# Patient Record
Sex: FEMALE | Race: WHITE | NOT HISPANIC OR LATINO | URBAN - METROPOLITAN AREA
[De-identification: names, ages, dates, MRNs, and addresses within clinical notes are randomized per-mention and may not be internally consistent; named-entity substitution may affect disease eponyms.]

---

## 2017-07-26 ENCOUNTER — OUTPATIENT (OUTPATIENT)
Dept: OUTPATIENT SERVICES | Facility: HOSPITAL | Age: 67
LOS: 1 days | Discharge: HOME | End: 2017-07-26

## 2017-07-26 DIAGNOSIS — Z00.00 ENCOUNTER FOR GENERAL ADULT MEDICAL EXAMINATION WITHOUT ABNORMAL FINDINGS: ICD-10-CM

## 2017-07-26 DIAGNOSIS — J44.9 CHRONIC OBSTRUCTIVE PULMONARY DISEASE, UNSPECIFIED: ICD-10-CM

## 2017-07-26 DIAGNOSIS — F41.9 ANXIETY DISORDER, UNSPECIFIED: ICD-10-CM

## 2017-07-26 DIAGNOSIS — J44.1 CHRONIC OBSTRUCTIVE PULMONARY DISEASE WITH (ACUTE) EXACERBATION: ICD-10-CM

## 2017-11-09 ENCOUNTER — INPATIENT (INPATIENT)
Facility: HOSPITAL | Age: 67
LOS: 4 days | Discharge: HOME | End: 2017-11-14
Attending: INTERNAL MEDICINE | Admitting: INTERNAL MEDICINE

## 2017-11-09 DIAGNOSIS — J44.1 CHRONIC OBSTRUCTIVE PULMONARY DISEASE WITH (ACUTE) EXACERBATION: ICD-10-CM

## 2017-11-09 DIAGNOSIS — J44.9 CHRONIC OBSTRUCTIVE PULMONARY DISEASE, UNSPECIFIED: ICD-10-CM

## 2017-11-15 DIAGNOSIS — J44.1 CHRONIC OBSTRUCTIVE PULMONARY DISEASE WITH (ACUTE) EXACERBATION: ICD-10-CM

## 2017-11-15 DIAGNOSIS — F41.9 ANXIETY DISORDER, UNSPECIFIED: ICD-10-CM

## 2017-11-15 DIAGNOSIS — G89.29 OTHER CHRONIC PAIN: ICD-10-CM

## 2017-11-15 DIAGNOSIS — Z88.0 ALLERGY STATUS TO PENICILLIN: ICD-10-CM

## 2017-11-15 DIAGNOSIS — Z72.0 TOBACCO USE: ICD-10-CM

## 2017-11-15 DIAGNOSIS — M54.9 DORSALGIA, UNSPECIFIED: ICD-10-CM

## 2017-11-15 DIAGNOSIS — I10 ESSENTIAL (PRIMARY) HYPERTENSION: ICD-10-CM

## 2018-01-17 PROBLEM — Z00.00 ENCOUNTER FOR PREVENTIVE HEALTH EXAMINATION: Status: ACTIVE | Noted: 2018-01-17

## 2018-04-17 ENCOUNTER — INPATIENT (INPATIENT)
Facility: HOSPITAL | Age: 68
LOS: 8 days | Discharge: HOME HEALTH | End: 2018-04-26
Attending: INTERNAL MEDICINE | Admitting: INTERNAL MEDICINE

## 2018-04-17 VITALS
DIASTOLIC BLOOD PRESSURE: 64 MMHG | TEMPERATURE: 98 F | RESPIRATION RATE: 20 BRPM | OXYGEN SATURATION: 98 % | SYSTOLIC BLOOD PRESSURE: 126 MMHG | HEART RATE: 86 BPM

## 2018-04-17 LAB
ALBUMIN SERPL ELPH-MCNC: 4.2 G/DL — SIGNIFICANT CHANGE UP (ref 3.5–5.2)
ALP SERPL-CCNC: 84 U/L — SIGNIFICANT CHANGE UP (ref 30–115)
ALT FLD-CCNC: 10 U/L — SIGNIFICANT CHANGE UP (ref 0–41)
ANION GAP SERPL CALC-SCNC: 15 MMOL/L — HIGH (ref 7–14)
AST SERPL-CCNC: 18 U/L — SIGNIFICANT CHANGE UP (ref 0–41)
BILIRUB SERPL-MCNC: 0.9 MG/DL — SIGNIFICANT CHANGE UP (ref 0.2–1.2)
BUN SERPL-MCNC: 4 MG/DL — LOW (ref 10–20)
CALCIUM SERPL-MCNC: 8.9 MG/DL — SIGNIFICANT CHANGE UP (ref 8.5–10.1)
CHLORIDE SERPL-SCNC: 98 MMOL/L — SIGNIFICANT CHANGE UP (ref 98–110)
CO2 SERPL-SCNC: 24 MMOL/L — SIGNIFICANT CHANGE UP (ref 17–32)
CREAT SERPL-MCNC: 0.6 MG/DL — LOW (ref 0.7–1.5)
GLUCOSE SERPL-MCNC: 109 MG/DL — HIGH (ref 70–99)
HCT VFR BLD CALC: 40.5 % — SIGNIFICANT CHANGE UP (ref 37–47)
HGB BLD-MCNC: 13.8 G/DL — SIGNIFICANT CHANGE UP (ref 12–16)
MAGNESIUM SERPL-MCNC: 2.1 MG/DL — SIGNIFICANT CHANGE UP (ref 1.8–2.4)
MCHC RBC-ENTMCNC: 33.3 PG — HIGH (ref 27–31)
MCHC RBC-ENTMCNC: 34.1 G/DL — SIGNIFICANT CHANGE UP (ref 32–37)
MCV RBC AUTO: 97.6 FL — SIGNIFICANT CHANGE UP (ref 81–99)
NRBC # BLD: 0 /100 WBCS — SIGNIFICANT CHANGE UP (ref 0–0)
PLATELET # BLD AUTO: 133 K/UL — SIGNIFICANT CHANGE UP (ref 130–400)
POTASSIUM SERPL-MCNC: 4.8 MMOL/L — SIGNIFICANT CHANGE UP (ref 3.5–5)
POTASSIUM SERPL-SCNC: 4.8 MMOL/L — SIGNIFICANT CHANGE UP (ref 3.5–5)
PROT SERPL-MCNC: 6.9 G/DL — SIGNIFICANT CHANGE UP (ref 6–8)
RBC # BLD: 4.15 M/UL — LOW (ref 4.2–5.4)
RBC # FLD: 11.9 % — SIGNIFICANT CHANGE UP (ref 11.5–14.5)
SODIUM SERPL-SCNC: 137 MMOL/L — SIGNIFICANT CHANGE UP (ref 135–146)
TROPONIN T SERPL-MCNC: <0.01 NG/ML — SIGNIFICANT CHANGE UP
WBC # BLD: 13.68 K/UL — HIGH (ref 4.8–10.8)
WBC # FLD AUTO: 13.68 K/UL — HIGH (ref 4.8–10.8)

## 2018-04-17 RX ORDER — IPRATROPIUM/ALBUTEROL SULFATE 18-103MCG
3 AEROSOL WITH ADAPTER (GRAM) INHALATION ONCE
Qty: 0 | Refills: 0 | Status: COMPLETED | OUTPATIENT
Start: 2018-04-17 | End: 2018-04-17

## 2018-04-17 RX ORDER — AZITHROMYCIN 500 MG/1
500 TABLET, FILM COATED ORAL ONCE
Qty: 0 | Refills: 0 | Status: COMPLETED | OUTPATIENT
Start: 2018-04-17 | End: 2018-04-17

## 2018-04-17 RX ADMIN — Medication 3 MILLILITER(S): at 23:40

## 2018-04-17 RX ADMIN — Medication 125 MILLIGRAM(S): at 20:52

## 2018-04-17 RX ADMIN — Medication 3 MILLILITER(S): at 20:52

## 2018-04-17 RX ADMIN — AZITHROMYCIN 255 MILLIGRAM(S): 500 TABLET, FILM COATED ORAL at 23:54

## 2018-04-17 NOTE — ED PROVIDER NOTE - PROGRESS NOTE DETAILS
peak flow  <150 at all times. desats to 95 when walking. pt does not feel safe going home and fears she will collapse. patient states she still feels sob despite the treatments. ariana beatty admits to hospitalist as per service called tonight./ spoke to dr greene for admission. peak flow  <150 at all times. desats to 93-95 when walking. pt does not feel safe going home and fears she will collapse.

## 2018-04-17 NOTE — ED PROVIDER NOTE - PHYSICAL EXAMINATION
PHYSICAL EXAM:    GENERAL: Alert, appears stated age, well appearing, non-toxic  SKIN: Warm, pink and dry. MMM.   EYE: Normal lids/conjunctiva  ENT: Normal hearing, patent oropharynx   NECK: +supple. No meningismus, or JVD, +Trachea midline.  Pulm: Bilateral BS, normal resp effort, no wheezes, stridor, or retractions  CV: RRR, no M/R/G, 2+ pulses throughout  Abd: soft, non-tender, non-distended, no hepatosplenomegaly. no CVA tenderness.   Mskel: no erythema, cyanosis, edema. no calf tenderness   Neuro: AAOx3

## 2018-04-17 NOTE — ED PROVIDER NOTE - MEDICAL DECISION MAKING DETAILS
Pt presents with SOB and cough, wheezing. Concerned about "walking PNA". will check xrays, labs, ekg and dispo on reassessment

## 2018-04-17 NOTE — ED PROVIDER NOTE - OBJECTIVE STATEMENT
67 y/o F with PMH COPD not on home O2. former smoker, presents with SOB,  cough, congestion  x days. She denies hx PE/DVT. She denies palliating/provoking factors. Presented to PMD who rxed prednisone, but she has not been able to  the rx. Denies CP, palpitations, back pain, abdominal pain, n/v/d, black or bloody stools, fevers, sweats, chills, HA, confusion, trauma, fall, cough, recent travel, recent illness, sick contacts, leg pain/swelling, urinary symptoms, rash.

## 2018-04-17 NOTE — ED ADULT NURSE NOTE - OBJECTIVE STATEMENT
pt with hx COPD, anxiety, presents c/o worsening SOB x5 days. States she lives alone and was nervous about her breathing. Used her inhalers w/o relief.

## 2018-04-17 NOTE — ED PROVIDER NOTE - NS ED ROS FT
Review of Systems    Constitutional: (-) fever  Cardiovascular: (-) chest pain, (-) syncope  Respiratory: (+) cough, (+) shortness of breath  Gastrointestinal: (-) vomiting, (-) diarrhea  Musculoskeletal: (-) neck pain, (-) back pain  Integumentary: (-) rash, (-) edema  Neurological: (-) headache, (-) altered mental status

## 2018-04-18 LAB
CK MB CFR SERPL CALC: 2.1 NG/ML — SIGNIFICANT CHANGE UP (ref 0.6–6.3)
CK SERPL-CCNC: 67 U/L — SIGNIFICANT CHANGE UP (ref 0–225)
NT-PROBNP SERPL-SCNC: 185 PG/ML — SIGNIFICANT CHANGE UP (ref 0–300)

## 2018-04-18 RX ORDER — AZITHROMYCIN 500 MG/1
500 TABLET, FILM COATED ORAL DAILY
Qty: 0 | Refills: 0 | Status: DISCONTINUED | OUTPATIENT
Start: 2018-04-18 | End: 2018-04-26

## 2018-04-18 RX ORDER — TRAZODONE HCL 50 MG
50 TABLET ORAL AT BEDTIME
Qty: 0 | Refills: 0 | Status: DISCONTINUED | OUTPATIENT
Start: 2018-04-18 | End: 2018-04-26

## 2018-04-18 RX ORDER — NICOTINE POLACRILEX 2 MG
1 GUM BUCCAL DAILY
Qty: 0 | Refills: 0 | Status: DISCONTINUED | OUTPATIENT
Start: 2018-04-18 | End: 2018-04-26

## 2018-04-18 RX ORDER — TIOTROPIUM BROMIDE 18 UG/1
1 CAPSULE ORAL; RESPIRATORY (INHALATION) DAILY
Qty: 0 | Refills: 0 | Status: DISCONTINUED | OUTPATIENT
Start: 2018-04-18 | End: 2018-04-18

## 2018-04-18 RX ORDER — ALPRAZOLAM 0.25 MG
0.5 TABLET ORAL
Qty: 0 | Refills: 0 | Status: DISCONTINUED | OUTPATIENT
Start: 2018-04-18 | End: 2018-04-25

## 2018-04-18 RX ORDER — ENOXAPARIN SODIUM 100 MG/ML
40 INJECTION SUBCUTANEOUS AT BEDTIME
Qty: 0 | Refills: 0 | Status: DISCONTINUED | OUTPATIENT
Start: 2018-04-18 | End: 2018-04-26

## 2018-04-18 RX ORDER — IPRATROPIUM/ALBUTEROL SULFATE 18-103MCG
3 AEROSOL WITH ADAPTER (GRAM) INHALATION EVERY 6 HOURS
Qty: 0 | Refills: 0 | Status: DISCONTINUED | OUTPATIENT
Start: 2018-04-18 | End: 2018-04-26

## 2018-04-18 RX ORDER — BUDESONIDE AND FORMOTEROL FUMARATE DIHYDRATE 160; 4.5 UG/1; UG/1
2 AEROSOL RESPIRATORY (INHALATION)
Qty: 0 | Refills: 0 | Status: COMPLETED | OUTPATIENT
Start: 2018-04-18 | End: 2019-03-17

## 2018-04-18 RX ORDER — IPRATROPIUM/ALBUTEROL SULFATE 18-103MCG
3 AEROSOL WITH ADAPTER (GRAM) INHALATION EVERY 4 HOURS
Qty: 0 | Refills: 0 | Status: DISCONTINUED | OUTPATIENT
Start: 2018-04-18 | End: 2018-04-26

## 2018-04-18 RX ORDER — ALBUTEROL 90 UG/1
2 AEROSOL, METERED ORAL EVERY 6 HOURS
Qty: 0 | Refills: 0 | Status: DISCONTINUED | OUTPATIENT
Start: 2018-04-18 | End: 2018-04-18

## 2018-04-18 RX ORDER — ALPRAZOLAM 0.25 MG
0.5 TABLET ORAL ONCE
Qty: 0 | Refills: 0 | Status: DISCONTINUED | OUTPATIENT
Start: 2018-04-18 | End: 2018-04-18

## 2018-04-18 RX ADMIN — Medication 0.5 MILLIGRAM(S): at 17:05

## 2018-04-18 RX ADMIN — Medication 60 MILLIGRAM(S): at 14:50

## 2018-04-18 RX ADMIN — Medication 50 MILLIGRAM(S): at 22:53

## 2018-04-18 RX ADMIN — Medication 1 PATCH: at 12:38

## 2018-04-18 RX ADMIN — Medication 60 MILLIGRAM(S): at 22:48

## 2018-04-18 RX ADMIN — AZITHROMYCIN 500 MILLIGRAM(S): 500 TABLET, FILM COATED ORAL at 12:37

## 2018-04-18 NOTE — H&P ADULT - ASSESSMENT
This is a 69 yo F with recurrent COPD and Anxiety presenting to ED with SOB, wheezing, and productive cough 2/2 COPD exacerbation.       1) COPD Exacerbation: satting 90% on O2, needs home O2 but is a recent smoker (quit 7 days ago)  - f/u official read for 2 view CXR --> no consolidations  - c/w albuterol/ipratroptium  - started spiriva and symbicort  - consider montelukast for future prevention  - obtain ambulatory O2  - discuss with SW setting up home O2, recently quit smoking (7 days ago)  - c/w Azithro for 5 days (GOLD indication for abx as patient has worsening SOB, increasing sputum production and purulence)    2) Anxiety  - c/w Xanax    3) Nicotine abuse/smoker: quit recently 7 days ago; does not like nicotine patch as it contributes to her anxiety  - c/w nicotine patch in hospital with xanax and reassurance/education    DVT ppx: lovenox    DISPO: home, full code This is a 67 yo F with recurrent COPD and Anxiety presenting to ED with SOB, wheezing, and productive cough 2/2 COPD exacerbation.       1) COPD Exacerbation: satting 90% on O2, needs home O2 but is a recent smoker (quit 7 days ago)  - f/u official read for 2 view CXR --> no consolidations  - c/w albuterol/ipratroptium q4 and prn  - started symbicort  - consider montelukast for future prevention  - obtain ambulatory O2  - discuss with SW setting up home O2, recently quit smoking (7 days ago)  - c/w Azithro for 5 days (GOLD indication for abx as patient has worsening SOB, increasing sputum production and purulence)  - start IV steroids 60mg q8    2) Anxiety  - c/w Xanax    3) Nicotine abuse/smoker: quit recently 7 days ago; does not like nicotine patch as it contributes to her anxiety  - c/w nicotine patch in hospital with xanax and reassurance/education    DVT ppx: lovenox    DISPO: home, full code

## 2018-04-18 NOTE — H&P ADULT - NSHPPHYSICALEXAM_GEN_ALL_CORE
GENERAL: Alert, appears stated age, well appearing, non-toxic  SKIN: Warm, pink and dry. MMM.   EYE: Normal lids/conjunctiva  ENT: Normal hearing, patent oropharynx   NECK: +supple. No meningismus, or JVD, +Trachea midline.  Pulm: Bilateral BS, normal resp effort, no wheezes, stridor, or retractions  CV: RRR, no M/R/G, 2+ pulses throughout  Abd: soft, non-tender, non-distended, no hepatosplenomegaly. no CVA tenderness.   Mskel: no erythema, cyanosis, edema. no calf tenderness   Neuro: AAOx3

## 2018-04-18 NOTE — H&P ADULT - ATTENDING COMMENTS
Patient examined and chart reviewed  Patient with severe COPD with recurrent exacerbation, more than 25 years of nicotine dependence and severe anxiety, came to ED because of refractory cough and wheezing. No edema  Agree with resident note and plan  Alert, anxious, diffuse bilateral rhonchi, on steroids and anxiolytic.  Seen in ED, awaiting admission to medical floor.

## 2018-04-18 NOTE — H&P ADULT - NSHPSOCIALHISTORY_GEN_ALL_CORE
-significant smoking history: >30 years .5-1 pack a day; tried to quit several times, last smoke was 1 week ago  -no EtOH  -no drug abuse

## 2018-04-18 NOTE — H&P ADULT - NSHPLABSRESULTS_GEN_ALL_CORE
Medications:    MEDICATIONS  (STANDING):  ALBUTerol    90 MICROgram(s) HFA Inhaler 2 Puff(s) Inhalation every 6 hours  ALPRAZolam 0.5 milliGRAM(s) Oral two times a day  methylPREDNISolone sodium succinate Injectable 40 milliGRAM(s) IV Push every 12 hours  tiotropium 18 MICROgram(s) Capsule 1 Capsule(s) Inhalation daily  traZODone 50 milliGRAM(s) Oral at bedtime    MEDICATIONS  (PRN):      Vitals:    Vital Signs Last 24 Hrs  T(C): 35.7 (18 Apr 2018 07:35), Max: 36.9 (17 Apr 2018 18:03)  T(F): 96.2 (18 Apr 2018 07:35), Max: 98.5 (17 Apr 2018 18:03)  HR: 91 (18 Apr 2018 07:35) (86 - 91)  BP: 138/67 (18 Apr 2018 07:35) (124/83 - 138/67)  BP(mean): --  RR: 17 (18 Apr 2018 07:35) (17 - 20)  SpO2: 97% (18 Apr 2018 07:35) (97% - 98%)    Labs:     04-17    137  |  98  |  4<L>  ----------------------------<  109<H>  4.8   |  24  |  0.6<L>    Ca    8.9      17 Apr 2018 21:04  Mg     2.1     04-17    TPro  6.9  /  Alb  4.2  /  TBili  0.9  /  DBili  x   /  AST  18  /  ALT  10  /  AlkPhos  84  04-17                          13.8   13.68 )-----------( 133      ( 17 Apr 2018 21:04 )             40.5     CARDIAC MARKERS ( 18 Apr 2018 02:44 )  x     / x     / 67 U/L / x     / 2.1 ng/mL  CARDIAC MARKERS ( 17 Apr 2018 21:04 )  x     / <0.01 ng/mL / x     / x     / x          LIVER FUNCTIONS - ( 17 Apr 2018 21:04 )  Alb: 4.2 g/dL / Pro: 6.9 g/dL / ALK PHOS: 84 U/L / ALT: 10 U/L / AST: 18 U/L / GGT: x                 Cultures:

## 2018-04-18 NOTE — H&P ADULT - HISTORY OF PRESENT ILLNESS
69 yo F with recurrent COPD exacerbation (multiple prior admissions, last one November 2017) not on home O2 (although indicated but smoker) and Anxiety brought in by ambulance for SOB, wheezing, and productive cough worsening for 4 days. Cough is productive yellow sputum and becoming more copious and purulent. No recent fevers. Pt states she has recurrent COPD exacerbations every 3-4 months and states that it is becoming more frequent and worse with age. She is compliant with all her meds and follows with Dr. Rosey Bermudez. She has been trying to quit smoking but has smoked for over 30 years and last smoke was 1 week ago. has tried to use nicotine patch but becomes anxious on it. She is up to date on flu vaccine. Denies any fevers, chills, nausea, CP.

## 2018-04-19 RX ORDER — OXYCODONE AND ACETAMINOPHEN 5; 325 MG/1; MG/1
1 TABLET ORAL ONCE
Qty: 0 | Refills: 0 | Status: DISCONTINUED | OUTPATIENT
Start: 2018-04-19 | End: 2018-04-19

## 2018-04-19 RX ADMIN — Medication 0.5 MILLIGRAM(S): at 21:53

## 2018-04-19 RX ADMIN — Medication 60 MILLIGRAM(S): at 13:04

## 2018-04-19 RX ADMIN — OXYCODONE AND ACETAMINOPHEN 1 TABLET(S): 5; 325 TABLET ORAL at 17:44

## 2018-04-19 RX ADMIN — OXYCODONE AND ACETAMINOPHEN 1 TABLET(S): 5; 325 TABLET ORAL at 18:14

## 2018-04-19 RX ADMIN — Medication 3 MILLILITER(S): at 19:35

## 2018-04-19 RX ADMIN — Medication 3 MILLILITER(S): at 16:59

## 2018-04-19 RX ADMIN — Medication 3 MILLILITER(S): at 10:05

## 2018-04-19 RX ADMIN — Medication 60 MILLIGRAM(S): at 06:25

## 2018-04-19 RX ADMIN — Medication 0.5 MILLIGRAM(S): at 06:25

## 2018-04-19 RX ADMIN — Medication 1 PATCH: at 11:36

## 2018-04-19 RX ADMIN — Medication 60 MILLIGRAM(S): at 21:51

## 2018-04-19 RX ADMIN — AZITHROMYCIN 500 MILLIGRAM(S): 500 TABLET, FILM COATED ORAL at 11:37

## 2018-04-19 RX ADMIN — Medication 50 MILLIGRAM(S): at 21:51

## 2018-04-19 NOTE — PROGRESS NOTE ADULT - ASSESSMENT
67 yo F with recurrent COPD and Anxiety presenting to ED with SOB, wheezing, and productive cough 2/2 COPD exacerbation.     COPD Exacerbation  - 90% on RA, currently off oxygen.  - c/w albuterol/ipratroptium q4 and prn  - started symbicort  - consider montelukast for future prevention  - ambulatory O2 pending  - discuss with SW setting up home O2, recently quit smoking (7 days ago)  - c/w Azithro for 4 more days  - IV Solumedrol 60mg q8 currently  - Patient still symptomatic with a peak flow of     Anxiety; Xanax    Nicotine abuse/smoker: quit recently 7 days ago; does not like nicotine patch as it contributes to her anxiety  - c/w nicotine patch in hospital with xanax and reassurance/education    DVT ppx: lovenox    DISPO: home, full code  Transfer to 3A upon patient's request and approval of team

## 2018-04-19 NOTE — ED ADULT NURSE REASSESSMENT NOTE - NS ED NURSE REASSESS COMMENT FT1
Pt provided O2 at bedside and ecouraged to wear at all times. PT appears to become dyspneic on exertion but refuses to wear o2 when ambulating unit. Pt wears o2 when in stretcher. spo2 96% with o2.
after pt ambulated down the hallway, O2 SAT 95%.
pt sleeping, in NAD. Will continue to reassess and maintain safety precautions.

## 2018-04-19 NOTE — PROGRESS NOTE ADULT - SUBJECTIVE AND OBJECTIVE BOX
Patient just brought up to medical floor from ED  Breathing has improved from yesterday but patient has diffuse wheezing both lung fields  She has her nicotine patch and is on intravenous steroids  Heart sounds distant  No edema.  Discussed with medical resident and continue treatment till objective improvement.

## 2018-04-19 NOTE — PROGRESS NOTE ADULT - SUBJECTIVE AND OBJECTIVE BOX
69 yo F with recurrent COPD exacerbation (multiple prior admissions, last one November 2017) not on home O2 (although indicated but smoker) and Anxiety brought in by ambulance for SOB, wheezing, and productive cough worsening for 4 days. Cough is productive yellow sputum and becoming more copious and purulent. No recent fevers. Pt states she has recurrent COPD exacerbations every 3-4 months and states that it is becoming more frequent and worse with age. She is compliant with all her meds and follows with Dr. Rosey Bermudez. She has been trying to quit smoking but has smoked for over 30 years and last smoke was 1 week ago. has tried to use nicotine patch but becomes anxious on it. She is up to date on flu vaccine. Denies any fevers, chills, nausea, CP.    PMH & PSH  Anxiety  COPD with recurrent exacerbation    Extensive tobacco use currently on nicotine patch    Physical Exam  Vital Signs Last 24 Hrs  T(C): 35.5 (19 Apr 2018 07:54), Max: 36.2 (18 Apr 2018 16:08)  T(F): 95.9 (19 Apr 2018 07:54), Max: 97.1 (18 Apr 2018 16:08)  HR: 77 (19 Apr 2018 07:54) (77 - 89)  BP: 158/67 (19 Apr 2018 07:54) (127/63 - 158/67)  BP(mean): --  RR: 20 (19 Apr 2018 07:54) (20 - 24)  SpO2: 99% (19 Apr 2018 07:54) (96% - 99%)  GENERAL: Short sentences. Alert, appears stated age, well appearing, non-toxic  SKIN: Warm, pink and dry. MMM.   EYE: Normal lids/conjunctiva  ENT: Normal hearing, patent oropharynx   NECK: +supple. No meningismus, or JVD, +Trachea midline.  Pulm: DAEB, diffuse wheezes  CV: RRR, no M/R/G, 2+ pulses throughout  Abd: soft, non-tender, non-distended, no hepatosplenomegaly. no CVA tenderness.   Mskel: no erythema, cyanosis, edema. no calf tenderness   Neuro: AAOx3 67 yo F with recurrent COPD exacerbation (multiple prior admissions, last one November 2017) not on home O2 (although indicated but smoker) and Anxiety brought in by ambulance for SOB, wheezing, and productive cough worsening for 4 days. Cough is productive yellow sputum and becoming more copious and purulent. No recent fevers. Pt states she has recurrent COPD exacerbations every 3-4 months and states that it is becoming more frequent and worse with age. She is compliant with all her meds and follows with Dr. Rosey Bermudez. She has been trying to quit smoking but has smoked for over 30 years and last smoke was 1 week ago. has tried to use nicotine patch but becomes anxious on it. She is up to date on flu vaccine. Denies any fevers, chills, nausea, CP.    PMH & PSH  Anxiety  COPD with recurrent exacerbation    Extensive tobacco use currently on nicotine patch    Physical Exam  Vital Signs Last 24 Hrs  T(C): 35.5 (19 Apr 2018 07:54), Max: 36.2 (18 Apr 2018 16:08)  T(F): 95.9 (19 Apr 2018 07:54), Max: 97.1 (18 Apr 2018 16:08)  HR: 77 (19 Apr 2018 07:54) (77 - 89)  BP: 158/67 (19 Apr 2018 07:54) (127/63 - 158/67)  BP(mean): --  RR: 20 (19 Apr 2018 07:54) (20 - 24)  SpO2: 99% (19 Apr 2018 07:54) (96% - 99%)  GENERAL: Short sentences. Alert, appears stated age, well appearing, non-toxic  SKIN: Warm, pink and dry. MMM.   EYE: Normal lids/conjunctiva  ENT: Normal hearing, patent oropharynx   NECK: +supple. No meningismus, or JVD, +Trachea midline.  Pulm: DAEB, diffuse wheezes  CV: RRR, no M/R/G, 2+ pulses throughout  Abd: soft, non-tender, non-distended, no hepatosplenomegaly. no CVA tenderness.   Mskel: no erythema, cyanosis, edema. no calf tenderness   Neuro: AAOx3    Labs                        13.8   13.68 )-----------( 133      ( 17 Apr 2018 21:04 )             40.5   04-17    137  |  98  |  4<L>  ----------------------------<  109<H>  4.8   |  24  |  0.6<L>    Ca    8.9      17 Apr 2018 21:04  Mg     2.1     04-17    TPro  6.9  /  Alb  4.2  /  TBili  0.9  /  DBili  x   /  AST  18  /  ALT  10  /  AlkPhos  84  04-17  CARDIAC MARKERS ( 18 Apr 2018 02:44 )  x     / x     / 67 U/L / x     / 2.1 ng/mL  CARDIAC MARKERS ( 17 Apr 2018 21:04 )  x     / <0.01 ng/mL / x     / x     / x      	LIVER FUNCTIONS - ( 17 Apr 2018 21:04 )  	Alb: 4.2 g/dL / Pro: 6.9 g/dL / ALK PHOS: 84 U/L / ALT: 10 U/L / AST: 18 U/L / GGT: x           MEDICATIONS  (STANDING):  ALBUTerol/ipratropium for Nebulization 3 milliLiter(s) Nebulizer every 4 hours  ALPRAZolam 0.5 milliGRAM(s) Oral two times a day  azithromycin   Tablet 500 milliGRAM(s) Oral daily  buDESOnide 160 MICROgram(s)/formoterol 4.5 MICROgram(s) Inhaler 2 Puff(s) Inhalation two times a day  enoxaparin Injectable 40 milliGRAM(s) SubCutaneous at bedtime  methylPREDNISolone sodium succinate Injectable 60 milliGRAM(s) IV Push every 8 hours  nicotine -   7 mG/24Hr(s) Patch 1 patch Transdermal daily  traZODone 50 milliGRAM(s) Oral at bedtime    MEDICATIONS  (PRN):  ALBUTerol/ipratropium for Nebulization 3 milliLiter(s) Nebulizer every 6 hours PRN Shortness of Breath and/or Wheezing      Radio  < from: Xray Chest 2 Views PA/Lat (04.17.18 @ 20:22) >  Impression:      No radiographic evidence of acute cardiopulmonary disease.    < end of copied text >

## 2018-04-20 ENCOUNTER — TRANSCRIPTION ENCOUNTER (OUTPATIENT)
Age: 68
End: 2018-04-20

## 2018-04-20 RX ORDER — OXYCODONE AND ACETAMINOPHEN 5; 325 MG/1; MG/1
2 TABLET ORAL EVERY 6 HOURS
Qty: 0 | Refills: 0 | Status: DISCONTINUED | OUTPATIENT
Start: 2018-04-20 | End: 2018-04-26

## 2018-04-20 RX ORDER — BUDESONIDE AND FORMOTEROL FUMARATE DIHYDRATE 160; 4.5 UG/1; UG/1
2 AEROSOL RESPIRATORY (INHALATION)
Qty: 0 | Refills: 0 | Status: DISCONTINUED | OUTPATIENT
Start: 2018-04-20 | End: 2018-04-26

## 2018-04-20 RX ORDER — OXYCODONE AND ACETAMINOPHEN 5; 325 MG/1; MG/1
1 TABLET ORAL ONCE
Qty: 0 | Refills: 0 | Status: DISCONTINUED | OUTPATIENT
Start: 2018-04-20 | End: 2018-04-20

## 2018-04-20 RX ADMIN — Medication 60 MILLIGRAM(S): at 12:39

## 2018-04-20 RX ADMIN — Medication 1 PATCH: at 12:40

## 2018-04-20 RX ADMIN — Medication 0.5 MILLIGRAM(S): at 07:36

## 2018-04-20 RX ADMIN — Medication 50 MILLIGRAM(S): at 21:19

## 2018-04-20 RX ADMIN — Medication 0.5 MILLIGRAM(S): at 19:03

## 2018-04-20 RX ADMIN — AZITHROMYCIN 500 MILLIGRAM(S): 500 TABLET, FILM COATED ORAL at 12:40

## 2018-04-20 RX ADMIN — OXYCODONE AND ACETAMINOPHEN 1 TABLET(S): 5; 325 TABLET ORAL at 06:55

## 2018-04-20 RX ADMIN — OXYCODONE AND ACETAMINOPHEN 1 TABLET(S): 5; 325 TABLET ORAL at 07:30

## 2018-04-20 RX ADMIN — OXYCODONE AND ACETAMINOPHEN 2 TABLET(S): 5; 325 TABLET ORAL at 19:05

## 2018-04-20 RX ADMIN — BUDESONIDE AND FORMOTEROL FUMARATE DIHYDRATE 2 PUFF(S): 160; 4.5 AEROSOL RESPIRATORY (INHALATION) at 19:33

## 2018-04-20 RX ADMIN — OXYCODONE AND ACETAMINOPHEN 2 TABLET(S): 5; 325 TABLET ORAL at 16:57

## 2018-04-20 RX ADMIN — Medication 3 MILLILITER(S): at 16:05

## 2018-04-20 RX ADMIN — Medication 1 PATCH: at 12:53

## 2018-04-20 RX ADMIN — Medication 3 MILLILITER(S): at 07:33

## 2018-04-20 RX ADMIN — Medication 3 MILLILITER(S): at 11:17

## 2018-04-20 RX ADMIN — Medication 60 MILLIGRAM(S): at 22:08

## 2018-04-20 NOTE — PROGRESS NOTE ADULT - SUBJECTIVE AND OBJECTIVE BOX
Patient seen and examined and discussed with resident  Very anxious this morning and continues to have diffuse wheezing both lung fields  c/o local discomfort from IV steroids.  Will start on oral Prednisone today and see response in am.

## 2018-04-20 NOTE — DISCHARGE NOTE ADULT - CARE PROVIDER_API CALL
London Bermudez), Internal Medicine  [address] [place] [address]  Phone: [phone]  Fax: [phone]    Chris Contreras), Critical Care Medicine; Internal Medicine; Pulmonary Disease; Sleep Medicine  [address] [place] [address]  Phone: [phone]  Fax: [phone]

## 2018-04-20 NOTE — DISCHARGE NOTE ADULT - PATIENT PORTAL LINK FT
You can access the [facility] Patient Portal, offered by [facility], by registering with the following website: [email or web address]

## 2018-04-20 NOTE — PROGRESS NOTE ADULT - SUBJECTIVE AND OBJECTIVE BOX
SUBJECTIVE:    Patient is a 68y old Female who presents with a chief complaint of shortness of breath (18 Apr 2018 08:36)    Currently admitted to medicine with the primary diagnosis of chronic obstructive pulmonary disease, unspecified COPD type    Today is hospital day 2d. Pt has hx of recurrent COPD exacerbations with SOB, wheezing, and productive cough which has progressively worsened. This morning she was agitated and is wheezing. She also notes dizziness, but no LOC over the past month and reports numerous falls, including 2 ankle fractures, over the last 2 years. Pt also complaining of a lump on her gums associated with some pain.      PAST MEDICAL & SURGICAL HISTORY  Anxiety  COPD (chronic obstructive pulmonary disease)    SOCIAL HISTORY:  Smoker for 30 years    ALLERGIES:  penicillin (Unknown)    MEDICATIONS:  STANDING MEDICATIONS  ALBUTerol/ipratropium for Nebulization 3 milliLiter(s) Nebulizer every 4 hours  ALPRAZolam 0.5 milliGRAM(s) Oral two times a day  azithromycin   Tablet 500 milliGRAM(s) Oral daily  buDESOnide 160 MICROgram(s)/formoterol 4.5 MICROgram(s) Inhaler 2 Puff(s) Inhalation two times a day  enoxaparin Injectable 40 milliGRAM(s) SubCutaneous at bedtime  methylPREDNISolone sodium succinate Injectable 60 milliGRAM(s) IV Push every 8 hours  nicotine -   7 mG/24Hr(s) Patch 1 patch Transdermal daily  traZODone 50 milliGRAM(s) Oral at bedtime    PRN MEDICATIONS  ALBUTerol/ipratropium for Nebulization 3 milliLiter(s) Nebulizer every 6 hours PRN    VITALS:   T(F): 97  HR: 81  BP: 139/72  RR: 18  SpO2: 96%    LABS:               13.8   13.68 )-----------( 133      ( 17 Apr 2018 21:04 )             40.5   04-17    137  |  98  |  4<L>  ----------------------------<  109<H>  4.8   |  24  |  0.6<L>    Ca    8.9      17 Apr 2018 21:04  Mg     2.1     04-17    TPro  6.9  /  Alb  4.2  /  TBili  0.9  /  DBili  x   /  AST  18  /  ALT  10  /  AlkPhos  84  04-17  CARDIAC MARKERS ( 18 Apr 2018 02:44 )  x     / x     / 67 U/L / x     / 2.1 ng/mL  CARDIAC MARKERS ( 17 Apr 2018 21:04 )  x     / <0.01 ng/mL / x     / x     / x      	LIVER FUNCTIONS - ( 17 Apr 2018 21:04 )  	Alb: 4.2 g/dL / Pro: 6.9 g/dL / ALK PHOS: 84 U/L / ALT: 10 U/L / AST: 18 U/L / GGT: x                       RADIOLOGY:  < from: Xray Chest 2 Views PA/Lat (04.17.18 @ 20:22) >  Impression:      No radiographic evidence of acute cardiopulmonary disease.    < end of copied text >      PHYSICAL EXAM:  GEN: Agitated  LUNGS: Diffuse wheezing bilaterally   HEART: S1/S2 present. RRR.   ABD: Soft, non-tender, non-distended. Bowel sounds present  EXT: NC/NC/NE/2+PP/BAPTISTE  NEURO: AAOX3

## 2018-04-20 NOTE — DISCHARGE NOTE ADULT - HOSPITAL COURSE
She came to us with COPD Exacerbation  - Wheezing much improved but still present  - Discharged on oral steroids taper   - On montelukast for prevention  - Peak flow decreased significantly from baseline    #) Anxiety  - high level of anxiety at baseline  - On Xanax twice a day

## 2018-04-20 NOTE — PROGRESS NOTE ADULT - ASSESSMENT
69 y/o F with recurrent COPD exacerbation (multiple prior admissions, last one November 2017) not on home O2 (although indicated, but smoker) and anxiety brought in by ambulance for SOB, wheezing, and productive cough worsening for 4 days. Cough is productive yellow sputum and becoming more copious and purulent. No recent fevers. Pt states she has recurrent COPD exacerbations every 3-4 months and states that it is becoming more frequent and worse with age. She is compliant with all her meds and follows with Dr. Rosey Bermudez. She has been trying to quit smoking, but has smoked for over 30 years and last smoke was 1 week ago. Pt has tried to use nicotine patch but becomes anxious on it. She is up to date on flu vaccine. Denies any fevers, chills, nausea, or CP.    #) COPD Exacerbation  - CXR: No radiographic evidence of acute cardiopulmonary disease  - 95% on RA, currently off oxygen.  - C/w albuterol/ipratroptium q4 and prn, provent 2 puffs every 6 hours,   - D/c solumderol 60 mg every 8 hours switch to PO predinsone due to IV discomfort  - Spiriva was discontinued  - Symbicort waiting for activation 2 puffs 2x daily  - Consider montelukast for future prevention  - C/w Azithro for 4 more days  - Discuss with SW setting up home O2, recently quit smoking  - Ambulatory O2 pending  - Patient still symptomatic with a peak flow of     #) Anxiety  - C/w Xanax    #) Nicotine abuse/smoker  - Quit recently; does not like nicotine patch as it contributes to her anxiety  - C/w nicotine patch in hospital with xanax and reassurance/education    #) DVT ppx  - Lovenox    #) Dispo  - From home, lives with brother  - Full code 67 y/o F with recurrent COPD exacerbation (multiple prior admissions, last one November 2017) not on home O2 (although indicated, but smoker) and anxiety brought in by ambulance for SOB, wheezing, and productive cough worsening for 4 days. Cough is productive yellow sputum and becoming more copious and purulent. No recent fevers. Pt states she has recurrent COPD exacerbations every 3-4 months and states that it is becoming more frequent and worse with age. She is compliant with all her meds and follows with Dr. Rosey Bermudez. She has been trying to quit smoking, but has smoked for over 30 years and last smoke was 1 week ago. Pt has tried to use nicotine patch but becomes anxious on it. She is up to date on flu vaccine. Denies any fevers, chills, nausea, or CP.    #) COPD Exacerbation  - CXR: No radiographic evidence of acute cardiopulmonary disease  - 95% on RA, currently off oxygen.  - C/w albuterol/ipratroptium q4 and prn, provent 2 puffs every 6 hours,   - D/c solumderol 60 mg every 8 hours switch to PO predinsone due to IV discomfort  - Symbicort waiting for activation 2 puffs 2x daily  - Consider montelukast for future prevention  - C/w Azithro for 4 more days  - Discuss with SW setting up home O2, recently quit smoking  - Ambulatory O2 pending  - Patient still symptomatic with a peak flow of     #) Anxiety  - C/w Xanax    #) Nicotine abuse/smoker  - Quit recently; does not like nicotine patch as it contributes to her anxiety  - C/w nicotine patch in hospital with xanax and reassurance/education    #) DVT ppx  - Lovenox    #) Dispo  - From home, lives with brother  - Full code 69 y/o F with recurrent COPD exacerbation (multiple prior admissions, last one November 2017) not on home O2 (although indicated, but smoker) and anxiety brought in by ambulance for SOB, wheezing, and productive cough worsening for 4 days. Cough is productive yellow sputum and becoming more copious and purulent. No recent fevers. Pt states she has recurrent COPD exacerbations every 3-4 months and states that it is becoming more frequent and worse with age. She is compliant with all her meds and follows with Dr. Rosey Bermudez. She has been trying to quit smoking, but has smoked for over 30 years and last smoke was 1 week ago. Pt has tried to use nicotine patch but becomes anxious on it. She is up to date on flu vaccine. Denies any fevers, chills, nausea, or CP.    #) COPD Exacerbation  - CXR: No radiographic evidence of acute cardiopulmonary disease  - 95% on RA, currently off oxygen.  - C/w albuterol/ipratroptium q4 and prn, provent 2 puffs every 6 hours,   - Continue with IV steroids swithch to PO after significant improvement   - Symbicort waiting for activation 2 puffs 2x daily  - Consider montelukast for future prevention  - C/w Azithro for 4 more days  - Patient still symptomatic with a peak flow of     #) Anxiety  - C/w Xanax    #) Nicotine abuse/smoker  - Quit recently; does not like nicotine patch as it contributes to her anxiety  - C/w nicotine patch    #) DVT ppx  - Lovenox    #) Dispo  - From home, lives with brother  - Full code

## 2018-04-20 NOTE — DISCHARGE NOTE ADULT - MEDICATION SUMMARY - MEDICATIONS TO TAKE
I will START or STAY ON the medications listed below when I get home from the hospital:    predniSONE 40 mg oral tablet  -- 1 tab(s) by mouth once a day for 3 days after the 50 mg tablet  -- Indication: For Chronic obstructive pulmonary disease, unspecified COPD type    predniSONE 30 mg oral tablet  -- 1 tab(s) by mouth once a day for 3 days after taking 40 mg for 3 days  -- Indication: For Chronic obstructive pulmonary disease, unspecified COPD type    predniSONE 40 mg oral tablet  -- 1 tab(s) by mouth once a day for 5 days when you are in yellow zone   -- Indication: For Chronic obstructive pulmonary disease, unspecified COPD type    predniSONE 10 mg oral tablet  -- 1 tab(s) by mouth once a day  for last 3 days   -- It is very important that you take or use this exactly as directed.  Do not skip doses or discontinue unless directed by your doctor.  Obtain medical advice before taking any non-prescription drugs as some may affect the action of this medication.  Take with food or milk.    -- Indication: For Chronic obstructive pulmonary disease, unspecified COPD type    predniSONE 20 mg oral tablet  -- 1 tab(s) by mouth once a day   -- It is very important that you take or use this exactly as directed.  Do not skip doses or discontinue unless directed by your doctor.  Obtain medical advice before taking any non-prescription drugs as some may affect the action of this medication.  Take with food or milk.    -- Indication: For Chronic obstructive pulmonary disease, unspecified COPD type    predniSONE 50 mg oral tablet  -- 1 tab(s) by mouth once a day for 3 days after the initial 3 days  -- It is very important that you take or use this exactly as directed.  Do not skip doses or discontinue unless directed by your doctor.  Obtain medical advice before taking any non-prescription drugs as some may affect the action of this medication.  Take with food or milk.    -- Indication: For Chronic obstructive pulmonary disease, unspecified COPD type    predniSONE 20 mg oral tablet  -- 3 tab(s) by mouth once a day initially to have 60 mg daily  -- It is very important that you take or use this exactly as directed.  Do not skip doses or discontinue unless directed by your doctor.  Obtain medical advice before taking any non-prescription drugs as some may affect the action of this medication.  Take with food or milk.    -- Indication: For Chronic obstructive pulmonary disease, unspecified COPD type    HYDROCO/APAP TAB 10-325MG  -- Indication: For Chronic Pain    TRAZODONE HCL 50 MG TABS  -- Indication: For Anxiety    doxycycline monohydrate 100 mg oral capsule  -- 1 cap(s) by mouth once a day for 7 days whenever you are in yellow zone as explained by the doctor  -- Avoid prolonged or excessive exposure to direct and/or artificial sunlight while taking this medication.  Do not take this drug if you are pregnant.  Finish all this medication unless otherwise directed by prescriber.  Medication should be taken with plenty of water.    -- Indication: For Preventive COPD Medicine    ALPRAZolam 0.5 mg oral tablet  -- 1 tab(s) by mouth every 12 hours  -- Indication: For Anxiety    BREO ELLIPTA -25  -- Indication: For Chronic obstructive pulmonary disease, unspecified COPD type    Spiriva Respimat 1.25 mcg/inh inhalation aerosol  -- 2 puff(s) inhaled once a day   -- Check with your doctor before becoming pregnant.  For inhalation only.    -- Indication: For Chronic obstructive pulmonary disease, unspecified COPD type    PROAIR  MCG/ACT AERS  -- Indication: For Chronic obstructive pulmonary disease, unspecified COPD type    COMBIVENT    AER   -- Indication: For Chronic obstructive pulmonary disease, unspecified COPD type    AUGMENTED BETAMETHASONE DIPROPIONATE .05 % OINT  -- Indication: For Health Maintainance    nicotine 7 mg/24 hr transdermal film, extended release  -- 1 patch by transdermal patch once a day   -- Indication: For Ncotine Withdrawal

## 2018-04-20 NOTE — DISCHARGE NOTE ADULT - PLAN OF CARE
Resolution of acute attack Please complete the steroid taper Please complete the steroid taper and follow up with the Pulmonologist in one week.

## 2018-04-21 RX ADMIN — Medication 0.5 MILLIGRAM(S): at 18:15

## 2018-04-21 RX ADMIN — OXYCODONE AND ACETAMINOPHEN 2 TABLET(S): 5; 325 TABLET ORAL at 20:06

## 2018-04-21 RX ADMIN — Medication 60 MILLIGRAM(S): at 21:44

## 2018-04-21 RX ADMIN — OXYCODONE AND ACETAMINOPHEN 2 TABLET(S): 5; 325 TABLET ORAL at 20:19

## 2018-04-21 RX ADMIN — Medication 3 MILLILITER(S): at 08:45

## 2018-04-21 RX ADMIN — Medication 60 MILLIGRAM(S): at 13:45

## 2018-04-21 RX ADMIN — OXYCODONE AND ACETAMINOPHEN 2 TABLET(S): 5; 325 TABLET ORAL at 07:00

## 2018-04-21 RX ADMIN — Medication 3 MILLILITER(S): at 20:01

## 2018-04-21 RX ADMIN — OXYCODONE AND ACETAMINOPHEN 2 TABLET(S): 5; 325 TABLET ORAL at 06:22

## 2018-04-21 RX ADMIN — Medication 60 MILLIGRAM(S): at 06:15

## 2018-04-21 RX ADMIN — ENOXAPARIN SODIUM 40 MILLIGRAM(S): 100 INJECTION SUBCUTANEOUS at 21:43

## 2018-04-21 RX ADMIN — Medication 0.5 MILLIGRAM(S): at 06:15

## 2018-04-21 RX ADMIN — Medication 1 PATCH: at 12:33

## 2018-04-21 RX ADMIN — Medication 50 MILLIGRAM(S): at 21:42

## 2018-04-21 RX ADMIN — OXYCODONE AND ACETAMINOPHEN 2 TABLET(S): 5; 325 TABLET ORAL at 12:42

## 2018-04-21 RX ADMIN — Medication 3 MILLILITER(S): at 13:37

## 2018-04-21 RX ADMIN — Medication 1 PATCH: at 12:29

## 2018-04-21 RX ADMIN — OXYCODONE AND ACETAMINOPHEN 2 TABLET(S): 5; 325 TABLET ORAL at 13:48

## 2018-04-21 RX ADMIN — AZITHROMYCIN 500 MILLIGRAM(S): 500 TABLET, FILM COATED ORAL at 12:28

## 2018-04-21 NOTE — PROGRESS NOTE ADULT - SUBJECTIVE AND OBJECTIVE BOX
Patient still has significant wheezing but cough now somewhat productive which might alleviate the dyspnea  Afebrile  Heart: distant heart sounds, no tachycardia  Lungs: diffuse bilateral rhonchi  No edema  On nebulizer treatment and intravenous steroids and nicotine patch.

## 2018-04-22 RX ADMIN — Medication 0.5 MILLIGRAM(S): at 06:57

## 2018-04-22 RX ADMIN — Medication 60 MILLIGRAM(S): at 06:18

## 2018-04-22 RX ADMIN — Medication 60 MILLIGRAM(S): at 13:03

## 2018-04-22 RX ADMIN — OXYCODONE AND ACETAMINOPHEN 2 TABLET(S): 5; 325 TABLET ORAL at 14:45

## 2018-04-22 RX ADMIN — Medication 1 PATCH: at 11:50

## 2018-04-22 RX ADMIN — Medication 3 MILLILITER(S): at 12:14

## 2018-04-22 RX ADMIN — AZITHROMYCIN 500 MILLIGRAM(S): 500 TABLET, FILM COATED ORAL at 11:49

## 2018-04-22 RX ADMIN — OXYCODONE AND ACETAMINOPHEN 2 TABLET(S): 5; 325 TABLET ORAL at 06:18

## 2018-04-22 RX ADMIN — BUDESONIDE AND FORMOTEROL FUMARATE DIHYDRATE 2 PUFF(S): 160; 4.5 AEROSOL RESPIRATORY (INHALATION) at 08:11

## 2018-04-22 RX ADMIN — BUDESONIDE AND FORMOTEROL FUMARATE DIHYDRATE 2 PUFF(S): 160; 4.5 AEROSOL RESPIRATORY (INHALATION) at 21:57

## 2018-04-22 RX ADMIN — Medication 50 MILLIGRAM(S): at 21:59

## 2018-04-22 RX ADMIN — Medication 3 MILLILITER(S): at 07:54

## 2018-04-22 RX ADMIN — Medication 60 MILLIGRAM(S): at 21:59

## 2018-04-22 RX ADMIN — ENOXAPARIN SODIUM 40 MILLIGRAM(S): 100 INJECTION SUBCUTANEOUS at 21:58

## 2018-04-22 RX ADMIN — OXYCODONE AND ACETAMINOPHEN 2 TABLET(S): 5; 325 TABLET ORAL at 17:17

## 2018-04-22 RX ADMIN — Medication 0.5 MILLIGRAM(S): at 17:16

## 2018-04-22 RX ADMIN — OXYCODONE AND ACETAMINOPHEN 2 TABLET(S): 5; 325 TABLET ORAL at 06:58

## 2018-04-22 RX ADMIN — Medication 3 MILLILITER(S): at 17:29

## 2018-04-22 NOTE — PROGRESS NOTE ADULT - SUBJECTIVE AND OBJECTIVE BOX
SUBJECTIVE:    Patient is a 68y old Female who presents with a chief complaint of Shortness of breath (20 Apr 2018 14:16)    Currently admitted to medicine with the primary diagnosis of Chronic obstructive pulmonary disease, unspecified COPD type     Today is hospital day 4d.NO events overnight.     PAST MEDICAL & SURGICAL HISTORY  Anxiety  COPD (chronic obstructive pulmonary disease)    SOCIAL HISTORY:  Negative for smoking/alcohol/drug use.     ALLERGIES:  penicillin (Unknown)    MEDICATIONS:  STANDING MEDICATIONS  ALBUTerol/ipratropium for Nebulization 3 milliLiter(s) Nebulizer every 4 hours  ALPRAZolam 0.5 milliGRAM(s) Oral two times a day  azithromycin   Tablet 500 milliGRAM(s) Oral daily  buDESOnide 160 MICROgram(s)/formoterol 4.5 MICROgram(s) Inhaler 2 Puff(s) Inhalation two times a day  enoxaparin Injectable 40 milliGRAM(s) SubCutaneous at bedtime  methylPREDNISolone sodium succinate Injectable 60 milliGRAM(s) IV Push every 8 hours  nicotine -   7 mG/24Hr(s) Patch 1 patch Transdermal daily  traZODone 50 milliGRAM(s) Oral at bedtime    PRN MEDICATIONS  ALBUTerol/ipratropium for Nebulization 3 milliLiter(s) Nebulizer every 6 hours PRN  oxyCODONE    5 mG/acetaminophen 325 mG 2 Tablet(s) Oral every 6 hours PRN    VITALS:   T(F): 97.3  HR: 78  BP: 147/69  RR: 18  SpO2: 96%      RADIOLOGY:  < from: Xray Chest 2 Views PA/Lat (04.17.18 @ 20:22) >  Impression:      No radiographic evidence of acute cardiopulmonary disease.      < end of copied text >    PHYSICAL EXAM:  GEN: No acute distress  LUNGS: Diffuse wheezing b/l   HEART: S1/S2 present. RRR.   ABD: Soft, non-tender, non-distended. Bowel sounds present  EXT: NC/NC/NE/2+PP/BAPTISTE  NEURO: AAOX3

## 2018-04-22 NOTE — PROGRESS NOTE ADULT - SUBJECTIVE AND OBJECTIVE BOX
Patient alert and oriented and jittery.  Continues to have diffuse bilateral rhonchi on auscultation inspite of treatment with nebulizer, IV satroids and inhalers  Still on opioids for chronic pain and on Alprazolam for chronic anxiety  Heart sounds distant.  On nicotine patch.

## 2018-04-22 NOTE — PROGRESS NOTE ADULT - ASSESSMENT
69 y/o F with recurrent COPD exacerbation (multiple prior admissions, last one November 2017) not on home O2 (although indicated, but smoker) and anxiety brought in by ambulance for SOB, wheezing, and productive cough worsening for 4 days. Cough is productive yellow sputum and becoming more copious and purulent. No recent fevers. Pt states she has recurrent COPD exacerbations every 3-4 months and states that it is becoming more frequent and worse with age. She is compliant with all her meds and follows with Dr. Rosey Bermudez. She has been trying to quit smoking, but has smoked for over 30 years and last smoke was 1 week ago. Pt has tried to use nicotine patch but becomes anxious on it. She is up to date on flu vaccine. Denies any fevers, chills, nausea, or CP.    #) COPD Exacerbation  -  Actively wheezing   - C/w albuterol/ipratroptium q4 and prn, provent 2 puffs every 6 hours,   - Continue with IV steroids switch to PO steroids tomorrow   - Symbicort waiting for activation 2 puffs 2x daily  - Consider montelukast for future prevention  - C/w Azithromycin for 5 days   - Peak flow 150 today     #) Anxiety  - C/w Xanax    #) Nicotine abuse/smoker  - Quit recently; does not like nicotine patch as it contributes to her anxiety  - C/w nicotine patch    #) DVT ppx  - Lovenox    #) Dispo  - From home, lives with brother  - Full code

## 2018-04-23 RX ADMIN — Medication 3 MILLILITER(S): at 15:41

## 2018-04-23 RX ADMIN — Medication 60 MILLIGRAM(S): at 14:23

## 2018-04-23 RX ADMIN — Medication 60 MILLIGRAM(S): at 05:17

## 2018-04-23 RX ADMIN — Medication 3 MILLILITER(S): at 08:42

## 2018-04-23 RX ADMIN — Medication 1 PATCH: at 12:24

## 2018-04-23 RX ADMIN — OXYCODONE AND ACETAMINOPHEN 2 TABLET(S): 5; 325 TABLET ORAL at 14:24

## 2018-04-23 RX ADMIN — Medication 3 MILLILITER(S): at 12:43

## 2018-04-23 RX ADMIN — Medication 3 MILLILITER(S): at 19:40

## 2018-04-23 RX ADMIN — BUDESONIDE AND FORMOTEROL FUMARATE DIHYDRATE 2 PUFF(S): 160; 4.5 AEROSOL RESPIRATORY (INHALATION) at 20:36

## 2018-04-23 RX ADMIN — Medication 0.5 MILLIGRAM(S): at 06:38

## 2018-04-23 RX ADMIN — OXYCODONE AND ACETAMINOPHEN 2 TABLET(S): 5; 325 TABLET ORAL at 22:51

## 2018-04-23 RX ADMIN — Medication 50 MILLIGRAM(S): at 21:41

## 2018-04-23 RX ADMIN — OXYCODONE AND ACETAMINOPHEN 2 TABLET(S): 5; 325 TABLET ORAL at 05:21

## 2018-04-23 RX ADMIN — Medication 60 MILLIGRAM(S): at 21:42

## 2018-04-23 RX ADMIN — OXYCODONE AND ACETAMINOPHEN 2 TABLET(S): 5; 325 TABLET ORAL at 15:00

## 2018-04-23 RX ADMIN — AZITHROMYCIN 500 MILLIGRAM(S): 500 TABLET, FILM COATED ORAL at 12:24

## 2018-04-23 RX ADMIN — Medication 0.5 MILLIGRAM(S): at 18:16

## 2018-04-23 RX ADMIN — OXYCODONE AND ACETAMINOPHEN 2 TABLET(S): 5; 325 TABLET ORAL at 21:41

## 2018-04-23 NOTE — PROGRESS NOTE ADULT - ASSESSMENT
68 year old female with recurrent COPD exacerbation (multiple prior admissions, last one November 2017) not on home O2 (although indicated) and anxiety brought in by ambulance for SOB, wheezing, and productive cough worsening for 4 days. Cough is productive yellow sputum and becoming more copious and purulent. No recent fevers. Pt states she has recurrent COPD exacerbations every 3-4 months and states that it is becoming more frequent and worse with age. She is compliant with all her meds and follows with Dr. Rosey Bermudez. She has been trying to quit smoking, but has smoked 1 and 1/2 PPD for over 30 years and last smoke was 1 week ago.    #) COPD Exacerbation  - Pulmonary consult requested because of recurrent attacks and as per PMD  -  Wheezing all over the chest  - On albuterol/ipratroptium q4 and prn, provent 2 puffs every 6 hours,   - On IV steroids as still wheezing a lot  - Symbicort waiting for activation 2 puffs 2x daily  - On montelukast for future prevention  - On Azithromycin  - Peak flow decreased than baseline of 350    #) Anxiety  - Extremely anxious at baseline  - On Xanax    #) Nicotine withdrawal  - Quit recently; does not like nicotine patch as it contributes to her anxiety  - On nicotine patch    #) DVT ppx

## 2018-04-23 NOTE — PROGRESS NOTE ADULT - SUBJECTIVE AND OBJECTIVE BOX
Patient continues to wheeze inspite of adequate treatment with IV steroids and treatment with nebulizer  She gets some dizziness on trying to get up but she has had this before and this may be from chronic hypoxemia  No clinical findings consistent with CHF.   A CT scan of chest in December showed secretions in multiple lobes  Dr Jas Iyer has seen patient in the past and suggest input.  Discussed with resident.

## 2018-04-23 NOTE — PROGRESS NOTE ADULT - SUBJECTIVE AND OBJECTIVE BOX
SUBJECTIVE:    Patient is a 68y old Female who presents with a chief complaint of Shortness of breath (20 Apr 2018 14:16)    Currently admitted to medicine with the primary diagnosis of Chronic obstructive pulmonary disease, unspecified COPD type     Today is hospital day 5d. This morning she is resting comfortably in bed and reports no new issues or overnight events.     PAST MEDICAL & SURGICAL HISTORY  Anxiety  COPD (chronic obstructive pulmonary disease)    SOCIAL HISTORY:  Negative for smoking/alcohol/drug use.     ALLERGIES:  penicillin (Unknown)    MEDICATIONS:  STANDING MEDICATIONS  ALBUTerol/ipratropium for Nebulization 3 milliLiter(s) Nebulizer every 4 hours  ALPRAZolam 0.5 milliGRAM(s) Oral two times a day  azithromycin   Tablet 500 milliGRAM(s) Oral daily  buDESOnide 160 MICROgram(s)/formoterol 4.5 MICROgram(s) Inhaler 2 Puff(s) Inhalation two times a day  enoxaparin Injectable 40 milliGRAM(s) SubCutaneous at bedtime  methylPREDNISolone sodium succinate Injectable 60 milliGRAM(s) IV Push every 8 hours  nicotine -   7 mG/24Hr(s) Patch 1 patch Transdermal daily  traZODone 50 milliGRAM(s) Oral at bedtime    PRN MEDICATIONS  ALBUTerol/ipratropium for Nebulization 3 milliLiter(s) Nebulizer every 6 hours PRN  oxyCODONE    5 mG/acetaminophen 325 mG 2 Tablet(s) Oral every 6 hours PRN    VITALS:   T(F): 96.8  HR: 75  BP: 150/75  RR: 20  SpO2: --    LABS:                        RADIOLOGY:    PHYSICAL EXAM:    GEN: No acute distress  LUNGS: Diffuse wheezing b/l   HEART: S1/S2 present. RRR.   ABD: Soft, non-tender, non-distended. Bowel sounds present  EXT: NC/NC/NE/2+PP/BAPTISTE  NEURO: AAOX3 SUBJECTIVE:    Patient is a 68y old Female who presents with a chief complaint of Shortness of breath (20 Apr 2018 14:16)    Currently admitted to medicine with the primary diagnosis of Chronic obstructive pulmonary disease, unspecified COPD type     Today is hospital day 5d. This morning she is resting comfortably in bed and reports no new issues or overnight events.     PAST MEDICAL & SURGICAL HISTORY  Anxiety  COPD (chronic obstructive pulmonary disease)    SOCIAL HISTORY:  Negative for smoking/alcohol/drug use.     ALLERGIES:  penicillin (Unknown)    MEDICATIONS:  STANDING MEDICATIONS  ALBUTerol/ipratropium for Nebulization 3 milliLiter(s) Nebulizer every 4 hours  ALPRAZolam 0.5 milliGRAM(s) Oral two times a day  azithromycin   Tablet 500 milliGRAM(s) Oral daily  buDESOnide 160 MICROgram(s)/formoterol 4.5 MICROgram(s) Inhaler 2 Puff(s) Inhalation two times a day  enoxaparin Injectable 40 milliGRAM(s) SubCutaneous at bedtime  methylPREDNISolone sodium succinate Injectable 60 milliGRAM(s) IV Push every 8 hours  nicotine -   7 mG/24Hr(s) Patch 1 patch Transdermal daily  traZODone 50 milliGRAM(s) Oral at bedtime    PRN MEDICATIONS  ALBUTerol/ipratropium for Nebulization 3 milliLiter(s) Nebulizer every 6 hours PRN  oxyCODONE    5 mG/acetaminophen 325 mG 2 Tablet(s) Oral every 6 hours PRN    VITALS:   T(F): 96.8  HR: 75  BP: 150/75  RR: 20  SpO2: --    LABS:                        RADIOLOGY:    PHYSICAL EXAM:    GEN: Anxious  LUNGS: Diffuse wheezing b/l   HEART: S1/S2 present. RRR.   ABD: Soft, non-tender, non-distended. Bowel sounds present  EXT: NC/NC/NE/2+PP/BAPTISTE  NEURO: AAOX3

## 2018-04-24 RX ADMIN — Medication 3 MILLILITER(S): at 11:59

## 2018-04-24 RX ADMIN — Medication 3 MILLILITER(S): at 08:24

## 2018-04-24 RX ADMIN — Medication 1 PATCH: at 11:54

## 2018-04-24 RX ADMIN — BUDESONIDE AND FORMOTEROL FUMARATE DIHYDRATE 2 PUFF(S): 160; 4.5 AEROSOL RESPIRATORY (INHALATION) at 20:14

## 2018-04-24 RX ADMIN — Medication 3 MILLILITER(S): at 15:36

## 2018-04-24 RX ADMIN — OXYCODONE AND ACETAMINOPHEN 2 TABLET(S): 5; 325 TABLET ORAL at 06:09

## 2018-04-24 RX ADMIN — Medication 0.5 MILLIGRAM(S): at 17:30

## 2018-04-24 RX ADMIN — Medication 60 MILLIGRAM(S): at 05:31

## 2018-04-24 RX ADMIN — Medication 60 MILLIGRAM(S): at 21:19

## 2018-04-24 RX ADMIN — OXYCODONE AND ACETAMINOPHEN 2 TABLET(S): 5; 325 TABLET ORAL at 12:03

## 2018-04-24 RX ADMIN — OXYCODONE AND ACETAMINOPHEN 2 TABLET(S): 5; 325 TABLET ORAL at 19:07

## 2018-04-24 RX ADMIN — Medication 60 MILLIGRAM(S): at 14:01

## 2018-04-24 RX ADMIN — Medication 50 MILLIGRAM(S): at 21:19

## 2018-04-24 RX ADMIN — OXYCODONE AND ACETAMINOPHEN 2 TABLET(S): 5; 325 TABLET ORAL at 05:29

## 2018-04-24 RX ADMIN — Medication 0.5 MILLIGRAM(S): at 06:44

## 2018-04-24 RX ADMIN — AZITHROMYCIN 500 MILLIGRAM(S): 500 TABLET, FILM COATED ORAL at 11:53

## 2018-04-24 RX ADMIN — BUDESONIDE AND FORMOTEROL FUMARATE DIHYDRATE 2 PUFF(S): 160; 4.5 AEROSOL RESPIRATORY (INHALATION) at 11:52

## 2018-04-24 NOTE — CONSULT NOTE ADULT - SUBJECTIVE AND OBJECTIVE BOX
Patient is a 68y old  Female who presents with a chief complaint of Shortness of breath (20 Apr 2018 14:16)      HPI:  67 yo F with recurrent COPD exacerbation (multiple prior admissions, last one November 2017) not on home O2 (although indicated but smoker) and Anxiety brought in by ambulance for SOB, wheezing, and productive cough worsening for 4 days. Cough is productive yellow sputum and becoming more copious and purulent. No recent fevers. Pt states she has recurrent COPD exacerbations every 3-4 months and states that it is becoming more frequent and worse with age. She is compliant with all her meds and follows with Dr. Rosey Bermudez. She has been trying to quit smoking but has smoked for over 30 years and last smoke was 1 week ago. has tried to use nicotine patch but becomes anxious on it. She is up to date on flu vaccine. Denies any fevers, chills, nausea, CP. (18 Apr 2018 08:36)    Pt admitted on 4/18 with sob, wheezing, productive cough, being treated with solumedrol and inhalers without any improvement. Pulm consulted for further management. Pt currently c/o      PAST MEDICAL & SURGICAL HISTORY:  Anxiety  COPD (chronic obstructive pulmonary disease)      SOCIAL HX:   Current smoker (last cig prior to admission)  >30 years .5-1 pack a day; tried to quit several times,     Smoking                           ETOH                              Other    FAMILY HISTORY:  not pertinent     REVIEW OF SYSTEMS  -ve other than above     Allergies    penicillin (Unknown)    Intolerances    PHYSICAL EXAM  Vital Signs Last 24 Hrs  T(C): 36.4 (24 Apr 2018 05:20), Max: 36.4 (24 Apr 2018 05:20)  T(F): 97.6 (24 Apr 2018 05:20), Max: 97.6 (24 Apr 2018 05:20)  HR: 74 (24 Apr 2018 05:20) (74 - 84)  BP: 147/65 (24 Apr 2018 05:20) (139/66 - 150/75)  BP(mean): --  RR: 18 (24 Apr 2018 05:20) (18 - 20)  SpO2: 95% (23 Apr 2018 23:58) (95% - 95%)    General: In NAD  HEENT: ANDRA             Lymph Nodes: No cervical LN     Lungs: Santhosh BS  Cardiovascular: Regular  Abdomen: Soft.  + BS   Extremities: No Clubbing   Skin: Warm  Neurological: Non Focal       LABS:                                           noted. No new since 4/17            MEDICATIONS  (STANDING):  ALBUTerol/ipratropium for Nebulization 3 milliLiter(s) Nebulizer every 4 hours  ALPRAZolam 0.5 milliGRAM(s) Oral two times a day  azithromycin   Tablet 500 milliGRAM(s) Oral daily  buDESOnide 160 MICROgram(s)/formoterol 4.5 MICROgram(s) Inhaler 2 Puff(s) Inhalation two times a day  enoxaparin Injectable 40 milliGRAM(s) SubCutaneous at bedtime  methylPREDNISolone sodium succinate Injectable 60 milliGRAM(s) IV Push every 8 hours  nicotine -   7 mG/24Hr(s) Patch 1 patch Transdermal daily  traZODone 50 milliGRAM(s) Oral at bedtime    MEDICATIONS  (PRN):  ALBUTerol/ipratropium for Nebulization 3 milliLiter(s) Nebulizer every 6 hours PRN Shortness of Breath and/or Wheezing  oxyCODONE    5 mG/acetaminophen 325 mG 2 Tablet(s) Oral every 6 hours PRN Moderate Pain (4 - 6)      CXR :   Impression:     No radiographic evidence of acute cardiopulmonary disease.    4/17 Patient is a 68y old  Female who presents with a chief complaint of Shortness of breath (20 Apr 2018 14:16)      HPI:  67 yo F with recurrent COPD exacerbation (multiple prior admissions, last one November 2017) not on home O2 (although indicated but smoker) and Anxiety brought in by ambulance for SOB, wheezing, and productive cough worsening for 4 days. Cough is productive yellow sputum and becoming more copious and purulent. No recent fevers. Pt states she has recurrent COPD exacerbations every 3-4 months and states that it is becoming more frequent and worse with age. She is compliant with all her meds and follows with Dr. Rosey Bermudez. She has been trying to quit smoking but has smoked for over 30 years and last smoke was 1 week ago. has tried to use nicotine patch but becomes anxious on it. She is up to date on flu vaccine. Denies any fevers, chills, nausea, CP. (18 Apr 2018 08:36)    Pt admitted on 4/18 with sob, wheezing, productive cough, being treated with solumedrol and inhalers without any improvement. Pulm consulted for further management. Pt currently c/o SOB, ANXIETY, cough ( non productive), wheezing     PAST MEDICAL & SURGICAL HISTORY:  Anxiety  COPD (chronic obstructive pulmonary disease)      SOCIAL HX:   Current smoker (last cig prior to admission)  45 years 1 pack a day; tried to quit several times,     FAMILY HISTORY:  not pertinent     REVIEW OF SYSTEMS  -ve other than above     Allergies    penicillin (Unknown)    Intolerances    PHYSICAL EXAM  Vital Signs Last 24 Hrs  T(C): 36.4 (24 Apr 2018 05:20), Max: 36.4 (24 Apr 2018 05:20)  T(F): 97.6 (24 Apr 2018 05:20), Max: 97.6 (24 Apr 2018 05:20)  HR: 74 (24 Apr 2018 05:20) (74 - 84)  BP: 147/65 (24 Apr 2018 05:20) (139/66 - 150/75)  BP(mean): --  RR: 18 (24 Apr 2018 05:20) (18 - 20)  SpO2: 95% (23 Apr 2018 23:58) (95% - 95%)    General: In NAD  HEENT: ANDRA             Lymph Nodes: No cervical LN     Lungs: Bilateral wheezing   Cardiovascular: Regular  Abdomen: Soft.  + BS   Extremities: No Clubbing   Skin: Warm  Neurological: Non Focal       LABS:                                           noted. No new since 4/17            MEDICATIONS  (STANDING):  ALBUTerol/ipratropium for Nebulization 3 milliLiter(s) Nebulizer every 4 hours  ALPRAZolam 0.5 milliGRAM(s) Oral two times a day  azithromycin   Tablet 500 milliGRAM(s) Oral daily  buDESOnide 160 MICROgram(s)/formoterol 4.5 MICROgram(s) Inhaler 2 Puff(s) Inhalation two times a day  enoxaparin Injectable 40 milliGRAM(s) SubCutaneous at bedtime  methylPREDNISolone sodium succinate Injectable 60 milliGRAM(s) IV Push every 8 hours  nicotine -   7 mG/24Hr(s) Patch 1 patch Transdermal daily  traZODone 50 milliGRAM(s) Oral at bedtime    MEDICATIONS  (PRN):  ALBUTerol/ipratropium for Nebulization 3 milliLiter(s) Nebulizer every 6 hours PRN Shortness of Breath and/or Wheezing  oxyCODONE    5 mG/acetaminophen 325 mG 2 Tablet(s) Oral every 6 hours PRN Moderate Pain (4 - 6)      CXR :   Impression:     No radiographic evidence of acute cardiopulmonary disease.    4/17 Patient is a 68y old  Female who presents with a chief complaint of Shortness of breath (20 Apr 2018 14:16)      HPI:  67 yo F with recurrent COPD exacerbation (multiple prior admissions, last one November 2017) active smoker and Anxiety brought in by ambulance for SOB, wheezing, and productive cough worsening for 4 days. Cough is productive yellow sputum and becoming more copious and purulent. No recent fevers. Pt states she has recurrent COPD exacerbations every 3-4 months and states that it is becoming more frequent and worse with age. She is compliant with all her meds and follows with Dr. Rosey Bermudez. She has been trying to quit smoking but has smoked for over 30 years and last smoke was 1 week ago. has tried to use nicotine patch but becomes anxious on it. She is up to date on flu vaccine. Denies any fevers, chills, nausea, CP. (18 Apr 2018 08:36)    Pt admitted on 4/18 with sob, wheezing, productive cough, being treated with solumedrol and inhalers without any improvement. Pulm consulted for further management. Pt currently c/o SOB, ANXIETY, cough ( non productive), wheezing, still coughing but feels better    PAST MEDICAL & SURGICAL HISTORY:  Anxiety  COPD (chronic obstructive pulmonary disease)      SOCIAL HX:   Current smoker (last cig prior to admission)  45 years 1 pack a day; tried to quit several times,     FAMILY HISTORY:  not pertinent     REVIEW OF SYSTEMS  -ve other than above     Allergies    penicillin (Unknown)    Intolerances    PHYSICAL EXAM  Vital Signs Last 24 Hrs  T(C): 36.4 (24 Apr 2018 05:20), Max: 36.4 (24 Apr 2018 05:20)  T(F): 97.6 (24 Apr 2018 05:20), Max: 97.6 (24 Apr 2018 05:20)  HR: 74 (24 Apr 2018 05:20) (74 - 84)  BP: 147/65 (24 Apr 2018 05:20) (139/66 - 150/75)  BP(mean): --  RR: 18 (24 Apr 2018 05:20) (18 - 20)  SpO2: 95% (23 Apr 2018 23:58) (95% - 95%)    General: In NAD  HEENT: ANDRA             Lymph Nodes: No cervical LN     Lungs: Bilateral wheezing   Cardiovascular: Regular  Abdomen: Soft.  + BS   Extremities: No Clubbing   Skin: Warm  Neurological: Non Focal       LABS:                                           noted. No new since 4/17            MEDICATIONS  (STANDING):  ALBUTerol/ipratropium for Nebulization 3 milliLiter(s) Nebulizer every 4 hours  ALPRAZolam 0.5 milliGRAM(s) Oral two times a day  azithromycin   Tablet 500 milliGRAM(s) Oral daily  buDESOnide 160 MICROgram(s)/formoterol 4.5 MICROgram(s) Inhaler 2 Puff(s) Inhalation two times a day  enoxaparin Injectable 40 milliGRAM(s) SubCutaneous at bedtime  methylPREDNISolone sodium succinate Injectable 60 milliGRAM(s) IV Push every 8 hours  nicotine -   7 mG/24Hr(s) Patch 1 patch Transdermal daily  traZODone 50 milliGRAM(s) Oral at bedtime    MEDICATIONS  (PRN):  ALBUTerol/ipratropium for Nebulization 3 milliLiter(s) Nebulizer every 6 hours PRN Shortness of Breath and/or Wheezing  oxyCODONE    5 mG/acetaminophen 325 mG 2 Tablet(s) Oral every 6 hours PRN Moderate Pain (4 - 6)      CXR :   Impression:     No radiographic evidence of acute cardiopulmonary disease.    4/17

## 2018-04-24 NOTE — PROGRESS NOTE ADULT - SUBJECTIVE AND OBJECTIVE BOX
Patient seen and examined  Continues to have dyspnea and diffuse wheezing in spite of nebulizer treatments ( gives her more anxiety), IV steroids, and use of inhalers  Scanty suptum, white.  No fever.  Pulmonary input suggested.

## 2018-04-24 NOTE — PROGRESS NOTE ADULT - ASSESSMENT
68 year old female with recurrent COPD exacerbation (multiple prior admissions, last one November 2017) not on home O2 (although indicated) and anxiety brought in by ambulance for SOB, wheezing, and productive cough worsening for 4 days. Cough is productive yellow sputum and becoming more copious and purulent. No recent fevers. Pt states she has recurrent COPD exacerbations every 3-4 months and states that it is becoming more frequent and worse with age. She is compliant with all her meds and follows with Dr. Rosey Bermudez. She has been trying to quit smoking, but has smoked 1 and 1/2 PPD for over 30 years and last smoke was 1 week ago.    #) COPD Exacerbation  - Pulmonary consult appreciated  -  Wheezing slightly improved  - On albuterol/ipratroptium q4 and prn, provent 2 puffs every 6 hours, symbicort  - On IV steroids as still wheezing a lot and azithromycin  - On montelukast for prevention  - Peak flow decreased than baseline of 350    #) Anxiety  - Extremely anxious at baseline  - On Xanax    #) Nicotine withdrawal  - Quit recently  - On nicotine patch

## 2018-04-24 NOTE — PROGRESS NOTE ADULT - SUBJECTIVE AND OBJECTIVE BOX
SUBJECTIVE:    Patient is a 68y old Female who presents with a chief complaint of Shortness of breath (20 Apr 2018 14:16)    Currently admitted to medicine with the primary diagnosis of Chronic obstructive pulmonary disease, unspecified COPD type     Today is hospital day 6d. This morning she is resting comfortably in bed and reports no new issues or overnight events.     PAST MEDICAL & SURGICAL HISTORY  Anxiety  COPD (chronic obstructive pulmonary disease)    SOCIAL HISTORY:  Negative for smoking/alcohol/drug use.     ALLERGIES:  penicillin (Unknown)    MEDICATIONS:  STANDING MEDICATIONS  ALBUTerol/ipratropium for Nebulization 3 milliLiter(s) Nebulizer every 4 hours  ALPRAZolam 0.5 milliGRAM(s) Oral two times a day  azithromycin   Tablet 500 milliGRAM(s) Oral daily  buDESOnide 160 MICROgram(s)/formoterol 4.5 MICROgram(s) Inhaler 2 Puff(s) Inhalation two times a day  enoxaparin Injectable 40 milliGRAM(s) SubCutaneous at bedtime  methylPREDNISolone sodium succinate Injectable 60 milliGRAM(s) IV Push every 8 hours  nicotine -   7 mG/24Hr(s) Patch 1 patch Transdermal daily  traZODone 50 milliGRAM(s) Oral at bedtime    PRN MEDICATIONS  ALBUTerol/ipratropium for Nebulization 3 milliLiter(s) Nebulizer every 6 hours PRN  oxyCODONE    5 mG/acetaminophen 325 mG 2 Tablet(s) Oral every 6 hours PRN    VITALS:   T(F): 98  HR: 76  BP: 142/67  RR: 18  SpO2: 97%    LABS:                        RADIOLOGY:    PHYSICAL EXAM:  GEN: Anxious  LUNGS: wheeze appreciable b/l, better than yesterday  HEART: S1/S2 present. RRR.   ABD: Soft, non-tender, non-distended. Bowel sounds present  EXT: NC/NC/NE/2+PP/BAPTISTE  NEURO: AAOX3

## 2018-04-24 NOTE — CONSULT NOTE ADULT - ASSESSMENT
Impression  >  >  >    Recommendations    Thank you for the consult. Impression  >Severe COPD exacerbation stage b   >Anxiety   >Current smoker     Recommendations    Solumedrol 60 q 8   symbicort, duonebs q 4 + prn   Azithormycin   Smoking cessation - nicotine patch  Xanax standing  Poor prognosis    dvt px     Thank you for the consult. Impression  >Severe COPD now exacerbation   >Anxiety   >Current smoker     Recommendations    change solumedrol to prednisone 60 mg for 3 days, 40 mg for 3 days, 20 mg for 3 days  symbicort, duonebs q 4 + prn   Azith course  Smoking cessation - nicotine patch  Xanax standing  Poor prognosis    dvt px   op f/up    Thank you for the consult.

## 2018-04-25 RX ADMIN — Medication 1 PATCH: at 11:14

## 2018-04-25 RX ADMIN — Medication 0.5 MILLIGRAM(S): at 05:21

## 2018-04-25 RX ADMIN — Medication 60 MILLIGRAM(S): at 05:24

## 2018-04-25 RX ADMIN — OXYCODONE AND ACETAMINOPHEN 2 TABLET(S): 5; 325 TABLET ORAL at 13:22

## 2018-04-25 RX ADMIN — BUDESONIDE AND FORMOTEROL FUMARATE DIHYDRATE 2 PUFF(S): 160; 4.5 AEROSOL RESPIRATORY (INHALATION) at 19:56

## 2018-04-25 RX ADMIN — OXYCODONE AND ACETAMINOPHEN 2 TABLET(S): 5; 325 TABLET ORAL at 20:01

## 2018-04-25 RX ADMIN — Medication 3 MILLILITER(S): at 12:19

## 2018-04-25 RX ADMIN — OXYCODONE AND ACETAMINOPHEN 2 TABLET(S): 5; 325 TABLET ORAL at 13:52

## 2018-04-25 RX ADMIN — Medication 50 MILLIGRAM(S): at 21:55

## 2018-04-25 RX ADMIN — BUDESONIDE AND FORMOTEROL FUMARATE DIHYDRATE 2 PUFF(S): 160; 4.5 AEROSOL RESPIRATORY (INHALATION) at 05:30

## 2018-04-25 RX ADMIN — Medication 60 MILLIGRAM(S): at 13:24

## 2018-04-25 RX ADMIN — OXYCODONE AND ACETAMINOPHEN 2 TABLET(S): 5; 325 TABLET ORAL at 20:53

## 2018-04-25 RX ADMIN — Medication 3 MILLILITER(S): at 20:35

## 2018-04-25 RX ADMIN — Medication 3 MILLILITER(S): at 08:07

## 2018-04-25 RX ADMIN — OXYCODONE AND ACETAMINOPHEN 2 TABLET(S): 5; 325 TABLET ORAL at 05:21

## 2018-04-25 RX ADMIN — Medication 3 MILLILITER(S): at 15:44

## 2018-04-25 RX ADMIN — Medication 1 PATCH: at 11:15

## 2018-04-25 RX ADMIN — AZITHROMYCIN 500 MILLIGRAM(S): 500 TABLET, FILM COATED ORAL at 11:12

## 2018-04-25 RX ADMIN — Medication 0.5 MILLIGRAM(S): at 17:07

## 2018-04-25 NOTE — PROGRESS NOTE ADULT - ASSESSMENT
68 year old female with recurrent COPD exacerbation (last one November 2017) not on home O2 (although indicated) and anxiety brought in by ambulance for SOB, wheezing, and productive cough worsening for 4 days. Cough is productive with yellow sputum. She denies recent fevers. Pt states she has recurrent COPD exacerbations every 3-4 months and states that it is becoming more frequent and worse with age. She is compliant with all her meds and follows with Dr. Rosey Bermudez. She has been trying to quit smoking, has smoked 1 and 1/2 PPD for over 30 years and last smoke was 1 week ago.    #) COPD Exacerbation  - Pulmonary consult appreciated  - Wheezing improved but still present  - On albuterol/ipratroptium q4 and prn, provent 2 puffs every 6 hours, symbicort  - On oral steroids taper from today  - On montelukast for prevention  - Peak flow decreased significantly from baseline of 350    #) Anxiety  - high level of anxiety at baseline  - On Xanax    #) Nicotine withdrawal  - Quit recently  - On nicotine patch

## 2018-04-25 NOTE — DIETITIAN INITIAL EVALUATION ADULT. - OTHER INFO
Pt presents with a chief complaint of Shortness of breath. COPD exacerbation: wheezing slightly improved, pulmonary following. Pt reports UBW ~152 lbs, denies wt changes PTA. (Reason for assessment: LOS).

## 2018-04-25 NOTE — PROGRESS NOTE ADULT - SUBJECTIVE AND OBJECTIVE BOX
SUBJECTIVE:    Patient is a 68y old Female who presents with a chief complaint of Shortness of breath (20 Apr 2018 14:16)    Currently admitted to medicine with the primary diagnosis of Chronic obstructive pulmonary disease, unspecified COPD type     Today is hospital day 7d. This morning she is resting comfortably in bed and reports no new issues or overnight events.     PAST MEDICAL & SURGICAL HISTORY  Anxiety  COPD (chronic obstructive pulmonary disease)    SOCIAL HISTORY:  Negative for smoking/alcohol/drug use.     ALLERGIES:  penicillin (Unknown)    MEDICATIONS:  STANDING MEDICATIONS  ALBUTerol/ipratropium for Nebulization 3 milliLiter(s) Nebulizer every 4 hours  azithromycin   Tablet 500 milliGRAM(s) Oral daily  buDESOnide 160 MICROgram(s)/formoterol 4.5 MICROgram(s) Inhaler 2 Puff(s) Inhalation two times a day  enoxaparin Injectable 40 milliGRAM(s) SubCutaneous at bedtime  nicotine -   7 mG/24Hr(s) Patch 1 patch Transdermal daily  predniSONE   Tablet 60 milliGRAM(s) Oral daily  traZODone 50 milliGRAM(s) Oral at bedtime    PRN MEDICATIONS  ALBUTerol/ipratropium for Nebulization 3 milliLiter(s) Nebulizer every 6 hours PRN  oxyCODONE    5 mG/acetaminophen 325 mG 2 Tablet(s) Oral every 6 hours PRN    VITALS:   T(F): 97.5  HR: 79  BP: 160/69  RR: 18  SpO2: 97%    LABS:                        RADIOLOGY:     Xray Chest 2 Views PA/Lat (04.17.18 @ 20:22) >  No radiographic evidence of acute cardiopulmonary disease.    < end of copied text >      PHYSICAL EXAM:  GEN: Anxious and tired  LUNGS: wheeze appreciable b/l, better than yesterday  HEART: S1/S2 present. RRR.   ABD: Soft, non-tender, non-distended. Bowel sounds present  EXT: No edema or cyanosis  NEURO: AAOX3

## 2018-04-25 NOTE — PROGRESS NOTE ADULT - SUBJECTIVE AND OBJECTIVE BOX
Patient continues to be tachypneic and has diffuse bilateral rhonchi on auscultation  The Alprazolam does seem to help because patient suffers from extreme anxiety  Her bp is elevated at times from anxiety and steroids  Pulmonary input appreciated.  Continue IV steroids, nebulizer rx and inhalers.

## 2018-04-26 VITALS
DIASTOLIC BLOOD PRESSURE: 62 MMHG | TEMPERATURE: 97 F | HEART RATE: 71 BPM | SYSTOLIC BLOOD PRESSURE: 133 MMHG | RESPIRATION RATE: 18 BRPM

## 2018-04-26 RX ORDER — ALPRAZOLAM 0.25 MG
1 TABLET ORAL
Qty: 0 | Refills: 0 | COMMUNITY
Start: 2018-04-26

## 2018-04-26 RX ORDER — ALPRAZOLAM 0.25 MG
1 TABLET ORAL
Qty: 20 | Refills: 0
Start: 2018-04-26 | End: 2018-05-05

## 2018-04-26 RX ORDER — ALPRAZOLAM 0.25 MG
0.5 TABLET ORAL EVERY 12 HOURS
Qty: 0 | Refills: 0 | Status: DISCONTINUED | OUTPATIENT
Start: 2018-04-26 | End: 2018-04-26

## 2018-04-26 RX ORDER — NICOTINE POLACRILEX 2 MG
1 GUM BUCCAL
Qty: 3 | Refills: 3 | OUTPATIENT
Start: 2018-04-26 | End: 2018-08-23

## 2018-04-26 RX ORDER — ALPRAZOLAM 0.25 MG
0.5 TABLET ORAL
Qty: 0 | Refills: 0 | Status: DISCONTINUED | OUTPATIENT
Start: 2018-04-26 | End: 2018-04-26

## 2018-04-26 RX ORDER — ALPRAZOLAM 0.25 MG
1 TABLET ORAL
Qty: 20 | Refills: 0 | COMMUNITY

## 2018-04-26 RX ORDER — TIOTROPIUM BROMIDE 18 UG/1
2 CAPSULE ORAL; RESPIRATORY (INHALATION)
Qty: 5 | Refills: 2 | OUTPATIENT
Start: 2018-04-26 | End: 2018-07-24

## 2018-04-26 RX ORDER — ALPRAZOLAM 0.25 MG
1 TABLET ORAL
Qty: 14 | Refills: 0
Start: 2018-04-26 | End: 2018-05-02

## 2018-04-26 RX ADMIN — Medication 1 PATCH: at 11:36

## 2018-04-26 RX ADMIN — BUDESONIDE AND FORMOTEROL FUMARATE DIHYDRATE 2 PUFF(S): 160; 4.5 AEROSOL RESPIRATORY (INHALATION) at 08:08

## 2018-04-26 RX ADMIN — Medication 0.5 MILLIGRAM(S): at 07:43

## 2018-04-26 RX ADMIN — Medication 60 MILLIGRAM(S): at 05:33

## 2018-04-26 RX ADMIN — Medication 1 PATCH: at 11:37

## 2018-04-26 RX ADMIN — OXYCODONE AND ACETAMINOPHEN 2 TABLET(S): 5; 325 TABLET ORAL at 11:43

## 2018-04-26 RX ADMIN — OXYCODONE AND ACETAMINOPHEN 2 TABLET(S): 5; 325 TABLET ORAL at 05:31

## 2018-04-26 RX ADMIN — AZITHROMYCIN 500 MILLIGRAM(S): 500 TABLET, FILM COATED ORAL at 11:36

## 2018-04-26 RX ADMIN — OXYCODONE AND ACETAMINOPHEN 2 TABLET(S): 5; 325 TABLET ORAL at 12:13

## 2018-04-26 RX ADMIN — Medication 3 MILLILITER(S): at 08:51

## 2018-04-26 NOTE — PROGRESS NOTE ADULT - ASSESSMENT
Impression  >Severe COPD now exacerbation   >Anxiety   >Current smoker     Recommendations    prednisone 60 mg for 3 days, 40 mg for 3 days, 20 mg for 3 days  symbicort, duonebs q 4 + prn   Azith course  Smoking cessation - nicotine patch   dvt px   op f/up

## 2018-04-26 NOTE — PROGRESS NOTE ADULT - SUBJECTIVE AND OBJECTIVE BOX
Patient seen and examined  Alert, oriented, gets shortness of breath from conversing  Has chronic anxiety, extreme  Diffuse bilateral expiratory  rhonchi but less intense now  Heart rate about 80/min.  No edema  Discussed with resident.  Patient can be discharged on Prednisone 60mg/d and will f/u in office on 04/3018  Will need Alprazolam for anxiety.  Must not go back to smoking. Can use nicotine patch.

## 2018-04-26 NOTE — PROGRESS NOTE ADULT - SUBJECTIVE AND OBJECTIVE BOX
OVERNIGHT EVENTS: feels better, cough productive of brownish phlegm, no fever    Vital Signs Last 24 Hrs  T(C): 36 (26 Apr 2018 05:26), Max: 36.4 (25 Apr 2018 19:34)  T(F): 96.8 (26 Apr 2018 05:26), Max: 97.5 (25 Apr 2018 19:34)  HR: 66 (26 Apr 2018 05:26) (66 - 79)  BP: 132/72 (26 Apr 2018 05:26) (132/72 - 160/69)  BP(mean): --  RR: 18 (26 Apr 2018 05:26) (18 - 18)  SpO2: 94%    PHYSICAL EXAMINATION:    GENERAL: The patient is awake and alert in no apparent distress.     HEENT: Head is normocephalic and atraumatic. Extraocular muscles are intact. Mucous membranes are moist.    NECK: Supple.    LUNGS: mild exp wheezing    HEART: Regular rate and rhythm without murmur.    ABDOMEN: Soft, nontender, and nondistended.      EXTREMITIES: Without any cyanosis, clubbing, rash, lesions or edema.    NEUROLOGIC: Grossly intact.    SKIN: No ulceration or induration present.      LABS:                                  MICROBIOLOGY:      MEDICATIONS  (STANDING):  ALBUTerol/ipratropium for Nebulization 3 milliLiter(s) Nebulizer every 4 hours  ALPRAZolam 0.5 milliGRAM(s) Oral every 12 hours  azithromycin   Tablet 500 milliGRAM(s) Oral daily  buDESOnide 160 MICROgram(s)/formoterol 4.5 MICROgram(s) Inhaler 2 Puff(s) Inhalation two times a day  enoxaparin Injectable 40 milliGRAM(s) SubCutaneous at bedtime  nicotine -   7 mG/24Hr(s) Patch 1 patch Transdermal daily  predniSONE   Tablet 60 milliGRAM(s) Oral daily  traZODone 50 milliGRAM(s) Oral at bedtime    MEDICATIONS  (PRN):  ALBUTerol/ipratropium for Nebulization 3 milliLiter(s) Nebulizer every 6 hours PRN Shortness of Breath and/or Wheezing  oxyCODONE    5 mG/acetaminophen 325 mG 2 Tablet(s) Oral every 6 hours PRN Moderate Pain (4 - 6)      RADIOLOGY & ADDITIONAL STUDIES:

## 2018-05-01 DIAGNOSIS — J44.1 CHRONIC OBSTRUCTIVE PULMONARY DISEASE WITH (ACUTE) EXACERBATION: ICD-10-CM

## 2018-05-01 DIAGNOSIS — Z88.0 ALLERGY STATUS TO PENICILLIN: ICD-10-CM

## 2018-05-01 DIAGNOSIS — F17.213 NICOTINE DEPENDENCE, CIGARETTES, WITH WITHDRAWAL: ICD-10-CM

## 2018-05-01 DIAGNOSIS — F41.9 ANXIETY DISORDER, UNSPECIFIED: ICD-10-CM

## 2018-05-19 ENCOUNTER — INPATIENT (INPATIENT)
Facility: HOSPITAL | Age: 68
LOS: 5 days | Discharge: HOME | End: 2018-05-25
Attending: INTERNAL MEDICINE | Admitting: INTERNAL MEDICINE

## 2018-05-19 VITALS
SYSTOLIC BLOOD PRESSURE: 149 MMHG | DIASTOLIC BLOOD PRESSURE: 71 MMHG | HEART RATE: 98 BPM | OXYGEN SATURATION: 96 % | TEMPERATURE: 98 F | RESPIRATION RATE: 20 BRPM

## 2018-05-19 LAB
ANION GAP SERPL CALC-SCNC: 10 MMOL/L — SIGNIFICANT CHANGE UP (ref 7–14)
BUN SERPL-MCNC: 6 MG/DL — LOW (ref 10–20)
CALCIUM SERPL-MCNC: 9.2 MG/DL — SIGNIFICANT CHANGE UP (ref 8.5–10.1)
CHLORIDE SERPL-SCNC: 91 MMOL/L — LOW (ref 98–110)
CO2 SERPL-SCNC: 32 MMOL/L — SIGNIFICANT CHANGE UP (ref 17–32)
CREAT SERPL-MCNC: 0.5 MG/DL — LOW (ref 0.7–1.5)
GLUCOSE SERPL-MCNC: 133 MG/DL — HIGH (ref 70–99)
HCT VFR BLD CALC: 41.6 % — SIGNIFICANT CHANGE UP (ref 37–47)
HGB BLD-MCNC: 13.9 G/DL — SIGNIFICANT CHANGE UP (ref 12–16)
MCHC RBC-ENTMCNC: 32.3 PG — HIGH (ref 27–31)
MCHC RBC-ENTMCNC: 33.4 G/DL — SIGNIFICANT CHANGE UP (ref 32–37)
MCV RBC AUTO: 96.7 FL — SIGNIFICANT CHANGE UP (ref 81–99)
NRBC # BLD: 0 /100 WBCS — SIGNIFICANT CHANGE UP (ref 0–0)
PLATELET # BLD AUTO: 406 K/UL — HIGH (ref 130–400)
POTASSIUM SERPL-MCNC: 5.2 MMOL/L — HIGH (ref 3.5–5)
POTASSIUM SERPL-SCNC: 5.2 MMOL/L — HIGH (ref 3.5–5)
RBC # BLD: 4.3 M/UL — SIGNIFICANT CHANGE UP (ref 4.2–5.4)
RBC # FLD: 12.8 % — SIGNIFICANT CHANGE UP (ref 11.5–14.5)
SODIUM SERPL-SCNC: 133 MMOL/L — LOW (ref 135–146)
WBC # BLD: 19.71 K/UL — HIGH (ref 4.8–10.8)
WBC # FLD AUTO: 19.71 K/UL — HIGH (ref 4.8–10.8)

## 2018-05-19 RX ORDER — IPRATROPIUM/ALBUTEROL SULFATE 18-103MCG
3 AEROSOL WITH ADAPTER (GRAM) INHALATION ONCE
Qty: 0 | Refills: 0 | Status: COMPLETED | OUTPATIENT
Start: 2018-05-19 | End: 2018-05-19

## 2018-05-19 RX ORDER — AZITHROMYCIN 500 MG/1
500 TABLET, FILM COATED ORAL ONCE
Qty: 0 | Refills: 0 | Status: DISCONTINUED | OUTPATIENT
Start: 2018-05-19 | End: 2018-05-19

## 2018-05-19 RX ADMIN — Medication 3 MILLILITER(S): at 20:06

## 2018-05-19 RX ADMIN — Medication 3 MILLILITER(S): at 20:14

## 2018-05-19 RX ADMIN — Medication 3 MILLILITER(S): at 20:37

## 2018-05-19 RX ADMIN — Medication 125 MILLIGRAM(S): at 20:14

## 2018-05-19 NOTE — H&P ADULT - NSHPPHYSICALEXAM_GEN_ALL_CORE
Gen: anxious, cooperative, pleasant, speaks in full sentences  Cards: RRR, S1-S2 present, no G/M/R  Resp: coarse rhonchii, expiratory wheeze b/l in all lung fields  Abd: S, NT, ND, +BS. No pain on palpation  Ext: 2+ radial pulses b/l, no C/C/E  Neuro: AOx3 Vital Signs Last 24 Hrs  T(C): 36.6 (19 May 2018 23:41), Max: 36.7 (19 May 2018 19:20)  T(F): 97.9 (19 May 2018 23:41), Max: 98 (19 May 2018 19:20)  HR: 105 (19 May 2018 23:41) (98 - 105)  BP: 181/85 (19 May 2018 23:41) (149/71 - 181/85)  BP(mean): --  RR: 18 (19 May 2018 23:41) (18 - 20)  SpO2: 97% (19 May 2018 23:41) (96% - 97%)    Gen: anxious, cooperative, pleasant, speaks in full sentences  Cards: RRR, S1-S2 present, no G/M/R  Resp: coarse rhonchii, expiratory wheeze b/l in all lung fields  Abd: S, NT, ND, +BS. No pain on palpation  Ext: 2+ radial pulses b/l, no C/C/E  Neuro: AOx3

## 2018-05-19 NOTE — H&P ADULT - HISTORY OF PRESENT ILLNESS
68yF w/ anxiety, COPD no home O2 p/w productive cough, dyspnea x3d unresponsive to outpatient management. Patient appears to know her outpatient course of treatment but has a circumferential and at times tangential manner of speech that makes it hard to understand her story. Patient states she has been hospitalized in November 2017 and April 2018 for pulmonary complaints. The patient states she was been treated by Dr. Chris Iyer as an outpatient with outpatient doxycyline and prednisone starting 3 days PTP without any improvement of her symptoms. She was initially to follow-up with Dr. Chris Iyer sooner since her 4/2018 discharge, but was not able to as her persistent symptoms prevented her from travelling to his office.    The patient has a history of sick contacts, subjective fever and chills, cough productive of yellow sputum (baseline white/clear). She denies a history of intubation.    PMD Nely Zaragoza  Pharmacy [facility] [address] [facility]

## 2018-05-19 NOTE — ED PROVIDER NOTE - PROGRESS NOTE DETAILS
spoke to dr. ariana beatty, accepts for admission if needed pt mildly improved, wheezing resolved, but still ronchi and dec air movement.  endorsed to mar

## 2018-05-19 NOTE — ED PROVIDER NOTE - NS ED ROS FT
Review of Systems:  	•	CONSTITUTIONAL - no fever, no diaphoresis, no weight change  	•	SKIN - no rash  	•	HEMATOLOGIC - no bleeding, no bruising  	•	EYES - no eye pain, no blurred vision  	•	ENT - no change in hearing, no pain  	•	RESPIRATORY - +wheezing + shortness of breath, + cough  	•	CARDIAC - no chest pain, no palpitations  	•	GI - no abd pain, no nausea, no vomiting, no diarrhea, no constipation, no bleeding  	•	MUSCULOSKELETAL - no joint paint, no leg swelling, no redness  	•	NEUROLOGIC - no weakness, no headache, no anesthesia, no paresthesias

## 2018-05-19 NOTE — ED PROVIDER NOTE - OBJECTIVE STATEMENT
69 yo f with pmh of copd, no home o2, anxiety, smoker, presents with worsening cough, wheezing and sob.  pt was admitted in april for severe copd exacerbation and was released on steroid taper.  pt says started coughing again this week, saw dr. ivy francois in office on wed and sent for a cxr which showed a right basilar pna.  pt says was started on doxy and medrol dose pack, pt is also taking her nebs and mdi at home without much relief.  yellow sputum.

## 2018-05-19 NOTE — ED PROVIDER NOTE - PHYSICAL EXAMINATION
VITAL SIGNS: I have reviewed nursing notes and confirm.  CONSTITUTIONAL: Well-developed; well-nourished; in no acute distress.  SKIN: Skin exam is warm and dry, no acute rash.  HEAD: Normocephalic; atraumatic.  EYES: PERRL, EOM intact; conjunctiva and sclera clear.  ENT: No nasal discharge; airway clear. TMs clear.  NECK: Supple; non tender.  CARD: S1, S2 normal; no murmurs, gallops, or rubs. Regular rate and rhythm.  RESP: diffuse ronchi,  wheezing, tachypnic, no significant accessory muscle use, speaking in choppy sentences  ABD: Normal bowel sounds; soft; non-distended; non-tender, obese  EXT: Normal ROM. No clubbing, cyanosis or edema.  NEURO: Alert, oriented. Grossly unremarkable. No focal deficits.  PSYCH: Cooperative, appropriate.

## 2018-05-19 NOTE — H&P ADULT - ASSESSMENT
68yF w/ anxiety, COPD no home O2 p/w productive cough, dyspnea x3d unresponsive to outpatient management. Pt complains of subjective fevers, chills, and has had a sick contact in the last week. Pt is afebrile in ED and CXR does not appear to have any opacities suggestive of PNA according to intern wet read. Pt has also been recently hospitalized for COPD exacerbation in 4/2018 which increases risk factor for Pseudomonas in a possible moderate/severe complicated COPD exacerbation.    Ddx: COPD Exacerbation v. Pneumonia    #COPD Exacerbation  -c/w levofloxacin (start 5/20) 2/2 recent hospitalization  -c/w methylprednisolone, albuterol q4h, symbicort, spiriva  -[facility]  -consider BiPAP if worsens  #Anxiety  -c/w alprazolam    Dispo: Home  Code: Full

## 2018-05-19 NOTE — H&P ADULT - NSHPSOCIALHISTORY_GEN_ALL_CORE
30 pkYears (active smoker), occasional EtOH use (beer), denies illicit drug use, ambulates independently

## 2018-05-20 PROBLEM — F41.9 ANXIETY DISORDER, UNSPECIFIED: Chronic | Status: ACTIVE | Noted: 2018-04-17

## 2018-05-20 PROBLEM — J44.9 CHRONIC OBSTRUCTIVE PULMONARY DISEASE, UNSPECIFIED: Chronic | Status: ACTIVE | Noted: 2018-04-17

## 2018-05-20 RX ORDER — TRAZODONE HCL 50 MG
50 TABLET ORAL AT BEDTIME
Qty: 0 | Refills: 0 | Status: DISCONTINUED | OUTPATIENT
Start: 2018-05-20 | End: 2018-05-25

## 2018-05-20 RX ORDER — IPRATROPIUM/ALBUTEROL SULFATE 18-103MCG
3 AEROSOL WITH ADAPTER (GRAM) INHALATION EVERY 4 HOURS
Qty: 0 | Refills: 0 | Status: DISCONTINUED | OUTPATIENT
Start: 2018-05-20 | End: 2018-05-25

## 2018-05-20 RX ORDER — ACETAMINOPHEN 500 MG
650 TABLET ORAL EVERY 6 HOURS
Qty: 0 | Refills: 0 | Status: DISCONTINUED | OUTPATIENT
Start: 2018-05-20 | End: 2018-05-25

## 2018-05-20 RX ORDER — OXYCODONE AND ACETAMINOPHEN 5; 325 MG/1; MG/1
1 TABLET ORAL EVERY 6 HOURS
Qty: 0 | Refills: 0 | Status: DISCONTINUED | OUTPATIENT
Start: 2018-05-20 | End: 2018-05-21

## 2018-05-20 RX ORDER — ENOXAPARIN SODIUM 100 MG/ML
40 INJECTION SUBCUTANEOUS DAILY
Qty: 0 | Refills: 0 | Status: DISCONTINUED | OUTPATIENT
Start: 2018-05-20 | End: 2018-05-25

## 2018-05-20 RX ORDER — TIOTROPIUM BROMIDE 18 UG/1
1 CAPSULE ORAL; RESPIRATORY (INHALATION) DAILY
Qty: 0 | Refills: 0 | Status: DISCONTINUED | OUTPATIENT
Start: 2018-05-20 | End: 2018-05-25

## 2018-05-20 RX ORDER — AMLODIPINE BESYLATE 2.5 MG/1
5 TABLET ORAL ONCE
Qty: 0 | Refills: 0 | Status: COMPLETED | OUTPATIENT
Start: 2018-05-20 | End: 2018-05-20

## 2018-05-20 RX ORDER — ALBUTEROL 90 UG/1
2.5 AEROSOL, METERED ORAL EVERY 4 HOURS
Qty: 0 | Refills: 0 | Status: DISCONTINUED | OUTPATIENT
Start: 2018-05-20 | End: 2018-05-20

## 2018-05-20 RX ORDER — ALPRAZOLAM 0.25 MG
0.5 TABLET ORAL EVERY 12 HOURS
Qty: 0 | Refills: 0 | Status: DISCONTINUED | OUTPATIENT
Start: 2018-05-20 | End: 2018-05-25

## 2018-05-20 RX ORDER — BUDESONIDE AND FORMOTEROL FUMARATE DIHYDRATE 160; 4.5 UG/1; UG/1
2 AEROSOL RESPIRATORY (INHALATION)
Qty: 0 | Refills: 0 | Status: DISCONTINUED | OUTPATIENT
Start: 2018-05-20 | End: 2018-05-25

## 2018-05-20 RX ORDER — LISINOPRIL 2.5 MG/1
10 TABLET ORAL ONCE
Qty: 0 | Refills: 0 | Status: COMPLETED | OUTPATIENT
Start: 2018-05-20 | End: 2018-05-20

## 2018-05-20 RX ORDER — NICOTINE POLACRILEX 2 MG
1 GUM BUCCAL DAILY
Qty: 0 | Refills: 0 | Status: DISCONTINUED | OUTPATIENT
Start: 2018-05-20 | End: 2018-05-25

## 2018-05-20 RX ORDER — IPRATROPIUM BROMIDE 0.2 MG/ML
500 SOLUTION, NON-ORAL INHALATION EVERY 4 HOURS
Qty: 0 | Refills: 0 | Status: DISCONTINUED | OUTPATIENT
Start: 2018-05-20 | End: 2018-05-20

## 2018-05-20 RX ADMIN — Medication 50 MILLIGRAM(S): at 21:04

## 2018-05-20 RX ADMIN — Medication 0.5 MILLIGRAM(S): at 16:28

## 2018-05-20 RX ADMIN — OXYCODONE AND ACETAMINOPHEN 1 TABLET(S): 5; 325 TABLET ORAL at 13:41

## 2018-05-20 RX ADMIN — Medication 1 PATCH: at 05:48

## 2018-05-20 RX ADMIN — TIOTROPIUM BROMIDE 1 CAPSULE(S): 18 CAPSULE ORAL; RESPIRATORY (INHALATION) at 08:47

## 2018-05-20 RX ADMIN — BUDESONIDE AND FORMOTEROL FUMARATE DIHYDRATE 2 PUFF(S): 160; 4.5 AEROSOL RESPIRATORY (INHALATION) at 21:23

## 2018-05-20 RX ADMIN — OXYCODONE AND ACETAMINOPHEN 1 TABLET(S): 5; 325 TABLET ORAL at 05:54

## 2018-05-20 RX ADMIN — ALBUTEROL 2.5 MILLIGRAM(S): 90 AEROSOL, METERED ORAL at 08:40

## 2018-05-20 RX ADMIN — Medication 60 MILLIGRAM(S): at 05:50

## 2018-05-20 RX ADMIN — Medication 3 MILLILITER(S): at 21:00

## 2018-05-20 RX ADMIN — OXYCODONE AND ACETAMINOPHEN 1 TABLET(S): 5; 325 TABLET ORAL at 21:23

## 2018-05-20 RX ADMIN — AMLODIPINE BESYLATE 5 MILLIGRAM(S): 2.5 TABLET ORAL at 10:48

## 2018-05-20 RX ADMIN — BUDESONIDE AND FORMOTEROL FUMARATE DIHYDRATE 2 PUFF(S): 160; 4.5 AEROSOL RESPIRATORY (INHALATION) at 05:52

## 2018-05-20 RX ADMIN — Medication 60 MILLIGRAM(S): at 17:16

## 2018-05-20 RX ADMIN — LISINOPRIL 10 MILLIGRAM(S): 2.5 TABLET ORAL at 19:41

## 2018-05-20 RX ADMIN — Medication 0.5 MILLIGRAM(S): at 03:32

## 2018-05-20 NOTE — PROGRESS NOTE ADULT - ASSESSMENT
68yF w/ anxiety, COPD no home O2 p/w productive cough, dyspnea x3d unresponsive to outpatient management. Pt complains of subjective fevers, chills, and has had a sick contact in the last week. Pt is afebrile in ED and CXR does not appear to have any opacities suggestive of PNA according to intern wet read. Pt has also been recently hospitalized for COPD exacerbation in 4/2018 which increases risk factor for Pseudomonas in a possible moderate/severe complicated COPD exacerbation.        #COPD Exacerbation  - Significant wheezing today, will give stat duonebs and PRN  -c/w levofloxacin (start 5/20) 2/2 recent hospitalization  -c/w methylprednisolone, duonebs q4, symbicort, spiriva  [facility]  -consider BiPAP if worsens    #Anxiety  -c/w alprazolam      # DVT: lovenox    # HTN:   BP uncontrolled, likely precipitated by anxiety  c/w home medication for now      Code: Full

## 2018-05-20 NOTE — PROGRESS NOTE ADULT - SUBJECTIVE AND OBJECTIVE BOX
Patient is a 68y old  Female who presents with a chief complaint of productive cough, dyspnea x3d unresponsive to outpt abx (19 May 2018 23:56)    Interval events:    Patient looks anxious, not happy about given " a lot of steroids and medications".       PAST MEDICAL & SURGICAL HISTORY:  Anxiety  COPD (chronic obstructive pulmonary disease)  No significant past surgical history      MEDICATIONS  (STANDING):  ALBUTerol    0.083% 2.5 milliGRAM(s) Nebulizer every 4 hours  ALPRAZolam 0.5 milliGRAM(s) Oral every 12 hours  buDESOnide 160 MICROgram(s)/formoterol 4.5 MICROgram(s) Inhaler 2 Puff(s) Inhalation two times a day  enoxaparin Injectable 40 milliGRAM(s) SubCutaneous daily  lisinopril 10 milliGRAM(s) Oral once  methylPREDNISolone sodium succinate Injectable 60 milliGRAM(s) IV Push two times a day  nicotine -   7 mG/24Hr(s) Patch 1 patch Transdermal daily  tiotropium 18 MICROgram(s) Capsule 1 Capsule(s) Inhalation daily  traZODone 50 milliGRAM(s) Oral at bedtime    MEDICATIONS  (PRN):  acetaminophen   Tablet 650 milliGRAM(s) Oral every 6 hours PRN For Temp greater than 38 C (100.4 F)  acetaminophen   Tablet. 650 milliGRAM(s) Oral every 6 hours PRN Mild Pain (1 - 3)  oxyCODONE    5 mG/acetaminophen 325 mG 1 Tablet(s) Oral every 6 hours PRN Moderate Pain (4 - 6)          Vital Signs Last 24 Hrs  T(C): 36.4 (20 May 2018 14:38), Max: 37.1 (20 May 2018 01:50)  T(F): 97.6 (20 May 2018 14:38), Max: 98.8 (20 May 2018 01:50)  HR: 84 (20 May 2018 18:38) (82 - 105)  BP: 172/80 (20 May 2018 18:38) (149/71 - 181/85)  BP(mean): --  RR: 19 (20 May 2018 14:38) (18 - 20)  SpO2: 89% (20 May 2018 12:56) (89% - 97%)  CAPILLARY BLOOD GLUCOSE        I&O's Summary    20 May 2018 07:01  -  20 May 2018 19:10  --------------------------------------------------------  IN: 150 mL / OUT: 0 mL / NET: 150 mL        Physical Exam:    -     General : sitting on bed, looks anxious    -      Cardiac: Regular rate    -      Pulm: bilateral wheeze    -      GI: soft non tender    -      Musculoskeletal: no edema    -      Neuro: ao x 3, non focal        Labs:                        13.9   19.71 )-----------( 406      ( 19 May 2018 19:37 )             41.6             05-19    133<L>  |  91<L>  |  6<L>  ----------------------------<  133<H>  5.2<H>   |  32  |  0.5<L>    Ca    9.2      19 May 2018 19:37                            Imaging:    ECG:

## 2018-05-21 ENCOUNTER — TRANSCRIPTION ENCOUNTER (OUTPATIENT)
Age: 68
End: 2018-05-21

## 2018-05-21 RX ORDER — IPRATROPIUM/ALBUTEROL SULFATE 18-103MCG
3 AEROSOL WITH ADAPTER (GRAM) INHALATION EVERY 6 HOURS
Qty: 0 | Refills: 0 | Status: DISCONTINUED | OUTPATIENT
Start: 2018-05-21 | End: 2018-05-25

## 2018-05-21 RX ORDER — OXYCODONE AND ACETAMINOPHEN 5; 325 MG/1; MG/1
2 TABLET ORAL EVERY 6 HOURS
Qty: 0 | Refills: 0 | Status: DISCONTINUED | OUTPATIENT
Start: 2018-05-21 | End: 2018-05-25

## 2018-05-21 RX ADMIN — Medication 60 MILLIGRAM(S): at 17:22

## 2018-05-21 RX ADMIN — OXYCODONE AND ACETAMINOPHEN 1 TABLET(S): 5; 325 TABLET ORAL at 05:35

## 2018-05-21 RX ADMIN — Medication 3 MILLILITER(S): at 09:32

## 2018-05-21 RX ADMIN — Medication 60 MILLIGRAM(S): at 05:35

## 2018-05-21 RX ADMIN — Medication 50 MILLIGRAM(S): at 23:15

## 2018-05-21 RX ADMIN — OXYCODONE AND ACETAMINOPHEN 2 TABLET(S): 5; 325 TABLET ORAL at 12:30

## 2018-05-21 RX ADMIN — Medication 1 PATCH: at 11:20

## 2018-05-21 RX ADMIN — BUDESONIDE AND FORMOTEROL FUMARATE DIHYDRATE 2 PUFF(S): 160; 4.5 AEROSOL RESPIRATORY (INHALATION) at 13:24

## 2018-05-21 RX ADMIN — OXYCODONE AND ACETAMINOPHEN 2 TABLET(S): 5; 325 TABLET ORAL at 20:38

## 2018-05-21 RX ADMIN — OXYCODONE AND ACETAMINOPHEN 2 TABLET(S): 5; 325 TABLET ORAL at 21:15

## 2018-05-21 RX ADMIN — Medication 3 MILLILITER(S): at 15:46

## 2018-05-21 RX ADMIN — OXYCODONE AND ACETAMINOPHEN 2 TABLET(S): 5; 325 TABLET ORAL at 12:59

## 2018-05-21 RX ADMIN — TIOTROPIUM BROMIDE 1 CAPSULE(S): 18 CAPSULE ORAL; RESPIRATORY (INHALATION) at 09:35

## 2018-05-21 RX ADMIN — Medication 0.5 MILLIGRAM(S): at 17:23

## 2018-05-21 NOTE — CONSULT NOTE ADULT - SUBJECTIVE AND OBJECTIVE BOX
Patient is a 68y old  Female who presents with a chief complaint of productive cough, dyspnea x3d unresponsive to outpt abx (19 May 2018 23:56)      HPI:  68yF w/ anxiety, COPD no home O2 p/w productive cough, dyspnea x3d unresponsive to outpatient management. Patient appears to know her outpatient course of treatment but has a circumferential and at times tangential manner of speech that makes it hard to understand her story. Patient states she has been hospitalized in November 2017 and April 2018 for pulmonary complaints. The patient states she was been treated by Dr. Chris Iyer as an outpatient with outpatient doxycyline and prednisone starting 3 days PTP without any improvement of her symptoms. She was initially to follow-up with Dr. Chris Iyer sooner since her 4/2018 discharge, but was not able to as her persistent symptoms prevented her from travelling to his office.    The patient has a history of sick contacts, subjective fever and chills, cough productive of yellow sputum (baseline white/clear). She denies a history of intubation.    PMD Nely Zaragoza  Pharmacy [address] (19 May 2018 23:56)      PAST MEDICAL & SURGICAL HISTORY:  Anxiety  COPD (chronic obstructive pulmonary disease)  No significant past surgical history      SOCIAL HX: Active Smoker                      FAMILY HISTORY:  No pertinent family history in first degree relatives    Allergies    penicillin (Unknown)    PHYSICAL EXAM  Vital Signs Last 24 Hrs  T(C): 36.1 (21 May 2018 05:13), Max: 36.7 (20 May 2018 21:47)  T(F): 97 (21 May 2018 05:13), Max: 98 (20 May 2018 21:47)  HR: 79 (21 May 2018 05:13) (79 - 102)  BP: 148/74 (21 May 2018 05:13) (143/66 - 176/79)  BP(mean): --  RR: 20 (21 May 2018 05:13) (19 - 20)  SpO2: 96% (21 May 2018 07:45) (89% - 96%)    General:    HEENT: AAO x3            Lymph Nodes: No lymphadenopathy  Neck:  Supple  Lungs: Bilateral wheezes +  Cardiovascular: S1S2  Abdomen: Soft, non tender  Extremities: NO edema or cyanosis  Skin: Intact  Neuro: non focal      05-20-18 @ 07:01  -  05-21-18 @ 07:00  --------------------------------------------------------  IN: 150 mL / OUT: 0 mL / NET: 150 mL    LAB:                        13.9   19.71 )-----------( 406      ( 19 May 2018 19:37 )             41.6                                               05-19    133<L>  |  91<L>  |  6<L>  ----------------------------<  133<H>  5.2<H>   |  32  |  0.5<L>    Ca    9.2      19 May 2018 19:37                                                                                                                        MEDICATIONS  (STANDING):  ALBUTerol/ipratropium for Nebulization 3 milliLiter(s) Nebulizer every 4 hours  ALPRAZolam 0.5 milliGRAM(s) Oral every 12 hours  buDESOnide 160 MICROgram(s)/formoterol 4.5 MICROgram(s) Inhaler 2 Puff(s) Inhalation two times a day  enoxaparin Injectable 40 milliGRAM(s) SubCutaneous daily  levoFLOXacin IVPB 750 milliGRAM(s) IV Intermittent every 24 hours  methylPREDNISolone sodium succinate Injectable 60 milliGRAM(s) IV Push two times a day  nicotine -   7 mG/24Hr(s) Patch 1 patch Transdermal daily  tiotropium 18 MICROgram(s) Capsule 1 Capsule(s) Inhalation daily  traZODone 50 milliGRAM(s) Oral at bedtime    MEDICATIONS  (PRN):  acetaminophen   Tablet 650 milliGRAM(s) Oral every 6 hours PRN For Temp greater than 38 C (100.4 F)  acetaminophen   Tablet. 650 milliGRAM(s) Oral every 6 hours PRN Mild Pain (1 - 3)  oxyCODONE    5 mG/acetaminophen 325 mG 1 Tablet(s) Oral every 6 hours PRN Moderate Pain (4 - 6)

## 2018-05-21 NOTE — DISCHARGE NOTE ADULT - MEDICATION SUMMARY - MEDICATIONS TO TAKE
I will START or STAY ON the medications listed below when I get home from the hospital:    predniSONE 20 mg oral tablet  -- 6 tab(s) daily x 3 days  4 tab(s) daily x 3 days  2 tab(s) daily x 3 days  1 tab(s) daily x 3 days  -- It is very important that you take or use this exactly as directed.  Do not skip doses or discontinue unless directed by your doctor.  Obtain medical advice before taking any non-prescription drugs as some may affect the action of this medication.  Take with food or milk.    -- Indication: For COPD EXACERBATION    HYDROCO/APAP TAB 10-325MG  -- 1 tab(s) by mouth every 6 hours, As Needed  -- Indication: For Chronic Pain    TRAZODONE HCL 50 MG TABS  -- Indication: For Insomnia    nystatin 100,000 units/mL oral suspension  -- 5 milliliter(s) by mouth 4 times a day swish in the mouth and retain for several minutes before swallowing  -- Indication: For Oral candidiasis    ALPRAZolam 0.5 mg oral tablet  -- 1 tab(s) by mouth every 12 hours  -- Indication: For Anxiety    Spiriva Respimat 1.25 mcg/inh inhalation aerosol  -- 2 puff(s) inhaled once a day   -- Check with your doctor before becoming pregnant.  For inhalation only.    -- Indication: For COPD EXACERBATION    PROAIR  MCG/ACT AERS  -- Indication: For COPD EXACERBATION    COMBIVENT    AER   -- Indication: For COPD EXACERBATION    BREO ELLIPTA -25  -- Indication: For COPD EXACERBATION    AUGMENTED BETAMETHASONE DIPROPIONATE .05 % OINT  -- Indication: For Ointment    Levaquin 750 mg oral tablet  -- 1 tab(s) by mouth once a day   -- Indication: For COPD EXACERBATION    roflumilast 500 mcg oral tablet  -- 1 tab(s) by mouth once a day   -- Check with your doctor before becoming pregnant.  Obtain medical advice before taking any non-prescription drugs as some may affect the action of this medication.    -- Indication: For COPD EXACERBATION    nicotine 7 mg/24 hr transdermal film, extended release  -- 1 patch by transdermal patch once a day   -- Indication: For COPD EXACERBATION

## 2018-05-21 NOTE — PROGRESS NOTE ADULT - ASSESSMENT
Assessment:  A 68y Female who presents for     Plan:  1.    2.    3. Assessment:  A 68y Female with a PMH of COPD and anxiety who presented with a 3-day history of SOB associated with productive cough. The patient was seen by her pulmonologist as an outpatient and was started on an antibiotic and Prednisone but her symptoms did not improve. She reports being hospitalized twice within the past year for respiratory complaints.    Plan:  1. Acute on chronic respiratory failure in the setting of COPD  - CXR (5/19): no radiographic evidence of acute cardiopulmonary disease  - continue Levaquin, Solu-Medrol IV, duonebs q4H and PRN, Spiriva  - pulmonology consult: check ABG, NIV as needed, smoking cessation counseling  - maintain SpO2 >90%    2. Anxiety  - continue current medications    3. DVT prophylaxis    4. Disposition  - anticipate discharge home once medically stable

## 2018-05-21 NOTE — DISCHARGE NOTE ADULT - MEDICATION SUMMARY - MEDICATIONS TO STOP TAKING
I will STOP taking the medications listed below when I get home from the hospital:    predniSONE 40 mg oral tablet  -- 1 tab(s) by mouth once a day for 3 days after the 50 mg tablet    predniSONE 30 mg oral tablet  -- 1 tab(s) by mouth once a day for 3 days after taking 40 mg for 3 days    predniSONE 40 mg oral tablet  -- 1 tab(s) by mouth once a day for 5 days when you are in yellow zone    predniSONE 10 mg oral tablet  -- 1 tab(s) by mouth once a day  for last 3 days   -- It is very important that you take or use this exactly as directed.  Do not skip doses or discontinue unless directed by your doctor.  Obtain medical advice before taking any non-prescription drugs as some may affect the action of this medication.  Take with food or milk.    predniSONE 20 mg oral tablet  -- 1 tab(s) by mouth once a day   -- It is very important that you take or use this exactly as directed.  Do not skip doses or discontinue unless directed by your doctor.  Obtain medical advice before taking any non-prescription drugs as some may affect the action of this medication.  Take with food or milk.    predniSONE 50 mg oral tablet  -- 1 tab(s) by mouth once a day for 3 days after the initial 3 days  -- It is very important that you take or use this exactly as directed.  Do not skip doses or discontinue unless directed by your doctor.  Obtain medical advice before taking any non-prescription drugs as some may affect the action of this medication.  Take with food or milk.    doxycycline monohydrate 100 mg oral capsule  -- 1 cap(s) by mouth once a day for 7 days whenever you are in yellow zone as explained by the doctor  -- Avoid prolonged or excessive exposure to direct and/or artificial sunlight while taking this medication.  Do not take this drug if you are pregnant.  Finish all this medication unless otherwise directed by prescriber.  Medication should be taken with plenty of water.    predniSONE 20 mg oral tablet  -- 3 tab(s) by mouth once a day initially to have 60 mg daily  -- It is very important that you take or use this exactly as directed.  Do not skip doses or discontinue unless directed by your doctor.  Obtain medical advice before taking any non-prescription drugs as some may affect the action of this medication.  Take with food or milk.

## 2018-05-21 NOTE — DISCHARGE NOTE ADULT - PLAN OF CARE
prevent recurrence and complications continue medications as instructed and follow-up with your pulmonologist for further evaluation optimal mood control continue medications as instructed and follow-up with your psychiatrist for further evaluation continue medications as instructed including the oral prednisone taper and follow-up with your pulmonologist for further evaluation optimal symptom control complete symptom control continue Nystatin oral suspension and stop once your oral thrush has resolved prevent complications of disease please follow-up with your primary doctor for further evaluation and repeat blood work; please seek medical attention if you experience any headache, dizziness, chest pain or palpitations

## 2018-05-21 NOTE — DISCHARGE NOTE ADULT - CARE PLAN
Principal Discharge DX:	COPD exacerbation  Goal:	prevent recurrence and complications  Assessment and plan of treatment:	continue medications as instructed and follow-up with your pulmonologist for further evaluation  Secondary Diagnosis:	Anxiety  Goal:	optimal mood control  Assessment and plan of treatment:	continue medications as instructed and follow-up with your psychiatrist for further evaluation  Secondary Diagnosis:	Oral candidiasis Principal Discharge DX:	COPD exacerbation  Goal:	prevent recurrence and complications  Assessment and plan of treatment:	continue medications as instructed including the oral prednisone taper and follow-up with your pulmonologist for further evaluation  Secondary Diagnosis:	Anxiety  Goal:	optimal symptom control  Assessment and plan of treatment:	continue medications as instructed and follow-up with your psychiatrist for further evaluation  Secondary Diagnosis:	Oral candidiasis  Goal:	complete symptom control  Assessment and plan of treatment:	continue Nystatin oral suspension and stop once your oral thrush has resolved  Secondary Diagnosis:	Hyperkalemia  Goal:	prevent complications of disease  Assessment and plan of treatment:	please follow-up with your primary doctor for further evaluation and repeat blood work; please seek medical attention if you experience any headache, dizziness, chest pain or palpitations

## 2018-05-21 NOTE — PROGRESS NOTE ADULT - SUBJECTIVE AND OBJECTIVE BOX
Patient is a 68y old  Female who presents with a chief complaint of productive cough, dyspnea x3d unresponsive to outpt abx (19 May 2018 23:56)    PAST MEDICAL & SURGICAL HISTORY:  Anxiety  COPD (chronic obstructive pulmonary disease)  No significant past surgical history    MEDICATIONS  (STANDING):  ALBUTerol/ipratropium for Nebulization 3 milliLiter(s) Nebulizer every 4 hours  ALPRAZolam 0.5 milliGRAM(s) Oral every 12 hours  buDESOnide 160 MICROgram(s)/formoterol 4.5 MICROgram(s) Inhaler 2 Puff(s) Inhalation two times a day  enoxaparin Injectable 40 milliGRAM(s) SubCutaneous daily  levoFLOXacin IVPB 750 milliGRAM(s) IV Intermittent every 24 hours  methylPREDNISolone sodium succinate Injectable 60 milliGRAM(s) IV Push two times a day  nicotine -   7 mG/24Hr(s) Patch 1 patch Transdermal daily  tiotropium 18 MICROgram(s) Capsule 1 Capsule(s) Inhalation daily  traZODone 50 milliGRAM(s) Oral at bedtime    MEDICATIONS  (PRN):  acetaminophen   Tablet 650 milliGRAM(s) Oral every 6 hours PRN For Temp greater than 38 C (100.4 F)  acetaminophen   Tablet. 650 milliGRAM(s) Oral every 6 hours PRN Mild Pain (1 - 3)  ALBUTerol/ipratropium for Nebulization 3 milliLiter(s) Nebulizer every 6 hours PRN Shortness of Breath and/or Wheezing  oxyCODONE    5 mG/acetaminophen 325 mG 2 Tablet(s) Oral every 6 hours PRN Severe Pain (7 - 10)    Allergy: penicillin (Unknown)    Overnight events: No acute events overnight.    Vital Signs Last 24 Hrs  T(C): 36.1 (21 May 2018 05:13), Max: 36.7 (20 May 2018 21:47)  T(F): 97 (21 May 2018 05:13), Max: 98 (20 May 2018 21:47)  HR: 79 (21 May 2018 05:13) (79 - 102)  BP: 148/74 (21 May 2018 05:13) (143/66 - 176/79)  BP(mean): --  RR: 20 (21 May 2018 05:13) (19 - 20)  SpO2: 96% (21 May 2018 07:45) (89% - 96%)  CAPILLARY BLOOD GLUCOSE    I&O's Summary    20 May 2018 07:01  -  21 May 2018 07:00  --------------------------------------------------------  IN: 150 mL / OUT: 0 mL / NET: 150 mL      CENTRAL LINE: [ ] YES [X] NO     TUBES: None    ZHOU: [ ] YES [X] NO        Physical Exam:    -     General : Alert, awake and in mild respiratory distress; appears anxious    -      HEENT: NC    -      Cardiac: RRR    -      Pulm: diffuse wheezes over posterior lung fields bilaterally    -      GI: abdomen soft, non-tender    -      Musculoskeletal: no lower extremity edema    Labs:                        13.9   19.71 )-----------( 406      ( 19 May 2018 19:37 )             41.6             05-19    133<L>  |  91<L>  |  6<L>  ----------------------------<  133<H>  5.2<H>   |  32  |  0.5<L>    Ca    9.2      19 May 2018 19:37

## 2018-05-21 NOTE — CONSULT NOTE ADULT - ASSESSMENT
Impression:  copd exacerbation  Active smoker    Recommendation:  iv solumedrol 60 Q8  Around the clock and PRN Albuterol.  Smoking cessation  z pack  Out patient follow up Impression:  copd exacerbation  Active smoker    Recommendation:  iv solumedrol 60 Q8  Around the clock and PRN Albuterol.  Smoking cessation  Repeat Potassium level  z pack  Out patient follow up Impression:  copd exacerbation  Active smoker    Recommendation:  ABG  NIV as needed  iv solumedrol 60 Q8  Around the clock and PRN Albuterol.  Smoking cessation  Repeat Potassium level  z pack  Out patient follow up Impression:  copd exacerbation  Active smoker/ non compliant    Recommendation:  ABG  NIV as needed  iv solumedrol 60 Q8/ abx  Around the clock and PRN Albuterol.  Smoking cessation  Repeat Potassium level  poor prognosis

## 2018-05-21 NOTE — DISCHARGE NOTE ADULT - PATIENT PORTAL LINK FT
You can access the [facility] Portal, offered by [facility], by registering with the following website: [email or web address]

## 2018-05-21 NOTE — PROGRESS NOTE ADULT - SUBJECTIVE AND OBJECTIVE BOX
Patient seen and examined  c/o gasping for air  Recurrent admissions for uncontrolled COPD inspite of adequate treatament  Anxious  Diffuse bilateral rhonchi  No edema  Distant heart sounds  Surprisingly,  saturation is good  Will discuss with  who saw her three days PTA and had her on Prednisone and Doxycycline.

## 2018-05-21 NOTE — DISCHARGE NOTE ADULT - CARE PROVIDER_API CALL
London Bermudez), Internal Medicine  [address] [place] [address]  Phone: [phone]  Fax: [phone]    Chris Contreras), Critical Care Medicine; Internal Medicine; Pulmonary Disease; Sleep Medicine  [address] [place] [address]  Phone: [phone]  Fax: [phone]

## 2018-05-22 LAB
ANION GAP SERPL CALC-SCNC: 8 MMOL/L — SIGNIFICANT CHANGE UP (ref 7–14)
BUN SERPL-MCNC: 11 MG/DL — SIGNIFICANT CHANGE UP (ref 10–20)
CALCIUM SERPL-MCNC: 9.5 MG/DL — SIGNIFICANT CHANGE UP (ref 8.5–10.1)
CHLORIDE SERPL-SCNC: 96 MMOL/L — LOW (ref 98–110)
CO2 SERPL-SCNC: 32 MMOL/L — SIGNIFICANT CHANGE UP (ref 17–32)
CREAT SERPL-MCNC: 0.5 MG/DL — LOW (ref 0.7–1.5)
GLUCOSE SERPL-MCNC: 97 MG/DL — SIGNIFICANT CHANGE UP (ref 70–99)
HCT VFR BLD CALC: 40.9 % — SIGNIFICANT CHANGE UP (ref 37–47)
HGB BLD-MCNC: 13.6 G/DL — SIGNIFICANT CHANGE UP (ref 12–16)
MAGNESIUM SERPL-MCNC: 2.1 MG/DL — SIGNIFICANT CHANGE UP (ref 1.8–2.4)
MCHC RBC-ENTMCNC: 32.4 PG — HIGH (ref 27–31)
MCHC RBC-ENTMCNC: 33.3 G/DL — SIGNIFICANT CHANGE UP (ref 32–37)
MCV RBC AUTO: 97.4 FL — SIGNIFICANT CHANGE UP (ref 81–99)
NRBC # BLD: 0 /100 WBCS — SIGNIFICANT CHANGE UP (ref 0–0)
PLATELET # BLD AUTO: 322 K/UL — SIGNIFICANT CHANGE UP (ref 130–400)
POTASSIUM SERPL-MCNC: 5.3 MMOL/L — HIGH (ref 3.5–5)
POTASSIUM SERPL-SCNC: 5.3 MMOL/L — HIGH (ref 3.5–5)
RBC # BLD: 4.2 M/UL — SIGNIFICANT CHANGE UP (ref 4.2–5.4)
RBC # FLD: 12.6 % — SIGNIFICANT CHANGE UP (ref 11.5–14.5)
SODIUM SERPL-SCNC: 136 MMOL/L — SIGNIFICANT CHANGE UP (ref 135–146)
WBC # BLD: 24.26 K/UL — HIGH (ref 4.8–10.8)
WBC # FLD AUTO: 24.26 K/UL — HIGH (ref 4.8–10.8)

## 2018-05-22 RX ADMIN — Medication 3 MILLILITER(S): at 09:09

## 2018-05-22 RX ADMIN — Medication 3 MILLILITER(S): at 15:44

## 2018-05-22 RX ADMIN — OXYCODONE AND ACETAMINOPHEN 2 TABLET(S): 5; 325 TABLET ORAL at 05:35

## 2018-05-22 RX ADMIN — Medication 50 MILLIGRAM(S): at 21:16

## 2018-05-22 RX ADMIN — BUDESONIDE AND FORMOTEROL FUMARATE DIHYDRATE 2 PUFF(S): 160; 4.5 AEROSOL RESPIRATORY (INHALATION) at 21:17

## 2018-05-22 RX ADMIN — Medication 1 PATCH: at 11:23

## 2018-05-22 RX ADMIN — TIOTROPIUM BROMIDE 1 CAPSULE(S): 18 CAPSULE ORAL; RESPIRATORY (INHALATION) at 09:07

## 2018-05-22 RX ADMIN — Medication 3 MILLILITER(S): at 20:00

## 2018-05-22 RX ADMIN — Medication 0.5 MILLIGRAM(S): at 08:35

## 2018-05-22 RX ADMIN — Medication 60 MILLIGRAM(S): at 05:36

## 2018-05-22 RX ADMIN — OXYCODONE AND ACETAMINOPHEN 2 TABLET(S): 5; 325 TABLET ORAL at 17:31

## 2018-05-22 RX ADMIN — Medication 1 PATCH: at 11:25

## 2018-05-22 RX ADMIN — OXYCODONE AND ACETAMINOPHEN 2 TABLET(S): 5; 325 TABLET ORAL at 11:58

## 2018-05-22 RX ADMIN — Medication 0.5 MILLIGRAM(S): at 21:16

## 2018-05-22 RX ADMIN — Medication 60 MILLIGRAM(S): at 17:31

## 2018-05-22 RX ADMIN — OXYCODONE AND ACETAMINOPHEN 2 TABLET(S): 5; 325 TABLET ORAL at 11:27

## 2018-05-22 NOTE — PROGRESS NOTE ADULT - ASSESSMENT
Assessment:  A 68y Female with a PMH of COPD and anxiety who presented with a 3-day history of SOB associated with productive cough. The patient was seen by her pulmonologist as an outpatient and was started on an antibiotic and Prednisone but her symptoms did not improve. She reports being hospitalized twice within the past year for respiratory complaints.    Plan:  1. Acute on chronic respiratory failure in the setting of COPD  - CXR (5/19): no radiographic evidence of acute cardiopulmonary disease  - continue Levaquin, Solu-Medrol IV, duonebs q4H and PRN, Spiriva  - pulmonology consult: check ABG, NIV as needed, smoking cessation counseling  - maintain SpO2 >90%    2. Anxiety  - continue current medications    3. DVT prophylaxis    4. Disposition  - anticipate discharge home once medically stable Assessment:  A 68y Female with a PMH of COPD and anxiety who presented with a 3-day history of SOB associated with productive cough. The patient was seen by her pulmonologist as an outpatient and was started on an antibiotic and Prednisone but her symptoms did not improve. She reports being hospitalized twice within the past year for respiratory complaints.    Plan:  1. Acute on chronic respiratory failure in the setting of COPD and active smoking  - CXR (5/22): no radiographic evidence of acute cardiopulmonary disease  - continue Levaquin, Solu-Medrol IV, duonebs q4H and PRN, Spiriva  - pulmonology following: recommend adding Daliresp 500 mg PO daily  - maintain SpO2 88-92%    2. Anxiety  - continue current medications    3. DVT prophylaxis    4. Disposition  - anticipate discharge home once medically stable Assessment:  A 68y Female with a PMH of COPD and anxiety who presented with a 3-day history of SOB associated with productive cough. The patient was seen by her pulmonologist as an outpatient and was started on an antibiotic and Prednisone but her symptoms did not improve. She reports being hospitalized twice within the past year for respiratory complaints.    Plan:  1. Acute on chronic respiratory failure in the setting of COPD and active smoking  - CXR (5/22): no radiographic evidence of acute cardiopulmonary disease  - continue Levaquin, Solu-Medrol IV, duonebs q4H and PRN, Spiriva  - pulmonology following: recommend adding Daliresp 500 mcg PO daily  - maintain SpO2 88-92%    2. Anxiety  - continue current medications    3. DVT prophylaxis    4. Disposition  - anticipate discharge home once medically stable

## 2018-05-22 NOTE — PROGRESS NOTE ADULT - SUBJECTIVE AND OBJECTIVE BOX
OVERNIGHT EVENTS: still coughing / wheezing    Vital Signs Last 24 Hrs  T(C): 35.3 (22 May 2018 04:46), Max: 36.6 (21 May 2018 14:20)  T(F): 95.6 (22 May 2018 04:46), Max: 97.8 (21 May 2018 14:20)  HR: 82 (22 May 2018 04:46) (78 - 91)  BP: 138/76 (22 May 2018 04:46) (138/76 - 165/77)  BP(mean): --  RR: 18 (22 May 2018 04:46) (18 - 22)  SpO2: 91%    PHYSICAL EXAMINATION:    GENERAL: The patient is awake and alert in no apparent distress.     HEENT: Head is normocephalic and atraumatic. Extraocular muscles are intact. Mucous membranes are moist.    NECK: Supple.    LUNGS: diffuse wheezing    HEART: Regular rate and rhythm without murmur.    ABDOMEN: Soft, nontender, and nondistended.      EXTREMITIES: Without any cyanosis, clubbing, rash, lesions or edema.    NEUROLOGIC: Grossly intact.    SKIN: No ulceration or induration present.      LABS:                        13.6   24.26 )-----------( 322      ( 22 May 2018 06:02 )             40.9     05-22    136  |  96<L>  |  11  ----------------------------<  97  5.3<H>   |  32  |  0.5<L>    Ca    9.5      22 May 2018 06:02  Mg     2.1     05-22 05-21-18 @ 07:01  -  05-22-18 @ 07:00  --------------------------------------------------------  IN: 0 mL / OUT: 1 mL / NET: -1 mL        MICROBIOLOGY:      MEDICATIONS  (STANDING):  ALBUTerol/ipratropium for Nebulization 3 milliLiter(s) Nebulizer every 4 hours  ALPRAZolam 0.5 milliGRAM(s) Oral every 12 hours  buDESOnide 160 MICROgram(s)/formoterol 4.5 MICROgram(s) Inhaler 2 Puff(s) Inhalation two times a day  enoxaparin Injectable 40 milliGRAM(s) SubCutaneous daily  levoFLOXacin  Tablet 750 milliGRAM(s) Oral every 24 hours  methylPREDNISolone sodium succinate Injectable 60 milliGRAM(s) IV Push two times a day  nicotine -   7 mG/24Hr(s) Patch 1 patch Transdermal daily  tiotropium 18 MICROgram(s) Capsule 1 Capsule(s) Inhalation daily  traZODone 50 milliGRAM(s) Oral at bedtime    MEDICATIONS  (PRN):  acetaminophen   Tablet 650 milliGRAM(s) Oral every 6 hours PRN For Temp greater than 38 C (100.4 F)  acetaminophen   Tablet. 650 milliGRAM(s) Oral every 6 hours PRN Mild Pain (1 - 3)  ALBUTerol/ipratropium for Nebulization 3 milliLiter(s) Nebulizer every 6 hours PRN Shortness of Breath and/or Wheezing  guaiFENesin    Syrup 100 milliGRAM(s) Oral every 6 hours PRN Cough or congestion  oxyCODONE    5 mG/acetaminophen 325 mG 2 Tablet(s) Oral every 6 hours PRN Severe Pain (7 - 10)      RADIOLOGY & ADDITIONAL STUDIES:

## 2018-05-22 NOTE — PROGRESS NOTE ADULT - SUBJECTIVE AND OBJECTIVE BOX
Patient continues to have grade 4 dyspnea and currently on 180 mgs of Solumedrol IV daily and nebulizer  Significantly diminished air entry both lung fields with scattered wheezing  Pulmonology note reviewed  No clinical findings suggestive of congestive heart failure. Normotensive.  Will discuss with Dr.El Iyer.

## 2018-05-22 NOTE — PROGRESS NOTE ADULT - ASSESSMENT
RECURRENT COPD EXACERBATION/ NOT COMPLIANT ACTIVE SMOKER    - IV SOLUMEDROL/ ABX  - NEBULIZER Q 4 H  - ADD DALIREP 500 Q 24  - PATIENT WITH SEVERE COPD, BRONCHOSCOPY NOT HELPFUL AND VERY RISKY PATIENT ACTIVELY WHEEZING  - DVT PROPHYLAXIS

## 2018-05-22 NOTE — PROGRESS NOTE ADULT - SUBJECTIVE AND OBJECTIVE BOX
Patient is a 68y old  Female who presented with a chief complaint of productive cough, dyspnea x3d unresponsive to outpt abx (21 May 2018 12:51)    PAST MEDICAL & SURGICAL HISTORY:  Anxiety  COPD (chronic obstructive pulmonary disease)  No significant past surgical history    MEDICATIONS  (STANDING):  ALBUTerol/ipratropium for Nebulization 3 milliLiter(s) Nebulizer every 4 hours  ALPRAZolam 0.5 milliGRAM(s) Oral every 12 hours  buDESOnide 160 MICROgram(s)/formoterol 4.5 MICROgram(s) Inhaler 2 Puff(s) Inhalation two times a day  enoxaparin Injectable 40 milliGRAM(s) SubCutaneous daily  levoFLOXacin IVPB 750 milliGRAM(s) IV Intermittent every 24 hours  methylPREDNISolone sodium succinate Injectable 60 milliGRAM(s) IV Push two times a day  nicotine -   7 mG/24Hr(s) Patch 1 patch Transdermal daily  tiotropium 18 MICROgram(s) Capsule 1 Capsule(s) Inhalation daily  traZODone 50 milliGRAM(s) Oral at bedtime    MEDICATIONS  (PRN):  acetaminophen   Tablet 650 milliGRAM(s) Oral every 6 hours PRN For Temp greater than 38 C (100.4 F)  acetaminophen   Tablet. 650 milliGRAM(s) Oral every 6 hours PRN Mild Pain (1 - 3)  ALBUTerol/ipratropium for Nebulization 3 milliLiter(s) Nebulizer every 6 hours PRN Shortness of Breath and/or Wheezing  oxyCODONE    5 mG/acetaminophen 325 mG 2 Tablet(s) Oral every 6 hours PRN Severe Pain (7 - 10)    Allergy: penicillin (Unknown)    Overnight events:    Vital Signs Last 24 Hrs  T(C): 35.3 (22 May 2018 04:46), Max: 36.6 (21 May 2018 14:20)  T(F): 95.6 (22 May 2018 04:46), Max: 97.8 (21 May 2018 14:20)  HR: 82 (22 May 2018 04:46) (78 - 91)  BP: 138/76 (22 May 2018 04:46) (138/76 - 165/77)  BP(mean): --  RR: 18 (22 May 2018 04:46) (18 - 22)  SpO2: 96% (21 May 2018 07:45) (96% - 96%)    I&O's Summary    20 May 2018 07:01  -  21 May 2018 07:00  --------------------------------------------------------  IN: 150 mL / OUT: 0 mL / NET: 150 mL    21 May 2018 07:01  -  22 May 2018 06:40  --------------------------------------------------------  IN: 0 mL / OUT: 1 mL / NET: -1 mL    CENTRAL LINE: [ ] YES [X] NO     TUBES: None    ABELARDO: [ ] YES [X] NO        Physical Exam:    -     General :     -      HEENT:    -      Cardiac:    -      Pulm:    -      GI:    -      Musculoskeletal:    -      Neuro: Patient is a 68y old  Female who presented with a chief complaint of productive cough, dyspnea x3d unresponsive to outpt abx (21 May 2018 12:51)    PAST MEDICAL & SURGICAL HISTORY:  Anxiety  COPD (chronic obstructive pulmonary disease)  No significant past surgical history    MEDICATIONS  (STANDING):  ALBUTerol/ipratropium for Nebulization 3 milliLiter(s) Nebulizer every 4 hours  ALPRAZolam 0.5 milliGRAM(s) Oral every 12 hours  buDESOnide 160 MICROgram(s)/formoterol 4.5 MICROgram(s) Inhaler 2 Puff(s) Inhalation two times a day  enoxaparin Injectable 40 milliGRAM(s) SubCutaneous daily  levoFLOXacin IVPB 750 milliGRAM(s) IV Intermittent every 24 hours  methylPREDNISolone sodium succinate Injectable 60 milliGRAM(s) IV Push two times a day  nicotine -   7 mG/24Hr(s) Patch 1 patch Transdermal daily  tiotropium 18 MICROgram(s) Capsule 1 Capsule(s) Inhalation daily  traZODone 50 milliGRAM(s) Oral at bedtime    MEDICATIONS  (PRN):  acetaminophen   Tablet 650 milliGRAM(s) Oral every 6 hours PRN For Temp greater than 38 C (100.4 F)  acetaminophen   Tablet. 650 milliGRAM(s) Oral every 6 hours PRN Mild Pain (1 - 3)  ALBUTerol/ipratropium for Nebulization 3 milliLiter(s) Nebulizer every 6 hours PRN Shortness of Breath and/or Wheezing  oxyCODONE    5 mG/acetaminophen 325 mG 2 Tablet(s) Oral every 6 hours PRN Severe Pain (7 - 10)    Allergy: penicillin (Unknown)    Overnight events:    Vital Signs Last 24 Hrs  T(C): 35.3 (22 May 2018 04:46), Max: 36.6 (21 May 2018 14:20)  T(F): 95.6 (22 May 2018 04:46), Max: 97.8 (21 May 2018 14:20)  HR: 82 (22 May 2018 04:46) (78 - 91)  BP: 138/76 (22 May 2018 04:46) (138/76 - 165/77)  BP(mean): --  RR: 18 (22 May 2018 04:46) (18 - 22)  SpO2: 96% (21 May 2018 07:45) (96% - 96%)    I&O's Summary    20 May 2018 07:01  -  21 May 2018 07:00  --------------------------------------------------------  IN: 150 mL / OUT: 0 mL / NET: 150 mL    21 May 2018 07:01  -  22 May 2018 06:40  --------------------------------------------------------  IN: 0 mL / OUT: 1 mL / NET: -1 mL    CENTRAL LINE: [ ] YES [X] NO     TUBES: None    ZHOU: [ ] YES [X] NO        Physical Exam:    -     General : Alert, awake and in no acute distress.    -      HEENT: NC    -      Cardiac: RRR    -      Pulm: inspiratory wheezes over posterior lung fields bilaterally, left greater than right side    -      GI: abdomen soft, non-tender    -      Musculoskeletal: mild tenderness over right sacral region    -      Neuro: AAO x3 Patient is a 68y old  Female who presented with a chief complaint of productive cough, dyspnea x3d unresponsive to outpt abx (21 May 2018 12:51)    PAST MEDICAL & SURGICAL HISTORY:  Anxiety  COPD (chronic obstructive pulmonary disease)  No significant past surgical history    MEDICATIONS  (STANDING):  ALBUTerol/ipratropium for Nebulization 3 milliLiter(s) Nebulizer every 4 hours  ALPRAZolam 0.5 milliGRAM(s) Oral every 12 hours  buDESOnide 160 MICROgram(s)/formoterol 4.5 MICROgram(s) Inhaler 2 Puff(s) Inhalation two times a day  enoxaparin Injectable 40 milliGRAM(s) SubCutaneous daily  levoFLOXacin IVPB 750 milliGRAM(s) IV Intermittent every 24 hours  methylPREDNISolone sodium succinate Injectable 60 milliGRAM(s) IV Push two times a day  nicotine -   7 mG/24Hr(s) Patch 1 patch Transdermal daily  tiotropium 18 MICROgram(s) Capsule 1 Capsule(s) Inhalation daily  traZODone 50 milliGRAM(s) Oral at bedtime    MEDICATIONS  (PRN):  acetaminophen   Tablet 650 milliGRAM(s) Oral every 6 hours PRN For Temp greater than 38 C (100.4 F)  acetaminophen   Tablet. 650 milliGRAM(s) Oral every 6 hours PRN Mild Pain (1 - 3)  ALBUTerol/ipratropium for Nebulization 3 milliLiter(s) Nebulizer every 6 hours PRN Shortness of Breath and/or Wheezing  oxyCODONE    5 mG/acetaminophen 325 mG 2 Tablet(s) Oral every 6 hours PRN Severe Pain (7 - 10)    Allergy: penicillin (Unknown)    Overnight events: No acute events overnight.    Vital Signs Last 24 Hrs  T(C): 35.3 (22 May 2018 04:46), Max: 36.6 (21 May 2018 14:20)  T(F): 95.6 (22 May 2018 04:46), Max: 97.8 (21 May 2018 14:20)  HR: 82 (22 May 2018 04:46) (78 - 91)  BP: 138/76 (22 May 2018 04:46) (138/76 - 165/77)  BP(mean): --  RR: 18 (22 May 2018 04:46) (18 - 22)  SpO2: 96% (21 May 2018 07:45) (96% - 96%)    I&O's Summary    20 May 2018 07:01  -  21 May 2018 07:00  --------------------------------------------------------  IN: 150 mL / OUT: 0 mL / NET: 150 mL    21 May 2018 07:01  -  22 May 2018 06:40  --------------------------------------------------------  IN: 0 mL / OUT: 1 mL / NET: -1 mL    CENTRAL LINE: [ ] YES [X] NO     TUBES: None    ZHOU: [ ] YES [X] NO        Physical Exam:    -     General : Alert, awake and in no acute distress.    -      HEENT: NC    -      Cardiac: RRR    -      Pulm: inspiratory wheezes over posterior lung fields bilaterally, left greater than right side    -      GI: abdomen soft, non-tender    -      Musculoskeletal: mild tenderness over right sacral region    -      Neuro: AAO x3

## 2018-05-22 NOTE — PROVIDER CONTACT NOTE (OTHER) - RECOMMENDATIONS
pt ref new site. began to express feelings of high anxiety. requested md speak to pt to allow new placement.

## 2018-05-23 RX ORDER — AMLODIPINE BESYLATE 2.5 MG/1
5 TABLET ORAL ONCE
Qty: 0 | Refills: 0 | Status: COMPLETED | OUTPATIENT
Start: 2018-05-23 | End: 2018-05-23

## 2018-05-23 RX ORDER — ROFLUMILAST 500 UG/1
500 TABLET ORAL DAILY
Qty: 0 | Refills: 0 | Status: DISCONTINUED | OUTPATIENT
Start: 2018-05-23 | End: 2018-05-25

## 2018-05-23 RX ORDER — NYSTATIN 500MM UNIT
500000 POWDER (EA) MISCELLANEOUS
Qty: 0 | Refills: 0 | Status: DISCONTINUED | OUTPATIENT
Start: 2018-05-23 | End: 2018-05-25

## 2018-05-23 RX ADMIN — Medication 0.5 MILLIGRAM(S): at 21:47

## 2018-05-23 RX ADMIN — OXYCODONE AND ACETAMINOPHEN 2 TABLET(S): 5; 325 TABLET ORAL at 17:56

## 2018-05-23 RX ADMIN — Medication 3 MILLILITER(S): at 12:26

## 2018-05-23 RX ADMIN — Medication 3 MILLILITER(S): at 19:46

## 2018-05-23 RX ADMIN — Medication 1 PATCH: at 11:11

## 2018-05-23 RX ADMIN — Medication 60 MILLIGRAM(S): at 05:10

## 2018-05-23 RX ADMIN — AMLODIPINE BESYLATE 5 MILLIGRAM(S): 2.5 TABLET ORAL at 23:21

## 2018-05-23 RX ADMIN — OXYCODONE AND ACETAMINOPHEN 2 TABLET(S): 5; 325 TABLET ORAL at 19:13

## 2018-05-23 RX ADMIN — OXYCODONE AND ACETAMINOPHEN 2 TABLET(S): 5; 325 TABLET ORAL at 11:14

## 2018-05-23 RX ADMIN — Medication 50 MILLIGRAM(S): at 21:49

## 2018-05-23 RX ADMIN — Medication 500000 UNIT(S): at 11:11

## 2018-05-23 RX ADMIN — Medication 0.5 MILLIGRAM(S): at 12:08

## 2018-05-23 RX ADMIN — Medication 1 PATCH: at 11:15

## 2018-05-23 RX ADMIN — OXYCODONE AND ACETAMINOPHEN 2 TABLET(S): 5; 325 TABLET ORAL at 05:08

## 2018-05-23 RX ADMIN — OXYCODONE AND ACETAMINOPHEN 2 TABLET(S): 5; 325 TABLET ORAL at 12:13

## 2018-05-23 RX ADMIN — Medication 3 MILLILITER(S): at 09:42

## 2018-05-23 RX ADMIN — Medication 500000 UNIT(S): at 21:48

## 2018-05-23 RX ADMIN — Medication 60 MILLIGRAM(S): at 17:03

## 2018-05-23 RX ADMIN — BUDESONIDE AND FORMOTEROL FUMARATE DIHYDRATE 2 PUFF(S): 160; 4.5 AEROSOL RESPIRATORY (INHALATION) at 21:47

## 2018-05-23 RX ADMIN — ROFLUMILAST 500 MICROGRAM(S): 500 TABLET ORAL at 12:15

## 2018-05-23 NOTE — PROGRESS NOTE ADULT - SUBJECTIVE AND OBJECTIVE BOX
Patient is a 68y old  Female who presented with a chief complaint of productive cough, dyspnea (21 May 2018 12:51)    PAST MEDICAL & SURGICAL HISTORY:  Anxiety  COPD (chronic obstructive pulmonary disease)  No significant past surgical history    MEDICATIONS  (STANDING):  ALBUTerol/ipratropium for Nebulization 3 milliLiter(s) Nebulizer every 4 hours  ALPRAZolam 0.5 milliGRAM(s) Oral every 12 hours  buDESOnide 160 MICROgram(s)/formoterol 4.5 MICROgram(s) Inhaler 2 Puff(s) Inhalation two times a day  enoxaparin Injectable 40 milliGRAM(s) SubCutaneous daily  levoFLOXacin  Tablet 750 milliGRAM(s) Oral every 24 hours  methylPREDNISolone sodium succinate Injectable 60 milliGRAM(s) IV Push two times a day  nicotine -   7 mG/24Hr(s) Patch 1 patch Transdermal daily  nystatin    Suspension 869821 Unit(s) Oral four times a day  roflumilast 500 MICROGram(s) Oral daily  tiotropium 18 MICROgram(s) Capsule 1 Capsule(s) Inhalation daily  traZODone 50 milliGRAM(s) Oral at bedtime    MEDICATIONS  (PRN):  acetaminophen   Tablet 650 milliGRAM(s) Oral every 6 hours PRN For Temp greater than 38 C (100.4 F)  acetaminophen   Tablet. 650 milliGRAM(s) Oral every 6 hours PRN Mild Pain (1 - 3)  ALBUTerol/ipratropium for Nebulization 3 milliLiter(s) Nebulizer every 6 hours PRN Shortness of Breath and/or Wheezing  guaiFENesin    Syrup 100 milliGRAM(s) Oral every 6 hours PRN Cough or congestion  oxyCODONE    5 mG/acetaminophen 325 mG 2 Tablet(s) Oral every 6 hours PRN Severe Pain (7 - 10)    Allergy: penicillin (Unknown)    Overnight events: No acute events overnight. The patient states that her SOB has improved at rest but she continue to experience SOB on exertion.    Vital Signs Last 24 Hrs  T(C): 35.6 (23 May 2018 13:18), Max: 36.3 (23 May 2018 05:06)  T(F): 96 (23 May 2018 13:18), Max: 97.3 (23 May 2018 05:06)  HR: 91 (23 May 2018 13:18) (80 - 92)  BP: 155/84 (23 May 2018 13:18) (137/63 - 155/84)  BP(mean): --  RR: 18 (23 May 2018 13:18) (18 - 20)  SpO2: 93% (22 May 2018 19:55) (93% - 93%)    I&O's Summary    22 May 2018 07:01  -  23 May 2018 07:00  --------------------------------------------------------  IN: 0 mL / OUT: 0 mL / NET: 0 mL    CENTRAL LINE: [ ] YES [X] NO     TUBES: None    ZHOU: [ ] YES [X] NO        Physical Exam:    -     General : Alert, awake and in no acute distress    -      HEENT: thrush noted on oropharynx    -      Cardiac: RRR    -      Pulm: decreased air entry over bilateral lung fields with scattered wheezes    -      GI: abdomen soft, non-tender; normoactive bowel sounds      Labs:                        13.6   24.26 )-----------( 322      ( 22 May 2018 06:02 )             40.9             05-22    136  |  96<L>  |  11  ----------------------------<  97  5.3<H>   |  32  |  0.5<L>    Ca    9.5      22 May 2018 06:02  Mg     2.1     05-22

## 2018-05-23 NOTE — PROGRESS NOTE ADULT - SUBJECTIVE AND OBJECTIVE BOX
OVERNIGHT EVENTS: still coughing/ wheezing    Vital Signs Last 24 Hrs  T(C): 35.6 (23 May 2018 13:18), Max: 36.3 (23 May 2018 05:06)  T(F): 96 (23 May 2018 13:18), Max: 97.3 (23 May 2018 05:06)  HR: 91 (23 May 2018 13:18) (80 - 92)  BP: 155/84 (23 May 2018 13:18) (137/63 - 155/84)  BP(mean): --  RR: 18 (23 May 2018 13:18) (18 - 20)  SpO2: 93% (22 May 2018 19:55) (93% - 93%)    PHYSICAL EXAMINATION:    GENERAL: The patient is awake and alert in no apparent distress.     HEENT: Head is normocephalic and atraumatic. Extraocular muscles are intact. Mucous membranes are moist.    NECK: Supple.    LUNGS: diffuse wheezing/ rhonchi    HEART: Regular rate and rhythm without murmur.    ABDOMEN: Soft, nontender, and nondistended.      EXTREMITIES: Without any cyanosis, clubbing, rash, lesions or edema.    NEUROLOGIC: Grossly intact.    SKIN: No ulceration or induration present.      LABS:                        13.6   24.26 )-----------( 322      ( 22 May 2018 06:02 )             40.9     05-22    136  |  96<L>  |  11  ----------------------------<  97  5.3<H>   |  32  |  0.5<L>    Ca    9.5      22 May 2018 06:02  Mg     2.1     05-22 05-22-18 @ 07:01  -  05-23-18 @ 07:00  --------------------------------------------------------  IN: 0 mL / OUT: 0 mL / NET: 0 mL        MICROBIOLOGY:      MEDICATIONS  (STANDING):  ALBUTerol/ipratropium for Nebulization 3 milliLiter(s) Nebulizer every 4 hours  ALPRAZolam 0.5 milliGRAM(s) Oral every 12 hours  buDESOnide 160 MICROgram(s)/formoterol 4.5 MICROgram(s) Inhaler 2 Puff(s) Inhalation two times a day  enoxaparin Injectable 40 milliGRAM(s) SubCutaneous daily  levoFLOXacin  Tablet 750 milliGRAM(s) Oral every 24 hours  methylPREDNISolone sodium succinate Injectable 60 milliGRAM(s) IV Push two times a day  nicotine -   7 mG/24Hr(s) Patch 1 patch Transdermal daily  nystatin    Suspension 400264 Unit(s) Oral four times a day  roflumilast 500 MICROGram(s) Oral daily  tiotropium 18 MICROgram(s) Capsule 1 Capsule(s) Inhalation daily  traZODone 50 milliGRAM(s) Oral at bedtime    MEDICATIONS  (PRN):  acetaminophen   Tablet 650 milliGRAM(s) Oral every 6 hours PRN For Temp greater than 38 C (100.4 F)  acetaminophen   Tablet. 650 milliGRAM(s) Oral every 6 hours PRN Mild Pain (1 - 3)  ALBUTerol/ipratropium for Nebulization 3 milliLiter(s) Nebulizer every 6 hours PRN Shortness of Breath and/or Wheezing  guaiFENesin    Syrup 100 milliGRAM(s) Oral every 6 hours PRN Cough or congestion  oxyCODONE    5 mG/acetaminophen 325 mG 2 Tablet(s) Oral every 6 hours PRN Severe Pain (7 - 10)      RADIOLOGY & ADDITIONAL STUDIES:

## 2018-05-23 NOTE — PROGRESS NOTE ADULT - ASSESSMENT
RECURRENT COPD EXACERBATION/ NOT COMPLIANT ACTIVE SMOKER    - IV SOLUMEDROL ANOTHER 24 H/ ABX  - NEBULIZER Q 4 H  - SPUTUM GRAM STAIN/ CX  - ADD DALIREP 500 Q 24  - DVT PROPHYLAXIS

## 2018-05-23 NOTE — PROGRESS NOTE ADULT - ASSESSMENT
Assessment:  A 68y Female with a PMH of COPD and anxiety who presented with a 3-day history of SOB associated with productive cough. The patient was seen by her pulmonologist as an outpatient and was started on an antibiotic and Prednisone but her symptoms did not improve. She reports being hospitalized twice within the past year for respiratory complaints.    Plan:  1. Acute on chronic respiratory failure in the setting of COPD and active smoking  - continue Levaquin, Solu-Medrol IV, duonebs q4H and PRN, Spiriva, and Daliresp  - pulmonology following: recommend continuing IV steroid therapy  - maintain SpO2 88-92%; BiPAP as needed  - continue smoking cessation counseling and Nicotine patches    2. Oral candidiasis  - likely related to steroid and inhaler use  - start Nystatin swish-and-swallow    3. Anxiety  - continue current medications    4. DVT prophylaxis    5. Disposition  - anticipate discharge home once medically stable Assessment:  A 68y Female with a PMH of COPD and anxiety who presented with a 3-day history of SOB associated with productive cough. The patient was seen by her pulmonologist as an outpatient and was started on an antibiotic and Prednisone but her symptoms did not improve. She reports being hospitalized twice within the past year for respiratory complaints.    Plan:  1. Acute on chronic respiratory failure in the setting of COPD and active smoking  - continue Levaquin, Solu-Medrol IV, duonebs q4H and PRN, Spiriva, and Daliresp  - pulmonology following: recommend continuing IV steroid therapy; obtain sputum culture  - maintain SpO2 88-92%; BiPAP as needed  - continue smoking cessation counseling and Nicotine patches    2. Oral candidiasis  - likely related to steroid and inhaler use  - start Nystatin swish-and-swallow    3. Anxiety  - continue current medications    4. DVT prophylaxis    5. Disposition  - anticipate discharge home once medically stable

## 2018-05-24 LAB
ALBUMIN SERPL ELPH-MCNC: 4 G/DL — SIGNIFICANT CHANGE UP (ref 3.5–5.2)
ALP SERPL-CCNC: 39 U/L — SIGNIFICANT CHANGE UP (ref 30–115)
ALT FLD-CCNC: 18 U/L — SIGNIFICANT CHANGE UP (ref 0–41)
ANION GAP SERPL CALC-SCNC: 12 MMOL/L — SIGNIFICANT CHANGE UP (ref 7–14)
AST SERPL-CCNC: 12 U/L — SIGNIFICANT CHANGE UP (ref 0–41)
BILIRUB SERPL-MCNC: 0.4 MG/DL — SIGNIFICANT CHANGE UP (ref 0.2–1.2)
BUN SERPL-MCNC: 13 MG/DL — SIGNIFICANT CHANGE UP (ref 10–20)
CALCIUM SERPL-MCNC: 9.4 MG/DL — SIGNIFICANT CHANGE UP (ref 8.5–10.1)
CHLORIDE SERPL-SCNC: 91 MMOL/L — LOW (ref 98–110)
CO2 SERPL-SCNC: 30 MMOL/L — SIGNIFICANT CHANGE UP (ref 17–32)
CREAT SERPL-MCNC: 0.6 MG/DL — LOW (ref 0.7–1.5)
GLUCOSE SERPL-MCNC: 125 MG/DL — HIGH (ref 70–99)
GRAM STN FLD: SIGNIFICANT CHANGE UP
HCT VFR BLD CALC: 40.2 % — SIGNIFICANT CHANGE UP (ref 37–47)
HGB BLD-MCNC: 13.4 G/DL — SIGNIFICANT CHANGE UP (ref 12–16)
MAGNESIUM SERPL-MCNC: 2 MG/DL — SIGNIFICANT CHANGE UP (ref 1.8–2.4)
MCHC RBC-ENTMCNC: 32.4 PG — HIGH (ref 27–31)
MCHC RBC-ENTMCNC: 33.3 G/DL — SIGNIFICANT CHANGE UP (ref 32–37)
MCV RBC AUTO: 97.3 FL — SIGNIFICANT CHANGE UP (ref 81–99)
NRBC # BLD: 0 /100 WBCS — SIGNIFICANT CHANGE UP (ref 0–0)
PHOSPHATE SERPL-MCNC: 4 MG/DL — SIGNIFICANT CHANGE UP (ref 2.1–4.9)
PLATELET # BLD AUTO: 256 K/UL — SIGNIFICANT CHANGE UP (ref 130–400)
POTASSIUM SERPL-MCNC: 5.6 MMOL/L — HIGH (ref 3.5–5)
POTASSIUM SERPL-SCNC: 5.6 MMOL/L — HIGH (ref 3.5–5)
PROT SERPL-MCNC: 6.3 G/DL — SIGNIFICANT CHANGE UP (ref 6–8)
RBC # BLD: 4.13 M/UL — LOW (ref 4.2–5.4)
RBC # FLD: 12.7 % — SIGNIFICANT CHANGE UP (ref 11.5–14.5)
SODIUM SERPL-SCNC: 133 MMOL/L — LOW (ref 135–146)
SPECIMEN SOURCE: SIGNIFICANT CHANGE UP
WBC # BLD: 20.45 K/UL — HIGH (ref 4.8–10.8)
WBC # FLD AUTO: 20.45 K/UL — HIGH (ref 4.8–10.8)

## 2018-05-24 RX ORDER — ROFLUMILAST 500 UG/1
1 TABLET ORAL
Qty: 30 | Refills: 0 | OUTPATIENT
Start: 2018-05-24 | End: 2018-06-22

## 2018-05-24 RX ADMIN — Medication 3 MILLILITER(S): at 20:04

## 2018-05-24 RX ADMIN — Medication 1 PATCH: at 12:10

## 2018-05-24 RX ADMIN — Medication 0.5 MILLIGRAM(S): at 06:38

## 2018-05-24 RX ADMIN — OXYCODONE AND ACETAMINOPHEN 2 TABLET(S): 5; 325 TABLET ORAL at 19:30

## 2018-05-24 RX ADMIN — Medication 3 MILLILITER(S): at 07:27

## 2018-05-24 RX ADMIN — BUDESONIDE AND FORMOTEROL FUMARATE DIHYDRATE 2 PUFF(S): 160; 4.5 AEROSOL RESPIRATORY (INHALATION) at 18:34

## 2018-05-24 RX ADMIN — OXYCODONE AND ACETAMINOPHEN 2 TABLET(S): 5; 325 TABLET ORAL at 08:01

## 2018-05-24 RX ADMIN — Medication 3 MILLILITER(S): at 11:25

## 2018-05-24 RX ADMIN — Medication 60 MILLIGRAM(S): at 18:31

## 2018-05-24 RX ADMIN — Medication 500000 UNIT(S): at 18:31

## 2018-05-24 RX ADMIN — Medication 500000 UNIT(S): at 00:50

## 2018-05-24 RX ADMIN — BUDESONIDE AND FORMOTEROL FUMARATE DIHYDRATE 2 PUFF(S): 160; 4.5 AEROSOL RESPIRATORY (INHALATION) at 08:01

## 2018-05-24 RX ADMIN — Medication 500000 UNIT(S): at 05:35

## 2018-05-24 RX ADMIN — OXYCODONE AND ACETAMINOPHEN 2 TABLET(S): 5; 325 TABLET ORAL at 18:41

## 2018-05-24 RX ADMIN — OXYCODONE AND ACETAMINOPHEN 2 TABLET(S): 5; 325 TABLET ORAL at 12:08

## 2018-05-24 RX ADMIN — OXYCODONE AND ACETAMINOPHEN 2 TABLET(S): 5; 325 TABLET ORAL at 06:38

## 2018-05-24 RX ADMIN — ROFLUMILAST 500 MICROGRAM(S): 500 TABLET ORAL at 18:29

## 2018-05-24 RX ADMIN — Medication 60 MILLIGRAM(S): at 05:35

## 2018-05-24 RX ADMIN — Medication 3 MILLILITER(S): at 16:13

## 2018-05-24 RX ADMIN — Medication 500000 UNIT(S): at 12:05

## 2018-05-24 RX ADMIN — TIOTROPIUM BROMIDE 1 CAPSULE(S): 18 CAPSULE ORAL; RESPIRATORY (INHALATION) at 07:31

## 2018-05-24 RX ADMIN — Medication 0.5 MILLIGRAM(S): at 18:34

## 2018-05-24 RX ADMIN — OXYCODONE AND ACETAMINOPHEN 2 TABLET(S): 5; 325 TABLET ORAL at 15:57

## 2018-05-24 NOTE — PROGRESS NOTE ADULT - SUBJECTIVE AND OBJECTIVE BOX
Patient is a 68y old  Female who presented with a chief complaint of productive cough, dyspnea (21 May 2018 12:51)    PAST MEDICAL & SURGICAL HISTORY:  Anxiety  COPD (chronic obstructive pulmonary disease)  No significant past surgical history    MEDICATIONS  (STANDING):  ALBUTerol/ipratropium for Nebulization 3 milliLiter(s) Nebulizer every 4 hours  ALPRAZolam 0.5 milliGRAM(s) Oral every 12 hours  buDESOnide 160 MICROgram(s)/formoterol 4.5 MICROgram(s) Inhaler 2 Puff(s) Inhalation two times a day  enoxaparin Injectable 40 milliGRAM(s) SubCutaneous daily  levoFLOXacin  Tablet 750 milliGRAM(s) Oral every 24 hours  methylPREDNISolone sodium succinate Injectable 60 milliGRAM(s) IV Push two times a day  nicotine -   7 mG/24Hr(s) Patch 1 patch Transdermal daily  nystatin    Suspension 974896 Unit(s) Oral four times a day  roflumilast 500 MICROGram(s) Oral daily  tiotropium 18 MICROgram(s) Capsule 1 Capsule(s) Inhalation daily  traZODone 50 milliGRAM(s) Oral at bedtime    MEDICATIONS  (PRN):  acetaminophen   Tablet 650 milliGRAM(s) Oral every 6 hours PRN For Temp greater than 38 C (100.4 F)  acetaminophen   Tablet. 650 milliGRAM(s) Oral every 6 hours PRN Mild Pain (1 - 3)  ALBUTerol/ipratropium for Nebulization 3 milliLiter(s) Nebulizer every 6 hours PRN Shortness of Breath and/or Wheezing  guaiFENesin    Syrup 100 milliGRAM(s) Oral every 6 hours PRN Cough or congestion  oxyCODONE    5 mG/acetaminophen 325 mG 2 Tablet(s) Oral every 6 hours PRN Severe Pain (7 - 10)    Allergy: penicillin (Unknown)    Overnight events: No acute events overnight. The patient reports improvement in her breathing this morning.    Vital Signs Last 24 Hrs  T(C): 37.1 (24 May 2018 12:45), Max: 37.1 (24 May 2018 12:45)  T(F): 98.8 (24 May 2018 12:45), Max: 98.8 (24 May 2018 12:45)  HR: 87 (24 May 2018 12:45) (77 - 87)  BP: 157/70 (24 May 2018 12:45) (150/68 - 189/79)  BP(mean): --  RR: 18 (24 May 2018 12:45) (18 - 18)  SpO2: 95% (24 May 2018 11:31) (95% - 98%)    I&O's Summary    23 May 2018 07:01  -  24 May 2018 07:00  --------------------------------------------------------  IN: 0 mL / OUT: 0 mL / NET: 0 mL    24 May 2018 07:01  -  24 May 2018 17:38  --------------------------------------------------------  IN: 0 mL / OUT: 2 mL / NET: -2 mL    CENTRAL LINE: [ ] YES [X] NO     TUBES: None    ZHOU: [ ] YES [X] NO        Physical Exam:    -     General : Alert, awake and in no acute distress    -      HEENT: NC    -      Cardiac: RRR    -      Pulm: good air entry heard over bilateral lung fields with diffuse wheezes    -      GI: abdomen soft, non-tender    Labs:                        13.4   20.45 )-----------( 256      ( 24 May 2018 08:16 )             40.2             05-24    133<L>  |  91<L>  |  13  ----------------------------<  125<H>  5.6<H>   |  30  |  0.6<L>    Ca    9.4      24 May 2018 08:16  Phos  4.0     05-24  Mg     2.0     05-24    TPro  6.3  /  Alb  4.0  /  TBili  0.4  /  DBili  x   /  AST  12  /  ALT  18  /  AlkPhos  39  05-24    LIVER FUNCTIONS - ( 24 May 2018 08:16 )  Alb: 4.0 g/dL / Pro: 6.3 g/dL / ALK PHOS: 39 U/L / ALT: 18 U/L / AST: 12 U/L / GGT: x

## 2018-05-24 NOTE — PROGRESS NOTE ADULT - ASSESSMENT
Assessment:  A 68y Female with a PMH of COPD and anxiety who presented with a 3-day history of SOB associated with productive cough. The patient was seen by her pulmonologist as an outpatient and was started on an antibiotic and Prednisone but her symptoms did not improve. She reports being hospitalized twice within the past year for respiratory complaints.    Plan:  1. Acute on chronic respiratory failure in the setting of COPD and active smoking  - continue Levaquin, Solu-Medrol IV, duonebs q4H and PRN, Spiriva, and Daliresp  - pulmonology following: recommend slow steroid taper; obtain sputum culture  - maintain SpO2 88-92%; BiPAP as needed  - continue smoking cessation counseling and Nicotine patches    2. Oral candidiasis  - likely related to steroid and inhaler use  - continue Nystatin swish-and-swallow until symptoms resolve    3. Leukocytosis  - likely related to steroid effect    4. Anxiety  - continue current medications    5. Chronic pain  - pain control as tolerated    6. DVT prophylaxis    7. Disposition  - anticipate discharge home once medically stable

## 2018-05-24 NOTE — PROGRESS NOTE ADULT - SUBJECTIVE AND OBJECTIVE BOX
Patient seen and examined and chart reviewed  According to patient " I have always wheezed, what do you expect after 45 years of smoking?"  Continues to show expiratory excess effort and strain.  Lungs: improvement in rhonchi as patient brings up some expectorant which is clear  No edema from steroids  Marked leucocytosis (24K) from steroids  Ambulatory.  As per pulmonologist may need a day or two to stabilize on new inhalers.

## 2018-05-25 VITALS
TEMPERATURE: 97 F | HEART RATE: 95 BPM | DIASTOLIC BLOOD PRESSURE: 79 MMHG | SYSTOLIC BLOOD PRESSURE: 167 MMHG | RESPIRATION RATE: 22 BRPM

## 2018-05-25 RX ORDER — DEXTROSE 50 % IN WATER 50 %
50 SYRINGE (ML) INTRAVENOUS ONCE
Qty: 0 | Refills: 0 | Status: COMPLETED | OUTPATIENT
Start: 2018-05-25 | End: 2018-05-25

## 2018-05-25 RX ORDER — NYSTATIN 500MM UNIT
5 POWDER (EA) MISCELLANEOUS
Qty: 60 | Refills: 0 | OUTPATIENT
Start: 2018-05-25

## 2018-05-25 RX ORDER — INSULIN HUMAN 100 [IU]/ML
10 INJECTION, SOLUTION SUBCUTANEOUS ONCE
Qty: 0 | Refills: 0 | Status: COMPLETED | OUTPATIENT
Start: 2018-05-25 | End: 2018-05-25

## 2018-05-25 RX ADMIN — OXYCODONE AND ACETAMINOPHEN 2 TABLET(S): 5; 325 TABLET ORAL at 07:53

## 2018-05-25 RX ADMIN — OXYCODONE AND ACETAMINOPHEN 2 TABLET(S): 5; 325 TABLET ORAL at 12:43

## 2018-05-25 RX ADMIN — Medication 500000 UNIT(S): at 01:50

## 2018-05-25 RX ADMIN — Medication 3 MILLILITER(S): at 07:28

## 2018-05-25 RX ADMIN — Medication 500000 UNIT(S): at 12:44

## 2018-05-25 RX ADMIN — Medication 60 MILLIGRAM(S): at 06:17

## 2018-05-25 RX ADMIN — Medication 1 PATCH: at 12:00

## 2018-05-25 RX ADMIN — ROFLUMILAST 500 MICROGRAM(S): 500 TABLET ORAL at 12:44

## 2018-05-25 RX ADMIN — Medication 500000 UNIT(S): at 06:16

## 2018-05-25 RX ADMIN — Medication 3 MILLILITER(S): at 11:33

## 2018-05-25 RX ADMIN — OXYCODONE AND ACETAMINOPHEN 2 TABLET(S): 5; 325 TABLET ORAL at 06:13

## 2018-05-25 RX ADMIN — Medication 1 PATCH: at 12:45

## 2018-05-25 RX ADMIN — TIOTROPIUM BROMIDE 1 CAPSULE(S): 18 CAPSULE ORAL; RESPIRATORY (INHALATION) at 08:37

## 2018-05-25 RX ADMIN — Medication 3 MILLILITER(S): at 15:47

## 2018-05-25 RX ADMIN — OXYCODONE AND ACETAMINOPHEN 2 TABLET(S): 5; 325 TABLET ORAL at 15:23

## 2018-05-25 RX ADMIN — Medication 0.5 MILLIGRAM(S): at 06:15

## 2018-05-25 RX ADMIN — BUDESONIDE AND FORMOTEROL FUMARATE DIHYDRATE 2 PUFF(S): 160; 4.5 AEROSOL RESPIRATORY (INHALATION) at 07:53

## 2018-05-25 NOTE — PROGRESS NOTE ADULT - SUBJECTIVE AND OBJECTIVE BOX
OVERNIGHT EVENTS: still coughing, wheezing, however looks better    Vital Signs Last 24 Hrs  T(C): 36 (25 May 2018 12:40), Max: 36.9 (24 May 2018 20:35)  T(F): 96.8 (25 May 2018 12:40), Max: 98.5 (24 May 2018 20:35)  HR: 95 (25 May 2018 12:40) (83 - 95)  BP: 167/79 (25 May 2018 12:40) (140/66 - 167/79)  BP(mean): --  RR: 22 (25 May 2018 12:40) (18 - 22)  SpO2: 96% (25 May 2018 08:13) (96% - 96%)    PHYSICAL EXAMINATION:    GENERAL: The patient is awake and alert in no apparent distress.     HEENT: Head is normocephalic and atraumatic. Extraocular muscles are intact. Mucous membranes are moist.    NECK: Supple.    LUNGS: b.l rhonchi/ wheezing    HEART: Regular rate and rhythm without murmur.    ABDOMEN: Soft, nontender, and nondistended.      EXTREMITIES: Without any cyanosis, clubbing, rash, lesions or edema.    NEUROLOGIC: Grossly intact.    SKIN: No ulceration or induration present.      LABS:                        13.4   20.45 )-----------( 256      ( 24 May 2018 08:16 )             40.2     05-24    133<L>  |  91<L>  |  13  ----------------------------<  125<H>  5.6<H>   |  30  |  0.6<L>    Ca    9.4      24 May 2018 08:16  Phos  4.0     05-24  Mg     2.0     05-24    TPro  6.3  /  Alb  4.0  /  TBili  0.4  /  DBili  x   /  AST  12  /  ALT  18  /  AlkPhos  39  05-24 05-24-18 @ 07:01  -  05-25-18 @ 07:00  --------------------------------------------------------  IN: 0 mL / OUT: 2 mL / NET: -2 mL        MICROBIOLOGY:      MEDICATIONS  (STANDING):  ALBUTerol/ipratropium for Nebulization 3 milliLiter(s) Nebulizer every 4 hours  ALPRAZolam 0.5 milliGRAM(s) Oral every 12 hours  buDESOnide 160 MICROgram(s)/formoterol 4.5 MICROgram(s) Inhaler 2 Puff(s) Inhalation two times a day  enoxaparin Injectable 40 milliGRAM(s) SubCutaneous daily  levoFLOXacin  Tablet 750 milliGRAM(s) Oral every 24 hours  methylPREDNISolone sodium succinate Injectable 60 milliGRAM(s) IV Push two times a day  nicotine -   7 mG/24Hr(s) Patch 1 patch Transdermal daily  nystatin    Suspension 277253 Unit(s) Oral four times a day  roflumilast 500 MICROGram(s) Oral daily  tiotropium 18 MICROgram(s) Capsule 1 Capsule(s) Inhalation daily  traZODone 50 milliGRAM(s) Oral at bedtime    MEDICATIONS  (PRN):  acetaminophen   Tablet 650 milliGRAM(s) Oral every 6 hours PRN For Temp greater than 38 C (100.4 F)  acetaminophen   Tablet. 650 milliGRAM(s) Oral every 6 hours PRN Mild Pain (1 - 3)  ALBUTerol/ipratropium for Nebulization 3 milliLiter(s) Nebulizer every 6 hours PRN Shortness of Breath and/or Wheezing  guaiFENesin    Syrup 100 milliGRAM(s) Oral every 6 hours PRN Cough or congestion  oxyCODONE    5 mG/acetaminophen 325 mG 2 Tablet(s) Oral every 6 hours PRN Severe Pain (7 - 10)      RADIOLOGY & ADDITIONAL STUDIES:

## 2018-05-25 NOTE — PROGRESS NOTE ADULT - ASSESSMENT
RECURRENT COPD EXACERBATION/ NOT COMPLIANT ACTIVE SMOKER/ BETTER    - ATTEMPT DC AM  - NEBULIZER Q 4 H  - DALIRESP/ INHALER  - PREDNISONE 40 FOR 5 20 FOR 5  - DVT PROPHYLAXIS

## 2018-05-26 LAB
CULTURE RESULTS: SIGNIFICANT CHANGE UP
SPECIMEN SOURCE: SIGNIFICANT CHANGE UP

## 2018-05-29 DIAGNOSIS — G89.29 OTHER CHRONIC PAIN: ICD-10-CM

## 2018-05-29 DIAGNOSIS — I10 ESSENTIAL (PRIMARY) HYPERTENSION: ICD-10-CM

## 2018-05-29 DIAGNOSIS — J44.1 CHRONIC OBSTRUCTIVE PULMONARY DISEASE WITH (ACUTE) EXACERBATION: ICD-10-CM

## 2018-05-29 DIAGNOSIS — D72.829 ELEVATED WHITE BLOOD CELL COUNT, UNSPECIFIED: ICD-10-CM

## 2018-05-29 DIAGNOSIS — R06.02 SHORTNESS OF BREATH: ICD-10-CM

## 2018-05-29 DIAGNOSIS — B37.0 CANDIDAL STOMATITIS: ICD-10-CM

## 2018-05-29 DIAGNOSIS — F17.210 NICOTINE DEPENDENCE, CIGARETTES, UNCOMPLICATED: ICD-10-CM

## 2018-05-29 DIAGNOSIS — Z91.19 PATIENT'S NONCOMPLIANCE WITH OTHER MEDICAL TREATMENT AND REGIMEN: ICD-10-CM

## 2018-05-29 DIAGNOSIS — E87.5 HYPERKALEMIA: ICD-10-CM

## 2018-05-29 DIAGNOSIS — J96.20 ACUTE AND CHRONIC RESPIRATORY FAILURE, UNSPECIFIED WHETHER WITH HYPOXIA OR HYPERCAPNIA: ICD-10-CM

## 2018-05-29 DIAGNOSIS — F41.9 ANXIETY DISORDER, UNSPECIFIED: ICD-10-CM

## 2018-06-05 DIAGNOSIS — T38.0X5A ADVERSE EFFECT OF GLUCOCORTICOIDS AND SYNTHETIC ANALOGUES, INITIAL ENCOUNTER: ICD-10-CM

## 2018-06-05 DIAGNOSIS — Z79.52 LONG TERM (CURRENT) USE OF SYSTEMIC STEROIDS: ICD-10-CM

## 2018-06-08 ENCOUNTER — INPATIENT (INPATIENT)
Facility: HOSPITAL | Age: 68
LOS: 13 days | Discharge: NURSING, REHAB OR LONG-TERM CARE | End: 2018-06-22
Attending: INTERNAL MEDICINE | Admitting: INTERNAL MEDICINE
Payer: COMMERCIAL

## 2018-06-08 VITALS
HEART RATE: 109 BPM | TEMPERATURE: 98 F | OXYGEN SATURATION: 97 % | SYSTOLIC BLOOD PRESSURE: 142 MMHG | DIASTOLIC BLOOD PRESSURE: 77 MMHG

## 2018-06-08 LAB
ALBUMIN SERPL ELPH-MCNC: 4.2 G/DL — SIGNIFICANT CHANGE UP (ref 3.5–5.2)
ALP SERPL-CCNC: 58 U/L — SIGNIFICANT CHANGE UP (ref 30–115)
ALT FLD-CCNC: 17 U/L — SIGNIFICANT CHANGE UP (ref 0–41)
ANION GAP SERPL CALC-SCNC: 15 MMOL/L — HIGH (ref 7–14)
AST SERPL-CCNC: 22 U/L — SIGNIFICANT CHANGE UP (ref 0–41)
BILIRUB SERPL-MCNC: 0.6 MG/DL — SIGNIFICANT CHANGE UP (ref 0.2–1.2)
BUN SERPL-MCNC: 4 MG/DL — LOW (ref 10–20)
CALCIUM SERPL-MCNC: 9 MG/DL — SIGNIFICANT CHANGE UP (ref 8.5–10.1)
CHLORIDE SERPL-SCNC: 94 MMOL/L — LOW (ref 98–110)
CK SERPL-CCNC: 32 U/L — SIGNIFICANT CHANGE UP (ref 0–225)
CO2 SERPL-SCNC: 28 MMOL/L — SIGNIFICANT CHANGE UP (ref 17–32)
CREAT SERPL-MCNC: 0.5 MG/DL — LOW (ref 0.7–1.5)
GAS PNL BLDV: SIGNIFICANT CHANGE UP
GLUCOSE SERPL-MCNC: 135 MG/DL — HIGH (ref 70–99)
HCT VFR BLD CALC: 41.9 % — SIGNIFICANT CHANGE UP (ref 37–47)
HGB BLD-MCNC: 13.9 G/DL — SIGNIFICANT CHANGE UP (ref 12–16)
MCHC RBC-ENTMCNC: 32.6 PG — HIGH (ref 27–31)
MCHC RBC-ENTMCNC: 33.2 G/DL — SIGNIFICANT CHANGE UP (ref 32–37)
MCV RBC AUTO: 98.1 FL — SIGNIFICANT CHANGE UP (ref 81–99)
NRBC # BLD: 0 /100 WBCS — SIGNIFICANT CHANGE UP (ref 0–0)
NT-PROBNP SERPL-SCNC: 92 PG/ML — SIGNIFICANT CHANGE UP (ref 0–300)
PLATELET # BLD AUTO: 183 K/UL — SIGNIFICANT CHANGE UP (ref 130–400)
POTASSIUM SERPL-MCNC: 5 MMOL/L — SIGNIFICANT CHANGE UP (ref 3.5–5)
POTASSIUM SERPL-SCNC: 5 MMOL/L — SIGNIFICANT CHANGE UP (ref 3.5–5)
PROT SERPL-MCNC: 6.7 G/DL — SIGNIFICANT CHANGE UP (ref 6–8)
RBC # BLD: 4.27 M/UL — SIGNIFICANT CHANGE UP (ref 4.2–5.4)
RBC # FLD: 13.5 % — SIGNIFICANT CHANGE UP (ref 11.5–14.5)
SODIUM SERPL-SCNC: 137 MMOL/L — SIGNIFICANT CHANGE UP (ref 135–146)
TROPONIN T SERPL-MCNC: <0.01 NG/ML — SIGNIFICANT CHANGE UP
WBC # BLD: 23.46 K/UL — HIGH (ref 4.8–10.8)
WBC # FLD AUTO: 23.46 K/UL — HIGH (ref 4.8–10.8)

## 2018-06-08 RX ORDER — ALPRAZOLAM 0.25 MG
0.5 TABLET ORAL EVERY 12 HOURS
Qty: 0 | Refills: 0 | Status: DISCONTINUED | OUTPATIENT
Start: 2018-06-08 | End: 2018-06-12

## 2018-06-08 RX ORDER — BUDESONIDE AND FORMOTEROL FUMARATE DIHYDRATE 160; 4.5 UG/1; UG/1
2 AEROSOL RESPIRATORY (INHALATION)
Qty: 0 | Refills: 0 | Status: DISCONTINUED | OUTPATIENT
Start: 2018-06-08 | End: 2018-06-22

## 2018-06-08 RX ORDER — NYSTATIN 500MM UNIT
500000 POWDER (EA) MISCELLANEOUS
Qty: 0 | Refills: 0 | Status: DISCONTINUED | OUTPATIENT
Start: 2018-06-08 | End: 2018-06-18

## 2018-06-08 RX ORDER — NICOTINE POLACRILEX 2 MG
1 GUM BUCCAL DAILY
Qty: 0 | Refills: 0 | Status: DISCONTINUED | OUTPATIENT
Start: 2018-06-08 | End: 2018-06-09

## 2018-06-08 RX ORDER — ALBUTEROL 90 UG/1
1 AEROSOL, METERED ORAL EVERY 4 HOURS
Qty: 0 | Refills: 0 | Status: DISCONTINUED | OUTPATIENT
Start: 2018-06-08 | End: 2018-06-09

## 2018-06-08 RX ORDER — IPRATROPIUM/ALBUTEROL SULFATE 18-103MCG
3 AEROSOL WITH ADAPTER (GRAM) INHALATION ONCE
Qty: 0 | Refills: 0 | Status: COMPLETED | OUTPATIENT
Start: 2018-06-08 | End: 2018-06-08

## 2018-06-08 RX ORDER — IPRATROPIUM/ALBUTEROL SULFATE 18-103MCG
3 AEROSOL WITH ADAPTER (GRAM) INHALATION EVERY 6 HOURS
Qty: 0 | Refills: 0 | Status: DISCONTINUED | OUTPATIENT
Start: 2018-06-08 | End: 2018-06-22

## 2018-06-08 RX ORDER — HEPARIN SODIUM 5000 [USP'U]/ML
5000 INJECTION INTRAVENOUS; SUBCUTANEOUS EVERY 8 HOURS
Qty: 0 | Refills: 0 | Status: DISCONTINUED | OUTPATIENT
Start: 2018-06-08 | End: 2018-06-22

## 2018-06-08 RX ORDER — TRAZODONE HCL 50 MG
50 TABLET ORAL AT BEDTIME
Qty: 0 | Refills: 0 | Status: DISCONTINUED | OUTPATIENT
Start: 2018-06-08 | End: 2018-06-22

## 2018-06-08 RX ORDER — OXYCODONE AND ACETAMINOPHEN 5; 325 MG/1; MG/1
1 TABLET ORAL EVERY 6 HOURS
Qty: 0 | Refills: 0 | Status: DISCONTINUED | OUTPATIENT
Start: 2018-06-08 | End: 2018-06-09

## 2018-06-08 RX ORDER — TIOTROPIUM BROMIDE 18 UG/1
1 CAPSULE ORAL; RESPIRATORY (INHALATION) DAILY
Qty: 0 | Refills: 0 | Status: DISCONTINUED | OUTPATIENT
Start: 2018-06-08 | End: 2018-06-09

## 2018-06-08 RX ADMIN — Medication 3 MILLILITER(S): at 17:06

## 2018-06-08 RX ADMIN — Medication 60 MILLIGRAM(S): at 22:29

## 2018-06-08 RX ADMIN — Medication 3 MILLILITER(S): at 22:29

## 2018-06-08 RX ADMIN — Medication 0.5 MILLIGRAM(S): at 22:29

## 2018-06-08 RX ADMIN — BUDESONIDE AND FORMOTEROL FUMARATE DIHYDRATE 2 PUFF(S): 160; 4.5 AEROSOL RESPIRATORY (INHALATION) at 21:35

## 2018-06-08 RX ADMIN — OXYCODONE AND ACETAMINOPHEN 1 TABLET(S): 5; 325 TABLET ORAL at 23:20

## 2018-06-08 RX ADMIN — OXYCODONE AND ACETAMINOPHEN 1 TABLET(S): 5; 325 TABLET ORAL at 23:50

## 2018-06-08 RX ADMIN — Medication 125 MILLIGRAM(S): at 17:14

## 2018-06-08 NOTE — H&P ADULT - ASSESSMENT
68 year old female with h/o copd not on home oxygen , anxiety and depression presented to ER with c/o worsening SOB  a/w cough x 2 days     1)Cough a/w sob and wheezing - likely etiology COPD exacerbation  plan  admit to medicine  pan culture- sputum , blood with gram stain  c/w solumedrol 60 q 8   duonebs  symbicort 160 q12  roflumilast - its non formulary requested pharmacy to add it  pulm recs- dr ivy francois     2)anxiety- c/w alprazolam 0.5 q12  3)depression/insomnia- c/w Trazadone 50 mg HS  4)dvt ppx  5)DNR/DNI 68 year old female with h/o copd not on home oxygen , anxiety and depression presented to ER with c/o worsening SOB  a/w cough x 2 days     1)Cough a/w sob and wheezing - likely etiology COPD exacerbation  plan  admit to medicine  pan culture- sputum , blood with gram stain  c/w solumedrol 60 q 8   duonebs  symbicort 160 q12  roflumilast 500 mg oral daily - its non formulary requested pharmacy to add it  pulm recs- dr ivy francois     2)anxiety- c/w alprazolam 0.5 q12  3)insomnia- c/w Trazadone 50 mg HS  4)dvt ppx  5)DNR/DNI 68 year old female with h/o copd not on home oxygen , anxiety and depression presented to ER with c/o worsening SOB  a/w cough x 2 days     1)Cough a/w sob and wheezing - likely etiology COPD exacerbation  plan  admit to medicine  pan culture- sputum , blood with gram stain  c/w solumedrol 60 q 8   duonebs  symbicort 160 q12  roflumilast 500 mg oral daily - its non formulary requested pharmacy to add it  pulm recs- dr ivy francois     2)anxiety- c/w alprazolam 0.5 q12  3)insomnia- c/w Trazadone 50 mg HS  4)dvt ppx- HSQ  5)code status- patient wants to be dnr/dni but wants her brother to sign the form .For tonight she wants to be full code , when her brother comes to the hospital in am she wants to discuss this with her brother and then sign the form.She has been explained that her respiratory status looks critical overnight and she might have to be intubated if respiratory status worsens.

## 2018-06-08 NOTE — ED PROVIDER NOTE - ATTENDING CONTRIBUTION TO CARE
I personally evaluated the patient. I reviewed the Resident’s or Physician Assistant’s note (as assigned above), and agree with the findings and plan except as documented in my note. I have seen this patient with a scribe. Please see progress note section for scribe documentation of my history and physical examination and plan. I agree with scribe documentation except as indicated in my notes.

## 2018-06-08 NOTE — ED PROVIDER NOTE - NS ED ROS FT
REVIEW OF SYSTEMS:    CONSTITUTIONAL: No weakness, fevers or chills  EYES/ENT: No visual changes;  No vertigo or throat pain   NECK: No pain or stiffness. No cervical midline tenderness.  CARDIOVASCULAR: No chest pain  GASTROINTESTINAL: No abdominal or epigastric pain. No nausea, vomiting, or hematemesis. No diarrhea or constipation. No melena, BRBPR, or hematochezia.  GENITOURINARY: No dysuria, frequency or hematuria.  NEUROLOGICAL: No headache. No numbness or weakness.  SKIN: No itching, rashes.

## 2018-06-08 NOTE — H&P ADULT - PMH
Anxiety    COPD (chronic obstructive pulmonary disease) Anxiety    Chronic pain    COPD (chronic obstructive pulmonary disease)    Depression Anxiety    Chronic pain    COPD (chronic obstructive pulmonary disease)    Depression    Insomnia

## 2018-06-08 NOTE — H&P ADULT - HISTORY OF PRESENT ILLNESS
68 year old female with h/o copd not on home oxygen , anxiety and depression presented to ER with c/o worsening SOB x 2 days   As per patient , she was recently discharged from [facility] approximately  2 weeks ago for similar complaints and was appropriately treated for COPD exacerbation. She was asked to follow up with dr ivy francois as an outpatient but because of her worsening sob she could not .C/o chronic productive cough a/w yellow phlegm , no blood. Denies fever, chills , chest pain palpitations, recent travel or sick contact .She states she has been complaint with her medications and inhalers . No URTI s/s , dysuria or  abdominal pain .  patient thinks that her anxiety has been worsening since she is taking prednisone .  In ED Rx received - 68 year old female with h/o copd not on home oxygen , anxiety and depression presented to ER with c/o worsening SOB x 2 days   As per patient , she was recently discharged from [facility] approximately  2 weeks ago for similar complaints and was appropriately treated for COPD exacerbation. She was asked to follow up with dr ivy francois as an outpatient but because of her worsening sob she could not .C/o chronic productive cough a/w yellow phlegm , no blood. Denies fever, chills , chest pain palpitations, recent travel or sick contact .She states she has been complaint with her medications and inhalers . No URTI s/s , dysuria or  abdominal pain .  patient thinks that her anxiety has been worsening since she is taking prednisone .  In ED Rx received -duonebs, solumedrol 125 IV , levofloxacin 750 mg IV x 1

## 2018-06-08 NOTE — H&P ADULT - NSHPLABSRESULTS_GEN_ALL_CORE
LABS:                        13.9   23.46 )-----------( 183      ( 08 Jun 2018 17:03 )             41.9     08 Jun 2018 17:03    137    |  94     |  4      ----------------------------<  135    5.0     |  28     |  0.5      Ca    9.0        08 Jun 2018 17:03    TPro  6.7    /  Alb  4.2    /  TBili  0.6    /  DBili  x      /  AST  22     /  ALT  17     /  AlkPhos  58     08 Jun 2018 17:03    < from: Xray Chest 1 View AP/PA (06.08.18 @ 15:43) >    No radiographic evidence of acute cardiopulmonary disease.      < end of copied text > LABS:                        13.9   23.46 )-----------( 183      ( 08 Jun 2018 17:03 )             41.9     08 Jun 2018 17:03    137    |  94     |  4      ----------------------------<  135    5.0     |  28     |  0.5      Ca    9.0        08 Jun 2018 17:03    TPro  6.7    /  Alb  4.2    /  TBili  0.6    /  DBili  x      /  AST  22     /  ALT  17     /  AlkPhos  58     08 Jun 2018 17:03    < from: Xray Chest 1 View AP/PA (06.08.18 @ 15:43) >    No radiographic evidence of acute cardiopulmonary disease.      < end of copied text >  < from: 12 Lead ECG (06.08.18 @ 15:42) >     Sinus tachycardia @ 104    < end of copied text >    Blood Gas Profile w/Lytes - Venous (06.08.18 @ 17:04)    Blood Gas Profile w/Lytes - Venous: Performed in Lab

## 2018-06-08 NOTE — H&P ADULT - NSHPREVIEWOFSYSTEMS_GEN_ALL_CORE
REVIEW OF SYSTEMS:    CONSTITUTIONAL: No weakness, fevers or chills  EYES/ENT: No visual changes;  No vertigo or throat pain   NECK: No pain or stiffness  RESPIRATORY: productive cough + a/w  wheezing and  shortness of breath  CARDIOVASCULAR: No chest pain or palpitations  GASTROINTESTINAL: No abdominal or epigastric pain. No nausea, vomiting, or hematemesis; No diarrhea or constipation. No melena or hematochezia.  GENITOURINARY: No dysuria, frequency or hematuria  NEUROLOGICAL: No numbness or weakness  SKIN: No itching, rashes

## 2018-06-08 NOTE — ED ADULT NURSE NOTE - OBJECTIVE STATEMENT
Pt has had SOB for 2 weeks. Pt states she was recently admitted into the hospital for SOB. Pt has hx of COPD

## 2018-06-08 NOTE — H&P ADULT - NSHPPHYSICALEXAM_GEN_ALL_CORE
General: NAD, AAO x3  HEENT: No icterus,. Moist mucous membranes  Neck: No JVD noted. Supple, no meningismus  Cardio: S1, S2 noted, RRR , tachycardic  Resp: decrease BS b/l , diffuse expiratory wheezing + b/l   Abdo: Soft, NT, bowel sounds present. No organomegaly  Extremities: No edema noted. Pulses present b/l  Neuro: AAO x3, grossly normal motor strength.  Skin: Dry, no rashes

## 2018-06-08 NOTE — H&P ADULT - ATTENDING COMMENTS
Patient with severe COPD with multiple admissions for acute exacerbation.  Claims she has not been smoking  On opioids for chronic pain left shoulder/cervical area  Even as she is sitting, she has dyspnea with very poor air entry both lung fields  Mild sinus tachycardia  Facial swelling from steroids ( was recently discharged on steroid taper as per pulmonologist  On IV solumedrol.  Prognosis guarded.

## 2018-06-08 NOTE — ED PROVIDER NOTE - OBJECTIVE STATEMENT
69yo f hx anxiety, COPD no home O2 follows w Dr. Contreras, recent admit/dc for COPD exacerbation, just finished course of steroids and levaquin pw shortness of breath x2d, +cough, yellow sputum- no fevers/chills, chest pain, abdominal pain, numbness/weakness, back pain, dysuria/hematuria

## 2018-06-08 NOTE — ED PROVIDER NOTE - PROGRESS NOTE DETAILS
ATTENDING NOTE:   67 y/o F with PMH of anxiety, COPD, emphysema, not on home O2, recently finished course of steroids for COPD exacerbation, additionally recently diagnosed with PNA finished course of Levaquin, presents to ED for evaluation of increasing SOB over the last few days. SOB worse with exertion. Denies fever/chills, CP. Denies abdominal pain, N/V/D. Follows with pulmonologist Dr. Calvert.     On exam: Pt is well-appearing, NAD, WDWN, patient sitting up in bed, providing appropriate history. NCAT. PERRLA 3mm b/l, no nystagmus, EOMI. HEENT normal, no pooling of secretions. +Full passive ROM in neck. S1S2, no MRG. Lungs with bibasilar rales, bibasilar wheezing and expiratory rhonchi noted. Abdomen soft, NT/ND, no rebound or guarding, no CVAT. No leg edema, +symmetric pulses b/l. CN2-12 grossly intact. No rash.  Plan for nebs, steroids, ABX, EKG, labs, imaging and reassess.

## 2018-06-08 NOTE — ED PROVIDER NOTE - PHYSICAL EXAMINATION
Well appearing NAD non toxic. NCAT EOMI conjunctiva nml. No nasal discharge. MMM. Neck supple, non tender, full ROM. RRR no MRG +S1S2. diffuse wheezing throughout all lung fields, normal WOB, no retractions, speaking in full sentences. Abd s NT ND +BS. Ext WWP x4, moving all extremities, no edema. 2+ equal pulses throughout. Cooperative, appropriate.

## 2018-06-09 LAB — LACTATE SERPL-SCNC: 5.9 MMOL/L — CRITICAL HIGH (ref 0.5–2.2)

## 2018-06-09 RX ORDER — NYSTATIN CREAM 100000 [USP'U]/G
1 CREAM TOPICAL
Qty: 0 | Refills: 0 | Status: DISCONTINUED | OUTPATIENT
Start: 2018-06-09 | End: 2018-06-21

## 2018-06-09 RX ORDER — OXYCODONE AND ACETAMINOPHEN 5; 325 MG/1; MG/1
2 TABLET ORAL EVERY 6 HOURS
Qty: 0 | Refills: 0 | Status: DISCONTINUED | OUTPATIENT
Start: 2018-06-09 | End: 2018-06-09

## 2018-06-09 RX ORDER — OXYCODONE AND ACETAMINOPHEN 5; 325 MG/1; MG/1
2 TABLET ORAL EVERY 6 HOURS
Qty: 0 | Refills: 0 | Status: DISCONTINUED | OUTPATIENT
Start: 2018-06-09 | End: 2018-06-16

## 2018-06-09 RX ORDER — NICOTINE POLACRILEX 2 MG
1 GUM BUCCAL DAILY
Qty: 0 | Refills: 0 | Status: DISCONTINUED | OUTPATIENT
Start: 2018-06-09 | End: 2018-06-11

## 2018-06-09 RX ORDER — IPRATROPIUM/ALBUTEROL SULFATE 18-103MCG
3 AEROSOL WITH ADAPTER (GRAM) INHALATION EVERY 4 HOURS
Qty: 0 | Refills: 0 | Status: DISCONTINUED | OUTPATIENT
Start: 2018-06-09 | End: 2018-06-22

## 2018-06-09 RX ORDER — AZITHROMYCIN 500 MG/1
250 TABLET, FILM COATED ORAL
Qty: 0 | Refills: 0 | Status: DISCONTINUED | OUTPATIENT
Start: 2018-06-09 | End: 2018-06-21

## 2018-06-09 RX ORDER — NYSTATIN CREAM 100000 [USP'U]/G
1 CREAM TOPICAL
Qty: 0 | Refills: 0 | Status: DISCONTINUED | OUTPATIENT
Start: 2018-06-09 | End: 2018-06-09

## 2018-06-09 RX ADMIN — Medication 0.5 MILLIGRAM(S): at 17:42

## 2018-06-09 RX ADMIN — OXYCODONE AND ACETAMINOPHEN 2 TABLET(S): 5; 325 TABLET ORAL at 13:16

## 2018-06-09 RX ADMIN — BUDESONIDE AND FORMOTEROL FUMARATE DIHYDRATE 2 PUFF(S): 160; 4.5 AEROSOL RESPIRATORY (INHALATION) at 21:29

## 2018-06-09 RX ADMIN — Medication 3 MILLILITER(S): at 18:51

## 2018-06-09 RX ADMIN — Medication 60 MILLIGRAM(S): at 05:39

## 2018-06-09 RX ADMIN — OXYCODONE AND ACETAMINOPHEN 1 TABLET(S): 5; 325 TABLET ORAL at 06:28

## 2018-06-09 RX ADMIN — Medication 0.5 MILLIGRAM(S): at 05:45

## 2018-06-09 RX ADMIN — Medication 1 PATCH: at 11:18

## 2018-06-09 RX ADMIN — Medication 500000 UNIT(S): at 01:18

## 2018-06-09 RX ADMIN — OXYCODONE AND ACETAMINOPHEN 2 TABLET(S): 5; 325 TABLET ORAL at 10:00

## 2018-06-09 RX ADMIN — BUDESONIDE AND FORMOTEROL FUMARATE DIHYDRATE 2 PUFF(S): 160; 4.5 AEROSOL RESPIRATORY (INHALATION) at 10:09

## 2018-06-09 RX ADMIN — Medication 500000 UNIT(S): at 17:43

## 2018-06-09 RX ADMIN — Medication 500000 UNIT(S): at 11:17

## 2018-06-09 RX ADMIN — NYSTATIN CREAM 1 APPLICATION(S): 100000 CREAM TOPICAL at 05:39

## 2018-06-09 RX ADMIN — Medication 60 MILLIGRAM(S): at 13:18

## 2018-06-09 RX ADMIN — Medication 3 MILLILITER(S): at 07:47

## 2018-06-09 RX ADMIN — Medication 50 MILLIGRAM(S): at 21:30

## 2018-06-09 RX ADMIN — OXYCODONE AND ACETAMINOPHEN 2 TABLET(S): 5; 325 TABLET ORAL at 09:01

## 2018-06-09 RX ADMIN — OXYCODONE AND ACETAMINOPHEN 1 TABLET(S): 5; 325 TABLET ORAL at 05:45

## 2018-06-09 RX ADMIN — NYSTATIN CREAM 1 APPLICATION(S): 100000 CREAM TOPICAL at 17:42

## 2018-06-09 RX ADMIN — Medication 3 MILLILITER(S): at 12:55

## 2018-06-09 RX ADMIN — OXYCODONE AND ACETAMINOPHEN 2 TABLET(S): 5; 325 TABLET ORAL at 17:47

## 2018-06-09 RX ADMIN — OXYCODONE AND ACETAMINOPHEN 2 TABLET(S): 5; 325 TABLET ORAL at 14:10

## 2018-06-09 RX ADMIN — Medication 60 MILLIGRAM(S): at 21:30

## 2018-06-09 RX ADMIN — Medication 500000 UNIT(S): at 05:39

## 2018-06-09 RX ADMIN — OXYCODONE AND ACETAMINOPHEN 2 TABLET(S): 5; 325 TABLET ORAL at 18:51

## 2018-06-09 NOTE — PROGRESS NOTE ADULT - ASSESSMENT
68 year old female with h/o copd not on home oxygen , anxiety and depression presented to ER with c/o worsening SOB  a/w cough x 2 days     # Cough a/w sob and wheezing - likely etiology COPD exacerbation  plan  pan culture- sputum , blood with gram stain  pulm recs- dr ivy francois: c/w solumedrol 60 Q8H, add symbicort with spacer, duonebs Q4H PRN and d/c spiriva, check EKG for QTc then start Azithromycin 250mg three times a week, make pain meds standing, xanax 0.5mg BID standing, d/c roflumilast 500 mg oral daily     # Anxiety- c/w alprazolam 0.5 q12    # Insomnia- c/w Trazadone 50 mg HS    DVT ppx- HSQ  Code status- patient wants to be dnr/dni but wants her brother to sign the form .For tonight she wants to be full code , when her brother comes to the hospital in am she wants to discuss this with her brother and then sign the form.She has been explained that her respiratory status looks critical overnight and she might have to be intubated if respiratory status worsens.

## 2018-06-09 NOTE — PROGRESS NOTE ADULT - SUBJECTIVE AND OBJECTIVE BOX
SUBJECTIVE:    Patient is a 68y old  Female who presents with a chief complaint of COPD exacerbation (2018 21:31)    Currently admitted to medicine with the primary diagnosis of COPD exacerbation     Today is hospital day 1d. This morning she is resting comfortably in bed and reports no new issues or overnight events.     PAST MEDICAL & SURGICAL HISTORY  PAST MEDICAL & SURGICAL HISTORY:  Insomnia  Chronic pain  Depression  Anxiety  COPD (chronic obstructive pulmonary disease)  No significant past surgical history    SOCIAL HISTORY:    ALLERGIES:  penicillin (Unknown)    MEDICATIONS:  STANDING MEDICATIONS  ALBUTerol    90 MICROgram(s) HFA Inhaler 1 Puff(s) Inhalation every 4 hours  ALBUTerol/ipratropium for Nebulization 3 milliLiter(s) Nebulizer every 6 hours  ALPRAZolam 0.5 milliGRAM(s) Oral every 12 hours  buDESOnide 160 MICROgram(s)/formoterol 4.5 MICROgram(s) Inhaler 2 Puff(s) Inhalation two times a day  heparin  Injectable 5000 Unit(s) SubCutaneous every 8 hours  levoFLOXacin IVPB 750 milliGRAM(s) IV Intermittent every 24 hours  methylPREDNISolone sodium succinate Injectable 60 milliGRAM(s) IV Push three times a day  nicotine -   7 mG/24Hr(s) Patch 1 patch Transdermal daily  nystatin    Suspension 867424 Unit(s) Oral four times a day  nystatin Powder 1 Application(s) Topical two times a day  tiotropium 18 MICROgram(s) Capsule 1 Capsule(s) Inhalation daily  traZODone 50 milliGRAM(s) Oral at bedtime    PRN MEDICATIONS  oxyCODONE    5 mG/acetaminophen 325 mG 2 Tablet(s) Oral every 6 hours PRN    VITALS:   T(F): 96.1  HR: 97  BP: 174/86  RR: 17  SpO2: 99%    HOME MEDS: </u  ALPRAZolam 0.5 mg oral tablet: 1 tab(s) orally every 12 hours  AUGMENTED BETAMETHASONE DIPROPIONATE .05 % OINT:   BREO ELLIPTA -25:   COMBIVENT    AER :   HYDROCO/APAP TAB 10-325M tab(s) orally every 6 hours, As Needed  Levaquin 750 mg oral tablet: 1 tab(s) orally once a day   nicotine 7 mg/24 hr transdermal film, extended release: 1 patch transdermal once a day   nystatin 100,000 units/mL oral suspension: 5 milliliter(s) orally 4 times a day swish in the mouth and retain for several minutes before swallowing  predniSONE 20 mg oral tablet: 6 tab(s) daily x 3 days  4 tab(s) daily x 3 days  2 tab(s) daily x 3 days  1 tab(s) daily x 3 days  PROAIR  MCG/ACT AERS:   roflumilast 500 mcg oral tablet: 1 tab(s) orally once a day   Spiriva Respimat 1.25 mcg/inh inhalation aerosol: 2 puff(s) inhaled once a day   TRAZODONE HCL 50 MG TABS:       LABS:                        13.9   23.46 )-----------( 183      ( 2018 17:03 )             41.9     -    137  |  94<L>  |  4<L>  ----------------------------<  135<H>  5.0   |  28  |  0.5<L>    Ca    9.0      2018 17:03    TPro  6.7  /  Alb  4.2  /  TBili  0.6  /  DBili  x   /  AST  22  /  ALT  17  /  AlkPhos  58            Lactate, Blood: 5.9 mmol/L <HH> (18 @ 00:47)  Creatine Kinase, Serum: 32 U/L (18 @ 17:03)  Troponin T, Serum: <0.01 ng/mL (18 @ 17:03)      CARDIAC MARKERS ( 2018 17:03 )  x     / <0.01 ng/mL / 32 U/L / x     / x          RADIOLOGY:  reviewed    PHYSICAL EXAM:  GEN: No acute distress  HEENT: WNL  LUNGS: Clear to auscultation bilaterally   HEART: S1/S2 present. RRR.   ABD: Soft, non-tender, non-distended. Bowel sounds present  EXT: no LE edema  NEURO: AAOX3 SUBJECTIVE:    Patient is a 68y old  Female who presents with a chief complaint of COPD exacerbation (2018 21:31)    Currently admitted to medicine with the primary diagnosis of COPD exacerbation     Today is hospital day 1d. This morning she is resting comfortably in bed and reports no new issues or overnight events.     PAST MEDICAL & SURGICAL HISTORY  PAST MEDICAL & SURGICAL HISTORY:  Insomnia  Chronic pain  Depression  Anxiety  COPD (chronic obstructive pulmonary disease)  No significant past surgical history    SOCIAL HISTORY:    ALLERGIES:  penicillin (Unknown)    MEDICATIONS:  STANDING MEDICATIONS  ALBUTerol    90 MICROgram(s) HFA Inhaler 1 Puff(s) Inhalation every 4 hours  ALBUTerol/ipratropium for Nebulization 3 milliLiter(s) Nebulizer every 6 hours  ALPRAZolam 0.5 milliGRAM(s) Oral every 12 hours  buDESOnide 160 MICROgram(s)/formoterol 4.5 MICROgram(s) Inhaler 2 Puff(s) Inhalation two times a day  heparin  Injectable 5000 Unit(s) SubCutaneous every 8 hours  levoFLOXacin IVPB 750 milliGRAM(s) IV Intermittent every 24 hours  methylPREDNISolone sodium succinate Injectable 60 milliGRAM(s) IV Push three times a day  nicotine -   7 mG/24Hr(s) Patch 1 patch Transdermal daily  nystatin    Suspension 872964 Unit(s) Oral four times a day  nystatin Powder 1 Application(s) Topical two times a day  tiotropium 18 MICROgram(s) Capsule 1 Capsule(s) Inhalation daily  traZODone 50 milliGRAM(s) Oral at bedtime    PRN MEDICATIONS  oxyCODONE    5 mG/acetaminophen 325 mG 2 Tablet(s) Oral every 6 hours PRN    VITALS:   T(F): 96.1  HR: 97  BP: 174/86  RR: 17  SpO2: 99%    HOME MEDS: </u  ALPRAZolam 0.5 mg oral tablet: 1 tab(s) orally every 12 hours  AUGMENTED BETAMETHASONE DIPROPIONATE .05 % OINT:   BREO ELLIPTA -25:   COMBIVENT    AER :   HYDROCO/APAP TAB 10-325M tab(s) orally every 6 hours, As Needed  Levaquin 750 mg oral tablet: 1 tab(s) orally once a day   nicotine 7 mg/24 hr transdermal film, extended release: 1 patch transdermal once a day   nystatin 100,000 units/mL oral suspension: 5 milliliter(s) orally 4 times a day swish in the mouth and retain for several minutes before swallowing  predniSONE 20 mg oral tablet: 6 tab(s) daily x 3 days  4 tab(s) daily x 3 days  2 tab(s) daily x 3 days  1 tab(s) daily x 3 days  PROAIR  MCG/ACT AERS:   roflumilast 500 mcg oral tablet: 1 tab(s) orally once a day   Spiriva Respimat 1.25 mcg/inh inhalation aerosol: 2 puff(s) inhaled once a day   TRAZODONE HCL 50 MG TABS:       LABS:                        13.9   23.46 )-----------( 183      ( 2018 17:03 )             41.9         137  |  94<L>  |  4<L>  ----------------------------<  135<H>  5.0   |  28  |  0.5<L>    Ca    9.0      2018 17:03    TPro  6.7  /  Alb  4.2  /  TBili  0.6  /  DBili  x   /  AST  22  /  ALT  17  /  AlkPhos  58            Lactate, Blood: 5.9 mmol/L <HH> (18 @ 00:47)  Creatine Kinase, Serum: 32 U/L (18 @ 17:03)  Troponin T, Serum: <0.01 ng/mL (18 @ 17:03)      CARDIAC MARKERS ( 2018 17:03 )  x     / <0.01 ng/mL / 32 U/L / x     / x          RADIOLOGY:  reviewed    PHYSICAL EXAM:  GEN: In mild distress, using accessory resp muscles  HEENT: WNL  LUNGS: B/l wheezing, moving air  HEART: S1/S2 present. RRR.   ABD: Soft, non-tender, non-distended. Bowel sounds present  EXT: no LE edema  NEURO: AAOX3

## 2018-06-09 NOTE — CONSULT NOTE ADULT - ASSESSMENT
Impression  >  >  >    Recommendations    Thank you for the consult. Impression  >Acute COPD exacerbation   >Anxiety/Depression  >Lactic acidosis - 2/2     Recommendations    Solumedrol 60 q 8   Symbicort with a spacer   duonebs q 4 plus prn   DC spiriva   Azithromycin 250 three times a week. check qtc   Ideally needs Bipap q 4 plus prn. pt adamantly refusing   Standing xanax .5 tib standing   Trend lactic acid - check abg with lytes now. If worsening place, on Bipap 12/6 (Fio2 21%) and recheck in 4 hours. If still worsening than change duonebs to ipratropium alone.   Should be on morphine but pt requesting to cont hydrocodone. Change meds to standing.   DVT px     Thank you for the consult. Impression  >Acute COPD exacerbation   >Anxiety/Depression  >Lactic acidosis - 2/2     Recommendations    Solumedrol 60 q 8   Symbicort with a spacer   duonebs q 4 plus prn   DC spiriva   DC Daliresp  Azithromycin 250 three times a week. check qtc   Ideally needs Bipap q 4 plus prn. pt adamantly refusing   Standing xanax .5 tib standing   Trend lactic acid - check abg with lytes now. If worsening place, on Bipap 12/6 (Fio2 21%) and recheck ABG in 4 hours. If lactate still worsening than change duonebs to ipratropium alone.   Should be on morphine but pt requesting to cont hydrocodone. Change meds to standing.   Do not over oxygenate   DVT px     Thank you for the consult. Impression  >Acute COPD exacerbation   >Anxiety/Depression  >Lactic acidosis - 2/2     Recommendations    Solumedrol 60 q 8   Symbicort with a spacer   duonebs q 4 plus prn   DC spiriva   DC Daliresp  Azithromycin 250 three times a week. check qtc   Ideally needs Bipap q 4 plus prn. pt adamantly refusing   Standing xanax .5 tib standing   Trend lactic acid - check abg with lytes now. If worsening place, on Bipap 12/6 (Fio2 21%) and recheck ABG in 4 hours. If lactate still worsening than change duonebs to ipratropium alone.   Should be on morphine but pt requesting to cont hydrocodone. Change meds to standing.   Do not over oxygenate   Smoking cessation. Nicotine patch 14. Counselled extensively.   DVT px     Thank you for the consult.

## 2018-06-09 NOTE — CONSULT NOTE ADULT - SUBJECTIVE AND OBJECTIVE BOX
Patient is a 68y old  Female who presents with a chief complaint of COPD exacerbation (08 Jun 2018 21:31)      HPI:  68 year old female with h/o copd not on home oxygen , anxiety and depression presented to ER with c/o worsening SOB x 2 days   As per patient , she was recently discharged from [facility] approximately  2 weeks ago for similar complaints and was appropriately treated for COPD exacerbation. She was asked to follow up with dr ivy francois as an outpatient but because of her worsening sob she could not .C/o chronic productive cough a/w yellow phlegm , no blood. Denies fever, chills , chest pain palpitations, recent travel or sick contact .She states she has been complaint with her medications and inhalers . No URTI s/s , dysuria or  abdominal pain .  patient thinks that her anxiety has been worsening since she is taking prednisone .  In ED Rx received -duonebs, solumedrol 125 IV , levofloxacin 750 mg IV x 1 (08 Jun 2018 21:31)      PAST MEDICAL & SURGICAL HISTORY:  Insomnia  Chronic pain  Depression  Anxiety  COPD (chronic obstructive pulmonary disease)  No significant past surgical history      SOCIAL HX:       Smoking                           ETOH                              Other    FAMILY HISTORY:  No pertinent family history in first degree relatives      Review of Systems     -ve other than above    Allergies    penicillin (Unknown)    Intolerances          PHYSICAL EXAM  Vital Signs Last 24 Hrs  T(C): 35.6 (09 Jun 2018 06:14), Max: 36.8 (09 Jun 2018 00:14)  T(F): 96.1 (09 Jun 2018 06:14), Max: 98.3 (09 Jun 2018 00:14)  HR: 97 (09 Jun 2018 06:14) (97 - 109)  BP: 174/86 (09 Jun 2018 06:14) (121/89 - 174/86)  BP(mean): --  RR: 17 (09 Jun 2018 06:14) (17 - 20)  SpO2: 99% (09 Jun 2018 08:08) (97% - 99%)    General: In NAD  HEENT: ANDRA             Lymph Nodes: No cervical LN     Lungs: Santhosh BS  Cardiovascular: Regular  Abdomen: Soft.  + BS   Extremities: No Clubbing   Skin: Warm  Neurological: Non Focal       LABS:                          13.9   23.46 )-----------( 183      ( 08 Jun 2018 17:03 )             41.9                                               06-08    137  |  94<L>  |  4<L>  ----------------------------<  135<H>  5.0   |  28  |  0.5<L>    Ca    9.0      08 Jun 2018 17:03    TPro  6.7  /  Alb  4.2  /  TBili  0.6  /  DBili  x   /  AST  22  /  ALT  17  /  AlkPhos  58  06-08                                                 CARDIAC MARKERS ( 08 Jun 2018 17:03 )  x     / <0.01 ng/mL / 32 U/L / x     / x                                                LIVER FUNCTIONS - ( 08 Jun 2018 17:03 )  Alb: 4.2 g/dL / Pro: 6.7 g/dL / ALK PHOS: 58 U/L / ALT: 17 U/L / AST: 22 U/L / GGT: x                                                                                                MEDICATIONS  (STANDING):  ALBUTerol    90 MICROgram(s) HFA Inhaler 1 Puff(s) Inhalation every 4 hours  ALBUTerol/ipratropium for Nebulization 3 milliLiter(s) Nebulizer every 6 hours  ALPRAZolam 0.5 milliGRAM(s) Oral every 12 hours  buDESOnide 160 MICROgram(s)/formoterol 4.5 MICROgram(s) Inhaler 2 Puff(s) Inhalation two times a day  heparin  Injectable 5000 Unit(s) SubCutaneous every 8 hours  levoFLOXacin IVPB 750 milliGRAM(s) IV Intermittent every 24 hours  methylPREDNISolone sodium succinate Injectable 60 milliGRAM(s) IV Push three times a day  nicotine -   7 mG/24Hr(s) Patch 1 patch Transdermal daily  nystatin    Suspension 523128 Unit(s) Oral four times a day  nystatin Powder 1 Application(s) Topical two times a day  tiotropium 18 MICROgram(s) Capsule 1 Capsule(s) Inhalation daily  traZODone 50 milliGRAM(s) Oral at bedtime    MEDICATIONS  (PRN):  oxyCODONE    5 mG/acetaminophen 325 mG 2 Tablet(s) Oral every 6 hours PRN Moderate Pain (4 - 6) Patient is a 68y old  Female who presents with a chief complaint of COPD exacerbation (08 Jun 2018 21:31)      HPI:  68 year old female with h/o copd not on home oxygen , anxiety and depression presented to ER with c/o worsening SOB x 2 days   As per patient , she was recently discharged from [facility] approximately  2 weeks ago for similar complaints and was appropriately treated for COPD exacerbation. She was asked to follow up with dr ivy francois as an outpatient but because of her worsening sob she could not .C/o chronic productive cough a/w yellow phlegm , no blood. Denies fever, chills , chest pain palpitations, recent travel or sick contact .She states she has been complaint with her medications and inhalers . No URTI s/s , dysuria or  abdominal pain .  patient thinks that her anxiety has been worsening since she is taking prednisone .  In ED Rx received -duonebs, solumedrol 125 IV , levofloxacin 750 mg IV x 1 (08 Jun 2018 21:31)    Since discharge, pt smoked until last week. She continued to be sob. Could not tolerate the Daliresp. Continued to be on prednisone. Ran out of BREO and been off ICS! Pt is very anxious. Currently, c/o CP, sob, chest tightness. No nausea, vomiting.     PAST MEDICAL & SURGICAL HISTORY:  Insomnia  Chronic pain  Depression  Anxiety  COPD (chronic obstructive pulmonary disease)  No significant past surgical history      SOCIAL HX:   45 pack year, last cig 1 week ago.     FAMILY HISTORY:  No pertinent family history in first degree relatives      Review of Systems     -ve other than above    Allergies    penicillin (Unknown)    Intolerances          PHYSICAL EXAM  Vital Signs Last 24 Hrs  T(C): 35.6 (09 Jun 2018 06:14), Max: 36.8 (09 Jun 2018 00:14)  T(F): 96.1 (09 Jun 2018 06:14), Max: 98.3 (09 Jun 2018 00:14)  HR: 97 (09 Jun 2018 06:14) (97 - 109)  BP: 174/86 (09 Jun 2018 06:14) (121/89 - 174/86)  BP(mean): --  RR: 17 (09 Jun 2018 06:14) (17 - 20)  SpO2: 99% (09 Jun 2018 08:08) (97% - 99%)    General: In mild disress, using accessory muscles  HEENT: ANDRA             Lymph Nodes: No cervical LN     Lungs: bilateral wheezing, moving air.   Cardiovascular: Regular  Abdomen: Soft.  + BS   Extremities: No Clubbing   Skin: Warm  Neurological: Non Focal       LABS:                          13.9   23.46 )-----------( 183      ( 08 Jun 2018 17:03 )             41.9                                               06-08    137  |  94<L>  |  4<L>  ----------------------------<  135<H>  5.0   |  28  |  0.5<L>    Ca    9.0      08 Jun 2018 17:03    TPro  6.7  /  Alb  4.2  /  TBili  0.6  /  DBili  x   /  AST  22  /  ALT  17  /  AlkPhos  58  06-08                                                 CARDIAC MARKERS ( 08 Jun 2018 17:03 )  x     / <0.01 ng/mL / 32 U/L / x     / x                                                LIVER FUNCTIONS - ( 08 Jun 2018 17:03 )  Alb: 4.2 g/dL / Pro: 6.7 g/dL / ALK PHOS: 58 U/L / ALT: 17 U/L / AST: 22 U/L / GGT: x                                                                                                MEDICATIONS  (STANDING):  ALBUTerol    90 MICROgram(s) HFA Inhaler 1 Puff(s) Inhalation every 4 hours  ALBUTerol/ipratropium for Nebulization 3 milliLiter(s) Nebulizer every 6 hours  ALPRAZolam 0.5 milliGRAM(s) Oral every 12 hours  buDESOnide 160 MICROgram(s)/formoterol 4.5 MICROgram(s) Inhaler 2 Puff(s) Inhalation two times a day  heparin  Injectable 5000 Unit(s) SubCutaneous every 8 hours  levoFLOXacin IVPB 750 milliGRAM(s) IV Intermittent every 24 hours  methylPREDNISolone sodium succinate Injectable 60 milliGRAM(s) IV Push three times a day  nicotine -   7 mG/24Hr(s) Patch 1 patch Transdermal daily  nystatin    Suspension 916992 Unit(s) Oral four times a day  nystatin Powder 1 Application(s) Topical two times a day  tiotropium 18 MICROgram(s) Capsule 1 Capsule(s) Inhalation daily  traZODone 50 milliGRAM(s) Oral at bedtime    MEDICATIONS  (PRN):  oxyCODONE    5 mG/acetaminophen 325 mG 2 Tablet(s) Oral every 6 hours PRN Moderate Pain (4 - 6)      CXR : B/l interstitial markings

## 2018-06-10 LAB
ANION GAP SERPL CALC-SCNC: 13 MMOL/L — SIGNIFICANT CHANGE UP (ref 7–14)
BASOPHILS # BLD AUTO: 0.04 K/UL — SIGNIFICANT CHANGE UP (ref 0–0.2)
BASOPHILS NFR BLD AUTO: 0.2 % — SIGNIFICANT CHANGE UP (ref 0–1)
BUN SERPL-MCNC: 10 MG/DL — SIGNIFICANT CHANGE UP (ref 10–20)
CALCIUM SERPL-MCNC: 10 MG/DL — SIGNIFICANT CHANGE UP (ref 8.5–10.1)
CHLORIDE SERPL-SCNC: 92 MMOL/L — LOW (ref 98–110)
CO2 SERPL-SCNC: 31 MMOL/L — SIGNIFICANT CHANGE UP (ref 17–32)
CREAT SERPL-MCNC: 0.5 MG/DL — LOW (ref 0.7–1.5)
EOSINOPHIL # BLD AUTO: 0 K/UL — SIGNIFICANT CHANGE UP (ref 0–0.7)
EOSINOPHIL NFR BLD AUTO: 0 % — SIGNIFICANT CHANGE UP (ref 0–8)
GAS PNL BLDA: SIGNIFICANT CHANGE UP
GLUCOSE SERPL-MCNC: 139 MG/DL — HIGH (ref 70–99)
HCT VFR BLD CALC: 40.7 % — SIGNIFICANT CHANGE UP (ref 37–47)
HGB BLD-MCNC: 13.5 G/DL — SIGNIFICANT CHANGE UP (ref 12–16)
IMM GRANULOCYTES NFR BLD AUTO: 1.4 % — HIGH (ref 0.1–0.3)
LYMPHOCYTES # BLD AUTO: 1.09 K/UL — LOW (ref 1.2–3.4)
LYMPHOCYTES # BLD AUTO: 5.2 % — LOW (ref 20.5–51.1)
MAGNESIUM SERPL-MCNC: 2.1 MG/DL — SIGNIFICANT CHANGE UP (ref 1.8–2.4)
MCHC RBC-ENTMCNC: 32.2 PG — HIGH (ref 27–31)
MCHC RBC-ENTMCNC: 33.2 G/DL — SIGNIFICANT CHANGE UP (ref 32–37)
MCV RBC AUTO: 97.1 FL — SIGNIFICANT CHANGE UP (ref 81–99)
MONOCYTES # BLD AUTO: 0.56 K/UL — SIGNIFICANT CHANGE UP (ref 0.1–0.6)
MONOCYTES NFR BLD AUTO: 2.7 % — SIGNIFICANT CHANGE UP (ref 1.7–9.3)
NEUTROPHILS # BLD AUTO: 18.95 K/UL — HIGH (ref 1.4–6.5)
NEUTROPHILS NFR BLD AUTO: 90.5 % — HIGH (ref 42.2–75.2)
NRBC # BLD: 0 /100 WBCS — SIGNIFICANT CHANGE UP (ref 0–0)
PLATELET # BLD AUTO: 205 K/UL — SIGNIFICANT CHANGE UP (ref 130–400)
POTASSIUM SERPL-MCNC: 5.9 MMOL/L — HIGH (ref 3.5–5)
POTASSIUM SERPL-SCNC: 5.9 MMOL/L — HIGH (ref 3.5–5)
RBC # BLD: 4.19 M/UL — LOW (ref 4.2–5.4)
RBC # FLD: 13.2 % — SIGNIFICANT CHANGE UP (ref 11.5–14.5)
SODIUM SERPL-SCNC: 136 MMOL/L — SIGNIFICANT CHANGE UP (ref 135–146)
WBC # BLD: 20.94 K/UL — HIGH (ref 4.8–10.8)
WBC # FLD AUTO: 20.94 K/UL — HIGH (ref 4.8–10.8)

## 2018-06-10 RX ADMIN — NYSTATIN CREAM 1 APPLICATION(S): 100000 CREAM TOPICAL at 05:10

## 2018-06-10 RX ADMIN — Medication 500000 UNIT(S): at 17:14

## 2018-06-10 RX ADMIN — NYSTATIN CREAM 1 APPLICATION(S): 100000 CREAM TOPICAL at 17:14

## 2018-06-10 RX ADMIN — Medication 0.5 MILLIGRAM(S): at 21:56

## 2018-06-10 RX ADMIN — OXYCODONE AND ACETAMINOPHEN 2 TABLET(S): 5; 325 TABLET ORAL at 17:14

## 2018-06-10 RX ADMIN — Medication 60 MILLIGRAM(S): at 21:56

## 2018-06-10 RX ADMIN — Medication 3 MILLILITER(S): at 13:28

## 2018-06-10 RX ADMIN — OXYCODONE AND ACETAMINOPHEN 2 TABLET(S): 5; 325 TABLET ORAL at 18:05

## 2018-06-10 RX ADMIN — Medication 500000 UNIT(S): at 11:09

## 2018-06-10 RX ADMIN — OXYCODONE AND ACETAMINOPHEN 2 TABLET(S): 5; 325 TABLET ORAL at 05:08

## 2018-06-10 RX ADMIN — OXYCODONE AND ACETAMINOPHEN 2 TABLET(S): 5; 325 TABLET ORAL at 11:08

## 2018-06-10 RX ADMIN — Medication 3 MILLILITER(S): at 04:42

## 2018-06-10 RX ADMIN — BUDESONIDE AND FORMOTEROL FUMARATE DIHYDRATE 2 PUFF(S): 160; 4.5 AEROSOL RESPIRATORY (INHALATION) at 07:53

## 2018-06-10 RX ADMIN — BUDESONIDE AND FORMOTEROL FUMARATE DIHYDRATE 2 PUFF(S): 160; 4.5 AEROSOL RESPIRATORY (INHALATION) at 20:02

## 2018-06-10 RX ADMIN — OXYCODONE AND ACETAMINOPHEN 2 TABLET(S): 5; 325 TABLET ORAL at 12:10

## 2018-06-10 RX ADMIN — Medication 500000 UNIT(S): at 05:10

## 2018-06-10 RX ADMIN — Medication 1 PATCH: at 11:10

## 2018-06-10 RX ADMIN — OXYCODONE AND ACETAMINOPHEN 2 TABLET(S): 5; 325 TABLET ORAL at 06:19

## 2018-06-10 RX ADMIN — Medication 1 PATCH: at 11:09

## 2018-06-10 RX ADMIN — Medication 3 MILLILITER(S): at 07:29

## 2018-06-10 RX ADMIN — Medication 60 MILLIGRAM(S): at 13:15

## 2018-06-10 RX ADMIN — Medication 60 MILLIGRAM(S): at 05:10

## 2018-06-10 RX ADMIN — Medication 3 MILLILITER(S): at 19:52

## 2018-06-10 RX ADMIN — Medication 0.5 MILLIGRAM(S): at 05:08

## 2018-06-10 RX ADMIN — OXYCODONE AND ACETAMINOPHEN 2 TABLET(S): 5; 325 TABLET ORAL at 23:38

## 2018-06-10 RX ADMIN — Medication 500000 UNIT(S): at 23:38

## 2018-06-10 RX ADMIN — Medication 50 MILLIGRAM(S): at 21:57

## 2018-06-11 LAB
ANION GAP SERPL CALC-SCNC: 11 MMOL/L — SIGNIFICANT CHANGE UP (ref 7–14)
BASOPHILS # BLD AUTO: 0.03 K/UL — SIGNIFICANT CHANGE UP (ref 0–0.2)
BASOPHILS NFR BLD AUTO: 0.2 % — SIGNIFICANT CHANGE UP (ref 0–1)
BUN SERPL-MCNC: 8 MG/DL — LOW (ref 10–20)
CALCIUM SERPL-MCNC: 9.6 MG/DL — SIGNIFICANT CHANGE UP (ref 8.5–10.1)
CHLORIDE SERPL-SCNC: 95 MMOL/L — LOW (ref 98–110)
CO2 SERPL-SCNC: 29 MMOL/L — SIGNIFICANT CHANGE UP (ref 17–32)
CREAT SERPL-MCNC: <0.5 MG/DL — LOW (ref 0.7–1.5)
EOSINOPHIL # BLD AUTO: 0 K/UL — SIGNIFICANT CHANGE UP (ref 0–0.7)
EOSINOPHIL NFR BLD AUTO: 0 % — SIGNIFICANT CHANGE UP (ref 0–8)
GLUCOSE SERPL-MCNC: 122 MG/DL — HIGH (ref 70–99)
HCT VFR BLD CALC: 38.4 % — SIGNIFICANT CHANGE UP (ref 37–47)
HGB BLD-MCNC: 13.1 G/DL — SIGNIFICANT CHANGE UP (ref 12–16)
IMM GRANULOCYTES NFR BLD AUTO: 1.2 % — HIGH (ref 0.1–0.3)
LYMPHOCYTES # BLD AUTO: 1.18 K/UL — LOW (ref 1.2–3.4)
LYMPHOCYTES # BLD AUTO: 5.9 % — LOW (ref 20.5–51.1)
MAGNESIUM SERPL-MCNC: 2 MG/DL — SIGNIFICANT CHANGE UP (ref 1.8–2.4)
MCHC RBC-ENTMCNC: 32.8 PG — HIGH (ref 27–31)
MCHC RBC-ENTMCNC: 34.1 G/DL — SIGNIFICANT CHANGE UP (ref 32–37)
MCV RBC AUTO: 96 FL — SIGNIFICANT CHANGE UP (ref 81–99)
MONOCYTES # BLD AUTO: 0.63 K/UL — HIGH (ref 0.1–0.6)
MONOCYTES NFR BLD AUTO: 3.2 % — SIGNIFICANT CHANGE UP (ref 1.7–9.3)
NEUTROPHILS # BLD AUTO: 17.89 K/UL — HIGH (ref 1.4–6.5)
NEUTROPHILS NFR BLD AUTO: 89.5 % — HIGH (ref 42.2–75.2)
NRBC # BLD: 0 /100 WBCS — SIGNIFICANT CHANGE UP (ref 0–0)
PLATELET # BLD AUTO: 213 K/UL — SIGNIFICANT CHANGE UP (ref 130–400)
POTASSIUM SERPL-MCNC: 4.4 MMOL/L — SIGNIFICANT CHANGE UP (ref 3.5–5)
POTASSIUM SERPL-SCNC: 4.4 MMOL/L — SIGNIFICANT CHANGE UP (ref 3.5–5)
RBC # BLD: 4 M/UL — LOW (ref 4.2–5.4)
RBC # FLD: 13.3 % — SIGNIFICANT CHANGE UP (ref 11.5–14.5)
SODIUM SERPL-SCNC: 135 MMOL/L — SIGNIFICANT CHANGE UP (ref 135–146)
WBC # BLD: 19.97 K/UL — HIGH (ref 4.8–10.8)
WBC # FLD AUTO: 19.97 K/UL — HIGH (ref 4.8–10.8)

## 2018-06-11 RX ORDER — NICOTINE POLACRILEX 2 MG
1 GUM BUCCAL DAILY
Qty: 0 | Refills: 0 | Status: DISCONTINUED | OUTPATIENT
Start: 2018-06-11 | End: 2018-06-13

## 2018-06-11 RX ORDER — DOCUSATE SODIUM 100 MG
100 CAPSULE ORAL THREE TIMES A DAY
Qty: 0 | Refills: 0 | Status: DISCONTINUED | OUTPATIENT
Start: 2018-06-11 | End: 2018-06-22

## 2018-06-11 RX ADMIN — Medication 500000 UNIT(S): at 11:21

## 2018-06-11 RX ADMIN — AZITHROMYCIN 250 MILLIGRAM(S): 500 TABLET, FILM COATED ORAL at 05:41

## 2018-06-11 RX ADMIN — OXYCODONE AND ACETAMINOPHEN 2 TABLET(S): 5; 325 TABLET ORAL at 00:11

## 2018-06-11 RX ADMIN — Medication 60 MILLIGRAM(S): at 21:18

## 2018-06-11 RX ADMIN — OXYCODONE AND ACETAMINOPHEN 2 TABLET(S): 5; 325 TABLET ORAL at 06:14

## 2018-06-11 RX ADMIN — Medication 1 PATCH: at 11:21

## 2018-06-11 RX ADMIN — Medication 500000 UNIT(S): at 05:42

## 2018-06-11 RX ADMIN — Medication 0.5 MILLIGRAM(S): at 05:41

## 2018-06-11 RX ADMIN — OXYCODONE AND ACETAMINOPHEN 2 TABLET(S): 5; 325 TABLET ORAL at 17:36

## 2018-06-11 RX ADMIN — Medication 500000 UNIT(S): at 17:32

## 2018-06-11 RX ADMIN — Medication 100 MILLIGRAM(S): at 14:25

## 2018-06-11 RX ADMIN — Medication 50 MILLIGRAM(S): at 21:17

## 2018-06-11 RX ADMIN — Medication 3 MILLILITER(S): at 07:21

## 2018-06-11 RX ADMIN — Medication 1 PATCH: at 11:23

## 2018-06-11 RX ADMIN — OXYCODONE AND ACETAMINOPHEN 2 TABLET(S): 5; 325 TABLET ORAL at 23:47

## 2018-06-11 RX ADMIN — Medication 60 MILLIGRAM(S): at 14:24

## 2018-06-11 RX ADMIN — NYSTATIN CREAM 1 APPLICATION(S): 100000 CREAM TOPICAL at 17:32

## 2018-06-11 RX ADMIN — OXYCODONE AND ACETAMINOPHEN 2 TABLET(S): 5; 325 TABLET ORAL at 23:17

## 2018-06-11 RX ADMIN — Medication 100 MILLIGRAM(S): at 21:17

## 2018-06-11 RX ADMIN — Medication 60 MILLIGRAM(S): at 05:41

## 2018-06-11 RX ADMIN — OXYCODONE AND ACETAMINOPHEN 2 TABLET(S): 5; 325 TABLET ORAL at 05:41

## 2018-06-11 RX ADMIN — OXYCODONE AND ACETAMINOPHEN 2 TABLET(S): 5; 325 TABLET ORAL at 11:21

## 2018-06-11 RX ADMIN — BUDESONIDE AND FORMOTEROL FUMARATE DIHYDRATE 2 PUFF(S): 160; 4.5 AEROSOL RESPIRATORY (INHALATION) at 09:01

## 2018-06-11 RX ADMIN — BUDESONIDE AND FORMOTEROL FUMARATE DIHYDRATE 2 PUFF(S): 160; 4.5 AEROSOL RESPIRATORY (INHALATION) at 21:17

## 2018-06-11 RX ADMIN — NYSTATIN CREAM 1 APPLICATION(S): 100000 CREAM TOPICAL at 05:42

## 2018-06-11 RX ADMIN — Medication 0.5 MILLIGRAM(S): at 21:17

## 2018-06-11 NOTE — PROGRESS NOTE ADULT - SUBJECTIVE AND OBJECTIVE BOX
Patient seen and examned and discussed case with resident  Patient readmitted for dyspnea, diffuse wheezing even after high dose steroids  No edema or chest pain  Surprisingly she is not retaining PCO2 and decent PO2.  Suggest cardiology eval.

## 2018-06-11 NOTE — PROGRESS NOTE ADULT - SUBJECTIVE AND OBJECTIVE BOX
SUBJECTIVE:    Patient is a 68y old  Female who presents with a chief complaint of COPD exacerbation (2018 21:31)    Currently admitted to medicine with the primary diagnosis of COPD exacerbation     Today is hospital day 3d. This morning she is resting comfortably in bed and reports no new issues or overnight events.     PAST MEDICAL & SURGICAL HISTORY  PAST MEDICAL & SURGICAL HISTORY:  Insomnia  Chronic pain  Depression  Anxiety  COPD (chronic obstructive pulmonary disease)  No significant past surgical history    SOCIAL HISTORY:    ALLERGIES:  penicillin (Unknown)    MEDICATIONS:  STANDING MEDICATIONS  ALBUTerol/ipratropium for Nebulization 3 milliLiter(s) Nebulizer every 6 hours  ALPRAZolam 0.5 milliGRAM(s) Oral every 12 hours  azithromycin   Tablet 250 milliGRAM(s) Oral <User Schedule>  buDESOnide 160 MICROgram(s)/formoterol 4.5 MICROgram(s) Inhaler 2 Puff(s) Inhalation two times a day  docusate sodium 100 milliGRAM(s) Oral three times a day  heparin  Injectable 5000 Unit(s) SubCutaneous every 8 hours  methylPREDNISolone sodium succinate Injectable 60 milliGRAM(s) IV Push three times a day  nicotine - 21 mG/24Hr(s) Patch 1 patch Transdermal daily  nystatin    Suspension 235960 Unit(s) Oral four times a day  nystatin Powder 1 Application(s) Topical two times a day  oxyCODONE    5 mG/acetaminophen 325 mG 2 Tablet(s) Oral every 6 hours  traZODone 50 milliGRAM(s) Oral at bedtime    PRN MEDICATIONS  ALBUTerol/ipratropium for Nebulization 3 milliLiter(s) Nebulizer every 4 hours PRN    VITALS:   T(F): 96.1  HR: 104  BP: 163/74  RR: 18  SpO2: 96%    HOME MEDS: </u  ALPRAZolam 0.5 mg oral tablet: 1 tab(s) orally every 12 hours  AUGMENTED BETAMETHASONE DIPROPIONATE .05 % OINT:   BREO ELLIPTA -25:   COMBIVENT    AER :   HYDROCO/APAP TAB 10-325M tab(s) orally every 6 hours, As Needed  Levaquin 750 mg oral tablet: 1 tab(s) orally once a day   nicotine 7 mg/24 hr transdermal film, extended release: 1 patch transdermal once a day   nystatin 100,000 units/mL oral suspension: 5 milliliter(s) orally 4 times a day swish in the mouth and retain for several minutes before swallowing  predniSONE 20 mg oral tablet: 6 tab(s) daily x 3 days  4 tab(s) daily x 3 days  2 tab(s) daily x 3 days  1 tab(s) daily x 3 days  PROAIR  MCG/ACT AERS:   roflumilast 500 mcg oral tablet: 1 tab(s) orally once a day   Spiriva Respimat 1.25 mcg/inh inhalation aerosol: 2 puff(s) inhaled once a day   TRAZODONE HCL 50 MG TABS:       LABS:                        13.1   19.97 )-----------( 213      ( 2018 06:13 )             38.4     06-11    135  |  95<L>  |  8<L>  ----------------------------<  122<H>  4.4   |  29  |  <0.5<L>    Ca    9.6      2018 06:13  Mg     2.0     -11          ABG - ( 10 Anthony 2018 12:05 )  pH, Arterial: 7.48  pH, Blood: x     /  pCO2: 39    /  pO2: 105   / HCO3: 29    / Base Excess: 5.5   /  SaO2: 99          RADIOLOGY:  reviewed    PHYSICAL EXAM:  GEN: No acute distress  HEENT: WNL  LUNGS: mild wheezing bilaterally   HEART: S1/S2 present. RRR.   ABD: Soft, non-tender, non-distended. Bowel sounds present  EXT: no LE edema  NEURO: AAOX3

## 2018-06-11 NOTE — PROGRESS NOTE ADULT - ASSESSMENT
68 year old female with h/o copd not on home oxygen , anxiety and depression presented to ER with c/o worsening SOB  a/w cough x 2 days     # Cough a/w sob and wheezing - likely etiology COPD exacerbation  - c/w bipap  sputum culture neg  pulm recs- dr ivy francois: c/w solumedrol 60 Q8H, add symbicort with spacer, duonebs Q4H PRN and d/c spiriva, check EKG for QTc then start Azithromycin 250mg three times a week, make pain meds standing, xanax 0.5mg BID standing, d/c roflumilast 500 mg oral daily   - ABG WNL, as per hospitalist f/u Cardio consult and echo    # Anxiety- c/w alprazolam 0.5 q12    # Insomnia- c/w Trazadone 50 mg HS    DVT ppx- HSQ  Full code- patient waiting for brother to fill out DNR/DNI paperwork

## 2018-06-12 ENCOUNTER — TRANSCRIPTION ENCOUNTER (OUTPATIENT)
Age: 68
End: 2018-06-12

## 2018-06-12 LAB
ANION GAP SERPL CALC-SCNC: 15 MMOL/L — HIGH (ref 7–14)
BASOPHILS # BLD AUTO: 0.02 K/UL — SIGNIFICANT CHANGE UP (ref 0–0.2)
BASOPHILS NFR BLD AUTO: 0.1 % — SIGNIFICANT CHANGE UP (ref 0–1)
BUN SERPL-MCNC: 12 MG/DL — SIGNIFICANT CHANGE UP (ref 10–20)
CALCIUM SERPL-MCNC: 9.6 MG/DL — SIGNIFICANT CHANGE UP (ref 8.5–10.1)
CHLORIDE SERPL-SCNC: 93 MMOL/L — LOW (ref 98–110)
CO2 SERPL-SCNC: 31 MMOL/L — SIGNIFICANT CHANGE UP (ref 17–32)
CREAT SERPL-MCNC: 0.5 MG/DL — LOW (ref 0.7–1.5)
EOSINOPHIL # BLD AUTO: 0 K/UL — SIGNIFICANT CHANGE UP (ref 0–0.7)
EOSINOPHIL NFR BLD AUTO: 0 % — SIGNIFICANT CHANGE UP (ref 0–8)
GLUCOSE SERPL-MCNC: 156 MG/DL — HIGH (ref 70–99)
HCT VFR BLD CALC: 41.1 % — SIGNIFICANT CHANGE UP (ref 37–47)
HGB BLD-MCNC: 13.6 G/DL — SIGNIFICANT CHANGE UP (ref 12–16)
IMM GRANULOCYTES NFR BLD AUTO: 1.4 % — HIGH (ref 0.1–0.3)
LYMPHOCYTES # BLD AUTO: 1 K/UL — LOW (ref 1.2–3.4)
LYMPHOCYTES # BLD AUTO: 6.6 % — LOW (ref 20.5–51.1)
MAGNESIUM SERPL-MCNC: 2.3 MG/DL — SIGNIFICANT CHANGE UP (ref 1.8–2.4)
MCHC RBC-ENTMCNC: 32.9 PG — HIGH (ref 27–31)
MCHC RBC-ENTMCNC: 33.1 G/DL — SIGNIFICANT CHANGE UP (ref 32–37)
MCV RBC AUTO: 99.3 FL — HIGH (ref 81–99)
MONOCYTES # BLD AUTO: 0.51 K/UL — SIGNIFICANT CHANGE UP (ref 0.1–0.6)
MONOCYTES NFR BLD AUTO: 3.4 % — SIGNIFICANT CHANGE UP (ref 1.7–9.3)
NEUTROPHILS # BLD AUTO: 13.45 K/UL — HIGH (ref 1.4–6.5)
NEUTROPHILS NFR BLD AUTO: 88.5 % — HIGH (ref 42.2–75.2)
NRBC # BLD: 0 /100 WBCS — SIGNIFICANT CHANGE UP (ref 0–0)
PLATELET # BLD AUTO: 211 K/UL — SIGNIFICANT CHANGE UP (ref 130–400)
POTASSIUM SERPL-MCNC: 5.5 MMOL/L — HIGH (ref 3.5–5)
POTASSIUM SERPL-SCNC: 5.5 MMOL/L — HIGH (ref 3.5–5)
RBC # BLD: 4.14 M/UL — LOW (ref 4.2–5.4)
RBC # FLD: 13.3 % — SIGNIFICANT CHANGE UP (ref 11.5–14.5)
SODIUM SERPL-SCNC: 139 MMOL/L — SIGNIFICANT CHANGE UP (ref 135–146)
WBC # BLD: 15.2 K/UL — HIGH (ref 4.8–10.8)
WBC # FLD AUTO: 15.2 K/UL — HIGH (ref 4.8–10.8)

## 2018-06-12 RX ORDER — ALPRAZOLAM 0.25 MG
0.5 TABLET ORAL
Qty: 0 | Refills: 0 | Status: DISCONTINUED | OUTPATIENT
Start: 2018-06-12 | End: 2018-06-19

## 2018-06-12 RX ADMIN — Medication 1 PATCH: at 12:20

## 2018-06-12 RX ADMIN — BUDESONIDE AND FORMOTEROL FUMARATE DIHYDRATE 2 PUFF(S): 160; 4.5 AEROSOL RESPIRATORY (INHALATION) at 21:32

## 2018-06-12 RX ADMIN — Medication 50 MILLIGRAM(S): at 21:31

## 2018-06-12 RX ADMIN — Medication 1 DROP(S): at 18:04

## 2018-06-12 RX ADMIN — Medication 500000 UNIT(S): at 12:20

## 2018-06-12 RX ADMIN — Medication 3 MILLILITER(S): at 18:42

## 2018-06-12 RX ADMIN — Medication 0.5 MILLIGRAM(S): at 21:40

## 2018-06-12 RX ADMIN — NYSTATIN CREAM 1 APPLICATION(S): 100000 CREAM TOPICAL at 05:14

## 2018-06-12 RX ADMIN — OXYCODONE AND ACETAMINOPHEN 2 TABLET(S): 5; 325 TABLET ORAL at 23:01

## 2018-06-12 RX ADMIN — Medication 3 MILLILITER(S): at 07:28

## 2018-06-12 RX ADMIN — Medication 100 MILLIGRAM(S): at 13:59

## 2018-06-12 RX ADMIN — Medication 1 DROP(S): at 07:10

## 2018-06-12 RX ADMIN — Medication 500000 UNIT(S): at 00:17

## 2018-06-12 RX ADMIN — NYSTATIN CREAM 1 APPLICATION(S): 100000 CREAM TOPICAL at 18:04

## 2018-06-12 RX ADMIN — Medication 500000 UNIT(S): at 05:14

## 2018-06-12 RX ADMIN — OXYCODONE AND ACETAMINOPHEN 2 TABLET(S): 5; 325 TABLET ORAL at 12:19

## 2018-06-12 RX ADMIN — Medication 100 MILLIGRAM(S): at 05:14

## 2018-06-12 RX ADMIN — OXYCODONE AND ACETAMINOPHEN 2 TABLET(S): 5; 325 TABLET ORAL at 05:12

## 2018-06-12 RX ADMIN — BUDESONIDE AND FORMOTEROL FUMARATE DIHYDRATE 2 PUFF(S): 160; 4.5 AEROSOL RESPIRATORY (INHALATION) at 08:45

## 2018-06-12 RX ADMIN — Medication 60 MILLIGRAM(S): at 14:00

## 2018-06-12 RX ADMIN — OXYCODONE AND ACETAMINOPHEN 2 TABLET(S): 5; 325 TABLET ORAL at 23:31

## 2018-06-12 RX ADMIN — Medication 500000 UNIT(S): at 23:01

## 2018-06-12 RX ADMIN — Medication 500000 UNIT(S): at 18:04

## 2018-06-12 RX ADMIN — Medication 0.5 MILLIGRAM(S): at 05:14

## 2018-06-12 RX ADMIN — OXYCODONE AND ACETAMINOPHEN 2 TABLET(S): 5; 325 TABLET ORAL at 05:42

## 2018-06-12 RX ADMIN — Medication 100 MILLIGRAM(S): at 21:31

## 2018-06-12 RX ADMIN — OXYCODONE AND ACETAMINOPHEN 2 TABLET(S): 5; 325 TABLET ORAL at 18:04

## 2018-06-12 RX ADMIN — Medication 60 MILLIGRAM(S): at 05:15

## 2018-06-12 NOTE — PROGRESS NOTE ADULT - ASSESSMENT
COPD EXACERBATION/ RECURRENT ADMISSION/ STILL ACTIVELY WHEEZING    - CHEST CT NC  - DECREASE SOLUMEDROL TO Q 12H  - NEB Q 6H AS NEEDED  - THIAMINE/ FOLIC ACID  - POOR PROGNOSIS  - DVT PROPHYLAXIS  - DALIRESP 500 Q 24

## 2018-06-12 NOTE — DISCHARGE NOTE ADULT - CARE PLAN
Principal Discharge DX:	COPD (chronic obstructive pulmonary disease)  Goal:	MEdical management  Assessment and plan of treatment:	Take prednisone taper and antibiotics   Smoking cessation  Secondary Diagnosis:	Anxiety  Goal:	Medical management  Assessment and plan of treatment:	Continue home medications  Secondary Diagnosis:	Depression  Goal:	Medical management  Assessment and plan of treatment:	Please continue home medications

## 2018-06-12 NOTE — DISCHARGE NOTE ADULT - MEDICATION SUMMARY - MEDICATIONS TO TAKE
I will START or STAY ON the medications listed below when I get home from the hospital:    predniSONE 20 mg oral tablet  -- 6 tab(s) daily x 3 days  4 tab(s) daily x 3 days  2 tab(s) daily x 3 days  1 tab(s) daily x 3 days  -- It is very important that you take or use this exactly as directed.  Do not skip doses or discontinue unless directed by your doctor.  Obtain medical advice before taking any non-prescription drugs as some may affect the action of this medication.  Take with food or milk.    -- Indication: For COPD (chronic obstructive pulmonary disease)    HYDROCO/APAP TAB 10-325MG  -- 1 tab(s) by mouth every 6 hours, As Needed  -- Indication: For PAin    TRAZODONE HCL 50 MG TABS  -- Indication: For Depression    nystatin 100,000 units/mL oral suspension  -- 5 milliliter(s) by mouth 4 times a day swish in the mouth and retain for several minutes before swallowing  -- Indication: For Rash    fluconazole 100 mg oral tablet  -- 1 tab(s) by mouth once a day  -- Indication: For Fungal infection    ALPRAZolam 0.5 mg oral tablet  -- 1 tab(s) by mouth every 12 hours  -- Indication: For Anxiety    Spiriva Respimat 1.25 mcg/inh inhalation aerosol  -- 2 puff(s) inhaled once a day   -- Check with your doctor before becoming pregnant.  For inhalation only.    -- Indication: For COPD (chronic obstructive pulmonary disease)    PROAIR  MCG/ACT AERS  -- Indication: For COPD (chronic obstructive pulmonary disease)    COMBIVENT    AER   -- Indication: For COPD (chronic obstructive pulmonary disease)    BREO ELLIPTA -25  -- Indication: For COPD (chronic obstructive pulmonary disease)    amLODIPine 5 mg oral tablet  -- 1 tab(s) by mouth once a day  -- Indication: For HTN    cefpodoxime 200 mg oral tablet  -- 1 tab(s) by mouth 2 times a day   -- Finish all this medication unless otherwise directed by prescriber.  Take with food or milk.    -- Indication: For Pneumonia     AUGMENTED BETAMETHASONE DIPROPIONATE .05 % OINT  -- Indication: For Rash     roflumilast 500 mcg oral tablet  -- 1 tab(s) by mouth once a day   -- Check with your doctor before becoming pregnant.  Obtain medical advice before taking any non-prescription drugs as some may affect the action of this medication.    -- Indication: For COPD (chronic obstructive pulmonary disease)    nicotine 7 mg/24 hr transdermal film, extended release  -- 1 patch by transdermal patch once a day   -- Indication: For Nicotine dependence

## 2018-06-12 NOTE — DISCHARGE NOTE ADULT - HOSPITAL COURSE
Presented to the hospital with shortness of breath. A diagnosis of COPD exacerbation was made. Patient treated with steroids and antibiotics through the course of stay. Course was complicated by hypertension and oral thrush, which was also treated. Patient being discharged with Vantin 200mg BID for 7 days and continuation of home medications. Will follow up with PMD and pulmonology for long term management of COPD, hypertension, and other diagnoses. Presented to the hospital with shortness of breath. A diagnosis of COPD exacerbation was made. Patient treated with steroids and antibiotics through the course of stay. Course was complicated by hypertension and oral thrush, which was also treated. Patient being discharged with Vantin 200mg BID for 7 days and continuation of home medications. Will follow up with PMD and pulmonology for long term management of COPD, hypertension, and other diagnosis.

## 2018-06-12 NOTE — PROGRESS NOTE ADULT - ASSESSMENT
68 year old female with h/o copd not on home oxygen , anxiety and depression presented to ER with c/o worsening SOB  a/w cough x 2 days     # Cough a/w sob and wheezing - likely etiology COPD exacerbation  - c/w bipap  sputum culture neg  pulm recs- dr ivy francois: c/w solumedrol 60 Q8H, add symbicort with spacer, duonebs Q4H PRN and d/c spiriva, check EKG for QTc then start Azithromycin 250mg three times a week, make pain meds standing, xanax 0.5mg BID standing, d/c roflumilast 500 mg oral daily   - ABG WNL  - f/u Cardio consult  - echo shows G1DD, mild tricuspid regurge, sclerotic aortic valve with normal opening    # Anxiety- c/w alprazolam 0.5 q12    # Insomnia- c/w Trazadone 50 mg HS    DVT ppx- HSQ  Code status- patient wants to be dnr/dni but wants her brother to sign the form .For tonight she wants to be full code , when her brother comes to the hospital in am she wants to discuss this with her brother and then sign the form.She has been explained that her respiratory status looks critical overnight and she might have to be intubated if respiratory status worsens.

## 2018-06-12 NOTE — PROGRESS NOTE ADULT - SUBJECTIVE AND OBJECTIVE BOX
OVERNIGHT EVENTS: still c/o sob, wheezing    Vital Signs Last 24 Hrs  T(C): 35.7 (12 Jun 2018 05:00), Max: 36.2 (11 Jun 2018 21:16)  T(F): 96.3 (12 Jun 2018 05:00), Max: 97.2 (11 Jun 2018 21:16)  HR: 101 (12 Jun 2018 05:00) (101 - 106)  BP: 171/86 (12 Jun 2018 05:00) (145/91 - 171/86)  BP(mean): --  RR: 18 (12 Jun 2018 05:00) (18 - 18)  SpO2: --    PHYSICAL EXAMINATION:    GENERAL: The patient is awake and alert in no apparent distress.     HEENT: Head is normocephalic and atraumatic. Extraocular muscles are intact. Mucous membranes are moist.    NECK: Supple.    LUNGS: diffuse wheezing    HEART: Regular rate and rhythm without murmur.    ABDOMEN: Soft, nontender, and nondistended.      EXTREMITIES: Without any cyanosis, clubbing, rash, lesions or edema.    NEUROLOGIC: Grossly intact.    SKIN: No ulceration or induration present.      LABS:                        13.1   19.97 )-----------( 213      ( 11 Jun 2018 06:13 )             38.4     06-11    135  |  95<L>  |  8<L>  ----------------------------<  122<H>  4.4   |  29  |  <0.5<L>    Ca    9.6      11 Jun 2018 06:13  Mg     2.0     06-11          ABG - ( 10 Anthony 2018 12:05 )  pH, Arterial: 7.48  pH, Blood: x     /  pCO2: 39    /  pO2: 105   / HCO3: 29    / Base Excess: 5.5   /  SaO2: 99                                  MICROBIOLOGY:      MEDICATIONS  (STANDING):  ALBUTerol/ipratropium for Nebulization 3 milliLiter(s) Nebulizer every 6 hours  artificial  tears Solution 1 Drop(s) Both EYES two times a day  azithromycin   Tablet 250 milliGRAM(s) Oral <User Schedule>  buDESOnide 160 MICROgram(s)/formoterol 4.5 MICROgram(s) Inhaler 2 Puff(s) Inhalation two times a day  docusate sodium 100 milliGRAM(s) Oral three times a day  heparin  Injectable 5000 Unit(s) SubCutaneous every 8 hours  methylPREDNISolone sodium succinate Injectable 60 milliGRAM(s) IV Push three times a day  nicotine - 21 mG/24Hr(s) Patch 1 patch Transdermal daily  nystatin    Suspension 751377 Unit(s) Oral four times a day  nystatin Powder 1 Application(s) Topical two times a day  oxyCODONE    5 mG/acetaminophen 325 mG 2 Tablet(s) Oral every 6 hours  traZODone 50 milliGRAM(s) Oral at bedtime    MEDICATIONS  (PRN):  ALBUTerol/ipratropium for Nebulization 3 milliLiter(s) Nebulizer every 4 hours PRN Bronchospasm  ALPRAZolam 0.5 milliGRAM(s) Oral two times a day PRN Anxiety      RADIOLOGY & ADDITIONAL STUDIES:

## 2018-06-12 NOTE — PROGRESS NOTE ADULT - SUBJECTIVE AND OBJECTIVE BOX
SUBJECTIVE:    Patient is a 68y old  Female who presents with a chief complaint of COPD exacerbation (2018 09:17)    Currently admitted to medicine with the primary diagnosis of COPD exacerbation     Today is hospital day 4d. This morning she is resting comfortably in bed and reports no new issues or overnight events.     PAST MEDICAL & SURGICAL HISTORY  PAST MEDICAL & SURGICAL HISTORY:  Insomnia  Chronic pain  Depression  Anxiety  COPD (chronic obstructive pulmonary disease)  No significant past surgical history    SOCIAL HISTORY:    ALLERGIES:  penicillin (Unknown)    MEDICATIONS:  STANDING MEDICATIONS  ALBUTerol/ipratropium for Nebulization 3 milliLiter(s) Nebulizer every 6 hours  artificial  tears Solution 1 Drop(s) Both EYES two times a day  azithromycin   Tablet 250 milliGRAM(s) Oral <User Schedule>  buDESOnide 160 MICROgram(s)/formoterol 4.5 MICROgram(s) Inhaler 2 Puff(s) Inhalation two times a day  docusate sodium 100 milliGRAM(s) Oral three times a day  heparin  Injectable 5000 Unit(s) SubCutaneous every 8 hours  methylPREDNISolone sodium succinate Injectable 60 milliGRAM(s) IV Push three times a day  nicotine - 21 mG/24Hr(s) Patch 1 patch Transdermal daily  nystatin    Suspension 787551 Unit(s) Oral four times a day  nystatin Powder 1 Application(s) Topical two times a day  oxyCODONE    5 mG/acetaminophen 325 mG 2 Tablet(s) Oral every 6 hours  traZODone 50 milliGRAM(s) Oral at bedtime    PRN MEDICATIONS  ALBUTerol/ipratropium for Nebulization 3 milliLiter(s) Nebulizer every 4 hours PRN  ALPRAZolam 0.5 milliGRAM(s) Oral two times a day PRN    VITALS:   T(F): 98  HR: 108  BP: 179/81  RR: 18  SpO2: --    HOME MEDS: </u  ALPRAZolam 0.5 mg oral tablet: 1 tab(s) orally every 12 hours  AUGMENTED BETAMETHASONE DIPROPIONATE .05 % OINT:   BREO ELLIPTA -25:   COMBIVENT    AER :   HYDROCO/APAP TAB 10-325M tab(s) orally every 6 hours, As Needed  Levaquin 750 mg oral tablet: 1 tab(s) orally once a day   nicotine 7 mg/24 hr transdermal film, extended release: 1 patch transdermal once a day   nystatin 100,000 units/mL oral suspension: 5 milliliter(s) orally 4 times a day swish in the mouth and retain for several minutes before swallowing  predniSONE 20 mg oral tablet: 6 tab(s) daily x 3 days  4 tab(s) daily x 3 days  2 tab(s) daily x 3 days  1 tab(s) daily x 3 days  PROAIR  MCG/ACT AERS:   roflumilast 500 mcg oral tablet: 1 tab(s) orally once a day   Spiriva Respimat 1.25 mcg/inh inhalation aerosol: 2 puff(s) inhaled once a day   TRAZODONE HCL 50 MG TABS:       LABS:                        13.6   15.20 )-----------( 211      ( 2018 06:22 )             41.1     06-12    139  |  93<L>  |  12  ----------------------------<  156<H>  5.5<H>   |  31  |  0.5<L>    Ca    9.6      2018 06:22  Mg     2.3     06-12      RADIOLOGY:  reviewed    PHYSICAL EXAM:  GEN: No acute distress  HEENT: b/l wheezing  LUNGS: Clear to auscultation bilaterally   HEART: S1/S2 present. RRR.   ABD: Soft, non-tender, non-distended. Bowel sounds present  EXT: no LE edema  NEURO: AAOX3

## 2018-06-12 NOTE — DISCHARGE NOTE ADULT - PATIENT PORTAL LINK FT
You can access the [facility] Patient Portal, offered by [facility], by registering with the following website: [email or web address]

## 2018-06-12 NOTE — DISCHARGE NOTE ADULT - PLAN OF CARE
MEdical management Take prednisone taper and antibiotics   Smoking cessation Medical management Continue home medications Please continue home medications

## 2018-06-12 NOTE — PROGRESS NOTE ADULT - SUBJECTIVE AND OBJECTIVE BOX
Patient continues to have significant expiratory distress at rest  No fever  Decreased breath sounds both lungs with distant heart sounds  Note made of echo findings of good LV function and grade 1 diastolic dysfunction  On Solumedrol taper and will continue her inhalers and Prednisone upon discharge  Long term prognosis poor.

## 2018-06-12 NOTE — DISCHARGE NOTE ADULT - CARE PROVIDER_API CALL
Chris Contreras), Critical Care Medicine; Internal Medicine; Pulmonary Disease; Sleep Medicine  [address] [place] [address]  Phone: [phone]  Fax: [phone]    London Bermudez), Internal Medicine  [address] [place] [address]  Phone: [phone]  Fax: [phone]

## 2018-06-13 LAB
ANION GAP SERPL CALC-SCNC: 12 MMOL/L — SIGNIFICANT CHANGE UP (ref 7–14)
BASOPHILS # BLD AUTO: 0.04 K/UL — SIGNIFICANT CHANGE UP (ref 0–0.2)
BASOPHILS NFR BLD AUTO: 0.2 % — SIGNIFICANT CHANGE UP (ref 0–1)
BUN SERPL-MCNC: 9 MG/DL — LOW (ref 10–20)
CALCIUM SERPL-MCNC: 9.7 MG/DL — SIGNIFICANT CHANGE UP (ref 8.5–10.1)
CHLORIDE SERPL-SCNC: 90 MMOL/L — LOW (ref 98–110)
CO2 SERPL-SCNC: 31 MMOL/L — SIGNIFICANT CHANGE UP (ref 17–32)
CREAT SERPL-MCNC: 0.5 MG/DL — LOW (ref 0.7–1.5)
EOSINOPHIL # BLD AUTO: 0.01 K/UL — SIGNIFICANT CHANGE UP (ref 0–0.7)
EOSINOPHIL NFR BLD AUTO: 0.1 % — SIGNIFICANT CHANGE UP (ref 0–8)
GLUCOSE SERPL-MCNC: 100 MG/DL — HIGH (ref 70–99)
HCT VFR BLD CALC: 41.2 % — SIGNIFICANT CHANGE UP (ref 37–47)
HGB BLD-MCNC: 13.6 G/DL — SIGNIFICANT CHANGE UP (ref 12–16)
IMM GRANULOCYTES NFR BLD AUTO: 2.3 % — HIGH (ref 0.1–0.3)
LYMPHOCYTES # BLD AUTO: 1.65 K/UL — SIGNIFICANT CHANGE UP (ref 1.2–3.4)
LYMPHOCYTES # BLD AUTO: 9.6 % — LOW (ref 20.5–51.1)
MAGNESIUM SERPL-MCNC: 2 MG/DL — SIGNIFICANT CHANGE UP (ref 1.8–2.4)
MCHC RBC-ENTMCNC: 32.2 PG — HIGH (ref 27–31)
MCHC RBC-ENTMCNC: 33 G/DL — SIGNIFICANT CHANGE UP (ref 32–37)
MCV RBC AUTO: 97.4 FL — SIGNIFICANT CHANGE UP (ref 81–99)
MONOCYTES # BLD AUTO: 0.96 K/UL — HIGH (ref 0.1–0.6)
MONOCYTES NFR BLD AUTO: 5.6 % — SIGNIFICANT CHANGE UP (ref 1.7–9.3)
NEUTROPHILS # BLD AUTO: 14.1 K/UL — HIGH (ref 1.4–6.5)
NEUTROPHILS NFR BLD AUTO: 82.2 % — HIGH (ref 42.2–75.2)
NRBC # BLD: 0 /100 WBCS — SIGNIFICANT CHANGE UP (ref 0–0)
PLATELET # BLD AUTO: 209 K/UL — SIGNIFICANT CHANGE UP (ref 130–400)
POTASSIUM SERPL-MCNC: 5 MMOL/L — SIGNIFICANT CHANGE UP (ref 3.5–5)
POTASSIUM SERPL-SCNC: 5 MMOL/L — SIGNIFICANT CHANGE UP (ref 3.5–5)
RBC # BLD: 4.23 M/UL — SIGNIFICANT CHANGE UP (ref 4.2–5.4)
RBC # FLD: 13.2 % — SIGNIFICANT CHANGE UP (ref 11.5–14.5)
SODIUM SERPL-SCNC: 133 MMOL/L — LOW (ref 135–146)
WBC # BLD: 17.16 K/UL — HIGH (ref 4.8–10.8)
WBC # FLD AUTO: 17.16 K/UL — HIGH (ref 4.8–10.8)

## 2018-06-13 RX ORDER — NICOTINE POLACRILEX 2 MG
1 GUM BUCCAL DAILY
Qty: 0 | Refills: 0 | Status: DISCONTINUED | OUTPATIENT
Start: 2018-06-13 | End: 2018-06-22

## 2018-06-13 RX ADMIN — Medication 1 PATCH: at 21:22

## 2018-06-13 RX ADMIN — OXYCODONE AND ACETAMINOPHEN 2 TABLET(S): 5; 325 TABLET ORAL at 23:19

## 2018-06-13 RX ADMIN — Medication 500000 UNIT(S): at 06:03

## 2018-06-13 RX ADMIN — Medication 1 DROP(S): at 06:02

## 2018-06-13 RX ADMIN — Medication 100 MILLIGRAM(S): at 21:22

## 2018-06-13 RX ADMIN — BUDESONIDE AND FORMOTEROL FUMARATE DIHYDRATE 2 PUFF(S): 160; 4.5 AEROSOL RESPIRATORY (INHALATION) at 07:59

## 2018-06-13 RX ADMIN — Medication 1 PATCH: at 11:44

## 2018-06-13 RX ADMIN — Medication 100 MILLIGRAM(S): at 13:20

## 2018-06-13 RX ADMIN — Medication 500000 UNIT(S): at 11:39

## 2018-06-13 RX ADMIN — Medication 60 MILLIGRAM(S): at 17:07

## 2018-06-13 RX ADMIN — Medication 100 MILLIGRAM(S): at 06:02

## 2018-06-13 RX ADMIN — OXYCODONE AND ACETAMINOPHEN 2 TABLET(S): 5; 325 TABLET ORAL at 06:03

## 2018-06-13 RX ADMIN — OXYCODONE AND ACETAMINOPHEN 2 TABLET(S): 5; 325 TABLET ORAL at 13:14

## 2018-06-13 RX ADMIN — OXYCODONE AND ACETAMINOPHEN 2 TABLET(S): 5; 325 TABLET ORAL at 08:01

## 2018-06-13 RX ADMIN — NYSTATIN CREAM 1 APPLICATION(S): 100000 CREAM TOPICAL at 06:03

## 2018-06-13 RX ADMIN — AZITHROMYCIN 250 MILLIGRAM(S): 500 TABLET, FILM COATED ORAL at 06:02

## 2018-06-13 RX ADMIN — Medication 0.5 MILLIGRAM(S): at 13:20

## 2018-06-13 RX ADMIN — Medication 1 DROP(S): at 17:08

## 2018-06-13 RX ADMIN — NYSTATIN CREAM 1 APPLICATION(S): 100000 CREAM TOPICAL at 17:08

## 2018-06-13 RX ADMIN — BUDESONIDE AND FORMOTEROL FUMARATE DIHYDRATE 2 PUFF(S): 160; 4.5 AEROSOL RESPIRATORY (INHALATION) at 21:23

## 2018-06-13 RX ADMIN — Medication 500000 UNIT(S): at 23:17

## 2018-06-13 RX ADMIN — Medication 60 MILLIGRAM(S): at 06:02

## 2018-06-13 RX ADMIN — OXYCODONE AND ACETAMINOPHEN 2 TABLET(S): 5; 325 TABLET ORAL at 11:39

## 2018-06-13 RX ADMIN — Medication 500000 UNIT(S): at 17:08

## 2018-06-13 RX ADMIN — OXYCODONE AND ACETAMINOPHEN 2 TABLET(S): 5; 325 TABLET ORAL at 18:00

## 2018-06-13 RX ADMIN — Medication 50 MILLIGRAM(S): at 21:23

## 2018-06-13 RX ADMIN — OXYCODONE AND ACETAMINOPHEN 2 TABLET(S): 5; 325 TABLET ORAL at 17:07

## 2018-06-13 NOTE — PROGRESS NOTE ADULT - SUBJECTIVE AND OBJECTIVE BOX
Patient sitting up over edge of bed and still has significant respiratory effort with poor air entry both lung fields  Swelling over face improved  Decent ABGs with no CO2 retention  On Solumedrol  Awaiting CT chest n/c today

## 2018-06-13 NOTE — PROGRESS NOTE ADULT - SUBJECTIVE AND OBJECTIVE BOX
OVERNIGHT EVENTS: STILL COUGHING/ WHEEZING    Vital Signs Last 24 Hrs  T(C): 36.3 (13 Jun 2018 05:36), Max: 36.7 (12 Jun 2018 13:18)  T(F): 97.4 (13 Jun 2018 05:36), Max: 98 (12 Jun 2018 13:18)  HR: 86 (13 Jun 2018 05:36) (86 - 108)  BP: 179/81 (13 Jun 2018 05:36) (176/81 - 179/81)  BP(mean): --  RR: 20 (13 Jun 2018 05:36) (18 - 22)  SpO2: 93% (13 Jun 2018 07:38) (93% - 93%)    PHYSICAL EXAMINATION:    GENERAL: The patient is awake and alert in no apparent distress.     HEENT: Head is normocephalic and atraumatic. Extraocular muscles are intact. Mucous membranes are moist.    NECK: Supple.    LUNGS: DIFFUSE WHEEZING    HEART: Regular rate and rhythm without murmur.    ABDOMEN: Soft, nontender, and nondistended.      EXTREMITIES: Without any cyanosis, clubbing, rash, lesions or edema.    NEUROLOGIC: Grossly intact.    SKIN: No ulceration or induration present.      LABS:                        13.6   15.20 )-----------( 211      ( 12 Jun 2018 06:22 )             41.1     06-12    139  |  93<L>  |  12  ----------------------------<  156<H>  5.5<H>   |  31  |  0.5<L>    Ca    9.6      12 Jun 2018 06:22  Mg     2.3     06-12                              MICROBIOLOGY:      MEDICATIONS  (STANDING):  ALBUTerol/ipratropium for Nebulization 3 milliLiter(s) Nebulizer every 6 hours  artificial  tears Solution 1 Drop(s) Both EYES two times a day  azithromycin   Tablet 250 milliGRAM(s) Oral <User Schedule>  buDESOnide 160 MICROgram(s)/formoterol 4.5 MICROgram(s) Inhaler 2 Puff(s) Inhalation two times a day  docusate sodium 100 milliGRAM(s) Oral three times a day  heparin  Injectable 5000 Unit(s) SubCutaneous every 8 hours  methylPREDNISolone sodium succinate Injectable 60 milliGRAM(s) IV Push every 12 hours  nicotine - 21 mG/24Hr(s) Patch 1 patch Transdermal daily  nystatin    Suspension 572745 Unit(s) Oral four times a day  nystatin Powder 1 Application(s) Topical two times a day  oxyCODONE    5 mG/acetaminophen 325 mG 2 Tablet(s) Oral every 6 hours  traZODone 50 milliGRAM(s) Oral at bedtime    MEDICATIONS  (PRN):  ALBUTerol/ipratropium for Nebulization 3 milliLiter(s) Nebulizer every 4 hours PRN Bronchospasm  ALPRAZolam 0.5 milliGRAM(s) Oral two times a day PRN Anxiety      RADIOLOGY & ADDITIONAL STUDIES:

## 2018-06-13 NOTE — PROGRESS NOTE ADULT - ASSESSMENT
68 year old female with h/o copd not on home oxygen , anxiety and depression presented to ER with c/o worsening SOB  a/w cough x 2 days     # Cough a/w sob and wheezing - likely etiology COPD exacerbation  - c/w bipap  sputum culture neg  - CT Chest No Cont 6/13/18: mild UL centrilobular emphysematous changes, central peribronchial wall thickening with areas of atelectasis within RML and lingula  pulm recs- dr ivy francois: c/w solumedrol 60 Q8H, add symbicort with spacer, duonebs Q4H PRN and d/c spiriva, check EKG for QTc then start Azithromycin 250mg three times a week, make pain meds standing, xanax 0.5mg BID standing, d/c roflumilast 500 mg oral daily   - ABG WNL  - f/u Cardio consult  - echo shows G1DD, mild tricuspid regurge, sclerotic aortic valve with normal opening    # Anxiety- c/w alprazolam 0.5 q12    # Insomnia- c/w Trazadone 50 mg HS    DVT ppx- HSQ  Code status- patient wants to be dnr/dni but wants her brother to sign the form .For tonight she wants to be full code , when her brother comes to the hospital in am she wants to discuss this with her brother and then sign the form.She has been explained that her respiratory status looks critical overnight and she might have to be intubated if respiratory status worsens.

## 2018-06-13 NOTE — PROGRESS NOTE ADULT - SUBJECTIVE AND OBJECTIVE BOX
SUBJECTIVE:    Patient is a 68y old  Female who presents with a chief complaint of COPD exacerbation (2018 09:17)    Currently admitted to medicine with the primary diagnosis of COPD exacerbation     Today is hospital day 5d. This morning she is resting comfortably in bed and reports no new issues or overnight events.     PAST MEDICAL & SURGICAL HISTORY  PAST MEDICAL & SURGICAL HISTORY:  Insomnia  Chronic pain  Depression  Anxiety  COPD (chronic obstructive pulmonary disease)  No significant past surgical history    SOCIAL HISTORY:    ALLERGIES:  penicillin (Unknown)    MEDICATIONS:  STANDING MEDICATIONS  ALBUTerol/ipratropium for Nebulization 3 milliLiter(s) Nebulizer every 6 hours  artificial  tears Solution 1 Drop(s) Both EYES two times a day  azithromycin   Tablet 250 milliGRAM(s) Oral <User Schedule>  buDESOnide 160 MICROgram(s)/formoterol 4.5 MICROgram(s) Inhaler 2 Puff(s) Inhalation two times a day  docusate sodium 100 milliGRAM(s) Oral three times a day  heparin  Injectable 5000 Unit(s) SubCutaneous every 8 hours  methylPREDNISolone sodium succinate Injectable 60 milliGRAM(s) IV Push every 12 hours  nicotine - 21 mG/24Hr(s) Patch 1 patch Transdermal daily  nystatin    Suspension 241217 Unit(s) Oral four times a day  nystatin Powder 1 Application(s) Topical two times a day  oxyCODONE    5 mG/acetaminophen 325 mG 2 Tablet(s) Oral every 6 hours  traZODone 50 milliGRAM(s) Oral at bedtime    PRN MEDICATIONS  ALBUTerol/ipratropium for Nebulization 3 milliLiter(s) Nebulizer every 4 hours PRN  ALPRAZolam 0.5 milliGRAM(s) Oral two times a day PRN    VITALS:   T(F): 97.4  HR: 86  BP: 179/81  RR: 20  SpO2: 93%    HOME MEDS: </u  ALPRAZolam 0.5 mg oral tablet: 1 tab(s) orally every 12 hours  AUGMENTED BETAMETHASONE DIPROPIONATE .05 % OINT:   BREO ELLIPTA -25:   COMBIVENT    AER :   HYDROCO/APAP TAB 10-325M tab(s) orally every 6 hours, As Needed  Levaquin 750 mg oral tablet: 1 tab(s) orally once a day   nicotine 7 mg/24 hr transdermal film, extended release: 1 patch transdermal once a day   nystatin 100,000 units/mL oral suspension: 5 milliliter(s) orally 4 times a day swish in the mouth and retain for several minutes before swallowing  predniSONE 20 mg oral tablet: 6 tab(s) daily x 3 days  4 tab(s) daily x 3 days  2 tab(s) daily x 3 days  1 tab(s) daily x 3 days  PROAIR  MCG/ACT AERS:   roflumilast 500 mcg oral tablet: 1 tab(s) orally once a day   Spiriva Respimat 1.25 mcg/inh inhalation aerosol: 2 puff(s) inhaled once a day   TRAZODONE HCL 50 MG TABS:       LABS:                        13.6   17.16 )-----------( 209      ( 2018 07:53 )             41.2     06-    133<L>  |  90<L>  |  9<L>  ----------------------------<  100<H>  5.0   |  31  |  0.5<L>    Ca    9.7      2018 07:53  Mg     2.0           RADIOLOGY:  reviewed    PHYSICAL EXAM:  GEN: No acute distress  HEENT: WNL  LUNGS: B/l wheezing   HEART: S1/S2 present. RRR.   ABD: Soft, non-tender, non-distended. Bowel sounds present  EXT: no LE edema  NEURO: AAOX3

## 2018-06-13 NOTE — PROGRESS NOTE ADULT - ASSESSMENT
COPD EXACERBATION/ RECURRENT ADMISSION/ STILL ACTIVELY WHEEZING    - CHEST CT NC (SPOKE WITH RESIDENT)  - SOLUMEDROL TO Q 12H  - NEB Q 6H AS NEEDED  - THIAMINE/ FOLIC ACID  - POOR PROGNOSIS  - DVT PROPHYLAXIS  - DALIRESP 500 Q 24  - REHAB EVAL  - ATTEMPT DC 24 TO 48 H

## 2018-06-14 LAB
ANION GAP SERPL CALC-SCNC: 12 MMOL/L — SIGNIFICANT CHANGE UP (ref 7–14)
BASOPHILS # BLD AUTO: 0.05 K/UL — SIGNIFICANT CHANGE UP (ref 0–0.2)
BASOPHILS NFR BLD AUTO: 0.3 % — SIGNIFICANT CHANGE UP (ref 0–1)
BUN SERPL-MCNC: 11 MG/DL — SIGNIFICANT CHANGE UP (ref 10–20)
CALCIUM SERPL-MCNC: 9.4 MG/DL — SIGNIFICANT CHANGE UP (ref 8.5–10.1)
CHLORIDE SERPL-SCNC: 93 MMOL/L — LOW (ref 98–110)
CO2 SERPL-SCNC: 33 MMOL/L — HIGH (ref 17–32)
CREAT SERPL-MCNC: <0.5 MG/DL — LOW (ref 0.7–1.5)
EOSINOPHIL # BLD AUTO: 0.01 K/UL — SIGNIFICANT CHANGE UP (ref 0–0.7)
EOSINOPHIL NFR BLD AUTO: 0.1 % — SIGNIFICANT CHANGE UP (ref 0–8)
GLUCOSE SERPL-MCNC: 87 MG/DL — SIGNIFICANT CHANGE UP (ref 70–99)
HCT VFR BLD CALC: 41.5 % — SIGNIFICANT CHANGE UP (ref 37–47)
HGB BLD-MCNC: 13.8 G/DL — SIGNIFICANT CHANGE UP (ref 12–16)
IMM GRANULOCYTES NFR BLD AUTO: 2.7 % — HIGH (ref 0.1–0.3)
LYMPHOCYTES # BLD AUTO: 18.6 % — LOW (ref 20.5–51.1)
LYMPHOCYTES # BLD AUTO: 2.75 K/UL — SIGNIFICANT CHANGE UP (ref 1.2–3.4)
MAGNESIUM SERPL-MCNC: 2.3 MG/DL — SIGNIFICANT CHANGE UP (ref 1.8–2.4)
MCHC RBC-ENTMCNC: 32.5 PG — HIGH (ref 27–31)
MCHC RBC-ENTMCNC: 33.3 G/DL — SIGNIFICANT CHANGE UP (ref 32–37)
MCV RBC AUTO: 97.9 FL — SIGNIFICANT CHANGE UP (ref 81–99)
MONOCYTES # BLD AUTO: 1.08 K/UL — HIGH (ref 0.1–0.6)
MONOCYTES NFR BLD AUTO: 7.3 % — SIGNIFICANT CHANGE UP (ref 1.7–9.3)
NEUTROPHILS # BLD AUTO: 10.53 K/UL — HIGH (ref 1.4–6.5)
NEUTROPHILS NFR BLD AUTO: 71 % — SIGNIFICANT CHANGE UP (ref 42.2–75.2)
NRBC # BLD: 0 /100 WBCS — SIGNIFICANT CHANGE UP (ref 0–0)
PLATELET # BLD AUTO: 241 K/UL — SIGNIFICANT CHANGE UP (ref 130–400)
POTASSIUM SERPL-MCNC: 5.8 MMOL/L — HIGH (ref 3.5–5)
POTASSIUM SERPL-SCNC: 5.8 MMOL/L — HIGH (ref 3.5–5)
RBC # BLD: 4.24 M/UL — SIGNIFICANT CHANGE UP (ref 4.2–5.4)
RBC # FLD: 13.2 % — SIGNIFICANT CHANGE UP (ref 11.5–14.5)
SODIUM SERPL-SCNC: 138 MMOL/L — SIGNIFICANT CHANGE UP (ref 135–146)
TSH SERPL-MCNC: 1.95 UIU/ML — SIGNIFICANT CHANGE UP (ref 0.27–4.2)
WBC # BLD: 14.82 K/UL — HIGH (ref 4.8–10.8)
WBC # FLD AUTO: 14.82 K/UL — HIGH (ref 4.8–10.8)

## 2018-06-14 RX ADMIN — Medication 3 MILLILITER(S): at 13:02

## 2018-06-14 RX ADMIN — Medication 60 MILLIGRAM(S): at 05:11

## 2018-06-14 RX ADMIN — OXYCODONE AND ACETAMINOPHEN 2 TABLET(S): 5; 325 TABLET ORAL at 00:14

## 2018-06-14 RX ADMIN — BUDESONIDE AND FORMOTEROL FUMARATE DIHYDRATE 2 PUFF(S): 160; 4.5 AEROSOL RESPIRATORY (INHALATION) at 21:12

## 2018-06-14 RX ADMIN — NYSTATIN CREAM 1 APPLICATION(S): 100000 CREAM TOPICAL at 05:13

## 2018-06-14 RX ADMIN — Medication 0.5 MILLIGRAM(S): at 21:27

## 2018-06-14 RX ADMIN — Medication 500000 UNIT(S): at 17:07

## 2018-06-14 RX ADMIN — BUDESONIDE AND FORMOTEROL FUMARATE DIHYDRATE 2 PUFF(S): 160; 4.5 AEROSOL RESPIRATORY (INHALATION) at 08:37

## 2018-06-14 RX ADMIN — Medication 50 MILLIGRAM(S): at 21:11

## 2018-06-14 RX ADMIN — Medication 100 MILLIGRAM(S): at 05:12

## 2018-06-14 RX ADMIN — OXYCODONE AND ACETAMINOPHEN 2 TABLET(S): 5; 325 TABLET ORAL at 08:38

## 2018-06-14 RX ADMIN — NYSTATIN CREAM 1 APPLICATION(S): 100000 CREAM TOPICAL at 17:07

## 2018-06-14 RX ADMIN — Medication 500000 UNIT(S): at 11:07

## 2018-06-14 RX ADMIN — OXYCODONE AND ACETAMINOPHEN 2 TABLET(S): 5; 325 TABLET ORAL at 17:06

## 2018-06-14 RX ADMIN — Medication 3 MILLILITER(S): at 19:39

## 2018-06-14 RX ADMIN — Medication 500000 UNIT(S): at 23:02

## 2018-06-14 RX ADMIN — Medication 60 MILLIGRAM(S): at 17:08

## 2018-06-14 RX ADMIN — Medication 100 MILLIGRAM(S): at 21:11

## 2018-06-14 RX ADMIN — OXYCODONE AND ACETAMINOPHEN 2 TABLET(S): 5; 325 TABLET ORAL at 12:05

## 2018-06-14 RX ADMIN — Medication 500000 UNIT(S): at 05:11

## 2018-06-14 RX ADMIN — OXYCODONE AND ACETAMINOPHEN 2 TABLET(S): 5; 325 TABLET ORAL at 23:01

## 2018-06-14 RX ADMIN — Medication 1 DROP(S): at 17:08

## 2018-06-14 RX ADMIN — Medication 1 PATCH: at 11:06

## 2018-06-14 RX ADMIN — Medication 0.5 MILLIGRAM(S): at 17:22

## 2018-06-14 RX ADMIN — OXYCODONE AND ACETAMINOPHEN 2 TABLET(S): 5; 325 TABLET ORAL at 05:16

## 2018-06-14 RX ADMIN — OXYCODONE AND ACETAMINOPHEN 2 TABLET(S): 5; 325 TABLET ORAL at 18:05

## 2018-06-14 RX ADMIN — Medication 100 MILLIGRAM(S): at 13:39

## 2018-06-14 RX ADMIN — Medication 3 MILLILITER(S): at 07:42

## 2018-06-14 RX ADMIN — OXYCODONE AND ACETAMINOPHEN 2 TABLET(S): 5; 325 TABLET ORAL at 11:06

## 2018-06-14 RX ADMIN — Medication 1 DROP(S): at 05:13

## 2018-06-14 NOTE — PROGRESS NOTE ADULT - ASSESSMENT
68 year old female with h/o copd not on home oxygen , anxiety and depression presented to ER with c/o worsening SOB  a/w cough x 2 days     # Cough a/w sob and wheezing - likely etiology COPD exacerbation  - c/w bipap  sputum culture neg  - CT Chest No Cont 6/13/18: mild UL centrilobular emphysematous changes, central peribronchial wall thickening with areas of atelectasis within RML and lingula  pulm recs- dr ivy francois: c/w solumedrol 60 Q8H, add symbicort with spacer, duonebs Q4H PRN and d/c spiriva, check EKG for QTc then start Azithromycin 250mg three times a week, make pain meds standing, xanax 0.5mg BID standing, d/c roflumilast 500 mg oral daily   - Pulm: f/u  recs  - ABG WNL  - Cardio: f/u recs  - echo shows G1DD, mild tricuspid regurge, sclerotic aortic valve with normal opening    # Anxiety- c/w alprazolam 0.5 q12    # Insomnia- c/w Trazadone 50 mg HS    DVT ppx- HSQ  Code status- patient wants to be dnr/dni but wants her brother to sign the form .For tonight she wants to be full code , when her brother comes to the hospital in am she wants to discuss this with her brother and then sign the form.She has been explained that her respiratory status looks critical overnight and she might have to be intubated if respiratory status worsens.

## 2018-06-14 NOTE — DIETITIAN INITIAL EVALUATION ADULT. - OTHER INFO
Initial assessment for LOS p/w: SOB, cough. COPD exacerbation: BiPAP. Sputum culture neg. F/u cardio consult.

## 2018-06-14 NOTE — PROGRESS NOTE ADULT - SUBJECTIVE AND OBJECTIVE BOX
SUBJECTIVE:    Patient is a 68y old  Female who presents with a chief complaint of COPD exacerbation (2018 09:17)    Currently admitted to medicine with the primary diagnosis of COPD exacerbation     Today is hospital day 6d. This morning she is resting comfortably in bed and reports no new issues or overnight events.     PAST MEDICAL & SURGICAL HISTORY  PAST MEDICAL & SURGICAL HISTORY:  Insomnia  Chronic pain  Depression  Anxiety  COPD (chronic obstructive pulmonary disease)  No significant past surgical history    SOCIAL HISTORY:    ALLERGIES:  penicillin (Unknown)    MEDICATIONS:  STANDING MEDICATIONS  ALBUTerol/ipratropium for Nebulization 3 milliLiter(s) Nebulizer every 6 hours  artificial  tears Solution 1 Drop(s) Both EYES two times a day  azithromycin   Tablet 250 milliGRAM(s) Oral <User Schedule>  buDESOnide 160 MICROgram(s)/formoterol 4.5 MICROgram(s) Inhaler 2 Puff(s) Inhalation two times a day  docusate sodium 100 milliGRAM(s) Oral three times a day  heparin  Injectable 5000 Unit(s) SubCutaneous every 8 hours  methylPREDNISolone sodium succinate Injectable 60 milliGRAM(s) IV Push every 12 hours  nicotine -   7 mG/24Hr(s) Patch 1 patch Transdermal daily  nystatin    Suspension 775192 Unit(s) Oral four times a day  nystatin Powder 1 Application(s) Topical two times a day  oxyCODONE    5 mG/acetaminophen 325 mG 2 Tablet(s) Oral every 6 hours  traZODone 50 milliGRAM(s) Oral at bedtime    PRN MEDICATIONS  ALBUTerol/ipratropium for Nebulization 3 milliLiter(s) Nebulizer every 4 hours PRN  ALPRAZolam 0.5 milliGRAM(s) Oral two times a day PRN    VITALS:   T(F): 96.8  HR: 102  BP: 166/76  RR: 20  SpO2: 95%    HOME MEDS: </u  ALPRAZolam 0.5 mg oral tablet: 1 tab(s) orally every 12 hours  AUGMENTED BETAMETHASONE DIPROPIONATE .05 % OINT:   BREO ELLIPTA -25:   COMBIVENT    AER :   HYDROCO/APAP TAB 10-325M tab(s) orally every 6 hours, As Needed  Levaquin 750 mg oral tablet: 1 tab(s) orally once a day   nicotine 7 mg/24 hr transdermal film, extended release: 1 patch transdermal once a day   nystatin 100,000 units/mL oral suspension: 5 milliliter(s) orally 4 times a day swish in the mouth and retain for several minutes before swallowing  predniSONE 20 mg oral tablet: 6 tab(s) daily x 3 days  4 tab(s) daily x 3 days  2 tab(s) daily x 3 days  1 tab(s) daily x 3 days  PROAIR  MCG/ACT AERS:   roflumilast 500 mcg oral tablet: 1 tab(s) orally once a day   Spiriva Respimat 1.25 mcg/inh inhalation aerosol: 2 puff(s) inhaled once a day   TRAZODONE HCL 50 MG TABS:       LABS:                        13.8   14.82 )-----------( 241      ( 2018 06:13 )             41.5     06-14    138  |  93<L>  |  11  ----------------------------<  87  5.8<H>   |  33<H>  |  <0.5<L>    Ca    9.4      2018 06:13  Mg     2.3     -14      RADIOLOGY:  reviewed    PHYSICAL EXAM:  GEN: No acute distress  HEENT: WNL  LUNGS: Wheezing bilaterally   HEART: S1/S2 present. RRR.   ABD: Soft, non-tender, non-distended. Bowel sounds present  EXT: no LE edema  NEURO: AAOX3

## 2018-06-14 NOTE — CONSULT NOTE ADULT - SUBJECTIVE AND OBJECTIVE BOX
HPI:  68 year old female with h/o copd not on home oxygen , anxiety and depression presented to ER with c/o worsening SOB x 2 days   As per patient , she was recently discharged from [facility] approximately  2 weeks ago for similar complaints and was appropriately treated for COPD exacerbation. She was asked to follow up with dr ivy francois as an outpatient but because of her worsening sob she could not .C/o chronic productive cough a/w yellow phlegm , no blood. Denies fever, chills , chest pain palpitations, recent travel or sick contact .She states she has been complaint with her medications and inhalers . No URTI s/s , dysuria or  abdominal pain .  patient thinks that her anxiety has been worsening since she is taking prednisone .  In ED Rx received -duonebs, solumedrol 125 IV , levofloxacin 750 mg IV x 1 (08 Jun 2018 21:31)      PAST MEDICAL & SURGICAL HISTORY:  Insomnia  Chronic pain  Depression  Anxiety  COPD (chronic obstructive pulmonary disease)  No significant past surgical history      Hospital Course:    TODAY'S SUBJECTIVE & REVIEW OF SYMPTOMS:     Constitutional WNL   Cardio WNL   Resp sob   GI WNL  Heme WNL  Endo WNL  Skin WNL  MSK WNL  Neuro Weakness right arm / old  Cognitive WNL  Psych WNL      MEDICATIONS  (STANDING):  ALBUTerol/ipratropium for Nebulization 3 milliLiter(s) Nebulizer every 6 hours  artificial  tears Solution 1 Drop(s) Both EYES two times a day  azithromycin   Tablet 250 milliGRAM(s) Oral <User Schedule>  buDESOnide 160 MICROgram(s)/formoterol 4.5 MICROgram(s) Inhaler 2 Puff(s) Inhalation two times a day  docusate sodium 100 milliGRAM(s) Oral three times a day  heparin  Injectable 5000 Unit(s) SubCutaneous every 8 hours  methylPREDNISolone sodium succinate Injectable 60 milliGRAM(s) IV Push every 12 hours  nicotine -   7 mG/24Hr(s) Patch 1 patch Transdermal daily  nystatin    Suspension 002320 Unit(s) Oral four times a day  nystatin Powder 1 Application(s) Topical two times a day  oxyCODONE    5 mG/acetaminophen 325 mG 2 Tablet(s) Oral every 6 hours  traZODone 50 milliGRAM(s) Oral at bedtime    MEDICATIONS  (PRN):  ALBUTerol/ipratropium for Nebulization 3 milliLiter(s) Nebulizer every 4 hours PRN Bronchospasm  ALPRAZolam 0.5 milliGRAM(s) Oral two times a day PRN Anxiety      FAMILY HISTORY:  No pertinent family history in first degree relatives      Allergies    penicillin (Unknown)    Intolerances        SOCIAL HISTORY:    [  ] Etoh  [  ] Smoking  [  ] Substance abuse     Home Environment:  [x  ] Home Alone  [  ] Lives with Family  [  ] Home Health Aid    Dwelling:  [  ] Apartment  [ x ] Private House  [  ] Adult Home  [  ] Skilled Nursing Facility      [  ] Short Term  [  ] Long Term  [x  ] Stairs       Elevator [  ]    FUNCTIONAL STATUS PTA: (Check all that apply)  Ambulation: [ x  ]Independent    [  ] Dependent     [  ] Non-Ambulatory  Assistive Device: [  ] SA Cane  [  ]  Q Cane  [ x ] Walker  [  ]  Wheelchair  ADL : [ x ] Independent  [  ]  Dependent       Vital Signs Last 24 Hrs  T(C): 36 (14 Jun 2018 13:45), Max: 36.9 (14 Jun 2018 05:00)  T(F): 96.8 (14 Jun 2018 13:45), Max: 98.4 (14 Jun 2018 05:00)  HR: 102 (14 Jun 2018 13:45) (92 - 104)  BP: 166/76 (14 Jun 2018 13:45) (148/80 - 180/68)  BP(mean): --  RR: 20 (14 Jun 2018 13:45) (20 - 20)  SpO2: 95% (14 Jun 2018 09:32) (95% - 95%)      PHYSICAL EXAM: Alert & Oriented X3  GENERAL: NAD, well-groomed, well-developed  HEAD:  Atraumatic, Normocephalic  CHEST/LUNG: decreased bs lung bases  HEART: S1S2+  ABDOMEN: Soft, Nontender  EXTREMITIES:  no calf tenderness    NERVOUS SYSTEM:  Cranial Nerves 2-12 intact [  ] Abnormal  [  ]  ROM: WFL all extremities [  ]  Abnormal [x  ]limited rue  Motor Strength: WFL all extremities  [  ]  Abnormal [x  ]limited rue  Sensation: intact to light touch [  ] Abnormal [  ]  Reflexes: Symmetric [  ]  Abnormal [  ]    FUNCTIONAL STATUS:  Bed Mobility: Independent [  ]  Supervision [  ]  Needs Assistance [ x ]  N/A [  ]  Transfers: Independent [  ]  Supervision [  ]  Needs Assistance [ x ]  N/A [  ]   Ambulation: Independent [  ]  Supervision [  ]  Needs Assistance [  ]  N/A [  ]  ADL: Independent [  ] Requires Assistance [  ] N/A [  ]      LABS:                        13.8   14.82 )-----------( 241      ( 14 Jun 2018 06:13 )             41.5     06-14    138  |  93<L>  |  11  ----------------------------<  87  5.8<H>   |  33<H>  |  <0.5<L>    Ca    9.4      14 Jun 2018 06:13  Mg     2.3     06-14            RADIOLOGY & ADDITIONAL STUDIES:    Assesment:

## 2018-06-14 NOTE — CONSULT NOTE ADULT - ASSESSMENT
Main problem is COPD, still lung exam is very abnormal, tight and limited air entry  chest discomfort is not cardiac by presentation and the way patient explains and tests were normal as well ECG, echo, enzyme, exam    at this point she is not able to have any form of stress test and cardiac cath is unnecessary, cardiac tests resulted normal, no further in patient cardiac work up is recommended  as out patient she can follow with me then will do a routine screening CAD work up, when pulmonary status is more stable

## 2018-06-14 NOTE — CONSULT NOTE ADULT - ASSESSMENT
IMPRESSION: Rehab of gait dysfunction      PRECAUTIONS: [  ] Cardiac  [  ] Respiratory  [  ] Seizures [  ] Contact Isolation  [  ] Droplet Isolation  [  ] Other    Weight Bearing Status:     RECOMMENDATION:    Out of Bed to Chair     DVT/Decubiti Prophylaxis    REHAB PLAN:     [x   ] Bedside P/T 3-5 times a week   [   ]   Bedside O/T  2-3 times a week             [   ] No Rehab Therapy Indicated                   [   ]  Speech Therapy   Conditioning/ROM                                    ADL  Bed Mobility                                               Conditioning/ROM  Transfers                                                     Bed Mobility  Sitting /Standing Balance                         Transfers                                        Gait Training                                               Sitting/Standing Balance  Stair Training [   ]Applicable                    Home equipment Eval                                                                        Splinting  [   ] Only      GOALS:   ADL   [ x  ]   Independent                    Transfers  [x   ] Independent                          Ambulation  [ x  ] Independent     [  x  ] With device                            [   ]  CG                                                         [   ]  CG                                                                  [   ] CG                            [    ] Min A                                                   [   ] Min A                                                              [   ] Min  A          DISCHARGE PLAN:   [   ]  Good candidate for Intensive Rehabilitation/Hospital based-[facility]                                             Will tolerate 3hrs Intensive Rehab Daily                                       [ x   ]  Short Term Rehab in Skilled Nursing Facility                                       [    ]  Home with Outpatient or  services                                         [    ]  Possible Candidate for Intensive Hospital based Rehab

## 2018-06-14 NOTE — CONSULT NOTE ADULT - SUBJECTIVE AND OBJECTIVE BOX
Patient is a 68y old  Female who presents with a chief complaint of COPD exacerbation (12 Jun 2018 09:17)  patient feels chest tightness, unable to breath when has the exacerbation of the dyspnea, COPD, and clearly states by taking iv solumedrol and albuterol, otherwise inn other timne has no chest pain. severe sob,     HPI:  68 year old female with h/o copd not on home oxygen , anxiety and depression presented to ER with c/o worsening SOB x 2 days   As per patient , she was recently discharged from [facility] approximately  2 weeks ago for similar complaints and was appropriately treated for COPD exacerbation. She was asked to follow up with dr ivy francois as an outpatient but because of her worsening sob she could not .C/o chronic productive cough a/w yellow phlegm , no blood. Denies fever, chills , chest pain palpitations, recent travel or sick contact .She states she has been complaint with her medications and inhalers . No URTI s/s , dysuria or  abdominal pain .  patient thinks that her anxiety has been worsening since she is taking prednisone .  In ED Rx received -zane, solumedrol 125 IV , levofloxacin 750 mg IV x 1 (08 Jun 2018 21:31)      PAST MEDICAL & SURGICAL HISTORY:  Insomnia  Chronic pain  Depression  Anxiety  COPD (chronic obstructive pulmonary disease)  No significant past surgical history      PREVIOUS DIAGNOSTIC TESTING:      ECHO  FINDINGS: < from: Transthoracic Echocardiogram (06.11.18 @ 13:43) >   1. Spectral Doppler shows impaired relaxation pattern of left   ventricular myocardial filling (Grade I diastolic dysfunction).   2. Mild tricuspid regurgitation.   3. Sclerotic aortic valve with normal opening.    < end of copied text >  < from: Transthoracic Echocardiogram (06.11.18 @ 13:43) >  Normal left ventricular size and wall thicknesses, with   normal systolic function.    < end of copied text >        MEDICATIONS  (STANDING):  ALBUTerol/ipratropium for Nebulization 3 milliLiter(s) Nebulizer every 6 hours  artificial  tears Solution 1 Drop(s) Both EYES two times a day  azithromycin   Tablet 250 milliGRAM(s) Oral <User Schedule>  buDESOnide 160 MICROgram(s)/formoterol 4.5 MICROgram(s) Inhaler 2 Puff(s) Inhalation two times a day  docusate sodium 100 milliGRAM(s) Oral three times a day  heparin  Injectable 5000 Unit(s) SubCutaneous every 8 hours  methylPREDNISolone sodium succinate Injectable 60 milliGRAM(s) IV Push every 12 hours  nicotine -   7 mG/24Hr(s) Patch 1 patch Transdermal daily  nystatin    Suspension 516502 Unit(s) Oral four times a day  nystatin Powder 1 Application(s) Topical two times a day  oxyCODONE    5 mG/acetaminophen 325 mG 2 Tablet(s) Oral every 6 hours  traZODone 50 milliGRAM(s) Oral at bedtime    MEDICATIONS  (PRN):  ALBUTerol/ipratropium for Nebulization 3 milliLiter(s) Nebulizer every 4 hours PRN Bronchospasm  ALPRAZolam 0.5 milliGRAM(s) Oral two times a day PRN Anxiety      FAMILY HISTORY:  No pertinent family history in first degree relatives      SOCIAL HISTORY:  CIGARETTES: Ex smoker  ALCOHOL: No  DRUGS: No                      REVIEW OF SYSTEMS:  CONSTITUTIONAL: respiratory distress by talking,   NECK: No pain   RESPIRATORY: cough, wheezing, shortness of breath  CARDIOVASCULAR: chest tightness, unable to breath whenever has pulmonary attack, responds well to steroid and albuterol and heaviness goes awayNo chest pain, SOB, palpitations, leg swelling  GASTROINTESTINAL: No abdominal or epigastric pain. No nausea, vomiting,  NEUROLOGICAL: No dizziness, headaches,   MUSCULOSKELETAL: No joint pain, No  swelling; No muscle pain  PSYCHIATRIC: some depression, significant anxiety,        Vital Signs Last 24 Hrs  T(C): 36.9 (14 Jun 2018 05:00), Max: 36.9 (14 Jun 2018 05:00)  T(F): 98.4 (14 Jun 2018 05:00), Max: 98.4 (14 Jun 2018 05:00)  HR: 92 (14 Jun 2018 05:00) (92 - 104)  BP: 148/80 (14 Jun 2018 05:00) (148/80 - 180/68)  BP(mean): --  RR: 20 (14 Jun 2018 05:00) (20 - 20)  SpO2: 95% (14 Jun 2018 09:32) (95% - 95%)                      PHYSICAL EXAM:  GENERAL: even while sitting on the chair has tachypnea, audible wheeze, mildly over weight  HEAD:  Atraumatic, Normocephalic  NECK: Supple, No JVD, No Bruit   NERVOUS SYSTEM:  Alert, Awake, Oriented to time, place, person; Normal memory and speech; Normal motor Strength 5/5 B/L upper and lower extremities  CHEST/LUNG: decreased air entry to lung base bilaterally; tight air entry, wheeze, rhonchi, no crackle  HEART: Regular heart beat, S1, A2, P2, No S3, No gallop,   ABDOMEN: Soft, Non tender, Non distended; Bowel sounds present  EXTREMITIES:  1+ Peripheral Pulses in lower ext, 2+ in upper, no clubbing, fine edema  ECG: < from: 12 Lead ECG (06.08.18 @ 15:42) >  Sinus tachycardia  Otherwise normal ECG    < end of copied text >      I&O's Detail    14 Jun 2018 07:01  -  14 Jun 2018 13:19  --------------------------------------------------------  IN:    Oral Fluid: 240 mL  Total IN: 240 mL    OUT:    Voided: 300 mL  Total OUT: 300 mL    Total NET: -60 mL          LABS:                        13.8   14.82 )-----------( 241      ( 14 Jun 2018 06:13 )             41.5     06-14    138  |  93<L>  |  11  ----------------------------<  87  5.8<H>   |  33<H>  |  <0.5<L>    Ca    9.4      14 Jun 2018 06:13  Mg     2.3     06-14              I&O's Summary    14 Jun 2018 07:01  -  14 Jun 2018 13:19  --------------------------------------------------------  IN: 240 mL / OUT: 300 mL / NET: -60 mL        RADIOLOGY & ADDITIONAL STUDIES: < from: Xray Chest 1 View- PORTABLE-Routine (06.09.18 @ 06:12) >  No radiographic evidence ofacute cardiopulmonary disease.    < end of copied text >  < from: CT Chest No Cont (06.13.18 @ 12:55) >  Mild upper lobe centrilobular emphysematous changes.    Central peribronchialwall thickening with areas of atelectasis within   the right middle lobe and lingula.    < end of copied text >

## 2018-06-14 NOTE — DIETITIAN INITIAL EVALUATION ADULT. - ORAL INTAKE PTA
fair/reports good appetite, but difficulty preparing food d/t health status. Pt. interested in Meals on Wheels- will provide pt. with  handout for outpatient services. No supplement use

## 2018-06-14 NOTE — PROGRESS NOTE ADULT - SUBJECTIVE AND OBJECTIVE BOX
Patient seen and examined  Essentially unchaged as far as respiratory effort is concerned  On IV Solumedrol  Cardiologist eval her and has no issues  CT chest shows centrilobular emphysema and areas of atelectasis and patient has been referred for pulmonary rehab.

## 2018-06-15 LAB
ANION GAP SERPL CALC-SCNC: 9 MMOL/L — SIGNIFICANT CHANGE UP (ref 7–14)
BASOPHILS # BLD AUTO: 0.06 K/UL — SIGNIFICANT CHANGE UP (ref 0–0.2)
BASOPHILS NFR BLD AUTO: 0.3 % — SIGNIFICANT CHANGE UP (ref 0–1)
BUN SERPL-MCNC: 13 MG/DL — SIGNIFICANT CHANGE UP (ref 10–20)
CALCIUM SERPL-MCNC: 9.2 MG/DL — SIGNIFICANT CHANGE UP (ref 8.5–10.1)
CHLORIDE SERPL-SCNC: 93 MMOL/L — LOW (ref 98–110)
CO2 SERPL-SCNC: 31 MMOL/L — SIGNIFICANT CHANGE UP (ref 17–32)
CREAT SERPL-MCNC: <0.5 MG/DL — LOW (ref 0.7–1.5)
EOSINOPHIL # BLD AUTO: 0.01 K/UL — SIGNIFICANT CHANGE UP (ref 0–0.7)
EOSINOPHIL NFR BLD AUTO: 0.1 % — SIGNIFICANT CHANGE UP (ref 0–8)
GLUCOSE SERPL-MCNC: 102 MG/DL — HIGH (ref 70–99)
HCT VFR BLD CALC: 40.5 % — SIGNIFICANT CHANGE UP (ref 37–47)
HGB BLD-MCNC: 13.7 G/DL — SIGNIFICANT CHANGE UP (ref 12–16)
IMM GRANULOCYTES NFR BLD AUTO: 3.3 % — HIGH (ref 0.1–0.3)
LYMPHOCYTES # BLD AUTO: 1.15 K/UL — LOW (ref 1.2–3.4)
LYMPHOCYTES # BLD AUTO: 6.3 % — LOW (ref 20.5–51.1)
MAGNESIUM SERPL-MCNC: 2.1 MG/DL — SIGNIFICANT CHANGE UP (ref 1.8–2.4)
MCHC RBC-ENTMCNC: 32.9 PG — HIGH (ref 27–31)
MCHC RBC-ENTMCNC: 33.8 G/DL — SIGNIFICANT CHANGE UP (ref 32–37)
MCV RBC AUTO: 97.1 FL — SIGNIFICANT CHANGE UP (ref 81–99)
MONOCYTES # BLD AUTO: 0.96 K/UL — HIGH (ref 0.1–0.6)
MONOCYTES NFR BLD AUTO: 5.3 % — SIGNIFICANT CHANGE UP (ref 1.7–9.3)
NEUTROPHILS # BLD AUTO: 15.48 K/UL — HIGH (ref 1.4–6.5)
NEUTROPHILS NFR BLD AUTO: 84.7 % — HIGH (ref 42.2–75.2)
NRBC # BLD: 0 /100 WBCS — SIGNIFICANT CHANGE UP (ref 0–0)
PLATELET # BLD AUTO: 253 K/UL — SIGNIFICANT CHANGE UP (ref 130–400)
POTASSIUM SERPL-MCNC: 5.3 MMOL/L — HIGH (ref 3.5–5)
POTASSIUM SERPL-SCNC: 5.3 MMOL/L — HIGH (ref 3.5–5)
RBC # BLD: 4.17 M/UL — LOW (ref 4.2–5.4)
RBC # FLD: 13.2 % — SIGNIFICANT CHANGE UP (ref 11.5–14.5)
SODIUM SERPL-SCNC: 133 MMOL/L — LOW (ref 135–146)
WBC # BLD: 18.27 K/UL — HIGH (ref 4.8–10.8)
WBC # FLD AUTO: 18.27 K/UL — HIGH (ref 4.8–10.8)

## 2018-06-15 RX ORDER — HYDRALAZINE HCL 50 MG
5 TABLET ORAL ONCE
Qty: 0 | Refills: 0 | Status: COMPLETED | OUTPATIENT
Start: 2018-06-15 | End: 2018-06-15

## 2018-06-15 RX ORDER — MORPHINE SULFATE 50 MG/1
1 CAPSULE, EXTENDED RELEASE ORAL ONCE
Qty: 0 | Refills: 0 | Status: DISCONTINUED | OUTPATIENT
Start: 2018-06-15 | End: 2018-06-15

## 2018-06-15 RX ORDER — AMLODIPINE BESYLATE 2.5 MG/1
2.5 TABLET ORAL DAILY
Qty: 0 | Refills: 0 | Status: DISCONTINUED | OUTPATIENT
Start: 2018-06-15 | End: 2018-06-19

## 2018-06-15 RX ORDER — AMLODIPINE BESYLATE 2.5 MG/1
10 TABLET ORAL ONCE
Qty: 0 | Refills: 0 | Status: COMPLETED | OUTPATIENT
Start: 2018-06-15 | End: 2018-06-15

## 2018-06-15 RX ADMIN — Medication 3 MILLILITER(S): at 00:14

## 2018-06-15 RX ADMIN — OXYCODONE AND ACETAMINOPHEN 2 TABLET(S): 5; 325 TABLET ORAL at 17:18

## 2018-06-15 RX ADMIN — MORPHINE SULFATE 1 MILLIGRAM(S): 50 CAPSULE, EXTENDED RELEASE ORAL at 21:20

## 2018-06-15 RX ADMIN — OXYCODONE AND ACETAMINOPHEN 2 TABLET(S): 5; 325 TABLET ORAL at 19:54

## 2018-06-15 RX ADMIN — Medication 5 MILLIGRAM(S): at 16:36

## 2018-06-15 RX ADMIN — Medication 60 MILLIGRAM(S): at 05:45

## 2018-06-15 RX ADMIN — Medication 3 MILLILITER(S): at 13:02

## 2018-06-15 RX ADMIN — AMLODIPINE BESYLATE 2.5 MILLIGRAM(S): 2.5 TABLET ORAL at 09:54

## 2018-06-15 RX ADMIN — MORPHINE SULFATE 1 MILLIGRAM(S): 50 CAPSULE, EXTENDED RELEASE ORAL at 21:05

## 2018-06-15 RX ADMIN — Medication 3 MILLILITER(S): at 07:20

## 2018-06-15 RX ADMIN — Medication 1 PATCH: at 11:11

## 2018-06-15 RX ADMIN — OXYCODONE AND ACETAMINOPHEN 2 TABLET(S): 5; 325 TABLET ORAL at 11:45

## 2018-06-15 RX ADMIN — OXYCODONE AND ACETAMINOPHEN 2 TABLET(S): 5; 325 TABLET ORAL at 05:51

## 2018-06-15 RX ADMIN — OXYCODONE AND ACETAMINOPHEN 2 TABLET(S): 5; 325 TABLET ORAL at 06:30

## 2018-06-15 RX ADMIN — Medication 0.1 MILLIGRAM(S): at 20:08

## 2018-06-15 RX ADMIN — Medication 3 MILLILITER(S): at 20:58

## 2018-06-15 RX ADMIN — AMLODIPINE BESYLATE 10 MILLIGRAM(S): 2.5 TABLET ORAL at 13:25

## 2018-06-15 RX ADMIN — Medication 100 MILLIGRAM(S): at 05:44

## 2018-06-15 RX ADMIN — OXYCODONE AND ACETAMINOPHEN 2 TABLET(S): 5; 325 TABLET ORAL at 11:14

## 2018-06-15 RX ADMIN — Medication 1 DROP(S): at 05:49

## 2018-06-15 RX ADMIN — Medication 500000 UNIT(S): at 11:11

## 2018-06-15 RX ADMIN — Medication 60 MILLIGRAM(S): at 17:20

## 2018-06-15 RX ADMIN — BUDESONIDE AND FORMOTEROL FUMARATE DIHYDRATE 2 PUFF(S): 160; 4.5 AEROSOL RESPIRATORY (INHALATION) at 09:11

## 2018-06-15 RX ADMIN — Medication 0.5 MILLIGRAM(S): at 17:19

## 2018-06-15 RX ADMIN — AZITHROMYCIN 250 MILLIGRAM(S): 500 TABLET, FILM COATED ORAL at 05:44

## 2018-06-15 RX ADMIN — NYSTATIN CREAM 1 APPLICATION(S): 100000 CREAM TOPICAL at 17:20

## 2018-06-15 RX ADMIN — Medication 1 DROP(S): at 17:19

## 2018-06-15 RX ADMIN — NYSTATIN CREAM 1 APPLICATION(S): 100000 CREAM TOPICAL at 05:52

## 2018-06-15 RX ADMIN — Medication 500000 UNIT(S): at 05:44

## 2018-06-15 RX ADMIN — OXYCODONE AND ACETAMINOPHEN 2 TABLET(S): 5; 325 TABLET ORAL at 00:42

## 2018-06-15 RX ADMIN — Medication 500000 UNIT(S): at 17:20

## 2018-06-15 NOTE — PHYSICAL THERAPY INITIAL EVALUATION ADULT - LIVES WITH, PROFILE
other relative/pt lives with her brother in apt on 5th floor, + elevator (pt reports elevator is often broken)

## 2018-06-15 NOTE — PHYSICAL THERAPY INITIAL EVALUATION ADULT - GENERAL OBSERVATIONS, REHAB EVAL
9:15-9:40. Pt encountered sitting at EOB in NAD, tray within reach, stated she is not feeling well, however agreeable to PT.

## 2018-06-15 NOTE — PROGRESS NOTE ADULT - ASSESSMENT
SEVERE COPD/ S/P EXACERBATION, RECURRENT ADMISSION, INCREASE BP    - STEROIDS  - NEB  - SYMBICORT/ SPIRIVA  - DOPPLER LE  - BP CONTROL  - POOR PROGNOSIS

## 2018-06-15 NOTE — PROGRESS NOTE ADULT - ASSESSMENT
68 year old female with h/o copd not on home oxygen , anxiety and depression presented to ER with c/o worsening SOB  a/w cough x 2 days     # Cough a/w sob and wheezing - likely etiology COPD exacerbation  - patient refusing bipap PRN stating atelectasis as reason for not wanting positive pressure ventilation  sputum culture neg  - CT Chest No Cont 6/13/18: mild UL centrilobular emphysematous changes, central peribronchial wall thickening with areas of atelectasis within RML and lingula  pulm recs- dr ivy francois: c/w solumedrol 60 Q8H, add symbicort with spacer, duonebs Q4H PRN and d/c spiriva, check EKG for QTc then start Azithromycin 250mg three times a week, make pain meds standing, xanax 0.5mg BID standing, d/c roflumilast 500 mg oral daily   - ABG WNL  - Cardio: f/u recs  - echo shows G1DD, mild tricuspid regurge, sclerotic aortic valve with normal opening    # Anxiety- c/w alprazolam 0.5 q12    # Insomnia- c/w Trazadone 50 mg HS    DVT ppx- HSQ  Code status- patient wants to be dnr/dni but wants her brother to sign the form. For tonight she wants to be full code, when her brother comes to the hospital in am she wants to discuss this with her brother and then sign the form. She has been explained that her respiratory status looks critical and she might have to be intubated if respiratory status worsens. 68 year old female with h/o copd not on home oxygen , anxiety and depression presented to ER with c/o worsening SOB  a/w cough x 2 days     # Hypertension- likely due to respiratory distress  - started amlodipine 10mg (did not respond to 2.5mg)    # Cough a/w sob and wheezing - likely etiology COPD exacerbation  - patient refusing bipap PRN stating atelectasis as reason for not wanting positive pressure ventilation  sputum culture neg  - CT Chest No Cont 6/13/18: mild UL centrilobular emphysematous changes, central peribronchial wall thickening with areas of atelectasis within RML and lingula  pulm recs- dr ivy francois: c/w solumedrol 60 Q8H, add symbicort with spacer, duonebs Q4H PRN and d/c spiriva, check EKG for QTc then start Azithromycin 250mg three times a week, make pain meds standing, xanax 0.5mg BID standing, d/c roflumilast 500 mg oral daily   - ABG WNL  - Cardio: f/u recs  - echo shows G1DD, mild tricuspid regurge, sclerotic aortic valve with normal opening    # Anxiety- c/w alprazolam 0.5 q12    # Insomnia- c/w Trazadone 50 mg HS    DVT ppx- HSQ  Code status- patient wants to be dnr/dni but wants her brother to sign the form. For tonight she wants to be full code, when her brother comes to the hospital in am she wants to discuss this with her brother and then sign the form. She has been explained that her respiratory status looks critical and she might have to be intubated if respiratory status worsens.

## 2018-06-15 NOTE — PROGRESS NOTE ADULT - SUBJECTIVE AND OBJECTIVE BOX
SUBJECTIVE:    Patient is a 68y old  Female who presents with a chief complaint of COPD exacerbation (2018 09:17)    Currently admitted to medicine with the primary diagnosis of COPD exacerbation     Today is hospital day 7d. This morning she is resting comfortably in bed and reports no new issues or overnight events.     PAST MEDICAL & SURGICAL HISTORY  PAST MEDICAL & SURGICAL HISTORY:  Insomnia  Chronic pain  Depression  Anxiety  COPD (chronic obstructive pulmonary disease)  No significant past surgical history    SOCIAL HISTORY:    ALLERGIES:  penicillin (Unknown)    MEDICATIONS:  STANDING MEDICATIONS  ALBUTerol/ipratropium for Nebulization 3 milliLiter(s) Nebulizer every 6 hours  amLODIPine   Tablet 2.5 milliGRAM(s) Oral daily  artificial  tears Solution 1 Drop(s) Both EYES two times a day  azithromycin   Tablet 250 milliGRAM(s) Oral <User Schedule>  buDESOnide 160 MICROgram(s)/formoterol 4.5 MICROgram(s) Inhaler 2 Puff(s) Inhalation two times a day  docusate sodium 100 milliGRAM(s) Oral three times a day  heparin  Injectable 5000 Unit(s) SubCutaneous every 8 hours  methylPREDNISolone sodium succinate Injectable 60 milliGRAM(s) IV Push every 12 hours  nicotine -   7 mG/24Hr(s) Patch 1 patch Transdermal daily  nystatin    Suspension 135325 Unit(s) Oral four times a day  nystatin Powder 1 Application(s) Topical two times a day  oxyCODONE    5 mG/acetaminophen 325 mG 2 Tablet(s) Oral every 6 hours  traZODone 50 milliGRAM(s) Oral at bedtime    PRN MEDICATIONS  ALBUTerol/ipratropium for Nebulization 3 milliLiter(s) Nebulizer every 4 hours PRN  ALPRAZolam 0.5 milliGRAM(s) Oral two times a day PRN    VITALS:   T(F): 99.2  HR: 110  BP: 144/78  RR: 20  SpO2: --    HOME MEDS: </u  ALPRAZolam 0.5 mg oral tablet: 1 tab(s) orally every 12 hours  AUGMENTED BETAMETHASONE DIPROPIONATE .05 % OINT:   BREO ELLIPTA -25:   COMBIVENT    AER :   HYDROCO/APAP TAB 10-325M tab(s) orally every 6 hours, As Needed  Levaquin 750 mg oral tablet: 1 tab(s) orally once a day   nicotine 7 mg/24 hr transdermal film, extended release: 1 patch transdermal once a day   nystatin 100,000 units/mL oral suspension: 5 milliliter(s) orally 4 times a day swish in the mouth and retain for several minutes before swallowing  predniSONE 20 mg oral tablet: 6 tab(s) daily x 3 days  4 tab(s) daily x 3 days  2 tab(s) daily x 3 days  1 tab(s) daily x 3 days  PROAIR  MCG/ACT AERS:   roflumilast 500 mcg oral tablet: 1 tab(s) orally once a day   Spiriva Respimat 1.25 mcg/inh inhalation aerosol: 2 puff(s) inhaled once a day   TRAZODONE HCL 50 MG TABS:       LABS:                        13.7   18.27 )-----------( 253      ( 15 Anthony 2018 07:58 )             40.5     06-15    133<L>  |  93<L>  |  13  ----------------------------<  102<H>  5.3<H>   |  31  |  <0.5<L>    Ca    9.2      15 Anthony 2018 07:58  Mg     2.1     06-15      RADIOLOGY:  reviewed    PHYSICAL EXAM:  GEN: No acute distress  HEENT: WNL  LUNGS: Wheezing bilaterally, using abdominal muscles for respiration  HEART: S1/S2 present. RRR.   ABD: Soft, non-tender, non-distended. Bowel sounds present  EXT: no LE edema  NEURO: AAOX3

## 2018-06-15 NOTE — PROGRESS NOTE ADULT - SUBJECTIVE AND OBJECTIVE BOX
OVERNIGHT EVENTS: c/o wheezing, s/p chest ct    Vital Signs Last 24 Hrs  T(C): 37.3 (15 Anthony 2018 13:09), Max: 37.3 (15 Anthony 2018 13:09)  T(F): 99.2 (15 Anthony 2018 13:09), Max: 99.2 (15 Anthony 2018 13:09)  HR: 110 (15 Anthony 2018 13:09) (84 - 110)  BP: 144/78 (15 Anthony 2018 13:09) (144/78 - 198/86)  BP(mean): --  RR: 20 (15 Anthony 2018 13:09) (20 - 20)  SpO2: --    PHYSICAL EXAMINATION:    GENERAL: The patient is awake and alert in no apparent distress.     HEENT: Head is normocephalic and atraumatic. Extraocular muscles are intact. Mucous membranes are moist.    NECK: Supple.    LUNGS: b/l wheezing    HEART: Regular rate and rhythm without murmur.    ABDOMEN: Soft, nontender, and nondistended.      EXTREMITIES: Without any cyanosis, clubbing, rash, lesions or edema.    NEUROLOGIC: Grossly intact.    SKIN: No ulceration or induration present.      LABS:                        13.7   18.27 )-----------( 253      ( 15 Anthony 2018 07:58 )             40.5     06-15    133<L>  |  93<L>  |  13  ----------------------------<  102<H>  5.3<H>   |  31  |  <0.5<L>    Ca    9.2      15 Anthony 2018 07:58  Mg     2.1     06-15                            06-14-18 @ 07:01  -  06-15-18 @ 07:00  --------------------------------------------------------  IN: 240 mL / OUT: 300 mL / NET: -60 mL        MICROBIOLOGY:      MEDICATIONS  (STANDING):  ALBUTerol/ipratropium for Nebulization 3 milliLiter(s) Nebulizer every 6 hours  amLODIPine   Tablet 2.5 milliGRAM(s) Oral daily  artificial  tears Solution 1 Drop(s) Both EYES two times a day  azithromycin   Tablet 250 milliGRAM(s) Oral <User Schedule>  buDESOnide 160 MICROgram(s)/formoterol 4.5 MICROgram(s) Inhaler 2 Puff(s) Inhalation two times a day  docusate sodium 100 milliGRAM(s) Oral three times a day  heparin  Injectable 5000 Unit(s) SubCutaneous every 8 hours  methylPREDNISolone sodium succinate Injectable 60 milliGRAM(s) IV Push every 12 hours  nicotine -   7 mG/24Hr(s) Patch 1 patch Transdermal daily  nystatin    Suspension 284667 Unit(s) Oral four times a day  nystatin Powder 1 Application(s) Topical two times a day  oxyCODONE    5 mG/acetaminophen 325 mG 2 Tablet(s) Oral every 6 hours  traZODone 50 milliGRAM(s) Oral at bedtime    MEDICATIONS  (PRN):  ALBUTerol/ipratropium for Nebulization 3 milliLiter(s) Nebulizer every 4 hours PRN Bronchospasm  ALPRAZolam 0.5 milliGRAM(s) Oral two times a day PRN Anxiety      RADIOLOGY & ADDITIONAL STUDIES:

## 2018-06-16 RX ORDER — OXYCODONE AND ACETAMINOPHEN 5; 325 MG/1; MG/1
2 TABLET ORAL EVERY 6 HOURS
Qty: 0 | Refills: 0 | Status: DISCONTINUED | OUTPATIENT
Start: 2018-06-16 | End: 2018-06-22

## 2018-06-16 RX ADMIN — BUDESONIDE AND FORMOTEROL FUMARATE DIHYDRATE 2 PUFF(S): 160; 4.5 AEROSOL RESPIRATORY (INHALATION) at 02:41

## 2018-06-16 RX ADMIN — Medication 3 MILLILITER(S): at 19:23

## 2018-06-16 RX ADMIN — OXYCODONE AND ACETAMINOPHEN 2 TABLET(S): 5; 325 TABLET ORAL at 18:35

## 2018-06-16 RX ADMIN — Medication 1 PATCH: at 12:44

## 2018-06-16 RX ADMIN — OXYCODONE AND ACETAMINOPHEN 2 TABLET(S): 5; 325 TABLET ORAL at 22:51

## 2018-06-16 RX ADMIN — OXYCODONE AND ACETAMINOPHEN 2 TABLET(S): 5; 325 TABLET ORAL at 13:38

## 2018-06-16 RX ADMIN — Medication 1 DROP(S): at 17:33

## 2018-06-16 RX ADMIN — OXYCODONE AND ACETAMINOPHEN 2 TABLET(S): 5; 325 TABLET ORAL at 12:43

## 2018-06-16 RX ADMIN — Medication 0.5 MILLIGRAM(S): at 09:52

## 2018-06-16 RX ADMIN — OXYCODONE AND ACETAMINOPHEN 2 TABLET(S): 5; 325 TABLET ORAL at 07:27

## 2018-06-16 RX ADMIN — Medication 50 MILLIGRAM(S): at 22:36

## 2018-06-16 RX ADMIN — Medication 500000 UNIT(S): at 12:44

## 2018-06-16 RX ADMIN — NYSTATIN CREAM 1 APPLICATION(S): 100000 CREAM TOPICAL at 17:33

## 2018-06-16 RX ADMIN — Medication 500000 UNIT(S): at 17:34

## 2018-06-16 RX ADMIN — AMLODIPINE BESYLATE 2.5 MILLIGRAM(S): 2.5 TABLET ORAL at 07:27

## 2018-06-16 RX ADMIN — Medication 100 MILLIGRAM(S): at 06:27

## 2018-06-16 RX ADMIN — Medication 60 MILLIGRAM(S): at 06:28

## 2018-06-16 RX ADMIN — Medication 60 MILLIGRAM(S): at 17:34

## 2018-06-16 RX ADMIN — OXYCODONE AND ACETAMINOPHEN 2 TABLET(S): 5; 325 TABLET ORAL at 17:32

## 2018-06-16 RX ADMIN — Medication 1 DROP(S): at 06:27

## 2018-06-16 RX ADMIN — Medication 500000 UNIT(S): at 06:29

## 2018-06-16 RX ADMIN — Medication 3 MILLILITER(S): at 13:51

## 2018-06-16 RX ADMIN — BUDESONIDE AND FORMOTEROL FUMARATE DIHYDRATE 2 PUFF(S): 160; 4.5 AEROSOL RESPIRATORY (INHALATION) at 22:26

## 2018-06-16 RX ADMIN — BUDESONIDE AND FORMOTEROL FUMARATE DIHYDRATE 2 PUFF(S): 160; 4.5 AEROSOL RESPIRATORY (INHALATION) at 09:52

## 2018-06-16 RX ADMIN — Medication 0.1 MILLIGRAM(S): at 03:43

## 2018-06-16 RX ADMIN — Medication 100 MILLIGRAM(S): at 13:40

## 2018-06-16 RX ADMIN — OXYCODONE AND ACETAMINOPHEN 2 TABLET(S): 5; 325 TABLET ORAL at 06:29

## 2018-06-16 RX ADMIN — Medication 500000 UNIT(S): at 22:36

## 2018-06-16 RX ADMIN — Medication 0.5 MILLIGRAM(S): at 22:51

## 2018-06-16 RX ADMIN — Medication 0.1 MILLIGRAM(S): at 17:33

## 2018-06-16 RX ADMIN — NYSTATIN CREAM 1 APPLICATION(S): 100000 CREAM TOPICAL at 06:28

## 2018-06-16 NOTE — PROGRESS NOTE ADULT - SUBJECTIVE AND OBJECTIVE BOX
Patient seen and examined  [place] to have strained expiratory distress but air entry has improved somewhat in both lung fields with intensive steroid therapy  BP elevated time to time secondary to steroids, no h/o hypertension. May eventually need a small dose Amlodipine  No edema  Continues to take anxiolytics and opioids ( cervical radiculopathy, anxiety)  Eventual discharge to SNF for pulmonary rehab

## 2018-06-17 LAB
ANION GAP SERPL CALC-SCNC: 14 MMOL/L — SIGNIFICANT CHANGE UP (ref 7–14)
BASOPHILS # BLD AUTO: 0 K/UL — SIGNIFICANT CHANGE UP (ref 0–0.2)
BASOPHILS NFR BLD AUTO: 0 % — SIGNIFICANT CHANGE UP (ref 0–1)
BUN SERPL-MCNC: 12 MG/DL — SIGNIFICANT CHANGE UP (ref 10–20)
CALCIUM SERPL-MCNC: 9 MG/DL — SIGNIFICANT CHANGE UP (ref 8.5–10.1)
CHLORIDE SERPL-SCNC: 91 MMOL/L — LOW (ref 98–110)
CO2 SERPL-SCNC: 28 MMOL/L — SIGNIFICANT CHANGE UP (ref 17–32)
CREAT SERPL-MCNC: <0.5 MG/DL — LOW (ref 0.7–1.5)
EOSINOPHIL # BLD AUTO: 0 K/UL — SIGNIFICANT CHANGE UP (ref 0–0.7)
EOSINOPHIL NFR BLD AUTO: 0 % — SIGNIFICANT CHANGE UP (ref 0–8)
GLUCOSE SERPL-MCNC: 155 MG/DL — HIGH (ref 70–99)
HCT VFR BLD CALC: 40.9 % — SIGNIFICANT CHANGE UP (ref 37–47)
HGB BLD-MCNC: 13.7 G/DL — SIGNIFICANT CHANGE UP (ref 12–16)
LYMPHOCYTES # BLD AUTO: 0.2 K/UL — LOW (ref 1.2–3.4)
LYMPHOCYTES # BLD AUTO: 0.9 % — LOW (ref 20.5–51.1)
LYMPHOCYTES # SPEC AUTO: 0.9 % — HIGH (ref 0–0)
MAGNESIUM SERPL-MCNC: 1.9 MG/DL — SIGNIFICANT CHANGE UP (ref 1.8–2.4)
MANUAL SMEAR VERIFICATION: SIGNIFICANT CHANGE UP
MCHC RBC-ENTMCNC: 32.5 PG — HIGH (ref 27–31)
MCHC RBC-ENTMCNC: 33.5 G/DL — SIGNIFICANT CHANGE UP (ref 32–37)
MCV RBC AUTO: 96.9 FL — SIGNIFICANT CHANGE UP (ref 81–99)
MONOCYTES # BLD AUTO: 0.39 K/UL — SIGNIFICANT CHANGE UP (ref 0.1–0.6)
MONOCYTES NFR BLD AUTO: 1.7 % — SIGNIFICANT CHANGE UP (ref 1.7–9.3)
MYELOCYTES NFR BLD: 1.7 % — HIGH (ref 0–0)
NEUTROPHILS # BLD AUTO: 21.31 K/UL — HIGH (ref 1.4–6.5)
NEUTROPHILS NFR BLD AUTO: 93 % — HIGH (ref 42.2–75.2)
NEUTS BAND # BLD: 0.9 % — SIGNIFICANT CHANGE UP (ref 0–6)
NRBC # BLD: 0 /100 WBCS — SIGNIFICANT CHANGE UP (ref 0–0)
PLAT MORPH BLD: NORMAL — SIGNIFICANT CHANGE UP
PLATELET # BLD AUTO: 285 K/UL — SIGNIFICANT CHANGE UP (ref 130–400)
POTASSIUM SERPL-MCNC: 4.9 MMOL/L — SIGNIFICANT CHANGE UP (ref 3.5–5)
POTASSIUM SERPL-SCNC: 4.9 MMOL/L — SIGNIFICANT CHANGE UP (ref 3.5–5)
RBC # BLD: 4.22 M/UL — SIGNIFICANT CHANGE UP (ref 4.2–5.4)
RBC # FLD: 13.2 % — SIGNIFICANT CHANGE UP (ref 11.5–14.5)
RBC BLD AUTO: NORMAL — SIGNIFICANT CHANGE UP
SODIUM SERPL-SCNC: 133 MMOL/L — LOW (ref 135–146)
VARIANT LYMPHS # BLD: 0.9 % — SIGNIFICANT CHANGE UP (ref 0–5)
WBC # BLD: 22.69 K/UL — HIGH (ref 4.8–10.8)
WBC # FLD AUTO: 22.69 K/UL — HIGH (ref 4.8–10.8)

## 2018-06-17 RX ADMIN — NYSTATIN CREAM 1 APPLICATION(S): 100000 CREAM TOPICAL at 17:28

## 2018-06-17 RX ADMIN — OXYCODONE AND ACETAMINOPHEN 2 TABLET(S): 5; 325 TABLET ORAL at 07:15

## 2018-06-17 RX ADMIN — Medication 100 MILLIGRAM(S): at 13:29

## 2018-06-17 RX ADMIN — BUDESONIDE AND FORMOTEROL FUMARATE DIHYDRATE 2 PUFF(S): 160; 4.5 AEROSOL RESPIRATORY (INHALATION) at 08:46

## 2018-06-17 RX ADMIN — Medication 1 PATCH: at 11:29

## 2018-06-17 RX ADMIN — Medication 3 MILLILITER(S): at 14:07

## 2018-06-17 RX ADMIN — Medication 3 MILLILITER(S): at 08:08

## 2018-06-17 RX ADMIN — OXYCODONE AND ACETAMINOPHEN 2 TABLET(S): 5; 325 TABLET ORAL at 06:34

## 2018-06-17 RX ADMIN — OXYCODONE AND ACETAMINOPHEN 2 TABLET(S): 5; 325 TABLET ORAL at 18:36

## 2018-06-17 RX ADMIN — Medication 100 MILLIGRAM(S): at 08:46

## 2018-06-17 RX ADMIN — AMLODIPINE BESYLATE 2.5 MILLIGRAM(S): 2.5 TABLET ORAL at 06:14

## 2018-06-17 RX ADMIN — OXYCODONE AND ACETAMINOPHEN 2 TABLET(S): 5; 325 TABLET ORAL at 00:01

## 2018-06-17 RX ADMIN — Medication 100 MILLIGRAM(S): at 21:37

## 2018-06-17 RX ADMIN — Medication 60 MILLIGRAM(S): at 06:14

## 2018-06-17 RX ADMIN — Medication 60 MILLIGRAM(S): at 17:29

## 2018-06-17 RX ADMIN — NYSTATIN CREAM 1 APPLICATION(S): 100000 CREAM TOPICAL at 06:15

## 2018-06-17 RX ADMIN — OXYCODONE AND ACETAMINOPHEN 2 TABLET(S): 5; 325 TABLET ORAL at 19:06

## 2018-06-17 RX ADMIN — Medication 500000 UNIT(S): at 11:26

## 2018-06-17 RX ADMIN — OXYCODONE AND ACETAMINOPHEN 2 TABLET(S): 5; 325 TABLET ORAL at 12:32

## 2018-06-17 RX ADMIN — BUDESONIDE AND FORMOTEROL FUMARATE DIHYDRATE 2 PUFF(S): 160; 4.5 AEROSOL RESPIRATORY (INHALATION) at 21:37

## 2018-06-17 RX ADMIN — Medication 0.5 MILLIGRAM(S): at 17:28

## 2018-06-17 RX ADMIN — Medication 3 MILLILITER(S): at 19:59

## 2018-06-17 RX ADMIN — OXYCODONE AND ACETAMINOPHEN 2 TABLET(S): 5; 325 TABLET ORAL at 13:05

## 2018-06-17 RX ADMIN — Medication 1 DROP(S): at 06:15

## 2018-06-17 RX ADMIN — Medication 50 MILLIGRAM(S): at 21:37

## 2018-06-17 RX ADMIN — Medication 1 DROP(S): at 17:28

## 2018-06-17 RX ADMIN — Medication 500000 UNIT(S): at 06:14

## 2018-06-17 RX ADMIN — Medication 500000 UNIT(S): at 17:28

## 2018-06-18 LAB
ANION GAP SERPL CALC-SCNC: 11 MMOL/L — SIGNIFICANT CHANGE UP (ref 7–14)
ANION GAP SERPL CALC-SCNC: 13 MMOL/L — SIGNIFICANT CHANGE UP (ref 7–14)
BASOPHILS # BLD AUTO: 0.1 K/UL — SIGNIFICANT CHANGE UP (ref 0–0.2)
BASOPHILS NFR BLD AUTO: 0.5 % — SIGNIFICANT CHANGE UP (ref 0–1)
BUN SERPL-MCNC: 12 MG/DL — SIGNIFICANT CHANGE UP (ref 10–20)
BUN SERPL-MCNC: 9 MG/DL — LOW (ref 10–20)
CALCIUM SERPL-MCNC: 8.7 MG/DL — SIGNIFICANT CHANGE UP (ref 8.5–10.1)
CALCIUM SERPL-MCNC: 9.4 MG/DL — SIGNIFICANT CHANGE UP (ref 8.5–10.1)
CHLORIDE SERPL-SCNC: 85 MMOL/L — LOW (ref 98–110)
CHLORIDE SERPL-SCNC: 90 MMOL/L — LOW (ref 98–110)
CO2 SERPL-SCNC: 29 MMOL/L — SIGNIFICANT CHANGE UP (ref 17–32)
CO2 SERPL-SCNC: 32 MMOL/L — SIGNIFICANT CHANGE UP (ref 17–32)
CREAT SERPL-MCNC: 0.5 MG/DL — LOW (ref 0.7–1.5)
CREAT SERPL-MCNC: <0.5 MG/DL — LOW (ref 0.7–1.5)
EOSINOPHIL # BLD AUTO: 0 K/UL — SIGNIFICANT CHANGE UP (ref 0–0.7)
EOSINOPHIL NFR BLD AUTO: 0 % — SIGNIFICANT CHANGE UP (ref 0–8)
GLUCOSE SERPL-MCNC: 146 MG/DL — HIGH (ref 70–99)
GLUCOSE SERPL-MCNC: 152 MG/DL — HIGH (ref 70–99)
HCT VFR BLD CALC: 40.5 % — SIGNIFICANT CHANGE UP (ref 37–47)
HGB BLD-MCNC: 13.7 G/DL — SIGNIFICANT CHANGE UP (ref 12–16)
IMM GRANULOCYTES NFR BLD AUTO: 5.4 % — HIGH (ref 0.1–0.3)
LYMPHOCYTES # BLD AUTO: 1.11 K/UL — LOW (ref 1.2–3.4)
LYMPHOCYTES # BLD AUTO: 5.1 % — LOW (ref 20.5–51.1)
MAGNESIUM SERPL-MCNC: 2 MG/DL — SIGNIFICANT CHANGE UP (ref 1.8–2.4)
MCHC RBC-ENTMCNC: 32.9 PG — HIGH (ref 27–31)
MCHC RBC-ENTMCNC: 33.8 G/DL — SIGNIFICANT CHANGE UP (ref 32–37)
MCV RBC AUTO: 97.4 FL — SIGNIFICANT CHANGE UP (ref 81–99)
MONOCYTES # BLD AUTO: 1.12 K/UL — HIGH (ref 0.1–0.6)
MONOCYTES NFR BLD AUTO: 5.1 % — SIGNIFICANT CHANGE UP (ref 1.7–9.3)
NEUTROPHILS # BLD AUTO: 18.27 K/UL — HIGH (ref 1.4–6.5)
NEUTROPHILS NFR BLD AUTO: 83.9 % — HIGH (ref 42.2–75.2)
NRBC # BLD: 0 /100 WBCS — SIGNIFICANT CHANGE UP (ref 0–0)
PLATELET # BLD AUTO: 302 K/UL — SIGNIFICANT CHANGE UP (ref 130–400)
POTASSIUM SERPL-MCNC: 5.7 MMOL/L — HIGH (ref 3.5–5)
POTASSIUM SERPL-MCNC: 6 MMOL/L — CRITICAL HIGH (ref 3.5–5)
POTASSIUM SERPL-SCNC: 5.7 MMOL/L — HIGH (ref 3.5–5)
POTASSIUM SERPL-SCNC: 6 MMOL/L — CRITICAL HIGH (ref 3.5–5)
RBC # BLD: 4.16 M/UL — LOW (ref 4.2–5.4)
RBC # FLD: 13 % — SIGNIFICANT CHANGE UP (ref 11.5–14.5)
SODIUM SERPL-SCNC: 127 MMOL/L — LOW (ref 135–146)
SODIUM SERPL-SCNC: 133 MMOL/L — LOW (ref 135–146)
WBC # BLD: 21.77 K/UL — HIGH (ref 4.8–10.8)
WBC # FLD AUTO: 21.77 K/UL — HIGH (ref 4.8–10.8)

## 2018-06-18 RX ORDER — MEROPENEM 1 G/30ML
1000 INJECTION INTRAVENOUS EVERY 8 HOURS
Qty: 0 | Refills: 0 | Status: DISCONTINUED | OUTPATIENT
Start: 2018-06-18 | End: 2018-06-20

## 2018-06-18 RX ORDER — FLUCONAZOLE 150 MG/1
100 TABLET ORAL DAILY
Qty: 0 | Refills: 0 | Status: DISCONTINUED | OUTPATIENT
Start: 2018-06-19 | End: 2018-06-22

## 2018-06-18 RX ORDER — DEXTROSE 50 % IN WATER 50 %
50 SYRINGE (ML) INTRAVENOUS ONCE
Qty: 0 | Refills: 0 | Status: COMPLETED | OUTPATIENT
Start: 2018-06-18 | End: 2018-06-18

## 2018-06-18 RX ORDER — INSULIN HUMAN 100 [IU]/ML
10 INJECTION, SOLUTION SUBCUTANEOUS ONCE
Qty: 0 | Refills: 0 | Status: COMPLETED | OUTPATIENT
Start: 2018-06-18 | End: 2018-06-18

## 2018-06-18 RX ORDER — FLUCONAZOLE 150 MG/1
200 TABLET ORAL ONCE
Qty: 0 | Refills: 0 | Status: COMPLETED | OUTPATIENT
Start: 2018-06-18 | End: 2018-06-18

## 2018-06-18 RX ORDER — DIPHENHYDRAMINE HCL 50 MG
50 CAPSULE ORAL EVERY 4 HOURS
Qty: 0 | Refills: 0 | Status: DISCONTINUED | OUTPATIENT
Start: 2018-06-18 | End: 2018-06-22

## 2018-06-18 RX ADMIN — OXYCODONE AND ACETAMINOPHEN 2 TABLET(S): 5; 325 TABLET ORAL at 12:12

## 2018-06-18 RX ADMIN — Medication 3 MILLILITER(S): at 13:29

## 2018-06-18 RX ADMIN — Medication 100 MILLIGRAM(S): at 21:21

## 2018-06-18 RX ADMIN — FLUCONAZOLE 200 MILLIGRAM(S): 150 TABLET ORAL at 14:58

## 2018-06-18 RX ADMIN — Medication 1 PATCH: at 11:54

## 2018-06-18 RX ADMIN — NYSTATIN CREAM 1 APPLICATION(S): 100000 CREAM TOPICAL at 05:31

## 2018-06-18 RX ADMIN — OXYCODONE AND ACETAMINOPHEN 2 TABLET(S): 5; 325 TABLET ORAL at 18:45

## 2018-06-18 RX ADMIN — OXYCODONE AND ACETAMINOPHEN 2 TABLET(S): 5; 325 TABLET ORAL at 17:20

## 2018-06-18 RX ADMIN — Medication 50 MILLILITER(S): at 12:46

## 2018-06-18 RX ADMIN — Medication 1 DROP(S): at 17:05

## 2018-06-18 RX ADMIN — OXYCODONE AND ACETAMINOPHEN 2 TABLET(S): 5; 325 TABLET ORAL at 06:43

## 2018-06-18 RX ADMIN — Medication 3 MILLILITER(S): at 20:35

## 2018-06-18 RX ADMIN — INSULIN HUMAN 10 UNIT(S): 100 INJECTION, SOLUTION SUBCUTANEOUS at 12:46

## 2018-06-18 RX ADMIN — MEROPENEM 100 MILLIGRAM(S): 1 INJECTION INTRAVENOUS at 16:55

## 2018-06-18 RX ADMIN — AMLODIPINE BESYLATE 2.5 MILLIGRAM(S): 2.5 TABLET ORAL at 05:28

## 2018-06-18 RX ADMIN — Medication 100 MILLIGRAM(S): at 13:12

## 2018-06-18 RX ADMIN — Medication 1 DROP(S): at 05:32

## 2018-06-18 RX ADMIN — Medication 0.5 MILLIGRAM(S): at 21:21

## 2018-06-18 RX ADMIN — Medication 500000 UNIT(S): at 05:31

## 2018-06-18 RX ADMIN — Medication 500000 UNIT(S): at 11:54

## 2018-06-18 RX ADMIN — Medication 60 MILLIGRAM(S): at 05:29

## 2018-06-18 RX ADMIN — NYSTATIN CREAM 1 APPLICATION(S): 100000 CREAM TOPICAL at 17:04

## 2018-06-18 RX ADMIN — BUDESONIDE AND FORMOTEROL FUMARATE DIHYDRATE 2 PUFF(S): 160; 4.5 AEROSOL RESPIRATORY (INHALATION) at 20:23

## 2018-06-18 RX ADMIN — Medication 60 MILLIGRAM(S): at 17:03

## 2018-06-18 RX ADMIN — Medication 100 MILLIGRAM(S): at 05:31

## 2018-06-18 RX ADMIN — OXYCODONE AND ACETAMINOPHEN 2 TABLET(S): 5; 325 TABLET ORAL at 05:37

## 2018-06-18 RX ADMIN — Medication 0.5 MILLIGRAM(S): at 09:07

## 2018-06-18 RX ADMIN — Medication 3 MILLILITER(S): at 07:42

## 2018-06-18 RX ADMIN — AZITHROMYCIN 250 MILLIGRAM(S): 500 TABLET, FILM COATED ORAL at 05:31

## 2018-06-18 RX ADMIN — Medication 500000 UNIT(S): at 01:02

## 2018-06-18 RX ADMIN — MEROPENEM 100 MILLIGRAM(S): 1 INJECTION INTRAVENOUS at 21:27

## 2018-06-18 RX ADMIN — BUDESONIDE AND FORMOTEROL FUMARATE DIHYDRATE 2 PUFF(S): 160; 4.5 AEROSOL RESPIRATORY (INHALATION) at 13:09

## 2018-06-18 NOTE — PROGRESS NOTE ADULT - SUBJECTIVE AND OBJECTIVE BOX
Patient seen and examined.  Essentially same.  Has bilateral rhonchi, distant heart sounds  BP control with small dose Amlodipine  Ambulates about 20-30 feet  No labs necessary  Awaiting d/c for pulmonary rehab

## 2018-06-18 NOTE — PROGRESS NOTE ADULT - ASSESSMENT
COPD EXACERBATION/ INCREASE WBC, PRODUCTIVE/ INCREASE WBC    - REPEAT SPUTUM CX  - ZOSYN/ SWAB MRSA  - CXR  - IV SOLUMEDROL  - NOT READY FOR DC YET

## 2018-06-18 NOTE — PROGRESS NOTE ADULT - SUBJECTIVE AND OBJECTIVE BOX
Patient is a 68y old  Female who presents with a chief complaint of COPD exacerbation (12 Jun 2018 09:17)      PAST MEDICAL & SURGICAL HISTORY:  Insomnia  Chronic pain  Depression  Anxiety  COPD (chronic obstructive pulmonary disease)  No significant past surgical history      MEDICATIONS  (STANDING):  ALBUTerol/ipratropium for Nebulization 3 milliLiter(s) Nebulizer every 6 hours  amLODIPine   Tablet 2.5 milliGRAM(s) Oral daily  artificial  tears Solution 1 Drop(s) Both EYES two times a day  azithromycin   Tablet 250 milliGRAM(s) Oral <User Schedule>  buDESOnide 160 MICROgram(s)/formoterol 4.5 MICROgram(s) Inhaler 2 Puff(s) Inhalation two times a day  docusate sodium 100 milliGRAM(s) Oral three times a day  heparin  Injectable 5000 Unit(s) SubCutaneous every 8 hours  meropenem  IVPB 1000 milliGRAM(s) IV Intermittent every 8 hours  methylPREDNISolone sodium succinate Injectable 60 milliGRAM(s) IV Push every 12 hours  nicotine -   7 mG/24Hr(s) Patch 1 patch Transdermal daily  nystatin Powder 1 Application(s) Topical two times a day  traZODone 50 milliGRAM(s) Oral at bedtime    MEDICATIONS  (PRN):  ALBUTerol/ipratropium for Nebulization 3 milliLiter(s) Nebulizer every 4 hours PRN Bronchospasm  ALPRAZolam 0.5 milliGRAM(s) Oral two times a day PRN Anxiety  diphenhydrAMINE   Capsule 50 milliGRAM(s) Oral every 4 hours PRN Rash and/or Itching  oxyCODONE    5 mG/acetaminophen 325 mG 2 Tablet(s) Oral every 6 hours PRN Severe Pain (7 - 10)      Overnight events:    Vital Signs Last 24 Hrs  T(C): 36.5 (18 Jun 2018 14:49), Max: 36.5 (18 Jun 2018 14:49)  T(F): 97.7 (18 Jun 2018 14:49), Max: 97.7 (18 Jun 2018 14:49)  HR: 110 (18 Jun 2018 14:49) (91 - 110)  BP: 181/75 (18 Jun 2018 14:49) (139/63 - 181/75)  BP(mean): --  RR: 20 (18 Jun 2018 14:49) (18 - 20)  SpO2: --  CAPILLARY BLOOD GLUCOSE        I&O's Summary      Physical Exam:    GEN: NAD  HEENT: Head is normocephalic and atraumatic. Extraocular muscles are intact. Mucous membranes are moist. Oral thrush   NECK: Supple.  LUNGS: b/l wheeze  HEART: Regular rate and rhythm without murmur.  ABDOMEN: soft, NT, +BS    EXTREMITIES: Without any cyanosis, clubbing, rash, lesions or edema.  NEUROLOGIC: Grossly intact.        Labs:                        13.7   21.77 )-----------( 302      ( 18 Jun 2018 08:09 )             40.5             06-18    133<L>  |  90<L>  |  9<L>  ----------------------------<  146<H>  6.0<HH>   |  32  |  0.5<L>    Ca    9.4      18 Jun 2018 08:09  Mg     2.0     06-18

## 2018-06-18 NOTE — PROGRESS NOTE ADULT - ASSESSMENT
68 year old female with h/o copd not on home oxygen , anxiety and depression presented to ER with c/o worsening SOB  a/w cough x 2 days     # Hypertension- likely due to respiratory distress  - started amlodipine 10mg (did not respond to 2.5mg)    # Cough a/w sob and wheezing - likely etiology COPD exacerbation  - patient refusing bipap PRN stating atelectasis as reason for not wanting positive pressure ventilation  sputum culture neg  - CT Chest No Cont 6/13/18: mild UL centrilobular emphysematous changes, central peribronchial wall thickening with areas of atelectasis within RML and lingula  pulm recs- - not ready for d/c yet, repeat sputum culture, cxr, IV solumedrol.  Pt allergic to penicillin, reports was childhood allergy, does not remember reaction but said it did not involve breathing difficulties.  Meropenem prescribed.      - echo shows G1DD, mild tricuspid regurge, sclerotic aortic valve with normal opening    #hyperkalemia (6.0)  -EKG: no peaked T waves  insulin/dextrose given  repeat K+ this evening    # Anxiety- c/w alprazolam 0.5 q12    # Insomnia- c/w Trazadone 50 mg HS    DVT ppx- HSQ 68 year old female with h/o copd not on home oxygen , anxiety and depression presented to ER with c/o worsening SOB  a/w cough x 2 days     # Hypertension- likely due to respiratory distress  - started amlodipine 10mg (did not respond to 2.5mg)    # Cough a/w sob and wheezing - likely etiology COPD exacerbation  - patient refusing bipap PRN stating atelectasis as reason for not wanting positive pressure ventilation  sputum culture neg  - CT Chest No Cont 6/13/18: mild UL centrilobular emphysematous changes, central peribronchial wall thickening with areas of atelectasis within RML and lingula  pulm recs- - not ready for d/c yet, repeat sputum culture, cxr, IV solumedrol.  Pt allergic to penicillin, reports was childhood allergy, does not remember reaction but said it did not involve breathing difficulties.  Meropenem prescribed.      - echo shows G1DD, mild tricuspid regurge, sclerotic aortic valve with normal opening    #hyperkalemia (6.0)  -EKG: no peaked T waves  insulin/dextrose given  repeat K+ this evening    #oral thrush  was on nystatin, has not resolved, switch to fluconazole po    # Anxiety- c/w alprazolam 0.5 q12    # Insomnia- c/w Trazadone 50 mg HS    DVT ppx- HSQ

## 2018-06-18 NOTE — PROGRESS NOTE ADULT - SUBJECTIVE AND OBJECTIVE BOX
OVERNIGHT EVENTS: still c/o sob on minimal exertion, productive cough    Vital Signs Last 24 Hrs  T(C): 36.4 (18 Jun 2018 05:34), Max: 36.4 (18 Jun 2018 05:34)  T(F): 97.5 (18 Jun 2018 05:34), Max: 97.5 (18 Jun 2018 05:34)  HR: 91 (18 Jun 2018 05:34) (91 - 101)  BP: 139/63 (18 Jun 2018 05:34) (139/63 - 179/81)  BP(mean): --  RR: 18 (18 Jun 2018 05:34) (18 - 20)  SpO2: 94%    PHYSICAL EXAMINATION:    GENERAL: The patient is awake and alert in no apparent distress. / ORAL THRUSH    HEENT: Head is normocephalic and atraumatic. Extraocular muscles are intact. Mucous membranes are moist.    NECK: Supple.    LUNGS: b/l wheezing    HEART: Regular rate and rhythm without murmur.    ABDOMEN: Soft, nontender, and nondistended.      EXTREMITIES: Without any cyanosis, clubbing, rash, lesions or edema.    NEUROLOGIC: Grossly intact.    SKIN: No ulceration or induration present.      LABS:                        13.7   21.77 )-----------( 302      ( 18 Jun 2018 08:09 )             40.5     06-18    133<L>  |  90<L>  |  9<L>  ----------------------------<  146<H>  6.0<HH>   |  32  |  0.5<L>    Ca    9.4      18 Jun 2018 08:09  Mg     2.0     06-18                              MICROBIOLOGY:      MEDICATIONS  (STANDING):  ALBUTerol/ipratropium for Nebulization 3 milliLiter(s) Nebulizer every 6 hours  amLODIPine   Tablet 2.5 milliGRAM(s) Oral daily  artificial  tears Solution 1 Drop(s) Both EYES two times a day  azithromycin   Tablet 250 milliGRAM(s) Oral <User Schedule>  buDESOnide 160 MICROgram(s)/formoterol 4.5 MICROgram(s) Inhaler 2 Puff(s) Inhalation two times a day  dextrose 50% Injectable 50 milliLiter(s) IV Push once  docusate sodium 100 milliGRAM(s) Oral three times a day  heparin  Injectable 5000 Unit(s) SubCutaneous every 8 hours  insulin regular  human recombinant. 10 Unit(s) IV Push once  methylPREDNISolone sodium succinate Injectable 60 milliGRAM(s) IV Push every 12 hours  nicotine -   7 mG/24Hr(s) Patch 1 patch Transdermal daily  nystatin    Suspension [id number] Unit(s) Oral four times a day  nystatin Powder 1 Application(s) Topical two times a day  traZODone 50 milliGRAM(s) Oral at bedtime    MEDICATIONS  (PRN):  ALBUTerol/ipratropium for Nebulization 3 milliLiter(s) Nebulizer every 4 hours PRN Bronchospasm  ALPRAZolam 0.5 milliGRAM(s) Oral two times a day PRN Anxiety  oxyCODONE    5 mG/acetaminophen 325 mG 2 Tablet(s) Oral every 6 hours PRN Severe Pain (7 - 10)      RADIOLOGY & ADDITIONAL STUDIES:

## 2018-06-19 LAB
ANION GAP SERPL CALC-SCNC: 13 MMOL/L — SIGNIFICANT CHANGE UP (ref 7–14)
BUN SERPL-MCNC: 12 MG/DL — SIGNIFICANT CHANGE UP (ref 10–20)
CALCIUM SERPL-MCNC: 8.9 MG/DL — SIGNIFICANT CHANGE UP (ref 8.5–10.1)
CHLORIDE SERPL-SCNC: 90 MMOL/L — LOW (ref 98–110)
CO2 SERPL-SCNC: 32 MMOL/L — SIGNIFICANT CHANGE UP (ref 17–32)
CREAT SERPL-MCNC: <0.5 MG/DL — LOW (ref 0.7–1.5)
GLUCOSE SERPL-MCNC: 131 MG/DL — HIGH (ref 70–99)
GRAM STN FLD: SIGNIFICANT CHANGE UP
HCT VFR BLD CALC: 39.8 % — SIGNIFICANT CHANGE UP (ref 37–47)
HGB BLD-MCNC: 13.2 G/DL — SIGNIFICANT CHANGE UP (ref 12–16)
MCHC RBC-ENTMCNC: 32.8 PG — HIGH (ref 27–31)
MCHC RBC-ENTMCNC: 33.2 G/DL — SIGNIFICANT CHANGE UP (ref 32–37)
MCV RBC AUTO: 98.8 FL — SIGNIFICANT CHANGE UP (ref 81–99)
NRBC # BLD: 0 /100 WBCS — SIGNIFICANT CHANGE UP (ref 0–0)
PLATELET # BLD AUTO: 308 K/UL — SIGNIFICANT CHANGE UP (ref 130–400)
POTASSIUM SERPL-MCNC: 5.8 MMOL/L — HIGH (ref 3.5–5)
POTASSIUM SERPL-SCNC: 5.8 MMOL/L — HIGH (ref 3.5–5)
RBC # BLD: 4.03 M/UL — LOW (ref 4.2–5.4)
RBC # FLD: 13.3 % — SIGNIFICANT CHANGE UP (ref 11.5–14.5)
SODIUM SERPL-SCNC: 135 MMOL/L — SIGNIFICANT CHANGE UP (ref 135–146)
SPECIMEN SOURCE: SIGNIFICANT CHANGE UP
WBC # BLD: 24.8 K/UL — HIGH (ref 4.8–10.8)
WBC # FLD AUTO: 24.8 K/UL — HIGH (ref 4.8–10.8)

## 2018-06-19 RX ORDER — AMLODIPINE BESYLATE 2.5 MG/1
2.5 TABLET ORAL ONCE
Qty: 0 | Refills: 0 | Status: COMPLETED | OUTPATIENT
Start: 2018-06-19 | End: 2018-06-19

## 2018-06-19 RX ORDER — AMLODIPINE BESYLATE 2.5 MG/1
5 TABLET ORAL DAILY
Qty: 0 | Refills: 0 | Status: DISCONTINUED | OUTPATIENT
Start: 2018-06-20 | End: 2018-06-22

## 2018-06-19 RX ORDER — SODIUM POLYSTYRENE SULFONATE 4.1 MEQ/G
30 POWDER, FOR SUSPENSION ORAL ONCE
Qty: 0 | Refills: 0 | Status: COMPLETED | OUTPATIENT
Start: 2018-06-19 | End: 2018-06-19

## 2018-06-19 RX ORDER — DEXTROSE 50 % IN WATER 50 %
50 SYRINGE (ML) INTRAVENOUS ONCE
Qty: 0 | Refills: 0 | Status: COMPLETED | OUTPATIENT
Start: 2018-06-19 | End: 2018-06-19

## 2018-06-19 RX ORDER — INSULIN HUMAN 100 [IU]/ML
10 INJECTION, SOLUTION SUBCUTANEOUS ONCE
Qty: 0 | Refills: 0 | Status: COMPLETED | OUTPATIENT
Start: 2018-06-19 | End: 2018-06-19

## 2018-06-19 RX ADMIN — Medication 3 MILLILITER(S): at 07:22

## 2018-06-19 RX ADMIN — FLUCONAZOLE 100 MILLIGRAM(S): 150 TABLET ORAL at 11:01

## 2018-06-19 RX ADMIN — Medication 1 PATCH: at 11:01

## 2018-06-19 RX ADMIN — Medication 3 MILLILITER(S): at 13:24

## 2018-06-19 RX ADMIN — OXYCODONE AND ACETAMINOPHEN 2 TABLET(S): 5; 325 TABLET ORAL at 05:21

## 2018-06-19 RX ADMIN — AMLODIPINE BESYLATE 2.5 MILLIGRAM(S): 2.5 TABLET ORAL at 05:27

## 2018-06-19 RX ADMIN — OXYCODONE AND ACETAMINOPHEN 2 TABLET(S): 5; 325 TABLET ORAL at 11:01

## 2018-06-19 RX ADMIN — Medication 1 DROP(S): at 17:21

## 2018-06-19 RX ADMIN — BUDESONIDE AND FORMOTEROL FUMARATE DIHYDRATE 2 PUFF(S): 160; 4.5 AEROSOL RESPIRATORY (INHALATION) at 15:08

## 2018-06-19 RX ADMIN — Medication 50 MILLIGRAM(S): at 22:43

## 2018-06-19 RX ADMIN — Medication 100 MILLIGRAM(S): at 13:22

## 2018-06-19 RX ADMIN — MEROPENEM 100 MILLIGRAM(S): 1 INJECTION INTRAVENOUS at 13:23

## 2018-06-19 RX ADMIN — AMLODIPINE BESYLATE 2.5 MILLIGRAM(S): 2.5 TABLET ORAL at 08:52

## 2018-06-19 RX ADMIN — MEROPENEM 100 MILLIGRAM(S): 1 INJECTION INTRAVENOUS at 05:26

## 2018-06-19 RX ADMIN — OXYCODONE AND ACETAMINOPHEN 2 TABLET(S): 5; 325 TABLET ORAL at 17:46

## 2018-06-19 RX ADMIN — MEROPENEM 100 MILLIGRAM(S): 1 INJECTION INTRAVENOUS at 21:22

## 2018-06-19 RX ADMIN — INSULIN HUMAN 10 UNIT(S): 100 INJECTION, SOLUTION SUBCUTANEOUS at 13:57

## 2018-06-19 RX ADMIN — Medication 40 MILLIGRAM(S): at 17:21

## 2018-06-19 RX ADMIN — Medication 50 MILLILITER(S): at 13:58

## 2018-06-19 RX ADMIN — Medication 3 MILLILITER(S): at 19:11

## 2018-06-19 RX ADMIN — OXYCODONE AND ACETAMINOPHEN 2 TABLET(S): 5; 325 TABLET ORAL at 06:07

## 2018-06-19 RX ADMIN — Medication 1 DROP(S): at 05:29

## 2018-06-19 RX ADMIN — Medication 0.5 MILLIGRAM(S): at 13:22

## 2018-06-19 RX ADMIN — NYSTATIN CREAM 1 APPLICATION(S): 100000 CREAM TOPICAL at 17:21

## 2018-06-19 RX ADMIN — NYSTATIN CREAM 1 APPLICATION(S): 100000 CREAM TOPICAL at 05:29

## 2018-06-19 RX ADMIN — OXYCODONE AND ACETAMINOPHEN 2 TABLET(S): 5; 325 TABLET ORAL at 17:41

## 2018-06-19 RX ADMIN — SODIUM POLYSTYRENE SULFONATE 30 GRAM(S): 4.1 POWDER, FOR SUSPENSION ORAL at 23:30

## 2018-06-19 RX ADMIN — Medication 60 MILLIGRAM(S): at 05:29

## 2018-06-19 NOTE — PROGRESS NOTE ADULT - ASSESSMENT
68 year old female with h/o copd not on home oxygen , anxiety and depression presented to ER with c/o worsening SOB  a/w cough x 2 days     # Cough a/w sob and wheezing - likely etiology COPD exacerbation  - patient refusing bipap PRN stating atelectasis as reason for not wanting positive pressure ventilation  sputum culture neg  - CT Chest No Cont 6/13/18: mild UL centrilobular emphysematous changes, central peribronchial wall thickening with areas of atelectasis within RML and lingula  pulm recs- - not ready for d/c yet, IV solumedrol decreased to 40mg q12.  Pt allergic to penicillin, reports was childhood allergy, does not remember reaction but said it did not involve breathing difficulties.  Meropenem prescribed.      # Hypertension- likely due to respiratory distress  - started amlodipine 10mg (did not respond to 2.5mg)    #hyperkalemia (6.0)  -EKG: no peaked T waves  insulin/dextrose given yesterday  repeat today: 5.8, insulin/dextrose given again  repeat bmp in am     #oral thrush  was on nystatin, has not resolved, switch to fluconazole po    # Anxiety- c/w alprazolam 0.5 q12    # Insomnia- c/w Trazadone 50 mg HS    - echo shows G1DD, mild tricuspid regurge, sclerotic aortic valve with normal opening    DVT ppx- HSQ

## 2018-06-19 NOTE — PROGRESS NOTE ADULT - ASSESSMENT
COPD EXACERBATION/ INCREASE WBC, PRODUCTIVE COUGH/ CXR REVIEWED    - F/UP SPUTUM CX  - IV ABX  - DECREASE IV SOLUMEDROL 40 MG Q 12H  - NOT READY FOR DC YET ATTEMPT DC 24 TO 48 H

## 2018-06-19 NOTE — PROGRESS NOTE ADULT - SUBJECTIVE AND OBJECTIVE BOX
OVERNIGHT EVENTS: feels slightly better, still coughing    Vital Signs Last 24 Hrs  T(C): 36.3 (19 Jun 2018 05:00), Max: 36.5 (18 Jun 2018 14:49)  T(F): 97.4 (19 Jun 2018 05:00), Max: 97.7 (18 Jun 2018 14:49)  HR: 91 (19 Jun 2018 05:00) (91 - 110)  BP: 146/77 (19 Jun 2018 05:00) (146/77 - 181/75)  BP(mean): --  RR: 18 (19 Jun 2018 05:00) (18 - 20)  SpO2: 96% ra    PHYSICAL EXAMINATION:    GENERAL: oral thruh    HEENT: Head is normocephalic and atraumatic. Extraocular muscles are intact. Mucous membranes are moist.    NECK: Supple.    LUNGS: b/l wheezing    HEART: Regular rate and rhythm without murmur.    ABDOMEN: Soft, nontender, and nondistended.      EXTREMITIES: Without any cyanosis, clubbing, rash, lesions or edema.    NEUROLOGIC: Grossly intact.    SKIN: No ulceration or induration present.      LABS:                        13.2   24.80 )-----------( 308      ( 19 Jun 2018 06:56 )             39.8     06-19    135  |  90<L>  |  12  ----------------------------<  131<H>  5.8<H>   |  32  |  <0.5<L>    Ca    8.9      19 Jun 2018 06:56  Mg     2.0     06-18                              MICROBIOLOGY:      MEDICATIONS  (STANDING):  ALBUTerol/ipratropium for Nebulization 3 milliLiter(s) Nebulizer every 6 hours  artificial  tears Solution 1 Drop(s) Both EYES two times a day  azithromycin   Tablet 250 milliGRAM(s) Oral <User Schedule>  buDESOnide 160 MICROgram(s)/formoterol 4.5 MICROgram(s) Inhaler 2 Puff(s) Inhalation two times a day  docusate sodium 100 milliGRAM(s) Oral three times a day  fluconAZOLE   Tablet 100 milliGRAM(s) Oral daily  heparin  Injectable 5000 Unit(s) SubCutaneous every 8 hours  meropenem  IVPB 1000 milliGRAM(s) IV Intermittent every 8 hours  methylPREDNISolone sodium succinate Injectable 60 milliGRAM(s) IV Push every 12 hours  nicotine -   7 mG/24Hr(s) Patch 1 patch Transdermal daily  nystatin Powder 1 Application(s) Topical two times a day  traZODone 50 milliGRAM(s) Oral at bedtime    MEDICATIONS  (PRN):  ALBUTerol/ipratropium for Nebulization 3 milliLiter(s) Nebulizer every 4 hours PRN Bronchospasm  ALPRAZolam 0.5 milliGRAM(s) Oral two times a day PRN Anxiety  diphenhydrAMINE   Capsule 50 milliGRAM(s) Oral every 4 hours PRN Rash and/or Itching  oxyCODONE    5 mG/acetaminophen 325 mG 2 Tablet(s) Oral every 6 hours PRN Severe Pain (7 - 10)      RADIOLOGY & ADDITIONAL STUDIES:

## 2018-06-19 NOTE — PROGRESS NOTE ADULT - SUBJECTIVE AND OBJECTIVE BOX
Patient is a 68y old  Female who presents with a chief complaint of COPD exacerbation (12 Jun 2018 09:17)      PAST MEDICAL & SURGICAL HISTORY:  Insomnia  Chronic pain  Depression  Anxiety  COPD (chronic obstructive pulmonary disease)  No significant past surgical history      MEDICATIONS  (STANDING):  ALBUTerol/ipratropium for Nebulization 3 milliLiter(s) Nebulizer every 6 hours  artificial  tears Solution 1 Drop(s) Both EYES two times a day  azithromycin   Tablet 250 milliGRAM(s) Oral <User Schedule>  buDESOnide 160 MICROgram(s)/formoterol 4.5 MICROgram(s) Inhaler 2 Puff(s) Inhalation two times a day  docusate sodium 100 milliGRAM(s) Oral three times a day  fluconAZOLE   Tablet 100 milliGRAM(s) Oral daily  heparin  Injectable 5000 Unit(s) SubCutaneous every 8 hours  meropenem  IVPB 1000 milliGRAM(s) IV Intermittent every 8 hours  methylPREDNISolone sodium succinate Injectable 40 milliGRAM(s) IV Push two times a day  nicotine -   7 mG/24Hr(s) Patch 1 patch Transdermal daily  nystatin Powder 1 Application(s) Topical two times a day  traZODone 50 milliGRAM(s) Oral at bedtime    MEDICATIONS  (PRN):  ALBUTerol/ipratropium for Nebulization 3 milliLiter(s) Nebulizer every 4 hours PRN Bronchospasm  ALPRAZolam 0.5 milliGRAM(s) Oral two times a day PRN Anxiety  diphenhydrAMINE   Capsule 50 milliGRAM(s) Oral every 4 hours PRN Rash and/or Itching  oxyCODONE    5 mG/acetaminophen 325 mG 2 Tablet(s) Oral every 6 hours PRN Severe Pain (7 - 10)      Overnight events:    Vital Signs Last 24 Hrs  T(C): 36.8 (19 Jun 2018 14:06), Max: 36.8 (19 Jun 2018 14:06)  T(F): 98.2 (19 Jun 2018 14:06), Max: 98.2 (19 Jun 2018 14:06)  HR: 109 (19 Jun 2018 14:06) (91 - 109)  BP: 170/78 (19 Jun 2018 14:06) (146/77 - 170/78)  BP(mean): --  RR: 18 (19 Jun 2018 05:00) (18 - 20)  SpO2: --  CAPILLARY BLOOD GLUCOSE        I&O's Summary      Physical Exam:    GENERAL: oral thrush  NECK: Supple  LUNGS: b/l wheezing  HEART: Regular rate and rhythm without murmur.  ABDOMEN: soft, NT, +BS  EXTREMITIES: Without any cyanosis, clubbing, rash, lesions or edema.  NEUROLOGIC: AO x 3  SKIN: No ulceration or induration present.        Labs:                        13.2   24.80 )-----------( 308      ( 19 Jun 2018 06:56 )             39.8             06-19    135  |  90<L>  |  12  ----------------------------<  131<H>  5.8<H>   |  32  |  <0.5<L>    Ca    8.9      19 Jun 2018 06:56  Mg     2.0     06-18                          Culture - Sputum (collected 18 Jun 2018 11:01)  Source: .Sputum Sputum  Gram Stain (19 Jun 2018 06:57):    Numerous polymorphonuclear leukocytes per low power field    No Squamous epithelial cells per low power field    Numerous Gram Positive Cocci in Pairs and Chains per oil power field    Rare Gram Positive Cocci in Clusters per oil power field

## 2018-06-20 LAB
ANION GAP SERPL CALC-SCNC: 12 MMOL/L — SIGNIFICANT CHANGE UP (ref 7–14)
BUN SERPL-MCNC: 9 MG/DL — LOW (ref 10–20)
CALCIUM SERPL-MCNC: 8.9 MG/DL — SIGNIFICANT CHANGE UP (ref 8.5–10.1)
CHLORIDE SERPL-SCNC: 93 MMOL/L — LOW (ref 98–110)
CO2 SERPL-SCNC: 32 MMOL/L — SIGNIFICANT CHANGE UP (ref 17–32)
CREAT SERPL-MCNC: <0.5 MG/DL — LOW (ref 0.7–1.5)
GLUCOSE SERPL-MCNC: 128 MG/DL — HIGH (ref 70–99)
HCT VFR BLD CALC: 38.8 % — SIGNIFICANT CHANGE UP (ref 37–47)
HGB BLD-MCNC: 13 G/DL — SIGNIFICANT CHANGE UP (ref 12–16)
MCHC RBC-ENTMCNC: 32.5 PG — HIGH (ref 27–31)
MCHC RBC-ENTMCNC: 33.5 G/DL — SIGNIFICANT CHANGE UP (ref 32–37)
MCV RBC AUTO: 97 FL — SIGNIFICANT CHANGE UP (ref 81–99)
NRBC # BLD: 0 /100 WBCS — SIGNIFICANT CHANGE UP (ref 0–0)
PLATELET # BLD AUTO: 293 K/UL — SIGNIFICANT CHANGE UP (ref 130–400)
POTASSIUM SERPL-MCNC: 5 MMOL/L — SIGNIFICANT CHANGE UP (ref 3.5–5)
POTASSIUM SERPL-SCNC: 5 MMOL/L — SIGNIFICANT CHANGE UP (ref 3.5–5)
RBC # BLD: 4 M/UL — LOW (ref 4.2–5.4)
RBC # FLD: 13.2 % — SIGNIFICANT CHANGE UP (ref 11.5–14.5)
SODIUM SERPL-SCNC: 137 MMOL/L — SIGNIFICANT CHANGE UP (ref 135–146)
WBC # BLD: 24.58 K/UL — HIGH (ref 4.8–10.8)
WBC # FLD AUTO: 24.58 K/UL — HIGH (ref 4.8–10.8)

## 2018-06-20 PROCEDURE — 93970 EXTREMITY STUDY: CPT | Mod: 26

## 2018-06-20 RX ORDER — ALPRAZOLAM 0.25 MG
0.5 TABLET ORAL
Qty: 0 | Refills: 0 | Status: DISCONTINUED | OUTPATIENT
Start: 2018-06-20 | End: 2018-06-22

## 2018-06-20 RX ADMIN — Medication 0.5 MILLIGRAM(S): at 12:38

## 2018-06-20 RX ADMIN — OXYCODONE AND ACETAMINOPHEN 2 TABLET(S): 5; 325 TABLET ORAL at 17:25

## 2018-06-20 RX ADMIN — Medication 3 MILLILITER(S): at 07:18

## 2018-06-20 RX ADMIN — Medication 0.5 MILLIGRAM(S): at 17:25

## 2018-06-20 RX ADMIN — OXYCODONE AND ACETAMINOPHEN 2 TABLET(S): 5; 325 TABLET ORAL at 05:40

## 2018-06-20 RX ADMIN — OXYCODONE AND ACETAMINOPHEN 2 TABLET(S): 5; 325 TABLET ORAL at 15:50

## 2018-06-20 RX ADMIN — Medication 1 DROP(S): at 17:25

## 2018-06-20 RX ADMIN — OXYCODONE AND ACETAMINOPHEN 2 TABLET(S): 5; 325 TABLET ORAL at 11:38

## 2018-06-20 RX ADMIN — Medication 40 MILLIGRAM(S): at 05:36

## 2018-06-20 RX ADMIN — AMLODIPINE BESYLATE 5 MILLIGRAM(S): 2.5 TABLET ORAL at 06:19

## 2018-06-20 RX ADMIN — Medication 50 MILLIGRAM(S): at 21:55

## 2018-06-20 RX ADMIN — OXYCODONE AND ACETAMINOPHEN 2 TABLET(S): 5; 325 TABLET ORAL at 13:04

## 2018-06-20 RX ADMIN — Medication 3 MILLILITER(S): at 13:36

## 2018-06-20 RX ADMIN — Medication 1 DROP(S): at 05:35

## 2018-06-20 RX ADMIN — MEROPENEM 100 MILLIGRAM(S): 1 INJECTION INTRAVENOUS at 13:09

## 2018-06-20 RX ADMIN — Medication 1 PATCH: at 11:39

## 2018-06-20 RX ADMIN — MEROPENEM 100 MILLIGRAM(S): 1 INJECTION INTRAVENOUS at 05:36

## 2018-06-20 RX ADMIN — Medication 3 MILLILITER(S): at 19:16

## 2018-06-20 RX ADMIN — AZITHROMYCIN 250 MILLIGRAM(S): 500 TABLET, FILM COATED ORAL at 05:35

## 2018-06-20 RX ADMIN — FLUCONAZOLE 100 MILLIGRAM(S): 150 TABLET ORAL at 11:38

## 2018-06-20 RX ADMIN — NYSTATIN CREAM 1 APPLICATION(S): 100000 CREAM TOPICAL at 05:35

## 2018-06-20 NOTE — PROGRESS NOTE ADULT - SUBJECTIVE AND OBJECTIVE BOX
Patient continues to have a strained expiratory effort with bilateral diffuse rhonchi.  Elevated wbc ct may be from high dose steroids since sputum culture is negative and patient has remained afebrile  Minimal puffiness over feet.  Solumedrol down to 40 mgs IV bid starting today.  When medically stable, will be discharged to [place].

## 2018-06-20 NOTE — PROGRESS NOTE ADULT - ASSESSMENT
COPD EXACERBATION/ INCREASE WBC, PRODUCTIVE COUGH/ CXR REVIEWEd/    -  SPUTUM CX neg  - IV ABX  - CHANGE SOLUMEDROL TO 40 MG PREDNISONE DAILY  - IF DEC WBC D/C PLANNING ON DOXY 100 Q 12 FOR 7 DAYS/ FLUCONAZOLE FOR 7 DAYS

## 2018-06-20 NOTE — PROGRESS NOTE ADULT - SUBJECTIVE AND OBJECTIVE BOX
OVERNIGHT EVENTS: all over feels better, decrease cough    Vital Signs Last 24 Hrs  T(C): 36.8 (20 Jun 2018 05:39), Max: 36.8 (19 Jun 2018 14:06)  T(F): 98.3 (20 Jun 2018 05:39), Max: 98.3 (20 Jun 2018 05:39)  HR: 103 (20 Jun 2018 05:39) (103 - 109)  BP: 158/77 (20 Jun 2018 05:39) (158/77 - 170/78)  BP(mean): --  RR: 18 (20 Jun 2018 05:39) (18 - 20)  SpO2: 96% (19 Jun 2018 19:49) (96% - 96%)    PHYSICAL EXAMINATION:    GENERAL: The patient is awake and alert in no apparent distress.     HEENT: Head is normocephalic and atraumatic. Extraocular muscles are intact. Mucous membranes are moist.    NECK: Supple.    LUNGS: improved wheezing    HEART: Regular rate and rhythm without murmur.    ABDOMEN: Soft, nontender, and nondistended.      EXTREMITIES: Without any cyanosis, clubbing, rash, lesions or edema.    NEUROLOGIC: Grossly intact.    SKIN: No ulceration or induration present.      LABS:                        13.0   24.58 )-----------( 293      ( 20 Jun 2018 06:28 )             38.8     06-20    137  |  93<L>  |  9<L>  ----------------------------<  128<H>  5.0   |  32  |  <0.5<L>    Ca    8.9      20 Jun 2018 06:28                              MICROBIOLOGY:  Culture Results:   Normal Respiratory Maya present (06-18 @ 11:01)      MEDICATIONS  (STANDING):  ALBUTerol/ipratropium for Nebulization 3 milliLiter(s) Nebulizer every 6 hours  amLODIPine   Tablet 5 milliGRAM(s) Oral daily  artificial  tears Solution 1 Drop(s) Both EYES two times a day  azithromycin   Tablet 250 milliGRAM(s) Oral <User Schedule>  buDESOnide 160 MICROgram(s)/formoterol 4.5 MICROgram(s) Inhaler 2 Puff(s) Inhalation two times a day  docusate sodium 100 milliGRAM(s) Oral three times a day  fluconAZOLE   Tablet 100 milliGRAM(s) Oral daily  heparin  Injectable 5000 Unit(s) SubCutaneous every 8 hours  meropenem  IVPB 1000 milliGRAM(s) IV Intermittent every 8 hours  methylPREDNISolone sodium succinate Injectable 40 milliGRAM(s) IV Push two times a day  nicotine -   7 mG/24Hr(s) Patch 1 patch Transdermal daily  nystatin Powder 1 Application(s) Topical two times a day  traZODone 50 milliGRAM(s) Oral at bedtime    MEDICATIONS  (PRN):  ALBUTerol/ipratropium for Nebulization 3 milliLiter(s) Nebulizer every 4 hours PRN Bronchospasm  diphenhydrAMINE   Capsule 50 milliGRAM(s) Oral every 4 hours PRN Rash and/or Itching  oxyCODONE    5 mG/acetaminophen 325 mG 2 Tablet(s) Oral every 6 hours PRN Severe Pain (7 - 10)      RADIOLOGY & ADDITIONAL STUDIES:

## 2018-06-20 NOTE — PROGRESS NOTE ADULT - SUBJECTIVE AND OBJECTIVE BOX
Subjective:  i was calld again for the chest pain, patient denies any chest pain, states her feeling is a tightness like not being able to inhale deep and gets worse any time wheezing gets worse and does not want any cardiac work up now, she states "she is aware is the lung bothering her"    MEDICATIONS  (STANDING):  ALBUTerol/ipratropium for Nebulization 3 milliLiter(s) Nebulizer every 6 hours  amLODIPine   Tablet 5 milliGRAM(s) Oral daily  artificial  tears Solution 1 Drop(s) Both EYES two times a day  azithromycin   Tablet 250 milliGRAM(s) Oral <User Schedule>  buDESOnide 160 MICROgram(s)/formoterol 4.5 MICROgram(s) Inhaler 2 Puff(s) Inhalation two times a day  docusate sodium 100 milliGRAM(s) Oral three times a day  fluconAZOLE   Tablet 100 milliGRAM(s) Oral daily  heparin  Injectable 5000 Unit(s) SubCutaneous every 8 hours  meropenem  IVPB 1000 milliGRAM(s) IV Intermittent every 8 hours  methylPREDNISolone sodium succinate Injectable 40 milliGRAM(s) IV Push two times a day  nicotine -   7 mG/24Hr(s) Patch 1 patch Transdermal daily  nystatin Powder 1 Application(s) Topical two times a day  traZODone 50 milliGRAM(s) Oral at bedtime    MEDICATIONS  (PRN):  ALBUTerol/ipratropium for Nebulization 3 milliLiter(s) Nebulizer every 4 hours PRN Bronchospasm  diphenhydrAMINE   Capsule 50 milliGRAM(s) Oral every 4 hours PRN Rash and/or Itching  oxyCODONE    5 mG/acetaminophen 325 mG 2 Tablet(s) Oral every 6 hours PRN Severe Pain (7 - 10)            Vital Signs Last 24 Hrs  T(C): 36.8 (20 Jun 2018 05:39), Max: 36.8 (19 Jun 2018 14:06)  T(F): 98.3 (20 Jun 2018 05:39), Max: 98.3 (20 Jun 2018 05:39)  HR: 103 (20 Jun 2018 05:39) (103 - 109)  BP: 158/77 (20 Jun 2018 05:39) (158/77 - 170/78)  BP(mean): --  RR: 18 (20 Jun 2018 05:39) (18 - 20)  SpO2: 96% (19 Jun 2018 19:49) (96% - 96%)             REVIEW OF SYSTEMS:  CONSTITUTIONAL: no fever, no chills, no diaphoresis  CARDIOLOGY: no chest pain, but SOB, no palpitation, no diaphoresis, no faint   RESPIRATORY: dyspnea, wheeze, orthopnea, no PND   NEUROLOGICAL: no dizziness, headache, focal deficits to report.  GI: no abdominal pain, no dyspepsia, no nausea, no vomiting, no diarrhea.    HEENT: no congestion, no nasal bleeding  SKIN: no ecchymosis, no ptechia             PHYSICAL EXAM:  · CONSTITUTIONAL: always looks dyspnic  . NECK: Supple, no JVD, no bruit on either carotid side   · RESPIRATORY: decreased air entry to lung base, no wheeze, but rhonchi, no crackle, no wet rales  · CARDIOVASCULAR: Normal S1, A2, P2, no murmur, no click, regular rate,  no rub,  · EXTREMITIES: No cyanosis, no clubbing, no edema  · VASCULAR: Pulses are regular, equal, bilateral in upper and lower extremities  	  TELEMETRY:    ECG:   < from: 12 Lead ECG (06.18.18 @ 10:05) >  inus tachycardia  Otherwise normal ECG    < end of copied text >    TTE:    LABS:                        13.0   24.58 )-----------( 293      ( 20 Jun 2018 06:28 )             38.8     06-20    137  |  93<L>  |  9<L>  ----------------------------<  128<H>  5.0   |  32  |  <0.5<L>    Ca    8.9      20 Jun 2018 06:28              I&O's Summary    BNP  RADIOLOGY & ADDITIONAL STUDIES: < from: Xray Chest 1 View AP/PA (06.18.18 @ 14:09) >  Mild cardiomegaly.    < end of copied text >  < from: Xray Chest 1 View AP/PA (06.18.18 @ 14:09) >  o focal parenchymal opacities, effusion or   pneumothorax is present.    < end of copied text >      IMPRESSION AND PLAN: Subjective:  i was calld again for the chest pain, patient denies any chest pain, states her feeling is a tightness like not being able to inhale deep and gets worse any time wheezing gets worse and does not want any cardiac work up now, she states "she is aware is the lung bothering her"    MEDICATIONS  (STANDING):  ALBUTerol/ipratropium for Nebulization 3 milliLiter(s) Nebulizer every 6 hours  amLODIPine   Tablet 5 milliGRAM(s) Oral daily  artificial  tears Solution 1 Drop(s) Both EYES two times a day  azithromycin   Tablet 250 milliGRAM(s) Oral <User Schedule>  buDESOnide 160 MICROgram(s)/formoterol 4.5 MICROgram(s) Inhaler 2 Puff(s) Inhalation two times a day  docusate sodium 100 milliGRAM(s) Oral three times a day  fluconAZOLE   Tablet 100 milliGRAM(s) Oral daily  heparin  Injectable 5000 Unit(s) SubCutaneous every 8 hours  meropenem  IVPB 1000 milliGRAM(s) IV Intermittent every 8 hours  methylPREDNISolone sodium succinate Injectable 40 milliGRAM(s) IV Push two times a day  nicotine -   7 mG/24Hr(s) Patch 1 patch Transdermal daily  nystatin Powder 1 Application(s) Topical two times a day  traZODone 50 milliGRAM(s) Oral at bedtime    MEDICATIONS  (PRN):  ALBUTerol/ipratropium for Nebulization 3 milliLiter(s) Nebulizer every 4 hours PRN Bronchospasm  diphenhydrAMINE   Capsule 50 milliGRAM(s) Oral every 4 hours PRN Rash and/or Itching  oxyCODONE    5 mG/acetaminophen 325 mG 2 Tablet(s) Oral every 6 hours PRN Severe Pain (7 - 10)            Vital Signs Last 24 Hrs  T(C): 36.8 (20 Jun 2018 05:39), Max: 36.8 (19 Jun 2018 14:06)  T(F): 98.3 (20 Jun 2018 05:39), Max: 98.3 (20 Jun 2018 05:39)  HR: 103 (20 Jun 2018 05:39) (103 - 109)  BP: 158/77 (20 Jun 2018 05:39) (158/77 - 170/78)  BP(mean): --  RR: 18 (20 Jun 2018 05:39) (18 - 20)  SpO2: 96% (19 Jun 2018 19:49) (96% - 96%)             REVIEW OF SYSTEMS:  CONSTITUTIONAL: no fever, no chills, no diaphoresis  CARDIOLOGY: no chest pain, but SOB, no palpitation, no diaphoresis, no faint   RESPIRATORY: dyspnea, wheeze, orthopnea, no PND   NEUROLOGICAL: no dizziness, headache, focal deficits to report.  GI: no abdominal pain, no dyspepsia, no nausea, no vomiting, no diarrhea.    HEENT: no congestion, no nasal bleeding  SKIN: no ecchymosis, no ptechia             PHYSICAL EXAM:  · CONSTITUTIONAL: always looks dyspnic  . NECK: Supple, no JVD, no bruit on either carotid side   · RESPIRATORY: decreased air entry to lung base, no wheeze, but rhonchi, no crackle, no wet rales  · CARDIOVASCULAR: Normal S1, A2, P2, no murmur, no click, regular rate,  no rub,  · EXTREMITIES: No cyanosis, no clubbing, no edema  · VASCULAR: Pulses are regular, equal, bilateral in upper and lower extremities  	    ECG:   < from: 12 Lead ECG (06.18.18 @ 10:05) >  inus tachycardia  Otherwise normal ECG    < end of copied text >    TTE: < from: Transthoracic Echocardiogram (06.11.18 @ 13:43) >   1. Spectral Doppler shows impaired relaxation pattern of left   ventricular myocardial filling (Grade I diastolic dysfunction).   2. Mild tricuspid regurgitation.   3. Sclerotic aortic valve with normal opening.    < end of copied text >      LABS:                        13.0   24.58 )-----------( 293      ( 20 Jun 2018 06:28 )             38.8     06-20    137  |  93<L>  |  9<L>  ----------------------------<  128<H>  5.0   |  32  |  <0.5<L>    Ca    8.9      20 Jun 2018 06:28              I&O's Summary    BNP  RADIOLOGY & ADDITIONAL STUDIES: < from: Xray Chest 1 View AP/PA (06.18.18 @ 14:09) >  Mild cardiomegaly.    < end of copied text >  < from: Xray Chest 1 View AP/PA (06.18.18 @ 14:09) >  o focal parenchymal opacities, effusion or   pneumothorax is present.    < end of copied text >      IMPRESSION AND PLAN:

## 2018-06-20 NOTE — PROGRESS NOTE ADULT - ASSESSMENT
SUBJECTIVE:  Patient is a 68y old Female who presents with a chief complaint of COPD exacerbation (12 Jun 2018 09:17)    Currently admitted to medicine with the primary diagnosis of COPD exacerbation.     Today is hospital day 12d. Overnight: Patient became noticing increased edema in the R lower extremity (R>L). She denies pain in her R lower extremity.    Patient was seen this morning at bedside resting comfortably. She reports her SOB at rest is improving since admission, however, she is still experiencing SOB with walking and talking. She also continues to complain of an unchanged cough producing yellow, non-bloody sputum.     Currently denies subjective fever and chills.    PAST MEDICAL & SURGICAL HISTORY  Insomnia  Chronic pain  Depression  Anxiety  COPD (chronic obstructive pulmonary disease)  No significant past surgical history    SOCIAL HISTORY:  Negative for smoking/alcohol/drug use.     ALLERGIES:  penicillin (Unknown)    MEDICATIONS:  STANDING MEDICATIONS  ALBUTerol/ipratropium for Nebulization 3 milliLiter(s) Nebulizer every 6 hours  ALPRAZolam 0.5 milliGRAM(s) Oral two times a day  amLODIPine   Tablet 5 milliGRAM(s) Oral daily  artificial  tears Solution 1 Drop(s) Both EYES two times a day  azithromycin   Tablet 250 milliGRAM(s) Oral <User Schedule>  buDESOnide 160 MICROgram(s)/formoterol 4.5 MICROgram(s) Inhaler 2 Puff(s) Inhalation two times a day  docusate sodium 100 milliGRAM(s) Oral three times a day  fluconAZOLE   Tablet 100 milliGRAM(s) Oral daily  heparin  Injectable 5000 Unit(s) SubCutaneous every 8 hours  meropenem  IVPB 1000 milliGRAM(s) IV Intermittent every 8 hours  methylPREDNISolone sodium succinate Injectable 40 milliGRAM(s) IV Push two times a day  nicotine -   7 mG/24Hr(s) Patch 1 patch Transdermal daily  nystatin Powder 1 Application(s) Topical two times a day  traZODone 50 milliGRAM(s) Oral at bedtime    PRN MEDICATIONS  ALBUTerol/ipratropium for Nebulization 3 milliLiter(s) Nebulizer every 4 hours PRN  diphenhydrAMINE   Capsule 50 milliGRAM(s) Oral every 4 hours PRN  oxyCODONE    5 mG/acetaminophen 325 mG 2 Tablet(s) Oral every 6 hours PRN    VITALS:   T(F): 98.4  HR: 117  BP: 160/80  RR: 20  SpO2: 96%    PHYSICAL EXAM:  GEN: No acute distress.  LUNGS: Diffuse inspiratory wheezes BL.  HEART: S1/S2 present, tachcardia, no M/R/G appreciated on auscultation.   ABD: Soft, non-tender, non-distended. Bowel sounds present.  EXT: BL lower extremity edema below the knee (R>L) with no pain to palpation; Lower extremity strength 5/5 BL.  NEURO: AAOX3; light touch intact in BL lower extremities.     LABS:                        13.0   24.58 )-----------( 293      ( 20 Jun 2018 06:28 )             38.8     06-20    137  |  93<L>  |  9<L>  ----------------------------<  128<H>  5.0   |  32  |  <0.5<L>    Ca    8.9      20 Jun 2018 06:28      Culture - Sputum (collected 18 Jun 2018 11:01)  Source: .Sputum Sputum  Gram Stain (19 Jun 2018 06:57):    Numerous polymorphonuclear leukocytes per low power field    No Squamous epithelial cells per low power field    Numerous Gram Positive Cocci in Pairs and Chains per oil power field    Rare Gram Positive Cocci in Clusters per oil power field  Preliminary Report (19 Jun 2018 22:18):    Normal Respiratory Maya present      RADIOLOGY:  DUPLEX SCAN EXT VEINS LOWER BI        Impression:  No evidence of deep venous thrombosis or superficial thrombophlebitis in   the bilateral lower extremities.      Intravenous access: Peripheral    NG tube: None  Ramirez cathter: None      Assessment:  Patient is a 68 year old female with significant PMHx of COPD (not on home O2), anxiety, and depression who presented to the ED with a cc/o worsening SOB x 2 days. She also complained of a productive cough on admission.     1. Productive cough with associated SOB and wheezing sob with likely etiology of COPD exacerbation  	 -Pulm consult: recommends patient is not ready for discharge; IV solumedrol decreased to 40mg q12h; Pt allergic to penicillin; reports was childhood allergy, does not remember reaction 	but said it did not involve breathing difficulties; Meropenem prescribed.    	-Ambulatory pulse ox performed; patient SpO2 remained stable.   	-Patient refused BiPAP PRN, stating atelectasis as reason for not wanting positive pressure ventilation  	-Sputum cultures negative  	-CT Chest w/o contrast (6/13/18): mild UL centrilobular emphysematous changes, central peribronchial wall thickening with areas of atelectasis within RML and lingula  	-BL lower extremity ultrasound performed to rule out acute DVT; negative findings    2. Hypertension likely 2/2 respiratory distress, steroids, and anxiety  	- Amlodipine 10mg after inadequate response to 2.5mg    3. Hyperkalemia  	-Potassium (5.0 <-- 5.8)  	-EKG: no peaked T waves  	-Insulin/dextrose was given yesterday  	-Repeat bmp in am     4. Oral thrush  	-Previously on nystatin, has not resolved; switched to fluconazole PO    5. Anxiety  	-Alprazolam 0.5 q12    6. Insomnia  	-Trazadone 50 mg HS    7. DVT ppx   	- HSQ    Echo shows G1DD, mild tricuspid regurge, sclerotic aortic valve with normal opening. SUBJECTIVE:  Patient is a 68y old Female who presents with a chief complaint of COPD exacerbation (12 Jun 2018 09:17)    Currently admitted to medicine with the primary diagnosis of COPD exacerbation.     Today is hospital day 12d. Overnight: Patient became noticing increased edema in the R lower extremity (R>L). She denies pain in her R lower extremity.    Patient was seen this morning at bedside resting comfortably. She reports her SOB at rest is improving since admission, however, she is still experiencing SOB with walking and talking. She also continues to complain of an unchanged cough producing yellow, non-bloody sputum.     Currently denies subjective fever and chills.    PAST MEDICAL & SURGICAL HISTORY  Insomnia  Chronic pain  Depression  Anxiety  COPD (chronic obstructive pulmonary disease)  No significant past surgical history    SOCIAL HISTORY:  Negative for smoking/alcohol/drug use.     ALLERGIES:  penicillin (Unknown)    MEDICATIONS:  STANDING MEDICATIONS  ALBUTerol/ipratropium for Nebulization 3 milliLiter(s) Nebulizer every 6 hours  ALPRAZolam 0.5 milliGRAM(s) Oral two times a day  amLODIPine   Tablet 5 milliGRAM(s) Oral daily  artificial  tears Solution 1 Drop(s) Both EYES two times a day  azithromycin   Tablet 250 milliGRAM(s) Oral <User Schedule>  buDESOnide 160 MICROgram(s)/formoterol 4.5 MICROgram(s) Inhaler 2 Puff(s) Inhalation two times a day  docusate sodium 100 milliGRAM(s) Oral three times a day  fluconAZOLE   Tablet 100 milliGRAM(s) Oral daily  heparin  Injectable 5000 Unit(s) SubCutaneous every 8 hours  meropenem  IVPB 1000 milliGRAM(s) IV Intermittent every 8 hours  methylPREDNISolone sodium succinate Injectable 40 milliGRAM(s) IV Push two times a day  nicotine -   7 mG/24Hr(s) Patch 1 patch Transdermal daily  nystatin Powder 1 Application(s) Topical two times a day  traZODone 50 milliGRAM(s) Oral at bedtime    PRN MEDICATIONS  ALBUTerol/ipratropium for Nebulization 3 milliLiter(s) Nebulizer every 4 hours PRN  diphenhydrAMINE   Capsule 50 milliGRAM(s) Oral every 4 hours PRN  oxyCODONE    5 mG/acetaminophen 325 mG 2 Tablet(s) Oral every 6 hours PRN    VITALS:   T(F): 98.4  HR: 117  BP: 160/80  RR: 20  SpO2: 96%    PHYSICAL EXAM:  GEN: No acute distress.  LUNGS: Diffuse inspiratory wheezes BL.  HEART: S1/S2 present, tachcardia, no M/R/G appreciated on auscultation.   ABD: Soft, non-tender, non-distended. Bowel sounds present.  EXT: BL lower extremity edema below the knee (R>L) with no pain to palpation; Lower extremity strength 5/5 BL.  NEURO: AAOX3; light touch intact in BL lower extremities.     LABS:                        13.0   24.58 )-----------( 293      ( 20 Jun 2018 06:28 )             38.8     06-20    137  |  93<L>  |  9<L>  ----------------------------<  128<H>  5.0   |  32  |  <0.5<L>    Ca    8.9      20 Jun 2018 06:28      Culture - Sputum (collected 18 Jun 2018 11:01)  Source: .Sputum Sputum  Gram Stain (19 Jun 2018 06:57):    Numerous polymorphonuclear leukocytes per low power field    No Squamous epithelial cells per low power field    Numerous Gram Positive Cocci in Pairs and Chains per oil power field    Rare Gram Positive Cocci in Clusters per oil power field  Preliminary Report (19 Jun 2018 22:18):    Normal Respiratory Maya present      RADIOLOGY:  DUPLEX SCAN EXT VEINS LOWER BI        Impression:  No evidence of deep venous thrombosis or superficial thrombophlebitis in   the bilateral lower extremities.      Intravenous access: Peripheral    NG tube: None  Ramirez cathter: None      Assessment:  Patient is a 68 year old female with significant PMHx of COPD (not on home O2), anxiety, and depression who presented to the ED with a cc/o worsening SOB x 2 days. She also complained of a productive cough on admission.     1. Productive cough with associated SOB and wheezing sob with likely etiology of COPD exacerbation  	 -Pulm consult: recommends patient is not ready for discharge; IV solumedrol decreased to 40mg q12h; Pt allergic to penicillin; reports was childhood allergy, does not remember reaction 	but said it did not involve breathing difficulties; Meropenem prescribed.    	-Ambulatory pulse ox performed; patient SpO2 remained stable.   	-Patient refused BiPAP PRN, stating atelectasis as reason for not wanting positive pressure ventilation  	-Sputum cultures negative  	-CT Chest w/o contrast (6/13/18): mild UL centrilobular emphysematous changes, central peribronchial wall thickening with areas of atelectasis within RML and lingula  	  2. Hypertension likely 2/2 respiratory distress, steroids, and anxiety  	- Amlodipine 10mg after inadequate response to 2.5mg    3. Hyperkalemia  	-Potassium (5.0 <-- 5.8)  	-EKG: no peaked T waves  	-Insulin/dextrose was given yesterday  	-Repeat bmp in am     5. BL lower extremity edema (R>L)  	-ultrasound performed to rule out acute DVT; negative findings      4. Oral thrush  	-Previously on nystatin, has not resolved; switched to fluconazole PO    5. Anxiety  	-Alprazolam 0.5 q12    6. Insomnia  	-Trazadone 50 mg HS    7. DVT ppx   	- HSQ    Echo shows G1DD, mild tricuspid regurge, sclerotic aortic valve with normal opening. SUBJECTIVE:  Patient is a 68y old Female who presents with a chief complaint of COPD exacerbation (12 Jun 2018 09:17)    Currently admitted to medicine with the primary diagnosis of COPD exacerbation.     Today is hospital day 12d. Overnight: Patient became noticing increased edema in the R lower extremity (R>L). She denies pain in her R lower extremity.    Patient was seen this morning at bedside resting comfortably. She reports her SOB at rest is improving since admission, however, she is still experiencing SOB with walking and talking. She also continues to complain of an unchanged cough producing yellow, non-bloody sputum.     Currently denies subjective fever and chills.    PAST MEDICAL & SURGICAL HISTORY  Insomnia  Chronic pain  Depression  Anxiety  COPD (chronic obstructive pulmonary disease)  No significant past surgical history    SOCIAL HISTORY:  Negative for smoking/alcohol/drug use.     ALLERGIES:  penicillin (Unknown)    MEDICATIONS:  STANDING MEDICATIONS  ALBUTerol/ipratropium for Nebulization 3 milliLiter(s) Nebulizer every 6 hours  ALPRAZolam 0.5 milliGRAM(s) Oral two times a day  amLODIPine   Tablet 5 milliGRAM(s) Oral daily  artificial  tears Solution 1 Drop(s) Both EYES two times a day  azithromycin   Tablet 250 milliGRAM(s) Oral <User Schedule>  buDESOnide 160 MICROgram(s)/formoterol 4.5 MICROgram(s) Inhaler 2 Puff(s) Inhalation two times a day  docusate sodium 100 milliGRAM(s) Oral three times a day  fluconAZOLE   Tablet 100 milliGRAM(s) Oral daily  heparin  Injectable 5000 Unit(s) SubCutaneous every 8 hours  meropenem  IVPB 1000 milliGRAM(s) IV Intermittent every 8 hours  methylPREDNISolone sodium succinate Injectable 40 milliGRAM(s) IV Push two times a day  nicotine -   7 mG/24Hr(s) Patch 1 patch Transdermal daily  nystatin Powder 1 Application(s) Topical two times a day  traZODone 50 milliGRAM(s) Oral at bedtime    PRN MEDICATIONS  ALBUTerol/ipratropium for Nebulization 3 milliLiter(s) Nebulizer every 4 hours PRN  diphenhydrAMINE   Capsule 50 milliGRAM(s) Oral every 4 hours PRN  oxyCODONE    5 mG/acetaminophen 325 mG 2 Tablet(s) Oral every 6 hours PRN    VITALS:   T(F): 98.4  HR: 117  BP: 160/80  RR: 20  SpO2: 96%    PHYSICAL EXAM:  GEN: No acute distress.  LUNGS: Diffuse inspiratory wheezes BL.  HEART: S1/S2 present, tachcardia, no M/R/G appreciated on auscultation.   ABD: Soft, non-tender, non-distended. Bowel sounds present.  EXT: BL lower extremity edema below the knee (R>L) with no pain to palpation; Lower extremity strength 5/5 BL.  NEURO: AAOX3; light touch intact in BL lower extremities.     LABS:                        13.0   24.58 )-----------( 293      ( 20 Jun 2018 06:28 )             38.8     06-20    137  |  93<L>  |  9<L>  ----------------------------<  128<H>  5.0   |  32  |  <0.5<L>    Ca    8.9      20 Jun 2018 06:28      Culture - Sputum (collected 18 Jun 2018 11:01)  Source: .Sputum Sputum  Gram Stain (19 Jun 2018 06:57):    Numerous polymorphonuclear leukocytes per low power field    No Squamous epithelial cells per low power field    Numerous Gram Positive Cocci in Pairs and Chains per oil power field    Rare Gram Positive Cocci in Clusters per oil power field  Preliminary Report (19 Jun 2018 22:18):    Normal Respiratory Maya present      RADIOLOGY:  DUPLEX SCAN EXT VEINS LOWER BI        Impression:  No evidence of deep venous thrombosis or superficial thrombophlebitis in   the bilateral lower extremities.      Intravenous access: Peripheral    NG tube: None  Ramirez cathter: None      Assessment:  Patient is a 68 year old female with significant PMHx of COPD (not on home O2), anxiety, and depression who presented to the ED with a cc/o worsening SOB x 2 days. She also complained of a productive cough on admission.     1. Productive cough with associated SOB and wheezing sob with likely etiology of COPD exacerbation  	-Pulm consult: recommends patient is not ready for discharge; IV solumedrol decreased to 40mg q12h; Pt allergic to penicillin; reports was childhood allergy, does not remember reaction 	but said it did not involve breathing difficulties; Meropenem prescribed.    	-Ambulatory pulse ox performed; patient SpO2 remained stable.   	-Patient refused BiPAP PRN, stating atelectasis as reason for not wanting positive pressure ventilation  	-Sputum cultures negative  	-CT Chest w/o contrast (6/13/18): mild UL centrilobular emphysematous changes, central peribronchial wall thickening with areas of atelectasis within RML and lingula  	  2. Hypertension likely 2/2 respiratory distress, steroids, and anxiety  	- Amlodipine 10mg after inadequate response to 2.5mg    3. Hyperkalemia  	-Potassium (5.0 <-- 5.8)  	-EKG: no peaked T waves  	-Insulin/dextrose was given yesterday  	-Repeat bmp in am     4. BL lower extremity edema (R>L)  	-BL venous duplex ultrasound performed to rule out acute DVT; negative findings      5. Oral thrush  	-Previously on nystatin, has not resolved; switched to fluconazole PO    6. Anxiety  	-Alprazolam 0.5 q12    7. Insomnia  	-Trazadone 50 mg HS    8. DVT ppx   	- HSQ    Echo shows G1DD, mild tricuspid regurge, sclerotic aortic valve with normal opening. SUBJECTIVE:  Patient is a 68y old Female who presents with a chief complaint of COPD exacerbation (12 Jun 2018 09:17)    Currently admitted to medicine with the primary diagnosis of COPD exacerbation.     Today is hospital day 12d. Overnight: Patient became noticing increased edema in the R lower extremity (R>L). She denies pain in her R lower extremity.    Patient was seen this morning at bedside resting comfortably. She reports her SOB at rest is improving since admission, however, she is still experiencing SOB with walking and talking. She also continues to complain of an unchanged cough producing yellow, non-bloody sputum.     Currently denies subjective fever and chills.    PAST MEDICAL & SURGICAL HISTORY  Insomnia  Chronic pain  Depression  Anxiety  COPD (chronic obstructive pulmonary disease)  No significant past surgical history    SOCIAL HISTORY:  Negative for smoking/alcohol/drug use.     ALLERGIES:  penicillin (Unknown)    MEDICATIONS:  STANDING MEDICATIONS  ALBUTerol/ipratropium for Nebulization 3 milliLiter(s) Nebulizer every 6 hours  ALPRAZolam 0.5 milliGRAM(s) Oral two times a day  amLODIPine   Tablet 5 milliGRAM(s) Oral daily  artificial  tears Solution 1 Drop(s) Both EYES two times a day  azithromycin   Tablet 250 milliGRAM(s) Oral <User Schedule>  buDESOnide 160 MICROgram(s)/formoterol 4.5 MICROgram(s) Inhaler 2 Puff(s) Inhalation two times a day  docusate sodium 100 milliGRAM(s) Oral three times a day  fluconAZOLE   Tablet 100 milliGRAM(s) Oral daily  heparin  Injectable 5000 Unit(s) SubCutaneous every 8 hours  meropenem  IVPB 1000 milliGRAM(s) IV Intermittent every 8 hours  methylPREDNISolone sodium succinate Injectable 40 milliGRAM(s) IV Push two times a day  nicotine -   7 mG/24Hr(s) Patch 1 patch Transdermal daily  nystatin Powder 1 Application(s) Topical two times a day  traZODone 50 milliGRAM(s) Oral at bedtime    PRN MEDICATIONS  ALBUTerol/ipratropium for Nebulization 3 milliLiter(s) Nebulizer every 4 hours PRN  diphenhydrAMINE   Capsule 50 milliGRAM(s) Oral every 4 hours PRN  oxyCODONE    5 mG/acetaminophen 325 mG 2 Tablet(s) Oral every 6 hours PRN    VITALS:   T(F): 98.4  HR: 117  BP: 160/80  RR: 20  SpO2: 96%    PHYSICAL EXAM:  GEN: No acute distress.  LUNGS: Diffuse inspiratory wheezes BL.  HEART: S1/S2 present, tachcardia, no M/R/G appreciated on auscultation.   ABD: Soft, non-tender, non-distended. Bowel sounds present.  EXT: BL lower extremity edema below the knee (R>L) with no pain to palpation; Lower extremity strength 5/5 BL.  NEURO: AAOX3; light touch intact in BL lower extremities.     LABS:                        13.0   24.58 )-----------( 293      ( 20 Jun 2018 06:28 )             38.8     06-20    137  |  93<L>  |  9<L>  ----------------------------<  128<H>  5.0   |  32  |  <0.5<L>    Ca    8.9      20 Jun 2018 06:28      Culture - Sputum (collected 18 Jun 2018 11:01)  Source: .Sputum Sputum  Gram Stain (19 Jun 2018 06:57):    Numerous polymorphonuclear leukocytes per low power field    No Squamous epithelial cells per low power field    Numerous Gram Positive Cocci in Pairs and Chains per oil power field    Rare Gram Positive Cocci in Clusters per oil power field  Preliminary Report (19 Jun 2018 22:18):    Normal Respiratory Maya present      RADIOLOGY:  DUPLEX SCAN EXT VEINS LOWER BI        Impression:  No evidence of deep venous thrombosis or superficial thrombophlebitis in   the bilateral lower extremities.      Intravenous access: Peripheral    NG tube: None  Ramirez cathter: None      Assessment:  Patient is a 68 year old female with significant PMHx of COPD (not on home O2), anxiety, and depression who presented to the ED with a cc/o worsening SOB x 2 days. She also complained of a productive cough on admission.     1. Productive cough with associated SOB and wheezing sob with likely etiology of COPD exacerbation  	-Pulm consult: recommends patient is not ready for discharge; Change solumedrol to 40mg prednisone daily; Continue IV antibiotics; Discharge patient on doxy 100mg q12h x7days and 	fluconazole x7 days if white count decreased on tomorrows CBC; Pt allergic to penicillin; reports was childhood allergy, does not remember reaction but said it did not involve breathing 	difficulties; Meropenem prescribed.    	-Ambulatory pulse ox performed; patient SpO2 remained stable.   	-Patient refused BiPAP PRN, stating atelectasis as reason for not wanting positive pressure ventilation  	-Sputum cultures negative  	-CT Chest w/o contrast (6/13/18): mild UL centrilobular emphysematous changes, central peribronchial wall thickening with areas of atelectasis within RML and lingula  	  2. Hypertension likely 2/2 respiratory distress, steroids, and anxiety  	- Amlodipine 10mg after inadequate response to 2.5mg    3. Hyperkalemia  	-Potassium (5.0 <-- 5.8)  	-EKG: no peaked T waves  	-Insulin/dextrose was given yesterday  	-Repeat bmp in am     4. BL lower extremity edema (R>L)  	-BL venous duplex ultrasound performed to rule out acute DVT; negative findings      5. Oral thrush  	-Previously on nystatin, has not resolved; switched to fluconazole PO    6. Anxiety  	-Alprazolam 0.5 q12    7. Insomnia  	-Trazadone 50 mg HS    8. DVT ppx   	- HSQ    Echo shows G1DD, mild tricuspid regurge, sclerotic aortic valve with normal opening. SUBJECTIVE:  Patient is a 68y old Female who presents with a chief complaint of COPD exacerbation (12 Jun 2018 09:17)    Currently admitted to medicine with the primary diagnosis of COPD exacerbation.     Today is hospital day 12d. Overnight: Patient became noticing increased edema in the R lower extremity (R>L). She denies pain in her R lower extremity.    Patient was seen this morning at bedside resting comfortably. She reports her SOB at rest is improving since admission, however, she is still experiencing SOB with walking and talking. She also continues to complain of an unchanged cough producing yellow, non-bloody sputum.     Currently denies subjective fever and chills.    PAST MEDICAL & SURGICAL HISTORY  Insomnia  Chronic pain  Depression  Anxiety  COPD (chronic obstructive pulmonary disease)  No significant past surgical history    SOCIAL HISTORY:  Negative for smoking/alcohol/drug use.     ALLERGIES:  penicillin (Unknown)    MEDICATIONS:  STANDING MEDICATIONS  ALBUTerol/ipratropium for Nebulization 3 milliLiter(s) Nebulizer every 6 hours  ALPRAZolam 0.5 milliGRAM(s) Oral two times a day  amLODIPine   Tablet 5 milliGRAM(s) Oral daily  artificial  tears Solution 1 Drop(s) Both EYES two times a day  azithromycin   Tablet 250 milliGRAM(s) Oral <User Schedule>  buDESOnide 160 MICROgram(s)/formoterol 4.5 MICROgram(s) Inhaler 2 Puff(s) Inhalation two times a day  docusate sodium 100 milliGRAM(s) Oral three times a day  fluconAZOLE   Tablet 100 milliGRAM(s) Oral daily  heparin  Injectable 5000 Unit(s) SubCutaneous every 8 hours  meropenem  IVPB 1000 milliGRAM(s) IV Intermittent every 8 hours  methylPREDNISolone sodium succinate Injectable 40 milliGRAM(s) IV Push two times a day  nicotine -   7 mG/24Hr(s) Patch 1 patch Transdermal daily  nystatin Powder 1 Application(s) Topical two times a day  traZODone 50 milliGRAM(s) Oral at bedtime    PRN MEDICATIONS  ALBUTerol/ipratropium for Nebulization 3 milliLiter(s) Nebulizer every 4 hours PRN  diphenhydrAMINE   Capsule 50 milliGRAM(s) Oral every 4 hours PRN  oxyCODONE    5 mG/acetaminophen 325 mG 2 Tablet(s) Oral every 6 hours PRN    VITALS:   T(F): 98.4  HR: 117  BP: 160/80  RR: 20  SpO2: 96%    PHYSICAL EXAM:  GEN: No acute distress.  LUNGS: Diffuse inspiratory wheezes BL.  HEART: S1/S2 present, tachcardia, no M/R/G appreciated on auscultation.   ABD: Soft, non-tender, non-distended. Bowel sounds present.  EXT: BL lower extremity edema below the knee (R>L) with no pain to palpation; Lower extremity strength 5/5 BL.  NEURO: AAOX3; light touch intact in BL lower extremities.     LABS:                        13.0   24.58 )-----------( 293      ( 20 Jun 2018 06:28 )             38.8     06-20    137  |  93<L>  |  9<L>  ----------------------------<  128<H>  5.0   |  32  |  <0.5<L>    Ca    8.9      20 Jun 2018 06:28      Culture - Sputum (collected 18 Jun 2018 11:01)  Source: .Sputum Sputum  Gram Stain (19 Jun 2018 06:57):    Numerous polymorphonuclear leukocytes per low power field    No Squamous epithelial cells per low power field    Numerous Gram Positive Cocci in Pairs and Chains per oil power field    Rare Gram Positive Cocci in Clusters per oil power field  Preliminary Report (19 Jun 2018 22:18):    Normal Respiratory Maya present      RADIOLOGY:  DUPLEX SCAN EXT VEINS LOWER BI        Impression:  No evidence of deep venous thrombosis or superficial thrombophlebitis in   the bilateral lower extremities.      Intravenous access: Peripheral    NG tube: None  Ramirez cathter: None      Assessment:  Patient is a 68 year old female with significant PMHx of COPD (not on home O2), anxiety, and depression who presented to the ED with a cc/o worsening SOB x 2 days. She also complained of a productive cough on admission.     1. Productive cough with associated SOB and wheezing sob with likely etiology of COPD exacerbation  -Pulm consult: recommends patient is not ready for discharge; Change solumedrol to 40mg prednisone daily; Continue IV antibiotics; Discharge patient on doxy 100mg q12h x7days and fluconazole x7 days if white count decreased on tomorrows CBC; Pt allergic to penicillin; reports was childhood allergy, does not remember reaction but said it did not involve breathing difficulties; Meropenem prescribed.    -Ambulatory pulse ox performed; patient SpO2 remained stable.   -Patient refused BiPAP PRN, stating atelectasis as reason for not wanting positive pressure ventilation  -Sputum cultures negative  -CT Chest w/o contrast (6/13/18): mild UL centrilobular emphysematous changes, central peribronchial wall thickening with areas of atelectasis within RML and lingula  	  2. Hypertension likely 2/2 respiratory distress, steroids, and anxiety  - Amlodipine 10mg after inadequate response to 2.5mg    3. Hyperkalemia  -Potassium (5.0 <-- 5.8)  -EKG: no peaked T waves  -Insulin/dextrose was given yesterday  -Repeat bmp in am     4. BL lower extremity edema (R>L)  -BL venous duplex ultrasound performed to rule out acute DVT; negative findings      5. Oral thrush  -Previously on nystatin, has not resolved; switched to fluconazole PO    6. Anxiety  -Alprazolam 0.5 q12    7. Insomnia  -Trazadone 50 mg HS    8. DVT ppx   - HSQ    Echo shows G1DD, mild tricuspid regurge, sclerotic aortic valve with normal opening. SUBJECTIVE:  Patient is a 68y old Female who presents with a chief complaint of COPD exacerbation (12 Jun 2018 09:17)    Currently admitted to medicine with the primary diagnosis of COPD exacerbation.     Today is hospital day 12d. Overnight: Patient became noticing increased edema in the R lower extremity (R>L). She denies pain in her R lower extremity.    Patient was seen this morning at bedside resting comfortably. She reports her SOB at rest is improving since admission, however, she is still experiencing SOB with walking and talking. She also continues to complain of an unchanged cough producing yellow, non-bloody sputum.     Currently denies subjective fever and chills.    PAST MEDICAL & SURGICAL HISTORY  Insomnia  Chronic pain  Depression  Anxiety  COPD (chronic obstructive pulmonary disease)  No significant past surgical history    SOCIAL HISTORY:  Negative for smoking/alcohol/drug use.     ALLERGIES:  penicillin (Unknown)    MEDICATIONS:  STANDING MEDICATIONS  ALBUTerol/ipratropium for Nebulization 3 milliLiter(s) Nebulizer every 6 hours  ALPRAZolam 0.5 milliGRAM(s) Oral two times a day  amLODIPine   Tablet 5 milliGRAM(s) Oral daily  artificial  tears Solution 1 Drop(s) Both EYES two times a day  azithromycin   Tablet 250 milliGRAM(s) Oral <User Schedule>  buDESOnide 160 MICROgram(s)/formoterol 4.5 MICROgram(s) Inhaler 2 Puff(s) Inhalation two times a day  docusate sodium 100 milliGRAM(s) Oral three times a day  fluconAZOLE   Tablet 100 milliGRAM(s) Oral daily  heparin  Injectable 5000 Unit(s) SubCutaneous every 8 hours  meropenem  IVPB 1000 milliGRAM(s) IV Intermittent every 8 hours  methylPREDNISolone sodium succinate Injectable 40 milliGRAM(s) IV Push two times a day  nicotine -   7 mG/24Hr(s) Patch 1 patch Transdermal daily  nystatin Powder 1 Application(s) Topical two times a day  traZODone 50 milliGRAM(s) Oral at bedtime    PRN MEDICATIONS  ALBUTerol/ipratropium for Nebulization 3 milliLiter(s) Nebulizer every 4 hours PRN  diphenhydrAMINE   Capsule 50 milliGRAM(s) Oral every 4 hours PRN  oxyCODONE    5 mG/acetaminophen 325 mG 2 Tablet(s) Oral every 6 hours PRN    VITALS:   T(F): 98.4  HR: 117  BP: 160/80  RR: 20  SpO2: 96%    PHYSICAL EXAM:  GEN: No acute distress.  LUNGS: Diffuse inspiratory wheezes BL.  HEART: S1/S2 present, tachcardia, no M/R/G appreciated on auscultation.   ABD: Soft, non-tender, non-distended. Bowel sounds present.  EXT: BL lower extremity edema below the knee (R>L) with no pain to palpation; Lower extremity strength 5/5 BL.  NEURO: AAOX3; light touch intact in BL lower extremities.     LABS:                        13.0   24.58 )-----------( 293      ( 20 Jun 2018 06:28 )             38.8     06-20    137  |  93<L>  |  9<L>  ----------------------------<  128<H>  5.0   |  32  |  <0.5<L>    Ca    8.9      20 Jun 2018 06:28      Culture - Sputum (collected 18 Jun 2018 11:01)  Source: .Sputum Sputum  Gram Stain (19 Jun 2018 06:57):    Numerous polymorphonuclear leukocytes per low power field    No Squamous epithelial cells per low power field    Numerous Gram Positive Cocci in Pairs and Chains per oil power field    Rare Gram Positive Cocci in Clusters per oil power field  Preliminary Report (19 Jun 2018 22:18):    Normal Respiratory Maya present      RADIOLOGY:  DUPLEX SCAN EXT VEINS LOWER BI        Impression:  No evidence of deep venous thrombosis or superficial thrombophlebitis in   the bilateral lower extremities.      Intravenous access: Peripheral    NG tube: None  Ramirez cathter: None      Assessment:  Patient is a 68 year old female with significant PMHx of COPD (not on home O2), anxiety, and depression who presented to the ED with a cc/o worsening SOB x 2 days. She also complained of a productive cough on admission.     1. Productive cough with associated SOB and wheezing sob with likely etiology of COPD exacerbation  -Pulm consult: recommends patient is not ready for discharge; Change solumedrol to 40mg prednisone daily; Continue IV antibiotics; Discharge patient on doxy 100mg q12h x7days and fluconazole x7 days if white count decreased on tomorrows CBC; Pt allergic to penicillin; reports was childhood allergy, does not remember reaction but said it did not involve breathing difficulties; Meropenem prescribed.    -Ambulatory pulse ox performed; patient SpO2 remained stable.   -Patient refused BiPAP PRN, stating atelectasis as reason for not wanting positive pressure ventilation  -Sputum cultures negative  -CT Chest w/o contrast (6/13/18): mild UL centrilobular emphysematous changes, central peribronchial wall thickening with areas of atelectasis within RML and lingula  	  2. Hypertension likely 2/2 respiratory distress, steroids, and anxiety  - Amlodipine 10mg after inadequate response to 2.5mg    3. Hyperkalemia  -Potassium (5.0 <-- 5.8)  -EKG: no peaked T waves  -Insulin/dextrose was given yesterday  -Repeat bmp in am     4. BL lower extremity edema (R>L)  -BL venous duplex ultrasound performed to rule out acute DVT; negative findings      5. Oral thrush  -Previously on nystatin, has not resolved; switched to fluconazole PO    6. Anxiety  -Alprazolam 0.5 q12    7. Insomnia  -Trazadone 50 mg HS    8. DVT ppx   - HSQ    9. Cardio consult: Echo, EKG, and enzymes normal; Cardiac cath is only remaining test to asses chest pain as stress test is not an option for this patient at this time, however for further CAD assessment the only practical test is cardiac catheterization. Will see her in outpatient setting once COPD is better controlled for further evaluation. SUBJECTIVE:  Patient is a 68y old Female who presents with a chief complaint of COPD exacerbation (12 Jun 2018 09:17)    Currently admitted to medicine with the primary diagnosis of COPD exacerbation.     Today is hospital day 12d. Overnight: Patient became noticing increased edema in the R lower extremity (R>L). She denies pain in her R lower extremity.    Patient was seen this morning at bedside resting comfortably. She reports her SOB at rest is improving since admission, however, she is still experiencing SOB with walking and talking. She also continues to complain of an unchanged cough producing yellow, non-bloody sputum.     Currently denies subjective fever and chills.    PAST MEDICAL & SURGICAL HISTORY  Insomnia  Chronic pain  Depression  Anxiety  COPD (chronic obstructive pulmonary disease)  No significant past surgical history    SOCIAL HISTORY:  Negative for smoking/alcohol/drug use.     ALLERGIES:  penicillin (Unknown)    MEDICATIONS:  STANDING MEDICATIONS  ALBUTerol/ipratropium for Nebulization 3 milliLiter(s) Nebulizer every 6 hours  ALPRAZolam 0.5 milliGRAM(s) Oral two times a day  amLODIPine   Tablet 5 milliGRAM(s) Oral daily  artificial  tears Solution 1 Drop(s) Both EYES two times a day  azithromycin   Tablet 250 milliGRAM(s) Oral <User Schedule>  buDESOnide 160 MICROgram(s)/formoterol 4.5 MICROgram(s) Inhaler 2 Puff(s) Inhalation two times a day  docusate sodium 100 milliGRAM(s) Oral three times a day  fluconAZOLE   Tablet 100 milliGRAM(s) Oral daily  heparin  Injectable 5000 Unit(s) SubCutaneous every 8 hours  meropenem  IVPB 1000 milliGRAM(s) IV Intermittent every 8 hours  methylPREDNISolone sodium succinate Injectable 40 milliGRAM(s) IV Push two times a day  nicotine -   7 mG/24Hr(s) Patch 1 patch Transdermal daily  nystatin Powder 1 Application(s) Topical two times a day  traZODone 50 milliGRAM(s) Oral at bedtime    PRN MEDICATIONS  ALBUTerol/ipratropium for Nebulization 3 milliLiter(s) Nebulizer every 4 hours PRN  diphenhydrAMINE   Capsule 50 milliGRAM(s) Oral every 4 hours PRN  oxyCODONE    5 mG/acetaminophen 325 mG 2 Tablet(s) Oral every 6 hours PRN    VITALS:   T(F): 98.4  HR: 117  BP: 160/80  RR: 20  SpO2: 96%    PHYSICAL EXAM:  GEN: No acute distress.  LUNGS: Diffuse inspiratory wheezes BL.  HEART: S1/S2 present, tachcardia, no M/R/G appreciated on auscultation.   ABD: Soft, non-tender, non-distended. Bowel sounds present.  EXT: BL lower extremity edema below the knee (R>L) with no pain to palpation; Lower extremity strength 5/5 BL.  NEURO: AAOX3; light touch intact in BL lower extremities.     LABS:                        13.0   24.58 )-----------( 293      ( 20 Jun 2018 06:28 )             38.8     06-20    137  |  93<L>  |  9<L>  ----------------------------<  128<H>  5.0   |  32  |  <0.5<L>    Ca    8.9      20 Jun 2018 06:28      Culture - Sputum (collected 18 Jun 2018 11:01)  Source: .Sputum Sputum  Gram Stain (19 Jun 2018 06:57):    Numerous polymorphonuclear leukocytes per low power field    No Squamous epithelial cells per low power field    Numerous Gram Positive Cocci in Pairs and Chains per oil power field    Rare Gram Positive Cocci in Clusters per oil power field  Preliminary Report (19 Jun 2018 22:18):    Normal Respiratory Maya present      RADIOLOGY:  DUPLEX SCAN EXT VEINS LOWER BI        Impression:  No evidence of deep venous thrombosis or superficial thrombophlebitis in   the bilateral lower extremities.    Intravenous access: Peripheral    NG tube: None  Ramirez cathter: None      Assessment:  Patient is a 68 year old female with significant PMHx of COPD (not on home O2), anxiety, and depression who presented to the ED with a cc/o worsening SOB x 2 days. She also complained of a productive cough on admission.     1. Productive cough with associated SOB and wheezing sob with likely etiology of COPD exacerbation  -Pulm consult: recommends patient is not ready for discharge; Change solumedrol to 40mg prednisone daily; Continue IV antibiotics; Discharge patient on doxy 100mg q12h x7days and fluconazole x7 days if white count decreased on tomorrows CBC; Pt allergic to penicillin; reports was childhood allergy, does not remember reaction but said it did not involve breathing difficulties; Meropenem prescribed.    -Ambulatory pulse ox performed; patient SpO2 remained stable.   -Patient refused BiPAP PRN, stating atelectasis as reason for not wanting positive pressure ventilation  -Sputum cultures negative  -CT Chest w/o contrast (6/13/18): mild UL centrilobular emphysematous changes, central peribronchial wall thickening with areas of atelectasis within RML and lingula  	  2. Hypertension likely 2/2 respiratory distress, steroids, and anxiety  - Amlodipine 10mg after inadequate response to 2.5mg    3. Hyperkalemia  -Potassium (5.0 <-- 5.8)  -EKG: no peaked T waves  -Insulin/dextrose was given yesterday  -Repeat bmp in am     4. BL lower extremity edema (R>L)  -BL venous duplex ultrasound performed to rule out acute DVT; negative findings    5. Oral thrush  -Previously on nystatin, has not resolved; switched to fluconazole PO    6. Anxiety  -Alprazolam 0.5 q12    7. Insomnia  -Trazadone 50 mg HS    8. DVT ppx   - HSQ    9. Cardio consult: Echo, EKG, and enzymes normal; Cardiac cath is only remaining test to asses chest pain as stress test is not an option for this patient at this time, however for further CAD assessment the only practical test is cardiac catheterization. Will see her in outpatient setting once COPD is better controlled for further evaluation.

## 2018-06-21 LAB
-  CLINDAMYCIN: SIGNIFICANT CHANGE UP
-  ERYTHROMYCIN: SIGNIFICANT CHANGE UP
-  LEVOFLOXACIN: SIGNIFICANT CHANGE UP
-  PENICILLIN: SIGNIFICANT CHANGE UP
-  PENICILLIN: SIGNIFICANT CHANGE UP
-  TRIMETHOPRIM/SULFAMETHOXAZOLE: SIGNIFICANT CHANGE UP
-  VANCOMYCIN: SIGNIFICANT CHANGE UP
ANION GAP SERPL CALC-SCNC: 10 MMOL/L — SIGNIFICANT CHANGE UP (ref 7–14)
BUN SERPL-MCNC: 11 MG/DL — SIGNIFICANT CHANGE UP (ref 10–20)
CALCIUM SERPL-MCNC: 8.9 MG/DL — SIGNIFICANT CHANGE UP (ref 8.5–10.1)
CHLORIDE SERPL-SCNC: 93 MMOL/L — LOW (ref 98–110)
CO2 SERPL-SCNC: 35 MMOL/L — HIGH (ref 17–32)
CREAT SERPL-MCNC: <0.5 MG/DL — LOW (ref 0.7–1.5)
CULTURE RESULTS: SIGNIFICANT CHANGE UP
GLUCOSE SERPL-MCNC: 90 MG/DL — SIGNIFICANT CHANGE UP (ref 70–99)
HCT VFR BLD CALC: 38.2 % — SIGNIFICANT CHANGE UP (ref 37–47)
HGB BLD-MCNC: 12.8 G/DL — SIGNIFICANT CHANGE UP (ref 12–16)
MCHC RBC-ENTMCNC: 32.6 PG — HIGH (ref 27–31)
MCHC RBC-ENTMCNC: 33.5 G/DL — SIGNIFICANT CHANGE UP (ref 32–37)
MCV RBC AUTO: 97.2 FL — SIGNIFICANT CHANGE UP (ref 81–99)
METHOD TYPE: SIGNIFICANT CHANGE UP
METHOD TYPE: SIGNIFICANT CHANGE UP
NRBC # BLD: 0 /100 WBCS — SIGNIFICANT CHANGE UP (ref 0–0)
ORGANISM # SPEC MICROSCOPIC CNT: SIGNIFICANT CHANGE UP
PLATELET # BLD AUTO: 277 K/UL — SIGNIFICANT CHANGE UP (ref 130–400)
POTASSIUM SERPL-MCNC: 4.8 MMOL/L — SIGNIFICANT CHANGE UP (ref 3.5–5)
POTASSIUM SERPL-SCNC: 4.8 MMOL/L — SIGNIFICANT CHANGE UP (ref 3.5–5)
RBC # BLD: 3.93 M/UL — LOW (ref 4.2–5.4)
RBC # FLD: 13.3 % — SIGNIFICANT CHANGE UP (ref 11.5–14.5)
SODIUM SERPL-SCNC: 138 MMOL/L — SIGNIFICANT CHANGE UP (ref 135–146)
SPECIMEN SOURCE: SIGNIFICANT CHANGE UP
WBC # BLD: 27.2 K/UL — HIGH (ref 4.8–10.8)
WBC # FLD AUTO: 27.2 K/UL — HIGH (ref 4.8–10.8)

## 2018-06-21 RX ORDER — NYSTATIN 500MM UNIT
500000 POWDER (EA) MISCELLANEOUS
Qty: 0 | Refills: 0 | Status: DISCONTINUED | OUTPATIENT
Start: 2018-06-21 | End: 2018-06-22

## 2018-06-21 RX ORDER — AZITHROMYCIN 500 MG/1
250 TABLET, FILM COATED ORAL DAILY
Qty: 0 | Refills: 0 | Status: DISCONTINUED | OUTPATIENT
Start: 2018-06-21 | End: 2018-06-22

## 2018-06-21 RX ORDER — NYSTATIN CREAM 100000 [USP'U]/G
1 CREAM TOPICAL
Qty: 0 | Refills: 0 | Status: DISCONTINUED | OUTPATIENT
Start: 2018-06-21 | End: 2018-06-22

## 2018-06-21 RX ORDER — CEFTRIAXONE 500 MG/1
2 INJECTION, POWDER, FOR SOLUTION INTRAMUSCULAR; INTRAVENOUS EVERY 12 HOURS
Qty: 0 | Refills: 0 | Status: DISCONTINUED | OUTPATIENT
Start: 2018-06-21 | End: 2018-06-21

## 2018-06-21 RX ADMIN — Medication 3 MILLILITER(S): at 19:18

## 2018-06-21 RX ADMIN — Medication 500000 UNIT(S): at 11:16

## 2018-06-21 RX ADMIN — BUDESONIDE AND FORMOTEROL FUMARATE DIHYDRATE 2 PUFF(S): 160; 4.5 AEROSOL RESPIRATORY (INHALATION) at 21:28

## 2018-06-21 RX ADMIN — OXYCODONE AND ACETAMINOPHEN 2 TABLET(S): 5; 325 TABLET ORAL at 05:01

## 2018-06-21 RX ADMIN — Medication 0.5 MILLIGRAM(S): at 05:04

## 2018-06-21 RX ADMIN — Medication 50 MILLIGRAM(S): at 21:29

## 2018-06-21 RX ADMIN — Medication 3 MILLILITER(S): at 07:14

## 2018-06-21 RX ADMIN — OXYCODONE AND ACETAMINOPHEN 2 TABLET(S): 5; 325 TABLET ORAL at 11:23

## 2018-06-21 RX ADMIN — BUDESONIDE AND FORMOTEROL FUMARATE DIHYDRATE 2 PUFF(S): 160; 4.5 AEROSOL RESPIRATORY (INHALATION) at 09:48

## 2018-06-21 RX ADMIN — Medication 500000 UNIT(S): at 17:41

## 2018-06-21 RX ADMIN — NYSTATIN CREAM 1 APPLICATION(S): 100000 CREAM TOPICAL at 05:31

## 2018-06-21 RX ADMIN — OXYCODONE AND ACETAMINOPHEN 2 TABLET(S): 5; 325 TABLET ORAL at 11:53

## 2018-06-21 RX ADMIN — Medication 500000 UNIT(S): at 23:07

## 2018-06-21 RX ADMIN — NYSTATIN CREAM 1 APPLICATION(S): 100000 CREAM TOPICAL at 05:02

## 2018-06-21 RX ADMIN — Medication 100 MILLIGRAM(S): at 21:29

## 2018-06-21 RX ADMIN — Medication 1 PATCH: at 11:16

## 2018-06-21 RX ADMIN — Medication 0.5 MILLIGRAM(S): at 21:32

## 2018-06-21 RX ADMIN — OXYCODONE AND ACETAMINOPHEN 2 TABLET(S): 5; 325 TABLET ORAL at 18:10

## 2018-06-21 RX ADMIN — Medication 1 DROP(S): at 05:02

## 2018-06-21 RX ADMIN — Medication 3 MILLILITER(S): at 13:25

## 2018-06-21 RX ADMIN — Medication 40 MILLIGRAM(S): at 05:01

## 2018-06-21 RX ADMIN — Medication 500000 UNIT(S): at 05:41

## 2018-06-21 RX ADMIN — AZITHROMYCIN 250 MILLIGRAM(S): 500 TABLET, FILM COATED ORAL at 21:28

## 2018-06-21 RX ADMIN — OXYCODONE AND ACETAMINOPHEN 2 TABLET(S): 5; 325 TABLET ORAL at 05:31

## 2018-06-21 RX ADMIN — AMLODIPINE BESYLATE 5 MILLIGRAM(S): 2.5 TABLET ORAL at 05:01

## 2018-06-21 RX ADMIN — Medication 1 DROP(S): at 17:41

## 2018-06-21 RX ADMIN — NYSTATIN CREAM 1 APPLICATION(S): 100000 CREAM TOPICAL at 17:41

## 2018-06-21 RX ADMIN — OXYCODONE AND ACETAMINOPHEN 2 TABLET(S): 5; 325 TABLET ORAL at 17:40

## 2018-06-21 RX ADMIN — FLUCONAZOLE 100 MILLIGRAM(S): 150 TABLET ORAL at 11:16

## 2018-06-21 RX ADMIN — OXYCODONE AND ACETAMINOPHEN 2 TABLET(S): 5; 325 TABLET ORAL at 23:50

## 2018-06-21 NOTE — PROGRESS NOTE ADULT - ASSESSMENT
COPD EXACERBATION/ STREP PNA IN SPUTUM STILL INCREASE WBC    - ROCEPHIN 2 G  Q12H  - DC AZITHO  - PREDNISONE 40 MG Q 24  - F/UP CBC  - NEB Q 4 TO 6  - ATTEMPT DC 24 TO 48 H

## 2018-06-21 NOTE — PROGRESS NOTE ADULT - ASSESSMENT
KUSH RINALDI 68y Female  MRN#: [id number]   CODE STATUS:________      SUBJECTIVE  Patient is a 68y old Female who presents with a chief complaint of COPD exacerbation (12 Jun 2018 09:17)  Currently admitted to medicine with the primary diagnosis of COPD exacerbation  Hospital course has been complicated by _______.   Today is hospital day 13d, and this morning she is _________ and reports ________ overnight events.     Present Today:           Ramirez Catheter ()No/ ()Yes? Indication:          Central Line ()No/ ()Yes? Indication:          IV Fluids ()No/ ()Yes? Type:  Rate:  Indication:      OBJECTIVE  PAST MEDICAL & SURGICAL HISTORY  Insomnia  Chronic pain  Depression  Anxiety  COPD (chronic obstructive pulmonary disease)  No significant past surgical history    ALLERGIES:  penicillin (Unknown)    MEDICATIONS:  STANDING MEDICATIONS  ALBUTerol/ipratropium for Nebulization 3 milliLiter(s) Nebulizer every 6 hours  ALPRAZolam 0.5 milliGRAM(s) Oral two times a day  amLODIPine   Tablet 5 milliGRAM(s) Oral daily  artificial  tears Solution 1 Drop(s) Both EYES two times a day  buDESOnide 160 MICROgram(s)/formoterol 4.5 MICROgram(s) Inhaler 2 Puff(s) Inhalation two times a day  cefTRIAXone   IVPB 2 Gram(s) IV Intermittent every 12 hours  docusate sodium 100 milliGRAM(s) Oral three times a day  fluconAZOLE   Tablet 100 milliGRAM(s) Oral daily  heparin  Injectable 5000 Unit(s) SubCutaneous every 8 hours  nicotine -   7 mG/24Hr(s) Patch 1 patch Transdermal daily  nystatin    Suspension 342186 Unit(s) Oral four times a day  nystatin Powder 1 Application(s) Topical two times a day  predniSONE   Tablet 40 milliGRAM(s) Oral daily  traZODone 50 milliGRAM(s) Oral at bedtime    PRN MEDICATIONS  ALBUTerol/ipratropium for Nebulization 3 milliLiter(s) Nebulizer every 4 hours PRN  diphenhydrAMINE   Capsule 50 milliGRAM(s) Oral every 4 hours PRN  oxyCODONE    5 mG/acetaminophen 325 mG 2 Tablet(s) Oral every 6 hours PRN      VITAL SIGNS: Last 24 Hours  T(C): 35.7 (21 Jun 2018 06:00), Max: 36.3 (20 Jun 2018 21:20)  T(F): 96.2 (21 Jun 2018 06:00), Max: 97.4 (20 Jun 2018 21:20)  HR: 97 (21 Jun 2018 18:48) (97 - 104)  BP: 161/70 (21 Jun 2018 14:58) (132/77 - 161/70)  BP(mean): --  RR: 18 (21 Jun 2018 14:58) (18 - 20)  SpO2: 95% (21 Jun 2018 10:55) (95% - 95%)    LABS:                        12.8   27.20 )-----------( 277      ( 21 Jun 2018 05:46 )             38.2     06-21    138  |  93<L>  |  11  ----------------------------<  90  4.8   |  35<H>  |  <0.5<L>    Ca    8.9      21 Jun 2018 05:46                    RADIOLOGY:      PHYSICAL EXAM:    GENERAL: NAD, well-developed, AAOx3  HEENT:  Atraumatic, Normocephalic. EOMI, PERRLA, conjunctiva and sclera clear, No JVD  PULMONARY: Clear to auscultation bilaterally; No wheeze  CARDIOVASCULAR: Regular rate and rhythm; No murmurs, rubs, or gallops  GASTROINTESTINAL: Soft, Nontender, Nondistended; Bowel sounds present  MUSCULOSKELETAL:  2+ Peripheral Pulses, No clubbing, cyanosis, or edema  NEUROLOGY: non-focal  SKIN: No rashes or lesions      ADMISSION SUMMARY  Patient is a 68y old Female who presents with a chief complaint of COPD exacerbation (12 Jun 2018 09:17)  Currently admitted to medicine with the primary diagnosis of COPD exacerbation  Hospital course has been complicated by _______.       ASSESSMENT & PLAN    Patient is a 68 year old female with significant PMHx of COPD (not on home O2), anxiety, and depression who presented to the ED with a cc/o worsening SOB x 2 days. She also complained of a productive cough on admission.     1. Productive cough with associated SOB and wheezing sob with likely etiology of COPD exacerbation  -Pulm consult: recommends patient is not ready for discharge; Change solumedrol to 40mg prednisone daily; Continue IV antibiotics; Discharge patient on doxy 100mg q12h x7days and fluconazole x7 days if white count decreased on tomorrows CBC; Pt allergic to penicillin; reports was childhood allergy, does not remember reaction but said it did not involve breathing difficulties; Meropenem prescribed.    -Ambulatory pulse ox performed; patient SpO2 remained stable.   -Patient refused BiPAP PRN, stating atelectasis as reason for not wanting positive pressure ventilation  -Sputum cultures negative  -CT Chest w/o contrast (6/13/18): mild UL centrilobular emphysematous changes, central peribronchial wall thickening with areas of atelectasis within RML and lingula  	  2. Hypertension likely 2/2 respiratory distress, steroids, and anxiety  - Amlodipine 10mg after inadequate response to 2.5mg    3. Hyperkalemia  -Potassium (5.0 <-- 5.8)  -EKG: no peaked T waves  -Insulin/dextrose was given yesterday  -Repeat bmp in am     4. BL lower extremity edema (R>L)  -BL venous duplex ultrasound performed to rule out acute DVT; negative findings    5. Oral thrush  -Previously on nystatin, has not resolved; switched to fluconazole PO    6. Anxiety  -Alprazolam 0.5 q12    7. Insomnia  -Trazadone 50 mg HS    8. DVT ppx   - HSQ    9. Cardio consult: Echo, EKG, and enzymes normal; Cardiac cath is only remaining test to asses chest pain as stress test is not an option for this patient at this time, however for further CAD assessment the only practical test is cardiac catheterization. Will see her in outpatient setting once COPD is better controlled for further evaluation.         Present today:  ( ) Congestive Heart Failure, Yes? ( )Acute / ( )Acute on Chronic / ( )Chronic  :  ( )Systolic / ( )Diastolic               Plan:  ( ) Complicated Pneumonia, Type?  ( )Parapneumonic effusion / ( )Abscess / ( ) Multilobar / ( )Other               Plan:  ( ) Morbid Obesity, Yes? BMI:               Plan:  ( ) Functional Quadriplegia               Plan:  ( ) Encephalopathy               Plan:    ( ) Discussion with patient and/or family regarding goals of care  ( ) Discussed Case and Plan with Medical Attending, Name:      # Planned Disposition: ________ KUSH RINALDI 68y Female  MRN#: [id number]   CODE STATUS:________      SUBJECTIVE  Patient is a 68y old Female who presents with a chief complaint of COPD exacerbation (12 Jun 2018 09:17)  Currently admitted to medicine with the primary diagnosis of COPD exacerbation  Hospital course has been complicated by leukocytosis likely 2/2 pneumonia.     Overnight: Complained on mouth pain, but otherwise uneventful.  Today: Today is hospital day 13, and this morning she is feeling well. Still complains of shortness of breath. Review of systems otherwise negative.     Present Today:           Ramirez Catheter (X)No/ ()Yes? Indication:          Central Line (X)No/ ()Yes? Indication:          IV Fluids (X)No/ ()Yes? Type:  Rate:  Indication:      OBJECTIVE  PAST MEDICAL & SURGICAL HISTORY  Insomnia  Chronic pain  Depression  Anxiety  COPD (chronic obstructive pulmonary disease)  No significant past surgical history    ALLERGIES:  penicillin (Unknown)    MEDICATIONS:  STANDING MEDICATIONS  ALBUTerol/ipratropium for Nebulization 3 milliLiter(s) Nebulizer every 6 hours  ALPRAZolam 0.5 milliGRAM(s) Oral two times a day  amLODIPine   Tablet 5 milliGRAM(s) Oral daily  artificial  tears Solution 1 Drop(s) Both EYES two times a day  buDESOnide 160 MICROgram(s)/formoterol 4.5 MICROgram(s) Inhaler 2 Puff(s) Inhalation two times a day  cefTRIAXone   IVPB 2 Gram(s) IV Intermittent every 12 hours  docusate sodium 100 milliGRAM(s) Oral three times a day  fluconAZOLE   Tablet 100 milliGRAM(s) Oral daily  heparin  Injectable 5000 Unit(s) SubCutaneous every 8 hours  nicotine -   7 mG/24Hr(s) Patch 1 patch Transdermal daily  nystatin    Suspension 161960 Unit(s) Oral four times a day  nystatin Powder 1 Application(s) Topical two times a day  predniSONE   Tablet 40 milliGRAM(s) Oral daily  traZODone 50 milliGRAM(s) Oral at bedtime    PRN MEDICATIONS  ALBUTerol/ipratropium for Nebulization 3 milliLiter(s) Nebulizer every 4 hours PRN  diphenhydrAMINE   Capsule 50 milliGRAM(s) Oral every 4 hours PRN  oxyCODONE    5 mG/acetaminophen 325 mG 2 Tablet(s) Oral every 6 hours PRN    VITAL SIGNS: Last 24 Hours  T(C): 35.7 (21 Jun 2018 06:00), Max: 36.3 (20 Jun 2018 21:20)  T(F): 96.2 (21 Jun 2018 06:00), Max: 97.4 (20 Jun 2018 21:20)  HR: 97 (21 Jun 2018 18:48) (97 - 104)  BP: 161/70 (21 Jun 2018 14:58) (132/77 - 161/70)  BP(mean): --  RR: 18 (21 Jun 2018 14:58) (18 - 20)  SpO2: 95% (21 Jun 2018 10:55) (95% - 95%)      LABS:             12.8   27.20 )-----------( 277      ( 21 Jun 2018 05:46 )             38.2       06-21    138  |  93<L>  |  11  ----------------------------<  90  4.8   |  35<H>  |  <0.5<L>    Ca    8.9      21 Jun 2018 05:46    RADIOLOGY: No image studies to report.    PHYSICAL EXAM:  GENERAL: NAD, well-developed, AAOx3, labored breathing while talking.  HEENT:  Atraumatic, Normocephalic. EOMI, PERRLA, conjunctiva and sclera clear, No JVD, some thrush visualized.  PULMONARY: Equal expansion, diffuse end-expiratory wheezes BL.  CARDIOVASCULAR: Regular rate and rhythm; No murmurs, rubs, or gallops.  GASTROINTESTINAL: Soft, Nontender, Nondistended; Bowel sounds present.  MUSCULOSKELETAL: 2+ edema in lower extremities BL, No clubbing, cyanosis.      ADMISSION SUMMARY  Patient is a 68y old Female who presents with a chief complaint of COPD exacerbation (12 Jun 2018 09:17)  Currently admitted to medicine with the primary diagnosis of COPD exacerbation  Hospital course has been complicated by leukocytosis likely 2/2 pneumonia.     ASSESSMENT & PLAN  Patient is a 68 year old female with significant PMHx of COPD (not on home O2), anxiety, and depression who presented to the ED with a cc/o worsening SOB associated with a productive cough on admission.     1. COPD Exacerbation - Continues to experience shortness of breath when ambulating and talking. Diffuse end-expiratory wheezes unchanged on auscultation; dyspnea with ambulation and talking, normal at rest.  ·	Ambulatory pulse ox performed; patient SpO2 remained stable.   ·	Patient refused BiPAP PRN, stating atelectasis as reason for not wanting positive pressure ventilation, currently.  ·	Continue Symbicort; continue DuoNeb Q6H and Q4H PRN  ·	Prednisone 40mg QD PO     2. Pneumonia - WBC count trending upward (21>24>24>27), pneumonia vs. steroids. CXR on 06/18/2018 was unremarkable. CT Chest w/o contrast (6/13/18): mild UL centrilobular emphysematous changes, central peribronchial wall thickening with areas of atelectasis within RML and lingula. Sputum cultures grew normal zenobia (06/18/2018).  ·	Pulm consult rec: Azithromycin has been stopped; Ceftriaxone 2g Q12H initiated; reports childhood allergy to PCN, but it did not involve difficulty breathing, patient verbally agreed to treatment and understands risk.  ·	Consider morning CXR if patient condition does not improve, or WBC continues to trend up.   	  3. Hypertension likely 2/2 respiratory distress, steroids, and anxiety; BP remains high in 160s.  ·	Amlodipine 5mg QD PO.  ·	Cardio consult rec - Echo, EKG, and enzymes normal; Cardiac cath is only remaining test to asses chest pain as stress test is not an option for this patient at this time, however, for further CAD assessment the only practical test is cardiac catheterization. Will see her in outpatient setting once COPD is better controlled for further evaluation.     3. Hyperkalemia (resolved)  ·	Potassium 4.8 today; will follow up with BMP in the morning.    4. BL lower extremity edema (R>L) - Improving  ·	BL venous duplex ultrasound performed to rule out acute DVT; negative findings (06/20/2018)    5. Oral thrush - Improving; small amount still visible orally.   ·	Previously on nystatin, has not resolved; switched to fluconazole PO.    6. Anxiety  ·	Alprazolam 0.5 Q12H    7. Insomnia  ·	Trazadone 50 mg HS    8. DVT ppx   ·	HSQ      Present today:  ( X ) Congestive Heart Failure, Yes? ( )Acute / ( )Acute on Chronic / ( X ) Chronic  :  ( )Systolic / ( X ) Grade 1 Diastolic dysfunction               Plan: Follow up management with cardiology when pulmonary complications improve as outpatient.   ( X  ) Complicated Pneumonia  ( ) Parapneumonic effusion / ( )Abscess / ( ) Multilobar / ( X ) Other               Plan: Continue antibiotics.  ( X ) Discussion with patient and/or family regarding goals of care      # Planned Disposition: Plan to discharge to [place] when patient condition improves.

## 2018-06-21 NOTE — CHART NOTE - NSCHARTNOTEFT_GEN_A_CORE
Registered Dietitian Follow-Up (limited)    Pt. reports excellent appetite, denies GI distress at this time. Last BM 6/20. Labs/meds reviewed. No RD intervention at this time. Will continue to monitor pt.

## 2018-06-21 NOTE — PROGRESS NOTE ADULT - SUBJECTIVE AND OBJECTIVE BOX
OVERNIGHT EVENTS: still coughing, WBC still increasing, sput cx strep pna    Vital Signs Last 24 Hrs  T(C): 35.7 (21 Jun 2018 06:00), Max: 36.9 (20 Jun 2018 13:48)  T(F): 96.2 (21 Jun 2018 06:00), Max: 98.4 (20 Jun 2018 13:48)  HR: 100 (21 Jun 2018 06:00) (100 - 117)  BP: 132/77 (21 Jun 2018 06:00) (132/77 - 160/80)  BP(mean): --  RR: 18 (21 Jun 2018 06:00) (18 - 20)  SpO2: 95% (21 Jun 2018 10:55) (95% - 96%)    PHYSICAL EXAMINATION:    GENERAL: The patient is awake and alert in no apparent distress.     HEENT: Head is normocephalic and atraumatic. Extraocular muscles are intact. Mucous membranes are moist.    NECK: Supple.    LUNGS: improved wheezing    HEART: Regular rate and rhythm without murmur.    ABDOMEN: Soft, nontender, and nondistended.      EXTREMITIES: Without any cyanosis, clubbing, rash, lesions or edema.    NEUROLOGIC: Grossly intact.    SKIN: No ulceration or induration present.      LABS:                        12.8   27.20 )-----------( 277      ( 21 Jun 2018 05:46 )             38.2     06-21    138  |  93<L>  |  11  ----------------------------<  90  4.8   |  35<H>  |  <0.5<L>    Ca    8.9      21 Jun 2018 05:46                              MICROBIOLOGY:  Culture Results:   Numerous Streptococcus pneumoniae  Normal Respiratory Maya present (06-18 @ 11:01)      MEDICATIONS  (STANDING):  ALBUTerol/ipratropium for Nebulization 3 milliLiter(s) Nebulizer every 6 hours  ALPRAZolam 0.5 milliGRAM(s) Oral two times a day  amLODIPine   Tablet 5 milliGRAM(s) Oral daily  artificial  tears Solution 1 Drop(s) Both EYES two times a day  azithromycin   Tablet 250 milliGRAM(s) Oral <User Schedule>  buDESOnide 160 MICROgram(s)/formoterol 4.5 MICROgram(s) Inhaler 2 Puff(s) Inhalation two times a day  docusate sodium 100 milliGRAM(s) Oral three times a day  fluconAZOLE   Tablet 100 milliGRAM(s) Oral daily  heparin  Injectable 5000 Unit(s) SubCutaneous every 8 hours  nicotine -   7 mG/24Hr(s) Patch 1 patch Transdermal daily  nystatin    Suspension 520689 Unit(s) Oral four times a day  nystatin Powder 1 Application(s) Topical two times a day  predniSONE   Tablet 40 milliGRAM(s) Oral daily  traZODone 50 milliGRAM(s) Oral at bedtime    MEDICATIONS  (PRN):  ALBUTerol/ipratropium for Nebulization 3 milliLiter(s) Nebulizer every 4 hours PRN Bronchospasm  diphenhydrAMINE   Capsule 50 milliGRAM(s) Oral every 4 hours PRN Rash and/or Itching  oxyCODONE    5 mG/acetaminophen 325 mG 2 Tablet(s) Oral every 6 hours PRN Severe Pain (7 - 10)      RADIOLOGY & ADDITIONAL STUDIES:

## 2018-06-22 VITALS
SYSTOLIC BLOOD PRESSURE: 134 MMHG | TEMPERATURE: 100 F | HEART RATE: 106 BPM | RESPIRATION RATE: 18 BRPM | DIASTOLIC BLOOD PRESSURE: 73 MMHG

## 2018-06-22 LAB
BASOPHILS # BLD AUTO: 0.02 K/UL — SIGNIFICANT CHANGE UP (ref 0–0.2)
BASOPHILS NFR BLD AUTO: 0.1 % — SIGNIFICANT CHANGE UP (ref 0–1)
EOSINOPHIL # BLD AUTO: 0.04 K/UL — SIGNIFICANT CHANGE UP (ref 0–0.7)
EOSINOPHIL NFR BLD AUTO: 0.2 % — SIGNIFICANT CHANGE UP (ref 0–8)
HCT VFR BLD CALC: 39.2 % — SIGNIFICANT CHANGE UP (ref 37–47)
HGB BLD-MCNC: 12.9 G/DL — SIGNIFICANT CHANGE UP (ref 12–16)
IMM GRANULOCYTES NFR BLD AUTO: 8.1 % — HIGH (ref 0.1–0.3)
LYMPHOCYTES # BLD AUTO: 22.1 % — SIGNIFICANT CHANGE UP (ref 20.5–51.1)
LYMPHOCYTES # BLD AUTO: 5.35 K/UL — HIGH (ref 1.2–3.4)
MCHC RBC-ENTMCNC: 32.2 PG — HIGH (ref 27–31)
MCHC RBC-ENTMCNC: 32.9 G/DL — SIGNIFICANT CHANGE UP (ref 32–37)
MCV RBC AUTO: 97.8 FL — SIGNIFICANT CHANGE UP (ref 81–99)
MONOCYTES # BLD AUTO: 1.63 K/UL — HIGH (ref 0.1–0.6)
MONOCYTES NFR BLD AUTO: 6.7 % — SIGNIFICANT CHANGE UP (ref 1.7–9.3)
NEUTROPHILS # BLD AUTO: 15.17 K/UL — HIGH (ref 1.4–6.5)
NEUTROPHILS NFR BLD AUTO: 62.8 % — SIGNIFICANT CHANGE UP (ref 42.2–75.2)
NRBC # BLD: 0 /100 WBCS — SIGNIFICANT CHANGE UP (ref 0–0)
PLATELET # BLD AUTO: 261 K/UL — SIGNIFICANT CHANGE UP (ref 130–400)
RBC # BLD: 4.01 M/UL — LOW (ref 4.2–5.4)
RBC # FLD: 13.5 % — SIGNIFICANT CHANGE UP (ref 11.5–14.5)
WBC # BLD: 24.16 K/UL — HIGH (ref 4.8–10.8)
WBC # FLD AUTO: 24.16 K/UL — HIGH (ref 4.8–10.8)

## 2018-06-22 RX ORDER — CEFPODOXIME PROXETIL 100 MG
1 TABLET ORAL
Qty: 14 | Refills: 0 | OUTPATIENT
Start: 2018-06-22 | End: 2018-06-28

## 2018-06-22 RX ORDER — AMLODIPINE BESYLATE 2.5 MG/1
1 TABLET ORAL
Qty: 0 | Refills: 0 | COMMUNITY
Start: 2018-06-22

## 2018-06-22 RX ORDER — FLUCONAZOLE 150 MG/1
1 TABLET ORAL
Qty: 0 | Refills: 0 | COMMUNITY
Start: 2018-06-22

## 2018-06-22 RX ADMIN — NYSTATIN CREAM 1 APPLICATION(S): 100000 CREAM TOPICAL at 06:19

## 2018-06-22 RX ADMIN — Medication 500000 UNIT(S): at 06:21

## 2018-06-22 RX ADMIN — OXYCODONE AND ACETAMINOPHEN 2 TABLET(S): 5; 325 TABLET ORAL at 12:02

## 2018-06-22 RX ADMIN — AMLODIPINE BESYLATE 5 MILLIGRAM(S): 2.5 TABLET ORAL at 06:20

## 2018-06-22 RX ADMIN — NYSTATIN CREAM 1 APPLICATION(S): 100000 CREAM TOPICAL at 18:02

## 2018-06-22 RX ADMIN — Medication 3 MILLILITER(S): at 13:13

## 2018-06-22 RX ADMIN — Medication 1 DROP(S): at 06:20

## 2018-06-22 RX ADMIN — Medication 1 PATCH: at 11:25

## 2018-06-22 RX ADMIN — Medication 0.5 MILLIGRAM(S): at 06:24

## 2018-06-22 RX ADMIN — OXYCODONE AND ACETAMINOPHEN 2 TABLET(S): 5; 325 TABLET ORAL at 00:30

## 2018-06-22 RX ADMIN — Medication 3 MILLILITER(S): at 07:17

## 2018-06-22 RX ADMIN — AZITHROMYCIN 250 MILLIGRAM(S): 500 TABLET, FILM COATED ORAL at 11:25

## 2018-06-22 RX ADMIN — Medication 1 DROP(S): at 18:02

## 2018-06-22 RX ADMIN — Medication 1 PATCH: at 18:47

## 2018-06-22 RX ADMIN — Medication 500000 UNIT(S): at 17:59

## 2018-06-22 RX ADMIN — OXYCODONE AND ACETAMINOPHEN 2 TABLET(S): 5; 325 TABLET ORAL at 17:59

## 2018-06-22 RX ADMIN — Medication 40 MILLIGRAM(S): at 06:20

## 2018-06-22 RX ADMIN — FLUCONAZOLE 100 MILLIGRAM(S): 150 TABLET ORAL at 11:25

## 2018-06-22 RX ADMIN — OXYCODONE AND ACETAMINOPHEN 2 TABLET(S): 5; 325 TABLET ORAL at 06:19

## 2018-06-22 RX ADMIN — OXYCODONE AND ACETAMINOPHEN 2 TABLET(S): 5; 325 TABLET ORAL at 18:47

## 2018-06-22 RX ADMIN — BUDESONIDE AND FORMOTEROL FUMARATE DIHYDRATE 2 PUFF(S): 160; 4.5 AEROSOL RESPIRATORY (INHALATION) at 13:42

## 2018-06-22 RX ADMIN — Medication 500000 UNIT(S): at 11:26

## 2018-06-22 RX ADMIN — OXYCODONE AND ACETAMINOPHEN 2 TABLET(S): 5; 325 TABLET ORAL at 18:39

## 2018-06-22 NOTE — PROGRESS NOTE ADULT - ASSESSMENT
KUSH RINALDI 68y Female  MRN#: [id number]   CODE STATUS:________      SUBJECTIVE  Patient is a 68y old Female who presents with a chief complaint of COPD exacerbation (12 Jun 2018 09:17)  Currently admitted to medicine with the primary diagnosis of COPD exacerbation  Hospital course has been complicated by _______.   Today is hospital day 14d, and this morning she is _________ and reports ________ overnight events.     Present Today:           Ramirez Catheter ()No/ ()Yes? Indication:          Central Line ()No/ ()Yes? Indication:          IV Fluids ()No/ ()Yes? Type:  Rate:  Indication:      OBJECTIVE  PAST MEDICAL & SURGICAL HISTORY  Insomnia  Chronic pain  Depression  Anxiety  COPD (chronic obstructive pulmonary disease)  No significant past surgical history    ALLERGIES:  penicillin (Unknown)    MEDICATIONS:  STANDING MEDICATIONS  ALBUTerol/ipratropium for Nebulization 3 milliLiter(s) Nebulizer every 6 hours  ALPRAZolam 0.5 milliGRAM(s) Oral two times a day  amLODIPine   Tablet 5 milliGRAM(s) Oral daily  artificial  tears Solution 1 Drop(s) Both EYES two times a day  azithromycin   Tablet 250 milliGRAM(s) Oral daily  buDESOnide 160 MICROgram(s)/formoterol 4.5 MICROgram(s) Inhaler 2 Puff(s) Inhalation two times a day  docusate sodium 100 milliGRAM(s) Oral three times a day  fluconAZOLE   Tablet 100 milliGRAM(s) Oral daily  heparin  Injectable 5000 Unit(s) SubCutaneous every 8 hours  nicotine -   7 mG/24Hr(s) Patch 1 patch Transdermal daily  nystatin    Suspension 010374 Unit(s) Oral four times a day  nystatin Powder 1 Application(s) Topical two times a day  predniSONE   Tablet 40 milliGRAM(s) Oral daily  traZODone 50 milliGRAM(s) Oral at bedtime    PRN MEDICATIONS  ALBUTerol/ipratropium for Nebulization 3 milliLiter(s) Nebulizer every 4 hours PRN  diphenhydrAMINE   Capsule 50 milliGRAM(s) Oral every 4 hours PRN  oxyCODONE    5 mG/acetaminophen 325 mG 2 Tablet(s) Oral every 6 hours PRN      VITAL SIGNS: Last 24 Hours  T(C): 36.1 (21 Jun 2018 21:13), Max: 36.1 (21 Jun 2018 21:13)  T(F): 97 (21 Jun 2018 21:13), Max: 97 (21 Jun 2018 21:13)  HR: 106 (21 Jun 2018 21:13) (97 - 106)  BP: 155/70 (21 Jun 2018 21:13) (132/77 - 161/70)  BP(mean): --  RR: 20 (21 Jun 2018 21:13) (18 - 20)  SpO2: 95% (21 Jun 2018 10:55) (95% - 95%)    LABS:                        12.8   27.20 )-----------( 277      ( 21 Jun 2018 05:46 )             38.2     06-21    138  |  93<L>  |  11  ----------------------------<  90  4.8   |  35<H>  |  <0.5<L>    Ca    8.9      21 Jun 2018 05:46                    RADIOLOGY:      PHYSICAL EXAM:    GENERAL: NAD, well-developed, AAOx3  HEENT:  Atraumatic, Normocephalic. EOMI, PERRLA, conjunctiva and sclera clear, No JVD  PULMONARY: Clear to auscultation bilaterally; No wheeze  CARDIOVASCULAR: Regular rate and rhythm; No murmurs, rubs, or gallops  GASTROINTESTINAL: Soft, Nontender, Nondistended; Bowel sounds present  MUSCULOSKELETAL:  2+ Peripheral Pulses, No clubbing, cyanosis, or edema  NEUROLOGY: non-focal  SKIN: No rashes or lesions      ASSESSMENT & PLAN  Patient is a 68y old Female who presents with a chief complaint of COPD exacerbation (12 Jun 2018 09:17)  Currently admitted to medicine with the primary diagnosis of COPD exacerbation  Hospital course has been complicated by leukocytosis likely 2/2 pneumonia.     1. COPD Exacerbation - Continues to experience shortness of breath when ambulating and talking. Diffuse end-expiratory wheezes unchanged on auscultation; dyspnea with ambulation and talking, normal at rest.  Ambulatory pulse ox performed; patient SpO2 remained stable.   Patient refused BiPAP PRN, stating atelectasis as reason for not wanting positive pressure ventilation, currently.  Continue Symbicort; continue DuoNeb Q6H and Q4H PRN  Prednisone 40mg QD PO     2. Pneumonia - WBC count trending upward (21>24>24>27), pneumonia vs. steroids. CXR on 06/18/2018 was unremarkable. CT Chest w/o contrast (6/13/18): mild UL centrilobular emphysematous changes, central peribronchial wall thickening with areas of atelectasis within RML and lingula. Sputum cultures grew normal zenobia (06/18/2018).  Pulm consult rec: Azithromycin has been stopped; Ceftriaxone 2g Q12H initiated; reports childhood allergy to PCN, but it did not involve difficulty breathing, patient verbally agreed to treatment and understands risk.  Consider morning CXR if patient condition does not improve, or WBC continues to trend up.   	  3. Hypertension likely 2/2 respiratory distress, steroids, and anxiety; BP remains high in 160s.  Amlodipine 5mg QD PO.  Cardio consult rec - Echo, EKG, and enzymes normal; Cardiac cath is only remaining test to asses chest pain as stress test is not an option for this patient at this time, however, for further CAD assessment the only practical test is cardiac catheterization. Will see her in outpatient setting once COPD is better controlled for further evaluation.     3. Hyperkalemia (resolved)  Potassium 4.8 today; will follow up with BMP in the morning.    4. BL lower extremity edema (R>L) - Improving  BL venous duplex ultrasound performed to rule out acute DVT; negative findings (06/20/2018)    5. Oral thrush - Improving; small amount still visible orally.   Previously on nystatin, has not resolved; switched to fluconazole PO.    6. Anxiety  Alprazolam 0.5 Q12H    7. Insomnia  Trazadone 50 mg HS    8. DVT ppx   HSQ    Present today:  ( X ) Congestive Heart Failure, Yes? ( )Acute / ( )Acute on Chronic / ( X ) Chronic  :  ( )Systolic / ( X ) Grade 1 Diastolic dysfunction               Plan: Follow up management with cardiology when pulmonary complications improve as outpatient.   ( X  ) Complicated Pneumonia  ( ) Parapneumonic effusion / ( )Abscess / ( ) Multilobar / ( X ) Other               Plan: Continue antibiotics.  ( X ) Discussion with patient and/or family regarding goals of care      # Planned Disposition: Plan to discharge to [place] when patient condition improves. KUSH RINALDI 68y Female  MRN#: [id number]       SUBJECTIVE  Patient is a 68y old Female who presents with a chief complaint of COPD exacerbation (12 Jun 2018 09:17)  Currently admitted to medicine with the primary diagnosis of COPD exacerbation  Today is hospital day 14d, and this morning she is feeling better and reports no overnight events.     She is still having SOB with talking and ambulation. Also, continue productive cough, however, sputum is more white then green today.     Present Today:           Ramirez Catheter (x)No/ ()Yes? Indication:          Central Line (x)No/ ()Yes? Indication:          IV Fluids (x)No/ ()Yes? Type:  Rate:  Indication:      OBJECTIVE  PAST MEDICAL & SURGICAL HISTORY  Insomnia  Chronic pain  Depression  Anxiety  COPD (chronic obstructive pulmonary disease)  No significant past surgical history    ALLERGIES:  penicillin (Unknown)    MEDICATIONS:  STANDING MEDICATIONS  ALBUTerol/ipratropium for Nebulization 3 milliLiter(s) Nebulizer every 6 hours  ALPRAZolam 0.5 milliGRAM(s) Oral two times a day  amLODIPine   Tablet 5 milliGRAM(s) Oral daily  artificial  tears Solution 1 Drop(s) Both EYES two times a day  azithromycin   Tablet 250 milliGRAM(s) Oral daily  buDESOnide 160 MICROgram(s)/formoterol 4.5 MICROgram(s) Inhaler 2 Puff(s) Inhalation two times a day  docusate sodium 100 milliGRAM(s) Oral three times a day  fluconAZOLE   Tablet 100 milliGRAM(s) Oral daily  heparin  Injectable 5000 Unit(s) SubCutaneous every 8 hours  nicotine -   7 mG/24Hr(s) Patch 1 patch Transdermal daily  nystatin    Suspension 431658 Unit(s) Oral four times a day  nystatin Powder 1 Application(s) Topical two times a day  predniSONE   Tablet 40 milliGRAM(s) Oral daily  traZODone 50 milliGRAM(s) Oral at bedtime    PRN MEDICATIONS  ALBUTerol/ipratropium for Nebulization 3 milliLiter(s) Nebulizer every 4 hours PRN  diphenhydrAMINE   Capsule 50 milliGRAM(s) Oral every 4 hours PRN  oxyCODONE    5 mG/acetaminophen 325 mG 2 Tablet(s) Oral every 6 hours PRN      VITAL SIGNS: Last 24 Hours  T(C): 36.1 (21 Jun 2018 21:13), Max: 36.1 (21 Jun 2018 21:13)  T(F): 97 (21 Jun 2018 21:13), Max: 97 (21 Jun 2018 21:13)  HR: 106 (21 Jun 2018 21:13) (97 - 106)  BP: 155/70 (21 Jun 2018 21:13) (132/77 - 161/70)  BP(mean): --  RR: 20 (21 Jun 2018 21:13) (18 - 20)  SpO2: 95% (21 Jun 2018 10:55) (95% - 95%)    LABS:  Yesterday labs                     12.8   27.20 )-----------( 277    ( 21 Jun 2018 05:46 )             38.2     06-21    138  |  93<L>  |  11  ----------------------------<  90  4.8   |  35<H>  |  <0.5<L>    Ca    8.9      21 Jun 2018 05:46      RADIOLOGY: No new reports.      PHYSICAL EXAM:    GENERAL: NAD, well-developed, AAOx3, appears SOB when talking.   HEENT:  Atraumatic, Normocephalic. EOMI, PERRLA, conjunctiva and sclera clear.  PULMONARY: Mild/Moderate end-expiratory wheezes BL.   CARDIOVASCULAR: Regular rate and rhythm; No murmurs, rubs, or gallops  GASTROINTESTINAL: Soft, Nontender, Nondistended; Bowel sounds present.  MUSCULOSKELETAL:  2+ Peripheral Pulses, 2+ edema in LE BL      ASSESSMENT & PLAN  Patient is a 68y old Female who presents with a chief complaint of COPD exacerbation (12 Jun 2018 09:17)  Currently admitted to medicine with the primary diagnosis of COPD exacerbation  Hospital course has been complicated by leukocytosis likely 2/2 steroids vs. pneumonia.     1. COPD Exacerbation - Continues to experience shortness of breath when ambulating and talking. Diffuse end-expiratory wheezes improved on auscultation; dyspnea with ambulation and talking, normal at rest.  ·	Ambulatory pulse ox performed; patient SpO2 remained stable.   ·	Patient refused BiPAP PRN, stating atelectasis as reason for not wanting positive pressure ventilation, currently.  ·	Continue Symbicort; continue DuoNeb Q6H and Q4H PRN  ·	Prednisone 40mg QD PO     2. Leukocytosis, steroid-induced vs. pneumonia - WBC count trending upward (21>24>24>27); CXR on 06/18/2018 was unremarkable. CT Chest w/o contrast (6/13/18): mild UL centrilobular emphysematous changes, central peribronchial wall thickening with areas of atelectasis within RML and lingula. Sputum cultures grew normal zenobia (06/18/2018).  ·	Pulm consult rec: Azithromycin has been stopped; Ceftriaxone 2g Q12H initiated; reports childhood allergy to PCN, but it did not involve difficulty breathing, patient verbally agreed to treatment and understands risk.  ·	Consider morning CXR if patient condition does not improve, or WBC continues to trend up.   	  3. Hypertension likely 2/2 respiratory distress, steroids, and anxiety; BP remains high in 160s.  ·	Amlodipine 5mg QD PO.  ·	Cardio consult rec. - Echo, EKG, and enzymes normal; Cardiac cath is only remaining test to asses chest pain as stress test is not an option for this patient at this time, however, for further CAD assessment the only practical test is cardiac catheterization. Will see her in outpatient setting once COPD is better controlled for further evaluation.     3. Hyperkalemia (resolved)  ·	Potassium 4.8 yesterday; will follow up with BMP in the morning.    4. BL lower extremity edema (R>L) - Improving  ·	BL venous duplex ultrasound performed to rule out acute DVT; negative findings (06/20/2018)    5. Oral thrush - Improving; small amount still visible orally.   ·	Previously on nystatin, has not resolved; switched to fluconazole PO.    6. Anxiety  ·	Alprazolam 0.5 Q12H    7. Insomnia  ·	Trazadone 50 mg HS    DVT ppx: HSQ    Present today:  ( X ) Congestive Heart Failure, Yes? ( )Acute / ( )Acute on Chronic / ( X ) Chronic  :  ( )Systolic / ( X ) Grade 1 Diastolic dysfunction               Plan: Follow up management with cardiology when pulmonary complications improve as outpatient.   ( X  ) Complicated Pneumonia  ( ) Parapneumonic effusion / ( )Abscess / ( ) Multilobar / ( X ) Other               Plan: Continue antibiotics.  ( X ) Discussion with patient and/or family regarding goals of care      # Planned Disposition: Plan to discharge to [place] when patient condition improves. KUSH RINALDI 68y Female  MRN#: [id number]       SUBJECTIVE  Patient is a 68y old Female who presents with a chief complaint of COPD exacerbation (12 Jun 2018 09:17)  Currently admitted to medicine with the primary diagnosis of COPD exacerbation  Today is hospital day 14d, and this morning she is feeling better and reports no overnight events.     She is still having SOB with talking and ambulation. Also, continue productive cough, however, sputum is more white then green today.     Present Today:           Ramirez Catheter (x)No/ ()Yes? Indication:          Central Line (x)No/ ()Yes? Indication:          IV Fluids (x)No/ ()Yes? Type:  Rate:  Indication:      OBJECTIVE  PAST MEDICAL & SURGICAL HISTORY  Insomnia  Chronic pain  Depression  Anxiety  COPD (chronic obstructive pulmonary disease)  No significant past surgical history    ALLERGIES:  penicillin (Unknown)    MEDICATIONS:  STANDING MEDICATIONS  ALBUTerol/ipratropium for Nebulization 3 milliLiter(s) Nebulizer every 6 hours  ALPRAZolam 0.5 milliGRAM(s) Oral two times a day  amLODIPine   Tablet 5 milliGRAM(s) Oral daily  artificial  tears Solution 1 Drop(s) Both EYES two times a day  azithromycin   Tablet 250 milliGRAM(s) Oral daily  buDESOnide 160 MICROgram(s)/formoterol 4.5 MICROgram(s) Inhaler 2 Puff(s) Inhalation two times a day  docusate sodium 100 milliGRAM(s) Oral three times a day  fluconAZOLE   Tablet 100 milliGRAM(s) Oral daily  heparin  Injectable 5000 Unit(s) SubCutaneous every 8 hours  nicotine -   7 mG/24Hr(s) Patch 1 patch Transdermal daily  nystatin    Suspension [id number] Unit(s) Oral four times a day  nystatin Powder 1 Application(s) Topical two times a day  predniSONE   Tablet 40 milliGRAM(s) Oral daily  traZODone 50 milliGRAM(s) Oral at bedtime    PRN MEDICATIONS  ALBUTerol/ipratropium for Nebulization 3 milliLiter(s) Nebulizer every 4 hours PRN  diphenhydrAMINE   Capsule 50 milliGRAM(s) Oral every 4 hours PRN  oxyCODONE    5 mG/acetaminophen 325 mG 2 Tablet(s) Oral every 6 hours PRN      VITAL SIGNS: Last 24 Hours  T(C): 36.1 (21 Jun 2018 21:13), Max: 36.1 (21 Jun 2018 21:13)  T(F): 97 (21 Jun 2018 21:13), Max: 97 (21 Jun 2018 21:13)  HR: 106 (21 Jun 2018 21:13) (97 - 106)  BP: 155/70 (21 Jun 2018 21:13) (132/77 - 161/70)  BP(mean): --  RR: 20 (21 Jun 2018 21:13) (18 - 20)  SpO2: 95% (21 Jun 2018 10:55) (95% - 95%)    LABS:  Yesterday labs                     12.8   27.20 )-----------( 277    ( 21 Jun 2018 05:46 )             38.2     06-21    138  |  93<L>  |  11  ----------------------------<  90  4.8   |  35<H>  |  <0.5<L>    Ca    8.9      21 Jun 2018 05:46      RADIOLOGY: No new reports.      PHYSICAL EXAM:    GENERAL: NAD, well-developed, AAOx3, appears SOB when talking.   HEENT:  Atraumatic, Normocephalic. EOMI, PERRLA, conjunctiva and sclera clear.  PULMONARY: Mild/Moderate end-expiratory wheezes BL.   CARDIOVASCULAR: Regular rate and rhythm; No murmurs, rubs, or gallops  GASTROINTESTINAL: Soft, Nontender, Nondistended; Bowel sounds present.  MUSCULOSKELETAL:  2+ Peripheral Pulses, 2+ edema in LE BL      ASSESSMENT & PLAN  Patient is a 68y old Female who presents with a chief complaint of COPD exacerbation (12 Jun 2018 09:17)  Currently admitted to medicine with the primary diagnosis of COPD exacerbation  Hospital course has been complicated by leukocytosis likely 2/2 steroids vs. pneumonia.     1. COPD Exacerbation - Continues to experience shortness of breath when ambulating and talking. Diffuse end-expiratory wheezes improved on auscultation; dyspnea with ambulation and talking, normal at rest.  ·	Ambulatory pulse ox performed; patient SpO2 remained stable.   ·	Patient refused BiPAP PRN, stating atelectasis as reason for not wanting positive pressure ventilation, currently.  ·	Continue Symbicort; continue DuoNeb Q6H and Q4H PRN  ·	Prednisone 40mg QD PO     2. Leukocytosis, steroid-induced vs. pneumonia - WBC count trending upward (21>24>24>27); CXR on 06/18/2018 was unremarkable. CT Chest w/o contrast (6/13/18): mild UL centrilobular emphysematous changes, central peribronchial wall thickening with areas of atelectasis within RML and lingula. Sputum cultures grew normal zenobia (06/18/2018).  ·	Pulm consult rec: Azithromycin has been stopped; Ceftriaxone 2g Q12H initiated; reports childhood allergy to PCN, but it did not involve difficulty breathing, patient verbally agreed to treatment and understands risk.  ·	Consider morning CXR if patient condition does not improve, or WBC continues to trend up.   	  3. Hypertension likely 2/2 respiratory distress, steroids, and anxiety; BP remains high in 160s.  ·	Amlodipine 5mg QD PO.  ·	Cardio consult rec. - Echo, EKG, and enzymes normal; Cardiac cath is only remaining test to asses chest pain as stress test is not an option for this patient at this time, however, for further CAD assessment the only practical test is cardiac catheterization. Will see her in outpatient setting once COPD is better controlled for further evaluation.     3. Hyperkalemia (resolved)  ·	Potassium 4.8 yesterday; will follow up with BMP in the morning.    4. BL lower extremity edema (R>L) - Improving  ·	BL venous duplex ultrasound performed to rule out acute DVT; negative findings (06/20/2018)  ·	Will consider compression stockings for both legs.    5. Oral thrush - Improving; small amount still visible orally.   ·	Previously on nystatin, has not resolved; switched to fluconazole PO.    6. Anxiety  ·	Alprazolam 0.5 Q12H    7. Insomnia  ·	Trazadone 50 mg HS    DVT ppx: HSQ    Present today:  ( X ) Congestive Heart Failure, Yes? ( )Acute / ( )Acute on Chronic / ( X ) Chronic  :  ( )Systolic / ( X ) Grade 1 Diastolic dysfunction               Plan: Follow up management with cardiology when pulmonary complications improve as outpatient.   ( X  ) Complicated Pneumonia  ( ) Parapneumonic effusion / ( )Abscess / ( ) Multilobar / ( X ) Other               Plan: Continue antibiotics.  ( X ) Discussion with patient and/or family regarding goals of care      # Planned Disposition: Plan to discharge to [place] when patient condition improves. KUSH RINALDI 68y Female  MRN#: [id number]       SUBJECTIVE  Patient is a 68y old Female who presents with a chief complaint of shortness of breath (12 Jun 2018 09:17)  Currently admitted to medicine with the primary diagnosis of COPD exacerbation  Today is hospital day 14d, and this morning she is feeling better and reports no overnight events.     She is still having SOB with talking and ambulation. Also, continue productive cough, however, sputum is more white then green today.     Present Today:           Ramirez Catheter (x)No/ ()Yes? Indication:          Central Line (x)No/ ()Yes? Indication:          IV Fluids (x)No/ ()Yes? Type:  Rate:  Indication:      OBJECTIVE  PAST MEDICAL & SURGICAL HISTORY  Insomnia  Chronic pain  Depression  Anxiety  COPD (chronic obstructive pulmonary disease)  No significant past surgical history    ALLERGIES:  penicillin (Unknown)    MEDICATIONS:  STANDING MEDICATIONS  ALBUTerol/ipratropium for Nebulization 3 milliLiter(s) Nebulizer every 6 hours  ALPRAZolam 0.5 milliGRAM(s) Oral two times a day  amLODIPine   Tablet 5 milliGRAM(s) Oral daily  artificial  tears Solution 1 Drop(s) Both EYES two times a day  azithromycin   Tablet 250 milliGRAM(s) Oral daily  buDESOnide 160 MICROgram(s)/formoterol 4.5 MICROgram(s) Inhaler 2 Puff(s) Inhalation two times a day  docusate sodium 100 milliGRAM(s) Oral three times a day  fluconAZOLE   Tablet 100 milliGRAM(s) Oral daily  heparin  Injectable 5000 Unit(s) SubCutaneous every 8 hours  nicotine -   7 mG/24Hr(s) Patch 1 patch Transdermal daily  nystatin    Suspension [id number] Unit(s) Oral four times a day  nystatin Powder 1 Application(s) Topical two times a day  predniSONE   Tablet 40 milliGRAM(s) Oral daily  traZODone 50 milliGRAM(s) Oral at bedtime    PRN MEDICATIONS  ALBUTerol/ipratropium for Nebulization 3 milliLiter(s) Nebulizer every 4 hours PRN  diphenhydrAMINE   Capsule 50 milliGRAM(s) Oral every 4 hours PRN  oxyCODONE    5 mG/acetaminophen 325 mG 2 Tablet(s) Oral every 6 hours PRN      VITAL SIGNS: Last 24 Hours  T(C): 36.1 (21 Jun 2018 21:13), Max: 36.1 (21 Jun 2018 21:13)  T(F): 97 (21 Jun 2018 21:13), Max: 97 (21 Jun 2018 21:13)  HR: 106 (21 Jun 2018 21:13) (97 - 106)  BP: 155/70 (21 Jun 2018 21:13) (132/77 - 161/70)  BP(mean): --  RR: 20 (21 Jun 2018 21:13) (18 - 20)  SpO2: 95% (21 Jun 2018 10:55) (95% - 95%)    LABS:                        12.9   24.16 )-----------( 261      ( 22 Jun 2018 06:20 )             39.2     Culture - Sputum . (06.18.18 @ 11:01)    Gram Stain:   Numerous polymorphonuclear leukocytes per low power field  No Squamous epithelial cells per low power field  Numerous Gram Positive Cocci in Pairs and Chains per oil power field    Rare Gram Positive Cocci in Clusters per oil power field    -  Clindamycin: R    -  Erythromycin: R    -  Levofloxacin: S    -  Penicillin: S    -  Penicillin: See note 0.38    -  Trimethoprim/Sulfamethoxazole: S    -  Vancomycin: S    Specimen Source: .Sputum Sputum    Culture Results:   Numerous Streptococcus pneumoniae  Therapy requires maximum dose of Ceftriaxone and/or  Penicillin. Interpretive criteria as follows:  Ceftriaxone breakpoints for meningitis infections:  <=0.5=Sensitive, 1.0=Intermediate, >=2.0=Resistant  Penicillin breakpoints for meningitis infections:  <=0.06=Sensitive, >= 0.12=Resistant  Ceftriaxone breakpoints for non-meningitis infections:  <=1.0=Sensitive, 2.0=Intermediate, >=4.0=Resistant  Penicillin breakpoints for non-meningitis infections:  <=2.0=Sensitive, 4.0=Intermediate, >=8.0=Resistant  Oral Penicillin breakpoints:  <=0.06=Sensitive, 0.12-1.0=Intermediate, >=2.0=Resistant  Please note: In case of suspected meningitis, CSF  interpretive criteria must be used independent of specimen source.  Normal Respiratory Maya present    Organism Identification: Streptococcus pneumoniae  Streptococcus pneumoniae    Organism: Streptococcus pneumoniae    Organism: Streptococcus pneumoniae    Method Type: KB    Method Type: ETEST    RADIOLOGY: No new reports.    PHYSICAL EXAM:  GENERAL: NAD, well-developed, AAOx3, SOB with speech.   HEENT:  Atraumatic, Normocephalic. EOMI, PERRLA, conjunctiva and sclera clear.  PULMONARY: Mild/Moderate end-expiratory wheezes BL.   CARDIOVASCULAR: Regular rate and rhythm; No murmurs, rubs, or gallops.  GASTROINTESTINAL: Soft, Nontender, Nondistended; Bowel sounds present.  MUSCULOSKELETAL:  2+ Peripheral Pulses, 2+ edema in LE BL.      ASSESSMENT & PLAN  Patient is a 68y old Female who presents with a chief complaint of shortness of breath (12 Jun 2018 09:17)  Currently admitted to medicine with the primary diagnosis of COPD exacerbation  Hospital course has been complicated by leukocytosis likely 2/2 pneumonia and steroid-induced.    1. COPD Exacerbation - Continues to experience shortness of breath when ambulating and talking. Diffuse end-expiratory wheezes improved on auscultation; dyspnea with ambulation and talking, normal at rest.  ·	Ambulatory pulse ox performed; patient SpO2 remained stable.   ·	Patient refused BiPAP PRN, stating atelectasis as reason for not wanting positive pressure ventilation, currently.  ·	Continue Symbicort; continue DuoNeb Q6H and Q4H PRN  ·	Prednisone 40mg QD PO x3 days; then 20mg QD PO.  ·	Follow up with pulmonary as outpatient.    2. Leukocytosis, pneumonia and steroid-induced - WBC count decreased to 24; CXR on 06/18/2018 was unremarkable. CT Chest w/o contrast (6/13/18): mild UL centrilobular emphysematous changes, central peribronchial wall thickening with areas of atelectasis within RML and lingula. Sputum cultures grew streptococcus pneumoniae (06/18/2018).  ·	Pulm consult rec: Azithromycin has been stopped; Starting vantin 200 Q12H PO.  	  3. Hypertension likely 2/2 respiratory distress, steroids, and anxiety; BP remains high in 160s.  ·	Amlodipine 5mg QD PO.  ·	Cardio consult rec. - Echo, EKG, and enzymes normal; Cardiac cath is only remaining test to asses chest pain as stress test is not an option for this patient at this time, however, for further CAD assessment the only practical test is cardiac catheterization. Will see her in outpatient setting once COPD is better controlled for further evaluation.     3. Hyperkalemia (resolved)    4. BL lower extremity edema (R>L) - Improving  ·	BL venous duplex ultrasound performed to rule out acute DVT; negative findings (06/20/2018)  ·	Encouraged patient to keep legs elevated, may consider compression stockings as patient continues to sit up off side of bed.    5. Oral thrush - Improved  ·	Previously on nystatin, has not resolved; switched to fluconazole PO.    6. Anxiety  ·	Alprazolam 0.5 Q12H    7. Insomnia  ·	Trazadone 50 mg HS    DVT ppx: HSQ    Present today:  ( X ) Congestive Heart Failure, Yes? ( )Acute / ( )Acute on Chronic / ( X ) Chronic  :  ( )Systolic / ( X ) Grade 1 Diastolic dysfunction               Plan: Follow up management with cardiology when pulmonary complications improve as outpatient.   ( X  ) Complicated Pneumonia  ( ) Parapneumonic effusion / ( )Abscess / ( ) Multilobar / ( X ) Other               Plan: Continue antibiotics.  ( X ) Discussion with patient and/or family regarding goals of care      # Planned Disposition: Plan to discharge to [facility]. KUSH RINALDI 68y Female  MRN#: [id number]       SUBJECTIVE  Patient is a 68y old Female who presents with a chief complaint of shortness of breath (12 Jun 2018 09:17)  Currently admitted to medicine with the primary diagnosis of COPD exacerbation  Today is hospital day 14d, and this morning she is feeling better and reports no overnight events.     She is still having SOB with talking and ambulation. Also, continue productive cough, however, sputum is more white then green today.     Present Today:           Ramirez Catheter (x)No/ ()Yes? Indication:          Central Line (x)No/ ()Yes? Indication:          IV Fluids (x)No/ ()Yes? Type:  Rate:  Indication:      OBJECTIVE  PAST MEDICAL & SURGICAL HISTORY  Insomnia  Chronic pain  Depression  Anxiety  COPD (chronic obstructive pulmonary disease)  No significant past surgical history    ALLERGIES:  penicillin (Unknown)    MEDICATIONS:  STANDING MEDICATIONS  ALBUTerol/ipratropium for Nebulization 3 milliLiter(s) Nebulizer every 6 hours  ALPRAZolam 0.5 milliGRAM(s) Oral two times a day  amLODIPine   Tablet 5 milliGRAM(s) Oral daily  artificial  tears Solution 1 Drop(s) Both EYES two times a day  azithromycin   Tablet 250 milliGRAM(s) Oral daily  buDESOnide 160 MICROgram(s)/formoterol 4.5 MICROgram(s) Inhaler 2 Puff(s) Inhalation two times a day  docusate sodium 100 milliGRAM(s) Oral three times a day  fluconAZOLE   Tablet 100 milliGRAM(s) Oral daily  heparin  Injectable 5000 Unit(s) SubCutaneous every 8 hours  nicotine -   7 mG/24Hr(s) Patch 1 patch Transdermal daily  nystatin    Suspension 431290 Unit(s) Oral four times a day  nystatin Powder 1 Application(s) Topical two times a day  predniSONE   Tablet 40 milliGRAM(s) Oral daily  traZODone 50 milliGRAM(s) Oral at bedtime    PRN MEDICATIONS  ALBUTerol/ipratropium for Nebulization 3 milliLiter(s) Nebulizer every 4 hours PRN  diphenhydrAMINE   Capsule 50 milliGRAM(s) Oral every 4 hours PRN  oxyCODONE    5 mG/acetaminophen 325 mG 2 Tablet(s) Oral every 6 hours PRN      VITAL SIGNS: Last 24 Hours  T(C): 36.1 (21 Jun 2018 21:13), Max: 36.1 (21 Jun 2018 21:13)  T(F): 97 (21 Jun 2018 21:13), Max: 97 (21 Jun 2018 21:13)  HR: 106 (21 Jun 2018 21:13) (97 - 106)  BP: 155/70 (21 Jun 2018 21:13) (132/77 - 161/70)  BP(mean): --  RR: 20 (21 Jun 2018 21:13) (18 - 20)  SpO2: 95% (21 Jun 2018 10:55) (95% - 95%)    LABS:                        12.9   24.16 )-----------( 261      ( 22 Jun 2018 06:20 )             39.2     Culture - Sputum . (06.18.18 @ 11:01)    Gram Stain:   Numerous polymorphonuclear leukocytes per low power field  No Squamous epithelial cells per low power field  Numerous Gram Positive Cocci in Pairs and Chains per oil power field    Rare Gram Positive Cocci in Clusters per oil power field    -  Clindamycin: R    -  Erythromycin: R    -  Levofloxacin: S    -  Penicillin: S    -  Penicillin: See note 0.38    -  Trimethoprim/Sulfamethoxazole: S    -  Vancomycin: S    Specimen Source: .Sputum Sputum    Culture Results:   Numerous Streptococcus pneumoniae  Therapy requires maximum dose of Ceftriaxone and/or  Penicillin. Interpretive criteria as follows:  Ceftriaxone breakpoints for meningitis infections:  <=0.5=Sensitive, 1.0=Intermediate, >=2.0=Resistant  Penicillin breakpoints for meningitis infections:  <=0.06=Sensitive, >= 0.12=Resistant  Ceftriaxone breakpoints for non-meningitis infections:  <=1.0=Sensitive, 2.0=Intermediate, >=4.0=Resistant  Penicillin breakpoints for non-meningitis infections:  <=2.0=Sensitive, 4.0=Intermediate, >=8.0=Resistant  Oral Penicillin breakpoints:  <=0.06=Sensitive, 0.12-1.0=Intermediate, >=2.0=Resistant  Please note: In case of suspected meningitis, CSF  interpretive criteria must be used independent of specimen source.  Normal Respiratory Maya present    Organism Identification: Streptococcus pneumoniae  Streptococcus pneumoniae    Organism: Streptococcus pneumoniae    Organism: Streptococcus pneumoniae    Method Type: KB    Method Type: ETEST    RADIOLOGY: No new reports.    PHYSICAL EXAM:  GENERAL: NAD, well-developed, AAOx3, SOB with speech.   HEENT:  Atraumatic, Normocephalic. EOMI, PERRLA, conjunctiva and sclera clear.  PULMONARY: Mild/Moderate end-expiratory wheezes BL.   CARDIOVASCULAR: Regular rate and rhythm; No murmurs, rubs, or gallops.  GASTROINTESTINAL: Soft, Nontender, Nondistended; Bowel sounds present.  MUSCULOSKELETAL:  2+ Peripheral Pulses, 2+ edema in LE BL.      ASSESSMENT & PLAN  Patient is a 68y old Female who presents with a chief complaint of shortness of breath (12 Jun 2018 09:17)  Currently admitted to medicine with the primary diagnosis of COPD exacerbation  Hospital course has been complicated by leukocytosis likely 2/2 pneumonia and steroid-induced.    1. COPD Exacerbation - Continues to experience shortness of breath when ambulating and talking. Diffuse end-expiratory wheezes improved on auscultation; dyspnea with ambulation and talking, normal at rest.  ·	Ambulatory pulse ox performed; patient SpO2 remained stable.   ·	Patient refused BiPAP PRN, stating atelectasis as reason for not wanting positive pressure ventilation, currently.  ·	Continue Symbicort; continue DuoNeb Q6H and Q4H PRN  ·	Prednisone 40mg QD PO x3 days; then 20mg QD PO.  ·	Follow up with pulmonary as outpatient.    2. Leukocytosis, pneumonia and steroid-induced - WBC count decreased to 24; CXR on 06/18/2018 was unremarkable. CT Chest w/o contrast (6/13/18): mild UL centrilobular emphysematous changes, central peribronchial wall thickening with areas of atelectasis within RML and lingula. Sputum cultures grew streptococcus pneumoniae (06/18/2018).  ·	Pulm consult rec: Azithromycin has been stopped; Starting vantin 200 Q12H PO.  	  3. Hypertension likely 2/2 respiratory distress, steroids, and anxiety; BP remains high in 160s.  ·	Amlodipine 5mg QD PO.  ·	Cardio consult rec. - Echo, EKG, and enzymes normal; Cardiac cath is only remaining test to asses chest pain as stress test is not an option for this patient at this time, however, for further CAD assessment the only practical test is cardiac catheterization. Will see her in outpatient setting once COPD is better controlled for further evaluation.     3. Hyperkalemia (resolved)    4. BL lower extremity edema (R>L) - Improving  ·	BL venous duplex ultrasound performed to rule out acute DVT; negative findings (06/20/2018)  ·	Encouraged patient to keep legs elevated, may consider compression stockings as patient continues to sit up off side of bed.    5. Oral thrush - Improved  ·	Previously on nystatin, has not resolved; switched to fluconazole PO.    6. Anxiety  ·	Alprazolam 0.5 Q12H    7. Insomnia  ·	Trazadone 50 mg HS    DVT ppx: HSQ    Present today:  ( X ) Congestive Heart Failure, Yes? ( )Acute / ( )Acute on Chronic / ( X ) Chronic  :  ( )Systolic / ( X ) Grade 1 Diastolic dysfunction               Plan: Follow up management with cardiology when pulmonary complications improve as outpatient.   ( X  ) Complicated Pneumonia  ( ) Parapneumonic effusion / ( )Abscess / ( ) Multilobar / ( X ) Other               Plan: Continue antibiotics.  ( X ) Discussion with patient and/or family regarding goals of care      # Planned Disposition: Plan to discharge to [place] today (06/22/2018).

## 2018-06-22 NOTE — PROGRESS NOTE ADULT - SUBJECTIVE AND OBJECTIVE BOX
OVERNIGHT EVENTS: all over better    Vital Signs Last 24 Hrs  T(C): 36.2 (22 Jun 2018 06:15), Max: 36.2 (22 Jun 2018 06:15)  T(F): 97.2 (22 Jun 2018 06:15), Max: 97.2 (22 Jun 2018 06:15)  HR: 111 (22 Jun 2018 06:15) (97 - 111)  BP: 120/97 (22 Jun 2018 06:15) (120/97 - 161/70)  BP(mean): --  RR: 18 (22 Jun 2018 06:15) (18 - 20)  SpO2: 96% (22 Jun 2018 07:35) (95% - 96%)    PHYSICAL EXAMINATION:    GENERAL: The patient is awake and alert in no apparent distress.     HEENT: Head is normocephalic and atraumatic. Extraocular muscles are intact. Mucous membranes are moist.    NECK: Supple.    LUNGS: improved wheezing    HEART: Regular rate and rhythm without murmur.    ABDOMEN: Soft, nontender, and nondistended.      EXTREMITIES: Without any cyanosis, clubbing, rash, lesions or edema.    NEUROLOGIC: Grossly intact.    SKIN: No ulceration or induration present.      LABS:                        12.9   24.16 )-----------( 261      ( 22 Jun 2018 06:20 )             39.2     06-21    138  |  93<L>  |  11  ----------------------------<  90  4.8   |  35<H>  |  <0.5<L>    Ca    8.9      21 Jun 2018 05:46                              MICROBIOLOGY:  Culture Results:   Numerous Streptococcus pneumoniae  Therapy requires maximum dose of Ceftriaxone and/or  Penicillin. Interpretive criteria as follows:  Ceftriaxone breakpoints for meningitis infections:  <=0.5=Sensitive, 1.0=Intermediate, >=2.0=Resistant  Penicillin breakpoints for meningitis infections:  <=0.06=Sensitive, >= 0.12=Resistant  Ceftriaxone breakpoints for non-meningitis infections:  <=1.0=Sensitive, 2.0=Intermediate, >=4.0=Resistant  Penicillin breakpoints for non-meningitis infections:  <=2.0=Sensitive, 4.0=Intermediate, >=8.0=Resistant  Oral Penicillin breakpoints:  <=0.06=Sensitive, 0.12-1.0=Intermediate, >=2.0=Resistant  Please note: In case of suspected meningitis, CSF  interpretive criteria must be used independent of specimen source.  Normal Respiratory Maya present (06-18 @ 11:01)      MEDICATIONS  (STANDING):  ALBUTerol/ipratropium for Nebulization 3 milliLiter(s) Nebulizer every 6 hours  ALPRAZolam 0.5 milliGRAM(s) Oral two times a day  amLODIPine   Tablet 5 milliGRAM(s) Oral daily  artificial  tears Solution 1 Drop(s) Both EYES two times a day  azithromycin   Tablet 250 milliGRAM(s) Oral daily  buDESOnide 160 MICROgram(s)/formoterol 4.5 MICROgram(s) Inhaler 2 Puff(s) Inhalation two times a day  docusate sodium 100 milliGRAM(s) Oral three times a day  fluconAZOLE   Tablet 100 milliGRAM(s) Oral daily  heparin  Injectable 5000 Unit(s) SubCutaneous every 8 hours  nicotine -   7 mG/24Hr(s) Patch 1 patch Transdermal daily  nystatin    Suspension 185096 Unit(s) Oral four times a day  nystatin Powder 1 Application(s) Topical two times a day  predniSONE   Tablet 40 milliGRAM(s) Oral daily  traZODone 50 milliGRAM(s) Oral at bedtime    MEDICATIONS  (PRN):  ALBUTerol/ipratropium for Nebulization 3 milliLiter(s) Nebulizer every 4 hours PRN Bronchospasm  diphenhydrAMINE   Capsule 50 milliGRAM(s) Oral every 4 hours PRN Rash and/or Itching  oxyCODONE    5 mG/acetaminophen 325 mG 2 Tablet(s) Oral every 6 hours PRN Severe Pain (7 - 10)      RADIOLOGY & ADDITIONAL STUDIES:

## 2018-06-22 NOTE — PROGRESS NOTE ADULT - ASSESSMENT
COPD EXACERBATION/ STREP PNA IN SPUTUM     - VANTIN 200 PO Q 12H  - PREDNISONE 40 MG Q 24 FOR 3 DAYS THAN 20 MG ONCE DAILY  - F/UP CBC  - NEB Q 4 TO 6  - PUL STANDPOINT OP F/UP

## 2018-06-22 NOTE — PROGRESS NOTE ADULT - SUBJECTIVE AND OBJECTIVE BOX
Patient seen and examined  Some improvement in expiratory wheeze.   No fever but significant leucocytosis, most of it from steroids  No abdominal pain or dysuria  On IV abx/Prednisone

## 2018-06-23 ENCOUNTER — INPATIENT (INPATIENT)
Facility: HOSPITAL | Age: 68
LOS: 2 days | Discharge: NURSING, REHAB OR LONG-TERM CARE | End: 2018-06-26
Attending: INTERNAL MEDICINE | Admitting: INTERNAL MEDICINE

## 2018-06-23 VITALS
DIASTOLIC BLOOD PRESSURE: 67 MMHG | HEART RATE: 100 BPM | OXYGEN SATURATION: 95 % | TEMPERATURE: 98 F | SYSTOLIC BLOOD PRESSURE: 154 MMHG | RESPIRATION RATE: 20 BRPM

## 2018-06-23 LAB
ANION GAP SERPL CALC-SCNC: 10 MMOL/L — SIGNIFICANT CHANGE UP (ref 7–14)
BUN SERPL-MCNC: 10 MG/DL — SIGNIFICANT CHANGE UP (ref 10–20)
CALCIUM SERPL-MCNC: 8.8 MG/DL — SIGNIFICANT CHANGE UP (ref 8.5–10.1)
CHLORIDE SERPL-SCNC: 87 MMOL/L — LOW (ref 98–110)
CO2 SERPL-SCNC: 31 MMOL/L — SIGNIFICANT CHANGE UP (ref 17–32)
CREAT SERPL-MCNC: <0.5 MG/DL — LOW (ref 0.7–1.5)
GLUCOSE SERPL-MCNC: 139 MG/DL — HIGH (ref 70–99)
HCT VFR BLD CALC: 36.4 % — LOW (ref 37–47)
HGB BLD-MCNC: 12.1 G/DL — SIGNIFICANT CHANGE UP (ref 12–16)
MCHC RBC-ENTMCNC: 32.5 PG — HIGH (ref 27–31)
MCHC RBC-ENTMCNC: 33.2 G/DL — SIGNIFICANT CHANGE UP (ref 32–37)
MCV RBC AUTO: 97.8 FL — SIGNIFICANT CHANGE UP (ref 81–99)
NRBC # BLD: 0 /100 WBCS — SIGNIFICANT CHANGE UP (ref 0–0)
PLATELET # BLD AUTO: 220 K/UL — SIGNIFICANT CHANGE UP (ref 130–400)
POTASSIUM SERPL-MCNC: 5.2 MMOL/L — HIGH (ref 3.5–5)
POTASSIUM SERPL-SCNC: 5.2 MMOL/L — HIGH (ref 3.5–5)
RBC # BLD: 3.72 M/UL — LOW (ref 4.2–5.4)
RBC # FLD: 13.7 % — SIGNIFICANT CHANGE UP (ref 11.5–14.5)
SODIUM SERPL-SCNC: 128 MMOL/L — LOW (ref 135–146)
WBC # BLD: 23.78 K/UL — HIGH (ref 4.8–10.8)
WBC # FLD AUTO: 23.78 K/UL — HIGH (ref 4.8–10.8)

## 2018-06-23 RX ORDER — ACETAMINOPHEN 500 MG
650 TABLET ORAL ONCE
Qty: 0 | Refills: 0 | Status: COMPLETED | OUTPATIENT
Start: 2018-06-23 | End: 2018-06-23

## 2018-06-23 RX ORDER — IPRATROPIUM/ALBUTEROL SULFATE 18-103MCG
3 AEROSOL WITH ADAPTER (GRAM) INHALATION EVERY 6 HOURS
Qty: 0 | Refills: 0 | Status: DISCONTINUED | OUTPATIENT
Start: 2018-06-23 | End: 2018-06-26

## 2018-06-23 RX ORDER — NYSTATIN 500MM UNIT
500000 POWDER (EA) MISCELLANEOUS ONCE
Qty: 0 | Refills: 0 | Status: DISCONTINUED | OUTPATIENT
Start: 2018-06-23 | End: 2018-06-23

## 2018-06-23 RX ORDER — TRAZODONE HCL 50 MG
50 TABLET ORAL AT BEDTIME
Qty: 0 | Refills: 0 | Status: DISCONTINUED | OUTPATIENT
Start: 2018-06-23 | End: 2018-06-26

## 2018-06-23 RX ORDER — ALPRAZOLAM 0.25 MG
0.5 TABLET ORAL EVERY 12 HOURS
Qty: 0 | Refills: 0 | Status: DISCONTINUED | OUTPATIENT
Start: 2018-06-23 | End: 2018-06-26

## 2018-06-23 RX ORDER — NYSTATIN 500MM UNIT
500000 POWDER (EA) MISCELLANEOUS
Qty: 0 | Refills: 0 | Status: DISCONTINUED | OUTPATIENT
Start: 2018-06-23 | End: 2018-06-26

## 2018-06-23 RX ORDER — SODIUM CHLORIDE 9 MG/ML
3 INJECTION INTRAMUSCULAR; INTRAVENOUS; SUBCUTANEOUS ONCE
Qty: 0 | Refills: 0 | Status: COMPLETED | OUTPATIENT
Start: 2018-06-23 | End: 2018-06-23

## 2018-06-23 RX ORDER — ENOXAPARIN SODIUM 100 MG/ML
40 INJECTION SUBCUTANEOUS DAILY
Qty: 0 | Refills: 0 | Status: DISCONTINUED | OUTPATIENT
Start: 2018-06-23 | End: 2018-06-26

## 2018-06-23 RX ORDER — OXYCODONE AND ACETAMINOPHEN 5; 325 MG/1; MG/1
2 TABLET ORAL EVERY 6 HOURS
Qty: 0 | Refills: 0 | Status: DISCONTINUED | OUTPATIENT
Start: 2018-06-23 | End: 2018-06-26

## 2018-06-23 RX ORDER — IPRATROPIUM/ALBUTEROL SULFATE 18-103MCG
3 AEROSOL WITH ADAPTER (GRAM) INHALATION ONCE
Qty: 0 | Refills: 0 | Status: DISCONTINUED | OUTPATIENT
Start: 2018-06-23 | End: 2018-06-26

## 2018-06-23 RX ORDER — TIOTROPIUM BROMIDE 18 UG/1
1 CAPSULE ORAL; RESPIRATORY (INHALATION) AT BEDTIME
Qty: 0 | Refills: 0 | Status: DISCONTINUED | OUTPATIENT
Start: 2018-06-23 | End: 2018-06-26

## 2018-06-23 RX ORDER — PANTOPRAZOLE SODIUM 20 MG/1
40 TABLET, DELAYED RELEASE ORAL
Qty: 0 | Refills: 0 | Status: DISCONTINUED | OUTPATIENT
Start: 2018-06-23 | End: 2018-06-26

## 2018-06-23 RX ORDER — IPRATROPIUM/ALBUTEROL SULFATE 18-103MCG
3 AEROSOL WITH ADAPTER (GRAM) INHALATION ONCE
Qty: 0 | Refills: 0 | Status: COMPLETED | OUTPATIENT
Start: 2018-06-23 | End: 2018-06-23

## 2018-06-23 RX ORDER — ALPRAZOLAM 0.25 MG
0.5 TABLET ORAL ONCE
Qty: 0 | Refills: 0 | Status: DISCONTINUED | OUTPATIENT
Start: 2018-06-23 | End: 2018-06-23

## 2018-06-23 RX ORDER — AMLODIPINE BESYLATE 2.5 MG/1
5 TABLET ORAL DAILY
Qty: 0 | Refills: 0 | Status: DISCONTINUED | OUTPATIENT
Start: 2018-06-23 | End: 2018-06-26

## 2018-06-23 RX ORDER — BUDESONIDE AND FORMOTEROL FUMARATE DIHYDRATE 160; 4.5 UG/1; UG/1
2 AEROSOL RESPIRATORY (INHALATION)
Qty: 0 | Refills: 0 | Status: DISCONTINUED | OUTPATIENT
Start: 2018-06-23 | End: 2018-06-26

## 2018-06-23 RX ADMIN — OXYCODONE AND ACETAMINOPHEN 2 TABLET(S): 5; 325 TABLET ORAL at 23:30

## 2018-06-23 RX ADMIN — Medication 0.5 MILLIGRAM(S): at 18:14

## 2018-06-23 RX ADMIN — Medication 40 MILLIGRAM(S): at 18:15

## 2018-06-23 RX ADMIN — BUDESONIDE AND FORMOTEROL FUMARATE DIHYDRATE 2 PUFF(S): 160; 4.5 AEROSOL RESPIRATORY (INHALATION) at 20:09

## 2018-06-23 RX ADMIN — SODIUM CHLORIDE 3 MILLILITER(S): 9 INJECTION INTRAMUSCULAR; INTRAVENOUS; SUBCUTANEOUS at 02:01

## 2018-06-23 RX ADMIN — Medication 3 MILLILITER(S): at 01:41

## 2018-06-23 RX ADMIN — Medication 650 MILLIGRAM(S): at 13:45

## 2018-06-23 RX ADMIN — Medication 3 MILLILITER(S): at 19:32

## 2018-06-23 RX ADMIN — Medication 500000 UNIT(S): at 18:15

## 2018-06-23 RX ADMIN — Medication 3 MILLILITER(S): at 13:30

## 2018-06-23 RX ADMIN — Medication 50 MILLIGRAM(S): at 21:21

## 2018-06-23 RX ADMIN — Medication 650 MILLIGRAM(S): at 13:15

## 2018-06-23 RX ADMIN — Medication 60 MILLIGRAM(S): at 11:52

## 2018-06-23 RX ADMIN — AMLODIPINE BESYLATE 5 MILLIGRAM(S): 2.5 TABLET ORAL at 11:55

## 2018-06-23 RX ADMIN — Medication 500000 UNIT(S): at 11:54

## 2018-06-23 NOTE — ED ADULT NURSE NOTE - OBJECTIVE STATEMENT
pt presents to ED because she feels she is not getting the appropriate medical care at the NH. Pt was just discharged for COPD exacerbation.

## 2018-06-23 NOTE — H&P ADULT - HISTORY OF PRESENT ILLNESS
68 y/f with PMH of COPD not on home O2, Depression and Anxiety, was discharged to [facility], [place] yesterday 6/22, after being treated for COPD exacerbation, comes back after she had some shortness of breath and was not given her nebulization on time, She called 911, came here. Complains of having this shortness of breath worse than her ususal, no cough also mentions beng anxious on my examintaion. No chest pan, belly pain. Complains of having chronic neck pain.

## 2018-06-23 NOTE — H&P ADULT - NSHPPHYSICALEXAM_GEN_ALL_CORE
ICU Vital Signs Last 24 Hrs  T(C): 35.8 (23 Jun 2018 04:52), Max: 37.6 (22 Jun 2018 12:51)  T(F): 96.5 (23 Jun 2018 04:52), Max: 99.6 (22 Jun 2018 12:51)  HR: 96 (23 Jun 2018 04:52) (96 - 106)  BP: 154/67 (23 Jun 2018 00:17) (134/73 - 154/67)  BP(mean): --  ABP: --  ABP(mean): --  RR: 20 (23 Jun 2018 04:52) (18 - 20)  SpO2: 96% (23 Jun 2018 04:52) (95% - 96%)    PHYSICAL EXAM:      Constitutional: lying in bed, looks anxious, tachypneic    ENMT: PERLAA    Respiratory: bilateral wheezing    Cardiovascular: Regular rate and rhythm    Gastrointestinal: soft non tender    Extremities: b/l pitting edema minimal    Neurological:  AO x 3, non focal, anxious

## 2018-06-23 NOTE — ED PROVIDER NOTE - OBJECTIVE STATEMENT
Pt is a 67 y/o Female, PMHX of COPD, recently admitted, discharged to nursing facility/pulmonary rehab today, upon getting there pt states she was asked to change into hospital gown, which she did not want to do and was refused her medications and nebulizers, so pt came back Pt is a 69 y/o Female, PMHX of COPD, recently admitted, discharged to nursing facility/pulmonary rehab today, upon getting there pt states she was asked to change into hospital gown, which she did not want to do and was refused her medications and nebulizers, so pt came back to ER requesting admission and placement to different facility. Requesting neb treatments and feels SOB. Pt is a 69 y/o Female, PMHX of COPD, recently admitted, discharged to nursing facility/pulmonary rehab today, upon getting there pt states she was asked to change into hospital gown, which she did not want to do and was refused her medications and nebulizers, so pt came back to ER requesting admission and placement to different facility. Requesting neb treatments and feels SOB. Denies fever, chills, n/v, chest pain, cough, URI symptoms.

## 2018-06-23 NOTE — ED PROVIDER NOTE - NS ED ROS FT
Except as documented in HPI, all other ROS negative.   GENERAL: Denies fever/chills, loss of appetite/weight or fatigue.  SKIN: Denies rashes, abrasions, lacerations, ecchymosis, erythema, or edema.  HEAD: Denies headache, dizziness or trauma.  EYES: Denies blurry vision, diplopia, or photophobia.  CARDIAC: Denies chest pain, palpitations.   RESPIRATORY: + SOB   GI: Denies abdominal pain, n/v/d.   : Denies hematuria, dysuria or frequency.   MSK: Denies myalgias, bony deformity or pain.   NEURO: Denies paresthesias, tingling, weakness, slurred speech or AMS.

## 2018-06-23 NOTE — ED PROVIDER NOTE - PHYSICAL EXAMINATION
VITAL SIGNS: I have reviewed the initial vital signs.   CONSTITUTIONAL: Awake, alert. Well-developed; well-nourished; in no distress. Non-toxic appearing.   SKIN: No rash, vesicles/lesion, abrasions or lacerations. No ecchymosis or signs of trauma.   HEAD: Normocephalic; atraumatic.   EYES: Symmetrical, no discharge or signs of trauma.   ENT: Airway patent. MMM.   CARD: No chest wall deformity or tenderness. S1, S2 normal; no murmurs, gallops, or rubs. Regular rate and rhythm.  RESP: + diffuse expiratory wheezing.   ABD: Soft; non-distended; non-tender.   EXT: No bony deformity or tenderness. Normal ROM x 4 extremities.   NEURO: A&Ox3. GCS 15. Normal speech.   PSYCH: Cooperative, appropriate.

## 2018-06-23 NOTE — ED PROVIDER NOTE - ATTENDING CONTRIBUTION TO CARE
67 yo f with pmh of copd, just discharged today after being admitted for copd exacerbation and pna.  pt was dc to [facility] at 10 am and pt says she is very unhappy about the care there.  says she called 911 because she needs better medical care and needs placement in a different str.  pt admits wheezing.  no cp.  initially refusing iv lock.  exam: nad, ncat, perrl, eomi, mmm, rrr, b/l wheezing and ronchi, abd soft, nt,nd aox3, mild pitting edema b/l le imp: pt with continuing copd exacerbation, dc today.  wants placement to a different str.  will admit for placement

## 2018-06-23 NOTE — ED PROVIDER NOTE - PROGRESS NOTE DETAILS
Spoke with MAR - Aware of pt for admission & plan to see social work for placement and rehab. SPoke with Dr. Bermudez - accepts admission. Spoke with MAR - Aware of pt for admission & plan to see social work for placement and rehab. Spoke with Dr. Bermudez - accepts admission.

## 2018-06-23 NOTE — H&P ADULT - ASSESSMENT
68 y/f with PMH of COPD, anxiety was discharged to SNF 1 day back came back as she had shortness of breath and she didnt get her nebulization.        # Mild acute exacerbation of COPD  Has wheezing bilateral today, saturating well on room air, tachypneic likely d/t anxiety  will give her iv steroid for now, may taper to PO steroid based on clinical asssessment tomorrow  Duonebs, spiriva, symbicort  No signs of infectin at this time, WBC elevated likely d/t steroid. Will f/u Chest xay      # Anxiety/ Insomnia  C/w Xanax PRN and Trazodone    # DVT:  lovenox    # GI ppx:  protonix    # Code status: 68 y/f with PMH of COPD, anxiety was discharged to SNF 1 day back came back as she had shortness of breath and she didnt get her nebulization.        # Mild acute exacerbation of COPD  Has wheezing bilateral today, saturating well on room air, tachypneic likely d/t anxiety  will give her iv steroid for now, may taper to PO steroid based on clinical asssessment tomorrow  Duonebs, spiriva, symbicort  No signs of infectin at this time, WBC elevated likely d/t steroid. Will f/u Chest xay      # Anxiety/ Insomnia  C/w Xanax PRN and Trazodone    # DVT:  lovenox    # GI ppx:  protonix    # Code status:      # Dispo:  PT rehab pending, likely another SNF 68 y/f with PMH of COPD, anxiety was discharged to SNF 1 day back came back as she had shortness of breath and she didnt get her nebulization.        # Mild acute exacerbation of COPD  Has wheezing bilateral today, saturating well on room air, tachypneic likely d/t anxiety  will give her iv steroid for now, may taper to PO steroid based on clinical asssessment tomorrow  Duonebs, spiriva, symbicort  No signs of infectin at this time, WBC elevated likely d/t steroid. Will f/u Chest xay      # Anxiety/ Insomnia  C/w Xanax PRN and Trazodone    # DVT:  lovenox    # GI ppx:  protonix    # Code status:      # Dispo:  PT rehab pending  Will need CM to work upon for a Nursing home 68 y/f with PMH of COPD, anxiety was discharged to SNF 1 day back came back as she had shortness of breath and she didnt get her nebulization.        # Mild acute exacerbation of COPD  Has wheezing bilateral today, saturating well on room air, tachypneic likely d/t anxiety  will give her iv steroid for now, may taper to PO steroid based on clinical asssessment tomorrow  Duonebs, spiriva, symbicort  No signs of infectin at this time, WBC elevated likely d/t steroid. Will f/u Chest xay      # Anxiety/ Insomnia  C/w Xanax PRN and Trazodone    # DVT:  lovenox    # GI ppx:  protonix    # Code status:  Discussed with her, she wants her brother to make the decision  Will discuss with the brother    # Dispo:  PT rehab pending  Will need CM to work upon for a Nursing home

## 2018-06-24 LAB
ANION GAP SERPL CALC-SCNC: 14 MMOL/L — SIGNIFICANT CHANGE UP (ref 7–14)
BUN SERPL-MCNC: 12 MG/DL — SIGNIFICANT CHANGE UP (ref 10–20)
CALCIUM SERPL-MCNC: 9 MG/DL — SIGNIFICANT CHANGE UP (ref 8.5–10.1)
CHLORIDE SERPL-SCNC: 91 MMOL/L — LOW (ref 98–110)
CO2 SERPL-SCNC: 29 MMOL/L — SIGNIFICANT CHANGE UP (ref 17–32)
CREAT SERPL-MCNC: <0.5 MG/DL — LOW (ref 0.7–1.5)
GLUCOSE SERPL-MCNC: 244 MG/DL — HIGH (ref 70–99)
HCT VFR BLD CALC: 37.5 % — SIGNIFICANT CHANGE UP (ref 37–47)
HGB BLD-MCNC: 12.3 G/DL — SIGNIFICANT CHANGE UP (ref 12–16)
MCHC RBC-ENTMCNC: 32 PG — HIGH (ref 27–31)
MCHC RBC-ENTMCNC: 32.8 G/DL — SIGNIFICANT CHANGE UP (ref 32–37)
MCV RBC AUTO: 97.7 FL — SIGNIFICANT CHANGE UP (ref 81–99)
NRBC # BLD: 0 /100 WBCS — SIGNIFICANT CHANGE UP (ref 0–0)
PLATELET # BLD AUTO: 234 K/UL — SIGNIFICANT CHANGE UP (ref 130–400)
POTASSIUM SERPL-MCNC: 5.3 MMOL/L — HIGH (ref 3.5–5)
POTASSIUM SERPL-SCNC: 5.3 MMOL/L — HIGH (ref 3.5–5)
RBC # BLD: 3.84 M/UL — LOW (ref 4.2–5.4)
RBC # FLD: 13.4 % — SIGNIFICANT CHANGE UP (ref 11.5–14.5)
SODIUM SERPL-SCNC: 134 MMOL/L — LOW (ref 135–146)
WBC # BLD: 23.49 K/UL — HIGH (ref 4.8–10.8)
WBC # FLD AUTO: 23.49 K/UL — HIGH (ref 4.8–10.8)

## 2018-06-24 RX ADMIN — Medication 40 MILLIGRAM(S): at 05:33

## 2018-06-24 RX ADMIN — OXYCODONE AND ACETAMINOPHEN 2 TABLET(S): 5; 325 TABLET ORAL at 07:31

## 2018-06-24 RX ADMIN — Medication 50 MILLIGRAM(S): at 21:15

## 2018-06-24 RX ADMIN — AMLODIPINE BESYLATE 5 MILLIGRAM(S): 2.5 TABLET ORAL at 05:33

## 2018-06-24 RX ADMIN — OXYCODONE AND ACETAMINOPHEN 2 TABLET(S): 5; 325 TABLET ORAL at 13:39

## 2018-06-24 RX ADMIN — TIOTROPIUM BROMIDE 1 CAPSULE(S): 18 CAPSULE ORAL; RESPIRATORY (INHALATION) at 07:53

## 2018-06-24 RX ADMIN — PANTOPRAZOLE SODIUM 40 MILLIGRAM(S): 20 TABLET, DELAYED RELEASE ORAL at 06:24

## 2018-06-24 RX ADMIN — Medication 40 MILLIGRAM(S): at 18:10

## 2018-06-24 RX ADMIN — OXYCODONE AND ACETAMINOPHEN 2 TABLET(S): 5; 325 TABLET ORAL at 14:09

## 2018-06-24 RX ADMIN — BUDESONIDE AND FORMOTEROL FUMARATE DIHYDRATE 2 PUFF(S): 160; 4.5 AEROSOL RESPIRATORY (INHALATION) at 20:41

## 2018-06-24 RX ADMIN — OXYCODONE AND ACETAMINOPHEN 2 TABLET(S): 5; 325 TABLET ORAL at 21:15

## 2018-06-24 RX ADMIN — OXYCODONE AND ACETAMINOPHEN 2 TABLET(S): 5; 325 TABLET ORAL at 00:16

## 2018-06-24 RX ADMIN — Medication 3 MILLILITER(S): at 07:52

## 2018-06-24 RX ADMIN — Medication 500000 UNIT(S): at 11:33

## 2018-06-24 RX ADMIN — OXYCODONE AND ACETAMINOPHEN 2 TABLET(S): 5; 325 TABLET ORAL at 07:01

## 2018-06-24 RX ADMIN — Medication 500000 UNIT(S): at 05:33

## 2018-06-24 RX ADMIN — BUDESONIDE AND FORMOTEROL FUMARATE DIHYDRATE 2 PUFF(S): 160; 4.5 AEROSOL RESPIRATORY (INHALATION) at 08:10

## 2018-06-24 RX ADMIN — Medication 500000 UNIT(S): at 18:10

## 2018-06-24 RX ADMIN — Medication 3 MILLILITER(S): at 19:43

## 2018-06-24 RX ADMIN — Medication 500000 UNIT(S): at 00:15

## 2018-06-24 NOTE — PROGRESS NOTE ADULT - ASSESSMENT
KUSH RINALDI 68y Female  MRN#: [id number]     SUBJECTIVE  68 y/f with PMH of COPD not on home O2, Depression and Anxiety, was discharged to [facility] yesterday 6/22, after being treated for COPD exacerbation, comes back after she had some shortness of breath and was not given her nebulization on time, She called 911, came here. Complains of having this shortness of breath worse than her ususal, no cough also mentions beng anxious on my examintaion. No chest pain or abdominal pain.  Today is hospital day 1d, and this morning she is _________ and reports ________ overnight events.       PAST MEDICAL & SURGICAL HISTORY  Insomnia  Chronic pain  Depression  Anxiety  COPD (chronic obstructive pulmonary disease)  No significant past surgical history    ALLERGIES:  penicillin (Unknown)    MEDICATIONS:  STANDING MEDICATIONS  ALBUTerol/ipratropium for Nebulization 3 milliLiter(s) Nebulizer every 6 hours  ALBUTerol/ipratropium for Nebulization. 3 milliLiter(s) Nebulizer once  amLODIPine   Tablet 5 milliGRAM(s) Oral daily  buDESOnide 160 MICROgram(s)/formoterol 4.5 MICROgram(s) Inhaler 2 Puff(s) Inhalation two times a day  enoxaparin Injectable 40 milliGRAM(s) SubCutaneous daily  methylPREDNISolone sodium succinate Injectable 40 milliGRAM(s) IV Push two times a day  nystatin    Suspension 033190 Unit(s) Oral four times a day  pantoprazole    Tablet 40 milliGRAM(s) Oral before breakfast  tiotropium 18 MICROgram(s) Capsule 1 Capsule(s) Inhalation at bedtime  traZODone 50 milliGRAM(s) Oral at bedtime    PRN MEDICATIONS  ALPRAZolam 0.5 milliGRAM(s) Oral every 12 hours PRN  oxyCODONE    5 mG/acetaminophen 325 mG 2 Tablet(s) Oral every 6 hours PRN    .  OBJECTIVE    PHYSICAL EXAM:    GENERAL: NAD, well-developed, AAOx3  HEENT:  Atraumatic, Normocephalic. EOMI, PERRLA, conjunctiva and sclera clear, No JVD  PULMONARY: Clear to auscultation bilaterally; No wheeze  CARDIOVASCULAR: Regular rate and rhythm; No murmurs, rubs, or gallops  GASTROINTESTINAL: Soft, Nontender, Nondistended; Bowel sounds present  MUSCULOSKELETAL:  2+ Peripheral Pulses, No clubbing, cyanosis, or edema  NEUROLOGY: non-focal  SKIN: No rashes or lesions    VITAL SIGNS: Last 24 Hours  T(C): 36.2 (24 Jun 2018 05:00), Max: 36.4 (23 Jun 2018 19:58)  T(F): 97.1 (24 Jun 2018 05:00), Max: 97.5 (23 Jun 2018 19:58)  HR: 92 (24 Jun 2018 05:00) (92 - 103)  BP: 125/56 (24 Jun 2018 05:00) (125/56 - 146/71)  BP(mean): --  RR: 20 (24 Jun 2018 05:00) (20 - 20)  SpO2: --    LABS:                        12.3   23.49 )-----------( 234      ( 24 Jun 2018 06:58 )             37.5     06-24    134<L>  |  91<L>  |  12  ----------------------------<  244<H>  5.3<H>   |  29  |  <0.5<L>    Ca    9.0      24 Jun 2018 06:58      RADIOLOGY:  Xray Chest 1 View- PORTABLE-Urgent (06.23.18 @ 13:17)  Increased bibasilar opacities      ADMISSION SUMMARY  Patient is a 68y old Female who presents with a chief complaint of Returned from Nursing home because "she was short of breath and not given her nebulization" (23 Jun 2018 09:19)  Currently admitted to medicine with the primary diagnosis of COPD (chronic obstructive pulmonary disease) exacerbation    ASSESSMENT & PLAN    # Mild acute exacerbation of COPD  Patient receiving methylprednisolone.   Nebs: Duonebs, symbicort  Spiriva  Avoid morphinics if in pain   Monitor saturation     # Anxiety/ Insomnia  C/w Xanax PRN and Trazodone    # DVT:  lovenox sc    # GI ppx:  protonix KUSH RINALDI 68y Female  MRN#: [id number]     SUBJECTIVE  68 y/f with PMH of COPD not on home O2, Depression and Anxiety, was discharged to [facility] yesterday 6/22, after being treated for COPD exacerbation, comes back after she had some shortness of breath and was not given her nebulization on time, She called 911, came here. Complains of having this shortness of breath worse than her ususal, no cough also mentions beng anxious on my examintaion. No chest pain or abdominal pain.  Today is hospital day 1d, and this morning she is feeling better however still dyspneic and anxious about her state and treatment plan. Sat down with patient and tried to address her concern     PAST MEDICAL & SURGICAL HISTORY  Insomnia  Chronic pain in both her upper extremities   Depression  Anxiety  COPD (chronic obstructive pulmonary disease)  No significant past surgical history    ALLERGIES:  penicillin (Unknown)    MEDICATIONS:  STANDING MEDICATIONS  ALBUTerol/ipratropium for Nebulization 3 milliLiter(s) Nebulizer every 6 hours  ALBUTerol/ipratropium for Nebulization. 3 milliLiter(s) Nebulizer once  amLODIPine   Tablet 5 milliGRAM(s) Oral daily  buDESOnide 160 MICROgram(s)/formoterol 4.5 MICROgram(s) Inhaler 2 Puff(s) Inhalation two times a day  enoxaparin Injectable 40 milliGRAM(s) SubCutaneous daily  methylPREDNISolone sodium succinate Injectable 40 milliGRAM(s) IV Push two times a day  nystatin    Suspension [id number] Unit(s) Oral four times a day  pantoprazole    Tablet 40 milliGRAM(s) Oral before breakfast  tiotropium 18 MICROgram(s) Capsule 1 Capsule(s) Inhalation at bedtime  traZODone 50 milliGRAM(s) Oral at bedtime    PRN MEDICATIONS  ALPRAZolam 0.5 milliGRAM(s) Oral every 12 hours PRN  oxyCODONE    5 mG/acetaminophen 325 mG 2 Tablet(s) Oral every 6 hours PRN    .  OBJECTIVE    PHYSICAL EXAM:    GENERAL: AAOx3  HEENT: No JVD  PULMONARY: Bilateral expiratory wheezes   CARDIOVASCULAR: Regular rate and rhythm; No murmurs, rubs, or gallops  GASTROINTESTINAL: Soft, Nontender, Nondistended; Bowel sounds present  MUSCULOSKELETAL:  Mild edema of both feet     VITAL SIGNS: Last 24 Hours  T(C): 36.2 (24 Jun 2018 05:00), Max: 36.4 (23 Jun 2018 19:58)  T(F): 97.1 (24 Jun 2018 05:00), Max: 97.5 (23 Jun 2018 19:58)  HR: 92 (24 Jun 2018 05:00) (92 - 103)  BP: 125/56 (24 Jun 2018 05:00) (125/56 - 146/71)  BP(mean): --  RR: 20 (24 Jun 2018 05:00) (20 - 20)  SpO2: --    LABS:                        12.3   23.49 )-----------( 234      ( 24 Jun 2018 06:58 )             37.5     06-24    134<L>  |  91<L>  |  12  ----------------------------<  244<H>  5.3<H>   |  29  |  <0.5<L>    Ca    9.0      24 Jun 2018 06:58      RADIOLOGY:  Xray Chest 1 View- PORTABLE-Urgent (06.23.18 @ 13:17)  Increased bibasilar opacities      ADMISSION SUMMARY  Patient is a 68y old Female who presents with a chief complaint of Returned from Nursing home because "she was short of breath and not given her nebulization" (23 Jun 2018 09:19)  Currently admitted to medicine with the primary diagnosis of COPD (chronic obstructive pulmonary disease) exacerbation    ASSESSMENT & PLAN    # Mild acute exacerbation of COPD  Patient receiving methylprednisolone.   Nebs: Duonebs, symbicort  Spiriva  Avoid strong morphinics and monitor saturation and respiratory rate       # Anxiety/ Insomnia  C/w Xanax PRN and Trazodone    #DVT  lovenox sc    # GI ppx:  protonix

## 2018-06-25 LAB
ALBUMIN SERPL ELPH-MCNC: 4 G/DL — SIGNIFICANT CHANGE UP (ref 3.5–5.2)
ALP SERPL-CCNC: 45 U/L — SIGNIFICANT CHANGE UP (ref 30–115)
ALT FLD-CCNC: 39 U/L — SIGNIFICANT CHANGE UP (ref 0–41)
AST SERPL-CCNC: 15 U/L — SIGNIFICANT CHANGE UP (ref 0–41)
BASOPHILS # BLD AUTO: 0.05 K/UL — SIGNIFICANT CHANGE UP (ref 0–0.2)
BASOPHILS NFR BLD AUTO: 0.2 % — SIGNIFICANT CHANGE UP (ref 0–1)
BILIRUB SERPL-MCNC: 0.3 MG/DL — SIGNIFICANT CHANGE UP (ref 0.2–1.2)
BUN SERPL-MCNC: 12 MG/DL — SIGNIFICANT CHANGE UP (ref 10–20)
CALCIUM SERPL-MCNC: 9.2 MG/DL — SIGNIFICANT CHANGE UP (ref 8.5–10.1)
CHLORIDE SERPL-SCNC: 93 MMOL/L — LOW (ref 98–110)
CO2 SERPL-SCNC: 29 MMOL/L — SIGNIFICANT CHANGE UP (ref 17–32)
CREAT SERPL-MCNC: <0.5 MG/DL — LOW (ref 0.7–1.5)
EOSINOPHIL # BLD AUTO: 0 K/UL — SIGNIFICANT CHANGE UP (ref 0–0.7)
EOSINOPHIL NFR BLD AUTO: 0 % — SIGNIFICANT CHANGE UP (ref 0–8)
GLUCOSE SERPL-MCNC: 160 MG/DL — HIGH (ref 70–99)
HCT VFR BLD CALC: 37.9 % — SIGNIFICANT CHANGE UP (ref 37–47)
HGB BLD-MCNC: 12.5 G/DL — SIGNIFICANT CHANGE UP (ref 12–16)
IMM GRANULOCYTES NFR BLD AUTO: 3.4 % — HIGH (ref 0.1–0.3)
LYMPHOCYTES # BLD AUTO: 0.67 K/UL — LOW (ref 1.2–3.4)
LYMPHOCYTES # BLD AUTO: 2.8 % — LOW (ref 20.5–51.1)
MAGNESIUM SERPL-MCNC: 2 MG/DL — SIGNIFICANT CHANGE UP (ref 1.8–2.4)
MCHC RBC-ENTMCNC: 32.7 PG — HIGH (ref 27–31)
MCHC RBC-ENTMCNC: 33 G/DL — SIGNIFICANT CHANGE UP (ref 32–37)
MCV RBC AUTO: 99.2 FL — HIGH (ref 81–99)
MONOCYTES # BLD AUTO: 0.8 K/UL — HIGH (ref 0.1–0.6)
MONOCYTES NFR BLD AUTO: 3.3 % — SIGNIFICANT CHANGE UP (ref 1.7–9.3)
NEUTROPHILS # BLD AUTO: 21.65 K/UL — HIGH (ref 1.4–6.5)
NEUTROPHILS NFR BLD AUTO: 90.3 % — HIGH (ref 42.2–75.2)
NRBC # BLD: 0 /100 WBCS — SIGNIFICANT CHANGE UP (ref 0–0)
PLATELET # BLD AUTO: 241 K/UL — SIGNIFICANT CHANGE UP (ref 130–400)
POTASSIUM SERPL-MCNC: 5 MMOL/L — SIGNIFICANT CHANGE UP (ref 3.5–5)
POTASSIUM SERPL-SCNC: 5 MMOL/L — SIGNIFICANT CHANGE UP (ref 3.5–5)
PROT SERPL-MCNC: 6.2 G/DL — SIGNIFICANT CHANGE UP (ref 6–8)
RBC # BLD: 3.82 M/UL — LOW (ref 4.2–5.4)
RBC # FLD: 13.8 % — SIGNIFICANT CHANGE UP (ref 11.5–14.5)
SODIUM SERPL-SCNC: 136 MMOL/L — SIGNIFICANT CHANGE UP (ref 135–146)
WBC # BLD: 23.98 K/UL — HIGH (ref 4.8–10.8)
WBC # FLD AUTO: 23.98 K/UL — HIGH (ref 4.8–10.8)

## 2018-06-25 RX ADMIN — Medication 3 MILLILITER(S): at 07:26

## 2018-06-25 RX ADMIN — Medication 500000 UNIT(S): at 11:43

## 2018-06-25 RX ADMIN — Medication 50 MILLIGRAM(S): at 21:15

## 2018-06-25 RX ADMIN — Medication 500000 UNIT(S): at 23:21

## 2018-06-25 RX ADMIN — AMLODIPINE BESYLATE 5 MILLIGRAM(S): 2.5 TABLET ORAL at 06:35

## 2018-06-25 RX ADMIN — OXYCODONE AND ACETAMINOPHEN 2 TABLET(S): 5; 325 TABLET ORAL at 15:13

## 2018-06-25 RX ADMIN — Medication 500000 UNIT(S): at 06:35

## 2018-06-25 RX ADMIN — OXYCODONE AND ACETAMINOPHEN 2 TABLET(S): 5; 325 TABLET ORAL at 07:19

## 2018-06-25 RX ADMIN — Medication 3 MILLILITER(S): at 14:14

## 2018-06-25 RX ADMIN — Medication 500000 UNIT(S): at 18:22

## 2018-06-25 RX ADMIN — Medication 0.5 MILLIGRAM(S): at 23:19

## 2018-06-25 RX ADMIN — PANTOPRAZOLE SODIUM 40 MILLIGRAM(S): 20 TABLET, DELAYED RELEASE ORAL at 06:35

## 2018-06-25 RX ADMIN — Medication 3 MILLILITER(S): at 20:49

## 2018-06-25 RX ADMIN — Medication 40 MILLIGRAM(S): at 18:22

## 2018-06-25 RX ADMIN — Medication 0.5 MILLIGRAM(S): at 11:41

## 2018-06-25 RX ADMIN — OXYCODONE AND ACETAMINOPHEN 2 TABLET(S): 5; 325 TABLET ORAL at 18:26

## 2018-06-25 RX ADMIN — OXYCODONE AND ACETAMINOPHEN 2 TABLET(S): 5; 325 TABLET ORAL at 21:40

## 2018-06-25 RX ADMIN — OXYCODONE AND ACETAMINOPHEN 2 TABLET(S): 5; 325 TABLET ORAL at 06:42

## 2018-06-25 RX ADMIN — Medication 500000 UNIT(S): at 00:01

## 2018-06-25 RX ADMIN — BUDESONIDE AND FORMOTEROL FUMARATE DIHYDRATE 2 PUFF(S): 160; 4.5 AEROSOL RESPIRATORY (INHALATION) at 21:16

## 2018-06-25 RX ADMIN — Medication 40 MILLIGRAM(S): at 06:37

## 2018-06-25 RX ADMIN — OXYCODONE AND ACETAMINOPHEN 2 TABLET(S): 5; 325 TABLET ORAL at 21:18

## 2018-06-25 NOTE — CONSULT NOTE ADULT - ASSESSMENT

## 2018-06-25 NOTE — CONSULT NOTE ADULT - SUBJECTIVE AND OBJECTIVE BOX
HPI:  68 y/f with PMH of COPD not on home O2, Depression and Anxiety, was discharged to [facility] [place], operated by [facility] yesterday 6/22, after being treated for COPD exacerbation, comes back after she had some shortness of breath and was not given her nebulization on time, She called 911, came here. Complains of having this shortness of breath worse than her ususal, no cough also mentions beng anxious on my examintaion. No chest pan, belly pain. Complains of having chronic neck pain. (23 Jun 2018 09:19)      PAST MEDICAL & SURGICAL HISTORY:  Insomnia  Chronic pain  Depression  Anxiety  COPD (chronic obstructive pulmonary disease)  No significant past surgical history      Hospital Course:    TODAY'S SUBJECTIVE & REVIEW OF SYMPTOMS:     Constitutional WNL   Cardio WNL   Resp sob   GI WNL  Heme WNL  Endo WNL  Skin WNL  MSK WNL  Neuro WNL  Cognitive WNL  Psych WNL      MEDICATIONS  (STANDING):  ALBUTerol/ipratropium for Nebulization 3 milliLiter(s) Nebulizer every 6 hours  ALBUTerol/ipratropium for Nebulization. 3 milliLiter(s) Nebulizer once  amLODIPine   Tablet 5 milliGRAM(s) Oral daily  buDESOnide 160 MICROgram(s)/formoterol 4.5 MICROgram(s) Inhaler 2 Puff(s) Inhalation two times a day  enoxaparin Injectable 40 milliGRAM(s) SubCutaneous daily  methylPREDNISolone sodium succinate Injectable 40 milliGRAM(s) IV Push two times a day  nystatin    Suspension [id number] Unit(s) Oral four times a day  pantoprazole    Tablet 40 milliGRAM(s) Oral before breakfast  tiotropium 18 MICROgram(s) Capsule 1 Capsule(s) Inhalation at bedtime  traZODone 50 milliGRAM(s) Oral at bedtime    MEDICATIONS  (PRN):  ALPRAZolam 0.5 milliGRAM(s) Oral every 12 hours PRN anxiety  oxyCODONE    5 mG/acetaminophen 325 mG 2 Tablet(s) Oral every 6 hours PRN Moderate Pain (4 - 6)      FAMILY HISTORY:  No pertinent family history in first degree relatives      Allergies    penicillin (Unknown)    Intolerances        SOCIAL HISTORY:    [  ] Etoh  [  ] Smoking  [  ] Substance abuse     Home Environment:  [  ] Home Alone  [  ] Lives with Family  [  ] Home Health Aid    Dwelling:  [  ] Apartment  [  ] Private House  [  ] Adult Home  [  ] Skilled Nursing Facility      [x  ] Short Term  [  ] Long Term  [  ] Stairs       Elevator [  ]    FUNCTIONAL STATUS PTA: (Check all that apply)  Ambulation: [   ]Independent    [ x ] Dependent     [  ] Non-Ambulatory  Assistive Device: [  ] SA Cane  [  ]  Q Cane  [x  ] Walker  [  ]  Wheelchair  ADL : [  ] Independent  [ x ]  Dependent       Vital Signs Last 24 Hrs  T(C): 36.2 (25 Jun 2018 05:00), Max: 36.2 (25 Jun 2018 05:00)  T(F): 97.1 (25 Jun 2018 05:00), Max: 97.1 (25 Jun 2018 05:00)  HR: 89 (25 Jun 2018 05:00) (89 - 109)  BP: 131/60 (25 Jun 2018 05:00) (131/60 - 172/92)  BP(mean): --  RR: 18 (25 Jun 2018 05:00) (18 - 20)  SpO2: 96% (24 Jun 2018 22:04) (96% - 98%)      PHYSICAL EXAM: Alert & Oriented X3  GENERAL: NAD, well-groomed, well-developed  HEAD:  Atraumatic, Normocephalic  CHEST/LUNG: decreased bs gisel lung bases  HEART: S1S2+  ABDOMEN: Soft, Nontender  EXTREMITIES:  no calf tenderness    NERVOUS SYSTEM:  Cranial Nerves 2-12 intact [  ] Abnormal  [  ]  ROM: WFL all extremities [x  ]  Abnormal [  ]  Motor Strength: WFL all extremities  [  ]  Abnormal [ x ]4/5 all ext  Sensation: intact to light touch [ x ] Abnormal [  ]  Reflexes: Symmetric [  ]  Abnormal [  ]    FUNCTIONAL STATUS:  Bed Mobility: Independent [  ]  Supervision [  ]  Needs Assistance [ x ]  N/A [  ]  Transfers: Independent [  ]  Supervision [  ]  Needs Assistance [ x ]  N/A [  ]   Ambulation: Independent [  ]  Supervision [  ]  Needs Assistance [x  ]  N/A [  ]  ADL: Independent [  ] Requires Assistance [  ] N/A [  ]      LABS:                        12.3   23.49 )-----------( 234      ( 24 Jun 2018 06:58 )             37.5     06-24    134<L>  |  91<L>  |  12  ----------------------------<  244<H>  5.3<H>   |  29  |  <0.5<L>    Ca    9.0      24 Jun 2018 06:58            RADIOLOGY & ADDITIONAL STUDIES:    Assesment:

## 2018-06-25 NOTE — PROGRESS NOTE ADULT - ASSESSMENT
# Mild acute exacerbation of COPD/baseline COPD status  Patient receiving methylprednisolone, de-escalate tomorrow  Nebs: Duonebs, symbicort  Spiriva  Avoid strong morphinics and monitor saturation and respiratory rate   No antibiotics necessary    # Anxiety/ Insomnia  C/w Xanax PRN and Trazodone    #DVT  lovenox sc    # GI ppx:  protonix

## 2018-06-26 ENCOUNTER — TRANSCRIPTION ENCOUNTER (OUTPATIENT)
Age: 68
End: 2018-06-26

## 2018-06-26 VITALS
SYSTOLIC BLOOD PRESSURE: 152 MMHG | DIASTOLIC BLOOD PRESSURE: 83 MMHG | RESPIRATION RATE: 18 BRPM | TEMPERATURE: 97 F | HEART RATE: 105 BPM

## 2018-06-26 LAB
ALBUMIN SERPL ELPH-MCNC: 3.5 G/DL — SIGNIFICANT CHANGE UP (ref 3.5–5.2)
ALP SERPL-CCNC: 41 U/L — SIGNIFICANT CHANGE UP (ref 30–115)
ALT FLD-CCNC: 40 U/L — SIGNIFICANT CHANGE UP (ref 0–41)
ANION GAP SERPL CALC-SCNC: 12 MMOL/L — SIGNIFICANT CHANGE UP (ref 7–14)
AST SERPL-CCNC: 15 U/L — SIGNIFICANT CHANGE UP (ref 0–41)
BASOPHILS # BLD AUTO: 0.05 K/UL — SIGNIFICANT CHANGE UP (ref 0–0.2)
BASOPHILS NFR BLD AUTO: 0.2 % — SIGNIFICANT CHANGE UP (ref 0–1)
BILIRUB SERPL-MCNC: 0.5 MG/DL — SIGNIFICANT CHANGE UP (ref 0.2–1.2)
BUN SERPL-MCNC: 10 MG/DL — SIGNIFICANT CHANGE UP (ref 10–20)
CALCIUM SERPL-MCNC: 9 MG/DL — SIGNIFICANT CHANGE UP (ref 8.5–10.1)
CHLORIDE SERPL-SCNC: 94 MMOL/L — LOW (ref 98–110)
CO2 SERPL-SCNC: 31 MMOL/L — SIGNIFICANT CHANGE UP (ref 17–32)
CREAT SERPL-MCNC: <0.5 MG/DL — LOW (ref 0.7–1.5)
EOSINOPHIL # BLD AUTO: 0 K/UL — SIGNIFICANT CHANGE UP (ref 0–0.7)
EOSINOPHIL NFR BLD AUTO: 0 % — SIGNIFICANT CHANGE UP (ref 0–8)
GLUCOSE SERPL-MCNC: 207 MG/DL — HIGH (ref 70–99)
HCT VFR BLD CALC: 38 % — SIGNIFICANT CHANGE UP (ref 37–47)
HGB BLD-MCNC: 12.1 G/DL — SIGNIFICANT CHANGE UP (ref 12–16)
IMM GRANULOCYTES NFR BLD AUTO: 4.6 % — HIGH (ref 0.1–0.3)
LYMPHOCYTES # BLD AUTO: 1.19 K/UL — LOW (ref 1.2–3.4)
LYMPHOCYTES # BLD AUTO: 5.9 % — LOW (ref 20.5–51.1)
MAGNESIUM SERPL-MCNC: 1.8 MG/DL — SIGNIFICANT CHANGE UP (ref 1.8–2.4)
MCHC RBC-ENTMCNC: 31.8 G/DL — LOW (ref 32–37)
MCHC RBC-ENTMCNC: 31.8 PG — HIGH (ref 27–31)
MCV RBC AUTO: 100 FL — HIGH (ref 81–99)
MONOCYTES # BLD AUTO: 0.91 K/UL — HIGH (ref 0.1–0.6)
MONOCYTES NFR BLD AUTO: 4.5 % — SIGNIFICANT CHANGE UP (ref 1.7–9.3)
NEUTROPHILS # BLD AUTO: 16.98 K/UL — HIGH (ref 1.4–6.5)
NEUTROPHILS NFR BLD AUTO: 84.8 % — HIGH (ref 42.2–75.2)
NRBC # BLD: 0 /100 WBCS — SIGNIFICANT CHANGE UP (ref 0–0)
PLATELET # BLD AUTO: 240 K/UL — SIGNIFICANT CHANGE UP (ref 130–400)
POTASSIUM SERPL-MCNC: 5.6 MMOL/L — HIGH (ref 3.5–5)
POTASSIUM SERPL-SCNC: 5.6 MMOL/L — HIGH (ref 3.5–5)
PROT SERPL-MCNC: 5.9 G/DL — LOW (ref 6–8)
RBC # BLD: 3.8 M/UL — LOW (ref 4.2–5.4)
RBC # FLD: 13.7 % — SIGNIFICANT CHANGE UP (ref 11.5–14.5)
SODIUM SERPL-SCNC: 137 MMOL/L — SIGNIFICANT CHANGE UP (ref 135–146)
WBC # BLD: 20.05 K/UL — HIGH (ref 4.8–10.8)
WBC # FLD AUTO: 20.05 K/UL — HIGH (ref 4.8–10.8)

## 2018-06-26 RX ADMIN — Medication 500000 UNIT(S): at 11:11

## 2018-06-26 RX ADMIN — Medication 500000 UNIT(S): at 05:35

## 2018-06-26 RX ADMIN — BUDESONIDE AND FORMOTEROL FUMARATE DIHYDRATE 2 PUFF(S): 160; 4.5 AEROSOL RESPIRATORY (INHALATION) at 08:24

## 2018-06-26 RX ADMIN — Medication 40 MILLIGRAM(S): at 11:11

## 2018-06-26 RX ADMIN — OXYCODONE AND ACETAMINOPHEN 2 TABLET(S): 5; 325 TABLET ORAL at 11:41

## 2018-06-26 RX ADMIN — Medication 40 MILLIGRAM(S): at 05:35

## 2018-06-26 RX ADMIN — Medication 0.5 MILLIGRAM(S): at 11:21

## 2018-06-26 RX ADMIN — PANTOPRAZOLE SODIUM 40 MILLIGRAM(S): 20 TABLET, DELAYED RELEASE ORAL at 06:06

## 2018-06-26 RX ADMIN — AMLODIPINE BESYLATE 5 MILLIGRAM(S): 2.5 TABLET ORAL at 05:35

## 2018-06-26 RX ADMIN — Medication 3 MILLILITER(S): at 13:14

## 2018-06-26 RX ADMIN — OXYCODONE AND ACETAMINOPHEN 2 TABLET(S): 5; 325 TABLET ORAL at 12:11

## 2018-06-26 RX ADMIN — Medication 3 MILLILITER(S): at 07:13

## 2018-06-26 RX ADMIN — OXYCODONE AND ACETAMINOPHEN 2 TABLET(S): 5; 325 TABLET ORAL at 05:33

## 2018-06-26 NOTE — DISCHARGE NOTE ADULT - HOSPITAL COURSE
68 y/f with PMH of COPD not on home O2, Depression and Anxiety, was discharged to [facility] yesterday 6/22, after being treated for COPD exacerbation, comes back after she had some shortness of breath and was not given her nebulization on time, She called 911, came here. Complains of having this shortness of breath worse than her ususal, no cough also mentions beng anxious on my examintaion. No chest pan, belly pain. Complains of having chronic neck pain.  IV Solumedrol was started and COPD management. Patient was not in COPD exacerbation; anxiety triggered by not receiving her albuterol treatment. Patient was education on waiting for her treatments and monitoring her saturation and symptoms. Resume prednisone taper

## 2018-06-26 NOTE — DISCHARGE NOTE ADULT - MEDICATION SUMMARY - MEDICATIONS TO STOP TAKING
I will STOP taking the medications listed below when I get home from the hospital:    fluconazole 100 mg oral tablet  -- 1 tab(s) by mouth once a day    cefpodoxime 200 mg oral tablet  -- 1 tab(s) by mouth 2 times a day   -- Finish all this medication unless otherwise directed by prescriber.  Take with food or milk.

## 2018-06-26 NOTE — PROGRESS NOTE ADULT - SUBJECTIVE AND OBJECTIVE BOX
Patient readmitted from SNF after worsening of wheezing since she did not get her nebulizer treatment  Has severe COPD with refractory wheezing even after high dose steroids and multiple inhalers use  Patient chronically exhibiting strained expiratory effort, severe anxiety  Has edema over feet from steroids  Cardiac eval done on recent admission and no evidence of cardiac decompensation  Patient at this time planning to see if she can be home in her environment.
Patient seen and examined  Chronic dyspnea secondary to severe COPD on multiple inhalers, steroids, nebulizer rx  Some exp wheezing anterior chest wall, decreased breath sounds  Distant heart sounds, RSR  BP under control  Edema down  Will discuss with discharge planning in am. Can get PT at home.
Patient seen and examined.  Ambulates to bathroom  No fever  Bilateral expiratory rhonchi  Edema over ankles improving  Patient on IV Solumetrol  Eager to go home with PT at home and f/u by visiting nurses  Pulmonary fellow notified so Dr. Jas Iyer can eval for discharge
LENGTH OF HOSPITAL STAY: 2d    CHIEF COMPLAINT:   Patient is a 68y old  Female who presents with a chief complaint of Returned from Nursing home because "she was short of breath and not given her nebulization" (23 Jun 2018 09:19)      HISTORY OF PRESENTING ILLNESS:    HPI:  68 y/f with PMH of COPD not on home O2, Depression and Anxiety, was discharged to [facility] yesterday 6/22, after being treated for COPD exacerbation, comes back after she had some shortness of breath and was not given her nebulization on time, She called 911, came here. Complains of having this shortness of breath worse than her ususal, no cough also mentions beng anxious on my examintaion. No chest pan, belly pain. Complains of having chronic neck pain. (23 Jun 2018 09:19)    PAST MEDICAL & SURGICAL HISTORY  PAST MEDICAL & SURGICAL HISTORY:  Insomnia  Chronic pain  Depression  Anxiety  COPD (chronic obstructive pulmonary disease)  No significant past surgical history    SOCIAL HISTORY:    ALLERGIES:  penicillin (Unknown)    MEDICATIONS:  STANDING MEDICATIONS  ALBUTerol/ipratropium for Nebulization 3 milliLiter(s) Nebulizer every 6 hours  ALBUTerol/ipratropium for Nebulization. 3 milliLiter(s) Nebulizer once  amLODIPine   Tablet 5 milliGRAM(s) Oral daily  buDESOnide 160 MICROgram(s)/formoterol 4.5 MICROgram(s) Inhaler 2 Puff(s) Inhalation two times a day  enoxaparin Injectable 40 milliGRAM(s) SubCutaneous daily  methylPREDNISolone sodium succinate Injectable 40 milliGRAM(s) IV Push two times a day  nystatin    Suspension [id number] Unit(s) Oral four times a day  pantoprazole    Tablet 40 milliGRAM(s) Oral before breakfast  tiotropium 18 MICROgram(s) Capsule 1 Capsule(s) Inhalation at bedtime  traZODone 50 milliGRAM(s) Oral at bedtime    PRN MEDICATIONS  ALPRAZolam 0.5 milliGRAM(s) Oral every 12 hours PRN  oxyCODONE    5 mG/acetaminophen 325 mG 2 Tablet(s) Oral every 6 hours PRN    VITALS:   T(F): 97.1  HR: 89  BP: 131/60  RR: 18  SpO2: 95%    LABS:                        12.3   23.49 )-----------( 234      ( 24 Jun 2018 06:58 )             37.5     06-24    134<L>  |  91<L>  |  12  ----------------------------<  244<H>  5.3<H>   |  29  |  <0.5<L>    Ca    9.0      24 Jun 2018 06:58        RADIOLOGY:    PHYSICAL EXAM:  Vital Signs Last 24 Hrs  T(C): 36.2 (25 Jun 2018 05:00), Max: 36.2 (25 Jun 2018 05:00)  T(F): 97.1 (25 Jun 2018 05:00), Max: 97.1 (25 Jun 2018 05:00)  HR: 89 (25 Jun 2018 05:00) (89 - 109)  BP: 131/60 (25 Jun 2018 05:00) (131/60 - 172/92)  BP(mean): --  RR: 18 (25 Jun 2018 05:00) (18 - 20)  SpO2: 95% (25 Jun 2018 07:08) (95% - 98%)  GENERAL: AAOx3  HEENT: No JVD  PULMONARY: Bilateral expiratory wheezes   CARDIOVASCULAR: Regular rate and rhythm; No murmurs, rubs, or gallops  GASTROINTESTINAL: Soft, Nontender, Nondistended; Bowel sounds present  MUSCULOSKELETAL:  Mild edema of both feet decreased

## 2018-06-26 NOTE — DISCHARGE NOTE ADULT - CARE PROVIDER_API CALL
Chris Contreras), Critical Care Medicine; Internal Medicine; Pulmonary Disease; Sleep Medicine  [address] [place] [address]  Phone: [phone]  Fax: [phone]

## 2018-06-26 NOTE — DISCHARGE NOTE ADULT - PATIENT PORTAL LINK FT
You can access the [facility], offered by [facility], by registering with the following website: [email or web address]

## 2018-06-26 NOTE — DISCHARGE NOTE ADULT - PLAN OF CARE
medical management You were admitted for COPD exacerbation right after discharge. You did not have COPD exacerbation. That is your baseline. Please take your medications as instructed and follow up with Pulmonary for further recommendations. If you feel short of breath, please ask for your albuterol and wait for it. If your oxygen saturation is lower than 88 after the albuterol treatment and medications, please return to the Emergency Department.

## 2018-06-26 NOTE — DISCHARGE NOTE ADULT - CARE PLAN
Principal Discharge DX:	COPD (chronic obstructive pulmonary disease)  Goal:	medical management  Assessment and plan of treatment:	You were admitted for COPD exacerbation right after discharge. You did not have COPD exacerbation. That is your baseline. Please take your medications as instructed and follow up with Pulmonary for further recommendations. If you feel short of breath, please ask for your albuterol and wait for it. If your oxygen saturation is lower than 88 after the albuterol treatment and medications, please return to the Emergency Department.

## 2018-06-26 NOTE — DISCHARGE NOTE ADULT - MEDICATION SUMMARY - MEDICATIONS TO TAKE
I will START or STAY ON the medications listed below when I get home from the hospital:    predniSONE 20 mg oral tablet  -- 6 tab(s) daily x 3 days  4 tab(s) daily x 3 days  2 tab(s) daily x 3 days  1 tab(s) daily x 3 days  -- It is very important that you take or use this exactly as directed.  Do not skip doses or discontinue unless directed by your doctor.  Obtain medical advice before taking any non-prescription drugs as some may affect the action of this medication.  Take with food or milk.    -- Indication: For COPD    HYDROCO/APAP TAB 10-325MG  -- 1 tab(s) by mouth every 6 hours, As Needed  -- Indication: For Chronic pain    TRAZODONE HCL 50 MG TABS  -- Indication: For Depression    nystatin 100,000 units/mL oral suspension  -- 5 milliliter(s) by mouth 4 times a day swish in the mouth and retain for several minutes before swallowing  -- Indication: For If thrush with inhalers (use spacers)    ALPRAZolam 0.5 mg oral tablet  -- 1 tab(s) by mouth every 12 hours  -- Indication: For anxiety    Spiriva Respimat 1.25 mcg/inh inhalation aerosol  -- 2 puff(s) inhaled once a day   -- Check with your doctor before becoming pregnant.  For inhalation only.    -- Indication: For COPD    PROAIR  MCG/ACT AERS  -- Indication: For COPD    COMBIVENT    AER   -- Indication: For COPD    BREO ELLIPTA -25  -- Indication: For COPD    amLODIPine 5 mg oral tablet  -- 1 tab(s) by mouth once a day  -- Indication: For HTN    AUGMENTED BETAMETHASONE DIPROPIONATE .05 % OINT  -- Indication: For topical    roflumilast 500 mcg oral tablet  -- 1 tab(s) by mouth once a day   -- Check with your doctor before becoming pregnant.  Obtain medical advice before taking any non-prescription drugs as some may affect the action of this medication.    -- Indication: For COPD    nicotine 7 mg/24 hr transdermal film, extended release  -- 1 patch by transdermal patch once a day   -- Indication: For Smoking cessation

## 2018-06-27 DIAGNOSIS — R22.0 LOCALIZED SWELLING, MASS AND LUMP, HEAD: ICD-10-CM

## 2018-06-27 DIAGNOSIS — E87.5 HYPERKALEMIA: ICD-10-CM

## 2018-06-27 DIAGNOSIS — B37.0 CANDIDAL STOMATITIS: ICD-10-CM

## 2018-06-27 DIAGNOSIS — T38.0X5A ADVERSE EFFECT OF GLUCOCORTICOIDS AND SYNTHETIC ANALOGUES, INITIAL ENCOUNTER: ICD-10-CM

## 2018-06-27 DIAGNOSIS — B95.4 OTHER STREPTOCOCCUS AS THE CAUSE OF DISEASES CLASSIFIED ELSEWHERE: ICD-10-CM

## 2018-06-27 DIAGNOSIS — R00.0 TACHYCARDIA, UNSPECIFIED: ICD-10-CM

## 2018-06-27 DIAGNOSIS — I50.32 CHRONIC DIASTOLIC (CONGESTIVE) HEART FAILURE: ICD-10-CM

## 2018-06-27 DIAGNOSIS — E87.2 ACIDOSIS: ICD-10-CM

## 2018-06-27 DIAGNOSIS — G89.29 OTHER CHRONIC PAIN: ICD-10-CM

## 2018-06-27 DIAGNOSIS — J18.9 PNEUMONIA, UNSPECIFIED ORGANISM: ICD-10-CM

## 2018-06-27 DIAGNOSIS — G47.00 INSOMNIA, UNSPECIFIED: ICD-10-CM

## 2018-06-27 DIAGNOSIS — I11.0 HYPERTENSIVE HEART DISEASE WITH HEART FAILURE: ICD-10-CM

## 2018-06-27 DIAGNOSIS — J44.1 CHRONIC OBSTRUCTIVE PULMONARY DISEASE WITH (ACUTE) EXACERBATION: ICD-10-CM

## 2018-06-27 DIAGNOSIS — F17.210 NICOTINE DEPENDENCE, CIGARETTES, UNCOMPLICATED: ICD-10-CM

## 2018-06-27 DIAGNOSIS — Z88.0 ALLERGY STATUS TO PENICILLIN: ICD-10-CM

## 2018-06-27 DIAGNOSIS — F41.9 ANXIETY DISORDER, UNSPECIFIED: ICD-10-CM

## 2018-06-27 DIAGNOSIS — F32.9 MAJOR DEPRESSIVE DISORDER, SINGLE EPISODE, UNSPECIFIED: ICD-10-CM

## 2018-06-27 DIAGNOSIS — J44.0 CHRONIC OBSTRUCTIVE PULMONARY DISEASE WITH (ACUTE) LOWER RESPIRATORY INFECTION: ICD-10-CM

## 2018-06-29 DIAGNOSIS — I10 ESSENTIAL (PRIMARY) HYPERTENSION: ICD-10-CM

## 2018-06-29 DIAGNOSIS — R06.02 SHORTNESS OF BREATH: ICD-10-CM

## 2018-06-29 DIAGNOSIS — M54.2 CERVICALGIA: ICD-10-CM

## 2018-06-29 DIAGNOSIS — F41.9 ANXIETY DISORDER, UNSPECIFIED: ICD-10-CM

## 2018-06-29 DIAGNOSIS — J44.9 CHRONIC OBSTRUCTIVE PULMONARY DISEASE, UNSPECIFIED: ICD-10-CM

## 2018-06-29 DIAGNOSIS — Z79.891 LONG TERM (CURRENT) USE OF OPIATE ANALGESIC: ICD-10-CM

## 2018-06-29 DIAGNOSIS — G89.29 OTHER CHRONIC PAIN: ICD-10-CM

## 2018-06-29 DIAGNOSIS — F32.9 MAJOR DEPRESSIVE DISORDER, SINGLE EPISODE, UNSPECIFIED: ICD-10-CM

## 2018-10-16 NOTE — PHYSICAL THERAPY INITIAL EVALUATION ADULT - MANUAL MUSCLE TESTING RESULTS, REHAB EVAL
I have prescribed an antibiotic for the infection  Please take the antibiotic as prescribed and finish the entire prescription  I recommend that the patient takes an over the counter probiotic or eats yogurt with live cultures in it [place]) to keep good bacteria in the gut and help prevent diarrhea  Wash hands frequently to prevent the spread of infection  Can use over the counter cough and cold medications to help with symptoms  Ibuprofen and/or tylenol as needed for pain or fever  If not improving over the next 7-10 days, follow up with PCP 
B UE's / B LE's at least 3/5

## 2018-11-01 ENCOUNTER — INPATIENT (INPATIENT)
Facility: HOSPITAL | Age: 68
LOS: 7 days | Discharge: HOME HEALTH | End: 2018-11-09
Attending: INTERNAL MEDICINE | Admitting: INTERNAL MEDICINE

## 2018-11-01 VITALS
TEMPERATURE: 97 F | OXYGEN SATURATION: 86 % | HEART RATE: 94 BPM | HEIGHT: 63 IN | DIASTOLIC BLOOD PRESSURE: 107 MMHG | WEIGHT: 149.91 LBS | SYSTOLIC BLOOD PRESSURE: 180 MMHG | RESPIRATION RATE: 24 BRPM

## 2018-11-01 LAB
ALBUMIN SERPL ELPH-MCNC: 3.8 G/DL — SIGNIFICANT CHANGE UP (ref 3.5–5.2)
ALP SERPL-CCNC: 101 U/L — SIGNIFICANT CHANGE UP (ref 30–115)
ALT FLD-CCNC: 9 U/L — SIGNIFICANT CHANGE UP (ref 0–41)
ANION GAP SERPL CALC-SCNC: 15 MMOL/L — HIGH (ref 7–14)
AST SERPL-CCNC: 16 U/L — SIGNIFICANT CHANGE UP (ref 0–41)
BASE EXCESS BLDV CALC-SCNC: 3 MMOL/L — HIGH (ref -2–2)
BASOPHILS # BLD AUTO: 0.06 K/UL — SIGNIFICANT CHANGE UP (ref 0–0.2)
BASOPHILS NFR BLD AUTO: 0.5 % — SIGNIFICANT CHANGE UP (ref 0–1)
BILIRUB SERPL-MCNC: 0.4 MG/DL — SIGNIFICANT CHANGE UP (ref 0.2–1.2)
BUN SERPL-MCNC: <3 MG/DL — LOW (ref 10–20)
CA-I SERPL-SCNC: 1.21 MMOL/L — SIGNIFICANT CHANGE UP (ref 1.12–1.3)
CALCIUM SERPL-MCNC: 9.5 MG/DL — SIGNIFICANT CHANGE UP (ref 8.5–10.1)
CHLORIDE SERPL-SCNC: 97 MMOL/L — LOW (ref 98–110)
CO2 SERPL-SCNC: 26 MMOL/L — SIGNIFICANT CHANGE UP (ref 17–32)
CREAT SERPL-MCNC: 0.6 MG/DL — LOW (ref 0.7–1.5)
EOSINOPHIL # BLD AUTO: 0.07 K/UL — SIGNIFICANT CHANGE UP (ref 0–0.7)
EOSINOPHIL NFR BLD AUTO: 0.6 % — SIGNIFICANT CHANGE UP (ref 0–8)
GAS PNL BLDV: 134 MMOL/L — LOW (ref 136–145)
GAS PNL BLDV: SIGNIFICANT CHANGE UP
GLUCOSE SERPL-MCNC: 143 MG/DL — HIGH (ref 70–99)
HCO3 BLDV-SCNC: 30 MMOL/L — HIGH (ref 22–29)
HCT VFR BLD CALC: 48.9 % — HIGH (ref 37–47)
HCT VFR BLDA CALC: 51.8 % — HIGH (ref 34–44)
HGB BLD CALC-MCNC: 16.9 G/DL — SIGNIFICANT CHANGE UP (ref 14–18)
HGB BLD-MCNC: 15.6 G/DL — SIGNIFICANT CHANGE UP (ref 12–16)
IMM GRANULOCYTES NFR BLD AUTO: 1 % — HIGH (ref 0.1–0.3)
LACTATE BLDV-MCNC: 1.2 MMOL/L — SIGNIFICANT CHANGE UP (ref 0.5–1.6)
LYMPHOCYTES # BLD AUTO: 1.84 K/UL — SIGNIFICANT CHANGE UP (ref 1.2–3.4)
LYMPHOCYTES # BLD AUTO: 16 % — LOW (ref 20.5–51.1)
MCHC RBC-ENTMCNC: 29.1 PG — SIGNIFICANT CHANGE UP (ref 27–31)
MCHC RBC-ENTMCNC: 31.9 G/DL — LOW (ref 32–37)
MCV RBC AUTO: 91.1 FL — SIGNIFICANT CHANGE UP (ref 81–99)
MONOCYTES # BLD AUTO: 0.28 K/UL — SIGNIFICANT CHANGE UP (ref 0.1–0.6)
MONOCYTES NFR BLD AUTO: 2.4 % — SIGNIFICANT CHANGE UP (ref 1.7–9.3)
NEUTROPHILS # BLD AUTO: 9.12 K/UL — HIGH (ref 1.4–6.5)
NEUTROPHILS NFR BLD AUTO: 79.5 % — HIGH (ref 42.2–75.2)
NRBC # BLD: 0 /100 WBCS — SIGNIFICANT CHANGE UP (ref 0–0)
PCO2 BLDV: 54 MMHG — HIGH (ref 41–51)
PH BLDV: 7.36 — SIGNIFICANT CHANGE UP (ref 7.26–7.43)
PLATELET # BLD AUTO: 269 K/UL — SIGNIFICANT CHANGE UP (ref 130–400)
PO2 BLDV: 26 MMHG — SIGNIFICANT CHANGE UP (ref 20–40)
POTASSIUM BLDV-SCNC: 4.5 MMOL/L — SIGNIFICANT CHANGE UP (ref 3.3–5.6)
POTASSIUM SERPL-MCNC: 4.8 MMOL/L — SIGNIFICANT CHANGE UP (ref 3.5–5)
POTASSIUM SERPL-SCNC: 4.8 MMOL/L — SIGNIFICANT CHANGE UP (ref 3.5–5)
PROT SERPL-MCNC: 7.4 G/DL — SIGNIFICANT CHANGE UP (ref 6–8)
RBC # BLD: 5.37 M/UL — SIGNIFICANT CHANGE UP (ref 4.2–5.4)
RBC # FLD: 15.6 % — HIGH (ref 11.5–14.5)
SAO2 % BLDV: 40 % — SIGNIFICANT CHANGE UP
SODIUM SERPL-SCNC: 138 MMOL/L — SIGNIFICANT CHANGE UP (ref 135–146)
TROPONIN T SERPL-MCNC: <0.01 NG/ML — SIGNIFICANT CHANGE UP
WBC # BLD: 11.48 K/UL — HIGH (ref 4.8–10.8)
WBC # FLD AUTO: 11.48 K/UL — HIGH (ref 4.8–10.8)

## 2018-11-01 RX ORDER — MAGNESIUM SULFATE 500 MG/ML
2 VIAL (ML) INJECTION ONCE
Qty: 0 | Refills: 0 | Status: COMPLETED | OUTPATIENT
Start: 2018-11-01 | End: 2018-11-01

## 2018-11-01 RX ADMIN — Medication 50 GRAM(S): at 22:46

## 2018-11-01 NOTE — ED ADULT TRIAGE NOTE - ARRIVAL INFO ADDITIONAL COMMENTS
EMS reports patient uoxygen originally 74 % RA improved after treatments. Critical Care attending evaluated patient in ambulance bay prior to room assignment

## 2018-11-01 NOTE — ED ADULT NURSE REASSESSMENT NOTE - NS ED NURSE REASSESS COMMENT FT1
Patient complaining of SOB and cough [id number] with no o2 at homed, Lung sounds rhonchi, crackles and exp wheezing noted. o2 radames 92% on 3l nc.

## 2018-11-02 ENCOUNTER — TRANSCRIPTION ENCOUNTER (OUTPATIENT)
Age: 68
End: 2018-11-02

## 2018-11-02 DIAGNOSIS — R22.1 LOCALIZED SWELLING, MASS AND LUMP, NECK: Chronic | ICD-10-CM

## 2018-11-02 PROBLEM — F32.9 MAJOR DEPRESSIVE DISORDER, SINGLE EPISODE, UNSPECIFIED: Chronic | Status: ACTIVE | Noted: 2018-06-08

## 2018-11-02 PROBLEM — G89.29 OTHER CHRONIC PAIN: Chronic | Status: ACTIVE | Noted: 2018-06-08

## 2018-11-02 LAB
CK MB CFR SERPL CALC: 2.6 NG/ML — SIGNIFICANT CHANGE UP (ref 0.6–6.3)
CK SERPL-CCNC: 26 U/L — SIGNIFICANT CHANGE UP (ref 0–225)
TROPONIN T SERPL-MCNC: <0.01 NG/ML — SIGNIFICANT CHANGE UP

## 2018-11-02 RX ORDER — OXYCODONE HYDROCHLORIDE 5 MG/1
5 TABLET ORAL
Qty: 0 | Refills: 0 | Status: DISCONTINUED | OUTPATIENT
Start: 2018-11-02 | End: 2018-11-04

## 2018-11-02 RX ORDER — PANTOPRAZOLE SODIUM 20 MG/1
40 TABLET, DELAYED RELEASE ORAL
Qty: 0 | Refills: 0 | Status: DISCONTINUED | OUTPATIENT
Start: 2018-11-02 | End: 2018-11-09

## 2018-11-02 RX ORDER — ALBUTEROL 90 UG/1
0 AEROSOL, METERED ORAL
Qty: 255 | Refills: 0 | COMMUNITY

## 2018-11-02 RX ORDER — FLUTICASONE FUROATE AND VILANTEROL TRIFENATATE 100; 25 UG/1; UG/1
0 POWDER RESPIRATORY (INHALATION)
Qty: 60 | Refills: 0 | COMMUNITY

## 2018-11-02 RX ORDER — BUDESONIDE AND FORMOTEROL FUMARATE DIHYDRATE 160; 4.5 UG/1; UG/1
2 AEROSOL RESPIRATORY (INHALATION)
Qty: 0 | Refills: 0 | Status: DISCONTINUED | OUTPATIENT
Start: 2018-11-02 | End: 2018-11-02

## 2018-11-02 RX ORDER — IPRATROPIUM/ALBUTEROL SULFATE 18-103MCG
3 AEROSOL WITH ADAPTER (GRAM) INHALATION EVERY 6 HOURS
Qty: 0 | Refills: 0 | Status: DISCONTINUED | OUTPATIENT
Start: 2018-11-02 | End: 2018-11-02

## 2018-11-02 RX ORDER — BUDESONIDE, MICRONIZED 100 %
0.5 POWDER (GRAM) MISCELLANEOUS EVERY 12 HOURS
Qty: 0 | Refills: 0 | Status: DISCONTINUED | OUTPATIENT
Start: 2018-11-02 | End: 2018-11-09

## 2018-11-02 RX ORDER — AZITHROMYCIN 500 MG/1
500 TABLET, FILM COATED ORAL ONCE
Qty: 0 | Refills: 0 | Status: COMPLETED | OUTPATIENT
Start: 2018-11-02 | End: 2018-11-02

## 2018-11-02 RX ORDER — IPRATROPIUM/ALBUTEROL SULFATE 18-103MCG
0 AEROSOL WITH ADAPTER (GRAM) INHALATION
Qty: 4 | Refills: 0 | COMMUNITY

## 2018-11-02 RX ORDER — ALPRAZOLAM 0.25 MG
0.5 TABLET ORAL ONCE
Qty: 0 | Refills: 0 | Status: DISCONTINUED | OUTPATIENT
Start: 2018-11-02 | End: 2018-11-02

## 2018-11-02 RX ORDER — IPRATROPIUM/ALBUTEROL SULFATE 18-103MCG
3 AEROSOL WITH ADAPTER (GRAM) INHALATION EVERY 6 HOURS
Qty: 0 | Refills: 0 | Status: DISCONTINUED | OUTPATIENT
Start: 2018-11-02 | End: 2018-11-04

## 2018-11-02 RX ORDER — ENOXAPARIN SODIUM 100 MG/ML
40 INJECTION SUBCUTANEOUS DAILY
Qty: 0 | Refills: 0 | Status: DISCONTINUED | OUTPATIENT
Start: 2018-11-02 | End: 2018-11-09

## 2018-11-02 RX ORDER — IPRATROPIUM/ALBUTEROL SULFATE 18-103MCG
3 AEROSOL WITH ADAPTER (GRAM) INHALATION ONCE
Qty: 0 | Refills: 0 | Status: COMPLETED | OUTPATIENT
Start: 2018-11-02 | End: 2018-11-02

## 2018-11-02 RX ORDER — ALPRAZOLAM 0.25 MG
0.5 TABLET ORAL EVERY 12 HOURS
Qty: 0 | Refills: 0 | Status: DISCONTINUED | OUTPATIENT
Start: 2018-11-02 | End: 2018-11-04

## 2018-11-02 RX ORDER — NICOTINE POLACRILEX 2 MG
1 GUM BUCCAL DAILY
Qty: 0 | Refills: 0 | Status: DISCONTINUED | OUTPATIENT
Start: 2018-11-02 | End: 2018-11-09

## 2018-11-02 RX ORDER — TRAZODONE HCL 50 MG
50 TABLET ORAL AT BEDTIME
Qty: 0 | Refills: 0 | Status: DISCONTINUED | OUTPATIENT
Start: 2018-11-02 | End: 2018-11-09

## 2018-11-02 RX ADMIN — AZITHROMYCIN 255 MILLIGRAM(S): 500 TABLET, FILM COATED ORAL at 01:59

## 2018-11-02 RX ADMIN — OXYCODONE HYDROCHLORIDE 5 MILLIGRAM(S): 5 TABLET ORAL at 18:03

## 2018-11-02 RX ADMIN — Medication 3 MILLILITER(S): at 01:03

## 2018-11-02 RX ADMIN — Medication 1 PATCH: at 17:07

## 2018-11-02 RX ADMIN — Medication 0.5 MILLIGRAM(S): at 21:54

## 2018-11-02 RX ADMIN — OXYCODONE HYDROCHLORIDE 5 MILLIGRAM(S): 5 TABLET ORAL at 11:16

## 2018-11-02 RX ADMIN — OXYCODONE HYDROCHLORIDE 5 MILLIGRAM(S): 5 TABLET ORAL at 10:13

## 2018-11-02 RX ADMIN — Medication 60 MILLIGRAM(S): at 21:54

## 2018-11-02 RX ADMIN — Medication 50 MILLIGRAM(S): at 21:54

## 2018-11-02 RX ADMIN — Medication 1 PATCH: at 11:16

## 2018-11-02 RX ADMIN — PANTOPRAZOLE SODIUM 40 MILLIGRAM(S): 20 TABLET, DELAYED RELEASE ORAL at 10:13

## 2018-11-02 RX ADMIN — Medication 60 MILLIGRAM(S): at 14:57

## 2018-11-02 RX ADMIN — BUDESONIDE AND FORMOTEROL FUMARATE DIHYDRATE 2 PUFF(S): 160; 4.5 AEROSOL RESPIRATORY (INHALATION) at 10:07

## 2018-11-02 RX ADMIN — Medication 3 MILLILITER(S): at 14:58

## 2018-11-02 RX ADMIN — OXYCODONE HYDROCHLORIDE 5 MILLIGRAM(S): 5 TABLET ORAL at 17:11

## 2018-11-02 RX ADMIN — Medication 0.5 MILLIGRAM(S): at 10:12

## 2018-11-02 RX ADMIN — Medication 60 MILLIGRAM(S): at 10:13

## 2018-11-02 NOTE — CONSULT NOTE ADULT - ASSESSMENT
67 y/o F w PMH COPD, depression, anxiety presents for progressively worsening SOB for the last 2 weeks not resolved with home nebulizer treatments or inhalers.      Impression  COPD exacerbation      Plan  monitor pulse oxygen   HOB 45 degrees   IV solumedrol 60 q12h  Symbicort, Spiriva and Duoneb q6h 67 y/o F w PMH COPD, depression, anxiety presents for progressively worsening SOB for the last 2 weeks not resolved with home nebulizer treatments or inhalers.      Impression  Acute COPD exacerbation  Anxiety      Plan  monitor pulse oxygen   supplemental o2 if O2 sat is less than 88  smoking cessation   HOB 45 degrees   IV solumedrol 60 q12h  Symbicort, Spiriva and Duoneb q6h  IV antibiotics and switch to PO on DC  f/u with pulmonary as OP 69 y/o F w PMH COPD, depression, anxiety presents for progressively worsening SOB for the last 2 weeks not resolved with home nebulizer treatments or inhalers.      Impression  Acute COPD exacerbation  Anxiety      Plan  monitor pulse oxygen   supplemental o2 if O2 sat is less than 88  smoking cessation   HOB 45 degrees   IV solumedrol 60 q8h for now then taper when improving  budesonide via nebulizer 0.5 in 2cc q12h and Duoneb q4h and PRN  IV antibiotics and switch to PO on DC  f/u with pulmonary as OP

## 2018-11-02 NOTE — DISCHARGE NOTE ADULT - PLAN OF CARE
medical management, follow up with pulm outpatient you came to the hospital because you were feeling short of breath and you were admitted for treatment of COPD exacerbation. You were treated with antibiotics and IV steroids which were eventually tapered down to PO steroids.   You will be prescribed a prednisone taper on discharge (60x1 day, 40 x 3 days, 20 x 3 days)  You should also continue to take the Budesonide inhaler two times a day   You should follow up with your pulmonologist within 1-2 weeks of discharge. you came to the hospital because you were feeling short of breath and you were admitted for treatment of COPD exacerbation. You were treated with antibiotics and IV steroids which were eventually tapered down to PO steroids.   You will be prescribed a prednisone taper on discharge (60x1 day, 40 x 3 days, 20 x 3 days)  You should also continue to take the Combivent inhaler two times a day   You should follow up with your pulmonologist within 1-2 weeks of discharge.

## 2018-11-02 NOTE — DISCHARGE NOTE ADULT - PATIENT PORTAL LINK FT
You can access the [facility] Patient Portal, offered by [facility], by registering with the following website: [email or web address]

## 2018-11-02 NOTE — ED PROVIDER NOTE - ATTENDING CONTRIBUTION TO CARE
67 yo f hx copd, depression, anxiety here for prog worsening SOB. sx x 2 months. + increase in cough/sputum. no cp, n,v,f,c,ap. pt s/p 3 nebs and decadron 10mg by EMS. pt states she feels much better than before.    vss  card-rrr  lungs-diffuse wheezing, mild resp distress, accessory muscle use. speaking mid length sentences  abd-sntnd  neuro- nonfocal    copd exacerbation, r/o pna, r/o atypical acs  will need admission

## 2018-11-02 NOTE — ED PROVIDER NOTE - NS ED ROS FT
Constitutional: No fever or chills. Normal appetite. No unintended weight loss.   Eyes: No vision changes.  ENT: No hearing changes. No ear pain. No sore throat.  Neck: No neck pain or stiffness.  Cardiovascular: No chest pain, palpitations, or edema.  Pulmonary: No hemoptysis.  Abdominal: No abdominal pain, nausea, vomiting, or diarrhea.   : No dysuria or frequency. No hematuria.   Neuro: No headache, syncope, or dizziness.  MS: No back pain. No calf pain/swelling.  Psych: No suicidal or homicidal ideations.

## 2018-11-02 NOTE — H&P ADULT - NSHPPHYSICALEXAM_GEN_ALL_CORE
General: overweight woman, sitting on edge of bed, struggling to pull in air but speaking on RA  Cardiac: hard to appreciate due to overlying wheezing   Lungs: +wheezing bilaterally, +coughing  Abd: NTND, +BS  LE: no swelling  Neuro: AAOx3, nonfocal General: overweight woman, sitting on edge of bed, struggling to pull in air but speaking on RA  Cardiac: hard to appreciate due to overlying wheezing   Lungs: +wheezing bilaterally, +coughing, +use of intercostal m  Abd: NTND, +BS  LE: no swelling  Neuro: AAOx3, nonfocal

## 2018-11-02 NOTE — DISCHARGE NOTE ADULT - MEDICATION SUMMARY - MEDICATIONS TO TAKE
I will START or STAY ON the medications listed below when I get home from the hospital:    predniSONE 20 mg oral tablet  -- 2 tab(s) by mouth once a day x 3 days  1 tab(s) by mouth once a day x 3 days  -- Indication: For COPD exacerbation    HYDROcodone 10 mg oral capsule, extended release  -- 1 cap(s) by mouth 4 times a week, As Needed  -- Indication: For pain    TRAZODONE HCL 50 MG TABS  -- Indication: For anxiety    ALPRAZolam 0.5 mg oral tablet  -- 1 tab(s) by mouth every 12 hours  -- Indication: For anxiety    ProAir HFA 90 mcg/inh inhalation aerosol  -- 2 puff(s) inhaled 4 times a day, As Needed -for bronchospasm   -- Indication: For COPD exacerbation    ipratropium-albuterol 0.5 mg-2.5 mg/3 mLinhalation solution  -- 3 milliliter(s) inhaled every 4 hours  -- Indication: For COPD exacerbation    Combivent 18 mcg-103 mcg-/inh inhalation aerosol  -- 1 puff(s) inhaled 4 times a day  -- Indication: For COPD exacerbation    nicotine 14 mg/24 hr transdermal film, extended release  -- 1 patch by transdermal patch once a day  -- Indication: For smoking cessation

## 2018-11-02 NOTE — H&P ADULT - HISTORY OF PRESENT ILLNESS
68y F w PMH COPD, depression, anxiety presents for progressively worsening SOB for the last 2 weeks not resolved with home nebulizer treatments or inhalers. Pt follows w Dr. Chris Iyer whom she spoke with today and told her to come to the ED. On arrival to the ER she was hypertensive to 180/107, HR 94, RR 24, afebrile, sating 86% on RA. Pt has hx of severe COPD and multiple admissions for exacerbations, still smokes. 68y F w PMH COPD, depression, anxiety presents for progressively worsening SOB for the last 2 weeks not resolved with home nebulizer treatments or inhalers. Pt follows w Dr. Chris Iyer whom she spoke with yesterday and he told her to come to the ED. Pt has been taking care of her brother and noticed that her breathing had started to get worse. She experienced increased wheezing, sob on minimal exertion, and her sx were not improved with on/off prednisone. She states she is compliant with all her meds and stopped smoking in June, which was when her last copd ex was, however, she smells very strongly of cigarettes. Pt also endorses a midsternal and R sided chest pain described as an "elephant sitting on her chest." She states she gets it from all the gasping for air, it is worse when her breathing is worse, and improves when her breathing is better. Pt states that she often gets this kind of pain during her exacerbations and that this also feels like her usual copd, but much worse. There is also a component of anxiety, but pt states it is at baseline. OF note, in July pt took herself off on her spiriva respimat, daliresp, and breo bc they were causing her to have too much heart palpitations and worsening the anxiety. Denies fevers, chills, flu like sx. On arrival to the ER she was hypertensive to 180/107, HR 94, RR 24, afebrile, sating 86% on RA. Pt has hx of severe COPD and multiple admissions for exacerbations, still smokes.

## 2018-11-02 NOTE — DISCHARGE NOTE ADULT - HOSPITAL COURSE
68 year old female with COPD, depression, anxiety who came to [facility] for worsening SOB, she was admitted for COPD exacerbation. Problems were as follows:    #COPD exacerbation- improving  - Xray - negative for acute cardiopulmonary disease  - Duonebs q 4 hrs + prn,, budesonide BID  - Was started on IV steroids q8 hours and was tapered down. Switched to PO pred 24 hours prior to discharge,  will continue with Pred taper (60 x3, 40 x3, 20 x3) on discharge  - switch to PO levaquin 500 q 24 hours  - was previously seen by Dr. Carpenter during an admission in July 2018, she was worked up for ischemic causes at that time, which were negative   - encouraged smoking cessation      #HTN- stable since admission    #Anxiety/ depression  - continued home meds (xanax and trazadone)- she should continue these medications on discharge    #Chronic Back pain  - As per patient, and confirmed with pharmacy- she takes oxycodon 10. This was continued inpatient and she can continue this when discharged 68 year old female with COPD, depression, anxiety who came to [facility] for worsening SOB, she was admitted for COPD exacerbation. Problems were as follows:    #COPD exacerbation- improving  - Xray - negative for acute cardiopulmonary disease  - Duonebs q 4 hrs + prn,, budesonide BID  - Was started on IV steroids q8 hours and was tapered down. Switched to PO pred 24 hours prior to discharge,  will continue with Pred taper (40 x3, 20 x3) on discharge  - completed 7 day levaquin dose  - was previously seen by Dr. Carpenter during an admission in July 2018, she was worked up for ischemic causes at that time, which were negative   - encouraged smoking cessation      #HTN- stable since admission    #Anxiety/ depression  - continued home meds (xanax and trazadone)- she should continue these medications on discharge    #Chronic Back pain  - As per patient, and confirmed with pharmacy- she takes oxycodon 10. This was continued inpatient and she can continue this when discharged    Med Rec reviewed with

## 2018-11-02 NOTE — CONSULT NOTE ADULT - SUBJECTIVE AND OBJECTIVE BOX
HPI:  69 y/o F w PMH COPD, depression, anxiety presents for progressively worsening SOB for the last 2 weeks not resolved with home nebulizer treatments or inhalers. Pt follows w Dr. Chris Iyer whom she spoke with yesterday and he told her to come to the ED. Pt has been taking care of her brother and noticed that her breathing had started to get worse. She experienced increased wheezing, sob on minimal exertion, and her sx were not improved with on/off prednisone. She states she is compliant with all her meds and stopped smoking in June, which was when her last copd ex was, however, she smells very strongly of cigarettes. Pt also endorses a midsternal and R sided chest pain described as an "elephant sitting on her chest." She states she gets it from all the gasping for air, it is worse when her breathing is worse, and improves when her breathing is better. Pt states that she often gets this kind of pain during her exacerbations and that this also feels like her usual copd, but much worse. There is also a component of anxiety, but pt states it is at baseline. OF note, in July pt took herself off on her spiriva respimat, daliresp, and breo bc they were causing her to have too much heart palpitations and worsening the anxiety. Denies fevers, chills, flu like sx. On arrival to the ER she was hypertensive to 180/107, HR 94, RR 24, afebrile, sating 86% on RA. Pt has hx of severe COPD and multiple admissions for exacerbations, still smokes. (02 Nov 2018 08:14)      PAST MEDICAL & SURGICAL HISTORY:  Chronic pain  Depression  Anxiety  COPD (chronic obstructive pulmonary disease)  Neck mass      SOCIAL HX:   Smoking        exsmoker                 ETOH           no                  FAMILY HISTORY:  Family history of COPD (chronic obstructive pulmonary disease) (Mother)  .  No cardiovascular or pulmonary family history     Review of System:  See HPI    Allergies    penicillin (Unknown)    Intolerances        PHYSICAL EXAM  Vital Signs Last 24 Hrs  T(C): 36.6 (01 Nov 2018 23:15), Max: 36.6 (01 Nov 2018 23:15)  T(F): 97.8 (01 Nov 2018 23:15), Max: 97.8 (01 Nov 2018 23:15)  HR: 97 (01 Nov 2018 23:15) (94 - 99)  BP: 128/98 (01 Nov 2018 23:15) (128/98 - 180/107)  BP(mean): --  RR: 20 (01 Nov 2018 23:15) (20 - 24)  SpO2: 92% (01 Nov 2018 23:15) (86% - 92%)    General: In NAD  HEENT: ANDRA             Lymphatic system: No cervical LN     Lungs: Santhosh BS  Cardiovascular: Regular  Gastrointestinal: Soft.  + BS   Musculoskeletal: No Clubbing.  Full range of motion.. Moves all extremities  Skin: Warm.  Intact  Neurological: No motor or sensory deficit       LABS:                          15.6   11.48 )-----------( 269      ( 01 Nov 2018 22:25 )             48.9                                               11-01    138  |  97<L>  |  <3<L>  ----------------------------<  143<H>  4.8   |  26  |  0.6<L>    Ca    9.5      01 Nov 2018 22:25    TPro  7.4  /  Alb  3.8  /  TBili  0.4  /  DBili  x   /  AST  16  /  ALT  9   /  AlkPhos  101  11-01                                                 CARDIAC MARKERS ( 01 Nov 2018 22:25 )  x     / <0.01 ng/mL / x     / x     / x        LIVER FUNCTIONS - ( 01 Nov 2018 22:25 )  Alb: 3.8 g/dL / Pro: 7.4 g/dL / ALK PHOS: 101 U/L / ALT: 9 U/L / AST: 16 U/L / GGT: x                                                                                                MEDICATIONS  (STANDING):  ALBUTerol/ipratropium for Nebulization 3 milliLiter(s) Nebulizer every 6 hours  ALPRAZolam 0.5 milliGRAM(s) Oral every 12 hours  buDESOnide 160 MICROgram(s)/formoterol 4.5 MICROgram(s) Inhaler 2 Puff(s) Inhalation two times a day  enoxaparin Injectable 40 milliGRAM(s) SubCutaneous daily  methylPREDNISolone sodium succinate Injectable 60 milliGRAM(s) IV Push every 8 hours  nicotine -  14 mG/24Hr(s) Patch 1 patch Transdermal daily  pantoprazole    Tablet 40 milliGRAM(s) Oral before breakfast  traZODone 50 milliGRAM(s) Oral at bedtime    MEDICATIONS  (PRN):  ALBUTerol/ipratropium for Nebulization 3 milliLiter(s) Nebulizer every 6 hours PRN Shortness of Breath and/or Wheezing  oxyCODONE    IR 5 milliGRAM(s) Oral five times a day PRN Moderate Pain (4 - 6)    < from: Xray Chest 1 View-PORTABLE IMMEDIATE (11.01.18 @ 23:24) >  Impression:      No radiographic evidence of acute cardiopulmonary disease.    < end of copied text > HPI:  69 y/o F w PMH COPD, depression, anxiety presents for progressively worsening SOB for the last 2 weeks not resolved with home nebulizer treatments or inhalers. Pt follows w Dr. Chris Iyer whom she spoke with yesterday and he told her to come to the ED. Pt has been taking care of her brother and noticed that her breathing had started to get worse. She experienced increased wheezing, sob on minimal exertion, and her sx were not improved with on/off prednisone. She states she is compliant with all her meds and stopped smoking in June, which was when her last copd ex was, however, she smells very strongly of cigarettes. Pt also endorses a midsternal and R sided chest pain described as an "elephant sitting on her chest." She states she gets it from all the gasping for air, it is worse when her breathing is worse, and improves when her breathing is better. Pt states that she often gets this kind of pain during her exacerbations and that this also feels like her usual copd, but much worse. There is also a component of anxiety, but pt states it is at baseline. OF note, in July pt took herself off on her spiriva respimat, daliresp, and breo bc they were causing her to have too much heart palpitations and worsening the anxiety. Denies fevers, chills, flu like sx. On arrival to the ER she was hypertensive to 180/107, HR 94, RR 24, afebrile, sating 86% on RA. Pt has hx of severe COPD and multiple admissions for exacerbations, still smokes. (02 Nov 2018 08:14)      PAST MEDICAL & SURGICAL HISTORY:  Chronic pain  Depression  Anxiety  COPD (chronic obstructive pulmonary disease)  Neck mass      SOCIAL HX:   Smoking        exsmoker                 ETOH           no                  FAMILY HISTORY:  Family history of COPD (chronic obstructive pulmonary disease) (Mother)  .  No cardiovascular or pulmonary family history     Review of System:  See HPI    Allergies    penicillin (Unknown)    Intolerances        PHYSICAL EXAM  Vital Signs Last 24 Hrs  T(C): 36.6 (01 Nov 2018 23:15), Max: 36.6 (01 Nov 2018 23:15)  T(F): 97.8 (01 Nov 2018 23:15), Max: 97.8 (01 Nov 2018 23:15)  HR: 97 (01 Nov 2018 23:15) (94 - 99)  BP: 128/98 (01 Nov 2018 23:15) (128/98 - 180/107)  BP(mean): --  RR: 20 (01 Nov 2018 23:15) (20 - 24)  SpO2: 92% (01 Nov 2018 23:15) (86% - 92%)    General: In NAD  HEENT: ANDRA             Lymphatic system: No cervical LN     Lungs: b/l diffuse crackles and expiratory wheezying  Cardiovascular: Regular  Gastrointestinal: Soft.  + BS   Musculoskeletal: No Clubbing.  Full range of motion.. Moves all extremities  Skin: Warm.  Intact  Neurological: No motor or sensory deficit       LABS:                          15.6   11.48 )-----------( 269      ( 01 Nov 2018 22:25 )             48.9                                               11-01    138  |  97<L>  |  <3<L>  ----------------------------<  143<H>  4.8   |  26  |  0.6<L>    Ca    9.5      01 Nov 2018 22:25    TPro  7.4  /  Alb  3.8  /  TBili  0.4  /  DBili  x   /  AST  16  /  ALT  9   /  AlkPhos  101  11-01                                                 CARDIAC MARKERS ( 01 Nov 2018 22:25 )  x     / <0.01 ng/mL / x     / x     / x        LIVER FUNCTIONS - ( 01 Nov 2018 22:25 )  Alb: 3.8 g/dL / Pro: 7.4 g/dL / ALK PHOS: 101 U/L / ALT: 9 U/L / AST: 16 U/L / GGT: x                                                                                                MEDICATIONS  (STANDING):  ALBUTerol/ipratropium for Nebulization 3 milliLiter(s) Nebulizer every 6 hours  ALPRAZolam 0.5 milliGRAM(s) Oral every 12 hours  buDESOnide 160 MICROgram(s)/formoterol 4.5 MICROgram(s) Inhaler 2 Puff(s) Inhalation two times a day  enoxaparin Injectable 40 milliGRAM(s) SubCutaneous daily  methylPREDNISolone sodium succinate Injectable 60 milliGRAM(s) IV Push every 8 hours  nicotine -  14 mG/24Hr(s) Patch 1 patch Transdermal daily  pantoprazole    Tablet 40 milliGRAM(s) Oral before breakfast  traZODone 50 milliGRAM(s) Oral at bedtime    MEDICATIONS  (PRN):  ALBUTerol/ipratropium for Nebulization 3 milliLiter(s) Nebulizer every 6 hours PRN Shortness of Breath and/or Wheezing  oxyCODONE    IR 5 milliGRAM(s) Oral five times a day PRN Moderate Pain (4 - 6)    < from: Xray Chest 1 View-PORTABLE IMMEDIATE (11.01.18 @ 23:24) >  Impression:      No radiographic evidence of acute cardiopulmonary disease.    < end of copied text >

## 2018-11-02 NOTE — H&P ADULT - ASSESSMENT
1. SOB + CP 2/2 COPD ex, r/o cardiac etiology, could be 2/2 pt taking herself off of multiple inhalers in july  -start duonebs q6+prn, symbicort higher dose, solumedrol 60 q8  -supplemental o2 to keep sat 91-92%  -Pulm c/s, pt might need to be d/c on more inhalers for copd maintenance  -trend CE, one set neg  -CXR looks similar to prior one, no obvious shonda  -hold off on checking flu at this time as h+p does not support it    2. Anxiety: c/w xanax  3. Depression: c/w trazodone  4. Smoking cessation: nicotine patch, actively ruling out cardiac dx  5. chronic back pain: c/w prn pain meds (oxy here instead of hydrocodone at home)  6. DVT PPX: lovenox sx  7. GI PPX: protonix 1. SOB + CP 2/2 COPD ex, r/o cardiac etiology, could be 2/2 pt taking herself off of multiple inhalers in july  -start duonebs q6+prn, symbicort higher dose, solumedrol 60 q8  -supplemental o2 to keep sat 91-92%  -Pulm c/s, pt might need to be d/c on more inhalers for copd maintenance  -trend CE, one set neg, ekg nsr  -CXR looks similar to prior one, no obvious shonda  -hold off on checking flu at this time as h+p does not support it    2. Anxiety: c/w xanax  3. Depression: c/w trazodone  4. Smoking cessation: nicotine patch, actively ruling out cardiac dx  5. chronic back pain: c/w prn pain meds (oxy here instead of hydrocodone at home)  6. DVT PPX: lovenox sx  7. GI PPX: protonix 1. SOB + CP 2/2 COPD ex, r/o cardiac etiology, could be 2/2 pt taking herself off of multiple inhalers in july  -start duonebs q6+prn, symbicort higher dose, solumedrol 60 q8, start abx  -supplemental o2 to keep sat 91-92%  -Pulm c/s, pt might need to be d/c on more inhalers for copd maintenance  -trend CE, one set neg, ekg nsr  -CXR looks similar to prior one, no obvious shonda  -hold off on checking flu at this time as h+p does not support it    2. Anxiety: c/w xanax  3. Depression: c/w trazodone  4. Smoking cessation: nicotine patch, actively ruling out cardiac dx  5. chronic back pain: c/w prn pain meds (oxy here instead of hydrocodone at home)  6. DVT PPX: lovenox sx  7. GI PPX: protonix

## 2018-11-02 NOTE — DISCHARGE NOTE ADULT - MEDICATION SUMMARY - MEDICATIONS TO STOP TAKING
I will STOP taking the medications listed below when I get home from the hospital:    HYDROcodone 10 mg oral capsule, extended release  -- 1 cap(s) by mouth 4 times a week, As Needed

## 2018-11-02 NOTE — ED PROVIDER NOTE - OBJECTIVE STATEMENT
68y F w PMH COPD, depression, anxiety presents for progressively worsening SOB for the last 2 weeks not resolved with home nebulizer treatments or inhalers. Pt follows w Dr. Chris Iyer whom she spoke with today and told her to come to the ED. No CP. No n/v/d/f/c. Pt endorses cough with some clear sputum. Got 3 nebulizer treatments and 10 mg of decadron en route to the ED.

## 2018-11-02 NOTE — H&P ADULT - NSHPLABSRESULTS_GEN_ALL_CORE
CARDIAC MARKERS ( 01 Nov 2018 22:25 )  x     / <0.01 ng/mL / x     / x     / x                        15.6   11.48 )-----------( 269      ( 01 Nov 2018 22:25 )             48.9     11-01  138  |  97<L>  |  <3<L>  ----------------------------<  143<H>  4.8   |  26  |  0.6<L>  Ca    9.5      01 Nov 2018 22:25  TPro  7.4  /  Alb  3.8  /  TBili  0.4  /  DBili  x   /  AST  16  /  ALT  9   /  AlkPhos  101  11-01    LIVER FUNCTIONS - ( 01 Nov 2018 22:25 )  Alb: 3.8 g/dL / Pro: 7.4 g/dL / ALK PHOS: 101 U/L / ALT: 9 U/L / AST: 16 U/L / GGT: x

## 2018-11-02 NOTE — ED PROVIDER NOTE - PHYSICAL EXAMINATION
Constitutional: Well developed, well nourished. Mild respiratory distress. Good general hygiene  Head: Atraumatic.  Eyes: EOMI without discomfort.   ENT: No nasal discharge. Mucous membranes moist.  Neck: Supple. Painless ROM.  Cardiovascular: Regular rhythm. Regular rate. Normal S1 and S2. No murmurs. 2+ pulses in all extremities.   Pulmonary: Normal respiratory rate and effort. Diffuse rhonchi and wheezes in all lung fields. No rales. Bilateral, equal lung expansion.   Abdominal: Soft. Nondistended. Nontender. No rebound or guarding.   Extremities: No lower extremity edema. Symmetric calves.  Skin: No rashes.   Neuro: AAOx3. No focal neurological deficits.  Psych: Normal mood. Normal affect.

## 2018-11-02 NOTE — H&P ADULT - NSHPSOCIALHISTORY_GEN_ALL_CORE
Former smoker, used to smoker 1-1.5 packs/day since age 31, quit in July 2018. Social alcohol. No illicit drugs.

## 2018-11-02 NOTE — DISCHARGE NOTE ADULT - CARE PROVIDER_API CALL
London Bermudez), Internal Medicine  [facility] [place] [address]  Phone: [phone]  Fax: [phone]    Juan R Rodriguez), Critical Care Medicine; Geriatric Medicine; Internal Medicine; Pulmonary Disease  [address] [place] [address]  Phone: [phone]  Fax: [phone]

## 2018-11-02 NOTE — DISCHARGE NOTE ADULT - CARE PLAN
Principal Discharge DX:	COPD exacerbation  Goal:	medical management, follow up with pulm outpatient  Assessment and plan of treatment:	you came to the hospital because you were feeling short of breath and you were admitted for treatment of COPD exacerbation. You were treated with antibiotics and IV steroids which were eventually tapered down to PO steroids.   You will be prescribed a prednisone taper on discharge (60x1 day, 40 x 3 days, 20 x 3 days)  You should also continue to take the Budesonide inhaler two times a day   You should follow up with your pulmonologist within 1-2 weeks of discharge. Principal Discharge DX:	COPD exacerbation  Goal:	medical management, follow up with pulm outpatient  Assessment and plan of treatment:	you came to the hospital because you were feeling short of breath and you were admitted for treatment of COPD exacerbation. You were treated with antibiotics and IV steroids which were eventually tapered down to PO steroids.   You will be prescribed a prednisone taper on discharge (60x1 day, 40 x 3 days, 20 x 3 days)  You should also continue to take the Combivent inhaler two times a day   You should follow up with your pulmonologist within 1-2 weeks of discharge.

## 2018-11-03 PROBLEM — G47.00 INSOMNIA, UNSPECIFIED: Chronic | Status: INACTIVE | Noted: 2018-06-08 | Resolved: 2018-11-02

## 2018-11-03 RX ADMIN — Medication 60 MILLIGRAM(S): at 06:11

## 2018-11-03 RX ADMIN — OXYCODONE HYDROCHLORIDE 5 MILLIGRAM(S): 5 TABLET ORAL at 16:38

## 2018-11-03 RX ADMIN — OXYCODONE HYDROCHLORIDE 5 MILLIGRAM(S): 5 TABLET ORAL at 22:30

## 2018-11-03 RX ADMIN — OXYCODONE HYDROCHLORIDE 5 MILLIGRAM(S): 5 TABLET ORAL at 06:11

## 2018-11-03 RX ADMIN — OXYCODONE HYDROCHLORIDE 5 MILLIGRAM(S): 5 TABLET ORAL at 18:58

## 2018-11-03 RX ADMIN — Medication 50 MILLIGRAM(S): at 21:40

## 2018-11-03 RX ADMIN — Medication 1 PATCH: at 06:57

## 2018-11-03 RX ADMIN — Medication 1 PATCH: at 12:38

## 2018-11-03 RX ADMIN — Medication 0.5 MILLIGRAM(S): at 16:39

## 2018-11-03 RX ADMIN — Medication 0.5 MILLIGRAM(S): at 06:11

## 2018-11-03 RX ADMIN — Medication 1 PATCH: at 13:16

## 2018-11-03 RX ADMIN — Medication 3 MILLILITER(S): at 07:47

## 2018-11-03 RX ADMIN — PANTOPRAZOLE SODIUM 40 MILLIGRAM(S): 20 TABLET, DELAYED RELEASE ORAL at 06:11

## 2018-11-03 RX ADMIN — Medication 3 MILLILITER(S): at 13:27

## 2018-11-03 RX ADMIN — Medication 0.5 MILLIGRAM(S): at 21:57

## 2018-11-03 RX ADMIN — OXYCODONE HYDROCHLORIDE 5 MILLIGRAM(S): 5 TABLET ORAL at 21:53

## 2018-11-03 RX ADMIN — Medication 60 MILLIGRAM(S): at 12:40

## 2018-11-03 RX ADMIN — Medication 3 MILLILITER(S): at 22:24

## 2018-11-03 RX ADMIN — Medication 60 MILLIGRAM(S): at 21:40

## 2018-11-03 RX ADMIN — OXYCODONE HYDROCHLORIDE 5 MILLIGRAM(S): 5 TABLET ORAL at 06:41

## 2018-11-03 RX ADMIN — Medication 0.5 MILLIGRAM(S): at 08:31

## 2018-11-04 RX ORDER — IPRATROPIUM/ALBUTEROL SULFATE 18-103MCG
3 AEROSOL WITH ADAPTER (GRAM) INHALATION EVERY 4 HOURS
Qty: 0 | Refills: 0 | Status: DISCONTINUED | OUTPATIENT
Start: 2018-11-04 | End: 2018-11-09

## 2018-11-04 RX ORDER — OXYCODONE HYDROCHLORIDE 5 MG/1
5 TABLET ORAL ONCE
Qty: 0 | Refills: 0 | Status: DISCONTINUED | OUTPATIENT
Start: 2018-11-04 | End: 2018-11-04

## 2018-11-04 RX ORDER — HYDROMORPHONE HYDROCHLORIDE 2 MG/ML
4 INJECTION INTRAMUSCULAR; INTRAVENOUS; SUBCUTANEOUS ONCE
Qty: 0 | Refills: 0 | Status: DISCONTINUED | OUTPATIENT
Start: 2018-11-04 | End: 2018-11-04

## 2018-11-04 RX ORDER — OXYCODONE AND ACETAMINOPHEN 5; 325 MG/1; MG/1
1 TABLET ORAL EVERY 6 HOURS
Qty: 0 | Refills: 0 | Status: DISCONTINUED | OUTPATIENT
Start: 2018-11-04 | End: 2018-11-04

## 2018-11-04 RX ORDER — ALPRAZOLAM 0.25 MG
0.5 TABLET ORAL
Qty: 0 | Refills: 0 | Status: DISCONTINUED | OUTPATIENT
Start: 2018-11-04 | End: 2018-11-09

## 2018-11-04 RX ORDER — OXYCODONE HYDROCHLORIDE 5 MG/1
10 TABLET ORAL EVERY 6 HOURS
Qty: 0 | Refills: 0 | Status: DISCONTINUED | OUTPATIENT
Start: 2018-11-04 | End: 2018-11-09

## 2018-11-04 RX ADMIN — HYDROMORPHONE HYDROCHLORIDE 4 MILLIGRAM(S): 2 INJECTION INTRAMUSCULAR; INTRAVENOUS; SUBCUTANEOUS at 05:16

## 2018-11-04 RX ADMIN — Medication 60 MILLIGRAM(S): at 05:16

## 2018-11-04 RX ADMIN — Medication 1 PATCH: at 11:15

## 2018-11-04 RX ADMIN — OXYCODONE HYDROCHLORIDE 10 MILLIGRAM(S): 5 TABLET ORAL at 23:26

## 2018-11-04 RX ADMIN — Medication 3 MILLILITER(S): at 19:50

## 2018-11-04 RX ADMIN — Medication 3 MILLILITER(S): at 13:27

## 2018-11-04 RX ADMIN — Medication 60 MILLIGRAM(S): at 21:47

## 2018-11-04 RX ADMIN — Medication 50 MILLIGRAM(S): at 21:47

## 2018-11-04 RX ADMIN — Medication 1 PATCH: at 07:10

## 2018-11-04 RX ADMIN — Medication 0.5 MILLIGRAM(S): at 21:47

## 2018-11-04 RX ADMIN — Medication 60 MILLIGRAM(S): at 14:29

## 2018-11-04 RX ADMIN — Medication 3 MILLILITER(S): at 07:23

## 2018-11-04 RX ADMIN — OXYCODONE AND ACETAMINOPHEN 1 TABLET(S): 5; 325 TABLET ORAL at 11:51

## 2018-11-04 RX ADMIN — Medication 3 MILLILITER(S): at 15:50

## 2018-11-04 RX ADMIN — Medication 1 PATCH: at 11:31

## 2018-11-04 RX ADMIN — Medication 0.5 MILLIGRAM(S): at 08:41

## 2018-11-04 RX ADMIN — Medication 0.5 MILLIGRAM(S): at 20:22

## 2018-11-04 RX ADMIN — OXYCODONE HYDROCHLORIDE 10 MILLIGRAM(S): 5 TABLET ORAL at 17:03

## 2018-11-04 RX ADMIN — PANTOPRAZOLE SODIUM 40 MILLIGRAM(S): 20 TABLET, DELAYED RELEASE ORAL at 05:17

## 2018-11-04 RX ADMIN — Medication 0.5 MILLIGRAM(S): at 05:16

## 2018-11-04 NOTE — PROGRESS NOTE ADULT - ASSESSMENT
KUSH RINALDI 68y Female  MRN#: [id number]       SUBJECTIVE  Patient is a 68y old Female who presents with a chief complaint of copd ex (04 Nov 2018 11:48)  Currently admitted to medicine with the primary diagnosis of COPD exacerbation    Today is hospital day 2d, no acute events overnight. She is still complaining of shortness of breath and she has dyspnea of exertion.     OBJECTIVE  PAST MEDICAL & SURGICAL HISTORY  Chronic pain  Depression  Anxiety  COPD (chronic obstructive pulmonary disease)  Neck mass    ALLERGIES:  penicillin (Unknown)    MEDICATIONS:  STANDING MEDICATIONS  ALBUTerol/ipratropium for Nebulization 3 milliLiter(s) Nebulizer every 4 hours  ALPRAZolam 0.5 milliGRAM(s) Oral every 12 hours  buDESOnide   0.5 milliGRAM(s) Respule 0.5 milliGRAM(s) Inhalation every 12 hours  enoxaparin Injectable 40 milliGRAM(s) SubCutaneous daily  levoFLOXacin IVPB      levoFLOXacin IVPB 500 milliGRAM(s) IV Intermittent every 24 hours  methylPREDNISolone sodium succinate Injectable 60 milliGRAM(s) IV Push every 8 hours  nicotine -  14 mG/24Hr(s) Patch 1 patch Transdermal daily  oxyCODONE    IR 5 milliGRAM(s) Oral once  pantoprazole    Tablet 40 milliGRAM(s) Oral before breakfast  traZODone 50 milliGRAM(s) Oral at bedtime    PRN MEDICATIONS  oxyCODONE    IR 10 milliGRAM(s) Oral every 6 hours PRN      VITAL SIGNS: Last 24 Hours  T(C): 35.7 (04 Nov 2018 13:54), Max: 36.2 (03 Nov 2018 20:27)  T(F): 96.2 (04 Nov 2018 13:54), Max: 97.1 (03 Nov 2018 20:27)  HR: 100 (04 Nov 2018 13:54) (76 - 100)  BP: 149/77 (04 Nov 2018 13:54) (134/64 - 153/85)  BP(mean): --  RR: 17 (04 Nov 2018 13:54) (17 - 18)  SpO2: 95% (04 Nov 2018 08:00) (93% - 95%)    PHYSICAL EXAM:    GENERAL: NAD, well-developed, AAOx3  HEENT:  Atraumatic, Normocephalic. EOMI, PERRLA, conjunctiva and sclera clear, No JVD  PULMONARY: harsh inspiratory/expiratory wheezing b/l, + coughing, using accessory muscles to breath  CARDIOVASCULAR: Regular rate and rhythm; No murmurs, rubs, or gallops  GASTROINTESTINAL: Soft, Nontender, Nondistended; Bowel sounds present  MUSCULOSKELETAL:  2+ Peripheral Pulses, No edema  Neuro: AAOX3, nonfocal       LABS:    RADIOLOGY:  Xray Chest 1 View-PORTABLE IMMEDIATE (11.01.18 @ 23:24) >  Impression:      No radiographic evidence of acute cardiopulmonary disease.      EKG (11/2/18)- Normal sinus rhtyhm     ADMISSION SUMMARY  Patient is a 68y old Female who presents with a chief complaint of copd ex (04 Nov 2018 11:48)  Currently admitted to medicine with the primary diagnosis of COPD exacerbation        ASSESSMENT & PLAN    #COPD exacerbation- still having SOB and dyspnea with exertion  - Duonebs q 4 hrs + prn,, budesonide BID  - Solumedrol 60 q 8 hours  - cw levaquin 500 q 24 hours  - Appreciate pulm recs  - will consider cardiology consults to help rule out ischemic causes  - Keep O2 > 88%    #HTN- stable since admission    #Anxiety  cw home xanax    #depression  cw trazadone (home med)    #Smoking cessation   nicotine patch    #Chronic Back pain  - As per patient, and confirmed with pharmacy- she takes oxycodon 10    #DVT ppx: lovenox  # GI ppx: protonix  #Diet: DASH/TLC diet  #Activity: ambulate as tolerated  #Code: FULL

## 2018-11-05 LAB
ALBUMIN SERPL ELPH-MCNC: 3.6 G/DL — SIGNIFICANT CHANGE UP (ref 3.5–5.2)
ALP SERPL-CCNC: 55 U/L — SIGNIFICANT CHANGE UP (ref 30–115)
ALT FLD-CCNC: 10 U/L — SIGNIFICANT CHANGE UP (ref 0–41)
ANION GAP SERPL CALC-SCNC: 12 MMOL/L — SIGNIFICANT CHANGE UP (ref 7–14)
ANION GAP SERPL CALC-SCNC: 17 MMOL/L — HIGH (ref 7–14)
AST SERPL-CCNC: 11 U/L — SIGNIFICANT CHANGE UP (ref 0–41)
BILIRUB SERPL-MCNC: <0.2 MG/DL — SIGNIFICANT CHANGE UP (ref 0.2–1.2)
BUN SERPL-MCNC: 7 MG/DL — LOW (ref 10–20)
BUN SERPL-MCNC: 7 MG/DL — LOW (ref 10–20)
CALCIUM SERPL-MCNC: 8.7 MG/DL — SIGNIFICANT CHANGE UP (ref 8.5–10.1)
CALCIUM SERPL-MCNC: 9.1 MG/DL — SIGNIFICANT CHANGE UP (ref 8.5–10.1)
CHLORIDE SERPL-SCNC: 87 MMOL/L — LOW (ref 98–110)
CHLORIDE SERPL-SCNC: 94 MMOL/L — LOW (ref 98–110)
CO2 SERPL-SCNC: 27 MMOL/L — SIGNIFICANT CHANGE UP (ref 17–32)
CO2 SERPL-SCNC: 29 MMOL/L — SIGNIFICANT CHANGE UP (ref 17–32)
CREAT SERPL-MCNC: 0.5 MG/DL — LOW (ref 0.7–1.5)
CREAT SERPL-MCNC: 0.5 MG/DL — LOW (ref 0.7–1.5)
GLUCOSE SERPL-MCNC: 127 MG/DL — HIGH (ref 70–99)
GLUCOSE SERPL-MCNC: 141 MG/DL — HIGH (ref 70–99)
HCT VFR BLD CALC: 46.7 % — SIGNIFICANT CHANGE UP (ref 37–47)
HGB BLD-MCNC: 15 G/DL — SIGNIFICANT CHANGE UP (ref 12–16)
MAGNESIUM SERPL-MCNC: 2.3 MG/DL — SIGNIFICANT CHANGE UP (ref 1.8–2.4)
MCHC RBC-ENTMCNC: 29.6 PG — SIGNIFICANT CHANGE UP (ref 27–31)
MCHC RBC-ENTMCNC: 32.1 G/DL — SIGNIFICANT CHANGE UP (ref 32–37)
MCV RBC AUTO: 92.3 FL — SIGNIFICANT CHANGE UP (ref 81–99)
NRBC # BLD: 0 /100 WBCS — SIGNIFICANT CHANGE UP (ref 0–0)
PLATELET # BLD AUTO: 289 K/UL — SIGNIFICANT CHANGE UP (ref 130–400)
POTASSIUM SERPL-MCNC: 5.3 MMOL/L — HIGH (ref 3.5–5)
POTASSIUM SERPL-MCNC: 5.5 MMOL/L — HIGH (ref 3.5–5)
POTASSIUM SERPL-SCNC: 5.3 MMOL/L — HIGH (ref 3.5–5)
POTASSIUM SERPL-SCNC: 5.5 MMOL/L — HIGH (ref 3.5–5)
PROT SERPL-MCNC: 6.3 G/DL — SIGNIFICANT CHANGE UP (ref 6–8)
RBC # BLD: 5.06 M/UL — SIGNIFICANT CHANGE UP (ref 4.2–5.4)
RBC # FLD: 15.6 % — HIGH (ref 11.5–14.5)
SODIUM SERPL-SCNC: 131 MMOL/L — LOW (ref 135–146)
SODIUM SERPL-SCNC: 135 MMOL/L — SIGNIFICANT CHANGE UP (ref 135–146)
WBC # BLD: 15.53 K/UL — HIGH (ref 4.8–10.8)
WBC # FLD AUTO: 15.53 K/UL — HIGH (ref 4.8–10.8)

## 2018-11-05 RX ADMIN — Medication 1 PATCH: at 07:12

## 2018-11-05 RX ADMIN — Medication 1 PATCH: at 11:07

## 2018-11-05 RX ADMIN — Medication 0.5 MILLIGRAM(S): at 21:10

## 2018-11-05 RX ADMIN — Medication 1 PATCH: at 11:08

## 2018-11-05 RX ADMIN — OXYCODONE HYDROCHLORIDE 10 MILLIGRAM(S): 5 TABLET ORAL at 18:18

## 2018-11-05 RX ADMIN — OXYCODONE HYDROCHLORIDE 10 MILLIGRAM(S): 5 TABLET ORAL at 18:54

## 2018-11-05 RX ADMIN — Medication 50 MILLIGRAM(S): at 21:10

## 2018-11-05 RX ADMIN — OXYCODONE HYDROCHLORIDE 10 MILLIGRAM(S): 5 TABLET ORAL at 12:03

## 2018-11-05 RX ADMIN — Medication 1 PATCH: at 18:54

## 2018-11-05 RX ADMIN — Medication 3 MILLILITER(S): at 11:43

## 2018-11-05 RX ADMIN — Medication 3 MILLILITER(S): at 07:56

## 2018-11-05 RX ADMIN — PANTOPRAZOLE SODIUM 40 MILLIGRAM(S): 20 TABLET, DELAYED RELEASE ORAL at 06:04

## 2018-11-05 RX ADMIN — OXYCODONE HYDROCHLORIDE 10 MILLIGRAM(S): 5 TABLET ORAL at 12:44

## 2018-11-05 RX ADMIN — OXYCODONE HYDROCHLORIDE 10 MILLIGRAM(S): 5 TABLET ORAL at 06:18

## 2018-11-05 RX ADMIN — Medication 60 MILLIGRAM(S): at 06:04

## 2018-11-05 RX ADMIN — Medication 60 MILLIGRAM(S): at 13:22

## 2018-11-05 RX ADMIN — Medication 3 MILLILITER(S): at 19:15

## 2018-11-05 RX ADMIN — Medication 0.5 MILLIGRAM(S): at 09:53

## 2018-11-05 RX ADMIN — Medication 3 MILLILITER(S): at 15:48

## 2018-11-05 RX ADMIN — Medication 60 MILLIGRAM(S): at 21:10

## 2018-11-05 NOTE — PROGRESS NOTE ADULT - ASSESSMENT
KUSH RINALDI 68y Female  MRN#: [id number]     SUBJECTIVE  Patient is a 68y old Female who presents with a chief complaint of copd ex (04 Nov 2018 11:48)  Currently admitted to medicine with the primary diagnosis of COPD exacerbation    Today is hospital day 3d, no acute events overnight. She still reports shortness of breath and dyspnea with exertion, but reports some subjective improvement in her overall respiratory status.       OBJECTIVE  PAST MEDICAL & SURGICAL HISTORY  Chronic pain  Depression  Anxiety  COPD (chronic obstructive pulmonary disease)  Neck mass    ALLERGIES:  penicillin (Unknown)    MEDICATIONS:  STANDING MEDICATIONS  ALBUTerol/ipratropium for Nebulization 3 milliLiter(s) Nebulizer every 4 hours  ALPRAZolam 0.5 milliGRAM(s) Oral <User Schedule>  buDESOnide   0.5 milliGRAM(s) Respule 0.5 milliGRAM(s) Inhalation every 12 hours  enoxaparin Injectable 40 milliGRAM(s) SubCutaneous daily  levoFLOXacin IVPB      levoFLOXacin IVPB 500 milliGRAM(s) IV Intermittent every 24 hours  methylPREDNISolone sodium succinate Injectable 60 milliGRAM(s) IV Push every 8 hours  nicotine -  14 mG/24Hr(s) Patch 1 patch Transdermal daily  pantoprazole    Tablet 40 milliGRAM(s) Oral before breakfast  traZODone 50 milliGRAM(s) Oral at bedtime    PRN MEDICATIONS  oxyCODONE    IR 10 milliGRAM(s) Oral every 6 hours PRN      VITAL SIGNS: Last 24 Hours  T(C): 36 (05 Nov 2018 04:32), Max: 36.4 (04 Nov 2018 20:00)  T(F): 96.8 (05 Nov 2018 04:32), Max: 97.6 (04 Nov 2018 20:00)  HR: 76 (05 Nov 2018 04:32) (76 - 100)  BP: 145/83 (05 Nov 2018 04:32) (145/83 - 154/69)  BP(mean): --  RR: 18 (05 Nov 2018 04:32) (17 - 18)  SpO2: --    O2 saturation: 94-95% on RA, 92-93% with exertion on RA    PHYSICAL EXAM:    GENERAL: NAD, well-developed, AAOx3  HEENT:  Atraumatic, Normocephalic. EOMI, PERRLA, conjunctiva and sclera clear  PULMONARY: harsh inspiratory/expiratory wheezing b/l, + coughing, using accessory muscles to breath  CARDIOVASCULAR: Regular rate and rhythm; No murmurs, rubs, or gallops  GASTROINTESTINAL: Soft, Nontender, Nondistended; Bowel sounds present  MUSCULOSKELETAL:  2+ Peripheral Pulses, No edema  Neuro: AAOX3, nonfocal       LABS:    Xray Chest 1 View-PORTABLE IMMEDIATE (11.01.18 @ 23:24) >  Impression:      No radiographic evidence of acute cardiopulmonary disease.      EKG (11/2/18)- Normal sinus rhythm     ADMISSION SUMMARY  Patient is a 68y old Female who presents with a chief complaint of copd ex (04 Nov 2018 11:48)  Currently admitted to medicine with the primary diagnosis of COPD exacerbation        ASSESSMENT & PLAN    #COPD exacerbation- still having SOB and dyspnea with exertion  - Duonebs q 4 hrs + prn,, budesonide BID  - Solumedrol 60 q 8 hours- will transition to BID tomorrow   - cw levaquin 500 q 24 hours  - Appreciate pulm recs (O/P Dr. Rodriguez)  - was previously seen by Dr. Carpenter during an admission in July 2018, she was worked up for ischemic causes at that time, which were negative   - Keep O2 > 88%    #HTN- stable since admission    #Anxiety  cw home xanax    #depression  cw trazadone (home med)    #Smoking cessation   nicotine patch    #Chronic Back pain  - As per patient, and confirmed with pharmacy- she takes oxycodon 10    #DVT ppx: lovenox  # GI ppx: protonix  #Diet: DASH/TLC diet  #Activity: ambulate as tolerated  #Code: FULL

## 2018-11-06 LAB
ANION GAP SERPL CALC-SCNC: 10 MMOL/L — SIGNIFICANT CHANGE UP (ref 7–14)
ANION GAP SERPL CALC-SCNC: 11 MMOL/L — SIGNIFICANT CHANGE UP (ref 7–14)
BUN SERPL-MCNC: 8 MG/DL — LOW (ref 10–20)
BUN SERPL-MCNC: 8 MG/DL — LOW (ref 10–20)
CALCIUM SERPL-MCNC: 9.4 MG/DL — SIGNIFICANT CHANGE UP (ref 8.5–10.1)
CALCIUM SERPL-MCNC: 9.4 MG/DL — SIGNIFICANT CHANGE UP (ref 8.5–10.1)
CHLORIDE SERPL-SCNC: 87 MMOL/L — LOW (ref 98–110)
CHLORIDE SERPL-SCNC: 93 MMOL/L — LOW (ref 98–110)
CO2 SERPL-SCNC: 31 MMOL/L — SIGNIFICANT CHANGE UP (ref 17–32)
CO2 SERPL-SCNC: 33 MMOL/L — HIGH (ref 17–32)
CREAT SERPL-MCNC: 0.5 MG/DL — LOW (ref 0.7–1.5)
CREAT SERPL-MCNC: 0.6 MG/DL — LOW (ref 0.7–1.5)
GLUCOSE SERPL-MCNC: 113 MG/DL — HIGH (ref 70–99)
GLUCOSE SERPL-MCNC: 143 MG/DL — HIGH (ref 70–99)
HCT VFR BLD CALC: 46 % — SIGNIFICANT CHANGE UP (ref 37–47)
HGB BLD-MCNC: 14.6 G/DL — SIGNIFICANT CHANGE UP (ref 12–16)
MAGNESIUM SERPL-MCNC: 2.3 MG/DL — SIGNIFICANT CHANGE UP (ref 1.8–2.4)
MCHC RBC-ENTMCNC: 29.2 PG — SIGNIFICANT CHANGE UP (ref 27–31)
MCHC RBC-ENTMCNC: 31.7 G/DL — LOW (ref 32–37)
MCV RBC AUTO: 92 FL — SIGNIFICANT CHANGE UP (ref 81–99)
NRBC # BLD: 0 /100 WBCS — SIGNIFICANT CHANGE UP (ref 0–0)
PLATELET # BLD AUTO: 285 K/UL — SIGNIFICANT CHANGE UP (ref 130–400)
POTASSIUM SERPL-MCNC: 5.8 MMOL/L — HIGH (ref 3.5–5)
POTASSIUM SERPL-MCNC: 6.5 MMOL/L — CRITICAL HIGH (ref 3.5–5)
POTASSIUM SERPL-SCNC: 5.8 MMOL/L — HIGH (ref 3.5–5)
POTASSIUM SERPL-SCNC: 6.5 MMOL/L — CRITICAL HIGH (ref 3.5–5)
RBC # BLD: 5 M/UL — SIGNIFICANT CHANGE UP (ref 4.2–5.4)
RBC # FLD: 15.2 % — HIGH (ref 11.5–14.5)
SODIUM SERPL-SCNC: 130 MMOL/L — LOW (ref 135–146)
SODIUM SERPL-SCNC: 135 MMOL/L — SIGNIFICANT CHANGE UP (ref 135–146)
WBC # BLD: 14.14 K/UL — HIGH (ref 4.8–10.8)
WBC # FLD AUTO: 14.14 K/UL — HIGH (ref 4.8–10.8)

## 2018-11-06 RX ORDER — INSULIN HUMAN 100 [IU]/ML
10 INJECTION, SOLUTION SUBCUTANEOUS ONCE
Qty: 0 | Refills: 0 | Status: COMPLETED | OUTPATIENT
Start: 2018-11-06 | End: 2018-11-06

## 2018-11-06 RX ORDER — CLOTRIMAZOLE 10 MG
1 TROCHE MUCOUS MEMBRANE
Qty: 0 | Refills: 0 | Status: DISCONTINUED | OUTPATIENT
Start: 2018-11-06 | End: 2018-11-09

## 2018-11-06 RX ORDER — CHLORHEXIDINE GLUCONATE 213 G/1000ML
1 SOLUTION TOPICAL
Qty: 0 | Refills: 0 | Status: DISCONTINUED | OUTPATIENT
Start: 2018-11-06 | End: 2018-11-09

## 2018-11-06 RX ORDER — DEXTROSE 50 % IN WATER 50 %
50 SYRINGE (ML) INTRAVENOUS ONCE
Qty: 0 | Refills: 0 | Status: COMPLETED | OUTPATIENT
Start: 2018-11-06 | End: 2018-11-06

## 2018-11-06 RX ADMIN — Medication 60 MILLIGRAM(S): at 05:06

## 2018-11-06 RX ADMIN — Medication 50 MILLILITER(S): at 20:36

## 2018-11-06 RX ADMIN — Medication 1 PATCH: at 13:16

## 2018-11-06 RX ADMIN — OXYCODONE HYDROCHLORIDE 10 MILLIGRAM(S): 5 TABLET ORAL at 00:49

## 2018-11-06 RX ADMIN — Medication 1 PATCH: at 06:01

## 2018-11-06 RX ADMIN — OXYCODONE HYDROCHLORIDE 10 MILLIGRAM(S): 5 TABLET ORAL at 18:25

## 2018-11-06 RX ADMIN — Medication 1 PATCH: at 12:14

## 2018-11-06 RX ADMIN — INSULIN HUMAN 10 UNIT(S): 100 INJECTION, SOLUTION SUBCUTANEOUS at 20:35

## 2018-11-06 RX ADMIN — Medication 3 MILLILITER(S): at 16:05

## 2018-11-06 RX ADMIN — Medication 3 MILLILITER(S): at 11:50

## 2018-11-06 RX ADMIN — Medication 0.5 MILLIGRAM(S): at 21:05

## 2018-11-06 RX ADMIN — Medication 3 MILLILITER(S): at 07:48

## 2018-11-06 RX ADMIN — OXYCODONE HYDROCHLORIDE 10 MILLIGRAM(S): 5 TABLET ORAL at 06:51

## 2018-11-06 RX ADMIN — OXYCODONE HYDROCHLORIDE 10 MILLIGRAM(S): 5 TABLET ORAL at 06:21

## 2018-11-06 RX ADMIN — Medication 50 MILLIGRAM(S): at 21:07

## 2018-11-06 RX ADMIN — OXYCODONE HYDROCHLORIDE 10 MILLIGRAM(S): 5 TABLET ORAL at 12:14

## 2018-11-06 RX ADMIN — Medication 0.5 MILLIGRAM(S): at 10:12

## 2018-11-06 RX ADMIN — Medication 60 MILLIGRAM(S): at 18:15

## 2018-11-06 RX ADMIN — PANTOPRAZOLE SODIUM 40 MILLIGRAM(S): 20 TABLET, DELAYED RELEASE ORAL at 06:22

## 2018-11-06 RX ADMIN — Medication 1 LOZENGE: at 21:06

## 2018-11-06 RX ADMIN — OXYCODONE HYDROCHLORIDE 10 MILLIGRAM(S): 5 TABLET ORAL at 13:01

## 2018-11-06 RX ADMIN — Medication 3 MILLILITER(S): at 19:50

## 2018-11-06 RX ADMIN — OXYCODONE HYDROCHLORIDE 10 MILLIGRAM(S): 5 TABLET ORAL at 00:19

## 2018-11-06 RX ADMIN — Medication 1 PATCH: at 19:00

## 2018-11-06 RX ADMIN — Medication 1 LOZENGE: at 18:13

## 2018-11-06 NOTE — PROGRESS NOTE ADULT - ASSESSMENT
67 y/o F w PMH COPD, depression, anxiety presents for progressively worsening SOB for the last 2 weeks not resolved with home nebulizer treatments or inhalers.      Impression  COPD exacerbation  Anxiety      Plan  monitor pulse oxygen   supplemental o2 if O2 sat is less than 88  smoking cessation    IV solumedrol 60 q12h, change to prednisone in am  budesonide via nebulizer 0.5 in 2cc q12h and Duoneb q4h and PRN  IV antibiotics and switch to PO on DC  f/u with pulmonary as OP  attempt dc in 24 h

## 2018-11-06 NOTE — PROGRESS NOTE ADULT - ASSESSMENT
KUSH RINALDI 68y Female  MRN#: [id number]     SUBJECTIVE  Patient is a 68y old Female who presents with a chief complaint of copd ex (04 Nov 2018 11:48)  Currently admitted to medicine with the primary diagnosis of COPD exacerbation    Today is hospital day 4d, no acute events overnight. Continues to report subjective improvement in shortness of breath,   Able to walk around the unit with minimal dyspnea (which she has at baseline).       OBJECTIVE  PAST MEDICAL & SURGICAL HISTORY  Chronic pain  Depression  Anxiety  COPD (chronic obstructive pulmonary disease)  Neck mass    ALLERGIES:  penicillin (Unknown)    MEDICATIONS:  STANDING MEDICATIONS  ALBUTerol/ipratropium for Nebulization 3 milliLiter(s) Nebulizer every 4 hours  ALPRAZolam 0.5 milliGRAM(s) Oral <User Schedule>  buDESOnide   0.5 milliGRAM(s) Respule 0.5 milliGRAM(s) Inhalation every 12 hours  chlorhexidine 4% Liquid 1 Application(s) Topical <User Schedule>  enoxaparin Injectable 40 milliGRAM(s) SubCutaneous daily  levoFLOXacin IVPB      levoFLOXacin IVPB 500 milliGRAM(s) IV Intermittent every 24 hours  methylPREDNISolone sodium succinate Injectable 60 milliGRAM(s) IV Push two times a day  nicotine -  14 mG/24Hr(s) Patch 1 patch Transdermal daily  pantoprazole    Tablet 40 milliGRAM(s) Oral before breakfast  traZODone 50 milliGRAM(s) Oral at bedtime    PRN MEDICATIONS  oxyCODONE    IR 10 milliGRAM(s) Oral every 6 hours PRN      VITAL SIGNS: Last 24 Hours  T(C): 36.6 (06 Nov 2018 05:36), Max: 36.6 (06 Nov 2018 05:36)  T(F): 97.8 (06 Nov 2018 05:36), Max: 97.8 (06 Nov 2018 05:36)  HR: 81 (06 Nov 2018 05:36) (81 - 97)  BP: 154/67 (06 Nov 2018 05:36) (141/70 - 167/80)  BP(mean): --  RR: 18 (06 Nov 2018 05:36) (18 - 20)  SpO2: 98% (05 Nov 2018 20:09) (94% - 98%)    PHYSICAL EXAM:    GENERAL: NAD, well-developed, AAOx3  HEENT:  Atraumatic, Normocephalic. EOMI, PERRLA, conjunctiva and sclera clear  PULMONARY: diffuse b/l wheezing, + coughing, using accessory muscles to breath  CARDIOVASCULAR: Regular rate and rhythm; No murmurs, rubs, or gallops  GASTROINTESTINAL: Soft, Nontender, Nondistended; Bowel sounds present  MUSCULOSKELETAL:  2+ Peripheral Pulses, No edema  Neuro: AAOX3, nonfocal       LABS:                        14.6   14.14 )-----------( 285      ( 06 Nov 2018 05:22 )             46.0     11-06    135  |  93<L>  |  8<L>  ----------------------------<  143<H>  5.8<H>   |  31  |  0.6<L>    Ca    9.4      06 Nov 2018 05:22  Mg     2.3     11-06    TPro  6.3  /  Alb  3.6  /  TBili  <0.2  /  DBili  x   /  AST  11  /  ALT  10  /  AlkPhos  55  11-05          RADIOLOGY:    Xray Chest 1 View-PORTABLE IMMEDIATE (11.01.18 @ 23:24) >  Impression:      No radiographic evidence of acute cardiopulmonary disease.      EKG (11/2/18)- Normal sinus rhythm     ADMISSION SUMMARY  Patient is a 68y old Female who presents with a chief complaint of copd ex (04 Nov 2018 11:48)  Currently admitted to medicine with the primary diagnosis of COPD exacerbation      ASSESSMENT & PLAN    #COPD exacerbation- still having SOB and dyspnea with exertion  - Duonebs q 4 hrs + prn,, budesonide BID  - Solumedrol 60 q x1 today, will transition to PO tonight  - cw levaquin 500 q 24 hours  - Appreciate pulm recs (O/P Dr. Rodriguez)  - was previously seen by Dr. Carpenter during an admission in July 2018, she was worked up for ischemic causes at that time, which were negative   - Keep O2 > 88%    #HTN- stable since admission    #Anxiety  cw home xanax    #depression  cw trazadone (home med)    #Smoking cessation   nicotine patch    #Chronic Back pain  - As per patient, and confirmed with pharmacy- she takes oxycodon 10    #DVT ppx: lovenox  # GI ppx: protonix  #Diet: DASH/TLC diet  #Activity: ambulate as tolerated  #Code: FULL KUSH RINALDI 68y Female  MRN#: [id number]     SUBJECTIVE  Patient is a 68y old Female who presents with a chief complaint of copd ex (04 Nov 2018 11:48)  Currently admitted to medicine with the primary diagnosis of COPD exacerbation    Today is hospital day 4d, no acute events overnight. Continues to report subjective improvement in shortness of breath,   Able to walk around the unit with minimal dyspnea (which she has at baseline).       OBJECTIVE  PAST MEDICAL & SURGICAL HISTORY  Chronic pain  Depression  Anxiety  COPD (chronic obstructive pulmonary disease)  Neck mass    ALLERGIES:  penicillin (Unknown)    MEDICATIONS:  STANDING MEDICATIONS  ALBUTerol/ipratropium for Nebulization 3 milliLiter(s) Nebulizer every 4 hours  ALPRAZolam 0.5 milliGRAM(s) Oral <User Schedule>  buDESOnide   0.5 milliGRAM(s) Respule 0.5 milliGRAM(s) Inhalation every 12 hours  chlorhexidine 4% Liquid 1 Application(s) Topical <User Schedule>  enoxaparin Injectable 40 milliGRAM(s) SubCutaneous daily  levoFLOXacin IVPB      levoFLOXacin IVPB 500 milliGRAM(s) IV Intermittent every 24 hours  methylPREDNISolone sodium succinate Injectable 60 milliGRAM(s) IV Push two times a day  nicotine -  14 mG/24Hr(s) Patch 1 patch Transdermal daily  pantoprazole    Tablet 40 milliGRAM(s) Oral before breakfast  traZODone 50 milliGRAM(s) Oral at bedtime    PRN MEDICATIONS  oxyCODONE    IR 10 milliGRAM(s) Oral every 6 hours PRN      VITAL SIGNS: Last 24 Hours  T(C): 36.6 (06 Nov 2018 05:36), Max: 36.6 (06 Nov 2018 05:36)  T(F): 97.8 (06 Nov 2018 05:36), Max: 97.8 (06 Nov 2018 05:36)  HR: 81 (06 Nov 2018 05:36) (81 - 97)  BP: 154/67 (06 Nov 2018 05:36) (141/70 - 167/80)  BP(mean): --  RR: 18 (06 Nov 2018 05:36) (18 - 20)  SpO2: 98% (05 Nov 2018 20:09) (94% - 98%)    PHYSICAL EXAM:    GENERAL: NAD, well-developed, AAOx3  HEENT:  Atraumatic, Normocephalic. EOMI, PERRLA, conjunctiva and sclera clear  PULMONARY: diffuse b/l wheezing, + coughing, using accessory muscles to breath  CARDIOVASCULAR: Regular rate and rhythm; No murmurs, rubs, or gallops  GASTROINTESTINAL: Soft, Nontender, Nondistended; Bowel sounds present  MUSCULOSKELETAL:  2+ Peripheral Pulses, No edema  Neuro: AAOX3, nonfocal       LABS:                        14.6   14.14 )-----------( 285      ( 06 Nov 2018 05:22 )             46.0     11-06    135  |  93<L>  |  8<L>  ----------------------------<  143<H>  5.8<H>   |  31  |  0.6<L>    Ca    9.4      06 Nov 2018 05:22  Mg     2.3     11-06    TPro  6.3  /  Alb  3.6  /  TBili  <0.2  /  DBili  x   /  AST  11  /  ALT  10  /  AlkPhos  55  11-05          RADIOLOGY:    Xray Chest 1 View-PORTABLE IMMEDIATE (11.01.18 @ 23:24) >  Impression:      No radiographic evidence of acute cardiopulmonary disease.      EKG (11/2/18)- Normal sinus rhythm     ADMISSION SUMMARY  Patient is a 68y old Female who presents with a chief complaint of copd ex (04 Nov 2018 11:48)  Currently admitted to medicine with the primary diagnosis of COPD exacerbation      ASSESSMENT & PLAN    #COPD exacerbation- still having SOB and dyspnea with exertion  - Duonebs q 4 hrs + prn,, budesonide BID  - Solumedrol 60 q x 12 BID, will switch to prednisone in AM tomorrow   - cw levaquin 500 q 24 hours,  switch to PO no discharge  - Appreciate pulm recs (O/P Dr. Rodriguez)  - was previously seen by Dr. Carpenter during an admission in July 2018, she was worked up for ischemic causes at that time, which were negative   - Keep O2 > 88%    #HTN- stable since admission    #Anxiety  cw home xanax    #depression  cw trazadone (home med)    #Smoking cessation   nicotine patch    #Chronic Back pain  - As per patient, and confirmed with pharmacy- she takes oxycodon 10    #DVT ppx: lovenox  # GI ppx: protonix  #Diet: DASH/TLC diet  #Activity: ambulate as tolerated  #Code: FULL

## 2018-11-07 LAB
ANION GAP SERPL CALC-SCNC: 12 MMOL/L — SIGNIFICANT CHANGE UP (ref 7–14)
BUN SERPL-MCNC: 8 MG/DL — LOW (ref 10–20)
CALCIUM SERPL-MCNC: 8.9 MG/DL — SIGNIFICANT CHANGE UP (ref 8.5–10.1)
CHLORIDE SERPL-SCNC: 88 MMOL/L — LOW (ref 98–110)
CO2 SERPL-SCNC: 29 MMOL/L — SIGNIFICANT CHANGE UP (ref 17–32)
CREAT SERPL-MCNC: 0.5 MG/DL — LOW (ref 0.7–1.5)
GLUCOSE SERPL-MCNC: 149 MG/DL — HIGH (ref 70–99)
POTASSIUM SERPL-MCNC: 5 MMOL/L — SIGNIFICANT CHANGE UP (ref 3.5–5)
POTASSIUM SERPL-SCNC: 5 MMOL/L — SIGNIFICANT CHANGE UP (ref 3.5–5)
SODIUM SERPL-SCNC: 129 MMOL/L — LOW (ref 135–146)

## 2018-11-07 RX ORDER — SODIUM POLYSTYRENE SULFONATE 4.1 MEQ/G
30 POWDER, FOR SUSPENSION ORAL ONCE
Qty: 0 | Refills: 0 | Status: DISCONTINUED | OUTPATIENT
Start: 2018-11-07 | End: 2018-11-07

## 2018-11-07 RX ADMIN — OXYCODONE HYDROCHLORIDE 10 MILLIGRAM(S): 5 TABLET ORAL at 12:31

## 2018-11-07 RX ADMIN — OXYCODONE HYDROCHLORIDE 10 MILLIGRAM(S): 5 TABLET ORAL at 18:54

## 2018-11-07 RX ADMIN — Medication 1 LOZENGE: at 11:29

## 2018-11-07 RX ADMIN — Medication 3 MILLILITER(S): at 19:46

## 2018-11-07 RX ADMIN — Medication 5 MILLIGRAM(S): at 10:50

## 2018-11-07 RX ADMIN — OXYCODONE HYDROCHLORIDE 10 MILLIGRAM(S): 5 TABLET ORAL at 18:34

## 2018-11-07 RX ADMIN — Medication 1 PATCH: at 07:03

## 2018-11-07 RX ADMIN — Medication 3 MILLILITER(S): at 08:04

## 2018-11-07 RX ADMIN — Medication 0.5 MILLIGRAM(S): at 21:47

## 2018-11-07 RX ADMIN — Medication 60 MILLIGRAM(S): at 05:35

## 2018-11-07 RX ADMIN — Medication 1 LOZENGE: at 20:55

## 2018-11-07 RX ADMIN — OXYCODONE HYDROCHLORIDE 10 MILLIGRAM(S): 5 TABLET ORAL at 13:07

## 2018-11-07 RX ADMIN — PANTOPRAZOLE SODIUM 40 MILLIGRAM(S): 20 TABLET, DELAYED RELEASE ORAL at 06:40

## 2018-11-07 RX ADMIN — Medication 1 LOZENGE: at 00:34

## 2018-11-07 RX ADMIN — OXYCODONE HYDROCHLORIDE 10 MILLIGRAM(S): 5 TABLET ORAL at 07:09

## 2018-11-07 RX ADMIN — Medication 50 MILLIGRAM(S): at 21:48

## 2018-11-07 RX ADMIN — Medication 3 MILLILITER(S): at 23:45

## 2018-11-07 RX ADMIN — Medication 1 LOZENGE: at 15:51

## 2018-11-07 RX ADMIN — OXYCODONE HYDROCHLORIDE 10 MILLIGRAM(S): 5 TABLET ORAL at 06:39

## 2018-11-07 RX ADMIN — OXYCODONE HYDROCHLORIDE 10 MILLIGRAM(S): 5 TABLET ORAL at 01:03

## 2018-11-07 RX ADMIN — OXYCODONE HYDROCHLORIDE 10 MILLIGRAM(S): 5 TABLET ORAL at 00:33

## 2018-11-07 RX ADMIN — Medication 0.5 MILLIGRAM(S): at 10:50

## 2018-11-07 RX ADMIN — Medication 3 MILLILITER(S): at 16:32

## 2018-11-07 RX ADMIN — Medication 1 LOZENGE: at 08:16

## 2018-11-07 RX ADMIN — Medication 1 PATCH: at 18:08

## 2018-11-07 RX ADMIN — Medication 1 PATCH: at 11:29

## 2018-11-07 RX ADMIN — Medication 1 PATCH: at 11:30

## 2018-11-07 NOTE — PROGRESS NOTE ADULT - ASSESSMENT
KUSH RINALDI 68y Female  MRN#: [id number]     SUBJECTIVE  Patient is a 68y old Female who presents with a chief complaint of copd ex (04 Nov 2018 11:48)  Currently admitted to medicine with the primary diagnosis of COPD exacerbation    Today is hospital day 5d, no acute events overnight. Report feeling better overall, but still has cough. Not using supplemental oxygen at bedside or while ambulating.     Was hyperkalemic yesterday, EKG negative for peaked T waves, s/p 10U insulin/ D50. No chest pain or palpitations       OBJECTIVE  PAST MEDICAL & SURGICAL HISTORY  Chronic pain  Depression  Anxiety  COPD (chronic obstructive pulmonary disease)  Neck mass    ALLERGIES:  penicillin (Unknown)    MEDICATIONS:  STANDING MEDICATIONS  ALBUTerol/ipratropium for Nebulization 3 milliLiter(s) Nebulizer every 4 hours  ALPRAZolam 0.5 milliGRAM(s) Oral <User Schedule>  buDESOnide   0.5 milliGRAM(s) Respule 0.5 milliGRAM(s) Inhalation every 12 hours  chlorhexidine 4% Liquid 1 Application(s) Topical <User Schedule>  clotrimazole Lozenge 1 Lozenge Oral five times a day  enoxaparin Injectable 40 milliGRAM(s) SubCutaneous daily  levoFLOXacin  Tablet 500 milliGRAM(s) Oral every 24 hours  nicotine -  14 mG/24Hr(s) Patch 1 patch Transdermal daily  pantoprazole    Tablet 40 milliGRAM(s) Oral before breakfast  predniSONE   Tablet 60 milliGRAM(s) Oral daily  traZODone 50 milliGRAM(s) Oral at bedtime    PRN MEDICATIONS  oxyCODONE    IR 10 milliGRAM(s) Oral every 6 hours PRN      VITAL SIGNS: Last 24 Hours  T(C): 36 (07 Nov 2018 04:58), Max: 36 (07 Nov 2018 04:58)  T(F): 96.8 (07 Nov 2018 04:58), Max: 96.8 (07 Nov 2018 04:58)  HR: 84 (07 Nov 2018 04:58) (84 - 92)  BP: 149/70 (07 Nov 2018 04:58) (114/76 - 149/70)  BP(mean): --  RR: 18 (07 Nov 2018 04:58) (18 - 18)  SpO2: --    PHYSICAL EXAM:    GENERAL: NAD, well-developed, AAOx3  HEENT:  Atraumatic, Normocephalic. EOMI, PERRLA, conjunctiva and sclera clear  PULMONARY: b/l wheezing, + coughing, no supplemental oxygen   CARDIOVASCULAR: Regular rate and rhythm; No murmurs, rubs, or gallops  GASTROINTESTINAL: Soft, Nontender, Nondistended; Bowel sounds present  MUSCULOSKELETAL:  2+ Peripheral Pulses, No edema  Neuro: AAOX3, nonfocal       LABS:                        14.6   14.14 )-----------( 285      ( 06 Nov 2018 05:22 )             46.0     11-06    130<L>  |  87<L>  |  8<L>  ----------------------------<  113<H>  6.5<HH>   |  33<H>  |  0.5<L>    Ca    9.4      06 Nov 2018 17:05  Mg     2.3     11-06      RADIOLOGY:    Xray Chest 1 View-PORTABLE IMMEDIATE (11.01.18 @ 23:24) >  Impression:      No radiographic evidence of acute cardiopulmonary disease.      EKG (11/2/18)- Normal sinus rhythm   EKG 11/6/18- NSR      ADMISSION SUMMARY  Patient is a 68y old Female who presents with a chief complaint of copd ex (04 Nov 2018 11:48)  Currently admitted to medicine with the primary diagnosis of COPD exacerbation      ASSESSMENT & PLAN    #COPD exacerbation- improving  - Duonebs q 4 hrs + prn,, budesonide BID  - Switched to PO Prednisone today- will continue with Pred taper (60 x3, 40 x3, 20 x3)  - switch to PO levaquin 500 q 24 hours  - Appreciate pulm recs (O/P Dr. Rodriguez)  - was previously seen by Dr. Carpenter during an admission in July 2018, she was worked up for ischemic causes at that time, which were negative   - Keep O2 > 88%    #Hyperkalemia  - 6.5 last night, s/p 10 U insulin/ d50  - NO EKG changes  - Refused lab draw this AM from [facility], will recheck this AM     #HTN- stable since admission    #Anxiety  cw home xanax    #depression  cw trazadone (home med)    #Smoking cessation   nicotine patch    #Chronic Back pain  - As per patient, and confirmed with pharmacy- she takes oxycodon 10    #DVT ppx: lovenox  # GI ppx: protonix  #Diet: DASH/TLC diet  #Activity: ambulate as tolerated  #Code: FULL

## 2018-11-07 NOTE — PROGRESS NOTE ADULT - ASSESSMENT
67 y/o F w PMH COPD, depression, anxiety presents for progressively worsening SOB for the last 2 weeks not resolved with home nebulizer treatments or inhalers.      Impression  COPD exacerbation  Anxiety      Mckenna      prednisone 60 for 3 days 40 for 3 days 20 for 3 days  Duoneb q4h and PRN  IV antibiotics and switch to PO on DC finish course  f/u with pulmonary as OP  dvt prophylaxis

## 2018-11-08 RX ADMIN — Medication 3 MILLILITER(S): at 16:35

## 2018-11-08 RX ADMIN — Medication 1 LOZENGE: at 12:30

## 2018-11-08 RX ADMIN — Medication 1 LOZENGE: at 00:25

## 2018-11-08 RX ADMIN — OXYCODONE HYDROCHLORIDE 10 MILLIGRAM(S): 5 TABLET ORAL at 18:32

## 2018-11-08 RX ADMIN — Medication 1 LOZENGE: at 21:29

## 2018-11-08 RX ADMIN — OXYCODONE HYDROCHLORIDE 10 MILLIGRAM(S): 5 TABLET ORAL at 13:00

## 2018-11-08 RX ADMIN — Medication 1 PATCH: at 12:30

## 2018-11-08 RX ADMIN — Medication 3 MILLILITER(S): at 07:38

## 2018-11-08 RX ADMIN — Medication 3 MILLILITER(S): at 11:36

## 2018-11-08 RX ADMIN — OXYCODONE HYDROCHLORIDE 10 MILLIGRAM(S): 5 TABLET ORAL at 07:30

## 2018-11-08 RX ADMIN — CHLORHEXIDINE GLUCONATE 1 APPLICATION(S): 213 SOLUTION TOPICAL at 06:10

## 2018-11-08 RX ADMIN — OXYCODONE HYDROCHLORIDE 10 MILLIGRAM(S): 5 TABLET ORAL at 00:30

## 2018-11-08 RX ADMIN — Medication 1 LOZENGE: at 15:36

## 2018-11-08 RX ADMIN — Medication 1 PATCH: at 12:29

## 2018-11-08 RX ADMIN — OXYCODONE HYDROCHLORIDE 10 MILLIGRAM(S): 5 TABLET ORAL at 06:41

## 2018-11-08 RX ADMIN — Medication 0.5 MILLIGRAM(S): at 10:04

## 2018-11-08 RX ADMIN — Medication 1 PATCH: at 19:48

## 2018-11-08 RX ADMIN — Medication 0.5 MILLIGRAM(S): at 20:57

## 2018-11-08 RX ADMIN — Medication 1 LOZENGE: at 08:50

## 2018-11-08 RX ADMIN — PANTOPRAZOLE SODIUM 40 MILLIGRAM(S): 20 TABLET, DELAYED RELEASE ORAL at 06:10

## 2018-11-08 RX ADMIN — Medication 1 PATCH: at 06:10

## 2018-11-08 RX ADMIN — Medication 0.5 MILLIGRAM(S): at 21:29

## 2018-11-08 RX ADMIN — Medication 50 MILLIGRAM(S): at 21:29

## 2018-11-08 RX ADMIN — Medication 5 MILLIGRAM(S): at 21:29

## 2018-11-08 RX ADMIN — OXYCODONE HYDROCHLORIDE 10 MILLIGRAM(S): 5 TABLET ORAL at 01:00

## 2018-11-08 RX ADMIN — Medication 60 MILLIGRAM(S): at 06:10

## 2018-11-08 RX ADMIN — OXYCODONE HYDROCHLORIDE 10 MILLIGRAM(S): 5 TABLET ORAL at 12:30

## 2018-11-08 RX ADMIN — Medication 3 MILLILITER(S): at 20:57

## 2018-11-08 RX ADMIN — Medication 5 MILLIGRAM(S): at 06:42

## 2018-11-08 NOTE — PROGRESS NOTE ADULT - ASSESSMENT
KUSH RINALDI 68y Female  MRN#: [id number]       SUBJECTIVE  Patient is a 68y old Female who presents with a chief complaint of copd ex (04 Nov 2018 11:48)  Currently admitted to medicine with the primary diagnosis of COPD exacerbation    Today is hospital day 6d, no acute events overnight. Breathing at baseline. Not using supplemental oxygen at bedside or while ambulating.     Likely discharge today    OBJECTIVE  PAST MEDICAL & SURGICAL HISTORY  Chronic pain  Depression  Anxiety  COPD (chronic obstructive pulmonary disease)  Neck mass    ALLERGIES:  penicillin (Unknown)    MEDICATIONS:  STANDING MEDICATIONS  ALBUTerol/ipratropium for Nebulization 3 milliLiter(s) Nebulizer every 4 hours  ALPRAZolam 0.5 milliGRAM(s) Oral <User Schedule>  buDESOnide   0.5 milliGRAM(s) Respule 0.5 milliGRAM(s) Inhalation every 12 hours  chlorhexidine 4% Liquid 1 Application(s) Topical <User Schedule>  clotrimazole Lozenge 1 Lozenge Oral five times a day  enoxaparin Injectable 40 milliGRAM(s) SubCutaneous daily  levoFLOXacin  Tablet 500 milliGRAM(s) Oral every 24 hours  nicotine -  14 mG/24Hr(s) Patch 1 patch Transdermal daily  pantoprazole    Tablet 40 milliGRAM(s) Oral before breakfast  predniSONE   Tablet 60 milliGRAM(s) Oral daily  traZODone 50 milliGRAM(s) Oral at bedtime    PRN MEDICATIONS  bisacodyl 5 milliGRAM(s) Oral every 12 hours PRN  oxyCODONE    IR 10 milliGRAM(s) Oral every 6 hours PRN      VITAL SIGNS: Last 24 Hours  T(C): 36.4 (08 Nov 2018 04:32), Max: 36.4 (08 Nov 2018 04:32)  T(F): 97.6 (08 Nov 2018 04:32), Max: 97.6 (08 Nov 2018 04:32)  HR: 81 (08 Nov 2018 04:32) (81 - 92)  BP: 113/55 (08 Nov 2018 04:32) (113/55 - 143/74)  BP(mean): --  RR: 20 (08 Nov 2018 04:32) (20 - 20)  SpO2: 95% (07 Nov 2018 19:29) (95% - 95%)    PHYSICAL EXAM:    GENERAL: NAD, well-developed, AAOx3  HEENT:  Atraumatic, Normocephalic. EOMI, PERRLA, conjunctiva and sclera clear  PULMONARY: b/l wheezing, + coughing, no supplemental oxygen   CARDIOVASCULAR: Regular rate and rhythm; No murmurs, rubs, or gallops  GASTROINTESTINAL: Soft, Nontender, Nondistended; Bowel sounds present  MUSCULOSKELETAL:  2+ Peripheral Pulses, No edema  Neuro: AAOX3, nonfocal       LABS:    11-07    129<L>  |  88<L>  |  8<L>  ----------------------------<  149<H>  5.0   |  29  |  0.5<L>    Ca    8.9      07 Nov 2018 12:35      RADIOLOGY:  RADIOLOGY:    Xray Chest 1 View-PORTABLE IMMEDIATE (11.01.18 @ 23:24) >  Impression:      No radiographic evidence of acute cardiopulmonary disease.      EKG (11/2/18)- Normal sinus rhythm   EKG 11/6/18- NSR      ADMISSION SUMMARY  Patient is a 68y old Female who presents with a chief complaint of copd ex (04 Nov 2018 11:48)  Currently admitted to medicine with the primary diagnosis of COPD exacerbation      ASSESSMENT & PLAN    #COPD exacerbation- improving  - Duonebs q 4 hrs + prn,, budesonide BID  - PO pred 60 day 2 (pred taper: 60 x3, 40 x3, 20 x3)  - switch to PO levaquin 500 q 24 hours  - Appreciate pulm recs (sees O/P Dr. Rodriguez)  - was previously seen by Dr. Carpenter during an admission in July 2018, she was worked up for ischemic causes at that time, which were negative   - Keep O2 > 88%    #Hyperkalemia- resolved    #HTN- stable since admission    #Anxiety  cw home xanax    #depression  cw trazadone (home med)    #Smoking cessation   nicotine patch  counseled patient on smoking cessation    #Chronic Back pain  - As per patient, and confirmed with pharmacy- she takes oxycodon 10    #DVT ppx: lovenox  # GI ppx: protonix  #Diet: DASH/TLC diet  #Activity: ambulate as tolerated  #Code: FULL

## 2018-11-08 NOTE — PROGRESS NOTE ADULT - SUBJECTIVE AND OBJECTIVE BOX
OVERNIGHT EVENTS: still coughing, wheezing    Vital Signs Last 24 Hrs  T(C): 36 (07 Nov 2018 04:58), Max: 36 (07 Nov 2018 04:58)  T(F): 96.8 (07 Nov 2018 04:58), Max: 96.8 (07 Nov 2018 04:58)  HR: 84 (07 Nov 2018 04:58) (84 - 92)  BP: 149/70 (07 Nov 2018 04:58) (114/76 - 149/70)  BP(mean): --  RR: 18 (07 Nov 2018 04:58) (18 - 18)  SpO2: 95% ra    PHYSICAL EXAMINATION:    GENERAL: The patient is awake and alert in no apparent distress.     HEENT: Head is normocephalic and atraumatic. Extraocular muscles are intact. Mucous membranes are moist.    NECK: Supple.    LUNGS: b/l wheeizng    HEART: Regular rate and rhythm without murmur.    ABDOMEN: Soft, nontender, and nondistended.      EXTREMITIES: Without any cyanosis, clubbing, rash, lesions or edema.    NEUROLOGIC: Grossly intact.    SKIN: No ulceration or induration present.      LABS:                        14.6   14.14 )-----------( 285      ( 06 Nov 2018 05:22 )             46.0     11-06    130<L>  |  87<L>  |  8<L>  ----------------------------<  113<H>  6.5<HH>   |  33<H>  |  0.5<L>    Ca    9.4      06 Nov 2018 17:05  Mg     2.3     11-06                              MICROBIOLOGY:      MEDICATIONS  (STANDING):  ALBUTerol/ipratropium for Nebulization 3 milliLiter(s) Nebulizer every 4 hours  ALPRAZolam 0.5 milliGRAM(s) Oral <User Schedule>  buDESOnide   0.5 milliGRAM(s) Respule 0.5 milliGRAM(s) Inhalation every 12 hours  chlorhexidine 4% Liquid 1 Application(s) Topical <User Schedule>  clotrimazole Lozenge 1 Lozenge Oral five times a day  enoxaparin Injectable 40 milliGRAM(s) SubCutaneous daily  levoFLOXacin  Tablet 500 milliGRAM(s) Oral every 24 hours  nicotine -  14 mG/24Hr(s) Patch 1 patch Transdermal daily  pantoprazole    Tablet 40 milliGRAM(s) Oral before breakfast  predniSONE   Tablet 60 milliGRAM(s) Oral daily  traZODone 50 milliGRAM(s) Oral at bedtime    MEDICATIONS  (PRN):  bisacodyl 5 milliGRAM(s) Oral every 12 hours PRN Constipation  oxyCODONE    IR 10 milliGRAM(s) Oral every 6 hours PRN Moderate Pain (4 - 6)      RADIOLOGY & ADDITIONAL STUDIES:
OVERNIGHT EVENTS: all over feels better, still coughing yellow phlegm    Vital Signs Last 24 Hrs  T(C): 36.6 (06 Nov 2018 05:36), Max: 36.6 (06 Nov 2018 05:36)  T(F): 97.8 (06 Nov 2018 05:36), Max: 97.8 (06 Nov 2018 05:36)  HR: 81 (06 Nov 2018 05:36) (81 - 97)  BP: 154/67 (06 Nov 2018 05:36) (141/70 - 167/80)  BP(mean): --  RR: 18 (06 Nov 2018 05:36) (18 - 20)  SpO2: 98% (05 Nov 2018 20:09) (94% - 98%)    PHYSICAL EXAMINATION:    GENERAL: The patient is awake and alert in no apparent distress.     HEENT: Head is normocephalic and atraumatic. Extraocular muscles are intact. Mucous membranes are moist.    NECK: Supple.    LUNGS: b/l wheezing/ rhonchi    HEART: Regular rate and rhythm without murmur.    ABDOMEN: Soft, nontender, and nondistended.      EXTREMITIES: Without any cyanosis, clubbing, rash, lesions or edema.    NEUROLOGIC: Grossly intact.    SKIN: No ulceration or induration present.      LABS:                        14.6   14.14 )-----------( 285      ( 06 Nov 2018 05:22 )             46.0     11-06    135  |  93<L>  |  8<L>  ----------------------------<  143<H>  5.8<H>   |  31  |  0.6<L>    Ca    9.4      06 Nov 2018 05:22  Mg     2.3     11-06    TPro  6.3  /  Alb  3.6  /  TBili  <0.2  /  DBili  x   /  AST  11  /  ALT  10  /  AlkPhos  55  11-05                            MICROBIOLOGY:      MEDICATIONS  (STANDING):  ALBUTerol/ipratropium for Nebulization 3 milliLiter(s) Nebulizer every 4 hours  ALPRAZolam 0.5 milliGRAM(s) Oral <User Schedule>  buDESOnide   0.5 milliGRAM(s) Respule 0.5 milliGRAM(s) Inhalation every 12 hours  chlorhexidine 4% Liquid 1 Application(s) Topical <User Schedule>  clotrimazole Lozenge 1 Lozenge Oral five times a day  enoxaparin Injectable 40 milliGRAM(s) SubCutaneous daily  levoFLOXacin IVPB      levoFLOXacin IVPB 500 milliGRAM(s) IV Intermittent every 24 hours  methylPREDNISolone sodium succinate Injectable 60 milliGRAM(s) IV Push two times a day  nicotine -  14 mG/24Hr(s) Patch 1 patch Transdermal daily  pantoprazole    Tablet 40 milliGRAM(s) Oral before breakfast  traZODone 50 milliGRAM(s) Oral at bedtime    MEDICATIONS  (PRN):  oxyCODONE    IR 10 milliGRAM(s) Oral every 6 hours PRN Moderate Pain (4 - 6)      RADIOLOGY & ADDITIONAL STUDIES:
Patient admitted for COPD exacerbation and is on IV steroids  BP stable now, high on admission  Still has significant dyspnea and had c/o heaviness in chest before and may be prudent to r/o ischemic heart disease and will get cardiology input  No edema
Patient seen and examined  COPD with acute exacerbation and chronic wheezing  On last dose of solumedrol and starting of Prednisone 60 mgs qd with taper over next week  No chest pain  Distant heart sounds, sinus rhythm  No edema  Elevated K probably an error and needs repeat
Patient seen and examined  Can ambulate for more than 50' without desaturation but has significant diffuse wheezing and is always anxious and has chronic dyspnea  A coronary CTA in recent past was negative for significant obstructive disease  Distant heart sounds/no edema  On IV steroids.
Patient seen and examined.  Wife by bedside  Poor urine output yesterday but subsequently improved and some improvement in lower ext edema  Alert, oriented and looks better today  Hemodynamically stable.   Off Prednisone.  Basic Metabolic Panel in AM (11.06.18 @ 05:22)    Sodium, Serum: 135 mmol/L    Potassium, Serum: 5.8: Hemolyzed. Interpret with caution mmol/L    Chloride, Serum: 93 mmol/L    Carbon Dioxide, Serum: 31 mmol/L    Anion Gap, Serum: 11 mmol/L    Blood Urea Nitrogen, Serum: 8 mg/dL    Creatinine, Serum: 0.6 mg/dL    Glucose, Serum: 143 mg/dL    Calcium, Total Serum: 9.4 mg/dL    eGFR if Non : 94: Interpretative comment  The units for eGFR are ml/min/1.73m2 (normalized body surface area). The  eGFR is calculated from a serum creatinine using the CKD-EPI equation.  Other variables required for calculation are race, age and sex. Among  patients with chronic kidney disease (CKD), the eGFR is useful in  determining the stage of disease according to KDOQI CKD classification.  All eGFR results are reported numerically with the following  interpretation.          GFR                    With                 Without     (ml/min/1.73 m2)    Kidney Damage       Kidney Damage        >= 90                    Stage 1                     Normal        60-89                    Stage 2                     Decreased GFR        30-59     Stage 3                     Stage 3        15-29                    Stage 4                     Stage 4        < 15                      Stage 5                     Stage 5  Each stage of CKD assumes that the associated GFR level has been in  effect for at least 3 months. Determination of stages one and two (with  eGFR > 59 ml/min/m2) requires estimation of kidney damage for at least 3  months as defined by structural or functional abnormalities.  Limitations: All estimates of GFR will be less accurate for patients at  extremes of muscle mass (including but not limited to frail elderly,  critically ill, or cancer patients), those with unusual diets, and those  with conditions associated with reduced secretion or extrarenal  elimination of creatinine. The eGFR equation is not recommended for use  in patients with unstable creatinine levels. mL/min/1.73M2    eGFR if African American: 109 mL/min/1.73M2  Renal function has normaized now  PT needed for ambulation.
Patient with severe COPD and multiple admissions for exacerbations, still smokes, sent to ER by pulmonologist for increasing dyspnea and wheezing  Patient sitting up, has dyspnea at rest with diffuse bilateral wheezing  Heart rate about 80/min  No edema  Plan: IV steroids, nicotine patch.
Please ignore the previous note  Patient admitted for COPD with acute exacerbation and improving on steroids  Has chronic diffuse bilateral rhonchi but ambulates now with minimal discomfort  No chest pain  Dose of Symbicort reduced.  Patient has oral thrush and can use antifungal lozenges  Pulmonary input appreciated.
Sitting up in bed and c/o shortenss of breath. Belives it is because of Prednisone effect lessening  as day goes by  Gest nebulizer rx ina on  On Prednisone 60 mgs qd, taper, and abx  Chronic bilateral wheezing  No edema  Anticipate disch in am

## 2018-11-09 VITALS
DIASTOLIC BLOOD PRESSURE: 74 MMHG | TEMPERATURE: 97 F | SYSTOLIC BLOOD PRESSURE: 145 MMHG | RESPIRATION RATE: 20 BRPM | HEART RATE: 95 BPM

## 2018-11-09 RX ORDER — IPRATROPIUM/ALBUTEROL SULFATE 18-103MCG
3 AEROSOL WITH ADAPTER (GRAM) INHALATION
Qty: 90 | Refills: 0 | OUTPATIENT
Start: 2018-11-09 | End: 2018-12-08

## 2018-11-09 RX ORDER — ALBUTEROL 90 UG/1
2 AEROSOL, METERED ORAL
Qty: 0 | Refills: 0 | COMMUNITY

## 2018-11-09 RX ORDER — IPRATROPIUM/ALBUTEROL SULFATE 18-103MCG
1 AEROSOL WITH ADAPTER (GRAM) INHALATION
Qty: 0 | Refills: 0 | COMMUNITY

## 2018-11-09 RX ORDER — ALBUTEROL 90 UG/1
2 AEROSOL, METERED ORAL
Qty: 6 | Refills: 0 | OUTPATIENT
Start: 2018-11-09 | End: 2018-12-08

## 2018-11-09 RX ADMIN — CHLORHEXIDINE GLUCONATE 1 APPLICATION(S): 213 SOLUTION TOPICAL at 05:43

## 2018-11-09 RX ADMIN — OXYCODONE HYDROCHLORIDE 10 MILLIGRAM(S): 5 TABLET ORAL at 13:41

## 2018-11-09 RX ADMIN — Medication 1 LOZENGE: at 11:36

## 2018-11-09 RX ADMIN — OXYCODONE HYDROCHLORIDE 10 MILLIGRAM(S): 5 TABLET ORAL at 01:18

## 2018-11-09 RX ADMIN — Medication 1 LOZENGE: at 00:46

## 2018-11-09 RX ADMIN — Medication 0.5 MILLIGRAM(S): at 10:49

## 2018-11-09 RX ADMIN — OXYCODONE HYDROCHLORIDE 10 MILLIGRAM(S): 5 TABLET ORAL at 00:46

## 2018-11-09 RX ADMIN — OXYCODONE HYDROCHLORIDE 10 MILLIGRAM(S): 5 TABLET ORAL at 08:00

## 2018-11-09 RX ADMIN — Medication 1 PATCH: at 10:57

## 2018-11-09 RX ADMIN — Medication 1 PATCH: at 06:23

## 2018-11-09 RX ADMIN — Medication 3 MILLILITER(S): at 11:49

## 2018-11-09 RX ADMIN — Medication 60 MILLIGRAM(S): at 05:43

## 2018-11-09 RX ADMIN — OXYCODONE HYDROCHLORIDE 10 MILLIGRAM(S): 5 TABLET ORAL at 07:35

## 2018-11-09 RX ADMIN — Medication 3 MILLILITER(S): at 07:18

## 2018-11-09 RX ADMIN — Medication 1 LOZENGE: at 07:37

## 2018-11-09 RX ADMIN — Medication 1 PATCH: at 10:56

## 2018-11-09 RX ADMIN — PANTOPRAZOLE SODIUM 40 MILLIGRAM(S): 20 TABLET, DELAYED RELEASE ORAL at 06:23

## 2018-11-09 NOTE — DIETITIAN INITIAL EVALUATION ADULT. - FACTORS AFF FOOD INTAKE
pt reports good appetite/intake with po 100%, no trouble chewing/swallowing, pt reports was a little constipated but last BM 11/8 and on bowel regimen

## 2018-11-09 NOTE — DIETITIAN INITIAL EVALUATION ADULT. - ENERGY NEEDS
Energy needs: (5620-1549 kcal/day = MSJ x1.2-1.3 AF)  Protein needs: (67-80 g/day = 1.0-1.2 g/kg)  Fluid needs: 1ml : 1kcal

## 2018-11-09 NOTE — DIETITIAN INITIAL EVALUATION ADULT. - OTHER INFO
Pt admitted to medicine with the primary diagnosis of COPD exacerbation - improving. Pt reports UBW ~143# and stable PTA. Reason for assessment: LOS.

## 2018-11-09 NOTE — PROGRESS NOTE ADULT - ASSESSMENT
KUSH RINALDI 68y Female  MRN#: [id number]     SUBJECTIVE  Patient is a 68y old Female who presents with a chief complaint of copd ex (04 Nov 2018 11:48)  Currently admitted to medicine with the primary diagnosis of COPD exacerbation    Today is hospital day 7d, no acute events overnight. Not using supplemental oxygen at bediside or while ambulating. Continues to have cough.       OBJECTIVE  PAST MEDICAL & SURGICAL HISTORY  Chronic pain  Depression  Anxiety  COPD (chronic obstructive pulmonary disease)  Neck mass    ALLERGIES:  penicillin (Unknown)    MEDICATIONS:  STANDING MEDICATIONS  ALBUTerol/ipratropium for Nebulization 3 milliLiter(s) Nebulizer every 4 hours  ALPRAZolam 0.5 milliGRAM(s) Oral <User Schedule>  buDESOnide   0.5 milliGRAM(s) Respule 0.5 milliGRAM(s) Inhalation every 12 hours  chlorhexidine 4% Liquid 1 Application(s) Topical <User Schedule>  clotrimazole Lozenge 1 Lozenge Oral five times a day  enoxaparin Injectable 40 milliGRAM(s) SubCutaneous daily  levoFLOXacin  Tablet 500 milliGRAM(s) Oral every 24 hours  nicotine -  14 mG/24Hr(s) Patch 1 patch Transdermal daily  pantoprazole    Tablet 40 milliGRAM(s) Oral before breakfast  predniSONE   Tablet 60 milliGRAM(s) Oral daily  traZODone 50 milliGRAM(s) Oral at bedtime    PRN MEDICATIONS  bisacodyl 5 milliGRAM(s) Oral every 12 hours PRN  oxyCODONE    IR 10 milliGRAM(s) Oral every 6 hours PRN      VITAL SIGNS: Last 24 Hours  T(C): 36.2 (09 Nov 2018 05:11), Max: 36.4 (08 Nov 2018 20:24)  T(F): 97.1 (09 Nov 2018 05:11), Max: 97.6 (08 Nov 2018 20:24)  HR: 90 (09 Nov 2018 05:11) (88 - 104)  BP: 140/77 (09 Nov 2018 05:11) (130/68 - 140/77)  BP(mean): --  RR: 20 (09 Nov 2018 05:11) (20 - 20)  SpO2: 98% (08 Nov 2018 20:21) (98% - 98%)    PHYSICAL EXAM:    GENERAL: NAD, well-developed, AAOx3  HEENT:  Atraumatic, Normocephalic. EOMI, PERRLA, conjunctiva and sclera clear  PULMONARY: b/l wheezing, + coughing, no supplemental oxygen   CARDIOVASCULAR: Regular rate and rhythm; No murmurs, rubs, or gallops  GASTROINTESTINAL: Soft, Nontender, Nondistended; Bowel sounds present  MUSCULOSKELETAL:  2+ Peripheral Pulses, No edema  Neuro: AAOX3, nonfocal     LABS:    11-07    129<L>  |  88<L>  |  8<L>  ----------------------------<  149<H>  5.0   |  29  |  0.5<L>    Ca    8.9      07 Nov 2018 12:35          RADIOLOGY:    Xray Chest 1 View-PORTABLE IMMEDIATE (11.01.18 @ 23:24) >  Impression:      No radiographic evidence of acute cardiopulmonary disease.      EKG (11/2/18)- Normal sinus rhythm   EKG 11/6/18- NSR      ADMISSION SUMMARY  Patient is a 68y old Female who presents with a chief complaint of copd ex (04 Nov 2018 11:48)  Currently admitted to medicine with the primary diagnosis of COPD exacerbation      ASSESSMENT & PLAN    #COPD exacerbation- improving  - Duonebs q 4 hrs + prn,, budesonide BID  - PO pred 60 day 3 (pred taper: 60 x3, 40 x3, 20 x3)  - cw with PO levaquin 500 q 24 hours (day 7 today)  - Appreciate pulm recs (sees O/P Dr. Rodriguez)  - was previously seen by Dr. Carpenter during an admission in July 2018, she was worked up for ischemic causes at that time, which were negative   - Keep O2 > 88%    #Hyperkalemia- resolved    #HTN- stable since admission    #Anxiety  cw home xanax    #depression  cw trazadone (home med)    #Smoking cessation   nicotine patch  counseled patient on smoking cessation    #Chronic Back pain  - As per patient, and confirmed with pharmacy- she takes oxycodon 10    #DVT ppx: lovenox  # GI ppx: protonix  #Diet: DASH/TLC diet  #Activity: ambulate as tolerated  #Code: FULL    COPD Discharge Checklist (48 hours prior to DC day):     [ x] Verify appropriate discharge disposition with team (e.g. home, home with services, hospice, SNF)    [  ] Educate patient and/or caregiver on the COPD ACTION PLAN and provide patient with the completed form.    [  ] Review discharge medications with RN and patient/care giver    [ x ] Emphasize smoking cessation options for smokers    [  ] Switch to nebulizers if patient has difficulty using inhalers    [  ] Verify and document outpatient follow-up within 3 days with PMD or Pulmonary specialist.    [  ] Referal to outpatient pulmonary rehab within 2 weeks for eligible patients (discuss with pulmonary specialist)

## 2018-11-09 NOTE — PROGRESS NOTE ADULT - REASON FOR ADMISSION
copd ex

## 2018-11-09 NOTE — PROGRESS NOTE ADULT - PROVIDER SPECIALTY LIST ADULT
Internal Medicine
Pulmonology
Pulmonology

## 2018-11-14 DIAGNOSIS — F41.9 ANXIETY DISORDER, UNSPECIFIED: ICD-10-CM

## 2018-11-14 DIAGNOSIS — B37.0 CANDIDAL STOMATITIS: ICD-10-CM

## 2018-11-14 DIAGNOSIS — I10 ESSENTIAL (PRIMARY) HYPERTENSION: ICD-10-CM

## 2018-11-14 DIAGNOSIS — G89.29 OTHER CHRONIC PAIN: ICD-10-CM

## 2018-11-14 DIAGNOSIS — J44.1 CHRONIC OBSTRUCTIVE PULMONARY DISEASE WITH (ACUTE) EXACERBATION: ICD-10-CM

## 2018-11-14 DIAGNOSIS — M54.9 DORSALGIA, UNSPECIFIED: ICD-10-CM

## 2018-11-14 DIAGNOSIS — F32.9 MAJOR DEPRESSIVE DISORDER, SINGLE EPISODE, UNSPECIFIED: ICD-10-CM

## 2018-11-14 DIAGNOSIS — E87.5 HYPERKALEMIA: ICD-10-CM

## 2018-11-14 DIAGNOSIS — Z88.0 ALLERGY STATUS TO PENICILLIN: ICD-10-CM

## 2018-11-14 DIAGNOSIS — F17.200 NICOTINE DEPENDENCE, UNSPECIFIED, UNCOMPLICATED: ICD-10-CM

## 2018-12-06 NOTE — PATIENT PROFILE ADULT - FUNCTIONAL SCREEN CURRENT LEVEL: COMMUNICATION, MLM
Telephone Encounter by Danyelle Ramirez at 10/26/17 09:48 AM     Author:  Danyelle Ramirez Service:  (none) Author Type:  Patient      Filed:  10/26/17 09:48 AM Encounter Date:  10/23/2017 Status:  Signed     :  Danyelle Ramirez (Patient )            Left message on answering machine to call back[TT1.1T] via [TT1.1M]  .[TT1.1T]        Revision History        User Key Date/Time User Provider Type Action    > TT1.1 10/26/17 09:48 AM Danyelle Ramirez Patient  Sign    M - Manual, T - Template             0 = understands/communicates without difficulty

## 2019-02-15 NOTE — DISCHARGE NOTE ADULT - VISION (WITH CORRECTIVE LENSES IF THE PATIENT USUALLY WEARS THEM):
Normal vision: sees adequately in most situations; can see medication labels, newsprint
verbal cues/1 person assist
15-Feb-2019 07:00

## 2019-04-02 ENCOUNTER — INPATIENT (INPATIENT)
Facility: HOSPITAL | Age: 69
LOS: 6 days | Discharge: HOME HEALTH | End: 2019-04-09
Attending: INTERNAL MEDICINE | Admitting: INTERNAL MEDICINE
Payer: MEDICARE

## 2019-04-02 VITALS
RESPIRATION RATE: 25 BRPM | OXYGEN SATURATION: 96 % | HEART RATE: 91 BPM | WEIGHT: 149.91 LBS | DIASTOLIC BLOOD PRESSURE: 63 MMHG | SYSTOLIC BLOOD PRESSURE: 141 MMHG

## 2019-04-02 DIAGNOSIS — R22.1 LOCALIZED SWELLING, MASS AND LUMP, NECK: Chronic | ICD-10-CM

## 2019-04-02 LAB
ALBUMIN SERPL ELPH-MCNC: 4.1 G/DL — SIGNIFICANT CHANGE UP (ref 3.5–5.2)
ALP SERPL-CCNC: 68 U/L — SIGNIFICANT CHANGE UP (ref 30–115)
ALT FLD-CCNC: 12 U/L — SIGNIFICANT CHANGE UP (ref 0–41)
ANION GAP SERPL CALC-SCNC: 13 MMOL/L — SIGNIFICANT CHANGE UP (ref 7–14)
AST SERPL-CCNC: 15 U/L — SIGNIFICANT CHANGE UP (ref 0–41)
BASE EXCESS BLDV CALC-SCNC: 2.7 MMOL/L — HIGH (ref -2–2)
BASOPHILS # BLD AUTO: 0.07 K/UL — SIGNIFICANT CHANGE UP (ref 0–0.2)
BASOPHILS NFR BLD AUTO: 0.6 % — SIGNIFICANT CHANGE UP (ref 0–1)
BILIRUB SERPL-MCNC: 0.6 MG/DL — SIGNIFICANT CHANGE UP (ref 0.2–1.2)
BUN SERPL-MCNC: 4 MG/DL — LOW (ref 10–20)
CA-I SERPL-SCNC: 1.22 MMOL/L — SIGNIFICANT CHANGE UP (ref 1.12–1.3)
CALCIUM SERPL-MCNC: 9.1 MG/DL — SIGNIFICANT CHANGE UP (ref 8.5–10.1)
CHLORIDE SERPL-SCNC: 97 MMOL/L — LOW (ref 98–110)
CO2 SERPL-SCNC: 22 MMOL/L — SIGNIFICANT CHANGE UP (ref 17–32)
CREAT SERPL-MCNC: 0.6 MG/DL — LOW (ref 0.7–1.5)
EOSINOPHIL # BLD AUTO: 0.12 K/UL — SIGNIFICANT CHANGE UP (ref 0–0.7)
EOSINOPHIL NFR BLD AUTO: 1 % — SIGNIFICANT CHANGE UP (ref 0–8)
FLU A RESULT: NEGATIVE — SIGNIFICANT CHANGE UP
FLU A RESULT: NEGATIVE — SIGNIFICANT CHANGE UP
FLUAV AG NPH QL: NEGATIVE — SIGNIFICANT CHANGE UP
FLUBV AG NPH QL: NEGATIVE — SIGNIFICANT CHANGE UP
GAS PNL BLDV: 135 MMOL/L — LOW (ref 136–145)
GAS PNL BLDV: SIGNIFICANT CHANGE UP
GLUCOSE SERPL-MCNC: 148 MG/DL — HIGH (ref 70–99)
HCO3 BLDV-SCNC: 31 MMOL/L — HIGH (ref 22–29)
HCT VFR BLD CALC: 45.6 % — SIGNIFICANT CHANGE UP (ref 37–47)
HCT VFR BLDA CALC: 48.3 % — HIGH (ref 34–44)
HGB BLD CALC-MCNC: 15.7 G/DL — SIGNIFICANT CHANGE UP (ref 14–18)
HGB BLD-MCNC: 15 G/DL — SIGNIFICANT CHANGE UP (ref 12–16)
IMM GRANULOCYTES NFR BLD AUTO: 0.4 % — HIGH (ref 0.1–0.3)
LACTATE BLDV-MCNC: 1.9 MMOL/L — HIGH (ref 0.5–1.6)
LYMPHOCYTES # BLD AUTO: 37.5 % — SIGNIFICANT CHANGE UP (ref 20.5–51.1)
LYMPHOCYTES # BLD AUTO: 4.5 K/UL — HIGH (ref 1.2–3.4)
MCHC RBC-ENTMCNC: 32.6 PG — HIGH (ref 27–31)
MCHC RBC-ENTMCNC: 32.9 G/DL — SIGNIFICANT CHANGE UP (ref 32–37)
MCV RBC AUTO: 99.1 FL — HIGH (ref 81–99)
MONOCYTES # BLD AUTO: 1.29 K/UL — HIGH (ref 0.1–0.6)
MONOCYTES NFR BLD AUTO: 10.7 % — HIGH (ref 1.7–9.3)
NEUTROPHILS # BLD AUTO: 5.98 K/UL — SIGNIFICANT CHANGE UP (ref 1.4–6.5)
NEUTROPHILS NFR BLD AUTO: 49.8 % — SIGNIFICANT CHANGE UP (ref 42.2–75.2)
NRBC # BLD: 0 /100 WBCS — SIGNIFICANT CHANGE UP (ref 0–0)
PCO2 BLDV: 64 MMHG — HIGH (ref 41–51)
PH BLDV: 7.3 — SIGNIFICANT CHANGE UP (ref 7.26–7.43)
PLATELET # BLD AUTO: 231 K/UL — SIGNIFICANT CHANGE UP (ref 130–400)
PO2 BLDV: 21 MMHG — SIGNIFICANT CHANGE UP (ref 20–40)
POTASSIUM BLDV-SCNC: 4.5 MMOL/L — SIGNIFICANT CHANGE UP (ref 3.3–5.6)
POTASSIUM SERPL-MCNC: 4.6 MMOL/L — SIGNIFICANT CHANGE UP (ref 3.5–5)
POTASSIUM SERPL-SCNC: 4.6 MMOL/L — SIGNIFICANT CHANGE UP (ref 3.5–5)
PROT SERPL-MCNC: 6.5 G/DL — SIGNIFICANT CHANGE UP (ref 6–8)
RBC # BLD: 4.6 M/UL — SIGNIFICANT CHANGE UP (ref 4.2–5.4)
RBC # FLD: 13 % — SIGNIFICANT CHANGE UP (ref 11.5–14.5)
RSV RESULT: NEGATIVE — SIGNIFICANT CHANGE UP
RSV RNA RESP QL NAA+PROBE: NEGATIVE — SIGNIFICANT CHANGE UP
SAO2 % BLDV: 30 % — SIGNIFICANT CHANGE UP
SODIUM SERPL-SCNC: 132 MMOL/L — LOW (ref 135–146)
WBC # BLD: 12.01 K/UL — HIGH (ref 4.8–10.8)
WBC # FLD AUTO: 12.01 K/UL — HIGH (ref 4.8–10.8)

## 2019-04-02 PROCEDURE — 93010 ELECTROCARDIOGRAM REPORT: CPT

## 2019-04-02 PROCEDURE — 99285 EMERGENCY DEPT VISIT HI MDM: CPT

## 2019-04-02 PROCEDURE — 71045 X-RAY EXAM CHEST 1 VIEW: CPT | Mod: 26

## 2019-04-02 RX ORDER — NICOTINE POLACRILEX 2 MG
1 GUM BUCCAL DAILY
Qty: 0 | Refills: 0 | Status: DISCONTINUED | OUTPATIENT
Start: 2019-04-02 | End: 2019-04-04

## 2019-04-02 RX ORDER — PANTOPRAZOLE SODIUM 20 MG/1
40 TABLET, DELAYED RELEASE ORAL
Qty: 0 | Refills: 0 | Status: DISCONTINUED | OUTPATIENT
Start: 2019-04-02 | End: 2019-04-09

## 2019-04-02 RX ORDER — TRAZODONE HCL 50 MG
50 TABLET ORAL AT BEDTIME
Qty: 0 | Refills: 0 | Status: DISCONTINUED | OUTPATIENT
Start: 2019-04-02 | End: 2019-04-09

## 2019-04-02 RX ORDER — BUDESONIDE AND FORMOTEROL FUMARATE DIHYDRATE 160; 4.5 UG/1; UG/1
2 AEROSOL RESPIRATORY (INHALATION)
Qty: 0 | Refills: 0 | Status: DISCONTINUED | OUTPATIENT
Start: 2019-04-02 | End: 2019-04-09

## 2019-04-02 RX ORDER — ALPRAZOLAM 0.25 MG
0.5 TABLET ORAL DAILY
Qty: 0 | Refills: 0 | Status: DISCONTINUED | OUTPATIENT
Start: 2019-04-02 | End: 2019-04-03

## 2019-04-02 RX ORDER — IPRATROPIUM/ALBUTEROL SULFATE 18-103MCG
3 AEROSOL WITH ADAPTER (GRAM) INHALATION ONCE
Qty: 0 | Refills: 0 | Status: COMPLETED | OUTPATIENT
Start: 2019-04-02 | End: 2019-04-02

## 2019-04-02 RX ORDER — AZITHROMYCIN 500 MG/1
500 TABLET, FILM COATED ORAL ONCE
Qty: 0 | Refills: 0 | Status: COMPLETED | OUTPATIENT
Start: 2019-04-02 | End: 2019-04-02

## 2019-04-02 RX ORDER — MAGNESIUM SULFATE 500 MG/ML
2 VIAL (ML) INJECTION ONCE
Qty: 0 | Refills: 0 | Status: COMPLETED | OUTPATIENT
Start: 2019-04-02 | End: 2019-04-02

## 2019-04-02 RX ORDER — IPRATROPIUM/ALBUTEROL SULFATE 18-103MCG
3 AEROSOL WITH ADAPTER (GRAM) INHALATION EVERY 6 HOURS
Qty: 0 | Refills: 0 | Status: DISCONTINUED | OUTPATIENT
Start: 2019-04-02 | End: 2019-04-09

## 2019-04-02 RX ORDER — ENOXAPARIN SODIUM 100 MG/ML
40 INJECTION SUBCUTANEOUS DAILY
Qty: 0 | Refills: 0 | Status: DISCONTINUED | OUTPATIENT
Start: 2019-04-02 | End: 2019-04-09

## 2019-04-02 RX ADMIN — Medication 3 MILLILITER(S): at 20:01

## 2019-04-02 RX ADMIN — Medication 125 MILLIGRAM(S): at 20:12

## 2019-04-02 RX ADMIN — AZITHROMYCIN 500 MILLIGRAM(S): 500 TABLET, FILM COATED ORAL at 21:15

## 2019-04-02 RX ADMIN — Medication 2 GRAM(S): at 22:21

## 2019-04-02 RX ADMIN — Medication 50 GRAM(S): at 21:18

## 2019-04-02 RX ADMIN — Medication 3 MILLILITER(S): at 20:20

## 2019-04-02 RX ADMIN — Medication 3 MILLILITER(S): at 20:36

## 2019-04-02 RX ADMIN — AZITHROMYCIN 255 MILLIGRAM(S): 500 TABLET, FILM COATED ORAL at 20:10

## 2019-04-02 NOTE — ED PROVIDER NOTE - PROGRESS NOTE DETAILS
Patient's respiratory status has improved since 2 treatments. I was directly involved in the management of this patient. Case was discussed with PA Efrain Bellamy

## 2019-04-02 NOTE — ED PROVIDER NOTE - NS ED ROS FT
Review of Systems         Constitutional: (-) fever (-) chills (-) weakness       EENT: (-) sore throat       Cardiovascular: (-) chest pain (-) syncope       Respiratory: (+) cough, (+) shortness of breath       Gastrointestinal: (-) abdominal pain (-) vomiting (-) diarrhea (-) nausea       Genitourinary: (-) dysuria (-) frequency (-) hematuria       Musculoskeletal: (-) neck pain (-) back pain (-) joint pain       Integumentary: (-) rash       Neurological: (-) headache (-) altered mental status (-) dizziness       Psych: (-) psych history

## 2019-04-02 NOTE — H&P ADULT - ASSESSMENT
58 y/o female with h/o COPD not on oxygen ( never intubated ), anxiety presented to ED for c/o progressively worsening  SOB x 2 weeks duration     #COPD exacerbation   - FLU , RSV negative  - solumedrol 60 q8   -albuterol -ipratropium nebs  -vbg reviewed ph 7.30 pco2  64  po2  21   - patient refuses NIV - as two of her friends  while being on BiPAP   - smoking cessation advised  - reports heart burn - Protonix added   - oxygen supplementation to keep sat 88-92 %  - levaquin 500 iv q24   -f/u cultures and deescalate abx when cultures resulted   -f/u CE  -vascular duplex  - bnp    #c/o cramps and weakness involving thigh and calf +anxious- likely becasue of chronic steroid use  f/u electrolytes       #Anxiety- prn xanax 0.5 q12  #dvt ppx  #full code, from home 58 y/o female with h/o COPD not on oxygen ( never intubated ), anxiety presented to ED for c/o progressively worsening  SOB x 2 weeks duration     #COPD exacerbation   - FLU , RSV negative  - solumedrol 60 q8   -albuterol -ipratropium nebs  -vbg reviewed ph 7.30 pco2  64  po2  21   - patient refuses NIV - as two of her friends  while being on BiPAP   - smoking cessation advised  - reports heart burn - Protonix added   - oxygen supplementation to keep sat 88-92 %  - levaquin 500 iv q24   -f/u cultures and deescalate abx when cultures resulted   -f/u CE  -vascular duplex  - bnp  -pulm rehab as outpatient    #c/o cramps and weakness involving thigh and calf +anxious- likely becasue of chronic steroid use  f/u electrolytes       #Anxiety- prn xanax 0.5 q12  #dvt ppx  #full code, from home

## 2019-04-02 NOTE — H&P ADULT - NSICDXFAMILYHX_GEN_ALL_CORE_FT
FAMILY HISTORY:  Mother  Still living? Unknown  Family history of COPD (chronic obstructive pulmonary disease), Age at diagnosis: Age Unknown

## 2019-04-02 NOTE — ED ADULT NURSE NOTE - OBJECTIVE STATEMENT
For 1.5 weeks I haven't been able to breathe.  I used the ventolin but it didn't help  my pulmonary doctor said its emphysema

## 2019-04-02 NOTE — H&P ADULT - NSHPPHYSICALEXAM_GEN_ALL_CORE
General: anxious + , AAO x3  HEENT: No icterus,. Moist mucous membranes  Neck: No JVD noted. Supple  Cardio: S1, S2 noted, RRR. No murmurs  Resp: b/l good air entry , b/l diffuse wheezing +   Abdo: Soft, NT, bowel sounds present.   Extremities: No edema noted. Pulses present b/l  Neuro: AAO x3, grossly normal motor strength.  Skin: Dry, no rashes

## 2019-04-02 NOTE — ED PROVIDER NOTE - CLINICAL SUMMARY MEDICAL DECISION MAKING FREE TEXT BOX
69yF p/w SOB x 2d, +wheezing, not improved by home albuterol. Pt w/ diffuse wheezing, inc WOB, only partially improved w/ duoneb and steroids.  CXR w/o focal consolidation.  EKG NSR w/o ischemia.  Labs reassuring.  Will give magnesium and adm for further treatments.

## 2019-04-02 NOTE — H&P ADULT - NSHPLABSRESULTS_GEN_ALL_CORE
LABS:                        15.0   12.01 )-----------( 231      ( 02 Apr 2019 20:10 )             45.6     02 Apr 2019 22:30    132    |  97     |  4      ----------------------------<  148    4.6     |  22     |  0.6      Ca    9.1        02 Apr 2019 22:30    TPro  6.5    /  Alb  4.1    /  TBili  0.6    /  DBili  x      /  AST  15     /  ALT  12     /  AlkPhos  68     02 Apr 2019 22:30    CXR- AP view - when compared to prior CXR 11/1/2018 - stable left basilar linear opacities. No evidence of consolidation or pneumothorax. NACPD appreciated. F/u official read    < from: Transthoracic Echocardiogram (06.11.18 @ 13:43) >    1. Spectral Doppler shows impaired relaxation pattern of left   ventricular myocardial filling (Grade I diastolic dysfunction).   2. Mild tricuspid regurgitation.   3. Sclerotic aortic valve with normal opening.    < end of copied text >

## 2019-04-02 NOTE — CONSULT NOTE ADULT - SUBJECTIVE AND OBJECTIVE BOX
Patient is a 69y old  Female who presents with a chief complaint of COPD exacerbation (02 Apr 2019 23:01)      HPI:  60 y/o female with h/o COPD not on oxygen ( never intubated ) , anxiety presented to ED for c/o progressively worsening SOB x 2 weeks duration   History dates back to 2 weeks ptp when patient noticed she was getting more SOB (NYHA 3) than her usual baseline , chronic productive cough - yellowish-white sputum ,no change in consistency ,quality or quantity of sputum. Denies fever,chills, nausea, vomiting, orthopnea recent travel or sick contacts. She recently completed her steroid taper on 3/14 and states whenever she is off steroids her symptoms worsens. C/o weakness and cramp involving b/l thighs . Reports compliance with inhalers .from the past 2 weeks patient has been using proair inhaler every 2 hours . Patient quit smoking in july 2018 but recently has started smoking again.     ED VS bp 141/63 hr 91 temp ,sat 96% RA   Rx ED solumedrol 125 mg iv x 1, magnesium 2 gm iv x 1, albuterol nebs (02 Apr 2019 23:01)    Inhalers- Combivent proair   STOPBANG- 3     PAST MEDICAL & SURGICAL HISTORY:  Chronic pain  Depression  Anxiety  COPD (chronic obstructive pulmonary disease)  Neck mass      SOCIAL HX:   Smoking 1-1.5 PPD x 31 years                         ETOH   none                       FAMILY HISTORY:  Family history of COPD (chronic obstructive pulmonary disease) (Mother)      Allergies    penicillin (Unknown)    PHYSICAL EXAM  Vital Signs Last 24 Hrs  T(C): --  T(F): --  HR: 91 (02 Apr 2019 19:26) (91 - 91)  BP: 141/63 (02 Apr 2019 19:26) (141/63 - 141/63)  BP(mean): --  RR: 25 (02 Apr 2019 19:26) (25 - 25)  SpO2: 96% (02 Apr 2019 19:26) (96% - 96%)  I&O's Summary      General: anxious + , sitting in bed  HEENT:  PERRLA          Lungs: b/l diffuse wheezing +   Cardiovascular: RRR, S1S2  Abdomen: BS+ve; soft non tender  Extremities: No LE edema  Skin:  No evident Rash  Neurological:  AAOx3; No focal deficit      LABS:                          15.0   12.01 )-----------( 231      ( 02 Apr 2019 20:10 )             45.6                                               04-02    132<L>  |  97<L>  |  4<L>  ----------------------------<  148<H>  4.6   |  22  |  0.6<L>    Ca    9.1      02 Apr 2019 22:30    TPro  6.5  /  Alb  4.1  /  TBili  0.6  /  DBili  x   /  AST  15  /  ALT  12  /  AlkPhos  68  04-02                                                                                           LIVER FUNCTIONS - ( 02 Apr 2019 22:30 )  Alb: 4.1 g/dL / Pro: 6.5 g/dL / ALK PHOS: 68 U/L / ALT: 12 U/L / AST: 15 U/L / GGT: x                                                                                                    Lactate (04-02-19 @ 20:48): 1.9<H>    blood gas- vbg Blood Gas Profile - Venous (04.02.19 @ 20:48)    pH, Venous: 7.30    pCO2, Venous: 64 mmHg    pO2, Venous: 21 mmHg    HCO3, Venous: 31 mmoL/L    Base Excess, Venous: 2.7 mmoL/L    Oxygen Saturation, Venous: 30 %      MEDICATIONS  (STANDING):  ALBUTerol/ipratropium for Nebulization 3 milliLiter(s) Nebulizer every 6 hours  buDESOnide 160 MICROgram(s)/formoterol 4.5 MICROgram(s) Inhaler 2 Puff(s) Inhalation two times a day  enoxaparin Injectable 40 milliGRAM(s) SubCutaneous daily  methylPREDNISolone sodium succinate Injectable 60 milliGRAM(s) IV Push every 8 hours  nicotine - 21 mG/24Hr(s) Patch 1 patch Transdermal daily  pantoprazole    Tablet 40 milliGRAM(s) Oral before breakfast  traZODone 50 milliGRAM(s) Oral at bedtime    MEDICATIONS  (PRN):  ALPRAZolam 0.5 milliGRAM(s) Oral daily PRN anxiety          Radiology:   CXR - when compared to prior CXR 11/1/2018 - stable left basilar linear opacities. No evidence of consolidation or pneumothorax. NACPD appreciated. F/u official read Patient is a 69y old  Female who presents with a chief complaint of SOB (02 Apr 2019 23:01)      HPI:  60 y/o female with h/o COPD not on oxygen recurrent exacerbation still actively smoking ( never intubated ) , anxiety presented to ED for c/o progressively worsening SOB x 2 weeks duration . History dates back to 2 weeks ptp when patient noticed she was getting more SOB (NYHA 3) than her usual baseline , chronic productive cough - yellowish-white sputum ,no change in consistency ,quality or quantity of sputum. Denies fever,chills, nausea, vomiting, orthopnea recent travel or sick contacts. She recently completed her steroid taper on 3/14 and states whenever she is off steroids her symptoms worsens. C/o weakness and cramp involving b/l thighs . Reports compliance with inhalers .from the past 2 weeks patient has been using proair inhaler every 2 hours . Patient quit smoking in july 2018 but recently has started smoking again.     ED VS bp 141/63 hr 91 temp ,sat 96% RA   Rx ED solumedrol 125 mg iv x 1, magnesium 2 gm iv x 1, albuterol nebs (02 Apr 2019 23:01), today feels better    Inhalers- Combivent proair   STOPBANG- 3     PAST MEDICAL & SURGICAL HISTORY:  Chronic pain  Depression  Anxiety  COPD (chronic obstructive pulmonary disease)  Neck mass      SOCIAL HX:   Smoking 1-1.5 PPD x 31 years                         ETOH   none                       FAMILY HISTORY:  Family history of COPD (chronic obstructive pulmonary disease) (Mother)      Allergies    penicillin (Unknown)    PHYSICAL EXAM  Vital Signs Last 24 Hrs  T: 97.2  HR: 91 (02 Apr 2019 19:26) (91 - 91)  BP: 141/63 (02 Apr 2019 19:26) (141/63 - 141/63  RR: 25 (02 Apr 2019 19:26) (25 - 25)  SpO2: 96% (02 Apr 2019 19:26) (96% - 96%)  I&O's Summary      General: anxious + , sitting in bed  HEENT:  PERRLA          Lungs: b/l diffuse wheezing +   Cardiovascular: RRR, S1S2  Abdomen: BS+ve; soft non tender  Extremities: No LE edema  Skin:  No evident Rash  Neurological:  AAOx3; No focal deficit      LABS:                          15.0   12.01 )-----------( 231      ( 02 Apr 2019 20:10 )             45.6                                               04-02    132<L>  |  97<L>  |  4<L>  ----------------------------<  148<H>  4.6   |  22  |  0.6<L>    Ca    9.1      02 Apr 2019 22:30    TPro  6.5  /  Alb  4.1  /  TBili  0.6  /  DBili  x   /  AST  15  /  ALT  12  /  AlkPhos  68  04-02                                                                                           LIVER FUNCTIONS - ( 02 Apr 2019 22:30 )  Alb: 4.1 g/dL / Pro: 6.5 g/dL / ALK PHOS: 68 U/L / ALT: 12 U/L / AST: 15 U/L / GGT: x                                                                                                    Lactate (04-02-19 @ 20:48): 1.9<H>    blood gas- vbg Blood Gas Profile - Venous (04.02.19 @ 20:48)    pH, Venous: 7.30    pCO2, Venous: 64 mmHg    pO2, Venous: 21 mmHg    HCO3, Venous: 31 mmoL/L    Base Excess, Venous: 2.7 mmoL/L    Oxygen Saturation, Venous: 30 %      MEDICATIONS  (STANDING):  ALBUTerol/ipratropium for Nebulization 3 milliLiter(s) Nebulizer every 6 hours  buDESOnide 160 MICROgram(s)/formoterol 4.5 MICROgram(s) Inhaler 2 Puff(s) Inhalation two times a day  enoxaparin Injectable 40 milliGRAM(s) SubCutaneous daily  methylPREDNISolone sodium succinate Injectable 60 milliGRAM(s) IV Push every 8 hours  nicotine - 21 mG/24Hr(s) Patch 1 patch Transdermal daily  pantoprazole    Tablet 40 milliGRAM(s) Oral before breakfast  traZODone 50 milliGRAM(s) Oral at bedtime    MEDICATIONS  (PRN):  ALPRAZolam 0.5 milliGRAM(s) Oral daily PRN anxiety          Radiology:   CXR - when compared to prior CXR 11/1/2018 - stable left basilar linear opacities. No evidence of consolidation or pneumothorax. NACPD appreciated. F/u official read

## 2019-04-02 NOTE — ED PROVIDER NOTE - PHYSICAL EXAMINATION
Physical Exam    Vital Signs: I have reviewed the initial vital signs  Constitutional: chronically ill, appears stated age, respiratory distress  HEENT: Conjunctiva pink, Sclera clear, PERRLA, EOMI. Mucous membranes moist, no exudates or lesions noted, uvula midline. No trismus or drooling. Non-tender lymph nodes  Cardiovascular: S1 and S2 present, tachycardic, regular rhythm. radial pulses equal and 2+ No peripheral edema  Respiratory: on 4L via NC. increased respiratory effort, tachypneic, speaking in broken sentences diffuse wheezing in all lung fields, moving air poorly  Musculoskeletal: supple nontender neck, no midline tenderness, no joint pain  Integumentary: warm, dry, no rash  Neurologic: A & O x 3, CN II-XII grossly intact, all extremities’ motor and sensory functions grossly intact  Psychiatric: appropriate mood, appropriate affect

## 2019-04-02 NOTE — ED PROVIDER NOTE - OBJECTIVE STATEMENT
69 year old female hx COPD presenting with increasing shortness of breath x 10 days. Patient states she has tried her albuterol, proair, and combivent at home with no relief; 2 treatments today. She admits to increased sputum with color change and cough. Not on home O2- pulm is El sayed. Patient sent in by her pulmonologist. No hx of asthma but patient does have heavy hx of smoking. She denies fevers, chills, chest pain, abd pain, n/v/d. She states shortness of breath  worse with ambulation, no peripheral swelling, no chest pain. Recent prednisone taper ended 3/14. No recent congestion, URI sxs.

## 2019-04-02 NOTE — H&P ADULT - NSICDXPASTMEDICALHX_GEN_ALL_CORE_FT
PAST MEDICAL HISTORY:  Anxiety     Chronic pain     COPD (chronic obstructive pulmonary disease)     Depression

## 2019-04-02 NOTE — H&P ADULT - NSHPREVIEWOFSYSTEMS_GEN_ALL_CORE
REVIEW OF SYSTEMS:    CONSTITUTIONAL: No weakness, fevers or chills  EYES/ENT: No visual changes;  No vertigo or throat pain   NECK: No pain or stiffness  RESPIRATORY: productive cough +, wheezing + a/w shortness of breath  CARDIOVASCULAR: No chest pain or palpitations  GASTROINTESTINAL: No abdominal or epigastric pain. No nausea, vomiting, or hematemesis; No diarrhea or constipation. No melena or hematochezia.  GENITOURINARY: No dysuria, frequency or hematuria  NEUROLOGICAL: No numbness or weakness  SKIN: No itching, rashes

## 2019-04-02 NOTE — H&P ADULT - ATTENDING COMMENTS
60 y/o female with h/o COPD not on oxygen ( never intubated ) , anxiety presented to ED for c/o progressively worsening SOB x 2 weeks duration   History dates back to 2 weeks ptp when patient noticed she was getting more SOB (NYHA 3) than her usual baseline , chronic productive cough - yellowish-white sputum ,no change in consistency ,quality or quantity of sputum. Denies fever,chills, nausea, vomiting, orthopnea recent travel or sick contacts. She recently completed her steroid taper on 3/14 and states whenever she is off steroids her symptoms worsens. C/o weakness and cramp involving b/l thighs . Reports compliance with inhalers .from the past 2 weeks patient has been using proair inhaler every 2 hours . Patient quit smoking in july 2018 but recently has started smoking again.   ED VS bp 141/63 hr 91 temp ,sat 96% RA   Rx ED solumedrol 125 mg iv x 1, magnesium 2 gm iv x 1, albuterol nebs     REVIEW OF SYSTEMS: see cc/HPI  CONSTITUTIONAL: No weakness, fevers or chills  EYES/ENT: No visual changes;  No vertigo or throat pain   NECK: No pain or stiffness  RESPIRATORY: (+) cough/wheezing, NO  hemoptysis; (+) shortness of breath/PARRA  CARDIOVASCULAR: No chest pain or palpitations  GASTROINTESTINAL: No abdominal or epigastric pain. No nausea, vomiting, or hematemesis; No diarrhea or constipation. No melena or hematochezia.  GENITOURINARY: No dysuria, frequency or hematuria  NEUROLOGICAL: No numbness or weakness  SKIN: No itching, rashes    Physical Exam:  General: WN/WD NAD  Neurology: A&Ox3, nonfocal, follows commands  Eyes: PERRLA/ EOMI  ENT/Neck: Neck supple, trachea midline, No JVD  Respiratory: B/L wheezing , tachypnea / mild use of accessory muscles, NO rales, NO rhonchi   CV: Normal rate regular rhythm, S1S2, no murmurs, rubs or gallops  Abdominal: Soft, NT, ND +BS,   Extremities: No edema, + peripheral pulses  Skin: No Rashes, Hematoma, Ecchymosis  Incisions:   Tubes:    A/P  COPD exacerbation / active smoker   -admit to floor   -IV steroids / Nebs /IV abx   -R3ukelsdeoxr and PRN O2  -smoking cessation stressed and nicotine patch accepted  -PRN BiPAP recommended to patient but she refused and is aware that it would assist in reducing the work of breathing   -Pulm eval   -check venous duple of LEs    Anxiety   -PRN Xanax    DVT and GI  prophylaxis

## 2019-04-02 NOTE — ED PROVIDER NOTE - ATTENDING CONTRIBUTION TO CARE
69yF COPD p/w SOB x days. +wheezing.  No fever. Not improved by home ventolin.    VSS  CONSTITUTIONAL: well developed; well nourished; well appearing in no acute distress  HEAD: normocephalic; atraumatic  EYES: no conjunctival injection, no scleral icterus  ENT: no nasal discharge; airway clear.  NECK: supple; non tender. + full passive ROM in all directions  CARD: S1, S2 normal; no murmurs, gallops, or rubs. Regular rate and rhythm  RESP: +diffuse wheezing, +tachypnea and inc WOB with accessory muscle use  ABD: soft; non-distended; non-tender. No rebound, no guarding, no pulsatile abdominal mass  EXT: moving all extremities spontaneously, normal ROM  SKIN: warm and dry, no lesions noted  NEURO: alert, oriented, CN II-XII grossly intact, motor and sensory grossly intact, speech nonslurred, stable gait, no focal deficits. GCS 15  PSYCH: calm, cooperative, appropriate, good eye contact, logical thought process, no apparent danger to self or others    CXR  labs  duonebs  steroids  consider Mg as needed, BiPAP as needed

## 2019-04-02 NOTE — H&P ADULT - HISTORY OF PRESENT ILLNESS
60 y/o female with h/o COPD not on oxygen ( never intubated ) , anxiety presented to ED for c/o progressively worsening SOB x 2 weeks duration   History dates back to 2 weeks ptp when patient noticed she was getting more SOB (NYHA 3) than her usual baseline , chronic productive cough - yellowish-white sputum ,no change in consistency ,quality or quantity of sputum. Denies fever,chills, nausea, vomiting, orthopnea recent travel or sick contacts. She recently completed her steroid taper on 3/14 and states whenever she is off steroids her symptoms worsens. C/o weakness and cramp involving b/l thighs . Reports compliance with inhalers .from the past 2 weeks patient has been using proair inhaler every 2 hours . Patient quit smoking in july 2018 but recently has started smoking again.   ED VS bp 141/63 hr 91 temp ,sat 96% RA   Rx ED solumedrol 125 mg iv x 1, magnesium 2 gm iv x 1, albuterol nebs

## 2019-04-02 NOTE — CONSULT NOTE ADULT - ASSESSMENT
IMPRESSION:  -COPD exacerbation  -anxiety      PLAN:   - FLU , RSV negative  - solumedrol 60 q8   -albuterol -ipratropium nebs  -vbg reviewed ph 7.30 pco2  64  po2  21   - patient refuses NIV   - smoking cessation advised  - reports heart burn - Protonix added   - oxygen supplementation to keep sat 88-92 %  - levaquin 500 iv q24   -f/u cultures and deescalate abx when cultures resulted   -f/u CE and bnp  -vascular venous lower extremity duplex  -consider pulm -rehab as outpatient  -OOB to chair  -dvt ppx    -case to be d/w attending IMPRESSION:  -COPD exacerbation  -anxiety      PLAN:   - FLU , RSV negative  - solumedrol 60 q8   -albuterol -ipratropium nebs  -vbg reviewed ph 7.30 pco2  64  po2  21   - patient refuses NIV   - smoking cessation advised  - reports heart burn - Protonix added   - oxygen supplementation to keep sat 88-92 %  - levaquin 500 iv q24   -f/u cultures and deescalate abx when cultures resulted   -f/u CE and bnp  -vascular venous lower extremity duplex  -consider pulm -rehab as outpatient  -STOPBANG 3 - outpatient sleep study  -OOB to chair  -dvt ppx    -case to be d/w attending IMPRESSION:  -severe COPD Now exacerbation active smoker (poor OP f/up)  -anxiety      PLAN:     - solumedrol 60 q8 q 4 h  -albuterol -ipratropium nebs  - BIPAP at nite and as needed  - smoking cessation   - ppi  - oxygen supplementation to keep sat 88-92 %  - finish course of ABX  -f/u cultures and deescalate abx when cultures resulted   -vascular venous lower extremity duplex  -  pulm -rehab as outpatient  -STOPBAN 3 - outpatient sleep study  -OOB to chair  -dvt ppx  - poor prognosis

## 2019-04-03 LAB
ALBUMIN SERPL ELPH-MCNC: 4.2 G/DL — SIGNIFICANT CHANGE UP (ref 3.5–5.2)
ALP SERPL-CCNC: 65 U/L — SIGNIFICANT CHANGE UP (ref 30–115)
ALT FLD-CCNC: 12 U/L — SIGNIFICANT CHANGE UP (ref 0–41)
ANION GAP SERPL CALC-SCNC: 15 MMOL/L — HIGH (ref 7–14)
ANION GAP SERPL CALC-SCNC: 16 MMOL/L — HIGH (ref 7–14)
AST SERPL-CCNC: 14 U/L — SIGNIFICANT CHANGE UP (ref 0–41)
BASOPHILS # BLD AUTO: 0.02 K/UL — SIGNIFICANT CHANGE UP (ref 0–0.2)
BASOPHILS NFR BLD AUTO: 0.3 % — SIGNIFICANT CHANGE UP (ref 0–1)
BILIRUB SERPL-MCNC: 0.5 MG/DL — SIGNIFICANT CHANGE UP (ref 0.2–1.2)
BUN SERPL-MCNC: 4 MG/DL — LOW (ref 10–20)
BUN SERPL-MCNC: 6 MG/DL — LOW (ref 10–20)
CALCIUM SERPL-MCNC: 9.1 MG/DL — SIGNIFICANT CHANGE UP (ref 8.5–10.1)
CALCIUM SERPL-MCNC: 9.1 MG/DL — SIGNIFICANT CHANGE UP (ref 8.5–10.1)
CHLORIDE SERPL-SCNC: 94 MMOL/L — LOW (ref 98–110)
CHLORIDE SERPL-SCNC: 98 MMOL/L — SIGNIFICANT CHANGE UP (ref 98–110)
CO2 SERPL-SCNC: 23 MMOL/L — SIGNIFICANT CHANGE UP (ref 17–32)
CO2 SERPL-SCNC: 24 MMOL/L — SIGNIFICANT CHANGE UP (ref 17–32)
CREAT SERPL-MCNC: 0.6 MG/DL — LOW (ref 0.7–1.5)
CREAT SERPL-MCNC: 0.7 MG/DL — SIGNIFICANT CHANGE UP (ref 0.7–1.5)
EOSINOPHIL # BLD AUTO: 0 K/UL — SIGNIFICANT CHANGE UP (ref 0–0.7)
EOSINOPHIL NFR BLD AUTO: 0 % — SIGNIFICANT CHANGE UP (ref 0–8)
ERYTHROCYTE [SEDIMENTATION RATE] IN BLOOD: 3 MM/HR — SIGNIFICANT CHANGE UP (ref 0–20)
GLUCOSE SERPL-MCNC: 189 MG/DL — HIGH (ref 70–99)
GLUCOSE SERPL-MCNC: 244 MG/DL — HIGH (ref 70–99)
HCT VFR BLD CALC: 45.8 % — SIGNIFICANT CHANGE UP (ref 37–47)
HGB BLD-MCNC: 14.7 G/DL — SIGNIFICANT CHANGE UP (ref 12–16)
IMM GRANULOCYTES NFR BLD AUTO: 0.7 % — HIGH (ref 0.1–0.3)
LYMPHOCYTES # BLD AUTO: 0.54 K/UL — LOW (ref 1.2–3.4)
LYMPHOCYTES # BLD AUTO: 9 % — LOW (ref 20.5–51.1)
MCHC RBC-ENTMCNC: 32.1 G/DL — SIGNIFICANT CHANGE UP (ref 32–37)
MCHC RBC-ENTMCNC: 32.2 PG — HIGH (ref 27–31)
MCV RBC AUTO: 100.4 FL — HIGH (ref 81–99)
MONOCYTES # BLD AUTO: 0.05 K/UL — LOW (ref 0.1–0.6)
MONOCYTES NFR BLD AUTO: 0.8 % — LOW (ref 1.7–9.3)
NEUTROPHILS # BLD AUTO: 5.35 K/UL — SIGNIFICANT CHANGE UP (ref 1.4–6.5)
NEUTROPHILS NFR BLD AUTO: 89.2 % — HIGH (ref 42.2–75.2)
NRBC # BLD: 0 /100 WBCS — SIGNIFICANT CHANGE UP (ref 0–0)
NT-PROBNP SERPL-SCNC: 104 PG/ML — SIGNIFICANT CHANGE UP (ref 0–300)
PLATELET # BLD AUTO: 232 K/UL — SIGNIFICANT CHANGE UP (ref 130–400)
POTASSIUM SERPL-MCNC: 5.6 MMOL/L — HIGH (ref 3.5–5)
POTASSIUM SERPL-MCNC: 5.7 MMOL/L — HIGH (ref 3.5–5)
POTASSIUM SERPL-SCNC: 5.6 MMOL/L — HIGH (ref 3.5–5)
POTASSIUM SERPL-SCNC: 5.7 MMOL/L — HIGH (ref 3.5–5)
PROT SERPL-MCNC: 6.4 G/DL — SIGNIFICANT CHANGE UP (ref 6–8)
RBC # BLD: 4.56 M/UL — SIGNIFICANT CHANGE UP (ref 4.2–5.4)
RBC # FLD: 12.7 % — SIGNIFICANT CHANGE UP (ref 11.5–14.5)
SODIUM SERPL-SCNC: 134 MMOL/L — LOW (ref 135–146)
SODIUM SERPL-SCNC: 136 MMOL/L — SIGNIFICANT CHANGE UP (ref 135–146)
TROPONIN T SERPL-MCNC: <0.01 NG/ML — SIGNIFICANT CHANGE UP
WBC # BLD: 6 K/UL — SIGNIFICANT CHANGE UP (ref 4.8–10.8)
WBC # FLD AUTO: 6 K/UL — SIGNIFICANT CHANGE UP (ref 4.8–10.8)

## 2019-04-03 RX ORDER — NICOTINE POLACRILEX 2 MG
1 GUM BUCCAL DAILY
Qty: 0 | Refills: 0 | Status: DISCONTINUED | OUTPATIENT
Start: 2019-04-03 | End: 2019-04-04

## 2019-04-03 RX ORDER — ALPRAZOLAM 0.25 MG
0.5 TABLET ORAL EVERY 12 HOURS
Qty: 0 | Refills: 0 | Status: DISCONTINUED | OUTPATIENT
Start: 2019-04-03 | End: 2019-04-09

## 2019-04-03 RX ORDER — OXYCODONE AND ACETAMINOPHEN 5; 325 MG/1; MG/1
1 TABLET ORAL ONCE
Qty: 0 | Refills: 0 | Status: DISCONTINUED | OUTPATIENT
Start: 2019-04-03 | End: 2019-04-03

## 2019-04-03 RX ORDER — ALPRAZOLAM 0.25 MG
0.5 TABLET ORAL ONCE
Qty: 0 | Refills: 0 | Status: DISCONTINUED | OUTPATIENT
Start: 2019-04-03 | End: 2019-04-03

## 2019-04-03 RX ORDER — DOCUSATE SODIUM 100 MG
100 CAPSULE ORAL
Qty: 0 | Refills: 0 | Status: DISCONTINUED | OUTPATIENT
Start: 2019-04-03 | End: 2019-04-09

## 2019-04-03 RX ORDER — SODIUM POLYSTYRENE SULFONATE 4.1 MEQ/G
30 POWDER, FOR SUSPENSION ORAL ONCE
Qty: 0 | Refills: 0 | Status: COMPLETED | OUTPATIENT
Start: 2019-04-03 | End: 2019-04-03

## 2019-04-03 RX ORDER — OXYCODONE HYDROCHLORIDE 5 MG/1
10 TABLET ORAL EVERY 6 HOURS
Qty: 0 | Refills: 0 | Status: DISCONTINUED | OUTPATIENT
Start: 2019-04-03 | End: 2019-04-07

## 2019-04-03 RX ORDER — CHLORHEXIDINE GLUCONATE 213 G/1000ML
1 SOLUTION TOPICAL
Qty: 0 | Refills: 0 | Status: DISCONTINUED | OUTPATIENT
Start: 2019-04-03 | End: 2019-04-09

## 2019-04-03 RX ADMIN — Medication 60 MILLIGRAM(S): at 21:07

## 2019-04-03 RX ADMIN — ENOXAPARIN SODIUM 40 MILLIGRAM(S): 100 INJECTION SUBCUTANEOUS at 11:25

## 2019-04-03 RX ADMIN — Medication 0.5 MILLIGRAM(S): at 00:29

## 2019-04-03 RX ADMIN — OXYCODONE HYDROCHLORIDE 10 MILLIGRAM(S): 5 TABLET ORAL at 12:16

## 2019-04-03 RX ADMIN — PANTOPRAZOLE SODIUM 40 MILLIGRAM(S): 20 TABLET, DELAYED RELEASE ORAL at 05:24

## 2019-04-03 RX ADMIN — OXYCODONE AND ACETAMINOPHEN 1 TABLET(S): 5; 325 TABLET ORAL at 06:37

## 2019-04-03 RX ADMIN — Medication 3 MILLILITER(S): at 09:04

## 2019-04-03 RX ADMIN — OXYCODONE HYDROCHLORIDE 10 MILLIGRAM(S): 5 TABLET ORAL at 11:24

## 2019-04-03 RX ADMIN — BUDESONIDE AND FORMOTEROL FUMARATE DIHYDRATE 2 PUFF(S): 160; 4.5 AEROSOL RESPIRATORY (INHALATION) at 20:03

## 2019-04-03 RX ADMIN — SODIUM POLYSTYRENE SULFONATE 30 GRAM(S): 4.1 POWDER, FOR SUSPENSION ORAL at 11:25

## 2019-04-03 RX ADMIN — Medication 3 MILLILITER(S): at 19:50

## 2019-04-03 RX ADMIN — OXYCODONE HYDROCHLORIDE 10 MILLIGRAM(S): 5 TABLET ORAL at 23:45

## 2019-04-03 RX ADMIN — Medication 0.5 MILLIGRAM(S): at 13:23

## 2019-04-03 RX ADMIN — Medication 1 PATCH: at 11:24

## 2019-04-03 RX ADMIN — Medication 0.5 MILLIGRAM(S): at 21:43

## 2019-04-03 RX ADMIN — Medication 60 MILLIGRAM(S): at 13:23

## 2019-04-03 RX ADMIN — Medication 3 MILLILITER(S): at 14:13

## 2019-04-03 RX ADMIN — OXYCODONE HYDROCHLORIDE 10 MILLIGRAM(S): 5 TABLET ORAL at 17:43

## 2019-04-03 RX ADMIN — Medication 100 MILLIGRAM(S): at 21:46

## 2019-04-03 RX ADMIN — Medication 50 MILLIGRAM(S): at 21:07

## 2019-04-03 RX ADMIN — Medication 60 MILLIGRAM(S): at 05:23

## 2019-04-03 RX ADMIN — BUDESONIDE AND FORMOTEROL FUMARATE DIHYDRATE 2 PUFF(S): 160; 4.5 AEROSOL RESPIRATORY (INHALATION) at 11:25

## 2019-04-03 RX ADMIN — SODIUM POLYSTYRENE SULFONATE 30 GRAM(S): 4.1 POWDER, FOR SUSPENSION ORAL at 21:05

## 2019-04-03 NOTE — PROGRESS NOTE ADULT - SUBJECTIVE AND OBJECTIVE BOX
SUBJECTIVE:    Patient is a 69y old Female who presents with a chief complaint of COPD exacerbation (02 Apr 2019 23:32)  Currently admitted to medicine with the primary diagnosis of COPD exacerbation   Today is hospital day 1d. This morning she is resting comfortably in bed and reports no new issues or overnight events.   this morning having breakfast, complaining of SOB, counselled about stop smoking.     PAST MEDICAL & SURGICAL HISTORY  Chronic pain  Depression  Anxiety  COPD (chronic obstructive pulmonary disease)  Neck mass    SOCIAL HISTORY:  Negative for smoking/alcohol/drug use.     ALLERGIES:  penicillin (Unknown)    MEDICATIONS:  STANDING MEDICATIONS  ALBUTerol/ipratropium for Nebulization 3 milliLiter(s) Nebulizer every 6 hours  buDESOnide 160 MICROgram(s)/formoterol 4.5 MICROgram(s) Inhaler 2 Puff(s) Inhalation two times a day  chlorhexidine 4% Liquid 1 Application(s) Topical <User Schedule>  enoxaparin Injectable 40 milliGRAM(s) SubCutaneous daily  levoFLOXacin IVPB      levoFLOXacin IVPB 500 milliGRAM(s) IV Intermittent every 24 hours  methylPREDNISolone sodium succinate Injectable 60 milliGRAM(s) IV Push every 8 hours  nicotine - 21 mG/24Hr(s) Patch 1 patch Transdermal daily  pantoprazole    Tablet 40 milliGRAM(s) Oral before breakfast  traZODone 50 milliGRAM(s) Oral at bedtime    PRN MEDICATIONS  ALPRAZolam 0.5 milliGRAM(s) Oral daily PRN  oxyCODONE    IR 10 milliGRAM(s) Oral every 6 hours PRN    VITALS:   T(F): 96.8  HR: 86  BP: 129/85  RR: 18  SpO2: 96%    LABS:                        14.7   6.00  )-----------( 232      ( 03 Apr 2019 06:57 )             45.8     04-03    136  |  98  |  4<L>  ----------------------------<  244<H>  5.7<H>   |  23  |  0.6<L>    Ca    9.1      03 Apr 2019 06:57    TPro  6.4  /  Alb  4.2  /  TBili  0.5  /  DBili  x   /  AST  14  /  ALT  12  /  AlkPhos  65  04-03          Troponin T, Serum: <0.01 ng/mL (04-03-19 @ 06:57)  Sedimentation Rate, Erythrocyte: 3 mm/hr (04-03-19 @ 06:57)      CARDIAC MARKERS ( 03 Apr 2019 06:57 )  x     / <0.01 ng/mL / x     / x     / x        Blood Gas Profile - Venous (04.02.19 @ 20:48)    pH, Venous: 7.30    pCO2, Venous: 64 mmHg    pO2, Venous: 21 mmHg    HCO3, Venous: 31 mmoL/L    Base Excess, Venous: 2.7 mmoL/L    Oxygen Saturation, Venous: 30 %  RADIOLOGY:    < from: Xray Chest 1 View-PORTABLE IMMEDIATE (04.02.19 @ 21:41) >    Impression:      Stable bibasilar linear opacities, likely atelectasis versus fibrosis.    < end of copied text >      PHYSICAL EXAM:  GEN: No acute distress  LUNGS: decreased breath sounds bilaterally   HEART: S1/S2 present. RRR.   ABD: Soft, non-tender, non-distended. Bowel sounds present  EXT: No ll edema, intact pulsations   NEURO: AAOX3, non focal

## 2019-04-04 LAB
ANION GAP SERPL CALC-SCNC: 12 MMOL/L — SIGNIFICANT CHANGE UP (ref 7–14)
ANION GAP SERPL CALC-SCNC: 13 MMOL/L — SIGNIFICANT CHANGE UP (ref 7–14)
BUN SERPL-MCNC: 6 MG/DL — LOW (ref 10–20)
BUN SERPL-MCNC: 6 MG/DL — LOW (ref 10–20)
CALCIUM SERPL-MCNC: 9.2 MG/DL — SIGNIFICANT CHANGE UP (ref 8.5–10.1)
CALCIUM SERPL-MCNC: 9.4 MG/DL — SIGNIFICANT CHANGE UP (ref 8.5–10.1)
CHLORIDE SERPL-SCNC: 93 MMOL/L — LOW (ref 98–110)
CHLORIDE SERPL-SCNC: 94 MMOL/L — LOW (ref 98–110)
CO2 SERPL-SCNC: 26 MMOL/L — SIGNIFICANT CHANGE UP (ref 17–32)
CO2 SERPL-SCNC: 31 MMOL/L — SIGNIFICANT CHANGE UP (ref 17–32)
CREAT SERPL-MCNC: 0.5 MG/DL — LOW (ref 0.7–1.5)
CREAT SERPL-MCNC: 0.6 MG/DL — LOW (ref 0.7–1.5)
GLUCOSE SERPL-MCNC: 110 MG/DL — HIGH (ref 70–99)
GLUCOSE SERPL-MCNC: 188 MG/DL — HIGH (ref 70–99)
HCT VFR BLD CALC: 46.1 % — SIGNIFICANT CHANGE UP (ref 37–47)
HGB BLD-MCNC: 15 G/DL — SIGNIFICANT CHANGE UP (ref 12–16)
MAGNESIUM SERPL-MCNC: 2.3 MG/DL — SIGNIFICANT CHANGE UP (ref 1.8–2.4)
MCHC RBC-ENTMCNC: 32.5 G/DL — SIGNIFICANT CHANGE UP (ref 32–37)
MCHC RBC-ENTMCNC: 32.6 PG — HIGH (ref 27–31)
MCV RBC AUTO: 100.2 FL — HIGH (ref 81–99)
NRBC # BLD: 0 /100 WBCS — SIGNIFICANT CHANGE UP (ref 0–0)
PLATELET # BLD AUTO: 257 K/UL — SIGNIFICANT CHANGE UP (ref 130–400)
POTASSIUM SERPL-MCNC: 5.1 MMOL/L — HIGH (ref 3.5–5)
POTASSIUM SERPL-MCNC: 5.3 MMOL/L — HIGH (ref 3.5–5)
POTASSIUM SERPL-SCNC: 5.1 MMOL/L — HIGH (ref 3.5–5)
POTASSIUM SERPL-SCNC: 5.3 MMOL/L — HIGH (ref 3.5–5)
RBC # BLD: 4.6 M/UL — SIGNIFICANT CHANGE UP (ref 4.2–5.4)
RBC # FLD: 12.7 % — SIGNIFICANT CHANGE UP (ref 11.5–14.5)
SODIUM SERPL-SCNC: 132 MMOL/L — LOW (ref 135–146)
SODIUM SERPL-SCNC: 137 MMOL/L — SIGNIFICANT CHANGE UP (ref 135–146)
WBC # BLD: 19.82 K/UL — HIGH (ref 4.8–10.8)
WBC # FLD AUTO: 19.82 K/UL — HIGH (ref 4.8–10.8)

## 2019-04-04 PROCEDURE — 93010 ELECTROCARDIOGRAM REPORT: CPT

## 2019-04-04 RX ORDER — SENNA PLUS 8.6 MG/1
2 TABLET ORAL AT BEDTIME
Qty: 0 | Refills: 0 | Status: DISCONTINUED | OUTPATIENT
Start: 2019-04-04 | End: 2019-04-09

## 2019-04-04 RX ORDER — NICOTINE POLACRILEX 2 MG
1 GUM BUCCAL DAILY
Qty: 0 | Refills: 0 | Status: DISCONTINUED | OUTPATIENT
Start: 2019-04-06 | End: 2019-04-09

## 2019-04-04 RX ORDER — SODIUM POLYSTYRENE SULFONATE 4.1 MEQ/G
30 POWDER, FOR SUSPENSION ORAL ONCE
Qty: 0 | Refills: 0 | Status: COMPLETED | OUTPATIENT
Start: 2019-04-04 | End: 2019-04-04

## 2019-04-04 RX ORDER — NICOTINE POLACRILEX 2 MG
1 GUM BUCCAL DAILY
Qty: 0 | Refills: 0 | Status: COMPLETED | OUTPATIENT
Start: 2019-04-04 | End: 2019-04-05

## 2019-04-04 RX ADMIN — Medication 100 MILLIGRAM(S): at 05:51

## 2019-04-04 RX ADMIN — OXYCODONE HYDROCHLORIDE 10 MILLIGRAM(S): 5 TABLET ORAL at 18:31

## 2019-04-04 RX ADMIN — OXYCODONE HYDROCHLORIDE 10 MILLIGRAM(S): 5 TABLET ORAL at 05:50

## 2019-04-04 RX ADMIN — Medication 100 MILLIGRAM(S): at 18:02

## 2019-04-04 RX ADMIN — Medication 50 MILLIGRAM(S): at 21:07

## 2019-04-04 RX ADMIN — SODIUM POLYSTYRENE SULFONATE 30 GRAM(S): 4.1 POWDER, FOR SUSPENSION ORAL at 11:33

## 2019-04-04 RX ADMIN — Medication 3 MILLILITER(S): at 01:27

## 2019-04-04 RX ADMIN — Medication 0.5 MILLIGRAM(S): at 11:33

## 2019-04-04 RX ADMIN — Medication 60 MILLIGRAM(S): at 13:20

## 2019-04-04 RX ADMIN — SODIUM POLYSTYRENE SULFONATE 30 GRAM(S): 4.1 POWDER, FOR SUSPENSION ORAL at 23:51

## 2019-04-04 RX ADMIN — Medication 3 MILLILITER(S): at 20:39

## 2019-04-04 RX ADMIN — Medication 3 MILLILITER(S): at 08:56

## 2019-04-04 RX ADMIN — PANTOPRAZOLE SODIUM 40 MILLIGRAM(S): 20 TABLET, DELAYED RELEASE ORAL at 05:31

## 2019-04-04 RX ADMIN — SENNA PLUS 2 TABLET(S): 8.6 TABLET ORAL at 21:07

## 2019-04-04 RX ADMIN — Medication 0.5 MILLIGRAM(S): at 23:51

## 2019-04-04 RX ADMIN — Medication 100 MILLIGRAM(S): at 05:31

## 2019-04-04 RX ADMIN — BUDESONIDE AND FORMOTEROL FUMARATE DIHYDRATE 2 PUFF(S): 160; 4.5 AEROSOL RESPIRATORY (INHALATION) at 09:04

## 2019-04-04 RX ADMIN — OXYCODONE HYDROCHLORIDE 10 MILLIGRAM(S): 5 TABLET ORAL at 18:01

## 2019-04-04 RX ADMIN — OXYCODONE HYDROCHLORIDE 10 MILLIGRAM(S): 5 TABLET ORAL at 11:54

## 2019-04-04 RX ADMIN — BUDESONIDE AND FORMOTEROL FUMARATE DIHYDRATE 2 PUFF(S): 160; 4.5 AEROSOL RESPIRATORY (INHALATION) at 21:09

## 2019-04-04 RX ADMIN — Medication 1 PATCH: at 20:14

## 2019-04-04 RX ADMIN — Medication 100 MILLIGRAM(S): at 21:08

## 2019-04-04 RX ADMIN — Medication 3 MILLILITER(S): at 14:05

## 2019-04-04 RX ADMIN — Medication 60 MILLIGRAM(S): at 05:31

## 2019-04-04 RX ADMIN — CHLORHEXIDINE GLUCONATE 1 APPLICATION(S): 213 SOLUTION TOPICAL at 05:30

## 2019-04-04 RX ADMIN — OXYCODONE HYDROCHLORIDE 10 MILLIGRAM(S): 5 TABLET ORAL at 06:20

## 2019-04-04 RX ADMIN — Medication 60 MILLIGRAM(S): at 21:05

## 2019-04-04 RX ADMIN — OXYCODONE HYDROCHLORIDE 10 MILLIGRAM(S): 5 TABLET ORAL at 00:15

## 2019-04-04 RX ADMIN — Medication 1 PATCH: at 09:20

## 2019-04-04 RX ADMIN — OXYCODONE HYDROCHLORIDE 10 MILLIGRAM(S): 5 TABLET ORAL at 12:24

## 2019-04-04 NOTE — PROGRESS NOTE ADULT - PROVIDER SPECIALTY LIST ADULT
Hospitalist PT ORTHO - HIP Initial Evaluation  Plan of Care Note    POD #: 0    DIAGNOSIS: right THR anterior approach    SUBJECTIVE: Subjective: Patient sleeping in left sidelying upon approach, wakes to name and is agreeable to therapy evaluation. Patient with nausea throughout session. Patient requesting to return to bed at end of session.  (12/07/18 1614)    OBJECTIVE:  Precautions:  Precautions  Hip Precautions: Direct anterior precautions (12/07/18 1614)  Weight Bearing Status: Weight bearing as tolerated right lower extremity (12/07/18 1614)  Precautions Compliance:  good  Pain:  Comfort/Function (Pain) SCORE with Activity: 8 (12/07/18 1614)  Vitals:  Vital Signs: See vital signs flowsheet. Patient with nausea, dizziness & emesis during session.  (12/07/18 1614)  Activity Tolerance:  limited by not feeling well (nausea, lightheadedness)  Exercise:  Exercise Comments: Patient instructed on & completed therapeutic exercise for increased ROM, blood flow and neuromuscular re-education: ankle pumps, quad sets, heel slides x 10 reps each (RLE). Patient completes SAQ x 10 reps RLE to assess readiness to stand with walker. (12/07/18 1614)    Functional Mobility (as of date/time noted)  Bed Mobility:   Supine to Sit: Supervision Meliton [place]) (12/07/18 1614)  Sit to Supine: Minimal Assist (Min) (12/07/18 1614)  Bed Mobility Comments: Patient requires supervision for safety due to dizziness with sitting edge of bed. Patient requires assist with lifting RLE back into bed due to weakness.   (12/07/18 1614)    Transfer:   Sit to Stand: Minimal Assist (Min) (12/07/18 1614)  Stand to Sit: Minimal Assist (Min) (12/07/18 1614)  Stand Pivot Transfers: Minimal Assist (Min)(bed to chair) (12/07/18 1614)  Toilet Transfers: Minimal Assist (Min)(requires cues for grab bar use) (12/07/18 1614)  Assistive Device/: 2-wheeled walker;1 Person;Gait Belt (12/07/18 1614)  Transfer Comments 1: Patient attempts to pull from walker - requires verbal cues for hand placements. Patient steady upon stand, fair eccentric control but continues to require verbal cues for hand placements (use of grab bar). (12/07/18 1614)    Ambulation:  Gait Assistance: Minimal Assist (Min) (12/07/18 1614)  Assistive Device/: 2-wheeled walker;1 Person;Gait Belt (12/07/18 1614)  Ambulation Distance (Feet): 2 Feet(15, 20) (12/07/18 1614)  Gait Comments 1: Patient with step to pattern. Patient progresses to reciprocal pattern but then reverts to step to with RLE leading. Brisk pace as patient not feeling well and concerned about getting to bathroom & then back to bed. No overt knee buckling.  (12/07/18 1614)    Stair Negotiation:  Stairs Mobility Comments: to be assessed - 12 steps up to second floor condo (12/07/18 1614)    See PT flowsheet for full details regarding the PT therapy provided. ASSESSMENT:   Emphasis of session on evaluation of 55year old female admitted for above. Prior to admission, patient residing alone in second floor apartment with 12 steps (2 railings but unable to reach simultaneously) to enter. Patient is independent with functional mobility & gait at baseline. Currently, patient demonstrates good right quadriceps recruitment & control throughout therapeutic exercise & functional mobility. Patient present with deficits in strength, balance, ROM, activity tolerance (mobility limited by medical status - nausea/lightheadedness/dizziness) all impacting quality, ease and safety of functional mobility. Patient requesting to return to bed at end of session due to feeling unwell. Relevant factors impacting plan of care include: fibromyalgia, resides alone, anxiety. Patient presents below baseline therefore skilled PT warranted to maximize functional independence and safety. Expect patient to progress well while at [facility] and be safe for discharge to home, would consider 24 hour assist and home vs outpatient PT pending transportation.         PT Identified Barriers to Discharge: medical status, weakness, lives alone     EDUCATION:   On this date, the patient was educated on therapy role, weight bearing restriction, therapeutic exercise, vitals, functional mobility, PT plan of care. The response to education was: Needs reinforcement    PLAN:   Continue skilled PT, including the following Treatment/Interventions: Functional transfer training;Strengthening;ROM; Equipment eval/education; Bed mobility;Gait training;Stairs retraining; Safety Education; Neuromuscular re-education (12/07/18 1614)   PT Frequency: Twice a day;7 days/week (12/07/18 1614)    Treatment Plan for Next Session: progress therapeutic exercise (provide updated HEP), progress bed mobility, transfers, gait training, trial stair negotiation to ensure safe discharge to home          RECOMMENDATIONS FOR DISCHARGE:  Recommendation for Discharge: PT: Home, 24 Hour assist, OP therapy, Home therapy(home vs outpatient PT pending transportation)    PT/OT Mobility Equipment for Discharge: Patient owns cane for use upon discharge (12/07/18 1614)

## 2019-04-04 NOTE — PROGRESS NOTE ADULT - SUBJECTIVE AND OBJECTIVE BOX
SUBJECTIVE:    Patient is a 69y old Female who presents with a chief complaint of COPD exacerbation (03 Apr 2019 10:27)  Currently admitted to medicine with the primary diagnosis of COPD exacerbation  Today is hospital day 2d. This morning she is resting comfortably in bed and reports no new issues or overnight events.   pt complains of SOB asking for the nicotine patch.     PAST MEDICAL & SURGICAL HISTORY  Chronic pain  Depression  Anxiety  COPD (chronic obstructive pulmonary disease)  Neck mass    SOCIAL HISTORY:  Negative for smoking/alcohol/drug use.     ALLERGIES:  penicillin (Unknown)    MEDICATIONS:  STANDING MEDICATIONS  ALBUTerol/ipratropium for Nebulization 3 milliLiter(s) Nebulizer every 6 hours  buDESOnide 160 MICROgram(s)/formoterol 4.5 MICROgram(s) Inhaler 2 Puff(s) Inhalation two times a day  chlorhexidine 4% Liquid 1 Application(s) Topical <User Schedule>  docusate sodium 100 milliGRAM(s) Oral two times a day  enoxaparin Injectable 40 milliGRAM(s) SubCutaneous daily  levoFLOXacin IVPB      levoFLOXacin IVPB 500 milliGRAM(s) IV Intermittent every 24 hours  methylPREDNISolone sodium succinate Injectable 60 milliGRAM(s) IV Push every 8 hours  nicotine -  14 mG/24Hr(s) Patch 1 patch Transdermal daily  pantoprazole    Tablet 40 milliGRAM(s) Oral before breakfast  senna 2 Tablet(s) Oral at bedtime  traZODone 50 milliGRAM(s) Oral at bedtime    PRN MEDICATIONS  ALPRAZolam 0.5 milliGRAM(s) Oral every 12 hours PRN  guaiFENesin    Syrup 100 milliGRAM(s) Oral every 6 hours PRN  oxyCODONE    IR 10 milliGRAM(s) Oral every 6 hours PRN    VITALS:   T(F): 97.3  HR: 88  BP: 124/64  RR: 18  SpO2: 94%    LABS:                        15.0   19.82 )-----------( 257      ( 04 Apr 2019 07:44 )             46.1     04-04    137  |  94<L>  |  6<L>  ----------------------------<  110<H>  5.3<H>   |  31  |  0.5<L>    Ca    9.4      04 Apr 2019 07:44  Mg     2.3     04-04    TPro  6.4  /  Alb  4.2  /  TBili  0.5  /  DBili  x   /  AST  14  /  ALT  12  /  AlkPhos  65  04-03              CARDIAC MARKERS ( 03 Apr 2019 06:57 )  x     / <0.01 ng/mL / x     / x     / x          RADIOLOGY:    < from: Xray Chest 1 View-PORTABLE IMMEDIATE (04.02.19 @ 21:41) >  Impression:      Stable bibasilar linear opacities, likely atelectasis versus fibrosis.    < end of copied text >      PHYSICAL EXAM:  GEN: No acute distress  LUNGS: decreased breath sounds bilaterally, scattered wheezes   HEART: S1/S2 present. RRR.   ABD: Soft, non-tender, non-distended. Bowel sounds present  EXT: No ll edema, intact pulsations   NEURO: AAOX3, non focal

## 2019-04-04 NOTE — PROGRESS NOTE ADULT - SUBJECTIVE AND OBJECTIVE BOX
GEE RINALDIUREEN  69y  Female      Patient is a 69y old  Female who presents with a chief complaint of COPD exacerbation (04 Apr 2019 09:50)      INTERVAL HPI/OVERNIGHT EVENTS:      ******************************* REVIEW OF SYSTEMS:**********************************************    Still wheezing and having dry coughing spells.  All other review of systems negative    *********************** VITALS ******************************************    T(F): 96.5 (04-04-19 @ 12:20)  HR: 101 (04-04-19 @ 12:20) (88 - 101)  BP: 117/73 (04-04-19 @ 12:20) (117/73 - 144/85)  RR: 18 (04-04-19 @ 12:20) (18 - 18)  SpO2: 94% (04-04-19 @ 08:36) (94% - 94%)            ******************************** PHYSICAL EXAM:**************************************************  GENERAL: NAD    PSYCH: no agitation, baseline mentation  HEENT:     NERVOUS SYSTEM:  Alert & Oriented X3, MS  5/5 B/L  UE and LE ; Sensory intact    PULMONARY: Decreased CHANTAL, diffuse wheezing B/L    CARDIOVASCULAR: S1S2 RRR    GI: Soft, NT, ND; BS present.    EXTREMITIES:  2+ Peripheral Pulses, No clubbing, cyanosis, or edema    LYMPH: No lymphadenopathy noted    SKIN: No rashes or lesions    ******************************************************************************************    Consultant(s) Notes Reviewed:  [x ] YES  [ ] NO    Discussed with Consultants/Other Providers [ x] YES     **************************** LABS *******************************************************                          15.0   19.82 )-----------( 257      ( 04 Apr 2019 07:44 )             46.1     04-04    137  |  94<L>  |  6<L>  ----------------------------<  110<H>  5.3<H>   |  31  |  0.5<L>    Ca    9.4      04 Apr 2019 07:44  Mg     2.3     04-04    TPro  6.4  /  Alb  4.2  /  TBili  0.5  /  DBili  x   /  AST  14  /  ALT  12  /  AlkPhos  65  04-03          Lactate Trend    CARDIAC MARKERS ( 03 Apr 2019 06:57 )  x     / <0.01 ng/mL / x     / x     / x          CAPILLARY BLOOD GLUCOSE              **************************Active Medications *******************************************  penicillin (Unknown)      ALBUTerol/ipratropium for Nebulization 3 milliLiter(s) Nebulizer every 6 hours  ALPRAZolam 0.5 milliGRAM(s) Oral every 12 hours PRN  buDESOnide 160 MICROgram(s)/formoterol 4.5 MICROgram(s) Inhaler 2 Puff(s) Inhalation two times a day  chlorhexidine 4% Liquid 1 Application(s) Topical <User Schedule>  docusate sodium 100 milliGRAM(s) Oral two times a day  enoxaparin Injectable 40 milliGRAM(s) SubCutaneous daily  guaiFENesin    Syrup 100 milliGRAM(s) Oral every 6 hours PRN  levoFLOXacin IVPB      levoFLOXacin IVPB 500 milliGRAM(s) IV Intermittent every 24 hours  methylPREDNISolone sodium succinate Injectable 60 milliGRAM(s) IV Push every 8 hours  nicotine -  14 mG/24Hr(s) Patch 1 patch Transdermal daily  oxyCODONE    IR 10 milliGRAM(s) Oral every 6 hours PRN  pantoprazole    Tablet 40 milliGRAM(s) Oral before breakfast  senna 2 Tablet(s) Oral at bedtime  traZODone 50 milliGRAM(s) Oral at bedtime      ***************************************************  RADIOLOGY & ADDITIONAL TESTS:    Imaging Personally Reviewed:  [ ] YES  [ ] NO    HEALTH ISSUES - PROBLEM Dx:

## 2019-04-05 LAB
ANION GAP SERPL CALC-SCNC: 10 MMOL/L — SIGNIFICANT CHANGE UP (ref 7–14)
BUN SERPL-MCNC: 8 MG/DL — LOW (ref 10–20)
CALCIUM SERPL-MCNC: 9.3 MG/DL — SIGNIFICANT CHANGE UP (ref 8.5–10.1)
CHLORIDE SERPL-SCNC: 99 MMOL/L — SIGNIFICANT CHANGE UP (ref 98–110)
CO2 SERPL-SCNC: 33 MMOL/L — HIGH (ref 17–32)
CREAT SERPL-MCNC: 0.6 MG/DL — LOW (ref 0.7–1.5)
GLUCOSE SERPL-MCNC: 139 MG/DL — HIGH (ref 70–99)
HCT VFR BLD CALC: 43.1 % — SIGNIFICANT CHANGE UP (ref 37–47)
HGB BLD-MCNC: 13.8 G/DL — SIGNIFICANT CHANGE UP (ref 12–16)
MAGNESIUM SERPL-MCNC: 2.2 MG/DL — SIGNIFICANT CHANGE UP (ref 1.8–2.4)
MCHC RBC-ENTMCNC: 32 G/DL — SIGNIFICANT CHANGE UP (ref 32–37)
MCHC RBC-ENTMCNC: 32.5 PG — HIGH (ref 27–31)
MCV RBC AUTO: 101.4 FL — HIGH (ref 81–99)
NRBC # BLD: 0 /100 WBCS — SIGNIFICANT CHANGE UP (ref 0–0)
PLATELET # BLD AUTO: 223 K/UL — SIGNIFICANT CHANGE UP (ref 130–400)
POTASSIUM SERPL-MCNC: 5.2 MMOL/L — HIGH (ref 3.5–5)
POTASSIUM SERPL-SCNC: 5.2 MMOL/L — HIGH (ref 3.5–5)
RBC # BLD: 4.25 M/UL — SIGNIFICANT CHANGE UP (ref 4.2–5.4)
RBC # FLD: 12.9 % — SIGNIFICANT CHANGE UP (ref 11.5–14.5)
SODIUM SERPL-SCNC: 142 MMOL/L — SIGNIFICANT CHANGE UP (ref 135–146)
WBC # BLD: 15.02 K/UL — HIGH (ref 4.8–10.8)
WBC # FLD AUTO: 15.02 K/UL — HIGH (ref 4.8–10.8)

## 2019-04-05 RX ORDER — SODIUM POLYSTYRENE SULFONATE 4.1 MEQ/G
30 POWDER, FOR SUSPENSION ORAL ONCE
Qty: 0 | Refills: 0 | Status: COMPLETED | OUTPATIENT
Start: 2019-04-05 | End: 2019-04-05

## 2019-04-05 RX ORDER — NYSTATIN 500MM UNIT
500000 POWDER (EA) MISCELLANEOUS THREE TIMES A DAY
Qty: 0 | Refills: 0 | Status: DISCONTINUED | OUTPATIENT
Start: 2019-04-05 | End: 2019-04-09

## 2019-04-05 RX ADMIN — Medication 1 PATCH: at 11:00

## 2019-04-05 RX ADMIN — OXYCODONE HYDROCHLORIDE 10 MILLIGRAM(S): 5 TABLET ORAL at 20:00

## 2019-04-05 RX ADMIN — Medication 1 PATCH: at 09:32

## 2019-04-05 RX ADMIN — Medication 60 MILLIGRAM(S): at 05:15

## 2019-04-05 RX ADMIN — Medication 500000 UNIT(S): at 13:14

## 2019-04-05 RX ADMIN — SODIUM POLYSTYRENE SULFONATE 30 GRAM(S): 4.1 POWDER, FOR SUSPENSION ORAL at 17:23

## 2019-04-05 RX ADMIN — Medication 50 MILLIGRAM(S): at 21:25

## 2019-04-05 RX ADMIN — Medication 100 MILLIGRAM(S): at 17:19

## 2019-04-05 RX ADMIN — OXYCODONE HYDROCHLORIDE 10 MILLIGRAM(S): 5 TABLET ORAL at 12:17

## 2019-04-05 RX ADMIN — BUDESONIDE AND FORMOTEROL FUMARATE DIHYDRATE 2 PUFF(S): 160; 4.5 AEROSOL RESPIRATORY (INHALATION) at 16:15

## 2019-04-05 RX ADMIN — OXYCODONE HYDROCHLORIDE 10 MILLIGRAM(S): 5 TABLET ORAL at 12:47

## 2019-04-05 RX ADMIN — Medication 100 MILLIGRAM(S): at 05:16

## 2019-04-05 RX ADMIN — Medication 500000 UNIT(S): at 21:25

## 2019-04-05 RX ADMIN — OXYCODONE HYDROCHLORIDE 10 MILLIGRAM(S): 5 TABLET ORAL at 05:21

## 2019-04-05 RX ADMIN — PANTOPRAZOLE SODIUM 40 MILLIGRAM(S): 20 TABLET, DELAYED RELEASE ORAL at 05:16

## 2019-04-05 RX ADMIN — Medication 3 MILLILITER(S): at 13:39

## 2019-04-05 RX ADMIN — Medication 60 MILLIGRAM(S): at 17:06

## 2019-04-05 RX ADMIN — Medication 1 PATCH: at 11:11

## 2019-04-05 RX ADMIN — BUDESONIDE AND FORMOTEROL FUMARATE DIHYDRATE 2 PUFF(S): 160; 4.5 AEROSOL RESPIRATORY (INHALATION) at 21:24

## 2019-04-05 RX ADMIN — SENNA PLUS 2 TABLET(S): 8.6 TABLET ORAL at 21:25

## 2019-04-05 RX ADMIN — OXYCODONE HYDROCHLORIDE 10 MILLIGRAM(S): 5 TABLET ORAL at 05:51

## 2019-04-05 RX ADMIN — Medication 3 MILLILITER(S): at 08:56

## 2019-04-05 RX ADMIN — OXYCODONE HYDROCHLORIDE 10 MILLIGRAM(S): 5 TABLET ORAL at 19:41

## 2019-04-05 RX ADMIN — CHLORHEXIDINE GLUCONATE 1 APPLICATION(S): 213 SOLUTION TOPICAL at 05:15

## 2019-04-05 RX ADMIN — Medication 0.5 MILLIGRAM(S): at 12:17

## 2019-04-05 RX ADMIN — Medication 3 MILLILITER(S): at 19:10

## 2019-04-05 RX ADMIN — Medication 100 MILLIGRAM(S): at 17:06

## 2019-04-05 NOTE — PROGRESS NOTE ADULT - SUBJECTIVE AND OBJECTIVE BOX
SUDHIRLA, KUSH  69y  Female      Patient is a 69y old  Female who presents with a chief complaint of COPD exacerbation (05 Apr 2019 10:30)      INTERVAL HPI/OVERNIGHT EVENTS:      ******************************* REVIEW OF SYSTEMS:**********************************************  Still found  huffing and puffing on the hallway.   All other review of systems negative    *********************** VITALS ******************************************    T(F): 96.9 (04-05-19 @ 12:30)  HR: 94 (04-05-19 @ 12:30) (86 - 97)  BP: 160/70 (04-05-19 @ 12:30) (123/82 - 160/70)  RR: 18 (04-05-19 @ 12:30) (18 - 18)  SpO2: 97% (04-05-19 @ 08:48) (97% - 97%)            ******************************** PHYSICAL EXAM:**************************************************  GENERAL: NAD    PSYCH: no agitation, baseline mentation  HEENT:     NERVOUS SYSTEM:  Alert & Oriented X3,     PULMONARY: Decreased CHANTAL, still diffusely  wheezing B/L    CARDIOVASCULAR: S1S2 RRR    GI: Soft, NT, ND; BS present.    EXTREMITIES:  2+ Peripheral Pulses, No clubbing, cyanosis, or edema    LYMPH: No lymphadenopathy noted    SKIN: No rashes or lesions    ******************************************************************************************    Consultant(s) Notes Reviewed:  [x ] YES  [ ] NO    Discussed with Consultants/Other Providers [ x] YES     **************************** LABS *******************************************************                          13.8   15.02 )-----------( 223      ( 05 Apr 2019 06:56 )             43.1     04-05    142  |  99  |  8<L>  ----------------------------<  139<H>  5.2<H>   |  33<H>  |  0.6<L>    Ca    9.3      05 Apr 2019 06:56  Mg     2.2     04-05            Lactate Trend        CAPILLARY BLOOD GLUCOSE              **************************Active Medications *******************************************  penicillin (Unknown)      ALBUTerol/ipratropium for Nebulization 3 milliLiter(s) Nebulizer every 6 hours  ALPRAZolam 0.5 milliGRAM(s) Oral every 12 hours PRN  buDESOnide 160 MICROgram(s)/formoterol 4.5 MICROgram(s) Inhaler 2 Puff(s) Inhalation two times a day  chlorhexidine 4% Liquid 1 Application(s) Topical <User Schedule>  docusate sodium 100 milliGRAM(s) Oral two times a day  enoxaparin Injectable 40 milliGRAM(s) SubCutaneous daily  guaiFENesin    Syrup 100 milliGRAM(s) Oral every 6 hours PRN  levoFLOXacin IVPB      levoFLOXacin IVPB 500 milliGRAM(s) IV Intermittent every 24 hours  methylPREDNISolone sodium succinate Injectable 60 milliGRAM(s) IV Push once  nystatin    Suspension [id number] Unit(s) Oral three times a day  oxyCODONE    IR 10 milliGRAM(s) Oral every 6 hours PRN  pantoprazole    Tablet 40 milliGRAM(s) Oral before breakfast  senna 2 Tablet(s) Oral at bedtime  sodium polystyrene sulfonate Suspension 30 Gram(s) Oral once  traZODone 50 milliGRAM(s) Oral at bedtime      ***************************************************  RADIOLOGY & ADDITIONAL TESTS:    Imaging Personally Reviewed:  [ ] YES  [ ] NO    HEALTH ISSUES - PROBLEM Dx:

## 2019-04-05 NOTE — PROGRESS NOTE ADULT - SUBJECTIVE AND OBJECTIVE BOX
SUBJECTIVE:    Patient is a 69y old Female who presents with a chief complaint of COPD exacerbation (05 Apr 2019 08:12)  Currently admitted to medicine with the primary diagnosis of COPD exacerbation  Today is hospital day 3d. This morning she is resting comfortably in bed and reports no new issues or overnight events.   this AM pt feels better, had difficulty sleeping at night.     PAST MEDICAL & SURGICAL HISTORY  Chronic pain  Depression  Anxiety  COPD (chronic obstructive pulmonary disease)  Neck mass    SOCIAL HISTORY:  Negative for smoking/alcohol/drug use.     ALLERGIES:  penicillin (Unknown)    MEDICATIONS:  STANDING MEDICATIONS  ALBUTerol/ipratropium for Nebulization 3 milliLiter(s) Nebulizer every 6 hours  buDESOnide 160 MICROgram(s)/formoterol 4.5 MICROgram(s) Inhaler 2 Puff(s) Inhalation two times a day  chlorhexidine 4% Liquid 1 Application(s) Topical <User Schedule>  docusate sodium 100 milliGRAM(s) Oral two times a day  enoxaparin Injectable 40 milliGRAM(s) SubCutaneous daily  levoFLOXacin IVPB      levoFLOXacin IVPB 500 milliGRAM(s) IV Intermittent every 24 hours  methylPREDNISolone sodium succinate Injectable 60 milliGRAM(s) IV Push once  nicotine -  14 mG/24Hr(s) Patch 1 patch Transdermal daily  nystatin    Suspension [id number] Unit(s) Oral three times a day  pantoprazole    Tablet 40 milliGRAM(s) Oral before breakfast  senna 2 Tablet(s) Oral at bedtime  sodium polystyrene sulfonate Suspension 30 Gram(s) Oral once  traZODone 50 milliGRAM(s) Oral at bedtime    PRN MEDICATIONS  ALPRAZolam 0.5 milliGRAM(s) Oral every 12 hours PRN  guaiFENesin    Syrup 100 milliGRAM(s) Oral every 6 hours PRN  oxyCODONE    IR 10 milliGRAM(s) Oral every 6 hours PRN    VITALS:   T(F): 97.6  HR: 86  BP: 136/70  RR: 18  SpO2: 97%    LABS:                        13.8   15.02 )-----------( 223      ( 05 Apr 2019 06:56 )             43.1     04-05    142  |  99  |  8<L>  ----------------------------<  139<H>  5.2<H>   |  33<H>  |  0.6<L>    Ca    9.3      05 Apr 2019 06:56  Mg     2.2     04-05    RADIOLOGY:    < from: Xray Chest 1 View-PORTABLE IMMEDIATE (04.02.19 @ 21:41) >  Impression:      Stable bibasilar linear opacities, likely atelectasis versus fibrosis.    < end of copied text >      PHYSICAL EXAM:  GEN: No acute distress  LUNGS: decreased breath sounds bilaterally, scattered wheezes   HEART: S1/S2 present. RRR.   ABD: Soft, non-tender, non-distended. Bowel sounds present  EXT: No ll edema, intact pulsations   NEURO: AAOX3, non focal

## 2019-04-05 NOTE — PROGRESS NOTE ADULT - SUBJECTIVE AND OBJECTIVE BOX
OVERNIGHT EVENTS: still coughing, no fever, chills    Vital Signs Last 24 Hrs  T(C): 36.4 (05 Apr 2019 04:55), Max: 36.4 (05 Apr 2019 04:55)  T(F): 97.6 (05 Apr 2019 04:55), Max: 97.6 (05 Apr 2019 04:55)  HR: 86 (05 Apr 2019 04:55) (86 - 101)  BP: 136/70 (05 Apr 2019 04:55) (117/73 - 136/70)  RR: 18 (05 Apr 2019 04:55) (18 - 18)  SpO2: 94% (04 Apr 2019 08:36) (94% - 94%)    PHYSICAL EXAMINATION:    GENERAL: The patient is awake and alert in no apparent distress.     HEENT: Head is normocephalic and atraumatic. Extraocular muscles are intact. Mucous membranes are moist.    NECK: Supple.    LUNGS: dec bs both bases, exp wheezing    HEART: Regular rate and rhythm without murmur.    ABDOMEN: Soft, nontender, and nondistended.      EXTREMITIES: Without any cyanosis, clubbing, rash, lesions or edema.    NEUROLOGIC: Grossly intact.    SKIN: No ulceration or induration present.      LABS:                        15.0   19.82 )-----------( 257      ( 04 Apr 2019 07:44 )             46.1     04-04    137  |  94<L>  |  6<L>  ----------------------------<  110<H>  5.3<H>   |  31  |  0.5<L>    Ca    9.4      04 Apr 2019 07:44  Mg     2.3     04-04                  Serum Pro-Brain Natriuretic Peptide: 104 pg/mL (04-03-19 @ 06:57)              MICROBIOLOGY:      MEDICATIONS  (STANDING):  ALBUTerol/ipratropium for Nebulization 3 milliLiter(s) Nebulizer every 6 hours  buDESOnide 160 MICROgram(s)/formoterol 4.5 MICROgram(s) Inhaler 2 Puff(s) Inhalation two times a day  chlorhexidine 4% Liquid 1 Application(s) Topical <User Schedule>  docusate sodium 100 milliGRAM(s) Oral two times a day  enoxaparin Injectable 40 milliGRAM(s) SubCutaneous daily  levoFLOXacin IVPB      levoFLOXacin IVPB 500 milliGRAM(s) IV Intermittent every 24 hours  methylPREDNISolone sodium succinate Injectable 60 milliGRAM(s) IV Push every 8 hours  nicotine -  14 mG/24Hr(s) Patch 1 patch Transdermal daily  pantoprazole    Tablet 40 milliGRAM(s) Oral before breakfast  senna 2 Tablet(s) Oral at bedtime  traZODone 50 milliGRAM(s) Oral at bedtime    MEDICATIONS  (PRN):  ALPRAZolam 0.5 milliGRAM(s) Oral every 12 hours PRN anexity  guaiFENesin    Syrup 100 milliGRAM(s) Oral every 6 hours PRN Cough  oxyCODONE    IR 10 milliGRAM(s) Oral every 6 hours PRN Severe Pain (7 - 10)      RADIOLOGY & ADDITIONAL STUDIES:

## 2019-04-06 ENCOUNTER — TRANSCRIPTION ENCOUNTER (OUTPATIENT)
Age: 69
End: 2019-04-06

## 2019-04-06 LAB
ANION GAP SERPL CALC-SCNC: 10 MMOL/L — SIGNIFICANT CHANGE UP (ref 7–14)
ANION GAP SERPL CALC-SCNC: 14 MMOL/L — SIGNIFICANT CHANGE UP (ref 7–14)
BUN SERPL-MCNC: 5 MG/DL — LOW (ref 10–20)
BUN SERPL-MCNC: 8 MG/DL — LOW (ref 10–20)
CALCIUM SERPL-MCNC: 8.8 MG/DL — SIGNIFICANT CHANGE UP (ref 8.5–10.1)
CALCIUM SERPL-MCNC: 9.4 MG/DL — SIGNIFICANT CHANGE UP (ref 8.5–10.1)
CHLORIDE SERPL-SCNC: 90 MMOL/L — LOW (ref 98–110)
CHLORIDE SERPL-SCNC: 96 MMOL/L — LOW (ref 98–110)
CO2 SERPL-SCNC: 31 MMOL/L — SIGNIFICANT CHANGE UP (ref 17–32)
CO2 SERPL-SCNC: 32 MMOL/L — SIGNIFICANT CHANGE UP (ref 17–32)
CREAT SERPL-MCNC: 0.6 MG/DL — LOW (ref 0.7–1.5)
CREAT SERPL-MCNC: 0.7 MG/DL — SIGNIFICANT CHANGE UP (ref 0.7–1.5)
GLUCOSE SERPL-MCNC: 129 MG/DL — HIGH (ref 70–99)
GLUCOSE SERPL-MCNC: 204 MG/DL — HIGH (ref 70–99)
HCT VFR BLD CALC: 43.6 % — SIGNIFICANT CHANGE UP (ref 37–47)
HGB BLD-MCNC: 14.1 G/DL — SIGNIFICANT CHANGE UP (ref 12–16)
MAGNESIUM SERPL-MCNC: 2.1 MG/DL — SIGNIFICANT CHANGE UP (ref 1.8–2.4)
MCHC RBC-ENTMCNC: 32.3 G/DL — SIGNIFICANT CHANGE UP (ref 32–37)
MCHC RBC-ENTMCNC: 32.6 PG — HIGH (ref 27–31)
MCV RBC AUTO: 100.7 FL — HIGH (ref 81–99)
NRBC # BLD: 0 /100 WBCS — SIGNIFICANT CHANGE UP (ref 0–0)
PLATELET # BLD AUTO: 244 K/UL — SIGNIFICANT CHANGE UP (ref 130–400)
POTASSIUM SERPL-MCNC: 4.8 MMOL/L — SIGNIFICANT CHANGE UP (ref 3.5–5)
POTASSIUM SERPL-MCNC: 5.7 MMOL/L — HIGH (ref 3.5–5)
POTASSIUM SERPL-SCNC: 4.8 MMOL/L — SIGNIFICANT CHANGE UP (ref 3.5–5)
POTASSIUM SERPL-SCNC: 5.7 MMOL/L — HIGH (ref 3.5–5)
RBC # BLD: 4.33 M/UL — SIGNIFICANT CHANGE UP (ref 4.2–5.4)
RBC # FLD: 12.9 % — SIGNIFICANT CHANGE UP (ref 11.5–14.5)
SODIUM SERPL-SCNC: 135 MMOL/L — SIGNIFICANT CHANGE UP (ref 135–146)
SODIUM SERPL-SCNC: 138 MMOL/L — SIGNIFICANT CHANGE UP (ref 135–146)
WBC # BLD: 15.02 K/UL — HIGH (ref 4.8–10.8)
WBC # FLD AUTO: 15.02 K/UL — HIGH (ref 4.8–10.8)

## 2019-04-06 PROCEDURE — 93010 ELECTROCARDIOGRAM REPORT: CPT

## 2019-04-06 RX ORDER — SODIUM POLYSTYRENE SULFONATE 4.1 MEQ/G
30 POWDER, FOR SUSPENSION ORAL ONCE
Qty: 0 | Refills: 0 | Status: COMPLETED | OUTPATIENT
Start: 2019-04-06 | End: 2019-04-06

## 2019-04-06 RX ADMIN — OXYCODONE HYDROCHLORIDE 10 MILLIGRAM(S): 5 TABLET ORAL at 21:53

## 2019-04-06 RX ADMIN — Medication 3 MILLILITER(S): at 09:20

## 2019-04-06 RX ADMIN — Medication 1 PATCH: at 07:34

## 2019-04-06 RX ADMIN — Medication 3 MILLILITER(S): at 20:24

## 2019-04-06 RX ADMIN — Medication 1 PATCH: at 11:36

## 2019-04-06 RX ADMIN — Medication 1 PATCH: at 19:21

## 2019-04-06 RX ADMIN — Medication 500000 UNIT(S): at 21:49

## 2019-04-06 RX ADMIN — Medication 500000 UNIT(S): at 05:14

## 2019-04-06 RX ADMIN — OXYCODONE HYDROCHLORIDE 10 MILLIGRAM(S): 5 TABLET ORAL at 12:07

## 2019-04-06 RX ADMIN — Medication 0.5 MILLIGRAM(S): at 13:03

## 2019-04-06 RX ADMIN — OXYCODONE HYDROCHLORIDE 10 MILLIGRAM(S): 5 TABLET ORAL at 11:37

## 2019-04-06 RX ADMIN — BUDESONIDE AND FORMOTEROL FUMARATE DIHYDRATE 2 PUFF(S): 160; 4.5 AEROSOL RESPIRATORY (INHALATION) at 20:17

## 2019-04-06 RX ADMIN — PANTOPRAZOLE SODIUM 40 MILLIGRAM(S): 20 TABLET, DELAYED RELEASE ORAL at 05:14

## 2019-04-06 RX ADMIN — Medication 100 MILLIGRAM(S): at 05:14

## 2019-04-06 RX ADMIN — OXYCODONE HYDROCHLORIDE 10 MILLIGRAM(S): 5 TABLET ORAL at 22:30

## 2019-04-06 RX ADMIN — Medication 500000 UNIT(S): at 13:04

## 2019-04-06 RX ADMIN — BUDESONIDE AND FORMOTEROL FUMARATE DIHYDRATE 2 PUFF(S): 160; 4.5 AEROSOL RESPIRATORY (INHALATION) at 07:41

## 2019-04-06 RX ADMIN — SODIUM POLYSTYRENE SULFONATE 30 GRAM(S): 4.1 POWDER, FOR SUSPENSION ORAL at 12:35

## 2019-04-06 RX ADMIN — Medication 50 MILLIGRAM(S): at 21:49

## 2019-04-06 RX ADMIN — Medication 0.5 MILLIGRAM(S): at 00:17

## 2019-04-06 RX ADMIN — OXYCODONE HYDROCHLORIDE 10 MILLIGRAM(S): 5 TABLET ORAL at 06:10

## 2019-04-06 RX ADMIN — Medication 40 MILLIGRAM(S): at 05:14

## 2019-04-06 RX ADMIN — OXYCODONE HYDROCHLORIDE 10 MILLIGRAM(S): 5 TABLET ORAL at 05:18

## 2019-04-06 RX ADMIN — Medication 60 MILLIGRAM(S): at 12:38

## 2019-04-06 RX ADMIN — Medication 3 MILLILITER(S): at 13:29

## 2019-04-06 RX ADMIN — Medication 1 PATCH: at 11:33

## 2019-04-06 NOTE — PROGRESS NOTE ADULT - SUBJECTIVE AND OBJECTIVE BOX
GEE RINALDIUREEN  69y  Female      Patient is a 69y old  Female who presents with a chief complaint of COPD exacerbation (05 Apr 2019 13:03)      INTERVAL HPI/OVERNIGHT EVENTS:      ******************************* REVIEW OF SYSTEMS:**********************************************  still in respiratory distress and coughing dry while trying to breath deep.  All other review of systems negative    *********************** VITALS ******************************************    T(F): 96.8 (04-06-19 @ 05:52)  HR: 91 (04-06-19 @ 05:52) (91 - 100)  BP: 120/68 (04-06-19 @ 05:52) (120/68 - 160/70)  RR: 18 (04-06-19 @ 05:52) (18 - 18)  SpO2: --            ******************************** PHYSICAL EXAM:**************************************************  GENERAL: NAD    PSYCH: no agitation, baseline mentation  HEENT:     NERVOUS SYSTEM:  Alert & Oriented X3, MS  5/5 B/L  UE and LE ; Sensory intact    PULMONARY:  Decreased  CHANTAL, Wheezing b/l  >> ? mildly improved.    CARDIOVASCULAR: S1S2 RRR    GI: Soft, NT, ND; BS present.    EXTREMITIES:  2+ Peripheral Pulses, No clubbing, cyanosis, or edema    LYMPH: No lymphadenopathy noted    SKIN: No rashes or lesions    ******************************************************************************************    Consultant(s) Notes Reviewed:  [x ] YES  [ ] NO    Discussed with Consultants/Other Providers [ x] YES     **************************** LABS *******************************************************                          14.1   15.02 )-----------( 244      ( 06 Apr 2019 05:25 )             43.6     04-06    138  |  96<L>  |  8<L>  ----------------------------<  129<H>  5.7<H>   |  32  |  0.6<L>    Ca    9.4      06 Apr 2019 05:25  Mg     2.1     04-06            Lactate Trend        CAPILLARY BLOOD GLUCOSE              **************************Active Medications *******************************************  penicillin (Unknown)      ALBUTerol/ipratropium for Nebulization 3 milliLiter(s) Nebulizer every 6 hours  ALPRAZolam 0.5 milliGRAM(s) Oral every 12 hours PRN  buDESOnide 160 MICROgram(s)/formoterol 4.5 MICROgram(s) Inhaler 2 Puff(s) Inhalation two times a day  chlorhexidine 4% Liquid 1 Application(s) Topical <User Schedule>  docusate sodium 100 milliGRAM(s) Oral two times a day  enoxaparin Injectable 40 milliGRAM(s) SubCutaneous daily  guaiFENesin    Syrup 100 milliGRAM(s) Oral every 6 hours PRN  levoFLOXacin IVPB      levoFLOXacin IVPB 500 milliGRAM(s) IV Intermittent every 24 hours  nicotine -   7 mG/24Hr(s) Patch 1 patch Transdermal daily  nystatin    Suspension [id number] Unit(s) Oral three times a day  oxyCODONE    IR 10 milliGRAM(s) Oral every 6 hours PRN  pantoprazole    Tablet 40 milliGRAM(s) Oral before breakfast  predniSONE   Tablet 40 milliGRAM(s) Oral daily  senna 2 Tablet(s) Oral at bedtime  sodium polystyrene sulfonate Suspension 30 Gram(s) Oral once  traZODone 50 milliGRAM(s) Oral at bedtime      ***************************************************  RADIOLOGY & ADDITIONAL TESTS:    Imaging Personally Reviewed:  [ ] YES  [ ] NO    HEALTH ISSUES - PROBLEM Dx:

## 2019-04-06 NOTE — DISCHARGE NOTE NURSING/CASE MANAGEMENT/SOCIAL WORK - NSDCPEWEB_GEN_ALL_CORE
[place] website --- [email or web address] for Tobacco Control website --- [email or web address]

## 2019-04-06 NOTE — DISCHARGE NOTE NURSING/CASE MANAGEMENT/SOCIAL WORK - NSDCPEEMAIL_GEN_ALL_CORE
[facility] for Tobacco Control email [email or web address]

## 2019-04-07 LAB
ANION GAP SERPL CALC-SCNC: 11 MMOL/L — SIGNIFICANT CHANGE UP (ref 7–14)
BUN SERPL-MCNC: 6 MG/DL — LOW (ref 10–20)
CALCIUM SERPL-MCNC: 9 MG/DL — SIGNIFICANT CHANGE UP (ref 8.5–10.1)
CHLORIDE SERPL-SCNC: 87 MMOL/L — LOW (ref 98–110)
CO2 SERPL-SCNC: 30 MMOL/L — SIGNIFICANT CHANGE UP (ref 17–32)
CREAT SERPL-MCNC: 0.6 MG/DL — LOW (ref 0.7–1.5)
GLUCOSE SERPL-MCNC: 175 MG/DL — HIGH (ref 70–99)
HCT VFR BLD CALC: 43.5 % — SIGNIFICANT CHANGE UP (ref 37–47)
HGB BLD-MCNC: 13.7 G/DL — SIGNIFICANT CHANGE UP (ref 12–16)
MAGNESIUM SERPL-MCNC: 2 MG/DL — SIGNIFICANT CHANGE UP (ref 1.8–2.4)
MCHC RBC-ENTMCNC: 31.5 G/DL — LOW (ref 32–37)
MCHC RBC-ENTMCNC: 32.4 PG — HIGH (ref 27–31)
MCV RBC AUTO: 102.8 FL — HIGH (ref 81–99)
NRBC # BLD: 0 /100 WBCS — SIGNIFICANT CHANGE UP (ref 0–0)
PLATELET # BLD AUTO: 231 K/UL — SIGNIFICANT CHANGE UP (ref 130–400)
POTASSIUM SERPL-MCNC: 4.6 MMOL/L — SIGNIFICANT CHANGE UP (ref 3.5–5)
POTASSIUM SERPL-SCNC: 4.6 MMOL/L — SIGNIFICANT CHANGE UP (ref 3.5–5)
RBC # BLD: 4.23 M/UL — SIGNIFICANT CHANGE UP (ref 4.2–5.4)
RBC # FLD: 12.8 % — SIGNIFICANT CHANGE UP (ref 11.5–14.5)
SODIUM SERPL-SCNC: 128 MMOL/L — LOW (ref 135–146)
WBC # BLD: 14.48 K/UL — HIGH (ref 4.8–10.8)
WBC # FLD AUTO: 14.48 K/UL — HIGH (ref 4.8–10.8)

## 2019-04-07 RX ORDER — OXYCODONE AND ACETAMINOPHEN 5; 325 MG/1; MG/1
1 TABLET ORAL EVERY 6 HOURS
Qty: 0 | Refills: 0 | Status: DISCONTINUED | OUTPATIENT
Start: 2019-04-07 | End: 2019-04-09

## 2019-04-07 RX ORDER — HYDRALAZINE HCL 50 MG
25 TABLET ORAL ONCE
Qty: 0 | Refills: 0 | Status: COMPLETED | OUTPATIENT
Start: 2019-04-07 | End: 2019-04-07

## 2019-04-07 RX ADMIN — OXYCODONE HYDROCHLORIDE 10 MILLIGRAM(S): 5 TABLET ORAL at 05:43

## 2019-04-07 RX ADMIN — Medication 500000 UNIT(S): at 15:23

## 2019-04-07 RX ADMIN — BUDESONIDE AND FORMOTEROL FUMARATE DIHYDRATE 2 PUFF(S): 160; 4.5 AEROSOL RESPIRATORY (INHALATION) at 19:20

## 2019-04-07 RX ADMIN — Medication 3 MILLILITER(S): at 19:19

## 2019-04-07 RX ADMIN — Medication 40 MILLIGRAM(S): at 05:12

## 2019-04-07 RX ADMIN — Medication 0.5 MILLIGRAM(S): at 09:23

## 2019-04-07 RX ADMIN — Medication 3 MILLILITER(S): at 08:00

## 2019-04-07 RX ADMIN — Medication 1 PATCH: at 07:23

## 2019-04-07 RX ADMIN — OXYCODONE AND ACETAMINOPHEN 1 TABLET(S): 5; 325 TABLET ORAL at 17:44

## 2019-04-07 RX ADMIN — Medication 3 MILLILITER(S): at 13:34

## 2019-04-07 RX ADMIN — Medication 60 MILLIGRAM(S): at 11:29

## 2019-04-07 RX ADMIN — Medication 0.5 MILLIGRAM(S): at 21:33

## 2019-04-07 RX ADMIN — Medication 25 MILLIGRAM(S): at 23:10

## 2019-04-07 RX ADMIN — Medication 1 PATCH: at 11:28

## 2019-04-07 RX ADMIN — Medication 50 MILLIGRAM(S): at 21:34

## 2019-04-07 RX ADMIN — Medication 100 MILLIGRAM(S): at 17:15

## 2019-04-07 RX ADMIN — Medication 500000 UNIT(S): at 21:33

## 2019-04-07 RX ADMIN — OXYCODONE AND ACETAMINOPHEN 1 TABLET(S): 5; 325 TABLET ORAL at 18:14

## 2019-04-07 RX ADMIN — PANTOPRAZOLE SODIUM 40 MILLIGRAM(S): 20 TABLET, DELAYED RELEASE ORAL at 05:12

## 2019-04-07 RX ADMIN — Medication 100 MILLIGRAM(S): at 06:09

## 2019-04-07 RX ADMIN — Medication 500000 UNIT(S): at 05:12

## 2019-04-07 RX ADMIN — OXYCODONE AND ACETAMINOPHEN 1 TABLET(S): 5; 325 TABLET ORAL at 11:59

## 2019-04-07 RX ADMIN — BUDESONIDE AND FORMOTEROL FUMARATE DIHYDRATE 2 PUFF(S): 160; 4.5 AEROSOL RESPIRATORY (INHALATION) at 08:29

## 2019-04-07 RX ADMIN — Medication 1 PATCH: at 19:04

## 2019-04-07 RX ADMIN — OXYCODONE HYDROCHLORIDE 10 MILLIGRAM(S): 5 TABLET ORAL at 06:11

## 2019-04-07 RX ADMIN — Medication 100 MILLIGRAM(S): at 05:14

## 2019-04-07 RX ADMIN — OXYCODONE AND ACETAMINOPHEN 1 TABLET(S): 5; 325 TABLET ORAL at 11:29

## 2019-04-07 NOTE — PROGRESS NOTE ADULT - SUBJECTIVE AND OBJECTIVE BOX
SUBJECTIVE:    Patient is a 69y old Female who presents with a chief complaint of COPD exacerbation (06 Apr 2019 11:18)  Currently admitted to medicine with the primary diagnosis of COPD exacerbation  Today is hospital day 5d. This morning she is resting comfortably in bed and reports no new issues or overnight events.   this AM pt is coughing and wheezing.     PAST MEDICAL & SURGICAL HISTORY  Chronic pain  Depression  Anxiety  COPD (chronic obstructive pulmonary disease)  Neck mass    SOCIAL HISTORY:  Negative for smoking/alcohol/drug use.     ALLERGIES:  penicillin (Unknown)    MEDICATIONS:  STANDING MEDICATIONS  ALBUTerol/ipratropium for Nebulization 3 milliLiter(s) Nebulizer every 6 hours  buDESOnide 160 MICROgram(s)/formoterol 4.5 MICROgram(s) Inhaler 2 Puff(s) Inhalation two times a day  chlorhexidine 4% Liquid 1 Application(s) Topical <User Schedule>  docusate sodium 100 milliGRAM(s) Oral two times a day  enoxaparin Injectable 40 milliGRAM(s) SubCutaneous daily  levoFLOXacin IVPB      levoFLOXacin IVPB 500 milliGRAM(s) IV Intermittent every 24 hours  methylPREDNISolone sodium succinate Injectable 60 milliGRAM(s) IV Push once  nicotine -   7 mG/24Hr(s) Patch 1 patch Transdermal daily  nystatin    Suspension [id number] Unit(s) Oral three times a day  pantoprazole    Tablet 40 milliGRAM(s) Oral before breakfast  senna 2 Tablet(s) Oral at bedtime  traZODone 50 milliGRAM(s) Oral at bedtime    PRN MEDICATIONS  ALPRAZolam 0.5 milliGRAM(s) Oral every 12 hours PRN  guaiFENesin    Syrup 100 milliGRAM(s) Oral every 6 hours PRN  oxyCODONE    5 mG/acetaminophen 325 mG 1 Tablet(s) Oral every 6 hours PRN    VITALS:   T(F): 98.5  HR: 81  BP: 119/59  RR: 18  SpO2: 94%    LABS:                        13.7   14.48 )-----------( 231      ( 07 Apr 2019 08:08 )             43.5     04-07    128<L>  |  87<L>  |  6<L>  ----------------------------<  175<H>  4.6   |  30  |  0.6<L>    Ca    9.0      07 Apr 2019 08:08  Mg     2.0     04-07      RADIOLOGY:    < from: Xray Chest 1 View-PORTABLE IMMEDIATE (04.02.19 @ 21:41) >  Impression:      Stable bibasilar linear opacities, likely atelectasis versus fibrosis.    < end of copied text >      PHYSICAL EXAM:  GEN: in mild distress  LUNGS: decreased breath sounds bilaterally, scattered wheezes   HEART: S1/S2 present. RRR.   ABD: Soft, non-tender, non-distended. Bowel sounds present  EXT: No ll edema, intact pulsations   NEURO: AAOX3, non focal

## 2019-04-07 NOTE — PROGRESS NOTE ADULT - SUBJECTIVE AND OBJECTIVE BOX
KUSH RINALDI  69y  Female      Patient is a 69y old  Female who presents with a chief complaint of COPD exacerbation (07 Apr 2019 10:53)      INTERVAL HPI/OVERNIGHT EVENTS:      ******************************* REVIEW OF SYSTEMS:**********************************************    All other review of systems negative    *********************** VITALS ******************************************    T(F): 99.8 (04-07-19 @ 12:30)  HR: 95 (04-07-19 @ 12:30) (81 - 97)  BP: 132/65 (04-07-19 @ 12:30) (119/59 - 152/72)  RR: 18 (04-07-19 @ 12:30) (18 - 18)  SpO2: --            ******************************** PHYSICAL EXAM:**************************************************  GENERAL: NAD    PSYCH: no agitation, baseline mentation  HEENT:     NERVOUS SYSTEM:  Alert & Oriented X3, MS  5/5 B/L  UE and LE ; Sensory intact    PULMONARY: Decreased CHANTAL, still wheezing diffusely     CARDIOVASCULAR: S1S2 RRR    GI: Soft, NT, ND; BS present.    EXTREMITIES:  2+ Peripheral Pulses, No clubbing, cyanosis, or edema    LYMPH: No lymphadenopathy noted    SKIN: No rashes or lesions    ******************************************************************************************    Consultant(s) Notes Reviewed:  [x ] YES  [ ] NO    Discussed with Consultants/Other Providers [ x] YES     **************************** LABS *******************************************************                          13.7   14.48 )-----------( 231      ( 07 Apr 2019 08:08 )             43.5     04-07    128<L>  |  87<L>  |  6<L>  ----------------------------<  175<H>  4.6   |  30  |  0.6<L>    Ca    9.0      07 Apr 2019 08:08  Mg     2.0     04-07            Lactate Trend        CAPILLARY BLOOD GLUCOSE              **************************Active Medications *******************************************  penicillin (Unknown)      ALBUTerol/ipratropium for Nebulization 3 milliLiter(s) Nebulizer every 6 hours  ALPRAZolam 0.5 milliGRAM(s) Oral every 12 hours PRN  buDESOnide 160 MICROgram(s)/formoterol 4.5 MICROgram(s) Inhaler 2 Puff(s) Inhalation two times a day  chlorhexidine 4% Liquid 1 Application(s) Topical <User Schedule>  docusate sodium 100 milliGRAM(s) Oral two times a day  enoxaparin Injectable 40 milliGRAM(s) SubCutaneous daily  guaiFENesin    Syrup 100 milliGRAM(s) Oral every 6 hours PRN  levoFLOXacin IVPB      levoFLOXacin IVPB 500 milliGRAM(s) IV Intermittent every 24 hours  nicotine -   7 mG/24Hr(s) Patch 1 patch Transdermal daily  nystatin    Suspension [id number] Unit(s) Oral three times a day  oxyCODONE    5 mG/acetaminophen 325 mG 1 Tablet(s) Oral every 6 hours PRN  pantoprazole    Tablet 40 milliGRAM(s) Oral before breakfast  senna 2 Tablet(s) Oral at bedtime  traZODone 50 milliGRAM(s) Oral at bedtime      ***************************************************  RADIOLOGY & ADDITIONAL TESTS:    Imaging Personally Reviewed:  [ ] YES  [ ] NO    HEALTH ISSUES - PROBLEM Dx:

## 2019-04-08 ENCOUNTER — TRANSCRIPTION ENCOUNTER (OUTPATIENT)
Age: 69
End: 2019-04-08

## 2019-04-08 LAB
ANION GAP SERPL CALC-SCNC: 8 MMOL/L — SIGNIFICANT CHANGE UP (ref 7–14)
BUN SERPL-MCNC: 9 MG/DL — LOW (ref 10–20)
CALCIUM SERPL-MCNC: 9 MG/DL — SIGNIFICANT CHANGE UP (ref 8.5–10.1)
CHLORIDE SERPL-SCNC: 95 MMOL/L — LOW (ref 98–110)
CO2 SERPL-SCNC: 33 MMOL/L — HIGH (ref 17–32)
CREAT SERPL-MCNC: 0.5 MG/DL — LOW (ref 0.7–1.5)
GLUCOSE SERPL-MCNC: 108 MG/DL — HIGH (ref 70–99)
HCT VFR BLD CALC: 41.5 % — SIGNIFICANT CHANGE UP (ref 37–47)
HGB BLD-MCNC: 13.3 G/DL — SIGNIFICANT CHANGE UP (ref 12–16)
MAGNESIUM SERPL-MCNC: 2.4 MG/DL — SIGNIFICANT CHANGE UP (ref 1.8–2.4)
MCHC RBC-ENTMCNC: 32 G/DL — SIGNIFICANT CHANGE UP (ref 32–37)
MCHC RBC-ENTMCNC: 32.5 PG — HIGH (ref 27–31)
MCV RBC AUTO: 101.5 FL — HIGH (ref 81–99)
NRBC # BLD: 0 /100 WBCS — SIGNIFICANT CHANGE UP (ref 0–0)
PLATELET # BLD AUTO: 238 K/UL — SIGNIFICANT CHANGE UP (ref 130–400)
POTASSIUM SERPL-MCNC: 5.2 MMOL/L — HIGH (ref 3.5–5)
POTASSIUM SERPL-SCNC: 5.2 MMOL/L — HIGH (ref 3.5–5)
RBC # BLD: 4.09 M/UL — LOW (ref 4.2–5.4)
RBC # FLD: 13 % — SIGNIFICANT CHANGE UP (ref 11.5–14.5)
SODIUM SERPL-SCNC: 136 MMOL/L — SIGNIFICANT CHANGE UP (ref 135–146)
WBC # BLD: 14.38 K/UL — HIGH (ref 4.8–10.8)
WBC # FLD AUTO: 14.38 K/UL — HIGH (ref 4.8–10.8)

## 2019-04-08 RX ORDER — PANTOPRAZOLE SODIUM 20 MG/1
1 TABLET, DELAYED RELEASE ORAL
Qty: 10 | Refills: 0 | OUTPATIENT
Start: 2019-04-08 | End: 2019-04-17

## 2019-04-08 RX ADMIN — Medication 100 MILLIGRAM(S): at 11:04

## 2019-04-08 RX ADMIN — Medication 100 MILLIGRAM(S): at 05:21

## 2019-04-08 RX ADMIN — Medication 500000 UNIT(S): at 05:21

## 2019-04-08 RX ADMIN — OXYCODONE AND ACETAMINOPHEN 1 TABLET(S): 5; 325 TABLET ORAL at 18:16

## 2019-04-08 RX ADMIN — Medication 50 MILLIGRAM(S): at 23:00

## 2019-04-08 RX ADMIN — OXYCODONE AND ACETAMINOPHEN 1 TABLET(S): 5; 325 TABLET ORAL at 12:20

## 2019-04-08 RX ADMIN — Medication 3 MILLILITER(S): at 13:32

## 2019-04-08 RX ADMIN — Medication 3 MILLILITER(S): at 08:19

## 2019-04-08 RX ADMIN — BUDESONIDE AND FORMOTEROL FUMARATE DIHYDRATE 2 PUFF(S): 160; 4.5 AEROSOL RESPIRATORY (INHALATION) at 08:02

## 2019-04-08 RX ADMIN — Medication 100 MILLIGRAM(S): at 21:25

## 2019-04-08 RX ADMIN — Medication 1 PATCH: at 11:05

## 2019-04-08 RX ADMIN — Medication 500000 UNIT(S): at 13:24

## 2019-04-08 RX ADMIN — Medication 50 MILLIGRAM(S): at 05:21

## 2019-04-08 RX ADMIN — Medication 500000 UNIT(S): at 21:25

## 2019-04-08 RX ADMIN — OXYCODONE AND ACETAMINOPHEN 1 TABLET(S): 5; 325 TABLET ORAL at 05:20

## 2019-04-08 RX ADMIN — BUDESONIDE AND FORMOTEROL FUMARATE DIHYDRATE 2 PUFF(S): 160; 4.5 AEROSOL RESPIRATORY (INHALATION) at 20:32

## 2019-04-08 RX ADMIN — OXYCODONE AND ACETAMINOPHEN 1 TABLET(S): 5; 325 TABLET ORAL at 18:46

## 2019-04-08 RX ADMIN — Medication 0.5 MILLIGRAM(S): at 21:24

## 2019-04-08 RX ADMIN — OXYCODONE AND ACETAMINOPHEN 1 TABLET(S): 5; 325 TABLET ORAL at 05:50

## 2019-04-08 RX ADMIN — OXYCODONE AND ACETAMINOPHEN 1 TABLET(S): 5; 325 TABLET ORAL at 11:50

## 2019-04-08 RX ADMIN — PANTOPRAZOLE SODIUM 40 MILLIGRAM(S): 20 TABLET, DELAYED RELEASE ORAL at 05:21

## 2019-04-08 RX ADMIN — Medication 0.5 MILLIGRAM(S): at 09:42

## 2019-04-08 RX ADMIN — Medication 3 MILLILITER(S): at 20:21

## 2019-04-08 RX ADMIN — Medication 1 PATCH: at 19:30

## 2019-04-08 RX ADMIN — Medication 1 PATCH: at 11:10

## 2019-04-08 RX ADMIN — Medication 100 MILLIGRAM(S): at 17:28

## 2019-04-08 NOTE — DISCHARGE NOTE PROVIDER - CARE PROVIDER_API CALL
London Bermudez)  Internal Medicine  [address] [place] [address]  Phone: [phone]  Fax: [phone]  Follow Up Time:     Chris Contreras)  Critical Care Medicine; Internal Medicine; Pulmonary Disease; Sleep Medicine  [address] [place] [address]  Phone: [phone]  Fax: [phone]  Follow Up Time:

## 2019-04-08 NOTE — DISCHARGE NOTE PROVIDER - NSDCCPCAREPLAN_GEN_ALL_CORE_FT
PRINCIPAL DISCHARGE DIAGNOSIS  Diagnosis: COPD exacerbation  Assessment and Plan of Treatment: Your shortness of breath is due to a COPD exacerbation. You were treated wit ne PRINCIPAL DISCHARGE DIAGNOSIS  Diagnosis: COPD exacerbation  Assessment and Plan of Treatment: Your shortness of breath is due to a COPD exacerbation. You were treated wit nebulizers and IV steroid therapy as well as a course of anitbiotics with great improvement. You will be sent home on a prednisone taper (to follow as prescribed) and nebulizers   The most important factor in controlling COPD and stop its progression is smoking cessation. It is highly recommended you do so. If you are willing to do so please ask your primary care physian or pulmonologist to guide you   You will need to follow up with your primary care physician and pulmonogist this week of 4/8/2019 in the office to make sure that symptoms are controlled after discahrge   If at any point you notice increased shortness of breath on your regular activites,, increased sputum production, fever please present to the emergency room PRINCIPAL DISCHARGE DIAGNOSIS  Diagnosis: COPD exacerbation  Assessment and Plan of Treatment: Your shortness of breath is due to a COPD exacerbation. You were treated with nebulizers and IV steroid therapy as well as a course of anitbiotics with improvement. You will be sent home on a prednisone taper (to follow as prescribed) and nebulizers   The most important factor in controlling COPD and stop its progression is smoking cessation. It is highly recommended you do so. If you are willing to do so please ask your primary care physian or pulmonologist to guide you   You will need to follow up with your primary care physician and pulmonogist this week of 4/8/2019 in the office to make sure that symptoms are controlled after discahrge   You have severe COPD, as a result it is expected for you to have some degree of shortness of breath at baseline at rest and on daily activities.   However if at any point you notice increased shortness of breath from that baseline on your regular activites or at rest, increased sputum production, fever please present to the emergency room PRINCIPAL DISCHARGE DIAGNOSIS  Diagnosis: COPD exacerbation  Assessment and Plan of Treatment: Your shortness of breath is due to a COPD exacerbation. You were treated with nebulizers and IV steroid therapy as well as a course of anitbiotics with improvement. You will be sent home on a prednisone taper (to follow as prescribed) and nebulizers   The most important factor in controlling COPD and stop its progression is smoking cessation. It is highly recommended you do so. If you are willing to do so please ask your primary care physian or pulmonologist to guide you. In the meatnime continue using the nicotine patches (unless you are actively smoking. Doing both can results in severe side effects). Follow up with your pulmonologist and primary care physician to guide you in tapering down the nicotine patch dose and for further smoking cessation counselling and treatment.   You will need to follow up with your primary care physician and pulmonogist this week of 4/8/2019 in the office to make sure that symptoms are controlled after discahrge   You have severe COPD, as a result it is expected for you to have some degree of shortness of breath at baseline at rest and on daily activities.   However if at any point you notice increased shortness of breath from that baseline on your regular activites or at rest, increased sputum production, fever please present to the emergency room

## 2019-04-08 NOTE — DISCHARGE NOTE NURSING/CASE MANAGEMENT/SOCIAL WORK - NSDCDPATPORTLINK_GEN_ALL_CORE
You can access the [facility] Patient Portal, offered by [facility], by registering with the following website: [email or web address]
You can access the [facility] Patient Portal, offered by [facility], by registering with the following website: [email or web address]

## 2019-04-08 NOTE — DISCHARGE NOTE NURSING/CASE MANAGEMENT/SOCIAL WORK - NSDCPEWEB_GEN_ALL_CORE
[place] website --- [email or web address] for Tobacco Control website --- [email or web address]

## 2019-04-08 NOTE — PROGRESS NOTE ADULT - SUBJECTIVE AND OBJECTIVE BOX
OVERNIGHT EVENTS: feels better, still coughing, easier to bring phlegm out    Vital Signs Last 24 Hrs  T(C): 36.2 (08 Apr 2019 06:04), Max: 37.7 (07 Apr 2019 12:30)  T(F): 97.2 (08 Apr 2019 06:04), Max: 99.8 (07 Apr 2019 12:30)  HR: 88 (08 Apr 2019 06:04) (88 - 99)  BP: 139/64 (08 Apr 2019 06:04) (132/65 - 181/77)  RR: 18 (08 Apr 2019 06:04) (18 - 18)  SpO2: 94%    PHYSICAL EXAMINATION:    GENERAL: The patient is awake and alert in no apparent distress.     HEENT: Head is normocephalic and atraumatic. Extraocular muscles are intact. Mucous membranes are moist.    NECK: Supple.    LUNGS: dec bs both bases, exp wheeizng    HEART: Regular rate and rhythm without murmur.    ABDOMEN: Soft, nontender, and nondistended.      EXTREMITIES: Without any cyanosis, clubbing, rash, lesions or edema.    NEUROLOGIC: Grossly intact.    SKIN: No ulceration or induration present.      LABS:                        13.7   14.48 )-----------( 231      ( 07 Apr 2019 08:08 )             43.5     04-07    128<L>  |  87<L>  |  6<L>  ----------------------------<  175<H>  4.6   |  30  |  0.6<L>    Ca    9.0      07 Apr 2019 08:08  Mg     2.0     04-07                              MICROBIOLOGY:      MEDICATIONS  (STANDING):  ALBUTerol/ipratropium for Nebulization 3 milliLiter(s) Nebulizer every 6 hours  buDESOnide 160 MICROgram(s)/formoterol 4.5 MICROgram(s) Inhaler 2 Puff(s) Inhalation two times a day  chlorhexidine 4% Liquid 1 Application(s) Topical <User Schedule>  docusate sodium 100 milliGRAM(s) Oral two times a day  enoxaparin Injectable 40 milliGRAM(s) SubCutaneous daily  levoFLOXacin IVPB      levoFLOXacin IVPB 500 milliGRAM(s) IV Intermittent every 24 hours  nicotine -   7 mG/24Hr(s) Patch 1 patch Transdermal daily  nystatin    Suspension 604516 Unit(s) Oral three times a day  pantoprazole    Tablet 40 milliGRAM(s) Oral before breakfast  predniSONE   Tablet 50 milliGRAM(s) Oral daily  senna 2 Tablet(s) Oral at bedtime  traZODone 50 milliGRAM(s) Oral at bedtime    MEDICATIONS  (PRN):  ALPRAZolam 0.5 milliGRAM(s) Oral every 12 hours PRN anexity  guaiFENesin    Syrup 100 milliGRAM(s) Oral every 6 hours PRN Cough  oxyCODONE    5 mG/acetaminophen 325 mG 1 Tablet(s) Oral every 6 hours PRN Severe Pain (7 - 10)      RADIOLOGY & ADDITIONAL STUDIES:

## 2019-04-08 NOTE — DISCHARGE NOTE PROVIDER - HOSPITAL COURSE
60 y/o female with h/o COPD not on oxygen ( never intubated ) , anxiety presented to ED for c/o progressively worsening SOB x 2 weeks duration     History dates back to 2 weeks ptp when patient noticed she was getting more SOB (NYHA 3) than her usual baseline , chronic productive cough - yellowish-white sputum ,no change in consistency ,quality or quantity of sputum. Denies fever,chills, nausea, vomiting, orthopnea recent travel or sick contacts. She recently completed her steroid taper on 3/14 and states whenever she is off steroids her symptoms worsens. C/o weakness and cramp involving b/l thighs . Reports compliance with inhalers .from the past 2 weeks patient has been using proair inhaler every 2 hours . Patient quit smoking in july 2018 but recently has started smoking again.     ED VS bp 141/63 hr 91 temp ,sat 96% RA     Rx ED solumedrol 125 mg iv x 1, magnesium 2 gm iv x 1, albuterol nebs         Patient was admitted with COPD exacerbation. Started on NEBs and IV solumedrol with improvement then swithched to prednisone taper    FLU was negative, finished a course of antibiotics was on room air an saturating well     Will be discharged on a prednisone taper 58 y/o female with h/o COPD not on oxygen ( never intubated ) , anxiety presented to ED for c/o progressively worsening SOB x 2 weeks duration     History dates back to 2 weeks ptp when patient noticed she was getting more SOB (NYHA 3) than her usual baseline , chronic productive cough - yellowish-white sputum ,no change in consistency ,quality or quantity of sputum. Denies fever,chills, nausea, vomiting, orthopnea recent travel or sick contacts. She recently completed her steroid taper on 3/14 and states whenever she is off steroids her symptoms worsens. C/o weakness and cramp involving b/l thighs . Reports compliance with inhalers .from the past 2 weeks patient has been using proair inhaler every 2 hours . Patient quit smoking in july 2018 but recently has started smoking again.     ED VS bp 141/63 hr 91 temp ,sat 96% RA     Rx ED solumedrol 125 mg iv x 1, magnesium 2 gm iv x 1, albuterol nebs         Patient was admitted with COPD exacerbation. Started on NEBs and IV solumedrol with improvement then swithched to prednisone taper    FLU was negative, finished a course of antibiotics was on room air and saturating well     Will be discharged on a prednisone taper . 60 y/o female with h/o COPD not on oxygen ( never intubated ) , anxiety presented to ED for c/o progressively worsening SOB x 2 weeks duration     History dates back to 2 weeks ptp when patient noticed she was getting more SOB (NYHA 3) than her usual baseline , chronic productive cough - yellowish-white sputum ,no change in consistency ,quality or quantity of sputum. Denies fever,chills, nausea, vomiting, orthopnea recent travel or sick contacts. She recently completed her steroid taper on 3/14 and states whenever she is off steroids her symptoms worsens. C/o weakness and cramp involving b/l thighs . Reports compliance with inhalers .from the past 2 weeks patient has been using proair inhaler every 2 hours . Patient quit smoking in july 2018 but recently has started smoking again.     ED VS bp 141/63 hr 91 temp ,sat 96% RA     Rx ED solumedrol 125 mg iv x 1, magnesium 2 gm iv x 1, albuterol nebs         Patient was admitted with COPD exacerbation. Started on NEBs and IV solumedrol with improvement then swithched to prednisone taper    FLU was negative, finished a course of antibiotics was on room air and saturating well     Will be discharged on a prednisone taper.    Patient was conuncelled on smoking and the importance of smoking cessation. She will be sent on the same dose of nicotine patches she was on. Explained no to use the patch while actively smoking.

## 2019-04-09 VITALS
TEMPERATURE: 99 F | HEART RATE: 104 BPM | RESPIRATION RATE: 18 BRPM | SYSTOLIC BLOOD PRESSURE: 119 MMHG | DIASTOLIC BLOOD PRESSURE: 53 MMHG

## 2019-04-09 RX ORDER — PANTOPRAZOLE SODIUM 20 MG/1
1 TABLET, DELAYED RELEASE ORAL
Qty: 30 | Refills: 0
Start: 2019-04-09 | End: 2019-05-08

## 2019-04-09 RX ORDER — ALPRAZOLAM 0.25 MG
1 TABLET ORAL
Qty: 14 | Refills: 0
Start: 2019-04-09 | End: 2019-04-15

## 2019-04-09 RX ORDER — IPRATROPIUM/ALBUTEROL SULFATE 18-103MCG
3 AEROSOL WITH ADAPTER (GRAM) INHALATION
Qty: 90 | Refills: 0
Start: 2019-04-09 | End: 2019-05-08

## 2019-04-09 RX ORDER — TRAZODONE HCL 50 MG
1 TABLET ORAL
Qty: 7 | Refills: 0 | OUTPATIENT
Start: 2019-04-09 | End: 2019-04-15

## 2019-04-09 RX ORDER — TRAZODONE HCL 50 MG
0 TABLET ORAL
Qty: 30 | Refills: 0 | COMMUNITY

## 2019-04-09 RX ORDER — TRAZODONE HCL 50 MG
1 TABLET ORAL
Qty: 7 | Refills: 0
Start: 2019-04-09 | End: 2019-04-15

## 2019-04-09 RX ORDER — ALBUTEROL 90 UG/1
2 AEROSOL, METERED ORAL
Qty: 6 | Refills: 0
Start: 2019-04-09 | End: 2019-05-08

## 2019-04-09 RX ADMIN — OXYCODONE AND ACETAMINOPHEN 1 TABLET(S): 5; 325 TABLET ORAL at 13:14

## 2019-04-09 RX ADMIN — CHLORHEXIDINE GLUCONATE 1 APPLICATION(S): 213 SOLUTION TOPICAL at 05:19

## 2019-04-09 RX ADMIN — PANTOPRAZOLE SODIUM 40 MILLIGRAM(S): 20 TABLET, DELAYED RELEASE ORAL at 05:19

## 2019-04-09 RX ADMIN — Medication 500000 UNIT(S): at 05:19

## 2019-04-09 RX ADMIN — Medication 3 MILLILITER(S): at 01:32

## 2019-04-09 RX ADMIN — Medication 1 PATCH: at 11:12

## 2019-04-09 RX ADMIN — OXYCODONE AND ACETAMINOPHEN 1 TABLET(S): 5; 325 TABLET ORAL at 12:44

## 2019-04-09 RX ADMIN — Medication 1 PATCH: at 11:32

## 2019-04-09 RX ADMIN — OXYCODONE AND ACETAMINOPHEN 1 TABLET(S): 5; 325 TABLET ORAL at 05:23

## 2019-04-09 RX ADMIN — ENOXAPARIN SODIUM 40 MILLIGRAM(S): 100 INJECTION SUBCUTANEOUS at 11:12

## 2019-04-09 RX ADMIN — OXYCODONE AND ACETAMINOPHEN 1 TABLET(S): 5; 325 TABLET ORAL at 06:30

## 2019-04-09 RX ADMIN — Medication 0.5 MILLIGRAM(S): at 11:15

## 2019-04-09 RX ADMIN — Medication 100 MILLIGRAM(S): at 11:14

## 2019-04-09 RX ADMIN — Medication 1 PATCH: at 07:11

## 2019-04-09 RX ADMIN — BUDESONIDE AND FORMOTEROL FUMARATE DIHYDRATE 2 PUFF(S): 160; 4.5 AEROSOL RESPIRATORY (INHALATION) at 11:11

## 2019-04-09 RX ADMIN — Medication 50 MILLIGRAM(S): at 05:19

## 2019-04-09 RX ADMIN — Medication 100 MILLIGRAM(S): at 05:19

## 2019-04-09 NOTE — PROGRESS NOTE ADULT - SUBJECTIVE AND OBJECTIVE BOX
GEE RINALDIUREEN  69y  Female      Patient is a 69y old  Female who presents with a chief complaint of COPD exacerbation (09 Apr 2019 10:22)      INTERVAL HPI/OVERNIGHT EVENTS:      ******************************* REVIEW OF SYSTEMS:**********************************************  All other review of systems negative    *********************** VITALS ******************************************    T(F): 98.6 (04-09-19 @ 13:10)  HR: 104 (04-09-19 @ 13:10) (85 - 104)  BP: 119/53 (04-09-19 @ 13:10) (119/53 - 138/68)  RR: 18 (04-09-19 @ 13:10) (18 - 18)  SpO2: --            ******************************** PHYSICAL EXAM:**************************************************  GENERAL: NAD    PSYCH: no agitation, baseline mentation  HEENT:     NERVOUS SYSTEM:  Alert & Oriented X3, MS  5/5 B/L  UE and LE ; Sensory intact    PULMONARY: Decreased CHANTAL, still wheezing probably at her baseline    CARDIOVASCULAR: S1S2 RRR    GI: Soft, NT, ND; BS present.    EXTREMITIES:  2+ Peripheral Pulses, No clubbing, cyanosis, or edema    LYMPH: No lymphadenopathy noted    SKIN: No rashes or lesions    ******************************************************************************************    Consultant(s) Notes Reviewed:  [x ] YES  [ ] NO    Discussed with Consultants/Other Providers [ x] YES     **************************** LABS *******************************************************                          13.3   14.38 )-----------( 238      ( 08 Apr 2019 07:31 )             41.5     04-08    136  |  95<L>  |  9<L>  ----------------------------<  108<H>  5.2<H>   |  33<H>  |  0.5<L>    Ca    9.0      08 Apr 2019 07:31  Mg     2.4     04-08            Lactate Trend        CAPILLARY BLOOD GLUCOSE              **************************Active Medications *******************************************  penicillin (Unknown)      ALBUTerol/ipratropium for Nebulization 3 milliLiter(s) Nebulizer every 6 hours  ALPRAZolam 0.5 milliGRAM(s) Oral every 12 hours PRN  buDESOnide 160 MICROgram(s)/formoterol 4.5 MICROgram(s) Inhaler 2 Puff(s) Inhalation two times a day  chlorhexidine 4% Liquid 1 Application(s) Topical <User Schedule>  docusate sodium 100 milliGRAM(s) Oral two times a day  enoxaparin Injectable 40 milliGRAM(s) SubCutaneous daily  guaiFENesin    Syrup 100 milliGRAM(s) Oral every 6 hours PRN  levoFLOXacin IVPB      levoFLOXacin IVPB 500 milliGRAM(s) IV Intermittent every 24 hours  nicotine -   7 mG/24Hr(s) Patch 1 patch Transdermal daily  nystatin    Suspension [id number] Unit(s) Oral three times a day  oxyCODONE    5 mG/acetaminophen 325 mG 1 Tablet(s) Oral every 6 hours PRN  pantoprazole    Tablet 40 milliGRAM(s) Oral before breakfast  predniSONE   Tablet 50 milliGRAM(s) Oral daily  senna 2 Tablet(s) Oral at bedtime  traZODone 50 milliGRAM(s) Oral at bedtime      ***************************************************  RADIOLOGY & ADDITIONAL TESTS:    Imaging Personally Reviewed:  [ ] YES  [ ] NO    HEALTH ISSUES - PROBLEM Dx:

## 2019-04-09 NOTE — PROGRESS NOTE ADULT - ASSESSMENT
________________________________________________________________________________  DAILY PROGRESS NOTE:    ================== SUBJECTIVE ==================    KUSH NIMCO  /   69y  /  Female  /  MRN#: [id number]  Patient is a 69y old Female who presents with a chief complaint of COPD exacerbation (08 Apr 2019 11:50)  Currently admitted to medicine with the primary diagnosis of COPD exacerbation    HOSPITAL DAY: 7d     OVERNIGHT EVENTS: None    TELEMETRY EVENTS:     TODAY: Patient was seen this morning at bedside. Currently, the patient reports feeling short of breath particularly at ambulation     Review of systems is otherwise negative.    FAMILY HISTORY:  Family history of COPD (chronic obstructive pulmonary disease) (Mother)    Social History        - Smoking:         - Alcohol:        - Drugs:    =================== OBJECTIVE ===================    VITAL SIGNS: Last 24 Hours  T(C): 36.4 (09 Apr 2019 05:25), Max: 37.1 (08 Apr 2019 21:16)  T(F): 97.5 (09 Apr 2019 05:25), Max: 98.8 (08 Apr 2019 21:16)  HR: 85 (09 Apr 2019 05:25) (85 - 99)  BP: 121/57 (09 Apr 2019 05:25) (119/66 - 138/68)  BP(mean): --  RR: 18 (09 Apr 2019 05:25) (18 - 18)  SpO2: --    PHYSICAL EXAM:  GEN: in mild distress however at baseline   LUNGS: decreased breath sounds bilaterally, no wheezes heard or crackles    HEART: S1/S2 present. RRR.   ABD: Soft, non-tender, non-distended. Bowel sounds present  EXT: No ll edema, intact pulsations   NEURO: AAOX3, non focal       ===================== LABS =====================                        13.3   14.38 )-----------( 238      ( 08 Apr 2019 07:31 )             41.5     04-08    136  |  95<L>  |  9<L>  ----------------------------<  108<H>  5.2<H>   |  33<H>  |  0.5<L>    Ca    9.0      08 Apr 2019 07:31  Mg     2.4     04-08    ================== ALLERGIES ===================  penicillin (Unknown)    ==================== MEDS =====================  Home Medications:  ALPRAZolam 0.5 mg oral tablet: 1 tab(s) orally every 12 hours (02 Nov 2018 08:38)  Combivent 18 mcg-103 mcg-/inh inhalation aerosol: 1 puff(s) inhaled 4 times a day (09 Nov 2018 14:24)  HYDROcodone 10 mg oral capsule, extended release: 1 cap(s) orally 4 times a week, As Needed (02 Nov 2018 08:40)  nicotine 14 mg/24 hr transdermal film, extended release: 1 patch transdermal once a day (02 Nov 2018 08:38)  TRAZODONE HCL 50 MG TABS:  (02 Nov 2018 08:38)    ALBUTerol/ipratropium for Nebulization 3 milliLiter(s) Nebulizer every 6 hours  buDESOnide 160 MICROgram(s)/formoterol 4.5 MICROgram(s) Inhaler 2 Puff(s) Inhalation two times a day  chlorhexidine 4% Liquid 1 Application(s) Topical <User Schedule>  docusate sodium 100 milliGRAM(s) Oral two times a day  enoxaparin Injectable 40 milliGRAM(s) SubCutaneous daily  levoFLOXacin IVPB      levoFLOXacin IVPB 500 milliGRAM(s) IV Intermittent every 24 hours  nicotine -   7 mG/24Hr(s) Patch 1 patch Transdermal daily  nystatin    Suspension 034730 Unit(s) Oral three times a day  pantoprazole    Tablet 40 milliGRAM(s) Oral before breakfast  predniSONE   Tablet 50 milliGRAM(s) Oral daily  senna 2 Tablet(s) Oral at bedtime  traZODone 50 milliGRAM(s) Oral at bedtime    PRN MEDICATIONS  ALPRAZolam 0.5 milliGRAM(s) Oral every 12 hours PRN  guaiFENesin    Syrup 100 milliGRAM(s) Oral every 6 hours PRN  oxyCODONE    5 mG/acetaminophen 325 mG 1 Tablet(s) Oral every 6 hours PRN      ================= ASSESSMENT/PLAN ==================  58 y/o female with h/o COPD not on oxygen ( never intubated ), anxiety presented to ED for c/o progressively worsening  SOB x 2 weeks duration     # Acute COPD exacerbation:    - FLU , RSV negative  - Started prednisone taper   - albuterol -ipratropium nebs  - patient refuses NIV , counselled, still refusing   - smoking cessation advised, given nicotine patch   - oxygen PRN to keep sat 88-92 %  - cxr: no acute pathology   - c/w Levaquin 500 iv q24 (day 7 today)   - refused vascular duplex LE  - pulm is following can be discharged     # Hyperkalemia: Resolved   # Anxiety- prn xanax 0.5 q12  # Chronic neck pain: take ocy 10 mg q 6 PRN, confirm from prescription h/o and previous admission charts ( changed to percocet q 6 hrs PRN)   # Depression: c/w Trazadone 50 mg once at bedtime     #dvt ppx: Lovenox  #gi: ppi  #full code, from home
________________________________________________________________________________  DAILY PROGRESS NOTE:    ================== SUBJECTIVE ==================    KUSH NIMCO  /   69y  /  Female  /  MRN#: [id number]  Patient is a 69y old Female who presents with a chief complaint of COPD exacerbation (08 Apr 2019 08:09)  Currently admitted to medicine with the primary diagnosis of COPD exacerbation    HOSPITAL DAY: 6d     OVERNIGHT EVENTS: None    TELEMETRY EVENTS:     TODAY: Patient was seen this morning at bedside. Currently, the patient reports no complaints     Review of systems is otherwise negative.    FAMILY HISTORY:  Family history of COPD (chronic obstructive pulmonary disease) (Mother)    Social History        - Smoking:         - Alcohol:        - Drugs:    =================== OBJECTIVE ===================    VITAL SIGNS: Last 24 Hours  T(C): 36.2 (08 Apr 2019 06:04), Max: 37.7 (07 Apr 2019 12:30)  T(F): 97.2 (08 Apr 2019 06:04), Max: 99.8 (07 Apr 2019 12:30)  HR: 88 (08 Apr 2019 06:04) (88 - 99)  BP: 139/64 (08 Apr 2019 06:04) (132/65 - 181/77)  BP(mean): --  RR: 18 (08 Apr 2019 06:04) (18 - 18)  SpO2: 97% (08 Apr 2019 08:19) (97% - 97%)    PHYSICAL EXAM:  GEN: in mild distress  LUNGS: decreased breath sounds bilaterally, no wheezes hear or crackles    HEART: S1/S2 present. RRR.   ABD: Soft, non-tender, non-distended. Bowel sounds present  EXT: No ll edema, intact pulsations   NEURO: AAOX3, non focal     ===================== LABS =====================                        13.3   14.38 )-----------( 238      ( 08 Apr 2019 07:31 )             41.5     04-08    136  |  95<L>  |  9<L>  ----------------------------<  108<H>  5.2<H>   |  33<H>  |  0.5<L>    Ca    9.0      08 Apr 2019 07:31  Mg     2.4     04-08    ================== ALLERGIES ===================  penicillin (Unknown)    ==================== MEDS =====================  Home Medications:  ALPRAZolam 0.5 mg oral tablet: 1 tab(s) orally every 12 hours (02 Nov 2018 08:38)  Combivent 18 mcg-103 mcg-/inh inhalation aerosol: 1 puff(s) inhaled 4 times a day (09 Nov 2018 14:24)  HYDROcodone 10 mg oral capsule, extended release: 1 cap(s) orally 4 times a week, As Needed (02 Nov 2018 08:40)  nicotine 14 mg/24 hr transdermal film, extended release: 1 patch transdermal once a day (02 Nov 2018 08:38)  TRAZODONE HCL 50 MG TABS:  (02 Nov 2018 08:38)    ALBUTerol/ipratropium for Nebulization 3 milliLiter(s) Nebulizer every 6 hours  buDESOnide 160 MICROgram(s)/formoterol 4.5 MICROgram(s) Inhaler 2 Puff(s) Inhalation two times a day  chlorhexidine 4% Liquid 1 Application(s) Topical <User Schedule>  docusate sodium 100 milliGRAM(s) Oral two times a day  enoxaparin Injectable 40 milliGRAM(s) SubCutaneous daily  levoFLOXacin IVPB      levoFLOXacin IVPB 500 milliGRAM(s) IV Intermittent every 24 hours  nicotine -   7 mG/24Hr(s) Patch 1 patch Transdermal daily  nystatin    Suspension 294934 Unit(s) Oral three times a day  pantoprazole    Tablet 40 milliGRAM(s) Oral before breakfast  predniSONE   Tablet 50 milliGRAM(s) Oral daily  senna 2 Tablet(s) Oral at bedtime  traZODone 50 milliGRAM(s) Oral at bedtime    PRN MEDICATIONS  ALPRAZolam 0.5 milliGRAM(s) Oral every 12 hours PRN  guaiFENesin    Syrup 100 milliGRAM(s) Oral every 6 hours PRN  oxyCODONE    5 mG/acetaminophen 325 mG 1 Tablet(s) Oral every 6 hours PRN      ================= ASSESSMENT/PLAN ==================  58 y/o female with h/o COPD not on oxygen ( never intubated ), anxiety presented to ED for c/o progressively worsening  SOB x 2 weeks duration     # Acute COPD exacerbation:    - FLU , RSV negative  - Started prednisone taper   - albuterol -ipratropium nebs  - patient refuses NIV , counselled, still refusing   - smoking cessation advised, given nicotine patch   - oxygen PRN to keep sat 88-92 %  - cxr: no acute pathology   - c/w Levaquin 500 iv q24 (day 6 today)   - refused vascular duplex LE  - pulm is following can be discharged     # Hyperkalemia: Resolved   # Anxiety- prn xanax 0.5 q12  # Chronic neck pain: take ocy 10 mg q 6 PRN, confirm from prescription h/o and previous admission charts ( changed to percocet q 6 hrs PRN)   # Depression: c/w Trazadone 50 mg once at bedtime     #dvt ppx: Lovenox  #gi: ppi  #full code, from home
58 y/o female with h/o COPD not on oxygen ( never intubated ), anxiety presented to ED for c/o progressively worsening  SOB x 2 weeks duration     # Acute COPD exacerbation:    - FLU , RSV negative  - c/w solumedrol 60 q 12 today, change to PO prednisone 40 mg daily in the AM  - albuterol -ipratropium nebs  - patient refuses NIV , counselled, still refusing   - smoking cessation advised, given nicotine patch   - oxygen PRN to keep sat 88-92 %  - cxr: no acute pathology   - c/w Levaquin 500 iv q24 (day 3)  - refused vascular duplex LE  - pulm is following ( Dr. Contreras)     # Hyperkalemia: AM 5.2, Kayexalate and repeat BMP daily , EKG with no changes   # Anxiety- prn xanax 0.5 q12  # Chronic neck pain: take ocy 10 mg q 6 PRN, confirm from prescription h/o and previous admission charts   # Depression: c/w Trazadone 50 mg once at bedtime     #dvt ppx: Lovenox  #gi: ppi  #full code, from home
58 y/o female with h/o COPD not on oxygen ( never intubated ), anxiety presented to ED for c/o progressively worsening  SOB x 2 weeks duration     # Acute COPD exacerbation:    - FLU , RSV negative  - c/w solumedrol 60 q 12 today, change to PO prednisone 50 mg daily in the AM per Attending   - albuterol -ipratropium nebs  - patient refuses NIV , counselled, still refusing   - smoking cessation advised, given nicotine patch   - oxygen PRN to keep sat 88-92 %  - cxr: no acute pathology   - c/w Levaquin 500 iv q24   - refused vascular duplex LE  - pulm is following ( Dr. Contreras)     # Hyperkalemia: Resolved   # Anxiety- prn xanax 0.5 q12  # Chronic neck pain: take ocy 10 mg q 6 PRN, confirm from prescription h/o and previous admission charts ( changed to percocet q 6 hrs PRN)   # Depression: c/w Trazadone 50 mg once at bedtime     #dvt ppx: Lovenox  #gi: ppi  #full code, from home
58 y/o female with h/o COPD not on oxygen ( never intubated ), anxiety presented to ED for c/o progressively worsening  SOB x 2 weeks duration     # Acute COPD exacerbation:    - FLU , RSV negative  - c/w solumedrol 60 q8   - albuterol -ipratropium nebs  - patient refuses NIV , counselled, still refusing   - smoking cessation advised, given nicotine patch   - oxygen PRN to keep sat 88-92 %  - cxr: no acute pathology   - c/w Levaquin 500 iv q24 (day 2)  - refused vascular duplex LE  - pulm is following     # Hyperkalemia: AM 5.3 from 5.1 from 5.6, will give Kayexalate and EKG and repeat BMP  # Anxiety- prn xanax 0.5 q12  # Chronic neck pain: take ocy 10 mg q 6 PRN, confirm from prescription h/o and previous admission charts   # Depression: c/w Trazadone 50 mg once at bedtime     #dvt ppx: Lovenox  #gi: ppi  #full code, from home
58 y/o female with h/o COPD not on oxygen ( never intubated ), anxiety presented to ED for c/o progressively worsening  SOB x 2 weeks duration     # Acute COPD exacerbation:    - FLU , RSV negative  - solumedrol 60 q8   - albuterol -ipratropium nebs  - patient refuses NIV   - smoking cessation advised  - oxygen PRN to keep sat 88-92 %  - cxr: no acute pathology   - c/w Levaquin 500 iv q24   - pending vascular duplex LE  - pulm is following     # Hyperkalemia: AM k 5.7 , will give key xylate f/u bmp 4 pm   # Anxiety- prn xanax 0.5 q12  # Chronic neck pain: take ocy 10 mg q 6 PRN, confirm from prescription h/o and previous admission charts   # Depression: c/w Trazadone 50 mg once at bedtime     #dvt ppx: lovenox  #gi: ppi  #full code, from home
58 y/o female with h/o COPD not on oxygen ( never intubated ), anxiety presented to ED for c/o progressively worsening  SOB x 2 weeks duration     # Acute COPD exacerbation: Well known to be non compliant.   Advanced / severe COPD   - FLU , RSV negative  - c/w solumedrol 60 q24 today, from tomorrow prednisone.  - albuterol -ipratropium nebs  - patient refuses NIV , counselled, still refusing   - smoking cessation advised, given nicotine patch   - oxygen PRN to keep sat 88-92 %  - c/w Levaquin 500 iv q24 (day 4)  - refused vascular duplex LE  - pulm is following     # Leucocytosis : doubt infectious, 2/2 steroid.    # Hyperkalemia: 5.6 >5.1 >5.3 >5.2   >> 5.7 today > Kayxalate x2 , repeat another  BMP at 8pm tonight.  # Anxiety- prn xanax 0.5 q12  # Chronic neck pain: stable with current pain meds.   # Depression: c/w Trazadone 50 mg once at bedtime     #dvt ppx: Lovenox  #gi: ppi  #full code, from home    #Progress Note Handoff  Pending (specify): Clinical improvement from COPD exacerbation  Family discussion: D/W the patient at bedside.  Disposition: Home
58 y/o female with h/o COPD not on oxygen ( never intubated ), anxiety presented to ED for c/o progressively worsening  SOB x 2 weeks duration     # Acute COPD exacerbation: Well known to be non compliant.   Advanced / severe COPD   - FLU , RSV negative  - s/p IV steroid x1 today, will taper down with PO from tomorrow.  - albuterol -ipratropium nebs  - patient refuses NIV , counselled, still refusing   - smoking cessation advised, given nicotine patch   - oxygen PRN to keep sat 88-92 %  - c/w Levaquin 500 iv q24 (day 4)  - refused vascular duplex LE  - pulm is following     # Leucocytosis : doubt infectious, 2/2 steroid.    # Hyperkalemia: 5.6 >5.1 >5.3 >5.2   >> 5.7   4.6   # Anxiety- prn xanax 0.5 q12  # Chronic neck pain: stable with current pain meds.   # Depression: c/w Trazadone 50 mg once at bedtime     #dvt ppx: Lovenox  #gi: ppi  #full code, from home    #Progress Note Handoff  Pending (specify): Clinical improvement from COPD exacerbation  Family discussion: D/W the patient at bedside.  Disposition: Home in 24-48 hours.
60 y/o female with h/o COPD not on oxygen ( never intubated ), anxiety presented to ED for c/o progressively worsening  SOB x 2 weeks duration     # Acute COPD exacerbation: Well known to be non compliant.    - FLU , RSV negative  - c/w solumedrol 60 q8 for now.  - albuterol -ipratropium nebs  - patient refuses NIV , counselled, still refusing   - smoking cessation advised, given nicotine patch   - oxygen PRN to keep sat 88-92 %  - cxr: no acute pathology   - c/w Levaquin 500 iv q24 (day 2)  - refused vascular duplex LE  - pulm is following     # Hyperkalemia: 5.6 >5.1 >5.3 , > trend  BMP  # Anxiety- prn xanax 0.5 q12  # Chronic neck pain: stable with current pain meds.   # Depression: c/w Trazadone 50 mg once at bedtime     #dvt ppx: Lovenox  #gi: ppi  #full code, from home    #Progress Note Handoff  Pending (specify): Clinical improvement from COPD exacerbation  Family discussion: D/W the patient at bedside.  Disposition: Home
60 y/o female with h/o COPD not on oxygen ( never intubated ), anxiety presented to ED for c/o progressively worsening  SOB x 2 weeks duration     # Acute COPD exacerbation: Well known to be non compliant.   Advanced / severe COPD   - FLU , RSV negative  - c/w solumedrol 60 q12 for now.  - albuterol -ipratropium nebs  - patient refuses NIV , counselled, still refusing   - smoking cessation advised, given nicotine patch   - oxygen PRN to keep sat 88-92 %  - c/w Levaquin 500 iv q24 (day 3)  - refused vascular duplex LE  - pulm is following     # Leucocytosis : doubt infectious, 2/2 steroid.    # Hyperkalemia: 5.6 >5.1 >5.3 >5.2  trend  BMP  # Anxiety- prn xanax 0.5 q12  # Chronic neck pain: stable with current pain meds.   # Depression: c/w Trazadone 50 mg once at bedtime     #dvt ppx: Lovenox  #gi: ppi  #full code, from home    #Progress Note Handoff  Pending (specify): Clinical improvement from COPD exacerbation  Family discussion: D/W the patient at bedside.  Disposition: Home
60 y/o female with h/o COPD not on oxygen ( never intubated ), anxiety presented to ED for c/o progressively worsening  SOB x 2 weeks duration     # Acute COPD exacerbation: Well known to be non compliant.   Advanced / severe COPD   - FLU , RSV negative  - s/p IV steroid , will taper down slowly with prednisone  - albuterol -ipratropium nebs  - patient refuses NIV , counselled, still refusing   - smoking cessation advised, given nicotine patch   - oxygen PRN to keep sat 88-92 %  - Offered  Pulm rehab.  f/u with Pulm Dr Chris Contreras.    # Leucocytosis : doubt infectious, 2/2 steroid.    # Chronic tobacco smoker, if motivated enough to quit then consider nicotine patch.     The patient was instructed/ educated not to use them both at the same time.    # Hyperkalemia: 5.6 >5.1 >5.3 >5.2   >> 5.7   >  4.6 >5.2   # Anxiety- prn xanax 0.5 q12  # Chronic neck pain: stable with current pain meds.   # Depression: c/w Trazadone 50 mg once at bedtime     #dvt ppx: Lovenox  #gi: ppi  #full code, from home    #Progress Note Handoff  Pending (specify):   Family discussion: D/W the patient at bedside.  Disposition: Home today.
IMPRESSION:    -severe COPD Now exacerbation active smoker (poor OP f/up)  -anxiety  - TRUMAN do not want CPAP  - ? hyponatremia      PLAN:     - prednisone 60 for 3 days 40 for 3 days 20 for 3 dasy  - albuterol -ipratropium nebs q 4 h  - finish course of ABX  - smoking cessation   - ppi  - oxygen supplementation to keep sat 88-92 %  -  pulm -rehab as outpatient  - repeat CMP check Na  -OOB to chair  -dvt ppx  - dc planning/ op f/up
IMPRESSION:    -severe COPD Now exacerbation active smoker (poor OP f/up)  -anxiety  - TRUMAN do not want CPAP  - ? hyponatremia      PLAN:     - prednisone taper  - albuterol -ipratropium nebs q 4 h  - finish course of ABX  - smoking cessation   - ppi  - oxygen supplementation to keep sat 88-92 %  -  pulm -rehab as outpatient  - Sputum gram stain/ cx  - repeat CMP check Na  -OOB to chair  -dvt ppx  - dc planning
IMPRESSION:  -severe COPD Now exacerbation active smoker (poor OP f/up)  -anxiety      PLAN:     - solumedrol 60 q 12 h tomorrow prednisone 40 mg daily  -albuterol -ipratropium nebs  - BIPAP at nite and as needed  - smoking cessation   - ppi  - oxygen supplementation to keep sat 88-92 %  - finish course of ABX  -  pulm -rehab as outpatient  -STOPBAN 3 - outpatient sleep study  -OOB to chair  -dvt ppx

## 2019-04-09 NOTE — PROGRESS NOTE ADULT - SUBJECTIVE AND OBJECTIVE BOX
OVERNIGHT EVENTS: feels better, cough imroved    Vital Signs Last 24 Hrs  T(C): 36.4 (09 Apr 2019 05:25), Max: 37.1 (08 Apr 2019 21:16)  T(F): 97.5 (09 Apr 2019 05:25), Max: 98.8 (08 Apr 2019 21:16)  HR: 85 (09 Apr 2019 05:25) (85 - 99)  BP: 121/57 (09 Apr 2019 05:25) (119/66 - 138/68)  RR: 18 (09 Apr 2019 05:25) (18 - 18)  SpO2: 95%    PHYSICAL EXAMINATION:    GENERAL: The patient is awake and alert in no apparent distress.     HEENT: Head is normocephalic and atraumatic. Extraocular muscles are intact. Mucous membranes are moist.    NECK: Supple.    LUNGS: mild exp wheezing    HEART: Regular rate and rhythm without murmur.    ABDOMEN: Soft, nontender, and nondistended.      EXTREMITIES: Without any cyanosis, clubbing, rash, lesions or edema.    NEUROLOGIC: Grossly intact.    SKIN: No ulceration or induration present.      LABS:                        13.3   14.38 )-----------( 238      ( 08 Apr 2019 07:31 )             41.5     04-08    136  |  95<L>  |  9<L>  ----------------------------<  108<H>  5.2<H>   |  33<H>  |  0.5<L>    Ca    9.0      08 Apr 2019 07:31  Mg     2.4     04-08                              MICROBIOLOGY:      MEDICATIONS  (STANDING):  ALBUTerol/ipratropium for Nebulization 3 milliLiter(s) Nebulizer every 6 hours  buDESOnide 160 MICROgram(s)/formoterol 4.5 MICROgram(s) Inhaler 2 Puff(s) Inhalation two times a day  chlorhexidine 4% Liquid 1 Application(s) Topical <User Schedule>  docusate sodium 100 milliGRAM(s) Oral two times a day  enoxaparin Injectable 40 milliGRAM(s) SubCutaneous daily  levoFLOXacin IVPB      levoFLOXacin IVPB 500 milliGRAM(s) IV Intermittent every 24 hours  nicotine -   7 mG/24Hr(s) Patch 1 patch Transdermal daily  nystatin    Suspension 925312 Unit(s) Oral three times a day  pantoprazole    Tablet 40 milliGRAM(s) Oral before breakfast  predniSONE   Tablet 50 milliGRAM(s) Oral daily  senna 2 Tablet(s) Oral at bedtime  traZODone 50 milliGRAM(s) Oral at bedtime    MEDICATIONS  (PRN):  ALPRAZolam 0.5 milliGRAM(s) Oral every 12 hours PRN anexity  guaiFENesin    Syrup 100 milliGRAM(s) Oral every 6 hours PRN Cough  oxyCODONE    5 mG/acetaminophen 325 mG 1 Tablet(s) Oral every 6 hours PRN Severe Pain (7 - 10)      RADIOLOGY & ADDITIONAL STUDIES:

## 2019-04-09 NOTE — PROGRESS NOTE ADULT - REASON FOR ADMISSION
COPD exacerbation

## 2019-04-23 DIAGNOSIS — F32.9 MAJOR DEPRESSIVE DISORDER, SINGLE EPISODE, UNSPECIFIED: ICD-10-CM

## 2019-04-23 DIAGNOSIS — G47.33 OBSTRUCTIVE SLEEP APNEA (ADULT) (PEDIATRIC): ICD-10-CM

## 2019-04-23 DIAGNOSIS — E87.1 HYPO-OSMOLALITY AND HYPONATREMIA: ICD-10-CM

## 2019-04-23 DIAGNOSIS — F41.9 ANXIETY DISORDER, UNSPECIFIED: ICD-10-CM

## 2019-04-23 DIAGNOSIS — J44.1 CHRONIC OBSTRUCTIVE PULMONARY DISEASE WITH (ACUTE) EXACERBATION: ICD-10-CM

## 2019-04-23 DIAGNOSIS — F17.210 NICOTINE DEPENDENCE, CIGARETTES, UNCOMPLICATED: ICD-10-CM

## 2019-04-23 DIAGNOSIS — E87.5 HYPERKALEMIA: ICD-10-CM

## 2019-04-23 DIAGNOSIS — Z91.19 PATIENT'S NONCOMPLIANCE WITH OTHER MEDICAL TREATMENT AND REGIMEN: ICD-10-CM

## 2019-04-23 DIAGNOSIS — D72.829 ELEVATED WHITE BLOOD CELL COUNT, UNSPECIFIED: ICD-10-CM

## 2019-04-23 DIAGNOSIS — G89.29 OTHER CHRONIC PAIN: ICD-10-CM

## 2019-04-23 DIAGNOSIS — M54.2 CERVICALGIA: ICD-10-CM

## 2019-04-23 DIAGNOSIS — T38.0X5A ADVERSE EFFECT OF GLUCOCORTICOIDS AND SYNTHETIC ANALOGUES, INITIAL ENCOUNTER: ICD-10-CM

## 2019-05-01 RX ADMIN — OXYCODONE HYDROCHLORIDE 10 MILLIGRAM(S): 5 TABLET ORAL at 22:29

## 2019-05-24 ENCOUNTER — INPATIENT (INPATIENT)
Facility: HOSPITAL | Age: 69
LOS: 16 days | Discharge: HOME HEALTH | End: 2019-06-10
Attending: INTERNAL MEDICINE | Admitting: INTERNAL MEDICINE
Payer: MEDICARE

## 2019-05-24 VITALS
TEMPERATURE: 98 F | OXYGEN SATURATION: 97 % | RESPIRATION RATE: 24 BRPM | SYSTOLIC BLOOD PRESSURE: 124 MMHG | HEART RATE: 112 BPM | WEIGHT: 160.06 LBS | DIASTOLIC BLOOD PRESSURE: 60 MMHG

## 2019-05-24 DIAGNOSIS — R22.1 LOCALIZED SWELLING, MASS AND LUMP, NECK: Chronic | ICD-10-CM

## 2019-05-24 LAB
ALBUMIN SERPL ELPH-MCNC: 4.5 G/DL — SIGNIFICANT CHANGE UP (ref 3.5–5.2)
ALP SERPL-CCNC: 95 U/L — SIGNIFICANT CHANGE UP (ref 30–115)
ALT FLD-CCNC: 11 U/L — SIGNIFICANT CHANGE UP (ref 0–41)
ANION GAP SERPL CALC-SCNC: 15 MMOL/L — HIGH (ref 7–14)
AST SERPL-CCNC: 12 U/L — SIGNIFICANT CHANGE UP (ref 0–41)
BASOPHILS # BLD AUTO: 0.04 K/UL — SIGNIFICANT CHANGE UP (ref 0–0.2)
BASOPHILS NFR BLD AUTO: 0.3 % — SIGNIFICANT CHANGE UP (ref 0–1)
BILIRUB SERPL-MCNC: 0.3 MG/DL — SIGNIFICANT CHANGE UP (ref 0.2–1.2)
BUN SERPL-MCNC: 6 MG/DL — LOW (ref 10–20)
CALCIUM SERPL-MCNC: 9.5 MG/DL — SIGNIFICANT CHANGE UP (ref 8.5–10.1)
CHLORIDE SERPL-SCNC: 97 MMOL/L — LOW (ref 98–110)
CO2 SERPL-SCNC: 25 MMOL/L — SIGNIFICANT CHANGE UP (ref 17–32)
CREAT SERPL-MCNC: 0.6 MG/DL — LOW (ref 0.7–1.5)
EOSINOPHIL # BLD AUTO: 0 K/UL — SIGNIFICANT CHANGE UP (ref 0–0.7)
EOSINOPHIL NFR BLD AUTO: 0 % — SIGNIFICANT CHANGE UP (ref 0–8)
GLUCOSE SERPL-MCNC: 164 MG/DL — HIGH (ref 70–99)
HCT VFR BLD CALC: 45.3 % — SIGNIFICANT CHANGE UP (ref 37–47)
HGB BLD-MCNC: 15.5 G/DL — SIGNIFICANT CHANGE UP (ref 12–16)
IMM GRANULOCYTES NFR BLD AUTO: 1.8 % — HIGH (ref 0.1–0.3)
LYMPHOCYTES # BLD AUTO: 1.71 K/UL — SIGNIFICANT CHANGE UP (ref 1.2–3.4)
LYMPHOCYTES # BLD AUTO: 11 % — LOW (ref 20.5–51.1)
MCHC RBC-ENTMCNC: 34 PG — HIGH (ref 27–31)
MCHC RBC-ENTMCNC: 34.2 G/DL — SIGNIFICANT CHANGE UP (ref 32–37)
MCV RBC AUTO: 99.3 FL — HIGH (ref 81–99)
MONOCYTES # BLD AUTO: 0.8 K/UL — HIGH (ref 0.1–0.6)
MONOCYTES NFR BLD AUTO: 5.1 % — SIGNIFICANT CHANGE UP (ref 1.7–9.3)
NEUTROPHILS # BLD AUTO: 12.77 K/UL — HIGH (ref 1.4–6.5)
NEUTROPHILS NFR BLD AUTO: 81.8 % — HIGH (ref 42.2–75.2)
NRBC # BLD: 0 /100 WBCS — SIGNIFICANT CHANGE UP (ref 0–0)
PLATELET # BLD AUTO: 283 K/UL — SIGNIFICANT CHANGE UP (ref 130–400)
POTASSIUM SERPL-MCNC: 4.4 MMOL/L — SIGNIFICANT CHANGE UP (ref 3.5–5)
POTASSIUM SERPL-SCNC: 4.4 MMOL/L — SIGNIFICANT CHANGE UP (ref 3.5–5)
PROT SERPL-MCNC: 6.8 G/DL — SIGNIFICANT CHANGE UP (ref 6–8)
RBC # BLD: 4.56 M/UL — SIGNIFICANT CHANGE UP (ref 4.2–5.4)
RBC # FLD: 13.1 % — SIGNIFICANT CHANGE UP (ref 11.5–14.5)
SODIUM SERPL-SCNC: 137 MMOL/L — SIGNIFICANT CHANGE UP (ref 135–146)
TROPONIN T SERPL-MCNC: <0.01 NG/ML — SIGNIFICANT CHANGE UP
WBC # BLD: 15.6 K/UL — HIGH (ref 4.8–10.8)
WBC # FLD AUTO: 15.6 K/UL — HIGH (ref 4.8–10.8)

## 2019-05-24 PROCEDURE — 71045 X-RAY EXAM CHEST 1 VIEW: CPT | Mod: 26

## 2019-05-24 PROCEDURE — 93010 ELECTROCARDIOGRAM REPORT: CPT

## 2019-05-24 PROCEDURE — 99285 EMERGENCY DEPT VISIT HI MDM: CPT

## 2019-05-24 RX ORDER — MAGNESIUM SULFATE 500 MG/ML
1 VIAL (ML) INJECTION ONCE
Refills: 0 | Status: COMPLETED | OUTPATIENT
Start: 2019-05-24 | End: 2019-05-24

## 2019-05-24 RX ORDER — ALPRAZOLAM 0.25 MG
0.5 TABLET ORAL EVERY 8 HOURS
Refills: 0 | Status: DISCONTINUED | OUTPATIENT
Start: 2019-05-24 | End: 2019-05-31

## 2019-05-24 RX ORDER — IPRATROPIUM/ALBUTEROL SULFATE 18-103MCG
3 AEROSOL WITH ADAPTER (GRAM) INHALATION
Refills: 0 | Status: COMPLETED | OUTPATIENT
Start: 2019-05-24 | End: 2019-05-24

## 2019-05-24 RX ADMIN — Medication 3 MILLILITER(S): at 21:29

## 2019-05-24 RX ADMIN — Medication 125 MILLIGRAM(S): at 21:29

## 2019-05-24 RX ADMIN — Medication 3 MILLILITER(S): at 21:30

## 2019-05-24 RX ADMIN — Medication 0.5 MILLIGRAM(S): at 23:54

## 2019-05-24 NOTE — ED PROVIDER NOTE - CLINICAL SUMMARY MEDICAL DECISION MAKING FREE TEXT BOX
pt with copd, given nebs, steorids, slight improvement. cxr ?rll infil. abx given. labs done. admitted to hosp for further mgmt.
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
## 2019-05-24 NOTE — H&P ADULT - HISTORY OF PRESENT ILLNESS
68 y/o female h/o COPD not on home O2, never been intubated p/w SOB for 3 weeks, worsening for the last week. Tried combivent, albuterol inhaler at home and didn't help. Takes prednisone 60mg po daily. Pt smokes 1/2ppd daily. Admits to cough, increased sputum production. Denies fever, chills, vomiting, diarrhea, CP. 68 y/o female h/o COPD not on home O2, never been intubated presented with SOB for 3 weeks, worsening for the last week. Tried combivent, albuterol inhaler at home and didn't help. Takes prednisone 60mg po daily.Admits to cough, increased sputum production. Denies fever, chills, vomiting, diarrhea, CP.   Currently smokes 1/2 PPD . Pt was recently hospitalized in April for COPD exacerbation.    Please Confirm med rec with pharmacy in morning. This med rec is per pt.

## 2019-05-24 NOTE — ED ADULT NURSE NOTE - OBJECTIVE STATEMENT
Patient presents to ED with worsening SOB, PMH of COPD. Patient tachypneic, using accessory muscles., auditory grunting and expiratory wheezing. Patient given  continuous duoneb upon arrival, with no improvement after 3 doses of duoneb, patient refusing bipap at this time.

## 2019-05-24 NOTE — ED PROVIDER NOTE - PHYSICAL EXAMINATION
Constitutional: Well developed, well nourished. NAD. Good general hygiene  Head: Atraumatic.  Eyes: PERRLA. EOMI without discomfort.   ENT: No nasal discharge. Mucous membranes moist.  Neck: Supple. Painless ROM.  Cardiovascular: Regular rhythm. Regular rate. Normal S1 and S2. No murmurs. 2+ pulses in all extremities.   Pulmonary: Tachypneic. Diffuse wheezing. No rales, rhonchi.   Abdominal: Soft. Nondistended. Nontender. No rebound or guarding.   Extremities. Pelvis stable. No lower extremity edema. Symmetric calves.  Skin: No rashes.   Neuro: AAOx3. No focal neurological deficits.  Psych: Normal mood. Normal affect.

## 2019-05-24 NOTE — ED PROVIDER NOTE - OBJECTIVE STATEMENT
68 y/o female h/o COPD not on home O2, never been intubated p/w SOB for 3 weeks, worsening for the last week. Tried combivent, albuterol inhaler at home and didn't help. Takes prednisone 60mg po daily. Pt smokes 1/2ppd daily. Admits to cough, increased sputum production. Denies fever, chills, vomiting, diarrhea, CP.

## 2019-05-24 NOTE — H&P ADULT - NSHPLABSRESULTS_GEN_ALL_CORE
15.5   15.60 )-----------( 283      ( 24 May 2019 21:22 )             45.3       05-24    137  |  97<L>  |  6<L>  ----------------------------<  164<H>  4.4   |  25  |  0.6<L>    Ca    9.5      24 May 2019 21:22    TPro  6.8  /  Alb  4.5  /  TBili  0.3  /  DBili  x   /  AST  12  /  ALT  11  /  AlkPhos  95  05-24
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
## 2019-05-24 NOTE — ED PROVIDER NOTE - NS ED ROS FT
Constitutional: No fever, chills.   Eyes:  No visual changes, eye pain or discharge.  ENMT:  No hearing changes, pain, no sore throat or runny nose, no difficulty swallowing  Cardiac:  No chest pain, or edema. No chest pain with exertion.  Respiratory:  SOB, wheezing, cough.  GI:  No nausea, vomiting, diarrhea or abdominal pain.  :  No dysuria, frequency or burning.  MS:  No myalgia, muscle weakness, joint pain or back pain.  Neuro:  No headache or weakness.  No LOC.  Skin:  No skin rash.   Endocrine: No history of thyroid disease or diabetes.

## 2019-05-24 NOTE — ED PROVIDER NOTE - ATTENDING CONTRIBUTION TO CARE
70 yo f hx of copd, active heavy smoker, c/o sob, feels like her typical copd exac. no cp fever, chills. no prod cough. no leg pain or swelling. pt in mild dist b/l wheezing, good air movement. no accessory use. +tachypnea. tachy, regular (getting nebs), ab soft, nt. no edema. non foacl. will give steroids, nebs, cxr, labs.

## 2019-05-24 NOTE — H&P ADULT - NSHPPHYSICALEXAM_GEN_ALL_CORE
PHYSICAL EXAM:  GENERAL: coughing and SOB  HEAD:  Atraumatic, Normocephalic  EYES: EOMI, PERRLA, conjunctiva and sclera clear  NECK: Supple, No JVD  CHEST/LUNG: Decreased air entry and b/l Wheezing  HEART: Regular rate and rhythm;   ABDOMEN: Soft, Nontender, Nondistended; Bowel sounds present  EXTREMITIES:  B/L 1+ pitting edema  PSYCH: AAOx3  NEUROLOGY: non-focal

## 2019-05-25 LAB
ANION GAP SERPL CALC-SCNC: 13 MMOL/L — SIGNIFICANT CHANGE UP (ref 7–14)
BUN SERPL-MCNC: 6 MG/DL — LOW (ref 10–20)
CALCIUM SERPL-MCNC: 9.3 MG/DL — SIGNIFICANT CHANGE UP (ref 8.5–10.1)
CHLORIDE SERPL-SCNC: 94 MMOL/L — LOW (ref 98–110)
CO2 SERPL-SCNC: 24 MMOL/L — SIGNIFICANT CHANGE UP (ref 17–32)
CREAT SERPL-MCNC: 0.5 MG/DL — LOW (ref 0.7–1.5)
GLUCOSE SERPL-MCNC: 164 MG/DL — HIGH (ref 70–99)
HCT VFR BLD CALC: 43.4 % — SIGNIFICANT CHANGE UP (ref 37–47)
HCV AB S/CO SERPL IA: 0.06 S/CO — SIGNIFICANT CHANGE UP (ref 0–0.99)
HCV AB SERPL-IMP: SIGNIFICANT CHANGE UP
HGB BLD-MCNC: 14.2 G/DL — SIGNIFICANT CHANGE UP (ref 12–16)
MCHC RBC-ENTMCNC: 32.7 G/DL — SIGNIFICANT CHANGE UP (ref 32–37)
MCHC RBC-ENTMCNC: 32.9 PG — HIGH (ref 27–31)
MCV RBC AUTO: 100.7 FL — HIGH (ref 81–99)
NRBC # BLD: 0 /100 WBCS — SIGNIFICANT CHANGE UP (ref 0–0)
PLATELET # BLD AUTO: 260 K/UL — SIGNIFICANT CHANGE UP (ref 130–400)
POTASSIUM SERPL-MCNC: 4.9 MMOL/L — SIGNIFICANT CHANGE UP (ref 3.5–5)
POTASSIUM SERPL-SCNC: 4.9 MMOL/L — SIGNIFICANT CHANGE UP (ref 3.5–5)
RBC # BLD: 4.31 M/UL — SIGNIFICANT CHANGE UP (ref 4.2–5.4)
RBC # FLD: 13 % — SIGNIFICANT CHANGE UP (ref 11.5–14.5)
SODIUM SERPL-SCNC: 131 MMOL/L — LOW (ref 135–146)
WBC # BLD: 17.89 K/UL — HIGH (ref 4.8–10.8)
WBC # FLD AUTO: 17.89 K/UL — HIGH (ref 4.8–10.8)

## 2019-05-25 PROCEDURE — 93970 EXTREMITY STUDY: CPT | Mod: 26

## 2019-05-25 RX ORDER — NYSTATIN CREAM 100000 [USP'U]/G
1 CREAM TOPICAL EVERY 12 HOURS
Refills: 0 | Status: DISCONTINUED | OUTPATIENT
Start: 2019-05-25 | End: 2019-06-10

## 2019-05-25 RX ORDER — OXYCODONE AND ACETAMINOPHEN 5; 325 MG/1; MG/1
1 TABLET ORAL EVERY 6 HOURS
Refills: 0 | Status: DISCONTINUED | OUTPATIENT
Start: 2019-05-25 | End: 2019-05-25

## 2019-05-25 RX ORDER — PANTOPRAZOLE SODIUM 20 MG/1
40 TABLET, DELAYED RELEASE ORAL
Refills: 0 | Status: DISCONTINUED | OUTPATIENT
Start: 2019-05-25 | End: 2019-06-10

## 2019-05-25 RX ORDER — IPRATROPIUM/ALBUTEROL SULFATE 18-103MCG
3 AEROSOL WITH ADAPTER (GRAM) INHALATION EVERY 6 HOURS
Refills: 0 | Status: DISCONTINUED | OUTPATIENT
Start: 2019-05-25 | End: 2019-05-29

## 2019-05-25 RX ORDER — OXYCODONE HYDROCHLORIDE 5 MG/1
10 TABLET ORAL EVERY 8 HOURS
Refills: 0 | Status: DISCONTINUED | OUTPATIENT
Start: 2019-05-25 | End: 2019-05-31

## 2019-05-25 RX ORDER — NICOTINE POLACRILEX 2 MG
1 GUM BUCCAL DAILY
Refills: 0 | Status: DISCONTINUED | OUTPATIENT
Start: 2019-05-25 | End: 2019-06-10

## 2019-05-25 RX ORDER — HYDROCODONE BITARTRATE 50 MG/1
1 CAPSULE, EXTENDED RELEASE ORAL
Qty: 0 | Refills: 0 | DISCHARGE

## 2019-05-25 RX ORDER — OXYCODONE HYDROCHLORIDE 5 MG/1
5 TABLET ORAL ONCE
Refills: 0 | Status: DISCONTINUED | OUTPATIENT
Start: 2019-05-25 | End: 2019-05-25

## 2019-05-25 RX ORDER — HEPARIN SODIUM 5000 [USP'U]/ML
5000 INJECTION INTRAVENOUS; SUBCUTANEOUS EVERY 8 HOURS
Refills: 0 | Status: DISCONTINUED | OUTPATIENT
Start: 2019-05-25 | End: 2019-06-10

## 2019-05-25 RX ORDER — HYDRALAZINE HCL 50 MG
25 TABLET ORAL ONCE
Refills: 0 | Status: COMPLETED | OUTPATIENT
Start: 2019-05-25 | End: 2019-05-25

## 2019-05-25 RX ORDER — BUDESONIDE AND FORMOTEROL FUMARATE DIHYDRATE 160; 4.5 UG/1; UG/1
2 AEROSOL RESPIRATORY (INHALATION)
Refills: 0 | Status: DISCONTINUED | OUTPATIENT
Start: 2019-05-25 | End: 2019-06-10

## 2019-05-25 RX ORDER — DOCUSATE SODIUM 100 MG
100 CAPSULE ORAL THREE TIMES A DAY
Refills: 0 | Status: DISCONTINUED | OUTPATIENT
Start: 2019-05-25 | End: 2019-06-10

## 2019-05-25 RX ORDER — ALBUTEROL 90 UG/1
2 AEROSOL, METERED ORAL EVERY 6 HOURS
Refills: 0 | Status: DISCONTINUED | OUTPATIENT
Start: 2019-05-25 | End: 2019-06-10

## 2019-05-25 RX ORDER — SENNA PLUS 8.6 MG/1
2 TABLET ORAL AT BEDTIME
Refills: 0 | Status: DISCONTINUED | OUTPATIENT
Start: 2019-05-25 | End: 2019-06-10

## 2019-05-25 RX ADMIN — Medication 1 PATCH: at 11:19

## 2019-05-25 RX ADMIN — Medication 80 MILLIGRAM(S): at 05:11

## 2019-05-25 RX ADMIN — OXYCODONE HYDROCHLORIDE 5 MILLIGRAM(S): 5 TABLET ORAL at 11:19

## 2019-05-25 RX ADMIN — BUDESONIDE AND FORMOTEROL FUMARATE DIHYDRATE 2 PUFF(S): 160; 4.5 AEROSOL RESPIRATORY (INHALATION) at 11:19

## 2019-05-25 RX ADMIN — PANTOPRAZOLE SODIUM 40 MILLIGRAM(S): 20 TABLET, DELAYED RELEASE ORAL at 05:12

## 2019-05-25 RX ADMIN — Medication 0.5 MILLIGRAM(S): at 21:03

## 2019-05-25 RX ADMIN — OXYCODONE AND ACETAMINOPHEN 1 TABLET(S): 5; 325 TABLET ORAL at 03:48

## 2019-05-25 RX ADMIN — Medication 100 GRAM(S): at 00:57

## 2019-05-25 RX ADMIN — OXYCODONE AND ACETAMINOPHEN 1 TABLET(S): 5; 325 TABLET ORAL at 03:18

## 2019-05-25 RX ADMIN — Medication 80 MILLIGRAM(S): at 21:04

## 2019-05-25 RX ADMIN — NYSTATIN CREAM 1 APPLICATION(S): 100000 CREAM TOPICAL at 22:44

## 2019-05-25 RX ADMIN — Medication 80 MILLIGRAM(S): at 13:56

## 2019-05-25 RX ADMIN — Medication 3 MILLILITER(S): at 08:10

## 2019-05-25 RX ADMIN — OXYCODONE AND ACETAMINOPHEN 1 TABLET(S): 5; 325 TABLET ORAL at 10:32

## 2019-05-25 RX ADMIN — Medication 3 MILLILITER(S): at 14:08

## 2019-05-25 RX ADMIN — BUDESONIDE AND FORMOTEROL FUMARATE DIHYDRATE 2 PUFF(S): 160; 4.5 AEROSOL RESPIRATORY (INHALATION) at 21:07

## 2019-05-25 RX ADMIN — Medication 25 MILLIGRAM(S): at 22:43

## 2019-05-25 RX ADMIN — Medication 3 MILLILITER(S): at 19:56

## 2019-05-25 RX ADMIN — Medication 100 MILLIGRAM(S): at 21:04

## 2019-05-25 RX ADMIN — Medication 1 PATCH: at 19:45

## 2019-05-25 RX ADMIN — OXYCODONE HYDROCHLORIDE 10 MILLIGRAM(S): 5 TABLET ORAL at 18:40

## 2019-05-25 RX ADMIN — Medication 0.5 MILLIGRAM(S): at 13:55

## 2019-05-25 RX ADMIN — OXYCODONE AND ACETAMINOPHEN 1 TABLET(S): 5; 325 TABLET ORAL at 11:15

## 2019-05-25 RX ADMIN — Medication 0.5 MILLIGRAM(S): at 05:12

## 2019-05-25 RX ADMIN — SENNA PLUS 2 TABLET(S): 8.6 TABLET ORAL at 21:04

## 2019-05-25 RX ADMIN — OXYCODONE HYDROCHLORIDE 5 MILLIGRAM(S): 5 TABLET ORAL at 11:51

## 2019-05-25 NOTE — CHART NOTE - NSCHARTNOTEFT_GEN_A_CORE
Offered Pt BIPAP for respiratory distress  , pt refused it along with refused for ABG's. Will continue to monitor.

## 2019-05-26 PROCEDURE — 71045 X-RAY EXAM CHEST 1 VIEW: CPT | Mod: 26

## 2019-05-26 RX ORDER — CHLORHEXIDINE GLUCONATE 213 G/1000ML
1 SOLUTION TOPICAL
Refills: 0 | Status: COMPLETED | OUTPATIENT
Start: 2019-05-26 | End: 2019-05-29

## 2019-05-26 RX ORDER — AMLODIPINE BESYLATE 2.5 MG/1
10 TABLET ORAL
Refills: 0 | Status: DISCONTINUED | OUTPATIENT
Start: 2019-05-26 | End: 2019-06-10

## 2019-05-26 RX ORDER — HYDRALAZINE HCL 50 MG
10 TABLET ORAL ONCE
Refills: 0 | Status: COMPLETED | OUTPATIENT
Start: 2019-05-26 | End: 2019-05-26

## 2019-05-26 RX ORDER — NYSTATIN 500MM UNIT
500000 POWDER (EA) MISCELLANEOUS
Refills: 0 | Status: DISCONTINUED | OUTPATIENT
Start: 2019-05-26 | End: 2019-06-10

## 2019-05-26 RX ADMIN — Medication 3 MILLILITER(S): at 14:00

## 2019-05-26 RX ADMIN — Medication 10 MILLIGRAM(S): at 13:39

## 2019-05-26 RX ADMIN — Medication 100 MILLIGRAM(S): at 13:48

## 2019-05-26 RX ADMIN — Medication 1 PATCH: at 20:09

## 2019-05-26 RX ADMIN — PANTOPRAZOLE SODIUM 40 MILLIGRAM(S): 20 TABLET, DELAYED RELEASE ORAL at 05:18

## 2019-05-26 RX ADMIN — OXYCODONE HYDROCHLORIDE 10 MILLIGRAM(S): 5 TABLET ORAL at 22:37

## 2019-05-26 RX ADMIN — Medication 0.5 MILLIGRAM(S): at 13:47

## 2019-05-26 RX ADMIN — Medication 0.5 MILLIGRAM(S): at 21:21

## 2019-05-26 RX ADMIN — Medication 3 MILLILITER(S): at 20:06

## 2019-05-26 RX ADMIN — Medication 1 PATCH: at 07:00

## 2019-05-26 RX ADMIN — OXYCODONE HYDROCHLORIDE 10 MILLIGRAM(S): 5 TABLET ORAL at 13:58

## 2019-05-26 RX ADMIN — Medication 80 MILLIGRAM(S): at 05:18

## 2019-05-26 RX ADMIN — OXYCODONE HYDROCHLORIDE 10 MILLIGRAM(S): 5 TABLET ORAL at 22:07

## 2019-05-26 RX ADMIN — Medication 80 MILLIGRAM(S): at 13:48

## 2019-05-26 RX ADMIN — Medication 1 PATCH: at 11:43

## 2019-05-26 RX ADMIN — CHLORHEXIDINE GLUCONATE 1 APPLICATION(S): 213 SOLUTION TOPICAL at 05:17

## 2019-05-26 RX ADMIN — NYSTATIN CREAM 1 APPLICATION(S): 100000 CREAM TOPICAL at 05:18

## 2019-05-26 RX ADMIN — Medication 0.5 MILLIGRAM(S): at 05:17

## 2019-05-26 RX ADMIN — Medication 3 MILLILITER(S): at 08:02

## 2019-05-26 RX ADMIN — Medication 100 MILLIGRAM(S): at 21:21

## 2019-05-26 RX ADMIN — OXYCODONE HYDROCHLORIDE 10 MILLIGRAM(S): 5 TABLET ORAL at 05:54

## 2019-05-26 RX ADMIN — Medication 500000 UNIT(S): at 23:42

## 2019-05-26 RX ADMIN — Medication 1 PATCH: at 11:44

## 2019-05-26 RX ADMIN — BUDESONIDE AND FORMOTEROL FUMARATE DIHYDRATE 2 PUFF(S): 160; 4.5 AEROSOL RESPIRATORY (INHALATION) at 21:25

## 2019-05-26 RX ADMIN — AMLODIPINE BESYLATE 10 MILLIGRAM(S): 2.5 TABLET ORAL at 18:08

## 2019-05-26 RX ADMIN — NYSTATIN CREAM 1 APPLICATION(S): 100000 CREAM TOPICAL at 18:06

## 2019-05-26 RX ADMIN — Medication 80 MILLIGRAM(S): at 21:21

## 2019-05-26 RX ADMIN — SENNA PLUS 2 TABLET(S): 8.6 TABLET ORAL at 21:23

## 2019-05-26 RX ADMIN — BUDESONIDE AND FORMOTEROL FUMARATE DIHYDRATE 2 PUFF(S): 160; 4.5 AEROSOL RESPIRATORY (INHALATION) at 11:42

## 2019-05-26 RX ADMIN — Medication 100 MILLIGRAM(S): at 05:18

## 2019-05-26 RX ADMIN — OXYCODONE HYDROCHLORIDE 10 MILLIGRAM(S): 5 TABLET ORAL at 05:24

## 2019-05-26 NOTE — PROGRESS NOTE ADULT - SUBJECTIVE AND OBJECTIVE BOX
S:  No new events/complaints  breathing gradually improving    All other pertinent ROS negative.      Vital Signs Last 24 Hrs  T(C): 36.1 (26 May 2019 14:52), Max: 36.4 (26 May 2019 04:42)  T(F): 96.9 (26 May 2019 14:52), Max: 97.6 (26 May 2019 04:42)  HR: 88 (26 May 2019 14:52) (78 - 95)  BP: 171/77 (26 May 2019 14:52) (140/67 - 186/81)  BP(mean): --  RR: 22 (26 May 2019 14:52) (18 - 22)  SpO2: 95% (25 May 2019 19:52) (95% - 95%)  PHYSICAL EXAM:    Constitutional: NAD, awake and alert, well-developed  HEENT: PERR, EOMI, Normal Hearing, MMM  Neck: Soft and supple, No LAD, No JVD  Respiratory: diffuse b/l wheeze - better AEBE  Cardiovascular: S1 and S2, regular rate and rhythm, no Murmurs, gallops or rubs  Gastrointestinal: Bowel Sounds present, soft, nontender, nondistended, no guarding, no rebound  Extremities: No peripheral edema  Vascular: 2+ peripheral pulses      MEDICATIONS:  MEDICATIONS  (STANDING):  ALBUTerol/ipratropium for Nebulization 3 milliLiter(s) Nebulizer every 6 hours  ALPRAZolam 0.5 milliGRAM(s) Oral every 8 hours  amLODIPine   Tablet 10 milliGRAM(s) Oral <User Schedule>  buDESOnide  80 MICROgram(s)/formoterol 4.5 MICROgram(s) Inhaler 2 Puff(s) Inhalation two times a day  chlorhexidine 4% Liquid 1 Application(s) Topical <User Schedule>  docusate sodium 100 milliGRAM(s) Oral three times a day  heparin  Injectable 5000 Unit(s) SubCutaneous every 8 hours  levoFLOXacin IVPB 750 milliGRAM(s) IV Intermittent every 24 hours  methylPREDNISolone sodium succinate Injectable 80 milliGRAM(s) IV Push every 8 hours  nicotine -  14 mG/24Hr(s) Patch 1 patch Transdermal daily  nystatin Powder 1 Application(s) Topical every 12 hours  pantoprazole    Tablet 40 milliGRAM(s) Oral before breakfast  senna 2 Tablet(s) Oral at bedtime      LABS: All Labs Reviewed:                        14.2   17.89 )-----------( 260      ( 25 May 2019 08:14 )             43.4     05-25    131<L>  |  94<L>  |  6<L>  ----------------------------<  164<H>  4.9   |  24  |  0.5<L>    Ca    9.3      25 May 2019 08:14    TPro  6.8  /  Alb  4.5  /  TBili  0.3  /  DBili  x   /  AST  12  /  ALT  11  /  AlkPhos  95  05-24      CARDIAC MARKERS ( 24 May 2019 21:22 )  x     / <0.01 ng/mL / x     / x     / x          Blood Culture:     Radiology: reviewed
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
## 2019-05-27 LAB
ANION GAP SERPL CALC-SCNC: 11 MMOL/L — SIGNIFICANT CHANGE UP (ref 7–14)
BUN SERPL-MCNC: 10 MG/DL — SIGNIFICANT CHANGE UP (ref 10–20)
CALCIUM SERPL-MCNC: 10 MG/DL — SIGNIFICANT CHANGE UP (ref 8.5–10.1)
CHLORIDE SERPL-SCNC: 87 MMOL/L — LOW (ref 98–110)
CO2 SERPL-SCNC: 33 MMOL/L — HIGH (ref 17–32)
CREAT SERPL-MCNC: 0.6 MG/DL — LOW (ref 0.7–1.5)
GLUCOSE SERPL-MCNC: 125 MG/DL — HIGH (ref 70–99)
POTASSIUM SERPL-MCNC: 6.1 MMOL/L — CRITICAL HIGH (ref 3.5–5)
POTASSIUM SERPL-SCNC: 6.1 MMOL/L — CRITICAL HIGH (ref 3.5–5)
SODIUM SERPL-SCNC: 131 MMOL/L — LOW (ref 135–146)

## 2019-05-27 PROCEDURE — 93010 ELECTROCARDIOGRAM REPORT: CPT

## 2019-05-27 RX ORDER — SODIUM POLYSTYRENE SULFONATE 4.1 MEQ/G
30 POWDER, FOR SUSPENSION ORAL ONCE
Refills: 0 | Status: COMPLETED | OUTPATIENT
Start: 2019-05-27 | End: 2019-05-27

## 2019-05-27 RX ADMIN — OXYCODONE HYDROCHLORIDE 10 MILLIGRAM(S): 5 TABLET ORAL at 23:23

## 2019-05-27 RX ADMIN — Medication 0.5 MILLIGRAM(S): at 05:21

## 2019-05-27 RX ADMIN — Medication 80 MILLIGRAM(S): at 21:32

## 2019-05-27 RX ADMIN — NYSTATIN CREAM 1 APPLICATION(S): 100000 CREAM TOPICAL at 05:22

## 2019-05-27 RX ADMIN — PANTOPRAZOLE SODIUM 40 MILLIGRAM(S): 20 TABLET, DELAYED RELEASE ORAL at 05:21

## 2019-05-27 RX ADMIN — Medication 0.5 MILLIGRAM(S): at 21:32

## 2019-05-27 RX ADMIN — SENNA PLUS 2 TABLET(S): 8.6 TABLET ORAL at 21:34

## 2019-05-27 RX ADMIN — Medication 100 MILLIGRAM(S): at 13:49

## 2019-05-27 RX ADMIN — Medication 1 PATCH: at 07:42

## 2019-05-27 RX ADMIN — CHLORHEXIDINE GLUCONATE 1 APPLICATION(S): 213 SOLUTION TOPICAL at 05:21

## 2019-05-27 RX ADMIN — BUDESONIDE AND FORMOTEROL FUMARATE DIHYDRATE 2 PUFF(S): 160; 4.5 AEROSOL RESPIRATORY (INHALATION) at 07:44

## 2019-05-27 RX ADMIN — Medication 100 MILLIGRAM(S): at 05:21

## 2019-05-27 RX ADMIN — OXYCODONE HYDROCHLORIDE 10 MILLIGRAM(S): 5 TABLET ORAL at 06:09

## 2019-05-27 RX ADMIN — Medication 500000 UNIT(S): at 11:18

## 2019-05-27 RX ADMIN — Medication 1 PATCH: at 11:19

## 2019-05-27 RX ADMIN — NYSTATIN CREAM 1 APPLICATION(S): 100000 CREAM TOPICAL at 17:27

## 2019-05-27 RX ADMIN — Medication 0.5 MILLIGRAM(S): at 13:47

## 2019-05-27 RX ADMIN — Medication 500000 UNIT(S): at 23:05

## 2019-05-27 RX ADMIN — OXYCODONE HYDROCHLORIDE 10 MILLIGRAM(S): 5 TABLET ORAL at 06:39

## 2019-05-27 RX ADMIN — OXYCODONE HYDROCHLORIDE 10 MILLIGRAM(S): 5 TABLET ORAL at 15:23

## 2019-05-27 RX ADMIN — Medication 80 MILLIGRAM(S): at 13:49

## 2019-05-27 RX ADMIN — BUDESONIDE AND FORMOTEROL FUMARATE DIHYDRATE 2 PUFF(S): 160; 4.5 AEROSOL RESPIRATORY (INHALATION) at 19:38

## 2019-05-27 RX ADMIN — Medication 3 MILLILITER(S): at 13:41

## 2019-05-27 RX ADMIN — OXYCODONE HYDROCHLORIDE 10 MILLIGRAM(S): 5 TABLET ORAL at 22:53

## 2019-05-27 RX ADMIN — HEPARIN SODIUM 5000 UNIT(S): 5000 INJECTION INTRAVENOUS; SUBCUTANEOUS at 13:48

## 2019-05-27 RX ADMIN — Medication 500000 UNIT(S): at 17:30

## 2019-05-27 RX ADMIN — AMLODIPINE BESYLATE 10 MILLIGRAM(S): 2.5 TABLET ORAL at 17:29

## 2019-05-27 RX ADMIN — Medication 100 MILLIGRAM(S): at 21:33

## 2019-05-27 RX ADMIN — OXYCODONE HYDROCHLORIDE 10 MILLIGRAM(S): 5 TABLET ORAL at 14:53

## 2019-05-27 RX ADMIN — Medication 500000 UNIT(S): at 05:22

## 2019-05-27 RX ADMIN — Medication 80 MILLIGRAM(S): at 07:44

## 2019-05-27 RX ADMIN — Medication 1 PATCH: at 19:29

## 2019-05-27 RX ADMIN — Medication 3 MILLILITER(S): at 07:58
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
## 2019-05-27 NOTE — PROGRESS NOTE ADULT - ASSESSMENT
#) Acute on chronic COPD exacerbation   - Flu A/B, RSV negative  - NC O2 to maintain sats of 88 -92 % - patient refused BiPAP  - c/w Duonebs q6hrs and PRN and symbicort BID  - Solumedrol 80 mg IV q 8   - X-ray showing atelectasis and pulmonary vascular congestion  - LE duplex neg  - RVP panel  - Has normal EF    #) Diabetes  - hold oral metformin  - Insulin for FS > 180  - fingersticks with meals and nightly    #) hyponatremia  - sodium 131 yesterday  - fluid restriction    #) cataract   - Cataract Out pt f/u    #) DVT PPX  - Heparin sq    #) GI ppx     Activity Increase as tolerated    Dispo : when medically stable

## 2019-05-27 NOTE — PROGRESS NOTE ADULT - SUBJECTIVE AND OBJECTIVE BOX
SUBJECTIVE:    Patient is a 69y old Female who presents with a chief complaint of SOB (26 May 2019 17:54)    Stable, no acute events.    PAST MEDICAL & SURGICAL HISTORY  Chronic pain  Depression  Anxiety  COPD (chronic obstructive pulmonary disease)  Neck mass       ALLERGIES:  penicillin (Unknown)    MEDICATIONS:  STANDING MEDICATIONS  ALBUTerol/ipratropium for Nebulization 3 milliLiter(s) Nebulizer every 6 hours  ALPRAZolam 0.5 milliGRAM(s) Oral every 8 hours  amLODIPine   Tablet 10 milliGRAM(s) Oral <User Schedule>  buDESOnide  80 MICROgram(s)/formoterol 4.5 MICROgram(s) Inhaler 2 Puff(s) Inhalation two times a day  chlorhexidine 4% Liquid 1 Application(s) Topical <User Schedule>  docusate sodium 100 milliGRAM(s) Oral three times a day  heparin  Injectable 5000 Unit(s) SubCutaneous every 8 hours  levoFLOXacin IVPB 750 milliGRAM(s) IV Intermittent every 24 hours  methylPREDNISolone sodium succinate Injectable 80 milliGRAM(s) IV Push every 8 hours  nicotine -  14 mG/24Hr(s) Patch 1 patch Transdermal daily  nystatin    Suspension 979209 Unit(s) Oral four times a day  nystatin Powder 1 Application(s) Topical every 12 hours  pantoprazole    Tablet 40 milliGRAM(s) Oral before breakfast  senna 2 Tablet(s) Oral at bedtime    PRN MEDICATIONS  ALBUTerol    90 MICROgram(s) HFA Inhaler 2 Puff(s) Inhalation every 6 hours PRN  oxyCODONE    IR 10 milliGRAM(s) Oral every 8 hours PRN    VITALS:   T(F): 96.4  HR: 84  BP: 143/84  RR: 18  SpO2: 97%    LABS:                        14.2   17.89 )-----------( 260      ( 25 May 2019 08:14 )             43.4     05-25    131<L>  |  94<L>  |  6<L>  ----------------------------<  164<H>  4.9   |  24  |  0.5<L>    Ca    9.3      25 May 2019 08:14                            PHYSICAL EXAM:  GEN: NAD, comfortable  LUNGS: CTAB, no w/r/r  HEART: RRR, no m/r/g  ABD: soft, NT/ND, +BS  EXT: no edema, PP b/l  NEURO: AAOx3 SUBJECTIVE:    Patient is a 69y old Female who presents with a chief complaint of SOB (26 May 2019 17:54)    Stable, no acute events. Patient reports her breathing is not back to baseline yet.    PAST MEDICAL & SURGICAL HISTORY  Chronic pain  Depression  Anxiety  COPD (chronic obstructive pulmonary disease)  Neck mass       ALLERGIES:  penicillin (Unknown)    MEDICATIONS:  STANDING MEDICATIONS  ALBUTerol/ipratropium for Nebulization 3 milliLiter(s) Nebulizer every 6 hours  ALPRAZolam 0.5 milliGRAM(s) Oral every 8 hours  amLODIPine   Tablet 10 milliGRAM(s) Oral <User Schedule>  buDESOnide  80 MICROgram(s)/formoterol 4.5 MICROgram(s) Inhaler 2 Puff(s) Inhalation two times a day  chlorhexidine 4% Liquid 1 Application(s) Topical <User Schedule>  docusate sodium 100 milliGRAM(s) Oral three times a day  heparin  Injectable 5000 Unit(s) SubCutaneous every 8 hours  levoFLOXacin IVPB 750 milliGRAM(s) IV Intermittent every 24 hours  methylPREDNISolone sodium succinate Injectable 80 milliGRAM(s) IV Push every 8 hours  nicotine -  14 mG/24Hr(s) Patch 1 patch Transdermal daily  nystatin    Suspension [id number] Unit(s) Oral four times a day  nystatin Powder 1 Application(s) Topical every 12 hours  pantoprazole    Tablet 40 milliGRAM(s) Oral before breakfast  senna 2 Tablet(s) Oral at bedtime    PRN MEDICATIONS  ALBUTerol    90 MICROgram(s) HFA Inhaler 2 Puff(s) Inhalation every 6 hours PRN  oxyCODONE    IR 10 milliGRAM(s) Oral every 8 hours PRN    VITALS:   T(F): 96.4  HR: 84  BP: 143/84  RR: 18  SpO2: 97%    LABS:                        14.2   17.89 )-----------( 260      ( 25 May 2019 08:14 )             43.4     05-25    131<L>  |  94<L>  |  6<L>  ----------------------------<  164<H>  4.9   |  24  |  0.5<L>    Ca    9.3      25 May 2019 08:14                            PHYSICAL EXAM:  GEN: NAD, comfortable  LUNGS: expiratory wheezing  HEART: RRR, no m/r/g  ABD: soft, NT/ND, +BS  EXT: no edema, PP b/l  NEURO: AAOx3

## 2019-05-28 LAB
ANION GAP SERPL CALC-SCNC: 18 MMOL/L — HIGH (ref 7–14)
ANISOCYTOSIS BLD QL: SLIGHT — SIGNIFICANT CHANGE UP
BASOPHILS # BLD AUTO: 0 K/UL — SIGNIFICANT CHANGE UP (ref 0–0.2)
BASOPHILS NFR BLD AUTO: 0 % — SIGNIFICANT CHANGE UP (ref 0–1)
BUN SERPL-MCNC: 11 MG/DL — SIGNIFICANT CHANGE UP (ref 10–20)
CALCIUM SERPL-MCNC: 9 MG/DL — SIGNIFICANT CHANGE UP (ref 8.5–10.1)
CHLORIDE SERPL-SCNC: 89 MMOL/L — LOW (ref 98–110)
CO2 SERPL-SCNC: 25 MMOL/L — SIGNIFICANT CHANGE UP (ref 17–32)
CREAT SERPL-MCNC: 0.6 MG/DL — LOW (ref 0.7–1.5)
EOSINOPHIL # BLD AUTO: 0 K/UL — SIGNIFICANT CHANGE UP (ref 0–0.7)
EOSINOPHIL NFR BLD AUTO: 0 % — SIGNIFICANT CHANGE UP (ref 0–8)
GLUCOSE SERPL-MCNC: 286 MG/DL — HIGH (ref 70–99)
HCT VFR BLD CALC: 44.1 % — SIGNIFICANT CHANGE UP (ref 37–47)
HGB BLD-MCNC: 14.8 G/DL — SIGNIFICANT CHANGE UP (ref 12–16)
LYMPHOCYTES # BLD AUTO: 0.33 K/UL — LOW (ref 1.2–3.4)
LYMPHOCYTES # BLD AUTO: 1.7 % — LOW (ref 20.5–51.1)
MACROCYTES BLD QL: SLIGHT — SIGNIFICANT CHANGE UP
MAGNESIUM SERPL-MCNC: 1.9 MG/DL — SIGNIFICANT CHANGE UP (ref 1.8–2.4)
MANUAL SMEAR VERIFICATION: SIGNIFICANT CHANGE UP
MCHC RBC-ENTMCNC: 33.6 G/DL — SIGNIFICANT CHANGE UP (ref 32–37)
MCHC RBC-ENTMCNC: 33.6 PG — HIGH (ref 27–31)
MCV RBC AUTO: 100 FL — HIGH (ref 81–99)
MONOCYTES # BLD AUTO: 0.84 K/UL — HIGH (ref 0.1–0.6)
MONOCYTES NFR BLD AUTO: 4.3 % — SIGNIFICANT CHANGE UP (ref 1.7–9.3)
MYELOCYTES NFR BLD: 0.9 % — HIGH (ref 0–0)
NEUTROPHILS # BLD AUTO: 18 K/UL — HIGH (ref 1.4–6.5)
NEUTROPHILS NFR BLD AUTO: 92.2 % — HIGH (ref 42.2–75.2)
PLAT MORPH BLD: NORMAL — SIGNIFICANT CHANGE UP
PLATELET # BLD AUTO: 228 K/UL — SIGNIFICANT CHANGE UP (ref 130–400)
POTASSIUM SERPL-MCNC: 3.7 MMOL/L — SIGNIFICANT CHANGE UP (ref 3.5–5)
POTASSIUM SERPL-SCNC: 3.7 MMOL/L — SIGNIFICANT CHANGE UP (ref 3.5–5)
RBC # BLD: 4.41 M/UL — SIGNIFICANT CHANGE UP (ref 4.2–5.4)
RBC # FLD: 12.9 % — SIGNIFICANT CHANGE UP (ref 11.5–14.5)
RBC BLD AUTO: ABNORMAL
SODIUM SERPL-SCNC: 132 MMOL/L — LOW (ref 135–146)
VARIANT LYMPHS # BLD: 0.9 % — SIGNIFICANT CHANGE UP (ref 0–5)
WBC # BLD: 19.52 K/UL — HIGH (ref 4.8–10.8)
WBC # FLD AUTO: 19.52 K/UL — HIGH (ref 4.8–10.8)

## 2019-05-28 RX ORDER — FUROSEMIDE 40 MG
40 TABLET ORAL ONCE
Refills: 0 | Status: COMPLETED | OUTPATIENT
Start: 2019-05-28 | End: 2019-05-28

## 2019-05-28 RX ADMIN — OXYCODONE HYDROCHLORIDE 10 MILLIGRAM(S): 5 TABLET ORAL at 06:13

## 2019-05-28 RX ADMIN — OXYCODONE HYDROCHLORIDE 10 MILLIGRAM(S): 5 TABLET ORAL at 15:05

## 2019-05-28 RX ADMIN — Medication 100 MILLIGRAM(S): at 13:09

## 2019-05-28 RX ADMIN — SENNA PLUS 2 TABLET(S): 8.6 TABLET ORAL at 21:07

## 2019-05-28 RX ADMIN — Medication 500000 UNIT(S): at 23:10

## 2019-05-28 RX ADMIN — Medication 80 MILLIGRAM(S): at 21:06

## 2019-05-28 RX ADMIN — Medication 500000 UNIT(S): at 17:07

## 2019-05-28 RX ADMIN — Medication 40 MILLIGRAM(S): at 16:04

## 2019-05-28 RX ADMIN — OXYCODONE HYDROCHLORIDE 10 MILLIGRAM(S): 5 TABLET ORAL at 23:40

## 2019-05-28 RX ADMIN — PANTOPRAZOLE SODIUM 40 MILLIGRAM(S): 20 TABLET, DELAYED RELEASE ORAL at 05:22

## 2019-05-28 RX ADMIN — Medication 1 PATCH: at 11:40

## 2019-05-28 RX ADMIN — Medication 1 PATCH: at 11:38

## 2019-05-28 RX ADMIN — BUDESONIDE AND FORMOTEROL FUMARATE DIHYDRATE 2 PUFF(S): 160; 4.5 AEROSOL RESPIRATORY (INHALATION) at 11:38

## 2019-05-28 RX ADMIN — OXYCODONE HYDROCHLORIDE 10 MILLIGRAM(S): 5 TABLET ORAL at 14:30

## 2019-05-28 RX ADMIN — CHLORHEXIDINE GLUCONATE 1 APPLICATION(S): 213 SOLUTION TOPICAL at 05:21

## 2019-05-28 RX ADMIN — Medication 500000 UNIT(S): at 11:38

## 2019-05-28 RX ADMIN — NYSTATIN CREAM 1 APPLICATION(S): 100000 CREAM TOPICAL at 17:06

## 2019-05-28 RX ADMIN — Medication 1 PATCH: at 20:47

## 2019-05-28 RX ADMIN — SODIUM POLYSTYRENE SULFONATE 30 GRAM(S): 4.1 POWDER, FOR SUSPENSION ORAL at 00:13

## 2019-05-28 RX ADMIN — AMLODIPINE BESYLATE 10 MILLIGRAM(S): 2.5 TABLET ORAL at 17:06

## 2019-05-28 RX ADMIN — Medication 0.5 MILLIGRAM(S): at 05:24

## 2019-05-28 RX ADMIN — OXYCODONE HYDROCHLORIDE 10 MILLIGRAM(S): 5 TABLET ORAL at 23:09

## 2019-05-28 RX ADMIN — Medication 0.5 MILLIGRAM(S): at 13:08

## 2019-05-28 RX ADMIN — Medication 3 MILLILITER(S): at 19:44

## 2019-05-28 RX ADMIN — Medication 3 MILLILITER(S): at 13:23

## 2019-05-28 RX ADMIN — BUDESONIDE AND FORMOTEROL FUMARATE DIHYDRATE 2 PUFF(S): 160; 4.5 AEROSOL RESPIRATORY (INHALATION) at 21:06

## 2019-05-28 RX ADMIN — Medication 3 MILLILITER(S): at 08:03

## 2019-05-28 RX ADMIN — Medication 100 MILLIGRAM(S): at 05:22

## 2019-05-28 RX ADMIN — Medication 80 MILLIGRAM(S): at 13:09

## 2019-05-28 RX ADMIN — Medication 80 MILLIGRAM(S): at 05:21

## 2019-05-28 RX ADMIN — NYSTATIN CREAM 1 APPLICATION(S): 100000 CREAM TOPICAL at 05:21

## 2019-05-28 RX ADMIN — OXYCODONE HYDROCHLORIDE 10 MILLIGRAM(S): 5 TABLET ORAL at 06:43

## 2019-05-28 RX ADMIN — Medication 100 MILLIGRAM(S): at 21:07

## 2019-05-28 RX ADMIN — Medication 0.5 MILLIGRAM(S): at 21:09

## 2019-05-28 RX ADMIN — Medication 500000 UNIT(S): at 05:21

## 2019-05-28 NOTE — PHYSICAL THERAPY INITIAL EVALUATION ADULT - GENERAL OBSERVATIONS, REHAB EVAL
Pt was seen from 8:40-9:25 for a total of 45 minutes. Pt encountered sitting EOB, +bed alarm, No apparent distress. PT agreeable to PT. SPO2: 95%, HR:109.

## 2019-05-28 NOTE — PHYSICAL THERAPY INITIAL EVALUATION ADULT - PERTINENT HX OF CURRENT PROBLEM, REHAB EVAL
COPD exacerbation and SOB worsening over the last 3 weeks. Pt admitted for COPD exacerbation and SOB worsening over the last 3 weeks.

## 2019-05-28 NOTE — PHYSICAL THERAPY INITIAL EVALUATION ADULT - CRITERIA FOR SKILLED THERAPEUTIC INTERVENTIONS
therapy frequency/predicted duration of therapy intervention/risk reduction/prevention/rehab potential/functional limitations in following categories/impairments found

## 2019-05-28 NOTE — PROGRESS NOTE ADULT - ASSESSMENT
#) Acute on chronic COPD exacerbation   - Flu A/B, RSV negative  - NC O2 to maintain sats of 88 -92 % - patient refused BiPAP  - c/w Duonebs q6hrs and PRN and symbicort BID  - Solumedrol 80 mg IV q 8   - X-ray showing atelectasis and pulmonary vascular congestion  - LE duplex neg  - RVP panel  - Has normal EF on echo from one year ago however has orthopnea and some mild swelling of lower extremities bilaterally    #) Diabetes  - hold oral metformin  - Insulin for FS > 180  - fingersticks with meals and nightly    #) hyponatremia  - sodium 131  - fluid restriction    #) hyperkalemia  - overnight received one dose of Kayexalate.     #) cataract   - Cataract Out pt f/u    #) DVT PPX  - Heparin sq    #) GI ppx     Activity Increase as tolerated    Dispo : when medically stable

## 2019-05-28 NOTE — PHYSICAL THERAPY INITIAL EVALUATION ADULT - TRANSFER SAFETY CONCERNS NOTED: SIT/STAND, REHAB EVAL
losing balance/decreased safety awareness/decreased balance during turns/decreased weight-shifting ability
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
## 2019-05-28 NOTE — PHYSICAL THERAPY INITIAL EVALUATION ADULT - IMPAIRMENTS CONTRIBUTING TO GAIT DEVIATIONS, PT EVAL
impaired balance/Pt had SOB and convulsive movements after 35 ft. SPO2: 93%. Pt was able to return to bed and symptoms dissipated. SPO2: 95%/decreased strength/decreased ROM

## 2019-05-28 NOTE — PHYSICAL THERAPY INITIAL EVALUATION ADULT - IMPAIRMENTS FOUND, PT EVAL
gait, locomotion, and balance/poor safety awareness/ventilation and respiration/gas exchange/ROM/muscle strength/aerobic capacity/endurance/ergonomics and body mechanics

## 2019-05-28 NOTE — PROGRESS NOTE ADULT - SUBJECTIVE AND OBJECTIVE BOX
SUBJECTIVE:    Patient is a 69y old Female who presents with a chief complaint of SOB (27 May 2019 07:28)    Last night patient potassium 6.1 not hemolyzed received one dose of Kayexalate. Patient reports she is still occasionally short of breath with frequent coughing. Reports that her breathing gets worse with laying flat on bed.     PAST MEDICAL & SURGICAL HISTORY  Chronic pain  Depression  Anxiety  COPD (chronic obstructive pulmonary disease)  Neck mass       ALLERGIES:  penicillin (Unknown)    MEDICATIONS:  STANDING MEDICATIONS  ALBUTerol/ipratropium for Nebulization 3 milliLiter(s) Nebulizer every 6 hours  ALPRAZolam 0.5 milliGRAM(s) Oral every 8 hours  amLODIPine   Tablet 10 milliGRAM(s) Oral <User Schedule>  buDESOnide  80 MICROgram(s)/formoterol 4.5 MICROgram(s) Inhaler 2 Puff(s) Inhalation two times a day  chlorhexidine 4% Liquid 1 Application(s) Topical <User Schedule>  docusate sodium 100 milliGRAM(s) Oral three times a day  heparin  Injectable 5000 Unit(s) SubCutaneous every 8 hours  methylPREDNISolone sodium succinate Injectable 80 milliGRAM(s) IV Push every 8 hours  nicotine -  14 mG/24Hr(s) Patch 1 patch Transdermal daily  nystatin    Suspension [id number] Unit(s) Oral four times a day  nystatin Powder 1 Application(s) Topical every 12 hours  pantoprazole    Tablet 40 milliGRAM(s) Oral before breakfast  senna 2 Tablet(s) Oral at bedtime    PRN MEDICATIONS  ALBUTerol    90 MICROgram(s) HFA Inhaler 2 Puff(s) Inhalation every 6 hours PRN  oxyCODONE    IR 10 milliGRAM(s) Oral every 8 hours PRN    VITALS:   T(F): 97.2  HR: 96  BP: 141/74  RR: 18  SpO2: 98%    LABS:    05-27    131<L>  |  87<L>  |  10  ----------------------------<  125<H>  6.1<HH>   |  33<H>  |  0.6<L>    Ca    10.0      27 May 2019 21:57                            PHYSICAL EXAM:  GEN: NAD  LUNGS: bilateral expiratory wheezing  HEART: RRR, no m/r/g  ABD: soft, NT/ND, +BS  EXT: +1 pitting edema in bilateral lower extremities  NEURO: AAOx3

## 2019-05-28 NOTE — PHYSICAL THERAPY INITIAL EVALUATION ADULT - ADDITIONAL COMMENTS
Pt notes that she lives in apartment building with her brother, but he is not capable of helping her. She states that she needs assistance with ADL's. There are 10 flights of steps to get to her appt, but she uses the elevators. She states that elevator is often broken.

## 2019-05-28 NOTE — PHYSICAL THERAPY INITIAL EVALUATION ADULT - ASSISTIVE DEVICE FOR TRANSFER: GAIT, REHAB EVAL
rolling walker Initial ambulation =99% on RA, During ambulation sp02 ranged between 91-95%. Final SP02=95%./rolling walker

## 2019-05-28 NOTE — PHYSICAL THERAPY INITIAL EVALUATION ADULT - PERSONAL SAFETY AND JUDGMENT, REHAB EVAL
Pt had SOB and convulsive movement during tx./impaired impaired/Pt had SOB and convulsive movement during tx. Pt reports this is normal prior to admission when she ambulated too far.

## 2019-05-29 LAB
ANION GAP SERPL CALC-SCNC: 13 MMOL/L — SIGNIFICANT CHANGE UP (ref 7–14)
BASOPHILS # BLD AUTO: 0.08 K/UL — SIGNIFICANT CHANGE UP (ref 0–0.2)
BASOPHILS NFR BLD AUTO: 0.5 % — SIGNIFICANT CHANGE UP (ref 0–1)
BUN SERPL-MCNC: 13 MG/DL — SIGNIFICANT CHANGE UP (ref 10–20)
CALCIUM SERPL-MCNC: 9.5 MG/DL — SIGNIFICANT CHANGE UP (ref 8.5–10.1)
CHLORIDE SERPL-SCNC: 89 MMOL/L — LOW (ref 98–110)
CO2 SERPL-SCNC: 31 MMOL/L — SIGNIFICANT CHANGE UP (ref 17–32)
CREAT SERPL-MCNC: 0.5 MG/DL — LOW (ref 0.7–1.5)
EOSINOPHIL # BLD AUTO: 0 K/UL — SIGNIFICANT CHANGE UP (ref 0–0.7)
EOSINOPHIL NFR BLD AUTO: 0 % — SIGNIFICANT CHANGE UP (ref 0–8)
GLUCOSE SERPL-MCNC: 143 MG/DL — HIGH (ref 70–99)
HCT VFR BLD CALC: 43.8 % — SIGNIFICANT CHANGE UP (ref 37–47)
HGB BLD-MCNC: 14.3 G/DL — SIGNIFICANT CHANGE UP (ref 12–16)
IMM GRANULOCYTES NFR BLD AUTO: 4.2 % — HIGH (ref 0.1–0.3)
IRON SATN MFR SERPL: 182 UG/DL — HIGH (ref 35–150)
IRON SATN MFR SERPL: 55 % — HIGH (ref 15–50)
LYMPHOCYTES # BLD AUTO: 0.73 K/UL — LOW (ref 1.2–3.4)
LYMPHOCYTES # BLD AUTO: 4.2 % — LOW (ref 20.5–51.1)
MAGNESIUM SERPL-MCNC: 2.1 MG/DL — SIGNIFICANT CHANGE UP (ref 1.8–2.4)
MCHC RBC-ENTMCNC: 32.6 G/DL — SIGNIFICANT CHANGE UP (ref 32–37)
MCHC RBC-ENTMCNC: 32.9 PG — HIGH (ref 27–31)
MCV RBC AUTO: 100.7 FL — HIGH (ref 81–99)
MONOCYTES # BLD AUTO: 1.02 K/UL — HIGH (ref 0.1–0.6)
MONOCYTES NFR BLD AUTO: 5.9 % — SIGNIFICANT CHANGE UP (ref 1.7–9.3)
NEUTROPHILS # BLD AUTO: 14.82 K/UL — HIGH (ref 1.4–6.5)
NEUTROPHILS NFR BLD AUTO: 85.2 % — HIGH (ref 42.2–75.2)
NRBC # BLD: 0 /100 WBCS — SIGNIFICANT CHANGE UP (ref 0–0)
PLATELET # BLD AUTO: 255 K/UL — SIGNIFICANT CHANGE UP (ref 130–400)
POTASSIUM SERPL-MCNC: 4.8 MMOL/L — SIGNIFICANT CHANGE UP (ref 3.5–5)
POTASSIUM SERPL-SCNC: 4.8 MMOL/L — SIGNIFICANT CHANGE UP (ref 3.5–5)
RBC # BLD: 4.35 M/UL — SIGNIFICANT CHANGE UP (ref 4.2–5.4)
RBC # FLD: 12.7 % — SIGNIFICANT CHANGE UP (ref 11.5–14.5)
SODIUM SERPL-SCNC: 133 MMOL/L — LOW (ref 135–146)
TIBC SERPL-MCNC: 331 UG/DL — SIGNIFICANT CHANGE UP (ref 220–430)
UIBC SERPL-MCNC: 149 UG/DL — SIGNIFICANT CHANGE UP (ref 110–370)
WBC # BLD: 17.38 K/UL — HIGH (ref 4.8–10.8)
WBC # FLD AUTO: 17.38 K/UL — HIGH (ref 4.8–10.8)

## 2019-05-29 PROCEDURE — 71045 X-RAY EXAM CHEST 1 VIEW: CPT | Mod: 26

## 2019-05-29 RX ORDER — IPRATROPIUM/ALBUTEROL SULFATE 18-103MCG
3 AEROSOL WITH ADAPTER (GRAM) INHALATION EVERY 4 HOURS
Refills: 0 | Status: DISCONTINUED | OUTPATIENT
Start: 2019-05-29 | End: 2019-06-10

## 2019-05-29 RX ORDER — GUAIFENESIN/DEXTROMETHORPHAN 600MG-30MG
10 TABLET, EXTENDED RELEASE 12 HR ORAL EVERY 6 HOURS
Refills: 0 | Status: DISCONTINUED | OUTPATIENT
Start: 2019-05-29 | End: 2019-05-31

## 2019-05-29 RX ADMIN — Medication 0.5 MILLIGRAM(S): at 13:39

## 2019-05-29 RX ADMIN — PANTOPRAZOLE SODIUM 40 MILLIGRAM(S): 20 TABLET, DELAYED RELEASE ORAL at 05:04

## 2019-05-29 RX ADMIN — NYSTATIN CREAM 1 APPLICATION(S): 100000 CREAM TOPICAL at 05:02

## 2019-05-29 RX ADMIN — NYSTATIN CREAM 1 APPLICATION(S): 100000 CREAM TOPICAL at 17:30

## 2019-05-29 RX ADMIN — AMLODIPINE BESYLATE 10 MILLIGRAM(S): 2.5 TABLET ORAL at 17:31

## 2019-05-29 RX ADMIN — Medication 500000 UNIT(S): at 23:42

## 2019-05-29 RX ADMIN — CHLORHEXIDINE GLUCONATE 1 APPLICATION(S): 213 SOLUTION TOPICAL at 05:02

## 2019-05-29 RX ADMIN — Medication 500000 UNIT(S): at 17:34

## 2019-05-29 RX ADMIN — Medication 500000 UNIT(S): at 11:12

## 2019-05-29 RX ADMIN — Medication 1 PATCH: at 06:06

## 2019-05-29 RX ADMIN — Medication 1 PATCH: at 12:14

## 2019-05-29 RX ADMIN — Medication 10 MILLILITER(S): at 12:12

## 2019-05-29 RX ADMIN — OXYCODONE HYDROCHLORIDE 10 MILLIGRAM(S): 5 TABLET ORAL at 15:43

## 2019-05-29 RX ADMIN — Medication 3 MILLILITER(S): at 08:43

## 2019-05-29 RX ADMIN — Medication 80 MILLIGRAM(S): at 05:03

## 2019-05-29 RX ADMIN — Medication 80 MILLIGRAM(S): at 13:40

## 2019-05-29 RX ADMIN — Medication 0.5 MILLIGRAM(S): at 21:00

## 2019-05-29 RX ADMIN — OXYCODONE HYDROCHLORIDE 10 MILLIGRAM(S): 5 TABLET ORAL at 07:28

## 2019-05-29 RX ADMIN — Medication 100 MILLIGRAM(S): at 05:04

## 2019-05-29 RX ADMIN — Medication 3 MILLILITER(S): at 15:52

## 2019-05-29 RX ADMIN — Medication 500000 UNIT(S): at 05:04

## 2019-05-29 RX ADMIN — Medication 3 MILLILITER(S): at 14:22

## 2019-05-29 RX ADMIN — BUDESONIDE AND FORMOTEROL FUMARATE DIHYDRATE 2 PUFF(S): 160; 4.5 AEROSOL RESPIRATORY (INHALATION) at 07:27

## 2019-05-29 RX ADMIN — BUDESONIDE AND FORMOTEROL FUMARATE DIHYDRATE 2 PUFF(S): 160; 4.5 AEROSOL RESPIRATORY (INHALATION) at 21:01

## 2019-05-29 RX ADMIN — Medication 80 MILLIGRAM(S): at 21:00

## 2019-05-29 RX ADMIN — Medication 0.5 MILLIGRAM(S): at 05:05

## 2019-05-29 RX ADMIN — Medication 100 MILLIGRAM(S): at 13:40

## 2019-05-29 RX ADMIN — Medication 10 MILLILITER(S): at 17:34

## 2019-05-29 NOTE — PROGRESS NOTE ADULT - ASSESSMENT
#) Acute on chronic COPD exacerbation   - Flu A/B, RSV negative  - NC O2 to maintain sats of 88 -92 % - patient refused BiPAP, 98 percent on room air  - c/w Duonebs q6hrs and PRN and symbicort BID  - Solumedrol 80 mg IV q 8   - X-ray showing atelectasis and pulmonary vascular congestion  - LE duplex neg  - RVP panel  - Has normal EF on echo from one year ago however has orthopnea and some mild swelling of lower extremities bilaterally    #) Diabetes  - hold oral metformin  - Insulin for FS > 180  - fingersticks with meals and nightly    #) hyponatremia  - sodium 131  - fluid restriction    #) hyperkalemia  - overnight received one dose of Kayexalate.     #) cataract   - Cataract Out pt f/u    #) DVT PPX  - Heparin sq    #) GI ppx     Activity Increase as tolerated    Dispo : when medically stable #) Acute on chronic COPD exacerbation   - Flu A/B, RSV negative  - NC O2 to maintain sats of 88 -92 % - patient refused BiPAP, 98 percent on room air  - c/w Duonebs q6hrs and PRN and symbicort BID  - Solumedrol 80 mg IV q 8   - X-ray showing atelectasis and pulmonary vascular congestion - gave one dose of IV lasix yesterday will repeat CXR this AM and see if there is improvement in vascular congestion  - LE duplex neg  - RVP panel  - Has normal EF on echo from one year ago however has orthopnea and some mild swelling of lower extremities bilaterally - bilateral lower ext swelling resolved today after dose of lasix    #) Diabetes  - hold oral metformin  - Insulin for FS > 180  - fingersticks with meals and nightly    #) hyponatremia  - sodium 132 yesterday  - encourage water restriction    #) cataract   - Cataract Out pt f/u    #) DVT PPX  - Heparin sq    #) GI ppx     Activity Increase as tolerated    Dispo : when medically stable

## 2019-05-29 NOTE — PROGRESS NOTE ADULT - ATTENDING COMMENTS
Patient was evaluated and examined by bedside, remains dyspneic at rest with cough, wheezing, no hypoxia, no fever  All labs, radiology studies, VS was reviewed  I agree with medical plan outlined by Medical resident as stated above.  - Acute exacerbation of advanced COPD - with persistent symptoms  -patient is on High dose of IV Solumedrol/neb every 4 hours/ mainten inh. tx./antitussive tx. -still with poor response  -consult Pulmonary - f/up recommendations  -smoking cessation d/w pt. by bedside  -CXR- mild pulmonary congestion- I personally reviewed CXR- and don't find any pulmonary congestion    - DM2 - bsfs with insulin co prn    -Mild hyponatremia -stable sodium levels- continue to monitor    Daily GI and DVT proph.    #Progress Note Handoff: Pending Consults__Pulmonary_______,Tests__none_____,Test Results_______,Other; monitor for clinical resolution of dyspnea/wheezing  Family discussion: patient by bedside Disposition: Home___once medically stable

## 2019-05-29 NOTE — PROGRESS NOTE ADULT - SUBJECTIVE AND OBJECTIVE BOX
SUBJECTIVE:    Patient is a 69y old Female who presents with a chief complaint of SOB (28 May 2019 07:55)    Stable, no acute events overnight. This AM she reports that she didn't sleep much overnight. She reports that day to day her breathing has not been improving much. Her breathing improves after receiving the nebulizer treatment but worsens after 2 hours. She is still wheezing. No sputum production. Denies chest pain, fever, chills, tremors.    PAST MEDICAL & SURGICAL HISTORY  Chronic pain  Depression  Anxiety  COPD (chronic obstructive pulmonary disease)  Neck mass       ALLERGIES:  penicillin (Unknown)    MEDICATIONS:  STANDING MEDICATIONS  ALBUTerol/ipratropium for Nebulization 3 milliLiter(s) Nebulizer every 6 hours  ALPRAZolam 0.5 milliGRAM(s) Oral every 8 hours  amLODIPine   Tablet 10 milliGRAM(s) Oral <User Schedule>  buDESOnide  80 MICROgram(s)/formoterol 4.5 MICROgram(s) Inhaler 2 Puff(s) Inhalation two times a day  docusate sodium 100 milliGRAM(s) Oral three times a day  heparin  Injectable 5000 Unit(s) SubCutaneous every 8 hours  methylPREDNISolone sodium succinate Injectable 80 milliGRAM(s) IV Push every 8 hours  nicotine -  14 mG/24Hr(s) Patch 1 patch Transdermal daily  nystatin    Suspension 455727 Unit(s) Oral four times a day  nystatin Powder 1 Application(s) Topical every 12 hours  pantoprazole    Tablet 40 milliGRAM(s) Oral before breakfast  senna 2 Tablet(s) Oral at bedtime    PRN MEDICATIONS  ALBUTerol    90 MICROgram(s) HFA Inhaler 2 Puff(s) Inhalation every 6 hours PRN  oxyCODONE    IR 10 milliGRAM(s) Oral every 8 hours PRN    VITALS:   T(F): 96.7  HR: 90  BP: 150/67  RR: 18  SpO2: 98%    LABS:                        14.8   19.52 )-----------( 228      ( 28 May 2019 09:11 )             44.1     05-28    132<L>  |  89<L>  |  11  ----------------------------<  286<H>  3.7   |  25  |  0.6<L>    Ca    9.0      28 May 2019 09:11  Mg     1.9     05-28 05-28-19 @ 07:01  -  05-29-19 @ 06:49  --------------------------------------------------------  IN: 0 mL / OUT: 900 mL / NET: -900 mL          PHYSICAL EXAM:  GEN: NAD, breathing room air  LUNGS: wheezing present bilaterally  HEART: RRR, no m/r/g  ABD: soft, NT/ND, +BS  EXT: no edema present bilaterally - improved from yesterday  NEURO: AAOx3

## 2019-05-30 LAB
ANION GAP SERPL CALC-SCNC: 11 MMOL/L — SIGNIFICANT CHANGE UP (ref 7–14)
BASOPHILS # BLD AUTO: 0.09 K/UL — SIGNIFICANT CHANGE UP (ref 0–0.2)
BASOPHILS NFR BLD AUTO: 0.5 % — SIGNIFICANT CHANGE UP (ref 0–1)
BUN SERPL-MCNC: 13 MG/DL — SIGNIFICANT CHANGE UP (ref 10–20)
CALCIUM SERPL-MCNC: 9.3 MG/DL — SIGNIFICANT CHANGE UP (ref 8.5–10.1)
CHLORIDE SERPL-SCNC: 91 MMOL/L — LOW (ref 98–110)
CO2 SERPL-SCNC: 31 MMOL/L — SIGNIFICANT CHANGE UP (ref 17–32)
CREAT SERPL-MCNC: 0.5 MG/DL — LOW (ref 0.7–1.5)
EOSINOPHIL # BLD AUTO: 0 K/UL — SIGNIFICANT CHANGE UP (ref 0–0.7)
EOSINOPHIL NFR BLD AUTO: 0 % — SIGNIFICANT CHANGE UP (ref 0–8)
ESTIMATED AVERAGE GLUCOSE: 128 MG/DL — HIGH (ref 68–114)
FOLATE SERPL-MCNC: 11.1 NG/ML — SIGNIFICANT CHANGE UP
GLUCOSE SERPL-MCNC: 155 MG/DL — HIGH (ref 70–99)
HBA1C BLD-MCNC: 6.1 % — HIGH (ref 4–5.6)
HCT VFR BLD CALC: 41.6 % — SIGNIFICANT CHANGE UP (ref 37–47)
HGB BLD-MCNC: 14.2 G/DL — SIGNIFICANT CHANGE UP (ref 12–16)
IMM GRANULOCYTES NFR BLD AUTO: 4.1 % — HIGH (ref 0.1–0.3)
LYMPHOCYTES # BLD AUTO: 0.79 K/UL — LOW (ref 1.2–3.4)
LYMPHOCYTES # BLD AUTO: 4.1 % — LOW (ref 20.5–51.1)
MAGNESIUM SERPL-MCNC: 2.2 MG/DL — SIGNIFICANT CHANGE UP (ref 1.8–2.4)
MCHC RBC-ENTMCNC: 33.4 PG — HIGH (ref 27–31)
MCHC RBC-ENTMCNC: 34.1 G/DL — SIGNIFICANT CHANGE UP (ref 32–37)
MCV RBC AUTO: 97.9 FL — SIGNIFICANT CHANGE UP (ref 81–99)
MONOCYTES # BLD AUTO: 1.19 K/UL — HIGH (ref 0.1–0.6)
MONOCYTES NFR BLD AUTO: 6.1 % — SIGNIFICANT CHANGE UP (ref 1.7–9.3)
NEUTROPHILS # BLD AUTO: 16.64 K/UL — HIGH (ref 1.4–6.5)
NEUTROPHILS NFR BLD AUTO: 85.2 % — HIGH (ref 42.2–75.2)
NRBC # BLD: 0 /100 WBCS — SIGNIFICANT CHANGE UP (ref 0–0)
PLATELET # BLD AUTO: 218 K/UL — SIGNIFICANT CHANGE UP (ref 130–400)
POTASSIUM SERPL-MCNC: 5 MMOL/L — SIGNIFICANT CHANGE UP (ref 3.5–5)
POTASSIUM SERPL-SCNC: 5 MMOL/L — SIGNIFICANT CHANGE UP (ref 3.5–5)
RBC # BLD: 4.25 M/UL — SIGNIFICANT CHANGE UP (ref 4.2–5.4)
RBC # FLD: 12.5 % — SIGNIFICANT CHANGE UP (ref 11.5–14.5)
SODIUM SERPL-SCNC: 133 MMOL/L — LOW (ref 135–146)
VIT B12 SERPL-MCNC: 213 PG/ML — LOW (ref 232–1245)
WBC # BLD: 19.5 K/UL — HIGH (ref 4.8–10.8)
WBC # FLD AUTO: 19.5 K/UL — HIGH (ref 4.8–10.8)

## 2019-05-30 RX ORDER — PREGABALIN 225 MG/1
1000 CAPSULE ORAL ONCE
Refills: 0 | Status: COMPLETED | OUTPATIENT
Start: 2019-05-30 | End: 2019-05-30

## 2019-05-30 RX ORDER — PREGABALIN 225 MG/1
1000 CAPSULE ORAL DAILY
Refills: 0 | Status: DISCONTINUED | OUTPATIENT
Start: 2019-05-31 | End: 2019-06-10

## 2019-05-30 RX ORDER — FUROSEMIDE 40 MG
40 TABLET ORAL DAILY
Refills: 0 | Status: DISCONTINUED | OUTPATIENT
Start: 2019-05-30 | End: 2019-06-02

## 2019-05-30 RX ADMIN — BUDESONIDE AND FORMOTEROL FUMARATE DIHYDRATE 2 PUFF(S): 160; 4.5 AEROSOL RESPIRATORY (INHALATION) at 08:41

## 2019-05-30 RX ADMIN — Medication 40 MILLIGRAM(S): at 17:07

## 2019-05-30 RX ADMIN — Medication 500000 UNIT(S): at 05:43

## 2019-05-30 RX ADMIN — OXYCODONE HYDROCHLORIDE 10 MILLIGRAM(S): 5 TABLET ORAL at 08:45

## 2019-05-30 RX ADMIN — OXYCODONE HYDROCHLORIDE 10 MILLIGRAM(S): 5 TABLET ORAL at 17:35

## 2019-05-30 RX ADMIN — Medication 80 MILLIGRAM(S): at 21:19

## 2019-05-30 RX ADMIN — OXYCODONE HYDROCHLORIDE 10 MILLIGRAM(S): 5 TABLET ORAL at 09:15

## 2019-05-30 RX ADMIN — BUDESONIDE AND FORMOTEROL FUMARATE DIHYDRATE 2 PUFF(S): 160; 4.5 AEROSOL RESPIRATORY (INHALATION) at 21:21

## 2019-05-30 RX ADMIN — Medication 100 MILLIGRAM(S): at 21:19

## 2019-05-30 RX ADMIN — Medication 0.5 MILLIGRAM(S): at 05:42

## 2019-05-30 RX ADMIN — Medication 3 MILLILITER(S): at 07:34

## 2019-05-30 RX ADMIN — Medication 3 MILLILITER(S): at 12:15

## 2019-05-30 RX ADMIN — NYSTATIN CREAM 1 APPLICATION(S): 100000 CREAM TOPICAL at 05:43

## 2019-05-30 RX ADMIN — Medication 80 MILLIGRAM(S): at 13:30

## 2019-05-30 RX ADMIN — AMLODIPINE BESYLATE 10 MILLIGRAM(S): 2.5 TABLET ORAL at 17:08

## 2019-05-30 RX ADMIN — Medication 0.5 MILLIGRAM(S): at 21:19

## 2019-05-30 RX ADMIN — Medication 500000 UNIT(S): at 17:08

## 2019-05-30 RX ADMIN — OXYCODONE HYDROCHLORIDE 10 MILLIGRAM(S): 5 TABLET ORAL at 00:37

## 2019-05-30 RX ADMIN — NYSTATIN CREAM 1 APPLICATION(S): 100000 CREAM TOPICAL at 17:07

## 2019-05-30 RX ADMIN — PANTOPRAZOLE SODIUM 40 MILLIGRAM(S): 20 TABLET, DELAYED RELEASE ORAL at 05:42

## 2019-05-30 RX ADMIN — Medication 500000 UNIT(S): at 11:17

## 2019-05-30 RX ADMIN — OXYCODONE HYDROCHLORIDE 10 MILLIGRAM(S): 5 TABLET ORAL at 01:10

## 2019-05-30 RX ADMIN — Medication 100 MILLIGRAM(S): at 13:30

## 2019-05-30 RX ADMIN — Medication 80 MILLIGRAM(S): at 05:42

## 2019-05-30 RX ADMIN — Medication 3 MILLILITER(S): at 21:13

## 2019-05-30 RX ADMIN — Medication 0.5 MILLIGRAM(S): at 13:29

## 2019-05-30 RX ADMIN — Medication 100 MILLIGRAM(S): at 05:42

## 2019-05-30 RX ADMIN — OXYCODONE HYDROCHLORIDE 10 MILLIGRAM(S): 5 TABLET ORAL at 17:05

## 2019-05-30 NOTE — DIETITIAN INITIAL EVALUATION ADULT. - DIET TYPE
DASH/TLC (sodium and cholesterol restricted diet)/Currently tolerating diet order with % po intake at this time.

## 2019-05-30 NOTE — DIETITIAN INITIAL EVALUATION ADULT. - MD RECOMMEND
Recommendation: Continue providing DASH/TLC diet modifier as tolerated. If blood glucose levels remain elevated, can consider adding Consistent Carbohydrate diet modifier.

## 2019-05-30 NOTE — DIETITIAN INITIAL EVALUATION ADULT. - ENERGY NEEDS
Energy: 5715-4037 kcal/day (MSJx1.1-1.2 AF)    Protein: 53-63 g/day (1-1.2 g/kg IBW)    Fluids: 4781-1701 mL/day (20-25 mL/kg IBW) or per LIP

## 2019-05-30 NOTE — DIETITIAN INITIAL EVALUATION ADULT. - PHYSICAL APPEARANCE
BMI: 30.2 (using wt 77.3 kg). Alert & oriented. Last BM 5/30. No chewing/swallowing difficulty reported. Skin noted intact.

## 2019-05-30 NOTE — DIETITIAN INITIAL EVALUATION ADULT. - OTHER INFO
Reason for RD assessment: LOS assessment. Pertinent Medical Information: p/w SOB. Acute on chronic COPD exacerbation noted. HTN noted poorly controlled yesterday. Hyponatremia noted improving; encouragement of fluid restriction noted.

## 2019-05-30 NOTE — CONSULT NOTE ADULT - ASSESSMENT
IMPRESSION:  - Severe COPD  - Recurrent Acute exacerbations  - Poor outpatient follow-up / Non compliant    RECOMMENDATION:  - Supplemental O2 as needed to keep SpO2>89%  - Duoneb q4h and PRN  - Solumedrol 80mg q8h for now  - Finished course of Levaquin  - Outpatient pulmonary rehab  - Poor prognosis IMPRESSION:  - Severe COPD at baseline  - Recurrent Acute exacerbations  - Poor outpatient follow-up / Non compliant  - Continues to smoke  - Component of diastolic CHF and fluid overload (congestion on CXR)    RECOMMENDATION:  - Supplemental O2 as needed to keep SpO2>89%  - Duoneb q4h and PRN  - Solumedrol 80mg q8h for now  - Finished course of Levaquin  - IV lasix 40mg for 2 days  - Outpatient pulmonary rehab  - Overall Poor prognosis IMPRESSION:  - Severe COPD at baseline  - Recurrent Acute exacerbations  - Poor outpatient follow-up / Non compliant  - Continues to smoke  - Component of mild diastolic CHF and fluid overload (congestion on CXR)    RECOMMENDATION:  - Supplemental O2 as needed to keep SpO2>89%  - Duoneb q4h and PRN  - Solumedrol 80mg q8h for now  - Finished course of Levaquin  - IV lasix 40mg for 2 days  - Outpatient pulmonary rehab  - Overall Poor prognosis IMPRESSION:  - Severe COPD at baseline/ Acute exacerbations  - Poor outpatient follow-up / Non compliant  - Continues to smoke  - Component of mild diastolic CHF and fluid overload (congestion on CXR)    RECOMMENDATION:  - Supplemental O2 as needed to keep SpO2>89%  - Duoneb q4h and PRN  - Solumedrol 80mg q8h for now  - Finished course of Levaquin  - IV lasix 40mg q 24 for 2 days  - Outpatient pulmonary rehab  - Overall Poor prognosis

## 2019-05-30 NOTE — PROGRESS NOTE ADULT - SUBJECTIVE AND OBJECTIVE BOX
SUBJECTIVE:    Patient is a 69y old Female who presents with a chief complaint of SOB (29 May 2019 06:48)    Overnight patient had blood pressure into 200 but repeat was 145 systolic so no intervention given. Otherwise patient still reports shortness of breath especially on ambulation and laying down. She is still coughing with minimal sputum. Endorses wheezing.    PAST MEDICAL & SURGICAL HISTORY  Chronic pain  Depression  Anxiety  COPD (chronic obstructive pulmonary disease)  Neck mass       ALLERGIES:  penicillin (Unknown)    MEDICATIONS:  STANDING MEDICATIONS  ALBUTerol/ipratropium for Nebulization 3 milliLiter(s) Nebulizer every 4 hours  ALPRAZolam 0.5 milliGRAM(s) Oral every 8 hours  amLODIPine   Tablet 10 milliGRAM(s) Oral <User Schedule>  buDESOnide  80 MICROgram(s)/formoterol 4.5 MICROgram(s) Inhaler 2 Puff(s) Inhalation two times a day  docusate sodium 100 milliGRAM(s) Oral three times a day  guaiFENesin/dextromethorphan  Syrup 10 milliLiter(s) Oral every 6 hours  heparin  Injectable 5000 Unit(s) SubCutaneous every 8 hours  methylPREDNISolone sodium succinate Injectable 80 milliGRAM(s) IV Push every 8 hours  nicotine -  14 mG/24Hr(s) Patch 1 patch Transdermal daily  nystatin    Suspension 611401 Unit(s) Oral four times a day  nystatin Powder 1 Application(s) Topical every 12 hours  pantoprazole    Tablet 40 milliGRAM(s) Oral before breakfast  senna 2 Tablet(s) Oral at bedtime    PRN MEDICATIONS  ALBUTerol    90 MICROgram(s) HFA Inhaler 2 Puff(s) Inhalation every 6 hours PRN  benzonatate 100 milliGRAM(s) Oral three times a day PRN  oxyCODONE    IR 10 milliGRAM(s) Oral every 8 hours PRN    VITALS:   T(F): 97.1  HR: 89  BP: 158/83  RR: 18  SpO2: 95%    LABS:                        14.3   17.38 )-----------( 255      ( 29 May 2019 06:59 )             43.8     05-29    133<L>  |  89<L>  |  13  ----------------------------<  143<H>  4.8   |  31  |  0.5<L>    Ca    9.5      29 May 2019 06:59  Mg     2.1     05-29 05-29-19 @ 07:01  -  05-30-19 @ 07:00  --------------------------------------------------------  IN: 560 mL / OUT: 201 mL / NET: 359 mL          PHYSICAL EXAM:  GEN: NAD, looks anxious, pursed lip breathing  LUNGS: bilateral expiratory wheezing  HEART: RRR, no m/r/g  ABD: soft, NT/ND, +BS  EXT: no edema bilaterally  NEURO: AAOx3

## 2019-05-30 NOTE — PROGRESS NOTE ADULT - ATTENDING COMMENTS
Patient was evaluated and examined by bedside, remains dyspneic at rest with cough, wheezing, no hypoxia, no fever  All labs, radiology studies, VS was reviewed  I agree with medical plan outlined by Medical resident as stated above.  - Acute exacerbation of advanced COPD - with persistent symptoms  -continue patient is on High dose of IV Solumedrol/neb every 4 hours/ mainten inh. tx./antitussive tx. -still with poor response  -consulted Pulmonary  - recommended to add lasix, outpatient pulmonary rehab  -smoking cessation d/w pt. by bedside  -CXR- mild pulmonary congestion    - DM2 - bsfs with insulin co prn    -Mild hyponatremia -stable sodium levels- continue to monitor    HTN- added on daily lasix tx., continue norvasc tx.    Daily GI and DVT proph.    #Progress Note Handoff: Pending  none _______,Tests__none_____,Test Results_______,Other; monitor for clinical resolution of dyspnea/wheezing  Family discussion: patient by bedside Disposition: Home___once medically stable . Patient was evaluated and examined by bedside, remains dyspneic at rest with cough, wheezing, no hypoxia, no fever  All labs, radiology studies, VS was reviewed  I agree with medical plan outlined by Medical resident as stated above.  - Acute exacerbation of advanced COPD - with persistent symptoms  -continue patient is on High dose of IV Solumedrol/neb every 4 hours/ mainten inh. tx./antitussive tx. -still with poor response  -consulted Pulmonary  - recommended to add lasix, outpatient pulmonary rehab  -smoking cessation d/w pt. by bedside  -CXR- mild pulmonary congestion    - DM2 - bsfs with insulin co prn    -Vitamin B 12 deficiency- started on Vitamin B 12 tx.     -Mild hyponatremia -stable sodium levels- continue to monitor    HTN- added on daily lasix tx., continue norvasc tx.    Daily GI and DVT proph.    #Progress Note Handoff: Pending  none _______,Tests__none_____,Test Results_______,Other; monitor for clinical resolution of dyspnea/wheezing  Family discussion: patient by bedside Disposition: Home___once medically stable .

## 2019-05-30 NOTE — PROGRESS NOTE ADULT - ASSESSMENT
#) Acute on chronic COPD exacerbation   - Flu A/B, RSV negative  - NC O2 to maintain sats of 88 -92 % - patient refused BiPAP, continues to sat well on room air however consistently looks uncomfortable - pursed lip breathing  - c/w Duonebs q6hrs and PRN and symbicort BID  - Solumedrol 80 mg IV q 8   - LE duplex neg  - Has normal EF on echo from one year ago however has orthopnea and some mild swelling of lower extremities bilaterally - bilateral lower ext swelling resolved today after dose of lasix  - pulm consult pending since patient not improving after several days of IV steroid treatment    #) Diabetes  - hold oral metformin  - Insulin for FS > 180  - fingersticks with meals and nightly  - f/u HbA1c from this AM    #) Hypertension  - poorly controlled yesterday  - currently on amlodipine 10mg daily - could add thiazide diuretic since patient is also frequently hyperkalemic    #) hyponatremia  - sodium 133 yesterday - improving  - encourage water restriction    #) cataract   - Cataract Out pt f/u    #) DVT PPX  - Heparin sq    #) GI ppx     Activity Increase as tolerated    Dispo : when medically stable

## 2019-05-30 NOTE — CONSULT NOTE ADULT - SUBJECTIVE AND OBJECTIVE BOX
HPI:  70 y/o female h/o COPD not on home O2, never been intubated presented with SOB for 3 weeks, worsening for the last week. Tried combivent, albuterol inhaler at home and didn't help. Takes prednisone 60mg po daily.Admits to cough, increased sputum production. Denies fever, chills, vomiting, diarrhea, CP.   Currently smokes 1/2 PPD. Pt was recently hospitalized in April for COPD exacerbation.    ROS:  All other systems reviewed and are negative    PAST MEDICAL & SURGICAL HISTORY  Chronic pain  Depression  Anxiety  COPD (chronic obstructive pulmonary disease)  Neck mass    FAMILY HISTORY:  Family history of COPD (chronic obstructive pulmonary disease) (Mother)    SOCIAL HISTORY:  [X]smoker  []Alcohol  []Drug    ALLERGIES:  penicillin (Unknown)    MEDICATIONS:  ALBUTerol/ipratropium for Nebulization 3 milliLiter(s) Nebulizer every 4 hours  ALPRAZolam 0.5 milliGRAM(s) Oral every 8 hours  amLODIPine   Tablet 10 milliGRAM(s) Oral <User Schedule>  buDESOnide  80 MICROgram(s)/formoterol 4.5 MICROgram(s) Inhaler 2 Puff(s) Inhalation two times a day  docusate sodium 100 milliGRAM(s) Oral three times a day  guaiFENesin/dextromethorphan  Syrup 10 milliLiter(s) Oral every 6 hours  heparin  Injectable 5000 Unit(s) SubCutaneous every 8 hours  methylPREDNISolone sodium succinate Injectable 80 milliGRAM(s) IV Push every 8 hours  nicotine -  14 mG/24Hr(s) Patch 1 patch Transdermal daily  nystatin    Suspension 123754 Unit(s) Oral four times a day  nystatin Powder 1 Application(s) Topical every 12 hours  pantoprazole    Tablet 40 milliGRAM(s) Oral before breakfast  senna 2 Tablet(s) Oral at bedtime    ALBUTerol    90 MICROgram(s) HFA Inhaler 2 Puff(s) Inhalation every 6 hours PRN Bronchospasm  benzonatate 100 milliGRAM(s) Oral three times a day PRN Cough  oxyCODONE    IR 10 milliGRAM(s) Oral every 8 hours PRN Severe Pain (7 - 10)    HOME MEDICATIONS:  Combivent 18 mcg-103 mcg-/inh inhalation aerosol: 1 puff(s) inhaled 4 times a day (24 May 2019 23:52)  HYDROcodone 10 mg oral capsule, extended release: 1 cap(s) orally every 6 hours, As Needed (25 May 2019 10:51)  metFORMIN 500 mg oral tablet: 1 tab(s) orally 2 times a day (25 May 2019 10:51)  nicotine 14 mg/24 hr transdermal film, extended release: 1 patch transdermal once a day (25 May 2019 10:51)    VITALS:   T(F): 97.1 (05-30 @ 05:36), Max: 98.9 (05-29 @ 22:45)  HR: 89 (05-30 @ 05:36) (84 - 102)  BP: 158/83 (05-30 @ 05:36) (138/85 - 201/95)  BP(mean): --  RR: 18 (05-30 @ 05:36) (18 - 22)  SpO2: 95% (05-30 @ 07:59) (91% - 98%)    PHYSICAL EXAM:  GEN: Alert and oriented X 3, Well nourished, No acute distress  NECK: Supple, no JVD  LUNGS: Clear to auscultation bilaterally  CARDIOVASCULAR: S1/S2 regular , no murmus or rubs  ABD: Soft, non-tender  EXT: No Lower extremity edema/cyanosis  NEURO: Non focal  SKIN: Intact    LABS:                        14.2   19.50 )-----------( 218      ( 30 May 2019 08:13 )             41.6     133<L>  |  89<L>  |  13  ----------------------------<  143<H>  4.8   |  31  |  0.5<L>    Ca    9.5      29 May 2019 06:59  Mg     2.1     05-29    RADIOLOGY:  -CXR: Mild pulmonary vascular congestion  -TTE (June 2018): Normal  -Lower extremities venous duplex: Negative for DVT HPI:  70 y/o female h/o COPD not on home O2, with recent hospitalization for COPD exacerbation (discharged around 3 weeks ago), presented because of worsening shortness of breath over the last week.  Patient is admitted to the hospital with COPD exacerbation every 2 months in average.  This time, she was still on the prednisone taper when her symptoms worsened. She became short of breath on minimal exertion then even at rest. She has orthopnea also. Cough at baseline with some yellowish phlegm. No fevers.    ROS:  All other systems reviewed and are negative    PAST MEDICAL & SURGICAL HISTORY  Chronic pain  Depression  Anxiety  COPD (chronic obstructive pulmonary disease)  Neck mass    FAMILY HISTORY:  Family history of COPD (chronic obstructive pulmonary disease) (Mother)    SOCIAL HISTORY:  [X]smoker  []Alcohol  []Drug    ALLERGIES:  penicillin (Unknown)    MEDICATIONS:  ALBUTerol/ipratropium for Nebulization 3 milliLiter(s) Nebulizer every 4 hours  ALPRAZolam 0.5 milliGRAM(s) Oral every 8 hours  amLODIPine   Tablet 10 milliGRAM(s) Oral <User Schedule>  buDESOnide  80 MICROgram(s)/formoterol 4.5 MICROgram(s) Inhaler 2 Puff(s) Inhalation two times a day  docusate sodium 100 milliGRAM(s) Oral three times a day  guaiFENesin/dextromethorphan  Syrup 10 milliLiter(s) Oral every 6 hours  heparin  Injectable 5000 Unit(s) SubCutaneous every 8 hours  methylPREDNISolone sodium succinate Injectable 80 milliGRAM(s) IV Push every 8 hours  nicotine -  14 mG/24Hr(s) Patch 1 patch Transdermal daily  nystatin    Suspension 020655 Unit(s) Oral four times a day  nystatin Powder 1 Application(s) Topical every 12 hours  pantoprazole    Tablet 40 milliGRAM(s) Oral before breakfast  senna 2 Tablet(s) Oral at bedtime    ALBUTerol    90 MICROgram(s) HFA Inhaler 2 Puff(s) Inhalation every 6 hours PRN Bronchospasm  benzonatate 100 milliGRAM(s) Oral three times a day PRN Cough  oxyCODONE    IR 10 milliGRAM(s) Oral every 8 hours PRN Severe Pain (7 - 10)    HOME MEDICATIONS:  Combivent 18 mcg-103 mcg-/inh inhalation aerosol: 1 puff(s) inhaled 4 times a day (24 May 2019 23:52)  HYDROcodone 10 mg oral capsule, extended release: 1 cap(s) orally every 6 hours, As Needed (25 May 2019 10:51)  metFORMIN 500 mg oral tablet: 1 tab(s) orally 2 times a day (25 May 2019 10:51)  nicotine 14 mg/24 hr transdermal film, extended release: 1 patch transdermal once a day (25 May 2019 10:51)    VITALS:   T(F): 97.1 (05-30 @ 05:36), Max: 98.9 (05-29 @ 22:45)  HR: 89 (05-30 @ 05:36) (84 - 102)  BP: 158/83 (05-30 @ 05:36) (138/85 - 201/95)  RR: 18 (05-30 @ 05:36) (18 - 22)  SpO2: 95% (05-30 @ 07:59) (91% - 98%)    PHYSICAL EXAM:  GEN: Alert and oriented X3, Well nourished, No acute distress  NECK: Supple, no JVD  LUNGS: Bilateral inspiratory and expiratory wheezing  CARDIOVASCULAR: S1/S2 regular, no murmurs or rubs  ABD: Soft, non-tender  EXT: No Lower extremity edema/cyanosis  NEURO: Non focal  SKIN: Intact    LABS:                        14.2   19.50 )-----------( 218      ( 30 May 2019 08:13 )             41.6     133<L>  |  89<L>  |  13  ----------------------------<  143<H>  4.8   |  31  |  0.5<L>    Ca    9.5      29 May 2019 06:59  Mg     2.1     05-29    RADIOLOGY:  -CXR: Mild pulmonary vascular congestion  -TTE (June 2018): Normal  -Lower extremities venous duplex: Negative for DVT HPI:  70 y/o female h/o COPD not on home O2, with recent hospitalization for COPD exacerbation (discharged around 3 weeks ago), presented because of worsening shortness of breath over the last week.  Patient is admitted to the hospital with COPD exacerbation every 2 months in average.  This time, she was still on the prednisone taper when her symptoms worsened. She became short of breath on minimal exertion then even at rest. She has orthopnea also. Cough at baseline with some yellowish phlegm. No fevers. patient with poor compliance, poor f/up, still wheezing    ROS:  All other systems reviewed and are negative    PAST MEDICAL & SURGICAL HISTORY  Chronic pain  Depression  Anxiety  COPD (chronic obstructive pulmonary disease)  Neck mass    FAMILY HISTORY:  Family history of COPD (chronic obstructive pulmonary disease) (Mother)    SOCIAL HISTORY:  [X]smoker  []Alcohol  []Drug    ALLERGIES:  penicillin (Unknown)    MEDICATIONS:  ALBUTerol/ipratropium for Nebulization 3 milliLiter(s) Nebulizer every 4 hours  ALPRAZolam 0.5 milliGRAM(s) Oral every 8 hours  amLODIPine   Tablet 10 milliGRAM(s) Oral <User Schedule>  buDESOnide  80 MICROgram(s)/formoterol 4.5 MICROgram(s) Inhaler 2 Puff(s) Inhalation two times a day  docusate sodium 100 milliGRAM(s) Oral three times a day  guaiFENesin/dextromethorphan  Syrup 10 milliLiter(s) Oral every 6 hours  heparin  Injectable 5000 Unit(s) SubCutaneous every 8 hours  methylPREDNISolone sodium succinate Injectable 80 milliGRAM(s) IV Push every 8 hours  nicotine -  14 mG/24Hr(s) Patch 1 patch Transdermal daily  nystatin    Suspension 823600 Unit(s) Oral four times a day  nystatin Powder 1 Application(s) Topical every 12 hours  pantoprazole    Tablet 40 milliGRAM(s) Oral before breakfast  senna 2 Tablet(s) Oral at bedtime    ALBUTerol    90 MICROgram(s) HFA Inhaler 2 Puff(s) Inhalation every 6 hours PRN Bronchospasm  benzonatate 100 milliGRAM(s) Oral three times a day PRN Cough  oxyCODONE    IR 10 milliGRAM(s) Oral every 8 hours PRN Severe Pain (7 - 10)    HOME MEDICATIONS:  Combivent 18 mcg-103 mcg-/inh inhalation aerosol: 1 puff(s) inhaled 4 times a day (24 May 2019 23:52)  HYDROcodone 10 mg oral capsule, extended release: 1 cap(s) orally every 6 hours, As Needed (25 May 2019 10:51)  metFORMIN 500 mg oral tablet: 1 tab(s) orally 2 times a day (25 May 2019 10:51)  nicotine 14 mg/24 hr transdermal film, extended release: 1 patch transdermal once a day (25 May 2019 10:51)    VITALS:   T(F): 97.1 (05-30 @ 05:36), Max: 98.9 (05-29 @ 22:45)  HR: 89 (05-30 @ 05:36) (84 - 102)  BP: 158/83 (05-30 @ 05:36) (138/85 - 201/95)  RR: 18 (05-30 @ 05:36) (18 - 22)  SpO2: 95% (05-30 @ 07:59) (91% - 98%)    PHYSICAL EXAM:  GEN: Alert and oriented X3, Well nourished, No acute distress  NECK: Supple, no JVD  LUNGS: Bilateral inspiratory and expiratory wheezing  CARDIOVASCULAR: S1/S2 regular, no murmurs or rubs  ABD: Soft, non-tender  EXT: No Lower extremity edema/cyanosis  NEURO: Non focal  SKIN: Intact    LABS:                        14.2   19.50 )-----------( 218      ( 30 May 2019 08:13 )             41.6     133<L>  |  89<L>  |  13  ----------------------------<  143<H>  4.8   |  31  |  0.5<L>    Ca    9.5      29 May 2019 06:59  Mg     2.1     05-29    RADIOLOGY:  -CXR: Mild pulmonary vascular congestion  -TTE (June 2018): Normal  -Lower extremities venous duplex: Negative for DVT

## 2019-05-30 NOTE — DIETITIAN INITIAL EVALUATION ADULT. - SOURCE
Good appetite & po intake PTP. NKFA. No supplements. Heart Healthy diet. No known history of unintentional wt loss.

## 2019-05-31 LAB
ANION GAP SERPL CALC-SCNC: 16 MMOL/L — HIGH (ref 7–14)
BASOPHILS # BLD AUTO: 0.12 K/UL — SIGNIFICANT CHANGE UP (ref 0–0.2)
BASOPHILS NFR BLD AUTO: 0.6 % — SIGNIFICANT CHANGE UP (ref 0–1)
BUN SERPL-MCNC: 18 MG/DL — SIGNIFICANT CHANGE UP (ref 10–20)
CALCIUM SERPL-MCNC: 9.2 MG/DL — SIGNIFICANT CHANGE UP (ref 8.5–10.1)
CHLORIDE SERPL-SCNC: 87 MMOL/L — LOW (ref 98–110)
CO2 SERPL-SCNC: 30 MMOL/L — SIGNIFICANT CHANGE UP (ref 17–32)
CREAT SERPL-MCNC: 0.6 MG/DL — LOW (ref 0.7–1.5)
EOSINOPHIL # BLD AUTO: 0 K/UL — SIGNIFICANT CHANGE UP (ref 0–0.7)
EOSINOPHIL NFR BLD AUTO: 0 % — SIGNIFICANT CHANGE UP (ref 0–8)
GLUCOSE SERPL-MCNC: 228 MG/DL — HIGH (ref 70–99)
HCT VFR BLD CALC: 45.6 % — SIGNIFICANT CHANGE UP (ref 37–47)
HGB BLD-MCNC: 15.3 G/DL — SIGNIFICANT CHANGE UP (ref 12–16)
IMM GRANULOCYTES NFR BLD AUTO: 4 % — HIGH (ref 0.1–0.3)
LYMPHOCYTES # BLD AUTO: 0.6 K/UL — LOW (ref 1.2–3.4)
LYMPHOCYTES # BLD AUTO: 2.8 % — LOW (ref 20.5–51.1)
MAGNESIUM SERPL-MCNC: 2.2 MG/DL — SIGNIFICANT CHANGE UP (ref 1.8–2.4)
MCHC RBC-ENTMCNC: 33.3 PG — HIGH (ref 27–31)
MCHC RBC-ENTMCNC: 33.6 G/DL — SIGNIFICANT CHANGE UP (ref 32–37)
MCV RBC AUTO: 99.3 FL — HIGH (ref 81–99)
MONOCYTES # BLD AUTO: 1.14 K/UL — HIGH (ref 0.1–0.6)
MONOCYTES NFR BLD AUTO: 5.2 % — SIGNIFICANT CHANGE UP (ref 1.7–9.3)
NEUTROPHILS # BLD AUTO: 19.01 K/UL — HIGH (ref 1.4–6.5)
NEUTROPHILS NFR BLD AUTO: 87.4 % — HIGH (ref 42.2–75.2)
NRBC # BLD: 0 /100 WBCS — SIGNIFICANT CHANGE UP (ref 0–0)
PLATELET # BLD AUTO: 267 K/UL — SIGNIFICANT CHANGE UP (ref 130–400)
POTASSIUM SERPL-MCNC: 4.9 MMOL/L — SIGNIFICANT CHANGE UP (ref 3.5–5)
POTASSIUM SERPL-SCNC: 4.9 MMOL/L — SIGNIFICANT CHANGE UP (ref 3.5–5)
RBC # BLD: 4.59 M/UL — SIGNIFICANT CHANGE UP (ref 4.2–5.4)
RBC # FLD: 12.6 % — SIGNIFICANT CHANGE UP (ref 11.5–14.5)
SODIUM SERPL-SCNC: 133 MMOL/L — LOW (ref 135–146)
WBC # BLD: 21.73 K/UL — HIGH (ref 4.8–10.8)
WBC # FLD AUTO: 21.73 K/UL — HIGH (ref 4.8–10.8)

## 2019-05-31 RX ORDER — TRAZODONE HCL 50 MG
50 TABLET ORAL AT BEDTIME
Refills: 0 | Status: DISCONTINUED | OUTPATIENT
Start: 2019-05-31 | End: 2019-06-10

## 2019-05-31 RX ORDER — OXYCODONE HYDROCHLORIDE 5 MG/1
10 TABLET ORAL EVERY 6 HOURS
Refills: 0 | Status: DISCONTINUED | OUTPATIENT
Start: 2019-05-31 | End: 2019-06-07

## 2019-05-31 RX ORDER — FLUCONAZOLE 150 MG/1
100 TABLET ORAL DAILY
Refills: 0 | Status: DISCONTINUED | OUTPATIENT
Start: 2019-05-31 | End: 2019-06-06

## 2019-05-31 RX ADMIN — Medication 50 MILLIGRAM(S): at 21:59

## 2019-05-31 RX ADMIN — Medication 80 MILLIGRAM(S): at 13:07

## 2019-05-31 RX ADMIN — Medication 500000 UNIT(S): at 05:15

## 2019-05-31 RX ADMIN — OXYCODONE HYDROCHLORIDE 10 MILLIGRAM(S): 5 TABLET ORAL at 05:49

## 2019-05-31 RX ADMIN — Medication 3 MILLILITER(S): at 11:27

## 2019-05-31 RX ADMIN — PANTOPRAZOLE SODIUM 40 MILLIGRAM(S): 20 TABLET, DELAYED RELEASE ORAL at 05:14

## 2019-05-31 RX ADMIN — Medication 3 MILLILITER(S): at 07:49

## 2019-05-31 RX ADMIN — OXYCODONE HYDROCHLORIDE 10 MILLIGRAM(S): 5 TABLET ORAL at 21:59

## 2019-05-31 RX ADMIN — Medication 3 MILLILITER(S): at 20:11

## 2019-05-31 RX ADMIN — Medication 80 MILLIGRAM(S): at 05:14

## 2019-05-31 RX ADMIN — PREGABALIN 1000 MICROGRAM(S): 225 CAPSULE ORAL at 11:26

## 2019-05-31 RX ADMIN — Medication 0.5 MILLIGRAM(S): at 05:14

## 2019-05-31 RX ADMIN — Medication 100 MILLIGRAM(S): at 05:14

## 2019-05-31 RX ADMIN — Medication 600 MILLIGRAM(S): at 17:31

## 2019-05-31 RX ADMIN — OXYCODONE HYDROCHLORIDE 10 MILLIGRAM(S): 5 TABLET ORAL at 13:44

## 2019-05-31 RX ADMIN — Medication 40 MILLIGRAM(S): at 05:14

## 2019-05-31 RX ADMIN — AMLODIPINE BESYLATE 10 MILLIGRAM(S): 2.5 TABLET ORAL at 17:31

## 2019-05-31 RX ADMIN — Medication 500000 UNIT(S): at 11:26

## 2019-05-31 RX ADMIN — BUDESONIDE AND FORMOTEROL FUMARATE DIHYDRATE 2 PUFF(S): 160; 4.5 AEROSOL RESPIRATORY (INHALATION) at 08:30

## 2019-05-31 RX ADMIN — Medication 0.5 MILLIGRAM(S): at 13:07

## 2019-05-31 RX ADMIN — OXYCODONE HYDROCHLORIDE 10 MILLIGRAM(S): 5 TABLET ORAL at 05:17

## 2019-05-31 RX ADMIN — Medication 500000 UNIT(S): at 22:02

## 2019-05-31 RX ADMIN — OXYCODONE HYDROCHLORIDE 10 MILLIGRAM(S): 5 TABLET ORAL at 14:14

## 2019-05-31 RX ADMIN — NYSTATIN CREAM 1 APPLICATION(S): 100000 CREAM TOPICAL at 05:14

## 2019-05-31 RX ADMIN — Medication 500000 UNIT(S): at 00:01

## 2019-05-31 RX ADMIN — BUDESONIDE AND FORMOTEROL FUMARATE DIHYDRATE 2 PUFF(S): 160; 4.5 AEROSOL RESPIRATORY (INHALATION) at 22:00

## 2019-05-31 RX ADMIN — NYSTATIN CREAM 1 APPLICATION(S): 100000 CREAM TOPICAL at 17:43

## 2019-05-31 RX ADMIN — Medication 0.5 MILLIGRAM(S): at 22:00

## 2019-05-31 RX ADMIN — Medication 80 MILLIGRAM(S): at 17:32

## 2019-05-31 RX ADMIN — Medication 100 MILLIGRAM(S): at 13:07

## 2019-05-31 RX ADMIN — Medication 500000 UNIT(S): at 17:43

## 2019-05-31 NOTE — PROGRESS NOTE ADULT - SUBJECTIVE AND OBJECTIVE BOX
OVERNIGHT EVENTS: better, still wheezing, oral thrush    Vital Signs Last 24 Hrs  T(C): 36.1 (31 May 2019 05:39), Max: 36.4 (30 May 2019 13:21)  T(F): 96.9 (31 May 2019 05:39), Max: 97.6 (30 May 2019 13:21)  HR: 91 (31 May 2019 05:39) (91 - 104)  BP: 113/64 (31 May 2019 05:39) (113/64 - 194/79)  RR: 18 (31 May 2019 05:39) (18 - 18)  SpO2: 93% (30 May 2019 19:25) (93% - 95%)    PHYSICAL EXAMINATION:    GENERAL: cushinoid/ oral thrush    HEENT: Head is normocephalic and atraumatic. Extraocular muscles are intact. Mucous membranes are moist.    NECK: Supple.    LUNGS: b/l wheezing    HEART: Regular rate and rhythm without murmur.    ABDOMEN: Soft, nontender, and nondistended.      EXTREMITIES: Without any cyanosis, clubbing, rash, lesions or edema.    NEUROLOGIC: Grossly intact.    SKIN: No ulceration or induration present.      LABS:                        14.2   19.50 )-----------( 218      ( 30 May 2019 08:13 )             41.6     05-30    133<L>  |  91<L>  |  13  ----------------------------<  155<H>  5.0   |  31  |  0.5<L>    Ca    9.3      30 May 2019 08:13  Mg     2.2     05-30                              MICROBIOLOGY:      MEDICATIONS  (STANDING):  ALBUTerol/ipratropium for Nebulization 3 milliLiter(s) Nebulizer every 4 hours  ALPRAZolam 0.5 milliGRAM(s) Oral every 8 hours  amLODIPine   Tablet 10 milliGRAM(s) Oral <User Schedule>  buDESOnide  80 MICROgram(s)/formoterol 4.5 MICROgram(s) Inhaler 2 Puff(s) Inhalation two times a day  cyanocobalamin 1000 MICROGram(s) Oral daily  cyanocobalamin Injectable 1000 MICROGram(s) IntraMuscular once  docusate sodium 100 milliGRAM(s) Oral three times a day  furosemide    Tablet 40 milliGRAM(s) Oral daily  guaiFENesin/dextromethorphan  Syrup 10 milliLiter(s) Oral every 6 hours  heparin  Injectable 5000 Unit(s) SubCutaneous every 8 hours  methylPREDNISolone sodium succinate Injectable 80 milliGRAM(s) IV Push every 8 hours  nicotine -  14 mG/24Hr(s) Patch 1 patch Transdermal daily  nystatin    Suspension 771048 Unit(s) Oral four times a day  nystatin Powder 1 Application(s) Topical every 12 hours  pantoprazole    Tablet 40 milliGRAM(s) Oral before breakfast  senna 2 Tablet(s) Oral at bedtime    MEDICATIONS  (PRN):  ALBUTerol    90 MICROgram(s) HFA Inhaler 2 Puff(s) Inhalation every 6 hours PRN Bronchospasm  benzonatate 100 milliGRAM(s) Oral three times a day PRN Cough  oxyCODONE    IR 10 milliGRAM(s) Oral every 8 hours PRN Severe Pain (7 - 10)      RADIOLOGY & ADDITIONAL STUDIES:

## 2019-05-31 NOTE — PROGRESS NOTE ADULT - SUBJECTIVE AND OBJECTIVE BOX
KUSH RINALDI  [facility] T2-3A 024 B ([facility] T[facility])            Patient was evaluated and examined  by bedside, still remains dyspneic at rest with cough, no hypoxia, patient using accessory muscles for breathing, anxious at times. c/o on/off joints pain        REVIEW OF SYSTEMS:  please see pertinent positives mentioned above, all other 12 ROS negative      T(C): , Max: 36.1 (05-31-19 @ 05:39)  HR: 106 (05-31-19 @ 12:30)  BP: 173/80 (05-31-19 @ 12:30)  RR: 18 (05-31-19 @ 12:30)  SpO2: 93% (05-30-19 @ 19:25)  CAPILLARY BLOOD GLUCOSE          PHYSICAL EXAM:  GENERAL: NAD, AAOX3, dyspneic at rest,  patient is sitting  in a bed  HEENT: AT, NC, PERRLA, SUPPLE, NO JVD, NO CB  LUNG: decreased breath sounds B/L with severe persistent loud b/l expiratory wheezing ( dont even need to auscultate can hear it from distance), patient is using accessory muscles for breathing  CVS: normal S1, S2, RRR, NO M/G/R  ABDOMEN: soft, bowel sounds present, normoactive in all 4 quadrants, non-tender, non-distended  EXT: no E/C/C, positive PP b/l extremities  NEURO: no acute focal neurological deficits  SKIN: no rash, no ecchymosis        LAB  CBC  Date: 05-31-19 @ 06:50  Mean cell Bgxjovgdfj52.3  Mean cell Hemoglobin Conc33.6  Mean cell Volum 99.3  Platelet count-Automate 267  RBC Count 4.59  Red Cell Distrib Width12.6  WBC Count21.73  % Albumin, Urine--  Hematocrit 45.6  Hemoglobin 15.3  CBC  Date: 05-30-19 @ 08:13  Mean cell Invgjnvgnp21.4  Mean cell Hemoglobin Conc34.1  Mean cell Volum 97.9  Platelet count-Automate 218  RBC Count 4.25  Red Cell Distrib Width12.5  WBC Count19.50  % Albumin, Urine--  Hematocrit 41.6  Hemoglobin 14.2  CBC  Date: 05-29-19 @ 06:59  Mean cell Cjfengoftm05.9  Mean cell Hemoglobin Conc32.6  Mean cell Volum 100.7  Platelet count-Automate 255  RBC Count 4.35  Red Cell Distrib Width12.7  WBC Count17.38  % Albumin, Urine--  Hematocrit 43.8  Hemoglobin 14.3  CBC  Date: 05-28-19 @ 09:11  Mean cell Dzvqneuzjd66.6  Mean cell Hemoglobin Conc33.6  Mean cell Volum 100.0  Platelet count-Automate 228  RBC Count 4.41  Red Cell Distrib Width12.9  WBC Count19.52  % Albumin, Urine--  Hematocrit 44.1  Hemoglobin 14.8  CBC  Date: 05-25-19 @ 08:14  Mean cell Jowogcsucn89.9  Mean cell Hemoglobin Conc32.7  Mean cell Volum 100.7  Platelet count-Automate 260  RBC Count 4.31  Red Cell Distrib Width13.0  WBC Count17.89  % Albumin, Urine--  Hematocrit 43.4  Hemoglobin 14.2  CBC  Date: 05-24-19 @ 21:22  Mean cell Smxtwjpmrx61.0  Mean cell Hemoglobin Conc34.2  Mean cell Volum 99.3  Platelet count-Automate 283  RBC Count 4.56  Red Cell Distrib Width13.1  WBC Count15.60  % Albumin, Urine--  Hematocrit 45.3  Hemoglobin 15.5    [facility]  05-31-19 @ 06:50  Blood Gas Arterial-Calcium,Ionized--  Blood Urea Nitrogen, Serum 18 mg/dL [10 - 20]  Carbon Dioxide, Serum30 mmol/L [17 - 32]  Chloride, Serum87 mmol/L<L> [98 - 110]  Creatinie, Serum0.6 mg/dL<L> [0.7 - 1.5]  Glucose, Pvhhl447 mg/dL<H> [70 - 99]  Potassium, Serum4.9 mmol/L [3.5 - 5.0]  Sodium, Serum 133 mmol/L<L> [135 - 146]  [facility]  05-30-19 @ 08:13  Blood Gas Arterial-Calcium,Ionized--  Blood Urea Nitrogen, Serum 13 mg/dL [10 - 20]  Carbon Dioxide, Serum31 mmol/L [17 - 32]  Chloride, Serum91 mmol/L<L> [98 - 110]  Creatinie, Serum0.5 mg/dL<L> [0.7 - 1.5]  Glucose, Vxgug021 mg/dL<H> [70 - 99]  Potassium, Serum5.0 mmol/L [3.5 - 5.0]  Sodium, Serum 133 mmol/L<L> [135 - 146]  [facility]  05-29-19 @ 06:59  Blood Gas Arterial-Calcium,Ionized--  Blood Urea Nitrogen, Serum 13 mg/dL [10 - 20]  Carbon Dioxide, Serum31 mmol/L [17 - 32]  Chloride, Serum89 mmol/L<L> [98 - 110]  Creatinie, Serum0.5 mg/dL<L> [0.7 - 1.5]  Glucose, Dhwfn070 mg/dL<H> [70 - 99]  Potassium, Serum4.8 mmol/L [3.5 - 5.0]  Sodium, Serum 133 mmol/L<L> [135 - 146]  [facility]  05-28-19 @ 09:11  Blood Gas Arterial-Calcium,Ionized--  Blood Urea Nitrogen, Serum 11 mg/dL [10 - 20]  Carbon Dioxide, Serum25 mmol/L [17 - 32]  Chloride, Serum89 mmol/L<L> [98 - 110]  Creatinie, Serum0.6 mg/dL<L> [0.7 - 1.5]  Glucose, Qojtl314 mg/dL<H> [70 - 99]  Potassium, Serum3.7 mmol/L [3.5 - 5.0]  Sodium, Serum 132 mmol/L<L> [135 - 146]  [facility]  05-27-19 @ 21:57  Blood Gas Arterial-Calcium,Ionized--  Blood Urea Nitrogen, Serum 10 mg/dL [10 - 20]  Carbon Dioxide, Serum33 mmol/L<H> [17 - 32]  Chloride, Serum87 mmol/L<L> [98 - 110]  Creatinie, Serum0.6 mg/dL<L> [0.7 - 1.5]  Glucose, Fwwxq251 mg/dL<H> [70 - 99]  Potassium, Serum6.1 mmol/L<HH> [3.5 - 5.0] [Critical value: SPECIMEN NOT HEMOLYZED.         TYPE:(C=Critical,  N=Notification, A=Abnormal) C  TESTS: _POTASSIUM  DATE/TIME CALLED: _05/27/19 23:24  CALLED TO: _DR ODONNELL  READ BACK (2 Patient Identifiers)(Y/N): _Y  READ BACK VALUES (Y/N): _Y  CALLED BY: _          TJ]  Sodium, Serum 131 mmol/L<L> [135 - 146]        Medications:  ALBUTerol    90 MICROgram(s) HFA Inhaler 2 Puff(s) Inhalation every 6 hours PRN  ALBUTerol/ipratropium for Nebulization 3 milliLiter(s) Nebulizer every 4 hours  ALPRAZolam 0.5 milliGRAM(s) Oral every 8 hours  amLODIPine   Tablet 10 milliGRAM(s) Oral <User Schedule>  benzonatate 100 milliGRAM(s) Oral three times a day PRN  buDESOnide  80 MICROgram(s)/formoterol 4.5 MICROgram(s) Inhaler 2 Puff(s) Inhalation two times a day  cyanocobalamin 1000 MICROGram(s) Oral daily  cyanocobalamin Injectable 1000 MICROGram(s) IntraMuscular once  docusate sodium 100 milliGRAM(s) Oral three times a day  fluconAZOLE   Tablet 100 milliGRAM(s) Oral daily  furosemide    Tablet 40 milliGRAM(s) Oral daily  guaiFENesin  milliGRAM(s) Oral every 12 hours  heparin  Injectable 5000 Unit(s) SubCutaneous every 8 hours  methylPREDNISolone sodium succinate Injectable 80 milliGRAM(s) IV Push every 12 hours  nicotine -  14 mG/24Hr(s) Patch 1 patch Transdermal daily  nystatin    Suspension 806373 Unit(s) Oral four times a day  nystatin Powder 1 Application(s) Topical every 12 hours  oxyCODONE    IR 10 milliGRAM(s) Oral every 6 hours PRN  pantoprazole    Tablet 40 milliGRAM(s) Oral before breakfast  senna 2 Tablet(s) Oral at bedtime  traZODone 50 milliGRAM(s) Oral at bedtime        Assessment and Plan:  - Acute exacerbation of advanced COPD - with persistent symptoms  -continue patient  IV Solumedrol 60 mg IV every 12 hours/neb every 4 hours/ mainten inh. tx./antitussive tx. -still with poor response  -consulted Pulmonary  - recommended to add lasix, outpatient pulmonary rehab  -smoking cessation d/w pt. by bedside  -CXR- mild pulmonary congestion    - DM2 - bsfs with insulin co prn    -Vitamin B 12 deficiency- started on Vitamin B 12 tx.     -Mild hyponatremia -stable sodium levels- continue to monitor    HTN- added on daily lasix tx., continue norvasc tx.    -Insomnia- resumed pt. on trazodone 50 mg po once daily at bedtime( this is patient's home med)    -chronic pain syndrome- oa- continue current pain management    Daily GI and DVT proph.    #Progress Note Handoff: Pending  none _______,Tests__none_____,Test Results_______,Other; monitor for clinical resolution of dyspnea/wheezing  Family discussion: patient by bedside Disposition: Home___once medically stable .

## 2019-05-31 NOTE — PROGRESS NOTE ADULT - SUBJECTIVE AND OBJECTIVE BOX
SUBJECTIVE:    Patient is a 69y old Female who presents with a chief complaint of SOB (31 May 2019 13:41)    Endorses wheezing, shortness of breath.    PAST MEDICAL & SURGICAL HISTORY  Chronic pain  Depression  Anxiety  COPD (chronic obstructive pulmonary disease)  Neck mass       ALLERGIES:  penicillin (Unknown)    MEDICATIONS:  STANDING MEDICATIONS  ALBUTerol/ipratropium for Nebulization 3 milliLiter(s) Nebulizer every 4 hours  ALPRAZolam 0.5 milliGRAM(s) Oral every 8 hours  amLODIPine   Tablet 10 milliGRAM(s) Oral <User Schedule>  buDESOnide  80 MICROgram(s)/formoterol 4.5 MICROgram(s) Inhaler 2 Puff(s) Inhalation two times a day  cyanocobalamin 1000 MICROGram(s) Oral daily  cyanocobalamin Injectable 1000 MICROGram(s) IntraMuscular once  docusate sodium 100 milliGRAM(s) Oral three times a day  fluconAZOLE   Tablet 100 milliGRAM(s) Oral daily  furosemide    Tablet 40 milliGRAM(s) Oral daily  guaiFENesin  milliGRAM(s) Oral every 12 hours  heparin  Injectable 5000 Unit(s) SubCutaneous every 8 hours  methylPREDNISolone sodium succinate Injectable 80 milliGRAM(s) IV Push every 12 hours  nicotine -  14 mG/24Hr(s) Patch 1 patch Transdermal daily  nystatin    Suspension 606311 Unit(s) Oral four times a day  nystatin Powder 1 Application(s) Topical every 12 hours  pantoprazole    Tablet 40 milliGRAM(s) Oral before breakfast  senna 2 Tablet(s) Oral at bedtime  traZODone 50 milliGRAM(s) Oral at bedtime    PRN MEDICATIONS  ALBUTerol    90 MICROgram(s) HFA Inhaler 2 Puff(s) Inhalation every 6 hours PRN  benzonatate 100 milliGRAM(s) Oral three times a day PRN  oxyCODONE    IR 10 milliGRAM(s) Oral every 6 hours PRN    VITALS:   T(F): 96.9  HR: 106  BP: 173/80  RR: 18  SpO2: 93%    LABS:                        15.3   21.73 )-----------( 267      ( 31 May 2019 06:50 )             45.6     05-31    133<L>  |  87<L>  |  18  ----------------------------<  228<H>  4.9   |  30  |  0.6<L>    Ca    9.2      31 May 2019 06:50  Mg     2.2     05-31                            PHYSICAL EXAM:  GEN: NAD, looks anxious, pursed lip breathing  LUNGS: bilateral expiratory wheezing  HEART: RRR, no m/r/g  ABD: soft, NT/ND, +BS  EXT: no edema bilaterally  NEURO: AAOx3

## 2019-05-31 NOTE — PROGRESS NOTE ADULT - ASSESSMENT
#) Acute on chronic COPD exacerbation   - Flu A/B, RSV negative  - NC O2 to maintain sats of 88 -92 % - patient refused BiPAP, continues to sat well on room air however consistently looks uncomfortable - pursed lip breathing  - c/w Duonebs q6hrs and PRN and symbicort BID  - Solumedrol 80 mg IV q12  - fluconazole for oral thursh  - LE duplex neg  - PO lasix  - pulm following    #) prediabetes  - A1c 6.1  - patient counseled     #) Hypertension  - poorly controlled in patient most likely secondary to anxiety and also her pain contributes as well  - currently on amlodipine 10mg daily and daily lasix    #) hyponatremia  - sodium 133  - encourage water restriction    #) cataract   - Cataract Out pt f/u    #) DVT PPX  - Heparin sq    #) GI ppx     Activity Increase as tolerated    Dispo : when medically stable

## 2019-05-31 NOTE — PROGRESS NOTE ADULT - ASSESSMENT
IMPRESSION:    - Severe COPD at baseline/ Acute exacerbations  - Poor outpatient follow-up / Non compliant  - Continues to smoke  - Component of mild diastolic CHF and fluid overload (congestion on CXR)    RECOMMENDATION:    - Supplemental O2 as needed to keep SpO2>89%  - Duoneb q4h and PRN  - dec Solumedrol 80 mg q 12h   - Finished course of Levaquin  - fluconazole 100 mg q 24  - mucinex 600 q 12h  - po lasix  - Outpatient pulmonary rehab  - Overall Poor prognosis

## 2019-06-01 LAB
ANION GAP SERPL CALC-SCNC: 14 MMOL/L — SIGNIFICANT CHANGE UP (ref 7–14)
BASOPHILS # BLD AUTO: 0.09 K/UL — SIGNIFICANT CHANGE UP (ref 0–0.2)
BASOPHILS NFR BLD AUTO: 0.4 % — SIGNIFICANT CHANGE UP (ref 0–1)
BUN SERPL-MCNC: 17 MG/DL — SIGNIFICANT CHANGE UP (ref 10–20)
CALCIUM SERPL-MCNC: 9 MG/DL — SIGNIFICANT CHANGE UP (ref 8.5–10.1)
CHLORIDE SERPL-SCNC: 82 MMOL/L — LOW (ref 98–110)
CO2 SERPL-SCNC: 33 MMOL/L — HIGH (ref 17–32)
CREAT SERPL-MCNC: 0.7 MG/DL — SIGNIFICANT CHANGE UP (ref 0.7–1.5)
EOSINOPHIL # BLD AUTO: 0 K/UL — SIGNIFICANT CHANGE UP (ref 0–0.7)
EOSINOPHIL NFR BLD AUTO: 0 % — SIGNIFICANT CHANGE UP (ref 0–8)
GLUCOSE SERPL-MCNC: 256 MG/DL — HIGH (ref 70–99)
HCT VFR BLD CALC: 43.8 % — SIGNIFICANT CHANGE UP (ref 37–47)
HGB BLD-MCNC: 14.6 G/DL — SIGNIFICANT CHANGE UP (ref 12–16)
IMM GRANULOCYTES NFR BLD AUTO: 4.5 % — HIGH (ref 0.1–0.3)
LYMPHOCYTES # BLD AUTO: 0.87 K/UL — LOW (ref 1.2–3.4)
LYMPHOCYTES # BLD AUTO: 4 % — LOW (ref 20.5–51.1)
MAGNESIUM SERPL-MCNC: 2.2 MG/DL — SIGNIFICANT CHANGE UP (ref 1.8–2.4)
MCHC RBC-ENTMCNC: 33.3 G/DL — SIGNIFICANT CHANGE UP (ref 32–37)
MCHC RBC-ENTMCNC: 33.6 PG — HIGH (ref 27–31)
MCV RBC AUTO: 100.7 FL — HIGH (ref 81–99)
MONOCYTES # BLD AUTO: 1.43 K/UL — HIGH (ref 0.1–0.6)
MONOCYTES NFR BLD AUTO: 6.6 % — SIGNIFICANT CHANGE UP (ref 1.7–9.3)
NEUTROPHILS # BLD AUTO: 18.41 K/UL — HIGH (ref 1.4–6.5)
NEUTROPHILS NFR BLD AUTO: 84.5 % — HIGH (ref 42.2–75.2)
NRBC # BLD: 0 /100 WBCS — SIGNIFICANT CHANGE UP (ref 0–0)
PLATELET # BLD AUTO: 246 K/UL — SIGNIFICANT CHANGE UP (ref 130–400)
POTASSIUM SERPL-MCNC: 5.3 MMOL/L — HIGH (ref 3.5–5)
POTASSIUM SERPL-SCNC: 5.3 MMOL/L — HIGH (ref 3.5–5)
RBC # BLD: 4.35 M/UL — SIGNIFICANT CHANGE UP (ref 4.2–5.4)
RBC # FLD: 12.4 % — SIGNIFICANT CHANGE UP (ref 11.5–14.5)
SODIUM SERPL-SCNC: 129 MMOL/L — LOW (ref 135–146)
WBC # BLD: 21.78 K/UL — HIGH (ref 4.8–10.8)
WBC # FLD AUTO: 21.78 K/UL — HIGH (ref 4.8–10.8)

## 2019-06-01 PROCEDURE — 99233 SBSQ HOSP IP/OBS HIGH 50: CPT

## 2019-06-01 RX ORDER — ALPRAZOLAM 0.25 MG
0.5 TABLET ORAL EVERY 8 HOURS
Refills: 0 | Status: DISCONTINUED | OUTPATIENT
Start: 2019-06-01 | End: 2019-06-08

## 2019-06-01 RX ADMIN — OXYCODONE HYDROCHLORIDE 10 MILLIGRAM(S): 5 TABLET ORAL at 05:27

## 2019-06-01 RX ADMIN — Medication 3 MILLILITER(S): at 08:39

## 2019-06-01 RX ADMIN — PREGABALIN 1000 MICROGRAM(S): 225 CAPSULE ORAL at 11:57

## 2019-06-01 RX ADMIN — Medication 3 MILLILITER(S): at 19:50

## 2019-06-01 RX ADMIN — Medication 100 MILLIGRAM(S): at 13:10

## 2019-06-01 RX ADMIN — Medication 500000 UNIT(S): at 13:10

## 2019-06-01 RX ADMIN — Medication 0.5 MILLIGRAM(S): at 21:41

## 2019-06-01 RX ADMIN — Medication 600 MILLIGRAM(S): at 17:10

## 2019-06-01 RX ADMIN — PANTOPRAZOLE SODIUM 40 MILLIGRAM(S): 20 TABLET, DELAYED RELEASE ORAL at 05:25

## 2019-06-01 RX ADMIN — Medication 40 MILLIGRAM(S): at 05:25

## 2019-06-01 RX ADMIN — Medication 3 MILLILITER(S): at 16:26

## 2019-06-01 RX ADMIN — OXYCODONE HYDROCHLORIDE 10 MILLIGRAM(S): 5 TABLET ORAL at 06:01

## 2019-06-01 RX ADMIN — Medication 500000 UNIT(S): at 05:26

## 2019-06-01 RX ADMIN — Medication 60 MILLIGRAM(S): at 17:11

## 2019-06-01 RX ADMIN — NYSTATIN CREAM 1 APPLICATION(S): 100000 CREAM TOPICAL at 05:26

## 2019-06-01 RX ADMIN — Medication 80 MILLIGRAM(S): at 05:25

## 2019-06-01 RX ADMIN — AMLODIPINE BESYLATE 10 MILLIGRAM(S): 2.5 TABLET ORAL at 17:11

## 2019-06-01 RX ADMIN — OXYCODONE HYDROCHLORIDE 10 MILLIGRAM(S): 5 TABLET ORAL at 11:54

## 2019-06-01 RX ADMIN — Medication 0.5 MILLIGRAM(S): at 05:48

## 2019-06-01 RX ADMIN — Medication 600 MILLIGRAM(S): at 05:25

## 2019-06-01 RX ADMIN — BUDESONIDE AND FORMOTEROL FUMARATE DIHYDRATE 2 PUFF(S): 160; 4.5 AEROSOL RESPIRATORY (INHALATION) at 21:41

## 2019-06-01 RX ADMIN — Medication 100 MILLIGRAM(S): at 21:41

## 2019-06-01 RX ADMIN — NYSTATIN CREAM 1 APPLICATION(S): 100000 CREAM TOPICAL at 17:12

## 2019-06-01 RX ADMIN — Medication 0.5 MILLIGRAM(S): at 13:13

## 2019-06-01 RX ADMIN — Medication 500000 UNIT(S): at 17:10

## 2019-06-01 RX ADMIN — OXYCODONE HYDROCHLORIDE 10 MILLIGRAM(S): 5 TABLET ORAL at 18:44

## 2019-06-01 RX ADMIN — Medication 500000 UNIT(S): at 21:48

## 2019-06-01 RX ADMIN — BUDESONIDE AND FORMOTEROL FUMARATE DIHYDRATE 2 PUFF(S): 160; 4.5 AEROSOL RESPIRATORY (INHALATION) at 07:55

## 2019-06-01 RX ADMIN — FLUCONAZOLE 100 MILLIGRAM(S): 150 TABLET ORAL at 11:57

## 2019-06-01 RX ADMIN — HEPARIN SODIUM 5000 UNIT(S): 5000 INJECTION INTRAVENOUS; SUBCUTANEOUS at 13:11

## 2019-06-01 RX ADMIN — Medication 100 MILLIGRAM(S): at 05:25

## 2019-06-01 RX ADMIN — Medication 50 MILLIGRAM(S): at 21:41

## 2019-06-01 RX ADMIN — Medication 3 MILLILITER(S): at 12:27

## 2019-06-01 NOTE — PROGRESS NOTE ADULT - ASSESSMENT
#) Acute on chronic COPD exacerbation   - Flu A/B, RSV negative  - NC O2 to maintain sats of 88 -92 % - patient refused BiPAP, continues to sat well on room air however consistently looks uncomfortable - pursed lip breathing  - c/w Duonebs q6hrs and PRN and symbicort BID  - Solumedrol 80 mg IV q12  - fluconazole for oral thursh  - LE duplex neg  - PO lasix  - pulm following    #) prediabetes  - A1c 6.1  - patient counseled     #) Hypertension  - poorly controlled in patient most likely secondary to anxiety and also her pain contributes as well  - currently on amlodipine 10mg daily and daily lasix    #) hyponatremia  - sodium 129  - encourage water restriction    #)hyperkalemia  - follow BMP  - currently on lasix    #) cataract   - Cataract Out pt f/u    #) DVT PPX  - Heparin sq    #) GI ppx     Activity Increase as tolerated    Dispo : when medically stable

## 2019-06-01 NOTE — PROGRESS NOTE ADULT - SUBJECTIVE AND OBJECTIVE BOX
SUBJECTIVE:    Patient is a 69y old Female who presents with a chief complaint of SOB (31 May 2019 16:16)    No overnight events. This morning she continues to report shortness of breath mostly on exertion. She is anxious and reports occasional pain.    PAST MEDICAL & SURGICAL HISTORY  Chronic pain  Depression  Anxiety  COPD (chronic obstructive pulmonary disease)  Neck mass       ALLERGIES:  penicillin (Unknown)    MEDICATIONS:  STANDING MEDICATIONS  ALBUTerol/ipratropium for Nebulization 3 milliLiter(s) Nebulizer every 4 hours  ALPRAZolam 0.5 milliGRAM(s) Oral every 8 hours  amLODIPine   Tablet 10 milliGRAM(s) Oral <User Schedule>  buDESOnide  80 MICROgram(s)/formoterol 4.5 MICROgram(s) Inhaler 2 Puff(s) Inhalation two times a day  cyanocobalamin 1000 MICROGram(s) Oral daily  docusate sodium 100 milliGRAM(s) Oral three times a day  fluconAZOLE   Tablet 100 milliGRAM(s) Oral daily  furosemide    Tablet 40 milliGRAM(s) Oral daily  guaiFENesin  milliGRAM(s) Oral every 12 hours  heparin  Injectable 5000 Unit(s) SubCutaneous every 8 hours  methylPREDNISolone sodium succinate Injectable 60 milliGRAM(s) IV Push every 12 hours  nicotine -  14 mG/24Hr(s) Patch 1 patch Transdermal daily  nystatin    Suspension 739754 Unit(s) Oral four times a day  nystatin Powder 1 Application(s) Topical every 12 hours  pantoprazole    Tablet 40 milliGRAM(s) Oral before breakfast  senna 2 Tablet(s) Oral at bedtime  traZODone 50 milliGRAM(s) Oral at bedtime    PRN MEDICATIONS  ALBUTerol    90 MICROgram(s) HFA Inhaler 2 Puff(s) Inhalation every 6 hours PRN  benzonatate 100 milliGRAM(s) Oral three times a day PRN  oxyCODONE    IR 10 milliGRAM(s) Oral every 6 hours PRN    VITALS:   T(F): 97.1  HR: 95  BP: 154/78  RR: 18  SpO2: 94%    LABS:                        14.6   21.78 )-----------( 246      ( 01 Jun 2019 07:59 )             43.8     06-01    129<L>  |  82<L>  |  17  ----------------------------<  256<H>  5.3<H>   |  33<H>  |  0.7    Ca    9.0      01 Jun 2019 07:59  Mg     2.2     06-01                            PHYSICAL EXAM:  GEN: NAD, anxious, pursed lip breathing  LUNGS: bilateral expiratory wheezing  HEART: RRR, no m/r/g  ABD: soft, non-tender, +BS  EXT: no pitting edema  NEURO: AAOx3

## 2019-06-01 NOTE — PROGRESS NOTE ADULT - SUBJECTIVE AND OBJECTIVE BOX
KUSH RINALDI  [facility] T2-3A 024 B ([facility] T[facility])            Patient was evaluated and examined  by bedside, feels better today, still wheezing, dyspneic at rest, mild cough, no fever, no hypoxia        REVIEW OF SYSTEMS:  please see pertinent positives mentioned above, all other 12 ROS negative      T(C): , Max: 36.2 (06-01-19 @ 04:54)  HR: 95 (06-01-19 @ 04:54)  BP: 154/78 (06-01-19 @ 04:54)  RR: 18 (06-01-19 @ 07:45)  SpO2: 94% (06-01-19 @ 07:45)  CAPILLARY BLOOD GLUCOSE          PHYSICAL EXAM:  GENERAL: NAD, AAOX3, dyspneic at rest,  patient is sitting  in a bed  HEENT: AT, NC, PERRLA, SUPPLE, NO JVD, NO CB  LUNG: decreased breath sounds B/L with moderate persistent  b/l expiratory wheezing  CVS: normal S1, S2, RRR, NO M/G/R  ABDOMEN: soft, bowel sounds present, normoactive in all 4 quadrants, non-tender, non-distended  EXT: no E/C/C, positive PP b/l extremities  NEURO: no acute focal neurological deficits  SKIN: no rash, no ecchymosis        LAB  CBC  Date: 06-01-19 @ 07:59  Mean cell Xdobatpbmb06.6  Mean cell Hemoglobin Conc33.3  Mean cell Volum 100.7  Platelet count-Automate 246  RBC Count 4.35  Red Cell Distrib Width12.4  WBC Count21.78  % Albumin, Urine--  Hematocrit 43.8  Hemoglobin 14.6  CBC  Date: 05-31-19 @ 06:50  Mean cell Bfvytdreqe02.3  Mean cell Hemoglobin Conc33.6  Mean cell Volum 99.3  Platelet count-Automate 267  RBC Count 4.59  Red Cell Distrib Width12.6  WBC Count21.73  % Albumin, Urine--  Hematocrit 45.6  Hemoglobin 15.3  CBC  Date: 05-30-19 @ 08:13  Mean cell Kkpxiwyeyr06.4  Mean cell Hemoglobin Conc34.1  Mean cell Volum 97.9  Platelet count-Automate 218  RBC Count 4.25  Red Cell Distrib Width12.5  WBC Count19.50  % Albumin, Urine--  Hematocrit 41.6  Hemoglobin 14.2  CBC  Date: 05-29-19 @ 06:59  Mean cell Jytieyfpls10.9  Mean cell Hemoglobin Conc32.6  Mean cell Volum 100.7  Platelet count-Automate 255  RBC Count 4.35  Red Cell Distrib Width12.7  WBC Count17.38  % Albumin, Urine--  Hematocrit 43.8  Hemoglobin 14.3  CBC  Date: 05-28-19 @ 09:11  Mean cell Rtblmteren82.6  Mean cell Hemoglobin Conc33.6  Mean cell Volum 100.0  Platelet count-Automate 228  RBC Count 4.41  Red Cell Distrib Width12.9  WBC Count19.52  % Albumin, Urine--  Hematocrit 44.1  Hemoglobin 14.8    [facility]  06-01-19 @ 07:59  Blood Gas Arterial-Calcium,Ionized--  Blood Urea Nitrogen, Serum 17 mg/dL [10 - 20]  Carbon Dioxide, Serum33 mmol/L<H> [17 - 32]  Chloride, Serum82 mmol/L<L> [98 - 110]  Creatinie, Serum0.7 mg/dL [0.7 - 1.5]  Glucose, Gpgmy845 mg/dL<H> [70 - 99]  Potassium, Serum5.3 mmol/L<H> [3.5 - 5.0]  Sodium, Serum 129 mmol/L<L> [135 - 146]  [facility]  05-31-19 @ 06:50  Blood Gas Arterial-Calcium,Ionized--  Blood Urea Nitrogen, Serum 18 mg/dL [10 - 20]  Carbon Dioxide, Serum30 mmol/L [17 - 32]  Chloride, Serum87 mmol/L<L> [98 - 110]  Creatinie, Serum0.6 mg/dL<L> [0.7 - 1.5]  Glucose, Nbmhf870 mg/dL<H> [70 - 99]  Potassium, Serum4.9 mmol/L [3.5 - 5.0]  Sodium, Serum 133 mmol/L<L> [135 - 146]  [facility]  05-30-19 @ 08:13  Blood Gas Arterial-Calcium,Ionized--  Blood Urea Nitrogen, Serum 13 mg/dL [10 - 20]  Carbon Dioxide, Serum31 mmol/L [17 - 32]  Chloride, Serum91 mmol/L<L> [98 - 110]  Creatinie, Serum0.5 mg/dL<L> [0.7 - 1.5]  Glucose, Vtnzz072 mg/dL<H> [70 - 99]  Potassium, Serum5.0 mmol/L [3.5 - 5.0]  Sodium, Serum 133 mmol/L<L> [135 - 146]  [facility]  05-29-19 @ 06:59  Blood Gas Arterial-Calcium,Ionized--  Blood Urea Nitrogen, Serum 13 mg/dL [10 - 20]  Carbon Dioxide, Serum31 mmol/L [17 - 32]  Chloride, Serum89 mmol/L<L> [98 - 110]  Creatinie, Serum0.5 mg/dL<L> [0.7 - 1.5]  Glucose, Yfqhm926 mg/dL<H> [70 - 99]  Potassium, Serum4.8 mmol/L [3.5 - 5.0]  Sodium, Serum 133 mmol/L<L> [135 - 146]  [facility]  05-28-19 @ 09:11  Blood Gas Arterial-Calcium,Ionized--  Blood Urea Nitrogen, Serum 11 mg/dL [10 - 20]  Carbon Dioxide, Serum25 mmol/L [17 - 32]  Chloride, Serum89 mmol/L<L> [98 - 110]  Creatinie, Serum0.6 mg/dL<L> [0.7 - 1.5]  Glucose, Krdwf642 mg/dL<H> [70 - 99]  Potassium, Serum3.7 mmol/L [3.5 - 5.0]  Sodium, Serum 132 mmol/L<L> [135 - 146]  [facility]  05-27-19 @ 21:57  Blood Gas Arterial-Calcium,Ionized--  Blood Urea Nitrogen, Serum 10 mg/dL [10 - 20]  Carbon Dioxide, Serum33 mmol/L<H> [17 - 32]  Chloride, Serum87 mmol/L<L> [98 - 110]  Creatinie, Serum0.6 mg/dL<L> [0.7 - 1.5]  Glucose, Lvccc690 mg/dL<H> [70 - 99]  Potassium, Serum6.1 mmol/L<HH> [3.5 - 5.0] [Critical value: SPECIMEN NOT HEMOLYZED.         TYPE:(C=Critical,  N=Notification, A=Abnormal) C  TESTS: _POTASSIUM  DATE/TIME CALLED: _05/27/19 23:24  CALLED TO: _DR ODONNELL  READ BACK (2 Patient Identifiers)(Y/N): _Y  READ BACK VALUES (Y/N): _Y  CALLED BY: _          TJ]  Sodium, Serum 131 mmol/L<L> [135 - 146]        Medications:  ALBUTerol    90 MICROgram(s) HFA Inhaler 2 Puff(s) Inhalation every 6 hours PRN  ALBUTerol/ipratropium for Nebulization 3 milliLiter(s) Nebulizer every 4 hours  ALPRAZolam 0.5 milliGRAM(s) Oral every 8 hours  amLODIPine   Tablet 10 milliGRAM(s) Oral <User Schedule>  benzonatate 100 milliGRAM(s) Oral three times a day PRN  buDESOnide  80 MICROgram(s)/formoterol 4.5 MICROgram(s) Inhaler 2 Puff(s) Inhalation two times a day  cyanocobalamin 1000 MICROGram(s) Oral daily  docusate sodium 100 milliGRAM(s) Oral three times a day  fluconAZOLE   Tablet 100 milliGRAM(s) Oral daily  furosemide    Tablet 40 milliGRAM(s) Oral daily  guaiFENesin  milliGRAM(s) Oral every 12 hours  heparin  Injectable 5000 Unit(s) SubCutaneous every 8 hours  methylPREDNISolone sodium succinate Injectable 60 milliGRAM(s) IV Push every 12 hours  nicotine -  14 mG/24Hr(s) Patch 1 patch Transdermal daily  nystatin    Suspension 389696 Unit(s) Oral four times a day  nystatin Powder 1 Application(s) Topical every 12 hours  oxyCODONE    IR 10 milliGRAM(s) Oral every 6 hours PRN  pantoprazole    Tablet 40 milliGRAM(s) Oral before breakfast  senna 2 Tablet(s) Oral at bedtime  traZODone 50 milliGRAM(s) Oral at bedtime        Assessment and Plan:  - Acute exacerbation of advanced COPD - with persistent symptoms  -continue patient  IV Solumedrol 60 mg IV every 12 hours/neb every 4 hours/ mainten inh. tx./antitussive tx. -now has very slow response to meds tx.  -consulted Pulmonary  - recommended to add lasix, outpatient pulmonary rehab  -smoking cessation d/w pt. by bedside  -CXR- mild pulmonary congestion    - DM2 - bsfs with insulin co prn    -Vitamin B 12 deficiency- started on Vitamin B 12 tx.     -Mild hyponatremia -stable sodium levels- continue to monitor    HTN- added on daily lasix tx., continue norvasc tx.    -Insomnia- resumed pt. on trazodone 50 mg po once daily at bedtime( this is patient's home med)    -chronic pain syndrome- oa- continue current pain management    Daily GI and DVT proph.    #Progress Note Handoff: Pending  none _______,Tests__none_____,Test Results_______,Other; monitor for clinical resolution of dyspnea/wheezing  Family discussion: patient by bedside Disposition: Home___once medically stable .

## 2019-06-02 LAB
ANION GAP SERPL CALC-SCNC: 14 MMOL/L — SIGNIFICANT CHANGE UP (ref 7–14)
BASOPHILS # BLD AUTO: 0.1 K/UL — SIGNIFICANT CHANGE UP (ref 0–0.2)
BASOPHILS NFR BLD AUTO: 0.5 % — SIGNIFICANT CHANGE UP (ref 0–1)
BUN SERPL-MCNC: 15 MG/DL — SIGNIFICANT CHANGE UP (ref 10–20)
CALCIUM SERPL-MCNC: 8.7 MG/DL — SIGNIFICANT CHANGE UP (ref 8.5–10.1)
CHLORIDE SERPL-SCNC: 82 MMOL/L — LOW (ref 98–110)
CO2 SERPL-SCNC: 32 MMOL/L — SIGNIFICANT CHANGE UP (ref 17–32)
CREAT SERPL-MCNC: 0.6 MG/DL — LOW (ref 0.7–1.5)
EOSINOPHIL # BLD AUTO: 0 K/UL — SIGNIFICANT CHANGE UP (ref 0–0.7)
EOSINOPHIL NFR BLD AUTO: 0 % — SIGNIFICANT CHANGE UP (ref 0–8)
GLUCOSE SERPL-MCNC: 215 MG/DL — HIGH (ref 70–99)
HCT VFR BLD CALC: 42.8 % — SIGNIFICANT CHANGE UP (ref 37–47)
HGB BLD-MCNC: 14.5 G/DL — SIGNIFICANT CHANGE UP (ref 12–16)
IMM GRANULOCYTES NFR BLD AUTO: 5.2 % — HIGH (ref 0.1–0.3)
LYMPHOCYTES # BLD AUTO: 0.84 K/UL — LOW (ref 1.2–3.4)
LYMPHOCYTES # BLD AUTO: 4.4 % — LOW (ref 20.5–51.1)
MAGNESIUM SERPL-MCNC: 2.1 MG/DL — SIGNIFICANT CHANGE UP (ref 1.8–2.4)
MCHC RBC-ENTMCNC: 33.8 PG — HIGH (ref 27–31)
MCHC RBC-ENTMCNC: 33.9 G/DL — SIGNIFICANT CHANGE UP (ref 32–37)
MCV RBC AUTO: 99.8 FL — HIGH (ref 81–99)
MONOCYTES # BLD AUTO: 1.21 K/UL — HIGH (ref 0.1–0.6)
MONOCYTES NFR BLD AUTO: 6.4 % — SIGNIFICANT CHANGE UP (ref 1.7–9.3)
NEUTROPHILS # BLD AUTO: 15.89 K/UL — HIGH (ref 1.4–6.5)
NEUTROPHILS NFR BLD AUTO: 83.5 % — HIGH (ref 42.2–75.2)
NRBC # BLD: 0 /100 WBCS — SIGNIFICANT CHANGE UP (ref 0–0)
PLATELET # BLD AUTO: 254 K/UL — SIGNIFICANT CHANGE UP (ref 130–400)
POTASSIUM SERPL-MCNC: 5.2 MMOL/L — HIGH (ref 3.5–5)
POTASSIUM SERPL-SCNC: 5.2 MMOL/L — HIGH (ref 3.5–5)
RBC # BLD: 4.29 M/UL — SIGNIFICANT CHANGE UP (ref 4.2–5.4)
RBC # FLD: 12.4 % — SIGNIFICANT CHANGE UP (ref 11.5–14.5)
SODIUM SERPL-SCNC: 128 MMOL/L — LOW (ref 135–146)
WBC # BLD: 19.02 K/UL — HIGH (ref 4.8–10.8)
WBC # FLD AUTO: 19.02 K/UL — HIGH (ref 4.8–10.8)

## 2019-06-02 PROCEDURE — 99232 SBSQ HOSP IP/OBS MODERATE 35: CPT

## 2019-06-02 RX ADMIN — AMLODIPINE BESYLATE 10 MILLIGRAM(S): 2.5 TABLET ORAL at 17:54

## 2019-06-02 RX ADMIN — OXYCODONE HYDROCHLORIDE 10 MILLIGRAM(S): 5 TABLET ORAL at 06:14

## 2019-06-02 RX ADMIN — Medication 100 MILLIGRAM(S): at 14:15

## 2019-06-02 RX ADMIN — FLUCONAZOLE 100 MILLIGRAM(S): 150 TABLET ORAL at 11:33

## 2019-06-02 RX ADMIN — Medication 60 MILLIGRAM(S): at 05:45

## 2019-06-02 RX ADMIN — Medication 100 MILLIGRAM(S): at 05:44

## 2019-06-02 RX ADMIN — Medication 600 MILLIGRAM(S): at 17:54

## 2019-06-02 RX ADMIN — OXYCODONE HYDROCHLORIDE 10 MILLIGRAM(S): 5 TABLET ORAL at 05:44

## 2019-06-02 RX ADMIN — Medication 500000 UNIT(S): at 11:33

## 2019-06-02 RX ADMIN — NYSTATIN CREAM 1 APPLICATION(S): 100000 CREAM TOPICAL at 05:45

## 2019-06-02 RX ADMIN — Medication 500000 UNIT(S): at 05:45

## 2019-06-02 RX ADMIN — OXYCODONE HYDROCHLORIDE 10 MILLIGRAM(S): 5 TABLET ORAL at 21:51

## 2019-06-02 RX ADMIN — Medication 100 MILLIGRAM(S): at 21:21

## 2019-06-02 RX ADMIN — Medication 40 MILLIGRAM(S): at 05:44

## 2019-06-02 RX ADMIN — OXYCODONE HYDROCHLORIDE 10 MILLIGRAM(S): 5 TABLET ORAL at 21:21

## 2019-06-02 RX ADMIN — Medication 60 MILLIGRAM(S): at 17:55

## 2019-06-02 RX ADMIN — NYSTATIN CREAM 1 APPLICATION(S): 100000 CREAM TOPICAL at 17:54

## 2019-06-02 RX ADMIN — Medication 600 MILLIGRAM(S): at 05:44

## 2019-06-02 RX ADMIN — BUDESONIDE AND FORMOTEROL FUMARATE DIHYDRATE 2 PUFF(S): 160; 4.5 AEROSOL RESPIRATORY (INHALATION) at 21:21

## 2019-06-02 RX ADMIN — Medication 3 MILLILITER(S): at 07:55

## 2019-06-02 RX ADMIN — Medication 3 MILLILITER(S): at 12:14

## 2019-06-02 RX ADMIN — BUDESONIDE AND FORMOTEROL FUMARATE DIHYDRATE 2 PUFF(S): 160; 4.5 AEROSOL RESPIRATORY (INHALATION) at 08:42

## 2019-06-02 RX ADMIN — OXYCODONE HYDROCHLORIDE 10 MILLIGRAM(S): 5 TABLET ORAL at 11:34

## 2019-06-02 RX ADMIN — Medication 500000 UNIT(S): at 21:21

## 2019-06-02 RX ADMIN — Medication 500000 UNIT(S): at 17:54

## 2019-06-02 RX ADMIN — Medication 0.5 MILLIGRAM(S): at 05:44

## 2019-06-02 RX ADMIN — Medication 3 MILLILITER(S): at 16:15

## 2019-06-02 RX ADMIN — PANTOPRAZOLE SODIUM 40 MILLIGRAM(S): 20 TABLET, DELAYED RELEASE ORAL at 05:45

## 2019-06-02 RX ADMIN — Medication 0.5 MILLIGRAM(S): at 21:21

## 2019-06-02 RX ADMIN — Medication 0.5 MILLIGRAM(S): at 14:14

## 2019-06-02 RX ADMIN — PREGABALIN 1000 MICROGRAM(S): 225 CAPSULE ORAL at 11:33

## 2019-06-02 RX ADMIN — Medication 50 MILLIGRAM(S): at 21:21

## 2019-06-02 NOTE — PROGRESS NOTE ADULT - SUBJECTIVE AND OBJECTIVE BOX
Pt seen and examined at bedside. Reports PARRA, cough. No other complaints.     VITAL SIGNS (Last 24 hrs):  T(C): 37.1 (19 @ 12:30), Max: 37.1 (19 @ 12:30)  HR: 111 (19 @ 12:30) (101 - 111)  BP: 160/72 (19 @ 12:30) (134/84 - 160/72)  RR: 18 (19 @ 12:30) (18 - 20)  SpO2: 94% (19 @ 07:57) (92% - 94%)  Wt(kg): --  Daily     Daily Weight in k.8 (2019 06:14)    I&O's Summary    2019 07:01  -  2019 07:00  --------------------------------------------------------  IN: 0 mL / OUT: 700 mL / NET: -700 mL        PHYSICAL EXAM:  GENERAL: NAD, well-developed  HEAD:  Atraumatic, Normocephalic  EYES: EOMI, PERRLA, conjunctiva and sclera clear  NECK: Supple, No JVD  CHEST/LUNG: diffuse expiratory wheezing bilaterally; No wheeze  HEART: Regular rate and rhythm; No murmurs, rubs, or gallops  ABDOMEN: Soft, Nontender, Nondistended; Bowel sounds present  EXTREMITIES:  2+ Peripheral Pulses, No clubbing, cyanosis, or edema  PSYCH: AAOx3  NEUROLOGY: non-focal  SKIN: No rashes or lesions    Labs Reviewed  Spoke to patient in regards to abnormal labs.    CBC Full  -  ( 2019 08:18 )  WBC Count : 19.02 K/uL  Hemoglobin : 14.5 g/dL  Hematocrit : 42.8 %  Platelet Count - Automated : 254 K/uL  Mean Cell Volume : 99.8 fL  Mean Cell Hemoglobin : 33.8 pg  Mean Cell Hemoglobin Concentration : 33.9 g/dL  Auto Neutrophil # : 15.89 K/uL  Auto Lymphocyte # : 0.84 K/uL  Auto Monocyte # : 1.21 K/uL  Auto Eosinophil # : 0.00 K/uL  Auto Basophil # : 0.10 K/uL  Auto Neutrophil % : 83.5 %  Auto Lymphocyte % : 4.4 %  Auto Monocyte % : 6.4 %  Auto Eosinophil % : 0.0 %  Auto Basophil % : 0.5 %    BMP:     @ 08:18    Blood Urea Nitrogen - 15  Calcium - 8.7  Carbond Dioxide - 32  Chloride - 82  Creatinine - 0.6  Glucose - 215  Potassium - 5.2  Sodium - 128      Hemoglobin A1c - Hemoglobin A1C, Whole Blood: 6.1 % ( @ 08:13)           MEDICATIONS  (STANDING):  ALBUTerol/ipratropium for Nebulization 3 milliLiter(s) Nebulizer every 4 hours  ALPRAZolam 0.5 milliGRAM(s) Oral every 8 hours  amLODIPine   Tablet 10 milliGRAM(s) Oral <User Schedule>  buDESOnide  80 MICROgram(s)/formoterol 4.5 MICROgram(s) Inhaler 2 Puff(s) Inhalation two times a day  cyanocobalamin 1000 MICROGram(s) Oral daily  docusate sodium 100 milliGRAM(s) Oral three times a day  fluconAZOLE   Tablet 100 milliGRAM(s) Oral daily  furosemide    Tablet 40 milliGRAM(s) Oral daily  guaiFENesin  milliGRAM(s) Oral every 12 hours  heparin  Injectable 5000 Unit(s) SubCutaneous every 8 hours  methylPREDNISolone sodium succinate Injectable 60 milliGRAM(s) IV Push every 12 hours  nicotine -  14 mG/24Hr(s) Patch 1 patch Transdermal daily  nystatin    Suspension 211720 Unit(s) Oral four times a day  nystatin Powder 1 Application(s) Topical every 12 hours  pantoprazole    Tablet 40 milliGRAM(s) Oral before breakfast  senna 2 Tablet(s) Oral at bedtime  traZODone 50 milliGRAM(s) Oral at bedtime    MEDICATIONS  (PRN):  ALBUTerol    90 MICROgram(s) HFA Inhaler 2 Puff(s) Inhalation every 6 hours PRN Bronchospasm  benzonatate 100 milliGRAM(s) Oral three times a day PRN Cough  oxyCODONE    IR 10 milliGRAM(s) Oral every 6 hours PRN Severe Pain (7 - 10)

## 2019-06-02 NOTE — PROGRESS NOTE ADULT - ASSESSMENT
#) Acute on chronic COPD exacerbation   - Flu A/B, RSV negative  - c/w Duonebs q6hrs and PRN   - c/w Symbicort BID  - Solumedrol 60 mg IV q12  - LE duplex neg  - Hold PO Lasix  - pulm following    #) prediabetes  - A1c 6.1  - patient counseled     #) Hypertension  - poorly controlled in patient most likely secondary to anxiety and also her pain contributes as well  - currently on amlodipine 10mg daily and daily lasix    #) hyponatremia  - obtain serum osm, urine osm, urine sodium   - hold lasix for now     #) mild hyperkalemia  - follow BMP  - low k diet     #) Vitamin B12 deficiency - c/w supplementation     #) DVT PPX  - Heparin sq

## 2019-06-03 LAB
ANION GAP SERPL CALC-SCNC: 9 MMOL/L — SIGNIFICANT CHANGE UP (ref 7–14)
BASOPHILS # BLD AUTO: 0.13 K/UL — SIGNIFICANT CHANGE UP (ref 0–0.2)
BASOPHILS NFR BLD AUTO: 0.7 % — SIGNIFICANT CHANGE UP (ref 0–1)
BUN SERPL-MCNC: 17 MG/DL — SIGNIFICANT CHANGE UP (ref 10–20)
CALCIUM SERPL-MCNC: 8.8 MG/DL — SIGNIFICANT CHANGE UP (ref 8.5–10.1)
CHLORIDE SERPL-SCNC: 85 MMOL/L — LOW (ref 98–110)
CO2 SERPL-SCNC: 35 MMOL/L — HIGH (ref 17–32)
CREAT SERPL-MCNC: 0.5 MG/DL — LOW (ref 0.7–1.5)
EOSINOPHIL # BLD AUTO: 0.01 K/UL — SIGNIFICANT CHANGE UP (ref 0–0.7)
EOSINOPHIL NFR BLD AUTO: 0.1 % — SIGNIFICANT CHANGE UP (ref 0–8)
GLUCOSE SERPL-MCNC: 207 MG/DL — HIGH (ref 70–99)
HCT VFR BLD CALC: 43.1 % — SIGNIFICANT CHANGE UP (ref 37–47)
HGB BLD-MCNC: 14.2 G/DL — SIGNIFICANT CHANGE UP (ref 12–16)
IMM GRANULOCYTES NFR BLD AUTO: 5.4 % — HIGH (ref 0.1–0.3)
LYMPHOCYTES # BLD AUTO: 0.92 K/UL — LOW (ref 1.2–3.4)
LYMPHOCYTES # BLD AUTO: 4.7 % — LOW (ref 20.5–51.1)
MAGNESIUM SERPL-MCNC: 2.3 MG/DL — SIGNIFICANT CHANGE UP (ref 1.8–2.4)
MCHC RBC-ENTMCNC: 32.9 G/DL — SIGNIFICANT CHANGE UP (ref 32–37)
MCHC RBC-ENTMCNC: 32.9 PG — HIGH (ref 27–31)
MCV RBC AUTO: 100 FL — HIGH (ref 81–99)
MONOCYTES # BLD AUTO: 1.32 K/UL — HIGH (ref 0.1–0.6)
MONOCYTES NFR BLD AUTO: 6.8 % — SIGNIFICANT CHANGE UP (ref 1.7–9.3)
NEUTROPHILS # BLD AUTO: 15.98 K/UL — HIGH (ref 1.4–6.5)
NEUTROPHILS NFR BLD AUTO: 82.3 % — HIGH (ref 42.2–75.2)
NRBC # BLD: 0 /100 WBCS — SIGNIFICANT CHANGE UP (ref 0–0)
OSMOLALITY UR: 232 MOS/KG — SIGNIFICANT CHANGE UP (ref 50–1400)
PLATELET # BLD AUTO: 251 K/UL — SIGNIFICANT CHANGE UP (ref 130–400)
POTASSIUM SERPL-MCNC: 5.1 MMOL/L — HIGH (ref 3.5–5)
POTASSIUM SERPL-SCNC: 5.1 MMOL/L — HIGH (ref 3.5–5)
RBC # BLD: 4.31 M/UL — SIGNIFICANT CHANGE UP (ref 4.2–5.4)
RBC # FLD: 12.3 % — SIGNIFICANT CHANGE UP (ref 11.5–14.5)
SODIUM SERPL-SCNC: 129 MMOL/L — LOW (ref 135–146)
SODIUM UR-SCNC: 20 MMOL/L — SIGNIFICANT CHANGE UP
VIT B1 SERPL-MCNC: 183 NMOL/L — SIGNIFICANT CHANGE UP (ref 66.5–200)
WBC # BLD: 19.41 K/UL — HIGH (ref 4.8–10.8)
WBC # FLD AUTO: 19.41 K/UL — HIGH (ref 4.8–10.8)

## 2019-06-03 PROCEDURE — 99233 SBSQ HOSP IP/OBS HIGH 50: CPT

## 2019-06-03 RX ADMIN — Medication 0.5 MILLIGRAM(S): at 21:37

## 2019-06-03 RX ADMIN — Medication 600 MILLIGRAM(S): at 05:54

## 2019-06-03 RX ADMIN — Medication 0.5 MILLIGRAM(S): at 13:00

## 2019-06-03 RX ADMIN — OXYCODONE HYDROCHLORIDE 10 MILLIGRAM(S): 5 TABLET ORAL at 05:53

## 2019-06-03 RX ADMIN — AMLODIPINE BESYLATE 10 MILLIGRAM(S): 2.5 TABLET ORAL at 17:11

## 2019-06-03 RX ADMIN — OXYCODONE HYDROCHLORIDE 10 MILLIGRAM(S): 5 TABLET ORAL at 17:58

## 2019-06-03 RX ADMIN — Medication 100 MILLIGRAM(S): at 05:54

## 2019-06-03 RX ADMIN — NYSTATIN CREAM 1 APPLICATION(S): 100000 CREAM TOPICAL at 05:54

## 2019-06-03 RX ADMIN — SENNA PLUS 2 TABLET(S): 8.6 TABLET ORAL at 21:38

## 2019-06-03 RX ADMIN — Medication 500000 UNIT(S): at 05:54

## 2019-06-03 RX ADMIN — FLUCONAZOLE 100 MILLIGRAM(S): 150 TABLET ORAL at 11:10

## 2019-06-03 RX ADMIN — OXYCODONE HYDROCHLORIDE 10 MILLIGRAM(S): 5 TABLET ORAL at 11:57

## 2019-06-03 RX ADMIN — OXYCODONE HYDROCHLORIDE 10 MILLIGRAM(S): 5 TABLET ORAL at 18:31

## 2019-06-03 RX ADMIN — Medication 60 MILLIGRAM(S): at 05:54

## 2019-06-03 RX ADMIN — Medication 600 MILLIGRAM(S): at 17:11

## 2019-06-03 RX ADMIN — Medication 3 MILLILITER(S): at 07:52

## 2019-06-03 RX ADMIN — PREGABALIN 1000 MICROGRAM(S): 225 CAPSULE ORAL at 11:10

## 2019-06-03 RX ADMIN — Medication 500000 UNIT(S): at 11:11

## 2019-06-03 RX ADMIN — Medication 500000 UNIT(S): at 17:12

## 2019-06-03 RX ADMIN — Medication 0.5 MILLIGRAM(S): at 05:53

## 2019-06-03 RX ADMIN — PANTOPRAZOLE SODIUM 40 MILLIGRAM(S): 20 TABLET, DELAYED RELEASE ORAL at 05:54

## 2019-06-03 RX ADMIN — BUDESONIDE AND FORMOTEROL FUMARATE DIHYDRATE 2 PUFF(S): 160; 4.5 AEROSOL RESPIRATORY (INHALATION) at 21:35

## 2019-06-03 RX ADMIN — NYSTATIN CREAM 1 APPLICATION(S): 100000 CREAM TOPICAL at 17:12

## 2019-06-03 RX ADMIN — Medication 100 MILLIGRAM(S): at 13:00

## 2019-06-03 RX ADMIN — Medication 3 MILLILITER(S): at 12:07

## 2019-06-03 RX ADMIN — Medication 50 MILLIGRAM(S): at 21:38

## 2019-06-03 RX ADMIN — Medication 60 MILLIGRAM(S): at 17:12

## 2019-06-03 RX ADMIN — BUDESONIDE AND FORMOTEROL FUMARATE DIHYDRATE 2 PUFF(S): 160; 4.5 AEROSOL RESPIRATORY (INHALATION) at 07:40

## 2019-06-03 RX ADMIN — Medication 100 MILLIGRAM(S): at 21:37

## 2019-06-03 RX ADMIN — OXYCODONE HYDROCHLORIDE 10 MILLIGRAM(S): 5 TABLET ORAL at 12:30

## 2019-06-03 RX ADMIN — Medication 3 MILLILITER(S): at 20:33

## 2019-06-03 NOTE — PROGRESS NOTE ADULT - ASSESSMENT
#Acute hypoxia secondary to acute on chronic copd exacerbation secondary to tobacco abuse  cw solumedrol   given the pulse ox on ambualtion will likely need oxygen on dc      #Tobacco abuse tobacco abuse counseling spent 10 additional minutes counseling patient     #Suspected vitamin b12 deficiency on po vitamin b 12    #Obesity BMI 30 patient needs to see dieitian outpatient for further evaluation     #Atelectasis deep breathing     Progress Note Handoff    Pending:  improvement in respiratory status , cw iv solumedrol     Family discussion: patient is of sound mind    Disposition: Home___

## 2019-06-04 LAB
ANION GAP SERPL CALC-SCNC: 15 MMOL/L — HIGH (ref 7–14)
BASOPHILS # BLD AUTO: 0.17 K/UL — SIGNIFICANT CHANGE UP (ref 0–0.2)
BASOPHILS NFR BLD AUTO: 0.6 % — SIGNIFICANT CHANGE UP (ref 0–1)
BUN SERPL-MCNC: 15 MG/DL — SIGNIFICANT CHANGE UP (ref 10–20)
CALCIUM SERPL-MCNC: 8.9 MG/DL — SIGNIFICANT CHANGE UP (ref 8.5–10.1)
CHLORIDE SERPL-SCNC: 84 MMOL/L — LOW (ref 98–110)
CO2 SERPL-SCNC: 29 MMOL/L — SIGNIFICANT CHANGE UP (ref 17–32)
CREAT SERPL-MCNC: <0.5 MG/DL — LOW (ref 0.7–1.5)
EOSINOPHIL # BLD AUTO: 0.01 K/UL — SIGNIFICANT CHANGE UP (ref 0–0.7)
EOSINOPHIL NFR BLD AUTO: 0 % — SIGNIFICANT CHANGE UP (ref 0–8)
GLUCOSE SERPL-MCNC: 205 MG/DL — HIGH (ref 70–99)
HCT VFR BLD CALC: 43.8 % — SIGNIFICANT CHANGE UP (ref 37–47)
HGB BLD-MCNC: 14.6 G/DL — SIGNIFICANT CHANGE UP (ref 12–16)
IMM GRANULOCYTES NFR BLD AUTO: 4.8 % — HIGH (ref 0.1–0.3)
LYMPHOCYTES # BLD AUTO: 1.1 K/UL — LOW (ref 1.2–3.4)
LYMPHOCYTES # BLD AUTO: 4.1 % — LOW (ref 20.5–51.1)
MAGNESIUM SERPL-MCNC: 2.1 MG/DL — SIGNIFICANT CHANGE UP (ref 1.8–2.4)
MCHC RBC-ENTMCNC: 33.3 G/DL — SIGNIFICANT CHANGE UP (ref 32–37)
MCHC RBC-ENTMCNC: 33.4 PG — HIGH (ref 27–31)
MCV RBC AUTO: 100.2 FL — HIGH (ref 81–99)
MONOCYTES # BLD AUTO: 1.65 K/UL — HIGH (ref 0.1–0.6)
MONOCYTES NFR BLD AUTO: 6.2 % — SIGNIFICANT CHANGE UP (ref 1.7–9.3)
NEUTROPHILS # BLD AUTO: 22.43 K/UL — HIGH (ref 1.4–6.5)
NEUTROPHILS NFR BLD AUTO: 84.3 % — HIGH (ref 42.2–75.2)
NRBC # BLD: 0 /100 WBCS — SIGNIFICANT CHANGE UP (ref 0–0)
OSMOLALITY SERPL: 278 MOS/KG — LOW (ref 289–308)
PLATELET # BLD AUTO: 255 K/UL — SIGNIFICANT CHANGE UP (ref 130–400)
POTASSIUM SERPL-MCNC: 5 MMOL/L — SIGNIFICANT CHANGE UP (ref 3.5–5)
POTASSIUM SERPL-SCNC: 5 MMOL/L — SIGNIFICANT CHANGE UP (ref 3.5–5)
RBC # BLD: 4.37 M/UL — SIGNIFICANT CHANGE UP (ref 4.2–5.4)
RBC # FLD: 12.4 % — SIGNIFICANT CHANGE UP (ref 11.5–14.5)
SODIUM SERPL-SCNC: 128 MMOL/L — LOW (ref 135–146)
WBC # BLD: 26.65 K/UL — HIGH (ref 4.8–10.8)
WBC # FLD AUTO: 26.65 K/UL — HIGH (ref 4.8–10.8)

## 2019-06-04 PROCEDURE — 99232 SBSQ HOSP IP/OBS MODERATE 35: CPT

## 2019-06-04 RX ORDER — FUROSEMIDE 40 MG
40 TABLET ORAL DAILY
Refills: 0 | Status: DISCONTINUED | OUTPATIENT
Start: 2019-06-04 | End: 2019-06-08

## 2019-06-04 RX ORDER — CHLORHEXIDINE GLUCONATE 213 G/1000ML
1 SOLUTION TOPICAL
Refills: 0 | Status: DISCONTINUED | OUTPATIENT
Start: 2019-06-04 | End: 2019-06-10

## 2019-06-04 RX ADMIN — Medication 100 MILLIGRAM(S): at 17:38

## 2019-06-04 RX ADMIN — Medication 500000 UNIT(S): at 05:42

## 2019-06-04 RX ADMIN — OXYCODONE HYDROCHLORIDE 10 MILLIGRAM(S): 5 TABLET ORAL at 15:54

## 2019-06-04 RX ADMIN — Medication 100 MILLIGRAM(S): at 21:21

## 2019-06-04 RX ADMIN — NYSTATIN CREAM 1 APPLICATION(S): 100000 CREAM TOPICAL at 17:40

## 2019-06-04 RX ADMIN — BUDESONIDE AND FORMOTEROL FUMARATE DIHYDRATE 2 PUFF(S): 160; 4.5 AEROSOL RESPIRATORY (INHALATION) at 21:24

## 2019-06-04 RX ADMIN — PREGABALIN 1000 MICROGRAM(S): 225 CAPSULE ORAL at 12:04

## 2019-06-04 RX ADMIN — OXYCODONE HYDROCHLORIDE 10 MILLIGRAM(S): 5 TABLET ORAL at 22:40

## 2019-06-04 RX ADMIN — Medication 60 MILLIGRAM(S): at 17:54

## 2019-06-04 RX ADMIN — BUDESONIDE AND FORMOTEROL FUMARATE DIHYDRATE 2 PUFF(S): 160; 4.5 AEROSOL RESPIRATORY (INHALATION) at 07:46

## 2019-06-04 RX ADMIN — OXYCODONE HYDROCHLORIDE 10 MILLIGRAM(S): 5 TABLET ORAL at 07:45

## 2019-06-04 RX ADMIN — OXYCODONE HYDROCHLORIDE 10 MILLIGRAM(S): 5 TABLET ORAL at 22:10

## 2019-06-04 RX ADMIN — Medication 60 MILLIGRAM(S): at 05:40

## 2019-06-04 RX ADMIN — SENNA PLUS 2 TABLET(S): 8.6 TABLET ORAL at 21:21

## 2019-06-04 RX ADMIN — Medication 0.5 MILLIGRAM(S): at 05:40

## 2019-06-04 RX ADMIN — Medication 3 MILLILITER(S): at 20:29

## 2019-06-04 RX ADMIN — NYSTATIN CREAM 1 APPLICATION(S): 100000 CREAM TOPICAL at 05:43

## 2019-06-04 RX ADMIN — Medication 50 MILLIGRAM(S): at 21:21

## 2019-06-04 RX ADMIN — Medication 40 MILLIGRAM(S): at 17:37

## 2019-06-04 RX ADMIN — Medication 500000 UNIT(S): at 12:06

## 2019-06-04 RX ADMIN — Medication 0.5 MILLIGRAM(S): at 21:21

## 2019-06-04 RX ADMIN — Medication 500000 UNIT(S): at 23:41

## 2019-06-04 RX ADMIN — Medication 0.5 MILLIGRAM(S): at 17:38

## 2019-06-04 RX ADMIN — OXYCODONE HYDROCHLORIDE 10 MILLIGRAM(S): 5 TABLET ORAL at 00:35

## 2019-06-04 RX ADMIN — Medication 3 MILLILITER(S): at 08:39

## 2019-06-04 RX ADMIN — FLUCONAZOLE 100 MILLIGRAM(S): 150 TABLET ORAL at 12:03

## 2019-06-04 RX ADMIN — Medication 600 MILLIGRAM(S): at 05:40

## 2019-06-04 RX ADMIN — Medication 600 MILLIGRAM(S): at 17:40

## 2019-06-04 RX ADMIN — PANTOPRAZOLE SODIUM 40 MILLIGRAM(S): 20 TABLET, DELAYED RELEASE ORAL at 07:47

## 2019-06-04 RX ADMIN — Medication 500000 UNIT(S): at 17:40

## 2019-06-04 RX ADMIN — Medication 100 MILLIGRAM(S): at 05:40

## 2019-06-04 RX ADMIN — AMLODIPINE BESYLATE 10 MILLIGRAM(S): 2.5 TABLET ORAL at 17:40

## 2019-06-04 NOTE — PROGRESS NOTE ADULT - ATTENDING COMMENTS
patient seen and examined , agree with pgy 1 assesment and plan except as indicated above,   GEN  sitting with pursed lips , occasional tripoding   HEENT Pupils equal and reactive to light and accommodationSupple Neck  PULM diffuse rhonchi  CV s1s2 regular rate and rhythm  GI + bowel sounds nontnender  EXT no cyanosis or edema  PSYCH awake alert and oriented x 3  INTEG No Lesions  NEURO BAPTISTE  #Acute hypoxia respriaotyry failrue secondary to copd exacerbation secondary to chronic tobacco use   counseled  change to prednsione   oxygen to be set up at home     #Suspected vitamin b12 deficiency on po vitamin b 12    #Obesity BMI 30 patient needs to see dieitian outpatient for further evaluation     #Atelectasis deep breathing     Progress Note Handoff    Pending:  oxygen set up at home     Family discussion: patient is of sound mind    Disposition: Home___

## 2019-06-04 NOTE — PROGRESS NOTE ADULT - ASSESSMENT
#) Acute on chronic COPD exacerbation   - Flu A/B, RSV negative  - NC O2 to maintain sats of 88 -92 % - patient refused BiPAP, continues to sat well on room air however consistently looks uncomfortable - pursed lip breathing  - c/w Duonebs q6hrs and PRN and symbicort BID  - Solumedrol 80 mg IV q12 - may taper further today  - fluconazole for oral thursh - 7 day course  - LE duplex neg  - PO lasix  - pulm following    #) prediabetes  - A1c 6.1  - patient counseled     #) Hypertension  - poorly controlled in patient most likely secondary to anxiety and also her pain contributes as well  - currently on amlodipine 10mg daily     #) hyponatremia  - sodium 129  - encourage water restriction    #)hyperkalemia  - follow BMP     #) cataract   - Cataract Out pt f/u    #) DVT PPX  - Heparin sq    #) GI ppx     Activity Increase as tolerated    Dispo : when medically stable #) Acute on chronic COPD exacerbation   - Flu A/B, RSV negative  - NC O2 to maintain sats of 88 -92 % - patient refused BiPAP, continues to sat well on room air however consistently looks uncomfortable - pursed lip breathing  - c/w Duonebs q6hrs and PRN and symbicort BID  - Solumedrol 80 mg IV q12 - may taper further today  - fluconazole for oral thursh - 7 day course  - LE duplex neg  - PO lasix  - pulm following    #) prediabetes  - A1c 6.1  - patient counseled     #) Hypertension  - poorly controlled in patient most likely secondary to anxiety and also her pain contributes as well  - currently on amlodipine 10mg daily     #) hyponatremia  - sodium 129  - encourage water restriction    #)hyperkalemia  - follow BMP     #) cataract   - Cataract Out pt f/u    #) DVT PPX  - Heparin sq    #) GI ppx     Activity Increase as tolerated    This is a 69 year old female who was admitted with a diagnosis of acute COPD exacerbation which has been treated and resolved. However despite aggressive steroid treatment and diuresis patient is hypoxemic on ambulation without nasal cannula with pulse ox dropping to 86. Patient has been tested in a chronic stable state. She is aware that she is going [facility] [place] [facility].

## 2019-06-04 NOTE — PROGRESS NOTE ADULT - SUBJECTIVE AND OBJECTIVE BOX
SUBJECTIVE:    Patient is a 69y old Female who presents with a chief complaint of SOB (03 Jun 2019 16:51)    Stable, no acute events.    PAST MEDICAL & SURGICAL HISTORY  Chronic pain  Depression  Anxiety  COPD (chronic obstructive pulmonary disease)  Neck mass       ALLERGIES:  penicillin (Unknown)    MEDICATIONS:  STANDING MEDICATIONS  ALBUTerol/ipratropium for Nebulization 3 milliLiter(s) Nebulizer every 4 hours  ALPRAZolam 0.5 milliGRAM(s) Oral every 8 hours  amLODIPine   Tablet 10 milliGRAM(s) Oral <User Schedule>  buDESOnide  80 MICROgram(s)/formoterol 4.5 MICROgram(s) Inhaler 2 Puff(s) Inhalation two times a day  cyanocobalamin 1000 MICROGram(s) Oral daily  docusate sodium 100 milliGRAM(s) Oral three times a day  fluconAZOLE   Tablet 100 milliGRAM(s) Oral daily  guaiFENesin  milliGRAM(s) Oral every 12 hours  heparin  Injectable 5000 Unit(s) SubCutaneous every 8 hours  methylPREDNISolone sodium succinate Injectable 60 milliGRAM(s) IV Push every 12 hours  nicotine -  14 mG/24Hr(s) Patch 1 patch Transdermal daily  nystatin    Suspension 538577 Unit(s) Oral four times a day  nystatin Powder 1 Application(s) Topical every 12 hours  pantoprazole    Tablet 40 milliGRAM(s) Oral before breakfast  senna 2 Tablet(s) Oral at bedtime  traZODone 50 milliGRAM(s) Oral at bedtime    PRN MEDICATIONS  ALBUTerol    90 MICROgram(s) HFA Inhaler 2 Puff(s) Inhalation every 6 hours PRN  benzonatate 100 milliGRAM(s) Oral three times a day PRN  oxyCODONE    IR 10 milliGRAM(s) Oral every 6 hours PRN    VITALS:   T(F): 97.8  HR: 92  BP: 167/80  RR: 20  SpO2: 93%    LABS:                        14.2   19.41 )-----------( 251      ( 03 Jun 2019 08:58 )             43.1     06-03    129<L>  |  85<L>  |  17  ----------------------------<  207<H>  5.1<H>   |  35<H>  |  0.5<L>    Ca    8.8      03 Jun 2019 08:58  Mg     2.3     06-03 06-03-19 @ 07:01  -  06-04-19 @ 07:00  --------------------------------------------------------  IN: 0 mL / OUT: 200 mL / NET: -200 mL          PHYSICAL EXAM:  GEN: NAD, comfortable  LUNGS: CTAB, no w/r/r  HEART: RRR, no m/r/g  ABD: soft, NT/ND, +BS  EXT: no edema, PP b/l  NEURO: AAOx3 SUBJECTIVE:    Patient is a 69y old Female who presents with a chief complaint of SOB (03 Jun 2019 16:51)    Stable, no acute events overnight. This morning she reports that her breathing is slightly better than yesterday    PAST MEDICAL & SURGICAL HISTORY  Chronic pain  Depression  Anxiety  COPD (chronic obstructive pulmonary disease)  Neck mass       ALLERGIES:  penicillin (Unknown)    MEDICATIONS:  STANDING MEDICATIONS  ALBUTerol/ipratropium for Nebulization 3 milliLiter(s) Nebulizer every 4 hours  ALPRAZolam 0.5 milliGRAM(s) Oral every 8 hours  amLODIPine   Tablet 10 milliGRAM(s) Oral <User Schedule>  buDESOnide  80 MICROgram(s)/formoterol 4.5 MICROgram(s) Inhaler 2 Puff(s) Inhalation two times a day  cyanocobalamin 1000 MICROGram(s) Oral daily  docusate sodium 100 milliGRAM(s) Oral three times a day  fluconAZOLE   Tablet 100 milliGRAM(s) Oral daily  guaiFENesin  milliGRAM(s) Oral every 12 hours  heparin  Injectable 5000 Unit(s) SubCutaneous every 8 hours  methylPREDNISolone sodium succinate Injectable 60 milliGRAM(s) IV Push every 12 hours  nicotine -  14 mG/24Hr(s) Patch 1 patch Transdermal daily  nystatin    Suspension 968375 Unit(s) Oral four times a day  nystatin Powder 1 Application(s) Topical every 12 hours  pantoprazole    Tablet 40 milliGRAM(s) Oral before breakfast  senna 2 Tablet(s) Oral at bedtime  traZODone 50 milliGRAM(s) Oral at bedtime    PRN MEDICATIONS  ALBUTerol    90 MICROgram(s) HFA Inhaler 2 Puff(s) Inhalation every 6 hours PRN  benzonatate 100 milliGRAM(s) Oral three times a day PRN  oxyCODONE    IR 10 milliGRAM(s) Oral every 6 hours PRN    VITALS:   T(F): 97.8  HR: 92  BP: 167/80  RR: 20  SpO2: 93%    LABS:                        14.2   19.41 )-----------( 251      ( 03 Jun 2019 08:58 )             43.1     06-03    129<L>  |  85<L>  |  17  ----------------------------<  207<H>  5.1<H>   |  35<H>  |  0.5<L>    Ca    8.8      03 Jun 2019 08:58  Mg     2.3     06-03 06-03-19 @ 07:01  -  06-04-19 @ 07:00  --------------------------------------------------------  IN: 0 mL / OUT: 200 mL / NET: -200 mL          PHYSICAL EXAM:  GEN: NAD, anxious, pursed lip breathing  LUNGS: bilateral exp wheezing  HEART: RRR, no m/r/g  ABD: soft, NT/ND, +BS  EXT: no bilateral edema LE  NEURO: AAOx3

## 2019-06-05 LAB
ANION GAP SERPL CALC-SCNC: 15 MMOL/L — HIGH (ref 7–14)
BASOPHILS # BLD AUTO: 0.12 K/UL — SIGNIFICANT CHANGE UP (ref 0–0.2)
BASOPHILS NFR BLD AUTO: 0.5 % — SIGNIFICANT CHANGE UP (ref 0–1)
BUN SERPL-MCNC: 16 MG/DL — SIGNIFICANT CHANGE UP (ref 10–20)
CALCIUM SERPL-MCNC: 8.7 MG/DL — SIGNIFICANT CHANGE UP (ref 8.5–10.1)
CHLORIDE SERPL-SCNC: 81 MMOL/L — LOW (ref 98–110)
CO2 SERPL-SCNC: 35 MMOL/L — HIGH (ref 17–32)
CREAT SERPL-MCNC: 0.5 MG/DL — LOW (ref 0.7–1.5)
EOSINOPHIL # BLD AUTO: 0 K/UL — SIGNIFICANT CHANGE UP (ref 0–0.7)
EOSINOPHIL NFR BLD AUTO: 0 % — SIGNIFICANT CHANGE UP (ref 0–8)
GLUCOSE SERPL-MCNC: 162 MG/DL — HIGH (ref 70–99)
HCT VFR BLD CALC: 43.6 % — SIGNIFICANT CHANGE UP (ref 37–47)
HGB BLD-MCNC: 14.5 G/DL — SIGNIFICANT CHANGE UP (ref 12–16)
IMM GRANULOCYTES NFR BLD AUTO: 5.1 % — HIGH (ref 0.1–0.3)
LYMPHOCYTES # BLD AUTO: 0.94 K/UL — LOW (ref 1.2–3.4)
LYMPHOCYTES # BLD AUTO: 3.9 % — LOW (ref 20.5–51.1)
MAGNESIUM SERPL-MCNC: 2.2 MG/DL — SIGNIFICANT CHANGE UP (ref 1.8–2.4)
MCHC RBC-ENTMCNC: 33.1 PG — HIGH (ref 27–31)
MCHC RBC-ENTMCNC: 33.3 G/DL — SIGNIFICANT CHANGE UP (ref 32–37)
MCV RBC AUTO: 99.5 FL — HIGH (ref 81–99)
MONOCYTES # BLD AUTO: 1.31 K/UL — HIGH (ref 0.1–0.6)
MONOCYTES NFR BLD AUTO: 5.4 % — SIGNIFICANT CHANGE UP (ref 1.7–9.3)
NEUTROPHILS # BLD AUTO: 20.79 K/UL — HIGH (ref 1.4–6.5)
NEUTROPHILS NFR BLD AUTO: 85.1 % — HIGH (ref 42.2–75.2)
NRBC # BLD: 0 /100 WBCS — SIGNIFICANT CHANGE UP (ref 0–0)
PLATELET # BLD AUTO: 260 K/UL — SIGNIFICANT CHANGE UP (ref 130–400)
POTASSIUM SERPL-MCNC: 4.3 MMOL/L — SIGNIFICANT CHANGE UP (ref 3.5–5)
POTASSIUM SERPL-SCNC: 4.3 MMOL/L — SIGNIFICANT CHANGE UP (ref 3.5–5)
RBC # BLD: 4.38 M/UL — SIGNIFICANT CHANGE UP (ref 4.2–5.4)
RBC # FLD: 12.4 % — SIGNIFICANT CHANGE UP (ref 11.5–14.5)
SODIUM SERPL-SCNC: 131 MMOL/L — LOW (ref 135–146)
WBC # BLD: 24.41 K/UL — HIGH (ref 4.8–10.8)
WBC # FLD AUTO: 24.41 K/UL — HIGH (ref 4.8–10.8)

## 2019-06-05 PROCEDURE — 93970 EXTREMITY STUDY: CPT | Mod: 26

## 2019-06-05 PROCEDURE — 99233 SBSQ HOSP IP/OBS HIGH 50: CPT

## 2019-06-05 RX ADMIN — PANTOPRAZOLE SODIUM 40 MILLIGRAM(S): 20 TABLET, DELAYED RELEASE ORAL at 05:26

## 2019-06-05 RX ADMIN — BUDESONIDE AND FORMOTEROL FUMARATE DIHYDRATE 2 PUFF(S): 160; 4.5 AEROSOL RESPIRATORY (INHALATION) at 08:10

## 2019-06-05 RX ADMIN — Medication 100 MILLIGRAM(S): at 05:25

## 2019-06-05 RX ADMIN — Medication 0.5 MILLIGRAM(S): at 13:10

## 2019-06-05 RX ADMIN — FLUCONAZOLE 100 MILLIGRAM(S): 150 TABLET ORAL at 11:28

## 2019-06-05 RX ADMIN — Medication 40 MILLIGRAM(S): at 05:25

## 2019-06-05 RX ADMIN — OXYCODONE HYDROCHLORIDE 10 MILLIGRAM(S): 5 TABLET ORAL at 18:18

## 2019-06-05 RX ADMIN — AMLODIPINE BESYLATE 10 MILLIGRAM(S): 2.5 TABLET ORAL at 17:02

## 2019-06-05 RX ADMIN — OXYCODONE HYDROCHLORIDE 10 MILLIGRAM(S): 5 TABLET ORAL at 11:58

## 2019-06-05 RX ADMIN — OXYCODONE HYDROCHLORIDE 10 MILLIGRAM(S): 5 TABLET ORAL at 06:00

## 2019-06-05 RX ADMIN — Medication 3 MILLILITER(S): at 08:06

## 2019-06-05 RX ADMIN — Medication 50 MILLIGRAM(S): at 21:32

## 2019-06-05 RX ADMIN — Medication 500000 UNIT(S): at 23:06

## 2019-06-05 RX ADMIN — Medication 60 MILLIGRAM(S): at 05:26

## 2019-06-05 RX ADMIN — Medication 100 MILLIGRAM(S): at 13:10

## 2019-06-05 RX ADMIN — Medication 3 MILLILITER(S): at 13:51

## 2019-06-05 RX ADMIN — OXYCODONE HYDROCHLORIDE 10 MILLIGRAM(S): 5 TABLET ORAL at 05:27

## 2019-06-05 RX ADMIN — Medication 500000 UNIT(S): at 11:28

## 2019-06-05 RX ADMIN — PREGABALIN 1000 MICROGRAM(S): 225 CAPSULE ORAL at 11:28

## 2019-06-05 RX ADMIN — Medication 100 MILLIGRAM(S): at 21:32

## 2019-06-05 RX ADMIN — NYSTATIN CREAM 1 APPLICATION(S): 100000 CREAM TOPICAL at 17:02

## 2019-06-05 RX ADMIN — Medication 600 MILLIGRAM(S): at 17:02

## 2019-06-05 RX ADMIN — Medication 3 MILLILITER(S): at 20:02

## 2019-06-05 RX ADMIN — Medication 0.5 MILLIGRAM(S): at 21:29

## 2019-06-05 RX ADMIN — NYSTATIN CREAM 1 APPLICATION(S): 100000 CREAM TOPICAL at 05:27

## 2019-06-05 RX ADMIN — OXYCODONE HYDROCHLORIDE 10 MILLIGRAM(S): 5 TABLET ORAL at 11:28

## 2019-06-05 RX ADMIN — Medication 500000 UNIT(S): at 17:02

## 2019-06-05 RX ADMIN — BUDESONIDE AND FORMOTEROL FUMARATE DIHYDRATE 2 PUFF(S): 160; 4.5 AEROSOL RESPIRATORY (INHALATION) at 21:29

## 2019-06-05 RX ADMIN — CHLORHEXIDINE GLUCONATE 1 APPLICATION(S): 213 SOLUTION TOPICAL at 05:27

## 2019-06-05 RX ADMIN — Medication 500000 UNIT(S): at 05:26

## 2019-06-05 RX ADMIN — SENNA PLUS 2 TABLET(S): 8.6 TABLET ORAL at 21:32

## 2019-06-05 RX ADMIN — Medication 0.5 MILLIGRAM(S): at 05:25

## 2019-06-05 RX ADMIN — OXYCODONE HYDROCHLORIDE 10 MILLIGRAM(S): 5 TABLET ORAL at 17:55

## 2019-06-05 NOTE — PROGRESS NOTE ADULT - ATTENDING COMMENTS
68 y/o F w/ h/o COPD, Chronic Pain, Depression, Anxiety. p/w c/o SOB.     #) Acute on chronic COPD exacerbation   - Flu A/B, RSV negative  - NC O2 to maintain sats of 88 -92 % - patient refused BiPAP, continues to sat well on room air however consistently looks uncomfortable - pursed lip breathing  - c/w Duonebs q6hrs and PRN and symbicort BID  - prednisone 60mg daily  - fluconazole for oral thursh - 7 day course  - PO lasix  - pulm following    #) prediabetes  - A1c 6.1  - patient counseled     #) Hypertension  - poorly controlled in patient most likely secondary to anxiety and also her pain contributes as well  - currently on amlodipine 10mg daily     #) hyponatremia  - sodium 129  - encourage water restriction    #)hyperkalemia  - follow BMP     #) cataract   - Cataract Out pt f/u    #R>L LE Edema   check b/l le duplex.     #) DVT PPX  - Heparin sq    #) GI ppx     Activity Increase as tolerated    This is a 69 year old female who was admitted with a diagnosis of acute COPD exacerbation which has been treated and resolved. However despite aggressive steroid treatment and diuresis patient is hypoxemic on ambulation without nasal cannula with pulse ox dropping to 87. Subsequently oxygen saturation improved to 90% on 2L oxygen nasal cannula on ambulation. Patient has been tested in a chronic stable state. She is aware that she is going home on oxygen.

## 2019-06-05 NOTE — PROGRESS NOTE ADULT - SUBJECTIVE AND OBJECTIVE BOX
SUBJECTIVE:    Patient is a 69y old Female who presents with a chief complaint of SOB (04 Jun 2019 08:41)    Stable, no acute events.    PAST MEDICAL & SURGICAL HISTORY  Chronic pain  Depression  Anxiety  COPD (chronic obstructive pulmonary disease)  Neck mass       ALLERGIES:  penicillin (Unknown)    MEDICATIONS:  STANDING MEDICATIONS  ALBUTerol/ipratropium for Nebulization 3 milliLiter(s) Nebulizer every 4 hours  ALPRAZolam 0.5 milliGRAM(s) Oral every 8 hours  amLODIPine   Tablet 10 milliGRAM(s) Oral <User Schedule>  buDESOnide  80 MICROgram(s)/formoterol 4.5 MICROgram(s) Inhaler 2 Puff(s) Inhalation two times a day  chlorhexidine 4% Liquid 1 Application(s) Topical <User Schedule>  cyanocobalamin 1000 MICROGram(s) Oral daily  docusate sodium 100 milliGRAM(s) Oral three times a day  fluconAZOLE   Tablet 100 milliGRAM(s) Oral daily  furosemide    Tablet 40 milliGRAM(s) Oral daily  guaiFENesin  milliGRAM(s) Oral every 12 hours  heparin  Injectable 5000 Unit(s) SubCutaneous every 8 hours  nicotine -  14 mG/24Hr(s) Patch 1 patch Transdermal daily  nystatin    Suspension 457566 Unit(s) Oral four times a day  nystatin Powder 1 Application(s) Topical every 12 hours  pantoprazole    Tablet 40 milliGRAM(s) Oral before breakfast  predniSONE   Tablet 60 milliGRAM(s) Oral daily  senna 2 Tablet(s) Oral at bedtime  traZODone 50 milliGRAM(s) Oral at bedtime    PRN MEDICATIONS  ALBUTerol    90 MICROgram(s) HFA Inhaler 2 Puff(s) Inhalation every 6 hours PRN  benzonatate 100 milliGRAM(s) Oral three times a day PRN  oxyCODONE    IR 10 milliGRAM(s) Oral every 6 hours PRN    VITALS:   T(F): 96.2  HR: 97  BP: 102/56  RR: 18  SpO2: 94%    LABS:                        14.5   24.41 )-----------( 260      ( 05 Jun 2019 08:41 )             43.6     06-05    131<L>  |  81<L>  |  16  ----------------------------<  162<H>  4.3   |  35<H>  |  0.5<L>    Ca    8.7      05 Jun 2019 08:41  Mg     2.2     06-05                            PHYSICAL EXAM:  GEN: NAD, comfortable  LUNGS: bilateral wheezing  HEART: RRR, no m/r/g  ABD: soft, NT/ND, +BS  EXT: no edema, PP b/l  NEURO: AAOx3

## 2019-06-05 NOTE — CHART NOTE - NSCHARTNOTEFT_GEN_A_CORE
This is a 69 year old female who was admitted with a diagnosis of acute COPD exacerbation which has been treated and resolved. However despite aggressive steroid treatment and diuresis patient is hypoxemic on ambulation without nasal cannula with pulse ox dropping to 87%. Subsequently oxygen saturation improved to 90% on 2L oxygen nasal cannula on ambulation. However Pulse Ox on Room air is 92% at rest. Patient has been tested in a chronic stable state. She is aware that she is going home on oxygen.  Her COPD has caused her to have a mobility limitation that significantly impairs the pts ability to participate in one or more MRADLs such as toileting, eating, dressing and bathing in customary locations in the home. The pts home provides adequate access between rooms for the use of the manual wheel chair. The wheel chair will significantly improve the pts ability to participate in MRADLs and will be used on a regular basis in the home. The Pts mobility limitation cannot be resolved with the use of a walker or cane. The pt  has sufficient upper extremity function to safely propel the manual wheelchair in the home during a typical day. The pt has agreed to use the manual wheelchair that is provided in the home

## 2019-06-05 NOTE — PROGRESS NOTE ADULT - ASSESSMENT
#) Acute on chronic COPD exacerbation   - Flu A/B, RSV negative  - NC O2 to maintain sats of 88 -92 % - patient refused BiPAP, continues to sat well on room air however consistently looks uncomfortable - pursed lip breathing  - c/w Duonebs q6hrs and PRN and symbicort BID  - prednisone 60mg daily  - fluconazole for oral thursh - 7 day course  - LE duplex neg  - PO lasix  - pulm following    #) prediabetes  - A1c 6.1  - patient counseled     #) Hypertension  - poorly controlled in patient most likely secondary to anxiety and also her pain contributes as well  - currently on amlodipine 10mg daily     #) hyponatremia  - sodium 129  - encourage water restriction    #)hyperkalemia  - follow BMP     #) cataract   - Cataract Out pt f/u    #) DVT PPX  - Heparin sq    #) GI ppx     Activity Increase as tolerated    This is a 69 year old female who was admitted with a diagnosis of acute COPD exacerbation which has been treated and resolved. However despite aggressive steroid treatment and diuresis patient is hypoxemic on ambulation without nasal cannula with pulse ox dropping to 87. Subsequently oxygen saturation improved to 90% on 2L oxygen nasal cannula on ambulation. Patient has been tested in a chronic stable state. She is aware that she is going home on oxygen.

## 2019-06-06 LAB
ANION GAP SERPL CALC-SCNC: 13 MMOL/L — SIGNIFICANT CHANGE UP (ref 7–14)
BASOPHILS # BLD AUTO: 0.12 K/UL — SIGNIFICANT CHANGE UP (ref 0–0.2)
BASOPHILS NFR BLD AUTO: 0.4 % — SIGNIFICANT CHANGE UP (ref 0–1)
BUN SERPL-MCNC: 17 MG/DL — SIGNIFICANT CHANGE UP (ref 10–20)
CALCIUM SERPL-MCNC: 8.3 MG/DL — LOW (ref 8.5–10.1)
CHLORIDE SERPL-SCNC: 80 MMOL/L — LOW (ref 98–110)
CO2 SERPL-SCNC: 33 MMOL/L — HIGH (ref 17–32)
CREAT SERPL-MCNC: 0.5 MG/DL — LOW (ref 0.7–1.5)
EOSINOPHIL # BLD AUTO: 0.02 K/UL — SIGNIFICANT CHANGE UP (ref 0–0.7)
EOSINOPHIL NFR BLD AUTO: 0.1 % — SIGNIFICANT CHANGE UP (ref 0–8)
GLUCOSE SERPL-MCNC: 182 MG/DL — HIGH (ref 70–99)
HCT VFR BLD CALC: 41 % — SIGNIFICANT CHANGE UP (ref 37–47)
HGB BLD-MCNC: 14.2 G/DL — SIGNIFICANT CHANGE UP (ref 12–16)
IMM GRANULOCYTES NFR BLD AUTO: 4.2 % — HIGH (ref 0.1–0.3)
LYMPHOCYTES # BLD AUTO: 1.56 K/UL — SIGNIFICANT CHANGE UP (ref 1.2–3.4)
LYMPHOCYTES # BLD AUTO: 5.4 % — LOW (ref 20.5–51.1)
MAGNESIUM SERPL-MCNC: 1.9 MG/DL — SIGNIFICANT CHANGE UP (ref 1.8–2.4)
MCHC RBC-ENTMCNC: 34.1 PG — HIGH (ref 27–31)
MCHC RBC-ENTMCNC: 34.6 G/DL — SIGNIFICANT CHANGE UP (ref 32–37)
MCV RBC AUTO: 98.3 FL — SIGNIFICANT CHANGE UP (ref 81–99)
MONOCYTES # BLD AUTO: 1.43 K/UL — HIGH (ref 0.1–0.6)
MONOCYTES NFR BLD AUTO: 5 % — SIGNIFICANT CHANGE UP (ref 1.7–9.3)
NEUTROPHILS # BLD AUTO: 24.3 K/UL — HIGH (ref 1.4–6.5)
NEUTROPHILS NFR BLD AUTO: 84.9 % — HIGH (ref 42.2–75.2)
NRBC # BLD: 0 /100 WBCS — SIGNIFICANT CHANGE UP (ref 0–0)
PLATELET # BLD AUTO: 223 K/UL — SIGNIFICANT CHANGE UP (ref 130–400)
POTASSIUM SERPL-MCNC: 3.8 MMOL/L — SIGNIFICANT CHANGE UP (ref 3.5–5)
POTASSIUM SERPL-SCNC: 3.8 MMOL/L — SIGNIFICANT CHANGE UP (ref 3.5–5)
RBC # BLD: 4.17 M/UL — LOW (ref 4.2–5.4)
RBC # FLD: 12.5 % — SIGNIFICANT CHANGE UP (ref 11.5–14.5)
SODIUM SERPL-SCNC: 126 MMOL/L — LOW (ref 135–146)
TROPONIN T SERPL-MCNC: <0.01 NG/ML — SIGNIFICANT CHANGE UP
WBC # BLD: 28.63 K/UL — HIGH (ref 4.8–10.8)
WBC # FLD AUTO: 28.63 K/UL — HIGH (ref 4.8–10.8)

## 2019-06-06 PROCEDURE — 93010 ELECTROCARDIOGRAM REPORT: CPT

## 2019-06-06 PROCEDURE — 99233 SBSQ HOSP IP/OBS HIGH 50: CPT

## 2019-06-06 RX ORDER — TIOTROPIUM BROMIDE 18 UG/1
1 CAPSULE ORAL; RESPIRATORY (INHALATION) DAILY
Refills: 0 | Status: DISCONTINUED | OUTPATIENT
Start: 2019-06-06 | End: 2019-06-10

## 2019-06-06 RX ORDER — NITROGLYCERIN 6.5 MG
0.4 CAPSULE, EXTENDED RELEASE ORAL
Refills: 0 | Status: COMPLETED | OUTPATIENT
Start: 2019-06-06 | End: 2019-06-06

## 2019-06-06 RX ADMIN — Medication 50 MILLIGRAM(S): at 21:10

## 2019-06-06 RX ADMIN — Medication 3 MILLILITER(S): at 16:05

## 2019-06-06 RX ADMIN — Medication 1 PATCH: at 19:30

## 2019-06-06 RX ADMIN — BUDESONIDE AND FORMOTEROL FUMARATE DIHYDRATE 2 PUFF(S): 160; 4.5 AEROSOL RESPIRATORY (INHALATION) at 20:15

## 2019-06-06 RX ADMIN — Medication 100 MILLIGRAM(S): at 14:10

## 2019-06-06 RX ADMIN — OXYCODONE HYDROCHLORIDE 10 MILLIGRAM(S): 5 TABLET ORAL at 17:38

## 2019-06-06 RX ADMIN — NYSTATIN CREAM 1 APPLICATION(S): 100000 CREAM TOPICAL at 17:34

## 2019-06-06 RX ADMIN — Medication 40 MILLIGRAM(S): at 05:09

## 2019-06-06 RX ADMIN — HEPARIN SODIUM 5000 UNIT(S): 5000 INJECTION INTRAVENOUS; SUBCUTANEOUS at 14:10

## 2019-06-06 RX ADMIN — Medication 600 MILLIGRAM(S): at 17:34

## 2019-06-06 RX ADMIN — Medication 100 MILLIGRAM(S): at 05:09

## 2019-06-06 RX ADMIN — NYSTATIN CREAM 1 APPLICATION(S): 100000 CREAM TOPICAL at 05:18

## 2019-06-06 RX ADMIN — Medication 600 MILLIGRAM(S): at 06:02

## 2019-06-06 RX ADMIN — Medication 0.5 MILLIGRAM(S): at 05:16

## 2019-06-06 RX ADMIN — Medication 0.4 MILLIGRAM(S): at 07:49

## 2019-06-06 RX ADMIN — AMLODIPINE BESYLATE 10 MILLIGRAM(S): 2.5 TABLET ORAL at 17:41

## 2019-06-06 RX ADMIN — OXYCODONE HYDROCHLORIDE 10 MILLIGRAM(S): 5 TABLET ORAL at 11:41

## 2019-06-06 RX ADMIN — Medication 500000 UNIT(S): at 11:13

## 2019-06-06 RX ADMIN — Medication 500000 UNIT(S): at 05:10

## 2019-06-06 RX ADMIN — Medication 0.5 MILLIGRAM(S): at 14:10

## 2019-06-06 RX ADMIN — Medication 60 MILLIGRAM(S): at 05:18

## 2019-06-06 RX ADMIN — Medication 3 MILLILITER(S): at 20:25

## 2019-06-06 RX ADMIN — PANTOPRAZOLE SODIUM 40 MILLIGRAM(S): 20 TABLET, DELAYED RELEASE ORAL at 06:02

## 2019-06-06 RX ADMIN — Medication 0.5 MILLIGRAM(S): at 21:15

## 2019-06-06 RX ADMIN — PREGABALIN 1000 MICROGRAM(S): 225 CAPSULE ORAL at 11:13

## 2019-06-06 RX ADMIN — Medication 3 MILLILITER(S): at 12:07

## 2019-06-06 RX ADMIN — Medication 500000 UNIT(S): at 17:34

## 2019-06-06 RX ADMIN — Medication 1 PATCH: at 11:13

## 2019-06-06 NOTE — PROGRESS NOTE ADULT - ASSESSMENT
#) Acute on chronic COPD exacerbation   - Flu A/B, RSV negative  - NC O2 to maintain sats of 88 -92 % - patient refused BiPAP, continues to sat well on room air however consistently looks uncomfortable - pursed lip breathing  - c/w Duonebs q6hrs and PRN and symbicort BID  - prednisone 60mg daily  - fluconazole for oral thursh - 7 day course  - LE duplex neg  - PO lasix  - pulm following    #) prediabetes  - A1c 6.1  - patient counseled     #) Hypertension  - poorly controlled in patient most likely secondary to anxiety and also her pain contributes as well  - currently on amlodipine 10mg daily     #) hyponatremia  - sodium 126  - encourage water restriction    #)hyperkalemia  - follow BMP     #) cataract   - Cataract Out pt f/u    #) DVT PPX  - Heparin sq    #) GI ppx     Activity Increase as tolerated

## 2019-06-06 NOTE — CHART NOTE - NSCHARTNOTEFT_GEN_A_CORE
RD limited follow up note:     Pt continues on DASH/TLC diet. Pt is eating great - documented po intake %. Last BM 6/5 no GI distress. BS 18 ecchymosis. Wt hx: 88.7 kg (6/6) vs. 84.8 kg (6/4) vs 83.6 kg (5/29) likely r/t fluid shifts. No issue chew/swallow. Hyponatremia noted. Cont on prednisone. Pulm following.     Recommendation: d/c salt restriction. Order Low Fat, Consistent carb diet. RD limited follow up note:     Pt continues on DASH/TLC diet. Pt is eating great - documented po intake %. Last BM 6/5 no GI distress. BS 18 ecchymosis. Wt hx: 88.7 kg (6/6) vs. 84.8 kg (6/4) vs 83.6 kg (5/29) likely r/t fluid shifts. No issue chew/swallow. Hyponatremia noted. Cont on prednisone. Pulm following. Pt not at risk f/u 7 days    Recommendation: d/c salt restriction. Order Low Fat, Consistent carb diet.

## 2019-06-06 NOTE — PROGRESS NOTE ADULT - SUBJECTIVE AND OBJECTIVE BOX
SUBJECTIVE:    Patient is a 69y old Female who presents with a chief complaint of SOB (06 Jun 2019 19:51)    Stable, no acute events.    PAST MEDICAL & SURGICAL HISTORY  Chronic pain  Depression  Anxiety  COPD (chronic obstructive pulmonary disease)  Neck mass       ALLERGIES:  penicillin (Unknown)    MEDICATIONS:  STANDING MEDICATIONS  ALBUTerol/ipratropium for Nebulization 3 milliLiter(s) Nebulizer every 4 hours  ALPRAZolam 0.5 milliGRAM(s) Oral every 8 hours  amLODIPine   Tablet 10 milliGRAM(s) Oral <User Schedule>  buDESOnide  80 MICROgram(s)/formoterol 4.5 MICROgram(s) Inhaler 2 Puff(s) Inhalation two times a day  chlorhexidine 4% Liquid 1 Application(s) Topical <User Schedule>  cyanocobalamin 1000 MICROGram(s) Oral daily  docusate sodium 100 milliGRAM(s) Oral three times a day  furosemide    Tablet 40 milliGRAM(s) Oral daily  guaiFENesin  milliGRAM(s) Oral every 12 hours  heparin  Injectable 5000 Unit(s) SubCutaneous every 8 hours  nicotine -  14 mG/24Hr(s) Patch 1 patch Transdermal daily  nystatin    Suspension 534836 Unit(s) Oral four times a day  nystatin Powder 1 Application(s) Topical every 12 hours  pantoprazole    Tablet 40 milliGRAM(s) Oral before breakfast  predniSONE   Tablet 60 milliGRAM(s) Oral daily  senna 2 Tablet(s) Oral at bedtime  tiotropium 18 MICROgram(s) Capsule 1 Capsule(s) Inhalation daily  traZODone 50 milliGRAM(s) Oral at bedtime    PRN MEDICATIONS  ALBUTerol    90 MICROgram(s) HFA Inhaler 2 Puff(s) Inhalation every 6 hours PRN  benzonatate 100 milliGRAM(s) Oral three times a day PRN  oxyCODONE    IR 10 milliGRAM(s) Oral every 6 hours PRN    VITALS:   T(F): 97.4  HR: 97  BP: 174/77  RR: 17  SpO2: 92%    LABS:                        14.2   28.63 )-----------( 223      ( 06 Jun 2019 08:11 )             41.0     06-06    126<L>  |  80<L>  |  17  ----------------------------<  182<H>  3.8   |  33<H>  |  0.5<L>    Ca    8.3<L>      06 Jun 2019 08:11  Mg     1.9     06-06            Troponin T, Serum: <0.01 ng/mL (06-06-19 @ 08:25)      CARDIAC MARKERS ( 06 Jun 2019 08:25 )  x     / <0.01 ng/mL / x     / x     / x              06-05-19 @ 07:01  -  06-06-19 @ 07:00  --------------------------------------------------------  IN: 0 mL / OUT: 3600 mL / NET: -3600 mL          PHYSICAL EXAM:  GEN: NAD, anxious, pursed lip breathing  LUNGS: bilateral expiratory wheezing  HEART: RRR, no m/r/g  ABD: soft, non-tender, +BS  EXT: bilateral pitting edema  NEURO: AAOx3

## 2019-06-06 NOTE — PROGRESS NOTE ADULT - SUBJECTIVE AND OBJECTIVE BOX
S : No new events/complaints  Breathing only marginally better  OVerall still very bad      All other pertinent ROS negative.      06-05-19 @ 07:01  -  06-06-19 @ 07:00  --------------------------------------------------------  IN: 0 mL / OUT: 3600 mL / NET: -3600 mL      Vital Signs Last 24 Hrs  T(C): 36.3 (06 Jun 2019 14:13), Max: 36.3 (06 Jun 2019 14:13)  T(F): 97.4 (06 Jun 2019 14:13), Max: 97.4 (06 Jun 2019 14:13)  HR: 100 (06 Jun 2019 14:13) (85 - 100)  BP: 143/77 (06 Jun 2019 14:13) (100/60 - 145/76)  BP(mean): --  RR: 20 (06 Jun 2019 14:13) (18 - 20)  SpO2: 96% (05 Jun 2019 20:30) (96% - 96%)  PHYSICAL EXAM:    Constitutional: NAD, awake and alert, well-developed  HEENT: PERR, EOMI, Normal Hearing, MMM  Neck: Soft and supple, No LAD, No JVD  Respiratory: diffuse wheeze  Cardiovascular: S1 and S2, regular rate and rhythm, no Murmurs, gallops or rubs  Gastrointestinal: Bowel Sounds present, soft, nontender, nondistended, no guarding, no rebound  Extremities: b/l LE edema    MEDICATIONS:  MEDICATIONS  (STANDING):  ALBUTerol/ipratropium for Nebulization 3 milliLiter(s) Nebulizer every 4 hours  ALPRAZolam 0.5 milliGRAM(s) Oral every 8 hours  amLODIPine   Tablet 10 milliGRAM(s) Oral <User Schedule>  buDESOnide  80 MICROgram(s)/formoterol 4.5 MICROgram(s) Inhaler 2 Puff(s) Inhalation two times a day  chlorhexidine 4% Liquid 1 Application(s) Topical <User Schedule>  cyanocobalamin 1000 MICROGram(s) Oral daily  docusate sodium 100 milliGRAM(s) Oral three times a day  furosemide    Tablet 40 milliGRAM(s) Oral daily  guaiFENesin  milliGRAM(s) Oral every 12 hours  heparin  Injectable 5000 Unit(s) SubCutaneous every 8 hours  nicotine -  14 mG/24Hr(s) Patch 1 patch Transdermal daily  nystatin    Suspension 940082 Unit(s) Oral four times a day  nystatin Powder 1 Application(s) Topical every 12 hours  pantoprazole    Tablet 40 milliGRAM(s) Oral before breakfast  predniSONE   Tablet 60 milliGRAM(s) Oral daily  senna 2 Tablet(s) Oral at bedtime  tiotropium 18 MICROgram(s) Capsule 1 Capsule(s) Inhalation daily  traZODone 50 milliGRAM(s) Oral at bedtime      LABS: All Labs Reviewed:                        14.2   28.63 )-----------( 223      ( 06 Jun 2019 08:11 )             41.0     06-06    126<L>  |  80<L>  |  17  ----------------------------<  182<H>  3.8   |  33<H>  |  0.5<L>    Ca    8.3<L>      06 Jun 2019 08:11  Mg     1.9     06-06        CARDIAC MARKERS ( 06 Jun 2019 08:25 )  x     / <0.01 ng/mL / x     / x     / x          Blood Culture:     Radiology: reviewed

## 2019-06-06 NOTE — PROGRESS NOTE ADULT - ASSESSMENT
68 y/o F w/ h/o COPD, Chronic Pain, Depression, Anxiety. p/w c/o SOB.     #) Acute on chronic COPD exacerbation   - Flu A/B, RSV negative  - NC O2 to maintain sats of 88 -92 % - patient refused BiPAP, continues to sat well on room air however consistently looks uncomfortable - pursed lip breathing  - c/w Duonebs q6hrs and PRN and symbicort BID  - prednisone 60mg daily. If not improving by tomorrow - increase steroids again.   - fluconazole for oral thursh - 7 day course  - PO lasix  - pulm following    #) prediabetes  - A1c 6.1  - patient counseled     #) Hypertension  - poorly controlled in patient most likely secondary to anxiety and also her pain contributes as well  - currently on amlodipine 10mg daily     #) hyponatremia  - sodium 129  - encourage water restriction    #)hyperkalemia  - follow BMP     #) cataract   - Cataract Out pt f/u    #R>L LE Edema   check b/l le duplex.     #) DVT PPX  - Heparin sq    #) GI ppx     Activity Increase as tolerated    This is a 69 year old female who was admitted with a diagnosis of acute COPD exacerbation which has been treated and resolved. However despite aggressive steroid treatment and diuresis patient is hypoxemic on ambulation without nasal cannula with pulse ox dropping to 87. Subsequently oxygen saturation improved to 90% on 2L oxygen nasal cannula on ambulation. Patient has been tested in a chronic stable state. She is aware that she is going home on oxygen.

## 2019-06-07 LAB
ANION GAP SERPL CALC-SCNC: 13 MMOL/L — SIGNIFICANT CHANGE UP (ref 7–14)
BASOPHILS # BLD AUTO: 0.11 K/UL — SIGNIFICANT CHANGE UP (ref 0–0.2)
BASOPHILS NFR BLD AUTO: 0.4 % — SIGNIFICANT CHANGE UP (ref 0–1)
BUN SERPL-MCNC: 15 MG/DL — SIGNIFICANT CHANGE UP (ref 10–20)
CALCIUM SERPL-MCNC: 8.5 MG/DL — SIGNIFICANT CHANGE UP (ref 8.5–10.1)
CHLORIDE SERPL-SCNC: 82 MMOL/L — LOW (ref 98–110)
CO2 SERPL-SCNC: 33 MMOL/L — HIGH (ref 17–32)
CREAT SERPL-MCNC: 0.5 MG/DL — LOW (ref 0.7–1.5)
EOSINOPHIL # BLD AUTO: 0.02 K/UL — SIGNIFICANT CHANGE UP (ref 0–0.7)
EOSINOPHIL NFR BLD AUTO: 0.1 % — SIGNIFICANT CHANGE UP (ref 0–8)
GLUCOSE SERPL-MCNC: 206 MG/DL — HIGH (ref 70–99)
HCT VFR BLD CALC: 40.9 % — SIGNIFICANT CHANGE UP (ref 37–47)
HGB BLD-MCNC: 13.9 G/DL — SIGNIFICANT CHANGE UP (ref 12–16)
IMM GRANULOCYTES NFR BLD AUTO: 3.9 % — HIGH (ref 0.1–0.3)
LYMPHOCYTES # BLD AUTO: 1.3 K/UL — SIGNIFICANT CHANGE UP (ref 1.2–3.4)
LYMPHOCYTES # BLD AUTO: 4.9 % — LOW (ref 20.5–51.1)
MAGNESIUM SERPL-MCNC: 1.9 MG/DL — SIGNIFICANT CHANGE UP (ref 1.8–2.4)
MCHC RBC-ENTMCNC: 33.5 PG — HIGH (ref 27–31)
MCHC RBC-ENTMCNC: 34 G/DL — SIGNIFICANT CHANGE UP (ref 32–37)
MCV RBC AUTO: 98.6 FL — SIGNIFICANT CHANGE UP (ref 81–99)
MONOCYTES # BLD AUTO: 1.26 K/UL — HIGH (ref 0.1–0.6)
MONOCYTES NFR BLD AUTO: 4.7 % — SIGNIFICANT CHANGE UP (ref 1.7–9.3)
NEUTROPHILS # BLD AUTO: 22.96 K/UL — HIGH (ref 1.4–6.5)
NEUTROPHILS NFR BLD AUTO: 86 % — HIGH (ref 42.2–75.2)
NRBC # BLD: 0 /100 WBCS — SIGNIFICANT CHANGE UP (ref 0–0)
PLATELET # BLD AUTO: 225 K/UL — SIGNIFICANT CHANGE UP (ref 130–400)
POTASSIUM SERPL-MCNC: 4.6 MMOL/L — SIGNIFICANT CHANGE UP (ref 3.5–5)
POTASSIUM SERPL-SCNC: 4.6 MMOL/L — SIGNIFICANT CHANGE UP (ref 3.5–5)
RBC # BLD: 4.15 M/UL — LOW (ref 4.2–5.4)
RBC # FLD: 12.4 % — SIGNIFICANT CHANGE UP (ref 11.5–14.5)
SODIUM SERPL-SCNC: 128 MMOL/L — LOW (ref 135–146)
WBC # BLD: 26.69 K/UL — HIGH (ref 4.8–10.8)
WBC # FLD AUTO: 26.69 K/UL — HIGH (ref 4.8–10.8)

## 2019-06-07 PROCEDURE — 99233 SBSQ HOSP IP/OBS HIGH 50: CPT

## 2019-06-07 RX ORDER — FUROSEMIDE 40 MG
40 TABLET ORAL ONCE
Refills: 0 | Status: COMPLETED | OUTPATIENT
Start: 2019-06-07 | End: 2019-06-07

## 2019-06-07 RX ORDER — OXYCODONE HYDROCHLORIDE 5 MG/1
10 TABLET ORAL EVERY 6 HOURS
Refills: 0 | Status: DISCONTINUED | OUTPATIENT
Start: 2019-06-07 | End: 2019-06-10

## 2019-06-07 RX ADMIN — Medication 3 MILLILITER(S): at 16:29

## 2019-06-07 RX ADMIN — Medication 3 MILLILITER(S): at 20:36

## 2019-06-07 RX ADMIN — Medication 3 MILLILITER(S): at 12:00

## 2019-06-07 RX ADMIN — Medication 1 PATCH: at 11:11

## 2019-06-07 RX ADMIN — Medication 1 PATCH: at 08:00

## 2019-06-07 RX ADMIN — Medication 0.5 MILLIGRAM(S): at 05:04

## 2019-06-07 RX ADMIN — Medication 500000 UNIT(S): at 23:22

## 2019-06-07 RX ADMIN — OXYCODONE HYDROCHLORIDE 10 MILLIGRAM(S): 5 TABLET ORAL at 11:46

## 2019-06-07 RX ADMIN — Medication 40 MILLIGRAM(S): at 05:03

## 2019-06-07 RX ADMIN — Medication 0.5 MILLIGRAM(S): at 21:34

## 2019-06-07 RX ADMIN — Medication 40 MILLIGRAM(S): at 17:09

## 2019-06-07 RX ADMIN — Medication 500000 UNIT(S): at 05:05

## 2019-06-07 RX ADMIN — BUDESONIDE AND FORMOTEROL FUMARATE DIHYDRATE 2 PUFF(S): 160; 4.5 AEROSOL RESPIRATORY (INHALATION) at 21:37

## 2019-06-07 RX ADMIN — Medication 3 MILLILITER(S): at 23:27

## 2019-06-07 RX ADMIN — Medication 600 MILLIGRAM(S): at 05:03

## 2019-06-07 RX ADMIN — BUDESONIDE AND FORMOTEROL FUMARATE DIHYDRATE 2 PUFF(S): 160; 4.5 AEROSOL RESPIRATORY (INHALATION) at 11:10

## 2019-06-07 RX ADMIN — Medication 100 MILLIGRAM(S): at 13:28

## 2019-06-07 RX ADMIN — PANTOPRAZOLE SODIUM 40 MILLIGRAM(S): 20 TABLET, DELAYED RELEASE ORAL at 05:03

## 2019-06-07 RX ADMIN — PREGABALIN 1000 MICROGRAM(S): 225 CAPSULE ORAL at 11:11

## 2019-06-07 RX ADMIN — OXYCODONE HYDROCHLORIDE 10 MILLIGRAM(S): 5 TABLET ORAL at 05:04

## 2019-06-07 RX ADMIN — Medication 500000 UNIT(S): at 17:09

## 2019-06-07 RX ADMIN — NYSTATIN CREAM 1 APPLICATION(S): 100000 CREAM TOPICAL at 17:09

## 2019-06-07 RX ADMIN — OXYCODONE HYDROCHLORIDE 10 MILLIGRAM(S): 5 TABLET ORAL at 11:14

## 2019-06-07 RX ADMIN — Medication 100 MILLIGRAM(S): at 05:03

## 2019-06-07 RX ADMIN — Medication 500000 UNIT(S): at 11:13

## 2019-06-07 RX ADMIN — NYSTATIN CREAM 1 APPLICATION(S): 100000 CREAM TOPICAL at 05:05

## 2019-06-07 RX ADMIN — OXYCODONE HYDROCHLORIDE 10 MILLIGRAM(S): 5 TABLET ORAL at 17:49

## 2019-06-07 RX ADMIN — HEPARIN SODIUM 5000 UNIT(S): 5000 INJECTION INTRAVENOUS; SUBCUTANEOUS at 13:29

## 2019-06-07 RX ADMIN — Medication 1 PATCH: at 05:08

## 2019-06-07 RX ADMIN — Medication 60 MILLIGRAM(S): at 05:04

## 2019-06-07 RX ADMIN — Medication 0.5 MILLIGRAM(S): at 13:28

## 2019-06-07 RX ADMIN — OXYCODONE HYDROCHLORIDE 10 MILLIGRAM(S): 5 TABLET ORAL at 17:09

## 2019-06-07 RX ADMIN — Medication 1 PATCH: at 21:36

## 2019-06-07 RX ADMIN — AMLODIPINE BESYLATE 10 MILLIGRAM(S): 2.5 TABLET ORAL at 17:09

## 2019-06-07 RX ADMIN — Medication 3 MILLILITER(S): at 07:59

## 2019-06-07 RX ADMIN — Medication 600 MILLIGRAM(S): at 17:09

## 2019-06-07 RX ADMIN — OXYCODONE HYDROCHLORIDE 10 MILLIGRAM(S): 5 TABLET ORAL at 23:21

## 2019-06-07 RX ADMIN — Medication 50 MILLIGRAM(S): at 21:34

## 2019-06-07 NOTE — CONSULT NOTE ADULT - SUBJECTIVE AND OBJECTIVE BOX
NEPHROLOGY CONSULTATION NOTE    Patient is a 69y Female [facility] Chronic pain, Depression, Anxiety, COPD, presented to the hospital with SOB, wheezing, cough with increased sputum production. Pt states that she has recently gained weight probably from steroids and developed edema, she also mentions that she has soft stools with average 3 BM daily. Pt denies any h/o edema at home, no h/o CHF. Pt denies any nausea, vomiting, fever, chest pain, urinary symptoms.    PAST MEDICAL & SURGICAL HISTORY:  Chronic pain  Depression  Anxiety  COPD (chronic obstructive pulmonary disease)  Neck mass    Allergies:  penicillin (Unknown)    Home Medications:  Combivent 18 mcg-103 mcg-/inh inhalation aerosol: 1 puff(s) inhaled 4 times a day (24 May 2019 23:52)  HYDROcodone 10 mg oral capsule, extended release: 1 cap(s) orally every 6 hours, As Needed (25 May 2019 10:51)  metFORMIN 500 mg oral tablet: 1 tab(s) orally 2 times a day (25 May 2019 10:51)  nicotine 14 mg/24 hr transdermal film, extended release: 1 patch transdermal once a day (25 May 2019 10:51)    Hospital Medications:   MEDICATIONS  (STANDING):  ALBUTerol/ipratropium for Nebulization 3 milliLiter(s) Nebulizer every 4 hours  ALPRAZolam 0.5 milliGRAM(s) Oral every 8 hours  amLODIPine   Tablet 10 milliGRAM(s) Oral <User Schedule>  buDESOnide  80 MICROgram(s)/formoterol 4.5 MICROgram(s) Inhaler 2 Puff(s) Inhalation two times a day  chlorhexidine 4% Liquid 1 Application(s) Topical <User Schedule>  cyanocobalamin 1000 MICROGram(s) Oral daily  docusate sodium 100 milliGRAM(s) Oral three times a day  furosemide    Tablet 40 milliGRAM(s) Oral daily  guaiFENesin  milliGRAM(s) Oral every 12 hours  heparin  Injectable 5000 Unit(s) SubCutaneous every 8 hours  nicotine -  14 mG/24Hr(s) Patch 1 patch Transdermal daily  nystatin    Suspension 389389 Unit(s) Oral four times a day  nystatin Powder 1 Application(s) Topical every 12 hours  pantoprazole    Tablet 40 milliGRAM(s) Oral before breakfast  predniSONE   Tablet 60 milliGRAM(s) Oral daily  senna 2 Tablet(s) Oral at bedtime  tiotropium 18 MICROgram(s) Capsule 1 Capsule(s) Inhalation daily  traZODone 50 milliGRAM(s) Oral at bedtime      SOCIAL HISTORY:  Denies ETOH,Smoking,   FAMILY HISTORY:  Family history of COPD (chronic obstructive pulmonary disease) (Mother)        REVIEW OF SYSTEMS:     All other review of systems is negative unless indicated above.    VITALS:  T(F): 97.4 (06-07-19 @ 12:51), Max: 97.4 (06-06-19 @ 20:25)  HR: 94 (06-07-19 @ 12:51)  BP: 155/68 (06-07-19 @ 12:51)  RR: 22 (06-07-19 @ 12:51)  SpO2: 93% (06-07-19 @ 08:50)        I&O's Detail        PHYSICAL EXAM:  Constitutional: NAD  Respiratory: CTAB, no wheezes, rales or rhonchi  Cardiovascular: S1, S2, RRR  Gastrointestinal: BS+, soft, NT/ND  Extremities: 1+ peripheral edema  Neurological: A/O x 3  : No truong.     Vascular Access:    LABS:  06-07    128<L>  |  82<L>  |  15  ----------------------------<  206<H>  4.6   |  33<H>  |  0.5<L>    Ca    8.5      07 Jun 2019 08:00  Mg     1.9     06-07      Creatinine Trend: 0.5 <--, 0.5 <--, 0.5 <--, <0.5 <--, 0.5 <--, 0.6 <--, 0.7 <--, 0.6 <--, 0.5 <--, 0.5 <--, 0.6 <--, 0.6 <--, 0.5 <--, 0.6 <--    SODIUM TREND:  Sodium 128 [06-07 @ 08:00]  Sodium 126 [06-06 @ 08:11]  Sodium 131 [06-05 @ 08:41]  Sodium 128 [06-04 @ 08:40]  Sodium 129 [06-03 @ 08:58]  Sodium 128 [06-02 @ 08:18]  Sodium 129 [06-01 @ 07:59]  Sodium 133 [05-31 @ 06:50]  Sodium 133 [05-30 @ 08:13]  Sodium 133 [05-29 @ 06:59]                        13.9   26.69 )-----------( 225      ( 07 Jun 2019 08:00 )             40.9     Osmolality, Serum: 278 mos/kg (06.04.19 @ 08:40)    Troponin T, Serum: <0.01 ng/mL (06.06.19 @ 08:25)    Urine Studies:    Osmolality, Random Urine: 232 mos/kg (06-03 @ 16:49)  Sodium, Random Urine: 20.0 mmoL/L (06-03 @ 16:49)      RADIOLOGY & ADDITIONAL STUDIES:  < from: [facility] Duplex Lower Ext Vein Scan, Bilat (06.05.19 @ 18:50) >  Impression:    No evidence of deep venous thrombosis or superficial thrombophlebitis in   bilateral lower extremities.    < end of copied text >    < from: Xray Chest 1 View- PORTABLE-Routine (05.29.19 @ 07:45) >  Impression:      Persistent pulmonary vascular congestion.    < end of copied text >    < from: Transthoracic Echocardiogram (06.11.18 @ 13:43) >  Summary:   1. Spectral Doppler shows impaired relaxation pattern of left   ventricular myocardial filling (Grade I diastolic dysfunction).   2. Mild tricuspid regurgitation.   3. Sclerotic aortic valve with normal opening.    < end of copied text > NEPHROLOGY CONSULTATION NOTE    Patient is a 69y Female [facility] Chronic pain, Depression, Anxiety, COPD, presented to the hospital with SOB, wheezing, cough with increased sputum production. Pt states that she has recently gained weight probably from steroids and developed edema, she also mentions that she has soft stools with average 3 BM daily. Pt denies any h/o edema at home, no h/o CHF. Pt denies any nausea, vomiting, fever, chest pain, urinary symptoms.    PAST MEDICAL & SURGICAL HISTORY:  Chronic pain  Depression  Anxiety  COPD (chronic obstructive pulmonary disease)  Neck mass    Allergies:  penicillin (Unknown)    Home Medications:  Combivent 18 mcg-103 mcg-/inh inhalation aerosol: 1 puff(s) inhaled 4 times a day (24 May 2019 23:52)  HYDROcodone 10 mg oral capsule, extended release: 1 cap(s) orally every 6 hours, As Needed (25 May 2019 10:51)  metFORMIN 500 mg oral tablet: 1 tab(s) orally 2 times a day (25 May 2019 10:51)  nicotine 14 mg/24 hr transdermal film, extended release: 1 patch transdermal once a day (25 May 2019 10:51)    Hospital Medications:   MEDICATIONS  (STANDING):  ALBUTerol/ipratropium for Nebulization 3 milliLiter(s) Nebulizer every 4 hours  ALPRAZolam 0.5 milliGRAM(s) Oral every 8 hours  amLODIPine   Tablet 10 milliGRAM(s) Oral <User Schedule>  buDESOnide  80 MICROgram(s)/formoterol 4.5 MICROgram(s) Inhaler 2 Puff(s) Inhalation two times a day  chlorhexidine 4% Liquid 1 Application(s) Topical <User Schedule>  cyanocobalamin 1000 MICROGram(s) Oral daily  docusate sodium 100 milliGRAM(s) Oral three times a day  furosemide    Tablet 40 milliGRAM(s) Oral daily  guaiFENesin  milliGRAM(s) Oral every 12 hours  heparin  Injectable 5000 Unit(s) SubCutaneous every 8 hours  nicotine -  14 mG/24Hr(s) Patch 1 patch Transdermal daily  nystatin    Suspension 218801 Unit(s) Oral four times a day  nystatin Powder 1 Application(s) Topical every 12 hours  pantoprazole    Tablet 40 milliGRAM(s) Oral before breakfast  predniSONE   Tablet 60 milliGRAM(s) Oral daily  senna 2 Tablet(s) Oral at bedtime  tiotropium 18 MICROgram(s) Capsule 1 Capsule(s) Inhalation daily  traZODone 50 milliGRAM(s) Oral at bedtime      SOCIAL HISTORY:  Denies ETOH,Smoking,   FAMILY HISTORY:  Family history of COPD (chronic obstructive pulmonary disease) (Mother)        REVIEW OF SYSTEMS:     All other review of systems is negative unless indicated above.    VITALS:  T(F): 97.4 (06-07-19 @ 12:51), Max: 97.4 (06-06-19 @ 20:25)  HR: 94 (06-07-19 @ 12:51)  BP: 155/68 (06-07-19 @ 12:51)  RR: 22 (06-07-19 @ 12:51)  SpO2: 93% (06-07-19 @ 08:50)        I&O's Detail        PHYSICAL EXAM:  Constitutional: NAD  Respiratory: CTAB, b/l diffuse wheezes, basal crackles  Cardiovascular: S1, S2, RRR  Gastrointestinal: BS+, soft, NT/ND  Extremities: 1+ peripheral edema  Neurological: A/O x 3  : No truong.     Vascular Access:    LABS:  06-07    128<L>  |  82<L>  |  15  ----------------------------<  206<H>  4.6   |  33<H>  |  0.5<L>    Ca    8.5      07 Jun 2019 08:00  Mg     1.9     06-07      Creatinine Trend: 0.5 <--, 0.5 <--, 0.5 <--, <0.5 <--, 0.5 <--, 0.6 <--, 0.7 <--, 0.6 <--, 0.5 <--, 0.5 <--, 0.6 <--, 0.6 <--, 0.5 <--, 0.6 <--    SODIUM TREND:  Sodium 128 [06-07 @ 08:00]  Sodium 126 [06-06 @ 08:11]  Sodium 131 [06-05 @ 08:41]  Sodium 128 [06-04 @ 08:40]  Sodium 129 [06-03 @ 08:58]  Sodium 128 [06-02 @ 08:18]  Sodium 129 [06-01 @ 07:59]  Sodium 133 [05-31 @ 06:50]  Sodium 133 [05-30 @ 08:13]  Sodium 133 [05-29 @ 06:59]                        13.9   26.69 )-----------( 225      ( 07 Jun 2019 08:00 )             40.9     Osmolality, Serum: 278 mos/kg (06.04.19 @ 08:40)    Troponin T, Serum: <0.01 ng/mL (06.06.19 @ 08:25)    Urine Studies:    Osmolality, Random Urine: 232 mos/kg (06-03 @ 16:49)  Sodium, Random Urine: 20.0 mmoL/L (06-03 @ 16:49)      RADIOLOGY & ADDITIONAL STUDIES:  < from: [facility] Duplex Lower Ext Vein Scan, Bilat (06.05.19 @ 18:50) >  Impression:    No evidence of deep venous thrombosis or superficial thrombophlebitis in   bilateral lower extremities.    < end of copied text >    < from: Xray Chest 1 View- PORTABLE-Routine (05.29.19 @ 07:45) >  Impression:      Persistent pulmonary vascular congestion.    < end of copied text >    < from: Transthoracic Echocardiogram (06.11.18 @ 13:43) >  Summary:   1. Spectral Doppler shows impaired relaxation pattern of left   ventricular myocardial filling (Grade I diastolic dysfunction).   2. Mild tricuspid regurgitation.   3. Sclerotic aortic valve with normal opening.    < end of copied text >

## 2019-06-07 NOTE — PROGRESS NOTE ADULT - ATTENDING COMMENTS
68 y/o F w/ h/o COPD, Chronic Pain, Depression, Anxiety. p/w c/o SOB.     #) Acute on chronic COPD exacerbation   - Flu A/B, RSV negative  - NC O2 to maintain sats of 88 -92 % - patient refused BiPAP, continues to sat well on room air however consistently looks uncomfortable - pursed lip breathing  - c/w Duonebs q6hrs and PRN and symbicort BID  - prednisone 60mg daily. If not improving by tomorrow - increase steroids again.   - fluconazole for oral thursh - 7 day course  - PO lasix  - pulm following    #Component of Acute diastolic CHF exacerbation :   Improved with lasix. c/w current po lasix dose.     #) prediabetes  - A1c 6.1  - patient counseled     #) Hypertension  - poorly controlled in patient most likely secondary to anxiety and also her pain contributes as well  - currently on amlodipine 10mg daily     #) hyponatremia  - sodium 128  - encourage water restriction  Nephro c/s    #)hyperkalemia  - follow BMP     #) cataract   - Cataract Out pt f/u    #R>L LE Edema   b/l le duplex neg.   c/w lasix, keep feet elevated.     #) DVT PPX  - Heparin sq    #) GI ppx     Activity Increase as tolerated    This is a 69 year old female who was admitted with a diagnosis of acute COPD exacerbation which has been treated and resolved. However despite aggressive steroid treatment and diuresis patient is hypoxemic on ambulation without nasal cannula with pulse ox dropping to 87. Subsequently oxygen saturation improved to 90% on 2L oxygen nasal cannula on ambulation. Patient has been tested in a chronic stable state. She is aware that she is going home on oxygen.

## 2019-06-07 NOTE — CONSULT NOTE ADULT - ASSESSMENT
Patient with hyponatremia presented to hospital for SOB, wheezing, worsening cough with sputum  PMH COPD, depression, anxiety, chronic pain    # Hyponatremia   - mild hyponatremia noted, Na level corrected for hyperglycemia noted ~ 131   - urine osmolality with low urine Na level in favour of extra renal Na losses a/w increased intake of free water.   - Pt on steroids noted, on exam pt is volume overload. repeat Albumin level. switch lasix to IV 40 mg daily, CXR noted for persistent congestion   - restrict free water intake to 1L, Repeat Na level q 12 hour.    - pt complain of loose stool, decrease laxatives.   - BP elevated noted, likely due to steroids, keep meds current for now. monitor BP.   - Avoid nephrotoxins and hypotension   - will follow

## 2019-06-07 NOTE — PROGRESS NOTE ADULT - ASSESSMENT
#) Acute on chronic COPD exacerbation   - Flu A/B, RSV negative  - NC O2 to maintain sats of 88 -92 % - patient refused BiPAP, continues to sat well on room air however consistently looks uncomfortable - pursed lip breathing  - c/w Duonebs q6hrs and PRN and symbicort BID  - prednisone 60mg daily  - LE duplex neg  - PO lasix  - pulm following    #)Bilateral lower ext edema  - 2D echo  - will give additional dose of IV lasix this evening    #) prediabetes  - A1c 6.1  - patient counseled     #) Hypertension  - poorly controlled in patient most likely secondary to anxiety and also her pain contributes as well  - currently on amlodipine 10mg daily     #) hyponatremia  - sodium 128  - encourage water restriction - 1L  - will give another dose of IV lasix    #)hyperkalemia  - follow BMP     #) cataract   - Cataract Out pt f/u    #) DVT PPX  - Heparin sq    #) GI ppx     Activity Increase as tolerated

## 2019-06-07 NOTE — PROGRESS NOTE ADULT - SUBJECTIVE AND OBJECTIVE BOX
SUBJECTIVE:    Patient is a 69y old Female who presents with a chief complaint of SOB (07 Jun 2019 15:20)    Stable, no acute events.    PAST MEDICAL & SURGICAL HISTORY  Chronic pain  Depression  Anxiety  COPD (chronic obstructive pulmonary disease)  Neck mass       ALLERGIES:  penicillin (Unknown)    MEDICATIONS:  STANDING MEDICATIONS  ALBUTerol/ipratropium for Nebulization 3 milliLiter(s) Nebulizer every 4 hours  ALPRAZolam 0.5 milliGRAM(s) Oral every 8 hours  amLODIPine   Tablet 10 milliGRAM(s) Oral <User Schedule>  buDESOnide  80 MICROgram(s)/formoterol 4.5 MICROgram(s) Inhaler 2 Puff(s) Inhalation two times a day  chlorhexidine 4% Liquid 1 Application(s) Topical <User Schedule>  cyanocobalamin 1000 MICROGram(s) Oral daily  docusate sodium 100 milliGRAM(s) Oral three times a day  furosemide    Tablet 40 milliGRAM(s) Oral daily  furosemide   Injectable 40 milliGRAM(s) IV Push once  guaiFENesin  milliGRAM(s) Oral every 12 hours  heparin  Injectable 5000 Unit(s) SubCutaneous every 8 hours  nicotine -  14 mG/24Hr(s) Patch 1 patch Transdermal daily  nystatin    Suspension 607430 Unit(s) Oral four times a day  nystatin Powder 1 Application(s) Topical every 12 hours  pantoprazole    Tablet 40 milliGRAM(s) Oral before breakfast  predniSONE   Tablet 60 milliGRAM(s) Oral daily  senna 2 Tablet(s) Oral at bedtime  tiotropium 18 MICROgram(s) Capsule 1 Capsule(s) Inhalation daily  traZODone 50 milliGRAM(s) Oral at bedtime    PRN MEDICATIONS  ALBUTerol    90 MICROgram(s) HFA Inhaler 2 Puff(s) Inhalation every 6 hours PRN  benzonatate 100 milliGRAM(s) Oral three times a day PRN  oxyCODONE    IR 10 milliGRAM(s) Oral every 6 hours PRN    VITALS:   T(F): 97.4  HR: 94  BP: 155/68  RR: 22  SpO2: 93%    LABS:                        13.9   26.69 )-----------( 225      ( 07 Jun 2019 08:00 )             40.9     06-07    128<L>  |  82<L>  |  15  ----------------------------<  206<H>  4.6   |  33<H>  |  0.5<L>    Ca    8.5      07 Jun 2019 08:00  Mg     1.9     06-07                CARDIAC MARKERS ( 06 Jun 2019 08:25 )  x     / <0.01 ng/mL / x     / x     / x                  PHYSICAL EXAM:  GEN: NAD, comfortable  LUNGS: bilateral wheezing  HEART: RRR, no m/r/g  ABD: soft, NT/ND, +BS  EXT: bilateral lower ext edema  NEURO: AAOx3

## 2019-06-08 LAB
ALBUMIN SERPL ELPH-MCNC: 3.6 G/DL — SIGNIFICANT CHANGE UP (ref 3.5–5.2)
ALP SERPL-CCNC: 58 U/L — SIGNIFICANT CHANGE UP (ref 30–115)
ALT FLD-CCNC: 37 U/L — SIGNIFICANT CHANGE UP (ref 0–41)
ANION GAP SERPL CALC-SCNC: 14 MMOL/L — SIGNIFICANT CHANGE UP (ref 7–14)
AST SERPL-CCNC: 15 U/L — SIGNIFICANT CHANGE UP (ref 0–41)
BASOPHILS # BLD AUTO: 0 K/UL — SIGNIFICANT CHANGE UP (ref 0–0.2)
BASOPHILS NFR BLD AUTO: 0 % — SIGNIFICANT CHANGE UP (ref 0–1)
BILIRUB DIRECT SERPL-MCNC: 0.2 MG/DL — SIGNIFICANT CHANGE UP (ref 0–0.2)
BILIRUB INDIRECT FLD-MCNC: 0.3 MG/DL — SIGNIFICANT CHANGE UP (ref 0.2–1.2)
BILIRUB SERPL-MCNC: 0.5 MG/DL — SIGNIFICANT CHANGE UP (ref 0.2–1.2)
BUN SERPL-MCNC: 14 MG/DL — SIGNIFICANT CHANGE UP (ref 10–20)
CALCIUM SERPL-MCNC: 8.4 MG/DL — LOW (ref 8.5–10.1)
CHLORIDE SERPL-SCNC: 80 MMOL/L — LOW (ref 98–110)
CO2 SERPL-SCNC: 36 MMOL/L — HIGH (ref 17–32)
CORTIS AM PEAK SERPL-MCNC: 5.6 UG/DL — LOW (ref 6–18.4)
CREAT SERPL-MCNC: 0.5 MG/DL — LOW (ref 0.7–1.5)
EOSINOPHIL # BLD AUTO: 0 K/UL — SIGNIFICANT CHANGE UP (ref 0–0.7)
EOSINOPHIL NFR BLD AUTO: 0 % — SIGNIFICANT CHANGE UP (ref 0–8)
GLUCOSE SERPL-MCNC: 257 MG/DL — HIGH (ref 70–99)
HCT VFR BLD CALC: 41.1 % — SIGNIFICANT CHANGE UP (ref 37–47)
HGB BLD-MCNC: 13.7 G/DL — SIGNIFICANT CHANGE UP (ref 12–16)
LYMPHOCYTES # BLD AUTO: 1.5 K/UL — SIGNIFICANT CHANGE UP (ref 1.2–3.4)
LYMPHOCYTES # BLD AUTO: 6.1 % — LOW (ref 20.5–51.1)
MAGNESIUM SERPL-MCNC: 1.8 MG/DL — SIGNIFICANT CHANGE UP (ref 1.8–2.4)
MANUAL SMEAR VERIFICATION: SIGNIFICANT CHANGE UP
MCHC RBC-ENTMCNC: 33.3 G/DL — SIGNIFICANT CHANGE UP (ref 32–37)
MCHC RBC-ENTMCNC: 33.4 PG — HIGH (ref 27–31)
MCV RBC AUTO: 100.2 FL — HIGH (ref 81–99)
MONOCYTES # BLD AUTO: 0.42 K/UL — SIGNIFICANT CHANGE UP (ref 0.1–0.6)
MONOCYTES NFR BLD AUTO: 1.7 % — SIGNIFICANT CHANGE UP (ref 1.7–9.3)
NEUTROPHILS # BLD AUTO: 22.51 K/UL — HIGH (ref 1.4–6.5)
NEUTROPHILS NFR BLD AUTO: 91.3 % — HIGH (ref 42.2–75.2)
PLAT MORPH BLD: NORMAL — SIGNIFICANT CHANGE UP
PLATELET # BLD AUTO: 227 K/UL — SIGNIFICANT CHANGE UP (ref 130–400)
POTASSIUM SERPL-MCNC: 3.9 MMOL/L — SIGNIFICANT CHANGE UP (ref 3.5–5)
POTASSIUM SERPL-SCNC: 3.9 MMOL/L — SIGNIFICANT CHANGE UP (ref 3.5–5)
PROT SERPL-MCNC: 5.6 G/DL — LOW (ref 6–8)
RBC # BLD: 4.1 M/UL — LOW (ref 4.2–5.4)
RBC # FLD: 12.7 % — SIGNIFICANT CHANGE UP (ref 11.5–14.5)
RBC BLD AUTO: NORMAL — SIGNIFICANT CHANGE UP
SODIUM SERPL-SCNC: 130 MMOL/L — LOW (ref 135–146)
VARIANT LYMPHS # BLD: 0.9 % — SIGNIFICANT CHANGE UP (ref 0–5)
WBC # BLD: 24.66 K/UL — HIGH (ref 4.8–10.8)
WBC # FLD AUTO: 24.66 K/UL — HIGH (ref 4.8–10.8)

## 2019-06-08 PROCEDURE — 99233 SBSQ HOSP IP/OBS HIGH 50: CPT

## 2019-06-08 RX ORDER — FUROSEMIDE 40 MG
60 TABLET ORAL DAILY
Refills: 0 | Status: DISCONTINUED | OUTPATIENT
Start: 2019-06-08 | End: 2019-06-10

## 2019-06-08 RX ADMIN — Medication 3 MILLILITER(S): at 12:46

## 2019-06-08 RX ADMIN — Medication 50 MILLIGRAM(S): at 21:07

## 2019-06-08 RX ADMIN — PREGABALIN 1000 MICROGRAM(S): 225 CAPSULE ORAL at 11:04

## 2019-06-08 RX ADMIN — Medication 100 MILLIGRAM(S): at 21:07

## 2019-06-08 RX ADMIN — Medication 0.5 MILLIGRAM(S): at 06:06

## 2019-06-08 RX ADMIN — Medication 3 MILLILITER(S): at 07:40

## 2019-06-08 RX ADMIN — OXYCODONE HYDROCHLORIDE 10 MILLIGRAM(S): 5 TABLET ORAL at 06:15

## 2019-06-08 RX ADMIN — OXYCODONE HYDROCHLORIDE 10 MILLIGRAM(S): 5 TABLET ORAL at 18:17

## 2019-06-08 RX ADMIN — Medication 500000 UNIT(S): at 23:05

## 2019-06-08 RX ADMIN — Medication 600 MILLIGRAM(S): at 18:05

## 2019-06-08 RX ADMIN — Medication 3 MILLILITER(S): at 16:06

## 2019-06-08 RX ADMIN — Medication 1 PATCH: at 11:01

## 2019-06-08 RX ADMIN — NYSTATIN CREAM 1 APPLICATION(S): 100000 CREAM TOPICAL at 06:07

## 2019-06-08 RX ADMIN — BUDESONIDE AND FORMOTEROL FUMARATE DIHYDRATE 2 PUFF(S): 160; 4.5 AEROSOL RESPIRATORY (INHALATION) at 21:07

## 2019-06-08 RX ADMIN — Medication 3 MILLILITER(S): at 20:04

## 2019-06-08 RX ADMIN — Medication 0.5 MILLIGRAM(S): at 14:37

## 2019-06-08 RX ADMIN — BUDESONIDE AND FORMOTEROL FUMARATE DIHYDRATE 2 PUFF(S): 160; 4.5 AEROSOL RESPIRATORY (INHALATION) at 08:51

## 2019-06-08 RX ADMIN — OXYCODONE HYDROCHLORIDE 10 MILLIGRAM(S): 5 TABLET ORAL at 12:00

## 2019-06-08 RX ADMIN — Medication 1 PATCH: at 11:04

## 2019-06-08 RX ADMIN — Medication 500000 UNIT(S): at 06:07

## 2019-06-08 RX ADMIN — OXYCODONE HYDROCHLORIDE 10 MILLIGRAM(S): 5 TABLET ORAL at 11:59

## 2019-06-08 RX ADMIN — SENNA PLUS 2 TABLET(S): 8.6 TABLET ORAL at 21:08

## 2019-06-08 RX ADMIN — Medication 1 PATCH: at 19:29

## 2019-06-08 RX ADMIN — Medication 500000 UNIT(S): at 18:05

## 2019-06-08 RX ADMIN — Medication 100 MILLIGRAM(S): at 14:37

## 2019-06-08 RX ADMIN — OXYCODONE HYDROCHLORIDE 10 MILLIGRAM(S): 5 TABLET ORAL at 18:16

## 2019-06-08 RX ADMIN — Medication 60 MILLIGRAM(S): at 18:06

## 2019-06-08 RX ADMIN — Medication 1 PATCH: at 06:07

## 2019-06-08 RX ADMIN — PANTOPRAZOLE SODIUM 40 MILLIGRAM(S): 20 TABLET, DELAYED RELEASE ORAL at 06:07

## 2019-06-08 RX ADMIN — Medication 600 MILLIGRAM(S): at 06:06

## 2019-06-08 RX ADMIN — Medication 60 MILLIGRAM(S): at 06:06

## 2019-06-08 RX ADMIN — AMLODIPINE BESYLATE 10 MILLIGRAM(S): 2.5 TABLET ORAL at 18:05

## 2019-06-08 RX ADMIN — Medication 500000 UNIT(S): at 11:04

## 2019-06-08 RX ADMIN — NYSTATIN CREAM 1 APPLICATION(S): 100000 CREAM TOPICAL at 18:06

## 2019-06-08 RX ADMIN — Medication 0.5 MILLIGRAM(S): at 21:10

## 2019-06-08 RX ADMIN — Medication 100 MILLIGRAM(S): at 06:06

## 2019-06-08 RX ADMIN — Medication 40 MILLIGRAM(S): at 06:07

## 2019-06-08 NOTE — PROGRESS NOTE ADULT - ASSESSMENT
68 y/o F w/ h/o COPD, Chronic Pain, Depression, Anxiety. p/w c/o SOB.     #) Acute on chronic COPD exacerbation   - Flu A/B, RSV negative  - NC O2 to maintain sats of 88 -92 % - patient refused BiPAP, continues to sat well on room air however consistently looks uncomfortable - pursed lip breathing  - c/w Duonebs q6hrs and PRN and symbicort BID  - prednisone 60mg daily. If not improving by tomorrow - increase steroids again.   - fluconazole for oral thursh - 7 day course  - PO lasix  - breathing improving very slowly. likely d/c by monday.     #Component of Acute diastolic CHF exacerbation :   Improved with lasix. c/w po lasix.   Increase to 60mg PO QD for now for the hypervolemic hypernatremia.   Revert to 40 upon d/c.     #) prediabetes  - A1c 6.1  - patient counseled     #) Hypertension  - poorly controlled in patient most likely secondary to anxiety and also her pain contributes as well  - currently on amlodipine 10mg daily     #) hyponatremia  - hypervolemic  Gradually improving  Increased to Lasix 60mg QD for now  1500 cc FW restriction.    #)hyperkalemia  - follow BMP     #) cataract   - Cataract Out pt f/u    #R>L LE Edema   b/l le duplex neg.   c/w lasix, keep feet elevated.     #) DVT PPX  - Heparin sq    #) GI ppx     Activity Increase as tolerated

## 2019-06-08 NOTE — PROGRESS NOTE ADULT - SUBJECTIVE AND OBJECTIVE BOX
seen and examined  on o2   c/o sob       Standing Inpatient Medications  ALBUTerol/ipratropium for Nebulization 3 milliLiter(s) Nebulizer every 4 hours  ALPRAZolam 0.5 milliGRAM(s) Oral every 8 hours  amLODIPine   Tablet 10 milliGRAM(s) Oral <User Schedule>  buDESOnide  80 MICROgram(s)/formoterol 4.5 MICROgram(s) Inhaler 2 Puff(s) Inhalation two times a day  chlorhexidine 4% Liquid 1 Application(s) Topical <User Schedule>  cyanocobalamin 1000 MICROGram(s) Oral daily  docusate sodium 100 milliGRAM(s) Oral three times a day  furosemide    Tablet 40 milliGRAM(s) Oral daily  guaiFENesin  milliGRAM(s) Oral every 12 hours  heparin  Injectable 5000 Unit(s) SubCutaneous every 8 hours  nicotine -  14 mG/24Hr(s) Patch 1 patch Transdermal daily  nystatin    Suspension 697902 Unit(s) Oral four times a day  nystatin Powder 1 Application(s) Topical every 12 hours  pantoprazole    Tablet 40 milliGRAM(s) Oral before breakfast  predniSONE   Tablet 60 milliGRAM(s) Oral daily  senna 2 Tablet(s) Oral at bedtime  tiotropium 18 MICROgram(s) Capsule 1 Capsule(s) Inhalation daily  traZODone 50 milliGRAM(s) Oral at bedtime      VITALS/PHYSICAL EXAM  --------------------------------------------------------------------------------  T(C): 36 (06-08-19 @ 05:05), Max: 36.3 (06-07-19 @ 12:51)  HR: 68 (06-08-19 @ 05:05) (68 - 95)  BP: 144/84 (06-08-19 @ 05:05) (138/83 - 155/68)  RR: 18 (06-08-19 @ 05:05) (18 - 22)  SpO2: 93% (06-07-19 @ 08:50) (93% - 93%)  Wt(kg): --        06-07-19 @ 07:01  -  06-08-19 @ 07:00  --------------------------------------------------------  IN: 0 mL / OUT: 2350 mL / NET: -2350 mL      Physical Exam:  	Gen: NAD, on o2   	Pulm:  B/L ronchi at bases   	CV:  S1S2; no rub  	Abd: distended  	LE: edema      LABS/STUDIES  --------------------------------------------------------------------------------              13.9   26.69 >-----------<  225      [06-07-19 @ 08:00]              40.9     128  |  82  |  15  ----------------------------<  206      [06-07-19 @ 08:00]  4.6   |  33  |  0.5        Ca     8.5     [06-07-19 @ 08:00]      Mg     1.9     [06-07-19 @ 08:00]          Troponin <0.01      [06-06-19 @ 08:25]    Creatinine Trend:  SCr 0.5 [06-07 @ 08:00]  SCr 0.5 [06-06 @ 08:11]  SCr 0.5 [06-05 @ 08:41]  SCr <0.5 [06-04 @ 08:40]  SCr 0.5 [06-03 @ 08:58]      Urine Sodium 20.0      [06-03-19 @ 16:49]  Urine Osmolality 232      [06-03-19 @ 16:49]    Iron 182, TIBC 331, %sat 55      [05-29-19 @ 15:30]  HbA1c 6.1      [05-30-19 @ 08:13]  TSH 1.95      [06-14-18 @ 06:13]    HCV 0.06, Nonreact      [05-25-19 @ 08:14]

## 2019-06-08 NOTE — PROGRESS NOTE ADULT - ASSESSMENT
Patient with hyponatremia presented to hospital for SOB, wheezing, worsening cough with sputum  # hyponatremia due to hypervolemia  # sodium improving  # keep negative balance   # continue fluid restriction   # increase lasix to 60   # wbc noted, on steroids, check repeat today  # will follow

## 2019-06-08 NOTE — PROGRESS NOTE ADULT - SUBJECTIVE AND OBJECTIVE BOX
S : No new evnts/complaints  Breathing improving very gradually      All other pertinent ROS negative.      06-07-19 @ 07:01  -  06-08-19 @ 07:00  --------------------------------------------------------  IN: 0 mL / OUT: 2350 mL / NET: -2350 mL    06-08-19 @ 07:01  -  06-08-19 @ 16:50  --------------------------------------------------------  IN: 1440 mL / OUT: 400 mL / NET: 1040 mL      Vital Signs Last 24 Hrs  T(C): 36.9 (08 Jun 2019 13:01), Max: 36.9 (08 Jun 2019 13:01)  T(F): 98.4 (08 Jun 2019 13:01), Max: 98.4 (08 Jun 2019 13:01)  HR: 99 (08 Jun 2019 13:01) (68 - 99)  BP: 185/94 (08 Jun 2019 13:01) (138/83 - 185/94)  BP(mean): --  RR: 20 (08 Jun 2019 13:01) (18 - 22)  SpO2: 92% (08 Jun 2019 08:50) (92% - 92%)  PHYSICAL EXAM:    Constitutional: NAD, awake and alert, well-developed  HEENT: PERR, EOMI, Normal Hearing, MMM  Neck: Soft and supple, No LAD, No JVD  Respiratory: diffuse b/l wheeze. air movt better  Cardiovascular: S1 and S2, regular rate and rhythm, no Murmurs, gallops or rubs  Gastrointestinal: Bowel Sounds present, soft, nontender, nondistended, no guarding, no rebound  Extremities: b/l LE edema    MEDICATIONS:  MEDICATIONS  (STANDING):  ALBUTerol/ipratropium for Nebulization 3 milliLiter(s) Nebulizer every 4 hours  ALPRAZolam 0.5 milliGRAM(s) Oral every 8 hours  amLODIPine   Tablet 10 milliGRAM(s) Oral <User Schedule>  buDESOnide  80 MICROgram(s)/formoterol 4.5 MICROgram(s) Inhaler 2 Puff(s) Inhalation two times a day  chlorhexidine 4% Liquid 1 Application(s) Topical <User Schedule>  cyanocobalamin 1000 MICROGram(s) Oral daily  docusate sodium 100 milliGRAM(s) Oral three times a day  furosemide    Tablet 60 milliGRAM(s) Oral daily  guaiFENesin  milliGRAM(s) Oral every 12 hours  heparin  Injectable 5000 Unit(s) SubCutaneous every 8 hours  nicotine -  14 mG/24Hr(s) Patch 1 patch Transdermal daily  nystatin    Suspension 983543 Unit(s) Oral four times a day  nystatin Powder 1 Application(s) Topical every 12 hours  pantoprazole    Tablet 40 milliGRAM(s) Oral before breakfast  predniSONE   Tablet 60 milliGRAM(s) Oral daily  senna 2 Tablet(s) Oral at bedtime  tiotropium 18 MICROgram(s) Capsule 1 Capsule(s) Inhalation daily  traZODone 50 milliGRAM(s) Oral at bedtime      LABS: All Labs Reviewed:                        13.7   24.66 )-----------( 227      ( 08 Jun 2019 08:25 )             41.1     06-08    130<L>  |  80<L>  |  14  ----------------------------<  257<H>  3.9   |  36<H>  |  0.5<L>    Ca    8.4<L>      08 Jun 2019 08:25  Mg     1.8     06-08    TPro  5.6<L>  /  Alb  3.6  /  TBili  0.5  /  DBili  0.2  /  AST  15  /  ALT  37  /  AlkPhos  58  06-08          Blood Culture:     Radiology: reviewed

## 2019-06-09 LAB
ANION GAP SERPL CALC-SCNC: 13 MMOL/L — SIGNIFICANT CHANGE UP (ref 7–14)
BASOPHILS # BLD AUTO: 0.06 K/UL — SIGNIFICANT CHANGE UP (ref 0–0.2)
BASOPHILS NFR BLD AUTO: 0.2 % — SIGNIFICANT CHANGE UP (ref 0–1)
BUN SERPL-MCNC: 16 MG/DL — SIGNIFICANT CHANGE UP (ref 10–20)
CALCIUM SERPL-MCNC: 8.5 MG/DL — SIGNIFICANT CHANGE UP (ref 8.5–10.1)
CHLORIDE SERPL-SCNC: 82 MMOL/L — LOW (ref 98–110)
CO2 SERPL-SCNC: 34 MMOL/L — HIGH (ref 17–32)
CREAT SERPL-MCNC: 0.5 MG/DL — LOW (ref 0.7–1.5)
EOSINOPHIL # BLD AUTO: 0.03 K/UL — SIGNIFICANT CHANGE UP (ref 0–0.7)
EOSINOPHIL NFR BLD AUTO: 0.1 % — SIGNIFICANT CHANGE UP (ref 0–8)
GLUCOSE SERPL-MCNC: 151 MG/DL — HIGH (ref 70–99)
HCT VFR BLD CALC: 39.5 % — SIGNIFICANT CHANGE UP (ref 37–47)
HGB BLD-MCNC: 13.4 G/DL — SIGNIFICANT CHANGE UP (ref 12–16)
IMM GRANULOCYTES NFR BLD AUTO: 3.5 % — HIGH (ref 0.1–0.3)
LYMPHOCYTES # BLD AUTO: 1.13 K/UL — LOW (ref 1.2–3.4)
LYMPHOCYTES # BLD AUTO: 4.6 % — LOW (ref 20.5–51.1)
MAGNESIUM SERPL-MCNC: 1.9 MG/DL — SIGNIFICANT CHANGE UP (ref 1.8–2.4)
MCHC RBC-ENTMCNC: 33.6 PG — HIGH (ref 27–31)
MCHC RBC-ENTMCNC: 33.9 G/DL — SIGNIFICANT CHANGE UP (ref 32–37)
MCV RBC AUTO: 99 FL — SIGNIFICANT CHANGE UP (ref 81–99)
MONOCYTES # BLD AUTO: 1.09 K/UL — HIGH (ref 0.1–0.6)
MONOCYTES NFR BLD AUTO: 4.4 % — SIGNIFICANT CHANGE UP (ref 1.7–9.3)
NEUTROPHILS # BLD AUTO: 21.55 K/UL — HIGH (ref 1.4–6.5)
NEUTROPHILS NFR BLD AUTO: 87.2 % — HIGH (ref 42.2–75.2)
NRBC # BLD: 0 /100 WBCS — SIGNIFICANT CHANGE UP (ref 0–0)
PLATELET # BLD AUTO: 222 K/UL — SIGNIFICANT CHANGE UP (ref 130–400)
POTASSIUM SERPL-MCNC: 3.6 MMOL/L — SIGNIFICANT CHANGE UP (ref 3.5–5)
POTASSIUM SERPL-SCNC: 3.6 MMOL/L — SIGNIFICANT CHANGE UP (ref 3.5–5)
RBC # BLD: 3.99 M/UL — LOW (ref 4.2–5.4)
RBC # FLD: 12.4 % — SIGNIFICANT CHANGE UP (ref 11.5–14.5)
SODIUM SERPL-SCNC: 129 MMOL/L — LOW (ref 135–146)
TSH SERPL-MCNC: 4.11 UIU/ML — SIGNIFICANT CHANGE UP (ref 0.27–4.2)
WBC # BLD: 24.72 K/UL — HIGH (ref 4.8–10.8)
WBC # FLD AUTO: 24.72 K/UL — HIGH (ref 4.8–10.8)

## 2019-06-09 PROCEDURE — 99233 SBSQ HOSP IP/OBS HIGH 50: CPT

## 2019-06-09 RX ORDER — ALPRAZOLAM 0.25 MG
0.5 TABLET ORAL EVERY 8 HOURS
Refills: 0 | Status: DISCONTINUED | OUTPATIENT
Start: 2019-06-09 | End: 2019-06-10

## 2019-06-09 RX ADMIN — Medication 600 MILLIGRAM(S): at 18:05

## 2019-06-09 RX ADMIN — SENNA PLUS 2 TABLET(S): 8.6 TABLET ORAL at 21:21

## 2019-06-09 RX ADMIN — AMLODIPINE BESYLATE 10 MILLIGRAM(S): 2.5 TABLET ORAL at 18:05

## 2019-06-09 RX ADMIN — OXYCODONE HYDROCHLORIDE 10 MILLIGRAM(S): 5 TABLET ORAL at 07:28

## 2019-06-09 RX ADMIN — Medication 0.5 MILLIGRAM(S): at 11:48

## 2019-06-09 RX ADMIN — Medication 3 MILLILITER(S): at 16:43

## 2019-06-09 RX ADMIN — Medication 60 MILLIGRAM(S): at 05:51

## 2019-06-09 RX ADMIN — Medication 1 PATCH: at 19:46

## 2019-06-09 RX ADMIN — Medication 60 MILLIGRAM(S): at 05:50

## 2019-06-09 RX ADMIN — Medication 1 PATCH: at 07:31

## 2019-06-09 RX ADMIN — Medication 500000 UNIT(S): at 05:50

## 2019-06-09 RX ADMIN — OXYCODONE HYDROCHLORIDE 10 MILLIGRAM(S): 5 TABLET ORAL at 14:07

## 2019-06-09 RX ADMIN — Medication 500000 UNIT(S): at 11:11

## 2019-06-09 RX ADMIN — Medication 500000 UNIT(S): at 18:06

## 2019-06-09 RX ADMIN — OXYCODONE HYDROCHLORIDE 10 MILLIGRAM(S): 5 TABLET ORAL at 21:24

## 2019-06-09 RX ADMIN — BUDESONIDE AND FORMOTEROL FUMARATE DIHYDRATE 2 PUFF(S): 160; 4.5 AEROSOL RESPIRATORY (INHALATION) at 07:30

## 2019-06-09 RX ADMIN — Medication 0.5 MILLIGRAM(S): at 21:21

## 2019-06-09 RX ADMIN — Medication 600 MILLIGRAM(S): at 05:51

## 2019-06-09 RX ADMIN — Medication 100 MILLIGRAM(S): at 21:21

## 2019-06-09 RX ADMIN — Medication 3 MILLILITER(S): at 13:28

## 2019-06-09 RX ADMIN — Medication 100 MILLIGRAM(S): at 05:51

## 2019-06-09 RX ADMIN — OXYCODONE HYDROCHLORIDE 10 MILLIGRAM(S): 5 TABLET ORAL at 07:31

## 2019-06-09 RX ADMIN — Medication 50 MILLIGRAM(S): at 21:21

## 2019-06-09 RX ADMIN — Medication 1 PATCH: at 11:12

## 2019-06-09 RX ADMIN — NYSTATIN CREAM 1 APPLICATION(S): 100000 CREAM TOPICAL at 18:05

## 2019-06-09 RX ADMIN — Medication 3 MILLILITER(S): at 19:44

## 2019-06-09 RX ADMIN — NYSTATIN CREAM 1 APPLICATION(S): 100000 CREAM TOPICAL at 05:52

## 2019-06-09 RX ADMIN — OXYCODONE HYDROCHLORIDE 10 MILLIGRAM(S): 5 TABLET ORAL at 00:22

## 2019-06-09 RX ADMIN — Medication 3 MILLILITER(S): at 07:47

## 2019-06-09 RX ADMIN — Medication 500000 UNIT(S): at 23:11

## 2019-06-09 RX ADMIN — OXYCODONE HYDROCHLORIDE 10 MILLIGRAM(S): 5 TABLET ORAL at 00:58

## 2019-06-09 RX ADMIN — OXYCODONE HYDROCHLORIDE 10 MILLIGRAM(S): 5 TABLET ORAL at 20:54

## 2019-06-09 RX ADMIN — Medication 1 PATCH: at 11:16

## 2019-06-09 RX ADMIN — PANTOPRAZOLE SODIUM 40 MILLIGRAM(S): 20 TABLET, DELAYED RELEASE ORAL at 05:51

## 2019-06-09 RX ADMIN — PREGABALIN 1000 MICROGRAM(S): 225 CAPSULE ORAL at 11:11

## 2019-06-09 RX ADMIN — Medication 100 MILLIGRAM(S): at 14:13

## 2019-06-09 RX ADMIN — OXYCODONE HYDROCHLORIDE 10 MILLIGRAM(S): 5 TABLET ORAL at 14:12

## 2019-06-09 NOTE — PROGRESS NOTE ADULT - ASSESSMENT
Patient with hyponatremia presented to hospital for SOB, wheezing, worsening cough with sputum  # hyponatremia due to hypervolemia  # sodium improving  # keep negative balance   # continue fluid restriction   # increase lasix to 60   # wbc noted, on steroids, check repeat today  # will follow Patient with hyponatremia presented to hospital for SOB, wheezing, worsening cough with sputum    # hyponatremia due to hypervolemia  # sodium improving keep on lasix current and repeat BMP in AM   # continue fluid restriction   # wbc noted, on steroids, follow trend has been stable around 24   # will follow

## 2019-06-09 NOTE — PROGRESS NOTE ADULT - SUBJECTIVE AND OBJECTIVE BOX
Nephrology progress note    Patient is seen and examined, events over the last 24 h noted .    Allergies:  penicillin (Unknown)    Hospital Medications:   MEDICATIONS  (STANDING):  ALBUTerol/ipratropium for Nebulization 3 milliLiter(s) Nebulizer every 4 hours  amLODIPine   Tablet 10 milliGRAM(s) Oral <User Schedule>  buDESOnide  80 MICROgram(s)/formoterol 4.5 MICROgram(s) Inhaler 2 Puff(s) Inhalation two times a day  chlorhexidine 4% Liquid 1 Application(s) Topical <User Schedule>  cyanocobalamin 1000 MICROGram(s) Oral daily  docusate sodium 100 milliGRAM(s) Oral three times a day  furosemide    Tablet 60 milliGRAM(s) Oral daily  guaiFENesin  milliGRAM(s) Oral every 12 hours  heparin  Injectable 5000 Unit(s) SubCutaneous every 8 hours  nicotine -  14 mG/24Hr(s) Patch 1 patch Transdermal daily  nystatin    Suspension 722989 Unit(s) Oral four times a day  nystatin Powder 1 Application(s) Topical every 12 hours  pantoprazole    Tablet 40 milliGRAM(s) Oral before breakfast  predniSONE   Tablet 60 milliGRAM(s) Oral daily  senna 2 Tablet(s) Oral at bedtime  tiotropium 18 MICROgram(s) Capsule 1 Capsule(s) Inhalation daily  traZODone 50 milliGRAM(s) Oral at bedtime        VITALS:  T(F): 97.1 (06-09-19 @ 04:56), Max: 98.4 (06-08-19 @ 13:01)  HR: 91 (06-09-19 @ 04:56)  BP: 119/58 (06-09-19 @ 04:56)  RR: 20 (06-09-19 @ 04:56)  SpO2: 95% (06-09-19 @ 10:44)  Wt(kg): --    06-07 @ 07:01  -  06-08 @ 07:00  --------------------------------------------------------  IN: 0 mL / OUT: 2350 mL / NET: -2350 mL    06-08 @ 07:01  -  06-09 @ 07:00  --------------------------------------------------------  IN: 1440 mL / OUT: 400 mL / NET: 1040 mL          PHYSICAL EXAM:  Constitutional: NAD  HEENT: anicteric sclera, oropharynx clear, MMM  Neck: No JVD  Respiratory: CTAB, no wheezes, rales or rhonchi  Cardiovascular: S1, S2, RRR  Gastrointestinal: BS+, soft, NT/ND  Extremities: No cyanosis or clubbing. No peripheral edema  :  No truong.   Skin: No rashes    LABS:  06-09    129<L>  |  82<L>  |  16  ----------------------------<  151<H>  3.6   |  34<H>  |  0.5<L>  SODIUM TREND:  Sodium 129 [06-09 @ 08:36]  Sodium 130 [06-08 @ 08:25]  Sodium 128 [06-07 @ 08:00]  Sodium 126 [06-06 @ 08:11]  Sodium 131 [06-05 @ 08:41]  Sodium 128 [06-04 @ 08:40]  Sodium 129 [06-03 @ 08:58]  Sodium 128 [06-02 @ 08:18]  Sodium 129 [06-01 @ 07:59]  Sodium 133 [05-31 @ 06:50]    Ca    8.5      09 Jun 2019 08:36  Mg     1.9     06-09    TPro  5.6<L>  /  Alb  3.6  /  TBili  0.5  /  DBili  0.2  /  AST  15  /  ALT  37  /  AlkPhos  58  06-08                          13.4   24.72 )-----------( 222      ( 09 Jun 2019 08:36 )             39.5       Urine Studies:    Osmolality, Random Urine: 232 mos/kg (06-03 @ 16:49)  Sodium, Random Urine: 20.0 mmoL/L (06-03 @ 16:49)    RADIOLOGY & ADDITIONAL STUDIES: Nephrology progress note    Patient is seen and examined, events over the last 24 h noted .  denied SOB   no other complaints   Allergies:  penicillin (Unknown)    Hospital Medications:   MEDICATIONS  (STANDING):  ALBUTerol/ipratropium for Nebulization 3 milliLiter(s) Nebulizer every 4 hours  amLODIPine   Tablet 10 milliGRAM(s) Oral <User Schedule>  buDESOnide  80 MICROgram(s)/formoterol 4.5 MICROgram(s) Inhaler 2 Puff(s) Inhalation two times a day  cyanocobalamin 1000 MICROGram(s) Oral daily  docusate sodium 100 milliGRAM(s) Oral three times a day  furosemide    Tablet 60 milliGRAM(s) Oral daily  guaiFENesin  milliGRAM(s) Oral every 12 hours  heparin  Injectable 5000 Unit(s) SubCutaneous every 8 hours  nicotine -  14 mG/24Hr(s) Patch 1 patch Transdermal daily  nystatin    Suspension 206558 Unit(s) Oral four times a day  nystatin Powder 1 Application(s) Topical every 12 hours  pantoprazole    Tablet 40 milliGRAM(s) Oral before breakfast  predniSONE   Tablet 60 milliGRAM(s) Oral daily  senna 2 Tablet(s) Oral at bedtime  tiotropium 18 MICROgram(s) Capsule 1 Capsule(s) Inhalation daily  traZODone 50 milliGRAM(s) Oral at bedtime        VITALS:  T(F): 97.1 (06-09-19 @ 04:56), Max: 98.4 (06-08-19 @ 13:01)  HR: 91 (06-09-19 @ 04:56)  BP: 119/58 (06-09-19 @ 04:56)  RR: 20 (06-09-19 @ 04:56)  SpO2: 95% (06-09-19 @ 10:44)      06-07 @ 07:01  -  06-08 @ 07:00  --------------------------------------------------------  IN: 0 mL / OUT: 2350 mL / NET: -2350 mL    06-08 @ 07:01  -  06-09 @ 07:00  --------------------------------------------------------  IN: 1440 mL / OUT: 400 mL / NET: 1040 mL          PHYSICAL EXAM:  Constitutional: NAD  HEENT: anicteric sclera, oropharynx clear, MMM  Neck: No JVD  Respiratory: CTAB, no wheezes, rales or rhonchi  Cardiovascular: S1, S2, RRR  Gastrointestinal: BS+, soft, NT/ND  Extremities: No cyanosis or clubbing. plus 2 edema lower ext   :  No truong.   Skin: No rashes    LABS:  06-09    129<L>  |  82<L>  |  16  ----------------------------<  151<H>  3.6   |  34<H>  |  0.5<L>  SODIUM TREND:  Sodium 129 [06-09 @ 08:36]  Sodium 130 [06-08 @ 08:25]  Sodium 128 [06-07 @ 08:00]  Sodium 126 [06-06 @ 08:11]  Sodium 131 [06-05 @ 08:41]  Sodium 128 [06-04 @ 08:40]  Sodium 129 [06-03 @ 08:58]  Sodium 128 [06-02 @ 08:18]  Sodium 129 [06-01 @ 07:59]  Sodium 133 [05-31 @ 06:50]    Ca    8.5      09 Jun 2019 08:36  Mg     1.9     06-09    TPro  5.6<L>  /  Alb  3.6  /  TBili  0.5  /  DBili  0.2  /  AST  15  /  ALT  37  /  AlkPhos  58  06-08                          13.4   24.72 )-----------( 222      ( 09 Jun 2019 08:36 )             39.5       Urine Studies:    Osmolality, Random Urine: 232 mos/kg (06-03 @ 16:49)  Sodium, Random Urine: 20.0 mmoL/L (06-03 @ 16:49)    RADIOLOGY & ADDITIONAL STUDIES:

## 2019-06-09 NOTE — PROGRESS NOTE ADULT - ASSESSMENT
68 y/o F w/ h/o COPD, Chronic Pain, Depression, Anxiety. p/w c/o SOB.     #) Acute on chronic COPD exacerbation   - Flu A/B, RSV negative  - NC O2 to maintain sats of 88 -92 % - patient refused BiPAP, continues to sat well on room air however consistently looks uncomfortable - pursed lip breathing  - c/w Duonebs q6hrs and PRN and symbicort BID  - prednisone 60mg daily. If not improving by tomorrow - increase steroids again.   - fluconazole for oral thursh - 7 day course  - PO lasix  - breathing improving very slowly. likely d/c by monday.     #Component of Acute diastolic CHF exacerbation :   Improved with lasix. c/w po lasix.   Increase to 60mg PO QD for now for the hypervolemic hypernatremia.   Revert to 40 upon d/c.     #) prediabetes  - A1c 6.1  - patient counseled     #) Hypertension  - poorly controlled in patient most likely secondary to anxiety and also her pain contributes as well  - currently on amlodipine 10mg daily     #) hyponatremia  - hypervolemic  Gradually improving  Increased to Lasix 60mg QD for now  1500 cc FW restriction.    #)hyperkalemia  - follow BMP     #) cataract   - Cataract Out pt f/u    #R>L LE Edema   b/l le duplex neg.   c/w lasix, keep feet elevated.     #) DVT PPX  - Heparin sq    #) GI ppx     Activity Increase as tolerated 70 y/o F w/ h/o COPD, Chronic Pain, Depression, Anxiety. p/w c/o SOB.     #) Acute on chronic COPD exacerbation   - Flu A/B, RSV negative  - NC O2 to maintain sats of 88 -92 % - patient refused BiPAP, continues to sat well on room air however consistently looks uncomfortable - pursed lip breathing  - c/w Duonebs q6hrs and PRN and symbicort BID  - prednisone 60mg daily. If not improving by tomorrow - increase steroids again.   - fluconazole for oral thursh - 7 day course  - PO lasix  - breathing improving very slowly. likely d/c by monday.     #Component of Acute diastolic CHF exacerbation :   Improved with lasix. c/w po lasix.   Increase to 60mg PO QD for now for the hypervolemic hypernatremia.   Revert to 40 upon d/c.     #) prediabetes  - A1c 6.1  - patient counseled     #) Hypertension  - poorly controlled in patient most likely secondary to anxiety and also her pain contributes as well  - currently on amlodipine 10mg daily     #) hyponatremia  - hypervolemic  Gradually improving  Increased to Lasix 60mg QD for now. c/w current. Check with Nephro tomorrow what dose to send her on. f/u AM bmp.  1500 cc FW restriction.    #)hyperkalemia  - follow BMP     #) cataract   - Cataract Out pt f/u    #R>L LE Edema   b/l le duplex neg.   c/w lasix, keep feet elevated.     #) DVT PPX  - Heparin sq    #) GI ppx     Activity Increase as tolerated

## 2019-06-09 NOTE — PROGRESS NOTE ADULT - SUBJECTIVE AND OBJECTIVE BOX
S : No new events/complaints      All other pertinent ROS negative.      06-08-19 @ 07:01  -  06-09-19 @ 07:00  --------------------------------------------------------  IN: 1440 mL / OUT: 400 mL / NET: 1040 mL    06-09-19 @ 07:01  -  06-09-19 @ 17:10  --------------------------------------------------------  IN: 0 mL / OUT: 800 mL / NET: -800 mL      Vital Signs Last 24 Hrs  T(C): 36.4 (09 Jun 2019 14:49), Max: 36.4 (09 Jun 2019 14:49)  T(F): 97.5 (09 Jun 2019 14:49), Max: 97.5 (09 Jun 2019 14:49)  HR: 103 (09 Jun 2019 14:49) (91 - 103)  BP: 136/73 (09 Jun 2019 14:49) (119/58 - 136/73)  BP(mean): --  RR: 18 (09 Jun 2019 14:49) (18 - 22)  SpO2: 95% (09 Jun 2019 14:49) (93% - 95%)  PHYSICAL EXAM:    Constitutional: NAD, awake and alert, well-developed  HEENT: PERR, EOMI, Normal Hearing, MMM  Neck: Soft and supple, No LAD, No JVD  Respiratory: wheezing better  Cardiovascular: S1 and S2, regular rate and rhythm, no Murmurs, gallops or rubs  Gastrointestinal: Bowel Sounds present, soft, nontender, nondistended, no guarding, no rebound  Extremities: b/l le edema/. slightly better.     MEDICATIONS:  MEDICATIONS  (STANDING):  ALBUTerol/ipratropium for Nebulization 3 milliLiter(s) Nebulizer every 4 hours  ALPRAZolam 0.5 milliGRAM(s) Oral every 8 hours  amLODIPine   Tablet 10 milliGRAM(s) Oral <User Schedule>  buDESOnide  80 MICROgram(s)/formoterol 4.5 MICROgram(s) Inhaler 2 Puff(s) Inhalation two times a day  chlorhexidine 4% Liquid 1 Application(s) Topical <User Schedule>  cyanocobalamin 1000 MICROGram(s) Oral daily  docusate sodium 100 milliGRAM(s) Oral three times a day  furosemide    Tablet 60 milliGRAM(s) Oral daily  guaiFENesin  milliGRAM(s) Oral every 12 hours  heparin  Injectable 5000 Unit(s) SubCutaneous every 8 hours  nicotine -  14 mG/24Hr(s) Patch 1 patch Transdermal daily  nystatin    Suspension 757880 Unit(s) Oral four times a day  nystatin Powder 1 Application(s) Topical every 12 hours  pantoprazole    Tablet 40 milliGRAM(s) Oral before breakfast  predniSONE   Tablet 60 milliGRAM(s) Oral daily  senna 2 Tablet(s) Oral at bedtime  tiotropium 18 MICROgram(s) Capsule 1 Capsule(s) Inhalation daily  traZODone 50 milliGRAM(s) Oral at bedtime      LABS: All Labs Reviewed:                        13.4   24.72 )-----------( 222      ( 09 Jun 2019 08:36 )             39.5     06-09    129<L>  |  82<L>  |  16  ----------------------------<  151<H>  3.6   |  34<H>  |  0.5<L>    Ca    8.5      09 Jun 2019 08:36  Mg     1.9     06-09    TPro  5.6<L>  /  Alb  3.6  /  TBili  0.5  /  DBili  0.2  /  AST  15  /  ALT  37  /  AlkPhos  58  06-08          Blood Culture:     Radiology: reviewed

## 2019-06-10 ENCOUNTER — TRANSCRIPTION ENCOUNTER (OUTPATIENT)
Age: 69
End: 2019-06-10

## 2019-06-10 VITALS — DIASTOLIC BLOOD PRESSURE: 81 MMHG | SYSTOLIC BLOOD PRESSURE: 169 MMHG | HEART RATE: 94 BPM

## 2019-06-10 LAB
ANION GAP SERPL CALC-SCNC: 11 MMOL/L — SIGNIFICANT CHANGE UP (ref 7–14)
BASOPHILS # BLD AUTO: 0.04 K/UL — SIGNIFICANT CHANGE UP (ref 0–0.2)
BASOPHILS NFR BLD AUTO: 0.2 % — SIGNIFICANT CHANGE UP (ref 0–1)
BUN SERPL-MCNC: 13 MG/DL — SIGNIFICANT CHANGE UP (ref 10–20)
CALCIUM SERPL-MCNC: 8.8 MG/DL — SIGNIFICANT CHANGE UP (ref 8.5–10.1)
CHLORIDE SERPL-SCNC: 83 MMOL/L — LOW (ref 98–110)
CO2 SERPL-SCNC: 37 MMOL/L — HIGH (ref 17–32)
CREAT SERPL-MCNC: <0.5 MG/DL — LOW (ref 0.7–1.5)
EOSINOPHIL # BLD AUTO: 0.08 K/UL — SIGNIFICANT CHANGE UP (ref 0–0.7)
EOSINOPHIL NFR BLD AUTO: 0.4 % — SIGNIFICANT CHANGE UP (ref 0–8)
GLUCOSE SERPL-MCNC: 162 MG/DL — HIGH (ref 70–99)
HCT VFR BLD CALC: 36.8 % — LOW (ref 37–47)
HGB BLD-MCNC: 12.3 G/DL — SIGNIFICANT CHANGE UP (ref 12–16)
IMM GRANULOCYTES NFR BLD AUTO: 2.8 % — HIGH (ref 0.1–0.3)
LYMPHOCYTES # BLD AUTO: 11.2 % — LOW (ref 20.5–51.1)
LYMPHOCYTES # BLD AUTO: 2.36 K/UL — SIGNIFICANT CHANGE UP (ref 1.2–3.4)
MAGNESIUM SERPL-MCNC: 1.9 MG/DL — SIGNIFICANT CHANGE UP (ref 1.8–2.4)
MCHC RBC-ENTMCNC: 33.4 G/DL — SIGNIFICANT CHANGE UP (ref 32–37)
MCHC RBC-ENTMCNC: 33.4 PG — HIGH (ref 27–31)
MCV RBC AUTO: 100 FL — HIGH (ref 81–99)
MONOCYTES # BLD AUTO: 1.24 K/UL — HIGH (ref 0.1–0.6)
MONOCYTES NFR BLD AUTO: 5.9 % — SIGNIFICANT CHANGE UP (ref 1.7–9.3)
NEUTROPHILS # BLD AUTO: 16.71 K/UL — HIGH (ref 1.4–6.5)
NEUTROPHILS NFR BLD AUTO: 79.5 % — HIGH (ref 42.2–75.2)
NRBC # BLD: 0 /100 WBCS — SIGNIFICANT CHANGE UP (ref 0–0)
PLATELET # BLD AUTO: 195 K/UL — SIGNIFICANT CHANGE UP (ref 130–400)
POTASSIUM SERPL-MCNC: 4 MMOL/L — SIGNIFICANT CHANGE UP (ref 3.5–5)
POTASSIUM SERPL-SCNC: 4 MMOL/L — SIGNIFICANT CHANGE UP (ref 3.5–5)
RBC # BLD: 3.68 M/UL — LOW (ref 4.2–5.4)
RBC # FLD: 12.5 % — SIGNIFICANT CHANGE UP (ref 11.5–14.5)
SODIUM SERPL-SCNC: 131 MMOL/L — LOW (ref 135–146)
WBC # BLD: 21.01 K/UL — HIGH (ref 4.8–10.8)
WBC # FLD AUTO: 21.01 K/UL — HIGH (ref 4.8–10.8)

## 2019-06-10 PROCEDURE — 99239 HOSP IP/OBS DSCHRG MGMT >30: CPT

## 2019-06-10 RX ORDER — BUDESONIDE AND FORMOTEROL FUMARATE DIHYDRATE 160; 4.5 UG/1; UG/1
2 AEROSOL RESPIRATORY (INHALATION)
Qty: 1 | Refills: 0
Start: 2019-06-10

## 2019-06-10 RX ORDER — SENNA PLUS 8.6 MG/1
2 TABLET ORAL
Qty: 0 | Refills: 0 | DISCHARGE
Start: 2019-06-10

## 2019-06-10 RX ORDER — NYSTATIN 500MM UNIT
5 POWDER (EA) MISCELLANEOUS
Qty: 0 | Refills: 0 | DISCHARGE
Start: 2019-06-10

## 2019-06-10 RX ORDER — TIOTROPIUM BROMIDE 18 UG/1
1 CAPSULE ORAL; RESPIRATORY (INHALATION)
Qty: 0 | Refills: 0 | DISCHARGE
Start: 2019-06-10

## 2019-06-10 RX ORDER — BUDESONIDE AND FORMOTEROL FUMARATE DIHYDRATE 160; 4.5 UG/1; UG/1
2 AEROSOL RESPIRATORY (INHALATION)
Refills: 0 | Status: DISCONTINUED | OUTPATIENT
Start: 2019-06-10 | End: 2019-06-10

## 2019-06-10 RX ORDER — IPRATROPIUM/ALBUTEROL SULFATE 18-103MCG
1 AEROSOL WITH ADAPTER (GRAM) INHALATION
Qty: 0 | Refills: 0 | DISCHARGE

## 2019-06-10 RX ORDER — TIOTROPIUM BROMIDE 18 UG/1
1 CAPSULE ORAL; RESPIRATORY (INHALATION) DAILY
Refills: 0 | Status: DISCONTINUED | OUTPATIENT
Start: 2019-06-10 | End: 2019-06-10

## 2019-06-10 RX ORDER — TIOTROPIUM BROMIDE 18 UG/1
1 CAPSULE ORAL; RESPIRATORY (INHALATION)
Qty: 1 | Refills: 0
Start: 2019-06-10 | End: 2019-07-09

## 2019-06-10 RX ORDER — TIOTROPIUM BROMIDE 18 UG/1
1 CAPSULE ORAL; RESPIRATORY (INHALATION)
Qty: 1 | Refills: 3
Start: 2019-06-10

## 2019-06-10 RX ORDER — IPRATROPIUM/ALBUTEROL SULFATE 18-103MCG
1 AEROSOL WITH ADAPTER (GRAM) INHALATION
Qty: 1 | Refills: 0
Start: 2019-06-10 | End: 2019-07-09

## 2019-06-10 RX ORDER — AMLODIPINE BESYLATE 2.5 MG/1
1 TABLET ORAL
Qty: 12 | Refills: 0
Start: 2019-06-10

## 2019-06-10 RX ORDER — PREGABALIN 225 MG/1
1 CAPSULE ORAL
Qty: 30 | Refills: 0
Start: 2019-06-10

## 2019-06-10 RX ORDER — BUDESONIDE AND FORMOTEROL FUMARATE DIHYDRATE 160; 4.5 UG/1; UG/1
2 AEROSOL RESPIRATORY (INHALATION)
Qty: 0 | Refills: 0 | DISCHARGE
Start: 2019-06-10

## 2019-06-10 RX ORDER — FUROSEMIDE 40 MG
3 TABLET ORAL
Qty: 30 | Refills: 2
Start: 2019-06-10

## 2019-06-10 RX ORDER — FUROSEMIDE 40 MG
3 TABLET ORAL
Qty: 0 | Refills: 0 | DISCHARGE
Start: 2019-06-10

## 2019-06-10 RX ORDER — SENNA PLUS 8.6 MG/1
2 TABLET ORAL
Qty: 28 | Refills: 0
Start: 2019-06-10 | End: 2019-06-23

## 2019-06-10 RX ADMIN — Medication 3 MILLILITER(S): at 08:24

## 2019-06-10 RX ADMIN — Medication 100 MILLIGRAM(S): at 14:33

## 2019-06-10 RX ADMIN — Medication 0.5 MILLIGRAM(S): at 05:56

## 2019-06-10 RX ADMIN — Medication 1 PATCH: at 11:13

## 2019-06-10 RX ADMIN — Medication 1 PATCH: at 07:39

## 2019-06-10 RX ADMIN — PANTOPRAZOLE SODIUM 40 MILLIGRAM(S): 20 TABLET, DELAYED RELEASE ORAL at 05:52

## 2019-06-10 RX ADMIN — Medication 500000 UNIT(S): at 17:20

## 2019-06-10 RX ADMIN — Medication 500000 UNIT(S): at 11:13

## 2019-06-10 RX ADMIN — Medication 0.5 MILLIGRAM(S): at 14:33

## 2019-06-10 RX ADMIN — OXYCODONE HYDROCHLORIDE 10 MILLIGRAM(S): 5 TABLET ORAL at 18:08

## 2019-06-10 RX ADMIN — AMLODIPINE BESYLATE 10 MILLIGRAM(S): 2.5 TABLET ORAL at 17:20

## 2019-06-10 RX ADMIN — Medication 3 MILLILITER(S): at 12:49

## 2019-06-10 RX ADMIN — Medication 3 MILLILITER(S): at 03:58

## 2019-06-10 RX ADMIN — OXYCODONE HYDROCHLORIDE 10 MILLIGRAM(S): 5 TABLET ORAL at 12:34

## 2019-06-10 RX ADMIN — OXYCODONE HYDROCHLORIDE 10 MILLIGRAM(S): 5 TABLET ORAL at 05:56

## 2019-06-10 RX ADMIN — OXYCODONE HYDROCHLORIDE 10 MILLIGRAM(S): 5 TABLET ORAL at 06:30

## 2019-06-10 RX ADMIN — Medication 600 MILLIGRAM(S): at 17:20

## 2019-06-10 RX ADMIN — NYSTATIN CREAM 1 APPLICATION(S): 100000 CREAM TOPICAL at 05:52

## 2019-06-10 RX ADMIN — Medication 60 MILLIGRAM(S): at 05:52

## 2019-06-10 RX ADMIN — Medication 600 MILLIGRAM(S): at 05:52

## 2019-06-10 RX ADMIN — Medication 3 MILLILITER(S): at 15:37

## 2019-06-10 RX ADMIN — BUDESONIDE AND FORMOTEROL FUMARATE DIHYDRATE 2 PUFF(S): 160; 4.5 AEROSOL RESPIRATORY (INHALATION) at 08:21

## 2019-06-10 RX ADMIN — Medication 500000 UNIT(S): at 05:52

## 2019-06-10 RX ADMIN — PREGABALIN 1000 MICROGRAM(S): 225 CAPSULE ORAL at 11:13

## 2019-06-10 RX ADMIN — OXYCODONE HYDROCHLORIDE 10 MILLIGRAM(S): 5 TABLET ORAL at 12:04

## 2019-06-10 NOTE — PROGRESS NOTE ADULT - ASSESSMENT
Patient with hyponatremia presented to hospital for SOB, wheezing, worsening cough with sputum  # hyponatremia due to hypervolemia ( might have underlying SIADH, patient on SSRI)  # sodium stable   # keep negative balance   # continue fluid restriction   # continue lasix  60   #venous duplex negative  # will sign off recall as needed

## 2019-06-10 NOTE — DISCHARGE NOTE PROVIDER - HOSPITAL COURSE
70 y/o female h/o COPD not on home O2, admitted in April for COPD exacerbation presented with SOB for 3 weeks and admitted for COPD exacerbation. Failed combivent, albuterol inhaler at home and daily prednisone. Patient given steroids, nebs and started on abx. Patient refused NIV. Pt was hyperglycemic due to prednisone and started on insulin. Abx dc'ed, no evidence of infection. CXR did show some evidence of pulmonary vascular congestion and was given extra doses of lasix. Stay also complicated by hypervolemic hyponatremia and Na improved with diuresis. Patient was approved for home O2 which has been delivered. RVP panel was negative. 68 y/o female h/o COPD not on home O2, admitted in April for COPD exacerbation presented with SOB for 3 weeks and admitted for COPD exacerbation. Failed combivent, albuterol inhaler at home and daily prednisone. Patient given steroids, nebs and started on abx. Patient refused NIV. Pt was hyperglycemic due to prednisone and started on insulin. Abx dc'ed, no evidence of infection. CXR did show some evidence of pulmonary vascular congestion and was given extra doses of lasix. Stay also complicated by hypervolemic hyponatremia and Na improved with diuresis. Patient was approved for home O2 which has been delivered. RVP panel was negative.         Attending Note:    68 y/o F w/ h/o COPD, Chronic Pain, Depression, Anxiety. p/w c/o SOB.         #) Acute on chronic COPD exacerbation     - Flu A/B, RSV negative    Improved after multiple days of steroid    D/c on PO steroid taper. Other meds per rec.         #Component of Acute diastolic CHF exacerbation :     Improved with lasix. c/w po lasix.     Increase to 60mg PO QD for now for the hypervolemic hypernatremia.              #) prediabetes    - A1c 6.1    - patient counseled         #) Hypertension    - poorly controlled in patient most likely secondary to anxiety and also her pain contributes as well    better now. Ok to d/c on home meds. f/u outpatient. BP should improve as steroids come down.        #) hyponatremia    - hypervolemic    Gradually improving    Increased to Lasix 60mg QD for now. c/w current.    BMP in 1 week.            #) cataract     - Cataract Out pt f/u        #R>L LE Edema     b/l le duplex neg.     c/w lasix, keep feet elevated.         #Oral thrush:     completed treatment.        d/c to home

## 2019-06-10 NOTE — DISCHARGE NOTE NURSING/CASE MANAGEMENT/SOCIAL WORK - NSDCPEEMAIL_GEN_ALL_CORE
[facility] for Tobacco Control email [email or web address]

## 2019-06-10 NOTE — PROGRESS NOTE ADULT - SUBJECTIVE AND OBJECTIVE BOX
seen and examined  no distress  SOB on exertion       Standing Inpatient Medications  ALBUTerol/ipratropium for Nebulization 3 milliLiter(s) Nebulizer every 4 hours  ALPRAZolam 0.5 milliGRAM(s) Oral every 8 hours  amLODIPine   Tablet 10 milliGRAM(s) Oral <User Schedule>  buDESOnide  80 MICROgram(s)/formoterol 4.5 MICROgram(s) Inhaler 2 Puff(s) Inhalation two times a day  chlorhexidine 4% Liquid 1 Application(s) Topical <User Schedule>  cyanocobalamin 1000 MICROGram(s) Oral daily  docusate sodium 100 milliGRAM(s) Oral three times a day  furosemide    Tablet 60 milliGRAM(s) Oral daily  guaiFENesin  milliGRAM(s) Oral every 12 hours  heparin  Injectable 5000 Unit(s) SubCutaneous every 8 hours  nicotine -  14 mG/24Hr(s) Patch 1 patch Transdermal daily  nystatin    Suspension 666647 Unit(s) Oral four times a day  nystatin Powder 1 Application(s) Topical every 12 hours  pantoprazole    Tablet 40 milliGRAM(s) Oral before breakfast  predniSONE   Tablet 60 milliGRAM(s) Oral daily  senna 2 Tablet(s) Oral at bedtime  tiotropium 18 MICROgram(s) Capsule 1 Capsule(s) Inhalation daily  traZODone 50 milliGRAM(s) Oral at bedtime      VITALS/PHYSICAL EXAM  --------------------------------------------------------------------------------  T(C): 36.4 (06-10-19 @ 05:14), Max: 36.4 (06-09-19 @ 14:49)  HR: 98 (06-10-19 @ 05:14) (98 - 103)  BP: 121/62 (06-10-19 @ 05:14) (121/62 - 139/76)  RR: 18 (06-10-19 @ 05:14) (18 - 18)  SpO2: 95% (06-10-19 @ 07:32) (93% - 95%)  Wt(kg): --        06-09-19 @ 07:01  -  06-10-19 @ 07:00  --------------------------------------------------------  IN: 0 mL / OUT: 1200 mL / NET: -1200 mL      Physical Exam:  	Gen: NAD  	Pulm: B/L ronchi at bases   	CV:  S1S2; no rub  	Abd: +distended  	LE:  edema R>L       LABS/STUDIES  --------------------------------------------------------------------------------              13.4   24.72 >-----------<  222      [06-09-19 @ 08:36]              39.5     129  |  82  |  16  ----------------------------<  151      [06-09-19 @ 08:36]  3.6   |  34  |  0.5        Ca     8.5     [06-09-19 @ 08:36]      Mg     1.9     [06-09-19 @ 08:36]    TPro  5.6  /  Alb  3.6  /  TBili  0.5  /  DBili  0.2  /  AST  15  /  ALT  37  /  AlkPhos  58  [06-08-19 @ 08:25]          Creatinine Trend:  SCr 0.5 [06-09 @ 08:36]  SCr 0.5 [06-08 @ 08:25]  SCr 0.5 [06-07 @ 08:00]  SCr 0.5 [06-06 @ 08:11]  SCr 0.5 [06-05 @ 08:41]      Urine Sodium 20.0      [06-03-19 @ 16:49]  Urine Osmolality 232      [06-03-19 @ 16:49]    Iron 182, TIBC 331, %sat 55      [05-29-19 @ 15:30]  HbA1c 6.1      [05-30-19 @ 08:13]  TSH 4.11      [06-08-19 @ 08:25]    HCV 0.06, Nonreact      [05-25-19 @ 08:14]

## 2019-06-10 NOTE — DISCHARGE NOTE PROVIDER - NSDCCPCAREPLAN_GEN_ALL_CORE_FT
PRINCIPAL DISCHARGE DIAGNOSIS  Diagnosis: COPD exacerbation  Assessment and Plan of Treatment: Take Symbicort 2 puffs twice a day  Take Spiriva daily  Take combivent as needed.  Smoking cessation encouraged. Cannot smoke while taking nicotine patch.        SECONDARY DISCHARGE DIAGNOSES  Diagnosis: DRAGAN (acute kidney injury)  Assessment and Plan of Treatment: Take lasix 20mg QD PRINCIPAL DISCHARGE DIAGNOSIS  Diagnosis: COPD exacerbation  Assessment and Plan of Treatment: Take Symbicort 2 puffs twice a day  Take Spiriva daily  Take combivent as needed.  Smoking cessation encouraged. Cannot smoke while taking nicotine patch.  Please complete your Prednsione taper as directed.  Follow up with your PMD and Pulmonologist in 1 week.         SECONDARY DISCHARGE DIAGNOSES  Diagnosis: DRAGAN (acute kidney injury)  Assessment and Plan of Treatment: Please continue taking your home medications as directed.

## 2019-06-10 NOTE — DISCHARGE NOTE NURSING/CASE MANAGEMENT/SOCIAL WORK - NSDCDPATPORTLINK_GEN_ALL_CORE
You can access the [facility] Patient Portal, offered by [facility], by registering with the following website: [email or web address]

## 2019-06-10 NOTE — DISCHARGE NOTE PROVIDER - CARE PROVIDER_API CALL
Chris Contreras)  Critical Care Medicine; Internal Medicine; Pulmonary Disease; Sleep Medicine  [address] [place] [address]  Phone: [phone]  Fax: [phone]  Follow Up Time: Chris Contreras)  Critical Care Medicine; Internal Medicine; Pulmonary Disease; Sleep Medicine  [address] [place] [address]  Phone: [phone]  Fax: [phone]  Follow Up Time:     London Bermudez)  Internal Medicine  [address] [place] [address]  Phone: [phone]  Fax: [phone]  Follow Up Time:

## 2019-06-10 NOTE — DISCHARGE NOTE NURSING/CASE MANAGEMENT/SOCIAL WORK - NSDCPEWEB_GEN_ALL_CORE
[facility] for Tobacco Control website --- [email or web address] website --- [email or web address]

## 2019-06-10 NOTE — DISCHARGE NOTE PROVIDER - NSDCFUADDINST_GEN_ALL_CORE_FT
Please followup with your lung doctor and primary care doctor within 1 week  Take symbicort and spiriva as instructed.   Monitor BP,  stop amlodipine when you start 20mg daily of prednisone. IIf systolic is below 110 please hold amlodipine. If BP is greater than 180 plesae call your primary doctor. Please followup with your lung doctor and primary care doctor within 1 week  Take symbicort and spiriva as instructed.   Monitor BP,  stop amlodipine when you start 20mg daily of prednisone. IIf systolic is below 110 please hold amlodipine. If BP is greater than 180 plesae call your primary doctor.    Please obtain a BMP with your PMD in

## 2019-06-10 NOTE — PROGRESS NOTE ADULT - PROVIDER SPECIALTY LIST ADULT
Hospitalist
Internal Medicine
Nephrology
Pulmonology
Internal Medicine

## 2019-06-10 NOTE — DISCHARGE NOTE PROVIDER - PROVIDER TOKENS
PROVIDER:[TOKEN:[[id number]]] PROVIDER:[TOKEN:[[id number]]],PROVIDER:[TOKEN:[[id number]]]

## 2019-06-12 DIAGNOSIS — R06.02 SHORTNESS OF BREATH: ICD-10-CM

## 2019-06-12 DIAGNOSIS — E53.8 DEFICIENCY OF OTHER SPECIFIED B GROUP VITAMINS: ICD-10-CM

## 2019-06-12 DIAGNOSIS — F17.200 NICOTINE DEPENDENCE, UNSPECIFIED, UNCOMPLICATED: ICD-10-CM

## 2019-06-12 DIAGNOSIS — E66.9 OBESITY, UNSPECIFIED: ICD-10-CM

## 2019-06-12 DIAGNOSIS — G47.00 INSOMNIA, UNSPECIFIED: ICD-10-CM

## 2019-06-12 DIAGNOSIS — J96.01 ACUTE RESPIRATORY FAILURE WITH HYPOXIA: ICD-10-CM

## 2019-06-12 DIAGNOSIS — H26.9 UNSPECIFIED CATARACT: ICD-10-CM
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
## 2019-06-12 DIAGNOSIS — F32.9 MAJOR DEPRESSIVE DISORDER, SINGLE EPISODE, UNSPECIFIED: ICD-10-CM

## 2019-06-12 DIAGNOSIS — R73.9 HYPERGLYCEMIA, UNSPECIFIED: ICD-10-CM

## 2019-06-12 DIAGNOSIS — G89.4 CHRONIC PAIN SYNDROME: ICD-10-CM

## 2019-06-12 DIAGNOSIS — J44.1 CHRONIC OBSTRUCTIVE PULMONARY DISEASE WITH (ACUTE) EXACERBATION: ICD-10-CM

## 2019-06-12 DIAGNOSIS — F41.9 ANXIETY DISORDER, UNSPECIFIED: ICD-10-CM

## 2019-06-12 DIAGNOSIS — E22.2 SYNDROME OF INAPPROPRIATE SECRETION OF ANTIDIURETIC HORMONE: ICD-10-CM

## 2019-06-12 DIAGNOSIS — E87.5 HYPERKALEMIA: ICD-10-CM

## 2019-06-12 DIAGNOSIS — B37.0 CANDIDAL STOMATITIS: ICD-10-CM

## 2019-06-12 DIAGNOSIS — T38.0X5A ADVERSE EFFECT OF GLUCOCORTICOIDS AND SYNTHETIC ANALOGUES, INITIAL ENCOUNTER: ICD-10-CM

## 2019-06-12 DIAGNOSIS — I11.0 HYPERTENSIVE HEART DISEASE WITH HEART FAILURE: ICD-10-CM

## 2019-06-12 DIAGNOSIS — Z91.19 PATIENT'S NONCOMPLIANCE WITH OTHER MEDICAL TREATMENT AND REGIMEN: ICD-10-CM

## 2019-06-12 DIAGNOSIS — R73.03 PREDIABETES: ICD-10-CM

## 2019-06-12 DIAGNOSIS — I50.33 ACUTE ON CHRONIC DIASTOLIC (CONGESTIVE) HEART FAILURE: ICD-10-CM

## 2019-07-30 ENCOUNTER — INPATIENT (INPATIENT)
Facility: HOSPITAL | Age: 69
LOS: 7 days | Discharge: NURSING, REHAB OR LONG-TERM CARE | End: 2019-08-07
Attending: INTERNAL MEDICINE | Admitting: INTERNAL MEDICINE
Payer: MEDICARE

## 2019-07-30 VITALS
SYSTOLIC BLOOD PRESSURE: 146 MMHG | HEIGHT: 63 IN | TEMPERATURE: 99 F | WEIGHT: 153 LBS | RESPIRATION RATE: 26 BRPM | OXYGEN SATURATION: 92 % | DIASTOLIC BLOOD PRESSURE: 85 MMHG | HEART RATE: 110 BPM

## 2019-07-30 DIAGNOSIS — R22.1 LOCALIZED SWELLING, MASS AND LUMP, NECK: Chronic | ICD-10-CM

## 2019-07-30 DIAGNOSIS — J44.0 CHRONIC OBSTRUCTIVE PULMONARY DISEASE WITH (ACUTE) LOWER RESPIRATORY INFECTION: ICD-10-CM

## 2019-07-30 LAB
ALBUMIN SERPL ELPH-MCNC: 2.7 G/DL — LOW (ref 3.5–5.2)
ALP SERPL-CCNC: 86 U/L — SIGNIFICANT CHANGE UP (ref 30–115)
ALT FLD-CCNC: 10 U/L — SIGNIFICANT CHANGE UP (ref 0–41)
ANION GAP SERPL CALC-SCNC: 18 MMOL/L — HIGH (ref 7–14)
AST SERPL-CCNC: 33 U/L — SIGNIFICANT CHANGE UP (ref 0–41)
BASOPHILS # BLD AUTO: 0.07 K/UL — SIGNIFICANT CHANGE UP (ref 0–0.2)
BASOPHILS NFR BLD AUTO: 0.4 % — SIGNIFICANT CHANGE UP (ref 0–1)
BILIRUB SERPL-MCNC: 0.9 MG/DL — SIGNIFICANT CHANGE UP (ref 0.2–1.2)
BUN SERPL-MCNC: 8 MG/DL — LOW (ref 10–20)
CALCIUM SERPL-MCNC: 8.4 MG/DL — LOW (ref 8.5–10.1)
CHLORIDE SERPL-SCNC: 86 MMOL/L — LOW (ref 98–110)
CO2 SERPL-SCNC: 28 MMOL/L — SIGNIFICANT CHANGE UP (ref 17–32)
CREAT SERPL-MCNC: 0.6 MG/DL — LOW (ref 0.7–1.5)
EOSINOPHIL # BLD AUTO: 0.06 K/UL — SIGNIFICANT CHANGE UP (ref 0–0.7)
EOSINOPHIL NFR BLD AUTO: 0.4 % — SIGNIFICANT CHANGE UP (ref 0–8)
GAS PNL BLDV: SIGNIFICANT CHANGE UP
GLUCOSE SERPL-MCNC: 92 MG/DL — SIGNIFICANT CHANGE UP (ref 70–99)
HCT VFR BLD CALC: 38.1 % — SIGNIFICANT CHANGE UP (ref 37–47)
HGB BLD-MCNC: 12.1 G/DL — SIGNIFICANT CHANGE UP (ref 12–16)
IMM GRANULOCYTES NFR BLD AUTO: 1.5 % — HIGH (ref 0.1–0.3)
LACTATE SERPL-SCNC: 1.9 MMOL/L — SIGNIFICANT CHANGE UP (ref 0.5–2.2)
LYMPHOCYTES # BLD AUTO: 15.8 % — LOW (ref 20.5–51.1)
LYMPHOCYTES # BLD AUTO: 2.71 K/UL — SIGNIFICANT CHANGE UP (ref 1.2–3.4)
MCHC RBC-ENTMCNC: 31.1 PG — HIGH (ref 27–31)
MCHC RBC-ENTMCNC: 31.8 G/DL — LOW (ref 32–37)
MCV RBC AUTO: 97.9 FL — SIGNIFICANT CHANGE UP (ref 81–99)
MONOCYTES # BLD AUTO: 1.39 K/UL — HIGH (ref 0.1–0.6)
MONOCYTES NFR BLD AUTO: 8.1 % — SIGNIFICANT CHANGE UP (ref 1.7–9.3)
NEUTROPHILS # BLD AUTO: 12.65 K/UL — HIGH (ref 1.4–6.5)
NEUTROPHILS NFR BLD AUTO: 73.8 % — SIGNIFICANT CHANGE UP (ref 42.2–75.2)
NRBC # BLD: 0 /100 WBCS — SIGNIFICANT CHANGE UP (ref 0–0)
NT-PROBNP SERPL-SCNC: 1475 PG/ML — HIGH (ref 0–300)
PLATELET # BLD AUTO: 233 K/UL — SIGNIFICANT CHANGE UP (ref 130–400)
POTASSIUM SERPL-MCNC: 3.9 MMOL/L — SIGNIFICANT CHANGE UP (ref 3.5–5)
POTASSIUM SERPL-SCNC: 3.9 MMOL/L — SIGNIFICANT CHANGE UP (ref 3.5–5)
PROT SERPL-MCNC: 5.9 G/DL — LOW (ref 6–8)
RBC # BLD: 3.89 M/UL — LOW (ref 4.2–5.4)
RBC # FLD: 14.6 % — HIGH (ref 11.5–14.5)
SODIUM SERPL-SCNC: 132 MMOL/L — LOW (ref 135–146)
TROPONIN T SERPL-MCNC: <0.01 NG/ML — SIGNIFICANT CHANGE UP
WBC # BLD: 17.14 K/UL — HIGH (ref 4.8–10.8)
WBC # FLD AUTO: 17.14 K/UL — HIGH (ref 4.8–10.8)

## 2019-07-30 PROCEDURE — 99285 EMERGENCY DEPT VISIT HI MDM: CPT

## 2019-07-30 PROCEDURE — 71045 X-RAY EXAM CHEST 1 VIEW: CPT | Mod: 26

## 2019-07-30 PROCEDURE — 93010 ELECTROCARDIOGRAM REPORT: CPT

## 2019-07-30 RX ORDER — SODIUM CHLORIDE 9 MG/ML
1000 INJECTION INTRAMUSCULAR; INTRAVENOUS; SUBCUTANEOUS
Refills: 0 | Status: DISCONTINUED | OUTPATIENT
Start: 2019-07-30 | End: 2019-07-31

## 2019-07-30 RX ORDER — IPRATROPIUM/ALBUTEROL SULFATE 18-103MCG
3 AEROSOL WITH ADAPTER (GRAM) INHALATION ONCE
Refills: 0 | Status: COMPLETED | OUTPATIENT
Start: 2019-07-30 | End: 2019-07-30

## 2019-07-30 RX ADMIN — Medication 3 MILLILITER(S): at 20:47

## 2019-07-30 RX ADMIN — SODIUM CHLORIDE 250 MILLILITER(S): 9 INJECTION INTRAMUSCULAR; INTRAVENOUS; SUBCUTANEOUS at 20:48

## 2019-07-30 RX ADMIN — Medication 125 MILLIGRAM(S): at 20:47

## 2019-07-30 NOTE — ED PROVIDER NOTE - NS ED ROS FT
Constitutional: no fever, chills, no recent weight loss, change in appetite or malaise  Eyes: no redness/discharge/pain/vision changes  ENT: no rhinorrhea/ear pain/sore throat  Cardiac: No chest pain  Respiratory: + sob and non productive cough  GI: No nausea, vomiting, diarrhea or abdominal pain.  MS: no pain to back or extremities, no loss of ROM, no weakness  Neuro: No headache or weakness.   Skin: No skin rash.  Endocrine: No history of thyroid disease or diabetes.  Except as documented in the HPI, all other systems are negative.

## 2019-07-30 NOTE — ED PROVIDER NOTE - CARE PLAN
Principal Discharge DX:	COPD (chronic obstructive pulmonary disease)  Secondary Diagnosis:	Pneumonia

## 2019-07-30 NOTE — ED PROVIDER NOTE - CLINICAL SUMMARY MEDICAL DECISION MAKING FREE TEXT BOX
Patient presented with shortness of breath. EKG, CXR, labs done. Found to have copd exacerbation and pneumonia. Patient treated with steroids, nebulizers with improvement in symptoms. Antibiotics given for pneumonia. Patient admitted for further treatment and evaluation.

## 2019-07-30 NOTE — ED PROVIDER NOTE - OBJECTIVE STATEMENT
69 year old F with hx of HTN, GERD, COPD (mult past admissions/no intubations) c/o sob x last few days. + sob at rest. Sts feels like her typical COPD. + non productive cough. No fever/chest pain, leg pain/swelling, abdominal pain, nausea, vomiting, URI symptoms, sick contacts, recent travel.

## 2019-07-30 NOTE — ED ADULT NURSE NOTE - NSIMPLEMENTINTERV_GEN_ALL_ED
Implemented All Universal Safety Interventions:  [place] to call system. Call bell, personal items and telephone within reach. Instruct patient to call for assistance. Room bathroom lighting operational. Non-slip footwear when patient is off stretcher. Physically safe environment: no spills, clutter or unnecessary equipment. Stretcher in lowest position, wheels locked, appropriate side rails in place.

## 2019-07-30 NOTE — ED ADULT NURSE NOTE - OBJECTIVE STATEMENT
pt reports SOB since July 4, getting progessively worse as the days go by with no relief from her normal breathing treatments at home or rescue inhalers. Current smoker, no home O2. Tachypneic, labored breathing. Upon assessment, green discharge from bilateral eyes pt reports SOB since July 4, getting progressively worse as the days go by with no relief from her normal breathing treatments at home or rescue inhalers. Current smoker, no home O2. Tachypneic, labored breathing. Upon assessment, green discharge from bilateral eyes. [place] lower extremitiy cellulitis noted, wrapped, with purulent drainage. pt states " went [facility] they switched me from solumedrol to prednisone and my feet swelled up and then started oozing"

## 2019-07-30 NOTE — ED PROVIDER NOTE - ATTENDING CONTRIBUTION TO CARE
70 yo F pmh of copd no home O2, asthma, htn presents with home for difficulty breathing that has been worsening over the last few days. States that tonight she had trouble breathing so called ems. no chest pain. no abdominal pain, no n/v/d, no fevers or recent illness. + dry cough. no leg swelling, no recent traveling.     CONSTITUTIONAL: Well-developed; well-nourished; in no acute distress.   SKIN: warm, dry  HEAD: Normocephalic; atraumatic.  EYES: PERRL, no conjunctival erythema  ENT: No nasal discharge; airway clear.  NECK: Supple; non tender.  CARD: S1, S2 normal;  Regular rate and rhythm.   RESP: + wheezing bilaterally, no crackles + tachypnea, no retractions. speaking few words at a time.   ABD: soft non tender, non distended, no rebound or guarding  EXT: Normal ROM.    LYMPH: No acute cervical adenopathy.  NEURO: Alert, oriented, grossly unremarkable.

## 2019-07-31 LAB
ANION GAP SERPL CALC-SCNC: 15 MMOL/L — HIGH (ref 7–14)
BASOPHILS # BLD AUTO: 0.01 K/UL — SIGNIFICANT CHANGE UP (ref 0–0.2)
BASOPHILS NFR BLD AUTO: 0.1 % — SIGNIFICANT CHANGE UP (ref 0–1)
BUN SERPL-MCNC: 8 MG/DL — LOW (ref 10–20)
CALCIUM SERPL-MCNC: 8.1 MG/DL — LOW (ref 8.5–10.1)
CHLORIDE SERPL-SCNC: 88 MMOL/L — LOW (ref 98–110)
CO2 SERPL-SCNC: 30 MMOL/L — SIGNIFICANT CHANGE UP (ref 17–32)
CREAT SERPL-MCNC: 0.5 MG/DL — LOW (ref 0.7–1.5)
EOSINOPHIL # BLD AUTO: 0 K/UL — SIGNIFICANT CHANGE UP (ref 0–0.7)
EOSINOPHIL NFR BLD AUTO: 0 % — SIGNIFICANT CHANGE UP (ref 0–8)
GLUCOSE SERPL-MCNC: 263 MG/DL — HIGH (ref 70–99)
HCT VFR BLD CALC: 36.3 % — LOW (ref 37–47)
HGB BLD-MCNC: 11.6 G/DL — LOW (ref 12–16)
IMM GRANULOCYTES NFR BLD AUTO: 1 % — HIGH (ref 0.1–0.3)
LYMPHOCYTES # BLD AUTO: 0.64 K/UL — LOW (ref 1.2–3.4)
LYMPHOCYTES # BLD AUTO: 4.4 % — LOW (ref 20.5–51.1)
MAGNESIUM SERPL-MCNC: 2 MG/DL — SIGNIFICANT CHANGE UP (ref 1.8–2.4)
MCHC RBC-ENTMCNC: 31.5 PG — HIGH (ref 27–31)
MCHC RBC-ENTMCNC: 32 G/DL — SIGNIFICANT CHANGE UP (ref 32–37)
MCV RBC AUTO: 98.6 FL — SIGNIFICANT CHANGE UP (ref 81–99)
MONOCYTES # BLD AUTO: 0.16 K/UL — SIGNIFICANT CHANGE UP (ref 0.1–0.6)
MONOCYTES NFR BLD AUTO: 1.1 % — LOW (ref 1.7–9.3)
NEUTROPHILS # BLD AUTO: 13.47 K/UL — HIGH (ref 1.4–6.5)
NEUTROPHILS NFR BLD AUTO: 93.4 % — HIGH (ref 42.2–75.2)
NRBC # BLD: 0 /100 WBCS — SIGNIFICANT CHANGE UP (ref 0–0)
PLATELET # BLD AUTO: 229 K/UL — SIGNIFICANT CHANGE UP (ref 130–400)
POTASSIUM SERPL-MCNC: 3.4 MMOL/L — LOW (ref 3.5–5)
POTASSIUM SERPL-SCNC: 3.4 MMOL/L — LOW (ref 3.5–5)
RBC # BLD: 3.68 M/UL — LOW (ref 4.2–5.4)
RBC # FLD: 14.6 % — HIGH (ref 11.5–14.5)
SODIUM SERPL-SCNC: 133 MMOL/L — LOW (ref 135–146)
WBC # BLD: 14.43 K/UL — HIGH (ref 4.8–10.8)
WBC # FLD AUTO: 14.43 K/UL — HIGH (ref 4.8–10.8)

## 2019-07-31 PROCEDURE — 71275 CT ANGIOGRAPHY CHEST: CPT | Mod: 26

## 2019-07-31 PROCEDURE — 99223 1ST HOSP IP/OBS HIGH 75: CPT | Mod: AI

## 2019-07-31 PROCEDURE — 93970 EXTREMITY STUDY: CPT | Mod: 26

## 2019-07-31 RX ORDER — PANTOPRAZOLE SODIUM 20 MG/1
0 TABLET, DELAYED RELEASE ORAL
Qty: 30 | Refills: 0 | DISCHARGE

## 2019-07-31 RX ORDER — FUROSEMIDE 40 MG
60 TABLET ORAL DAILY
Refills: 0 | Status: DISCONTINUED | OUTPATIENT
Start: 2019-07-31 | End: 2019-07-31

## 2019-07-31 RX ORDER — AMLODIPINE BESYLATE 2.5 MG/1
5 TABLET ORAL DAILY
Refills: 0 | Status: DISCONTINUED | OUTPATIENT
Start: 2019-07-31 | End: 2019-08-07

## 2019-07-31 RX ORDER — ALPRAZOLAM 0.25 MG
0 TABLET ORAL
Qty: 20 | Refills: 0 | DISCHARGE

## 2019-07-31 RX ORDER — HEPARIN SODIUM 5000 [USP'U]/ML
1400 INJECTION INTRAVENOUS; SUBCUTANEOUS
Qty: 25000 | Refills: 0 | Status: DISCONTINUED | OUTPATIENT
Start: 2019-07-31 | End: 2019-07-31

## 2019-07-31 RX ORDER — FUROSEMIDE 40 MG
0 TABLET ORAL
Qty: 30 | Refills: 0 | DISCHARGE

## 2019-07-31 RX ORDER — HYDROCODONE BITARTRATE 50 MG/1
1 CAPSULE, EXTENDED RELEASE ORAL
Qty: 0 | Refills: 0 | DISCHARGE

## 2019-07-31 RX ORDER — FUROSEMIDE 40 MG
20 TABLET ORAL ONCE
Refills: 0 | Status: DISCONTINUED | OUTPATIENT
Start: 2019-07-31 | End: 2019-07-31

## 2019-07-31 RX ORDER — NICOTINE POLACRILEX 2 MG
1 GUM BUCCAL DAILY
Refills: 0 | Status: DISCONTINUED | OUTPATIENT
Start: 2019-07-31 | End: 2019-08-07

## 2019-07-31 RX ORDER — ENOXAPARIN SODIUM 100 MG/ML
70 INJECTION SUBCUTANEOUS
Refills: 0 | Status: DISCONTINUED | OUTPATIENT
Start: 2019-07-31 | End: 2019-07-31

## 2019-07-31 RX ORDER — ALBUTEROL 90 UG/1
2 AEROSOL, METERED ORAL EVERY 6 HOURS
Refills: 0 | Status: DISCONTINUED | OUTPATIENT
Start: 2019-07-31 | End: 2019-08-07

## 2019-07-31 RX ORDER — ALBUTEROL 90 UG/1
0 AEROSOL, METERED ORAL
Qty: 85 | Refills: 0 | DISCHARGE

## 2019-07-31 RX ORDER — FUROSEMIDE 40 MG
40 TABLET ORAL
Refills: 0 | Status: DISCONTINUED | OUTPATIENT
Start: 2019-07-31 | End: 2019-07-31

## 2019-07-31 RX ORDER — TIOTROPIUM BROMIDE 18 UG/1
1 CAPSULE ORAL; RESPIRATORY (INHALATION) DAILY
Refills: 0 | Status: DISCONTINUED | OUTPATIENT
Start: 2019-07-31 | End: 2019-08-07

## 2019-07-31 RX ORDER — HEPARIN SODIUM 5000 [USP'U]/ML
5000 INJECTION INTRAVENOUS; SUBCUTANEOUS EVERY 8 HOURS
Refills: 0 | Status: DISCONTINUED | OUTPATIENT
Start: 2019-07-31 | End: 2019-07-31

## 2019-07-31 RX ORDER — BUDESONIDE AND FORMOTEROL FUMARATE DIHYDRATE 160; 4.5 UG/1; UG/1
2 AEROSOL RESPIRATORY (INHALATION)
Refills: 0 | Status: DISCONTINUED | OUTPATIENT
Start: 2019-07-31 | End: 2019-08-07

## 2019-07-31 RX ORDER — ENOXAPARIN SODIUM 100 MG/ML
40 INJECTION SUBCUTANEOUS DAILY
Refills: 0 | Status: DISCONTINUED | OUTPATIENT
Start: 2019-07-31 | End: 2019-07-31

## 2019-07-31 RX ORDER — OXYCODONE AND ACETAMINOPHEN 5; 325 MG/1; MG/1
2 TABLET ORAL EVERY 6 HOURS
Refills: 0 | Status: DISCONTINUED | OUTPATIENT
Start: 2019-07-31 | End: 2019-08-07

## 2019-07-31 RX ORDER — LACTULOSE 10 G/15ML
0 SOLUTION ORAL
Qty: 473 | Refills: 0 | DISCHARGE

## 2019-07-31 RX ORDER — PREGABALIN 225 MG/1
1000 CAPSULE ORAL DAILY
Refills: 0 | Status: DISCONTINUED | OUTPATIENT
Start: 2019-07-31 | End: 2019-08-07

## 2019-07-31 RX ORDER — APIXABAN 2.5 MG/1
10 TABLET, FILM COATED ORAL EVERY 12 HOURS
Refills: 0 | Status: COMPLETED | OUTPATIENT
Start: 2019-07-31 | End: 2019-08-07

## 2019-07-31 RX ORDER — CHLORHEXIDINE GLUCONATE 213 G/1000ML
1 SOLUTION TOPICAL
Refills: 0 | Status: DISCONTINUED | OUTPATIENT
Start: 2019-07-31 | End: 2019-08-07

## 2019-07-31 RX ORDER — ALPRAZOLAM 0.25 MG
0.5 TABLET ORAL DAILY
Refills: 0 | Status: DISCONTINUED | OUTPATIENT
Start: 2019-07-31 | End: 2019-08-07

## 2019-07-31 RX ORDER — ACETAMINOPHEN 500 MG
650 TABLET ORAL ONCE
Refills: 0 | Status: DISCONTINUED | OUTPATIENT
Start: 2019-07-31 | End: 2019-08-07

## 2019-07-31 RX ORDER — HEPARIN SODIUM 5000 [USP'U]/ML
5500 INJECTION INTRAVENOUS; SUBCUTANEOUS EVERY 6 HOURS
Refills: 0 | Status: DISCONTINUED | OUTPATIENT
Start: 2019-07-31 | End: 2019-07-31

## 2019-07-31 RX ORDER — NYSTATIN 500MM UNIT
0 POWDER (EA) MISCELLANEOUS
Qty: 60 | Refills: 0 | DISCHARGE

## 2019-07-31 RX ORDER — HEPARIN SODIUM 5000 [USP'U]/ML
5500 INJECTION INTRAVENOUS; SUBCUTANEOUS ONCE
Refills: 0 | Status: DISCONTINUED | OUTPATIENT
Start: 2019-07-31 | End: 2019-07-31

## 2019-07-31 RX ORDER — IPRATROPIUM/ALBUTEROL SULFATE 18-103MCG
3 AEROSOL WITH ADAPTER (GRAM) INHALATION EVERY 6 HOURS
Refills: 0 | Status: DISCONTINUED | OUTPATIENT
Start: 2019-07-31 | End: 2019-08-07

## 2019-07-31 RX ORDER — HEPARIN SODIUM 5000 [USP'U]/ML
2500 INJECTION INTRAVENOUS; SUBCUTANEOUS EVERY 6 HOURS
Refills: 0 | Status: DISCONTINUED | OUTPATIENT
Start: 2019-07-31 | End: 2019-07-31

## 2019-07-31 RX ORDER — NICOTINE POLACRILEX 2 MG
1 GUM BUCCAL
Qty: 0 | Refills: 3 | DISCHARGE

## 2019-07-31 RX ADMIN — CHLORHEXIDINE GLUCONATE 1 APPLICATION(S): 213 SOLUTION TOPICAL at 05:08

## 2019-07-31 RX ADMIN — Medication 3 MILLILITER(S): at 02:00

## 2019-07-31 RX ADMIN — SODIUM CHLORIDE 250 MILLILITER(S): 9 INJECTION INTRAMUSCULAR; INTRAVENOUS; SUBCUTANEOUS at 05:21

## 2019-07-31 RX ADMIN — APIXABAN 10 MILLIGRAM(S): 2.5 TABLET, FILM COATED ORAL at 17:51

## 2019-07-31 RX ADMIN — BUDESONIDE AND FORMOTEROL FUMARATE DIHYDRATE 2 PUFF(S): 160; 4.5 AEROSOL RESPIRATORY (INHALATION) at 21:48

## 2019-07-31 RX ADMIN — Medication 0.5 MILLIGRAM(S): at 12:31

## 2019-07-31 RX ADMIN — ALBUTEROL 2 PUFF(S): 90 AEROSOL, METERED ORAL at 15:36

## 2019-07-31 RX ADMIN — PREGABALIN 1000 MICROGRAM(S): 225 CAPSULE ORAL at 12:32

## 2019-07-31 RX ADMIN — OXYCODONE AND ACETAMINOPHEN 2 TABLET(S): 5; 325 TABLET ORAL at 10:38

## 2019-07-31 RX ADMIN — ALBUTEROL 2 PUFF(S): 90 AEROSOL, METERED ORAL at 02:08

## 2019-07-31 RX ADMIN — AMLODIPINE BESYLATE 5 MILLIGRAM(S): 2.5 TABLET ORAL at 05:22

## 2019-07-31 RX ADMIN — Medication 40 MILLIGRAM(S): at 12:33

## 2019-07-31 RX ADMIN — Medication 60 MILLIGRAM(S): at 05:23

## 2019-07-31 RX ADMIN — Medication 3 MILLILITER(S): at 10:13

## 2019-07-31 RX ADMIN — BUDESONIDE AND FORMOTEROL FUMARATE DIHYDRATE 2 PUFF(S): 160; 4.5 AEROSOL RESPIRATORY (INHALATION) at 10:14

## 2019-07-31 RX ADMIN — ALBUTEROL 2 PUFF(S): 90 AEROSOL, METERED ORAL at 10:14

## 2019-07-31 RX ADMIN — OXYCODONE AND ACETAMINOPHEN 2 TABLET(S): 5; 325 TABLET ORAL at 18:03

## 2019-07-31 RX ADMIN — Medication 3 MILLILITER(S): at 15:36

## 2019-07-31 RX ADMIN — HEPARIN SODIUM 5000 UNIT(S): 5000 INJECTION INTRAVENOUS; SUBCUTANEOUS at 05:23

## 2019-07-31 RX ADMIN — Medication 1 PATCH: at 12:31

## 2019-07-31 RX ADMIN — Medication 3 MILLILITER(S): at 20:09

## 2019-07-31 RX ADMIN — Medication 60 MILLIGRAM(S): at 05:22

## 2019-07-31 NOTE — H&P ADULT - ATTENDING COMMENTS
A 70 yo female with PMH of HTN, HFpEF and COPD came to ED for worsening SOB and cough. Patient has worsening symptoms for few weeks, she was admitted in early June 2019 to out facility, also she was discharged early July from [facility]. Since then she was most of the time at home, poor ambulation due to SOB, since Friday she was feeling more SOB with minimal activities, yesterday she was dyspneic even at rest so she called the EMS.   In ED she was hypoxic O2sat 75% on RA, , CXR showed bibasilar atelectasis.     PHYSICAL EXAM:  GENERAL: moderate distress, obese.   HEAD:  Atraumatic, Normocephalic  EYES: EOMI, PERRLA, conjunctiva and sclera clear  NECK: Supple, No JVD  CHEST/LUNG: Poor air entry, expiratory wheezing b/l, bibasilar rales.   HEART: Regular rate and rhythm; No murmurs, rubs, or gallops  ABDOMEN: Soft, Nontender, Nondistended; Bowel sounds present  EXTREMITIES:  LE edema 2+, small ulceration lesions on both feet.   PSYCH: AAOx3  NEUROLOGY: non-focal    A/P:   Acute Respiratory failure due to   Acute COPD exacerbation:   Acute HFpEF:   CXR showed vascular congestion, bibasilar atelectasis  Lasix 40mg IV BID, Solu-Medrol IV, DuoNeb  Levaquin IV  Pulmonary evaluation.     LE ulceration:   wound care, podiatry consult A 70 yo female with PMH of HTN, HFpEF and COPD came to ED for worsening SOB and cough. Patient has worsening symptoms for few weeks, she was admitted in early June 2019 to out facility, also she was discharged early July from [facility]. Since then she was most of the time at home, poor ambulation due to SOB, since Friday she was feeling more SOB with minimal activities, yesterday she was dyspneic even at rest so she called the EMS.   In ED she was hypoxic O2sat 75% on RA, , CXR showed bibasilar atelectasis.     PHYSICAL EXAM:  GENERAL: moderate distress, obese.   HEAD:  Atraumatic, Normocephalic  EYES: EOMI, PERRLA, conjunctiva and sclera clear  NECK: Supple, No JVD  CHEST/LUNG: Poor air entry, expiratory wheezing b/l, bibasilar rales.   HEART: Regular rate and rhythm; No murmurs, rubs, or gallops  ABDOMEN: Soft, Nontender, Nondistended; Bowel sounds present  EXTREMITIES:  LE edema 2+, small ulceration lesions on both feet.   PSYCH: AAOx3  NEUROLOGY: non-focal    A/P:   Acute Respiratory failure due to   Acute Pulmonary Embolism  Acute COPD exacerbation:   Acute HFpEF:   CXR showed vascular congestion, bibasilar atelectasis  CT chest showed bilateral segmental PE.   Lasix 40mg IV BID, Solu-Medrol IV, DuoNeb  Levaquin IV  Pulmonary evaluation.     Acute DVT:   venous duplex is positive   She refused Lovenox, start on Eliquis 10mg BID for one week.     LE ulceration:   wound care, podiatry consult

## 2019-07-31 NOTE — PHYSICAL THERAPY INITIAL EVALUATION ADULT - ADDITIONAL COMMENTS
pt reports living with her brother, elevator, uses RW, recent decline in functional status  pt reports performing ADLs has become very difficult as of recently

## 2019-07-31 NOTE — H&P ADULT - HISTORY OF PRESENT ILLNESS
69 year old lady with PMHx HTN, GERD, DMII, COPD with multiple past admissions, never been intubated presenting with SOB at rest and nonproductive cough for the past few days. She states that it feels like her usual COPD exacerbation. Denied fever, chills, n/v/d/c, cp,  pleuritic cp, palpitations, diaphoresis, wheezing, hemoptysis, HA, blurry vision, dizziness, lightheadedness, numbness, tingling, neck pain, neck stiffness, abdominal pain, melena, BRBPR, hematuria, urinary incontinace, trauma, calf pain/swelling/erythema, sick contacts, recent travel or rash.    In ED, bp 146/85    HR  110   RR 26     92% on RA    T 99.2F. She refused BiPap and was doing well on 2LNC. She was given solumedrol, nebs and started on levaquin. WBC 17. CXR showed a RLL infiltrate. trop (-) 69 year old lady with PMHx HTN, GERD, HFpEF, COPD with multiple past admissions, never been intubated presenting with SOB at rest and nonproductive cough for the past few days. She states that it feels like her usual COPD exacerbation. Denied fever, chills, n/v/d/c, cp,  pleuritic cp, palpitations, diaphoresis, wheezing, hemoptysis, HA, blurry vision, dizziness, lightheadedness, numbness, tingling, neck pain, neck stiffness, abdominal pain, melena, BRBPR, hematuria, urinary incontinace, trauma, calf pain/swelling/erythema, sick contacts, recent travel or rash.    In ED, bp 146/85    HR  110   RR 26     92% on RA    T 99.2F. She refused BiPap and was doing well on 2LNC. She was given solumedrol, nebs and started on levaquin. WBC 17. CXR showed a RLL infiltrate. trop (-)

## 2019-07-31 NOTE — H&P ADULT - NSHPPHYSICALEXAM_GEN_ALL_CORE
GENERAL: NAD, well-groomed, well-developed  HEAD:  NCAT  EYES: EOMI, PERRL, conjunctiva clear  ENMT: No tonsillar erythema, exudates, or enlargement; Moist mucous membranes, Good dentition, No lesions  NECK: Supple, No JVD, Normal thyroid  NERVOUS SYSTEM: AAOX4, Good concentration; Motor Strength 5/5 B/L upper and lower extremities; DTRs 2+ intact and symmetric  CHEST/LUNG: course bs b/l  HEART: +s1s2 RRR no m/g/r  ABDOMEN: soft, NT/ND (+) bs, no HSM  EXTREMITIES:  2+ Peripheral Pulses, No c/c/e  LYMPH: No lymphadenopathy noted  SKIN: No rashes or lesions
165.1

## 2019-07-31 NOTE — H&P ADULT - ASSESSMENT
69 year old lady with PMHx HTN, GERD, DMII, COPD with multiple past admissions, never been intubated presenting with SOB at rest and nonproductive cough for the past few days.    COPD exacerbation secondary to RLL PNA, possbile GNR PNA   -admit to medicine  -c/w levaquin for now  -nebs PRN  -solumedrol for now  -consider pulm evaluation  -BiPAP PRN    HTN  -monitor bp  -c/w amlodipine    GERD  -stable  -c/w protonix    DMII  -hold oral glycemic meds  -monitor FS  -if FS>180 start basal bolus insulin whilst in-patient    Dispo  -from home  -ambulates independently  -anticipated discharge when medically stable    DVT ppx  GI ppx  CHG 4% daily and PRN  OOBTC  DASH/TLC   FULL CODE 69 year old lady with PMHx HTN, GERD, DMII, COPD with multiple past admissions, never been intubated presenting with SOB at rest and nonproductive cough for the past few days.    Acute Respiratory failure:   COPD exacerbation   Acute HFpEF:   -admit to medicine  -c/w levaquin for now  -nebs PRN  -solumedrol for now  -consider pulm evaluation  -BiPAP PRN    HTN  -monitor bp  -c/w amlodipine    GERD  -stable  -c/w protonix    DMII  -hold oral glycemic meds  -monitor FS  -if FS>180 start basal bolus insulin whilst in-patient    Dispo  -from home  -ambulates independently  -anticipated discharge when medically stable    DVT ppx  GI ppx  CHG 4% daily and PRN  OOBTC  DASH/TLC   FULL CODE

## 2019-07-31 NOTE — H&P ADULT - NSHPLABSRESULTS_GEN_ALL_CORE
Labs:                        12.1   17.14 )-----------( 233      ( 30 Jul 2019 20:22 )             38.1                                   07-30    132<L>  |  86<L>  |  8<L>  ----------------------------<  92  3.9   |  28  |  0.6<L>    Ca    8.4<L>      30 Jul 2019 20:22    TPro  5.9<L>  /  Alb  2.7<L>  /  TBili  0.9  /  DBili  x   /  AST  33  /  ALT  10  /  AlkPhos  86  07-30    LIVER FUNCTIONS - ( 30 Jul 2019 20:22 )  Alb: 2.7 g/dL / Pro: 5.9 g/dL / ALK PHOS: 86 U/L / ALT: 10 U/L / AST: 33 U/L / GGT: x                                        CARDIAC MARKERS ( 30 Jul 2019 20:22 )  x     / <0.01 ng/mL / x     / x     / x        Lactate, Blood: 1.9 mmol/L (07-30-19 @ 20:22)    Lactate, Blood: 1.9 mmol/L (07-30-19 @ 20:22)  Serum Pro-Brain Natriuretic Peptide: 1475 pg/mL (07-30-19 @ 20:22)     Troponin T, Serum: <0.01 ng/mL (07-30-19 @ 20:22)    Serum Pro-Brain Natriuretic Peptide: 1475 pg/mL (07-30-19 @ 20:22)    Imaging:  CXR: RLL infiltrate    12 Lead ECG:   Ventricular Rate 106 BPM  Atrial Rate 106 BPM  P-R Interval 134 ms  QRS Duration 80 ms  Q-T Interval 370 ms  QTC Calculation(Bezet) 491 ms  P Axis 61 degrees  R Axis 5 degrees  T Axis 0 degrees    Diagnosis Line Sinus tachycardia  Anterior infarct , age undetermined  Abnormal ECG    Confirmed by Cruz North ([id number]) on 7/30/2019 11:07:39 PM (07-30-19 @ 20:55)

## 2019-08-01 LAB
CK MB CFR SERPL CALC: 2.5 NG/ML — SIGNIFICANT CHANGE UP (ref 0.6–6.3)
CK SERPL-CCNC: 26 U/L — SIGNIFICANT CHANGE UP (ref 0–225)
TROPONIN T SERPL-MCNC: <0.01 NG/ML — SIGNIFICANT CHANGE UP

## 2019-08-01 PROCEDURE — 93306 TTE W/DOPPLER COMPLETE: CPT | Mod: 26

## 2019-08-01 PROCEDURE — 99233 SBSQ HOSP IP/OBS HIGH 50: CPT

## 2019-08-01 PROCEDURE — 99221 1ST HOSP IP/OBS SF/LOW 40: CPT

## 2019-08-01 RX ORDER — APIXABAN 2.5 MG/1
1 TABLET, FILM COATED ORAL
Qty: 120 | Refills: 0
Start: 2019-08-01 | End: 2019-09-29

## 2019-08-01 RX ORDER — APIXABAN 2.5 MG/1
2 TABLET, FILM COATED ORAL
Qty: 28 | Refills: 0
Start: 2019-08-01 | End: 2019-08-07

## 2019-08-01 RX ORDER — COLLAGENASE CLOSTRIDIUM HIST. 250 UNIT/G
1 OINTMENT (GRAM) TOPICAL
Refills: 0 | Status: DISCONTINUED | OUTPATIENT
Start: 2019-08-01 | End: 2019-08-07

## 2019-08-01 RX ADMIN — Medication 1 APPLICATION(S): at 17:02

## 2019-08-01 RX ADMIN — OXYCODONE AND ACETAMINOPHEN 2 TABLET(S): 5; 325 TABLET ORAL at 17:22

## 2019-08-01 RX ADMIN — BUDESONIDE AND FORMOTEROL FUMARATE DIHYDRATE 2 PUFF(S): 160; 4.5 AEROSOL RESPIRATORY (INHALATION) at 09:06

## 2019-08-01 RX ADMIN — Medication 1 PATCH: at 00:04

## 2019-08-01 RX ADMIN — Medication 3 MILLILITER(S): at 13:35

## 2019-08-01 RX ADMIN — OXYCODONE AND ACETAMINOPHEN 2 TABLET(S): 5; 325 TABLET ORAL at 00:43

## 2019-08-01 RX ADMIN — Medication 60 MILLIGRAM(S): at 05:27

## 2019-08-01 RX ADMIN — OXYCODONE AND ACETAMINOPHEN 2 TABLET(S): 5; 325 TABLET ORAL at 01:20

## 2019-08-01 RX ADMIN — AMLODIPINE BESYLATE 5 MILLIGRAM(S): 2.5 TABLET ORAL at 05:27

## 2019-08-01 RX ADMIN — CHLORHEXIDINE GLUCONATE 1 APPLICATION(S): 213 SOLUTION TOPICAL at 05:24

## 2019-08-01 RX ADMIN — Medication 1 PATCH: at 20:35

## 2019-08-01 RX ADMIN — BUDESONIDE AND FORMOTEROL FUMARATE DIHYDRATE 2 PUFF(S): 160; 4.5 AEROSOL RESPIRATORY (INHALATION) at 20:30

## 2019-08-01 RX ADMIN — Medication 0.5 MILLIGRAM(S): at 11:35

## 2019-08-01 RX ADMIN — APIXABAN 10 MILLIGRAM(S): 2.5 TABLET, FILM COATED ORAL at 17:02

## 2019-08-01 RX ADMIN — APIXABAN 10 MILLIGRAM(S): 2.5 TABLET, FILM COATED ORAL at 05:27

## 2019-08-01 RX ADMIN — OXYCODONE AND ACETAMINOPHEN 2 TABLET(S): 5; 325 TABLET ORAL at 09:06

## 2019-08-01 RX ADMIN — Medication 1 PATCH: at 07:00

## 2019-08-01 RX ADMIN — Medication 3 MILLILITER(S): at 21:27

## 2019-08-01 RX ADMIN — Medication 1 PATCH: at 11:35

## 2019-08-01 RX ADMIN — OXYCODONE AND ACETAMINOPHEN 2 TABLET(S): 5; 325 TABLET ORAL at 10:28

## 2019-08-01 RX ADMIN — Medication 3 MILLILITER(S): at 01:14

## 2019-08-01 RX ADMIN — PREGABALIN 1000 MICROGRAM(S): 225 CAPSULE ORAL at 11:35

## 2019-08-01 RX ADMIN — ALBUTEROL 2 PUFF(S): 90 AEROSOL, METERED ORAL at 21:04

## 2019-08-01 NOTE — PROGRESS NOTE ADULT - ASSESSMENT
79 year old lady with PMHx HTN, GERD, DMII, COPD with multiple past admissions, never been intubated presenting with SOB at rest and nonproductive cough for the past few days.    SOB:  - found to have provoked bilateral PE and bilateral DVT  - continue eliquis 10 mg BID  - continue steroids 40 mg iV push daily + bronchodilators  - continue levofloxacin in the setting of RLL infiltrate (r/o copd excerbation)    HTN  -monitor bp  -c/w amlodipine    GERD  -stable  -c/w protonix    DMII  -hold oral glycemic meds  -monitor FS  -if FS>180 start basal bolus insulin whilst in-patient    Dispo  -from home  -ambulates independently  -anticipated discharge when medically stable    DVT ppx  GI ppx  CHG 4% daily and PRN  OOBTC  DASH/TLC   FULL CODE 79 year old lady with PMHx HTN, GERD, DMII, COPD with multiple past admissions, never been intubated presenting with SOB at rest and nonproductive cough for the past few days.    SOB:  - found to have provoked bilateral PE and bilateral DVT  - TTE did not show any right ventricular strain  - continue eliquis 10 mg BID for 7 days then 5 mg BID   - continue steroids 40 mg iV push daily + bronchodilators  - continue levofloxacin in the setting of RLL infiltrate (r/o copd excerbation)    HTN  -monitor bp  -c/w amlodipine    GERD  -stable  -c/w protonix    DMII  -hold oral glycemic meds  -monitor FS  -if FS>180 start basal bolus insulin whilst in-patient    Dispo  -from home  -ambulates independently  -anticipated discharge when medically stable    DVT ppx  GI ppx  CHG 4% daily and PRN  OOBTC  DASH/TLC   FULL CODE 79 year old lady with PMHx HTN, GERD, DMII, COPD with multiple past admissions, never been intubated presenting with SOB at rest and nonproductive cough for the past few days.    SOB:  - found to have provoked bilateral PE and bilateral DVT  - TTE did not show any right ventricular strain  - continue eliquis 10 mg BID for 7 days then 5 mg BID   - continue steroids 40 mg iV push daily + bronchodilators  - continue levofloxacin in the setting of RLL infiltrate (r/o copd excerbation)    HTN  -monitor bp  -c/w amlodipine    GERD  -stable  -c/w protonix    DMII  -hold oral glycemic meds  -monitor FS  -if FS>180 start basal bolus insulin whilst in-patient    vitamin b12 deficiency:  - on cyanocobalamin    Dispo  -from home  -ambulates independently  -anticipated discharge when medically stable    DVT ppx  GI ppx  CHG 4% daily and PRN  OOBTC  DASH/TLC   FULL CODE

## 2019-08-01 NOTE — PROGRESS NOTE ADULT - SUBJECTIVE AND OBJECTIVE BOX
Patient is a 69y old  Female who presents with a chief complaint of non productive cough (01 Aug 2019 11:04)      OVERNIGHT EVENTS:    SUBJECTIVE / INTERVAL HPI: Patient seen and examined at bedside.     VITAL SIGNS:  Vital Signs Last 24 Hrs  T(C): 35.6 (01 Aug 2019 04:43), Max: 36.3 (31 Jul 2019 16:56)  T(F): 96 (01 Aug 2019 04:43), Max: 97.3 (31 Jul 2019 16:56)  HR: 95 (01 Aug 2019 04:43) (91 - 95)  BP: 123/63 (01 Aug 2019 04:43) (123/63 - 126/60)  BP(mean): --  RR: 20 (01 Aug 2019 04:43) (20 - 20)  SpO2: --    PHYSICAL EXAM:    General: WDWN  HEENT: NC/AT; PERRL, clear conjunctiva  Neck: supple  Cardiovascular: +S1/S2; RRR  Respiratory: CTA b/l; no W/R/R  Gastrointestinal: soft, NT/ND; +BSx4  Extremities: WWP; 2+ peripheral pulses; no edema   Neurological: AAOx3; no focal deficits    MEDICATIONS:  MEDICATIONS  (STANDING):  ALBUTerol    90 MICROgram(s) HFA Inhaler 2 Puff(s) Inhalation every 6 hours  ALBUTerol/ipratropium for Nebulization 3 milliLiter(s) Nebulizer every 6 hours  ALPRAZolam 0.5 milliGRAM(s) Oral daily  amLODIPine   Tablet 5 milliGRAM(s) Oral daily  apixaban 10 milliGRAM(s) Oral every 12 hours  buDESOnide 160 MICROgram(s)/formoterol 4.5 MICROgram(s) Inhaler 2 Puff(s) Inhalation two times a day  chlorhexidine 4% Liquid 1 Application(s) Topical <User Schedule>  cyanocobalamin 1000 MICROGram(s) Oral daily  levoFLOXacin IVPB 500 milliGRAM(s) IV Intermittent every 24 hours  methylPREDNISolone sodium succinate Injectable 40 milliGRAM(s) IV Push daily  nicotine -   7 mG/24Hr(s) Patch 1 patch Transdermal daily  tiotropium 18 MICROgram(s) Capsule 1 Capsule(s) Inhalation daily    MEDICATIONS  (PRN):  acetaminophen   Tablet .. 650 milliGRAM(s) Oral once PRN Mild Pain (1 - 3)  oxyCODONE    5 mG/acetaminophen 325 mG 2 Tablet(s) Oral every 6 hours PRN Moderate Pain (4 - 6)      ALLERGIES:  Allergies    penicillin (Unknown)    Intolerances        LABS:                        11.6   14.43 )-----------( 229      ( 31 Jul 2019 06:51 )             36.3     07-31    133<L>  |  88<L>  |  8<L>  ----------------------------<  263<H>  3.4<L>   |  30  |  0.5<L>    Ca    8.1<L>      31 Jul 2019 06:51  Mg     2.0     07-31    TPro  5.9<L>  /  Alb  2.7<L>  /  TBili  0.9  /  DBili  x   /  AST  33  /  ALT  10  /  AlkPhos  86  07-30        CAPILLARY BLOOD GLUCOSE    radiology:    CT ANGIO:  - Multiple segmental pulmonary emboli involving the right upper lobe, right   lower lobe, left upper lobe and left lower lobe. No CT evidence of right   heart strain.    duplex LE    Acute left popliteal and posterior tibial vein DVT.    Bilateral gastrocnemius vein thrombosis    no evidence of right heart strain on TTE      POCT Blood Glucose.: 109 mg/dL (30 Jul 2019 20:18)      RADIOLOGY & ADDITIONAL TESTS: Reviewed.

## 2019-08-01 NOTE — CONSULT NOTE ADULT - ASSESSMENT
69 year old lady with PMHx HTN, GERD, HFpEF, COPD with multiple past admissions, never been intubated presenting with SOB at rest and nonproductive cough for the past few days.    Impression:  Provoked DVT and PE (segmental PE in RUL, RLL, BEENA, LLL), NO right heart strain  COPD    Plan:  Agree with eliquis  d/c levaquin  c/w symbicort, spiriva  de-escalate solu-medrol 40 qd 69 year old lady with PMHx HTN, GERD, HFpEF, COPD with multiple past admissions, never been intubated presenting with SOB at rest and nonproductive cough for the past few days/ treated for COPD/ now DVT/ PE    Impression:    Provoked DVT and PE (segmental PE in RUL, RLL, BEENA, LLL), NO right heart strain  COPD/ Med ln    Plan:    Agree with eliquis  d/c levaquin  c/w symbicort, spiriva  de-escalate solu-medrol 40 qd  poor prognosis

## 2019-08-01 NOTE — CONSULT NOTE ADULT - ATTENDING COMMENTS
stable superficial eschars b/l feet. Recommend daily santyl application.  Can follow up as outpatient for continued wound care    Thank you for allowing me to participate in your patient care  Delta Walsh DPM

## 2019-08-01 NOTE — CONSULT NOTE ADULT - SUBJECTIVE AND OBJECTIVE BOX
PODIATRY CONSULT   KUSH RINALDI is a pleasant well-nourished, well developed 69y Female in no acute distress, alert awake, and oriented to person, place and time.   Patient is a 69y old  Female who presents with a chief complaint of non productive cough (01 Aug 2019 11:04)    HPI:  69 year old lady with PMHx HTN, GERD, HFpEF, COPD with multiple past admissions, never been intubated presenting with SOB at rest and nonproductive cough for the past few days. She states that it feels like her usual COPD exacerbation. Denied fever, chills, n/v/d/c, cp,  pleuritic cp, palpitations, diaphoresis, wheezing, hemoptysis, HA, blurry vision, dizziness, lightheadedness, numbness, tingling, neck pain, neck stiffness, abdominal pain, melena, BRBPR, hematuria, urinary incontinace, trauma, calf pain/swelling/erythema, sick contacts, recent travel or rash.    In ED, bp 146/85    HR  110   RR 26     92% on RA    T 99.2F. She refused BiPap and was doing well on 2LNC. She was given solumedrol, nebs and started on levaquin. WBC 17. CXR showed a RLL infiltrate. trop (-) (31 Jul 2019 01:01)    pt seen and assessed at bedside with attending Dr. Walsh.     COPD  Insomnia  Chronic pain  Depression  Anxiety  COPD (chronic obstructive pulmonary disease)      PMH: COPD  Insomnia  Chronic pain  Depression  Anxiety  COPD (chronic obstructive pulmonary disease)    PSH: Neck mass  No significant past surgical history    Medication levoFLOXacin IVPB 500 milliGRAM(s) IV Intermittent every 24 hours    Allergy: penicillin (Unknown)        Labs:                        11.6   14.43 )-----------( 229      ( 31 Jul 2019 06:51 )             36.3       07-31    133<L>  |  88<L>  |  8<L>  ----------------------------<  263<H>  3.4<L>   |  30  |  0.5<L>    Ca    8.1<L>      31 Jul 2019 06:51  Mg     2.0     07-31    TPro  5.9<L>  /  Alb  2.7<L>  /  TBili  0.9  /  DBili  x   /  AST  33  /  ALT  10  /  AlkPhos  86  07-30    CARDIAC MARKERS ( 01 Aug 2019 06:57 )  x     / <0.01 ng/mL / 26 U/L / x     / 2.5 ng/mL  CARDIAC MARKERS ( 30 Jul 2019 20:22 )  x     / <0.01 ng/mL / x     / x     / x        REVIEW OF SYSTEMS:    CONSTITUTIONAL: No weakness, fevers or chills  EYES/ENT: No visual changes;  No vertigo or throat pain   NECK: No pain or stiffness  RESPIRATORY: No cough, wheezing, hemoptysis; No shortness of breath  CARDIOVASCULAR: No chest pain or palpitations  GASTROINTESTINAL: No abdominal or epigastric pain. No nausea, vomiting, or hematemesis; No diarrhea or constipation. No melena or hematochezia.  GENITOURINARY: No dysuria, frequency or hematuria  NEUROLOGICAL: No numbness or weakness  SKIN: No itching, burning, rashes, or lesions   All other review of systems is negative unless indicated below.      O:   Derm: multiple ulcerations b/l feet with dry superficial gangrene and fibrotic wound base, stable.  multiple erythematous papules anterior LLE, no open lesion.   Vascular: Dorsalis Pedis and Posterior Tibial pulses 1/4.  Capillary re-fill time less then 3 seconds digits 1-5 bilateral.  B/L feet warm to touch  Neuro: Protective sensation intact to the level of the digits bilateral.  MSK: Muscle strength 5/5 all major muscle groups bilateral.    Assessment and Plan:   b/l feet ulcerations, stable    Chart reviewed and Patient evaluated  Discussed diagnosis and treatment with patient  local wound care: wash with soap and water, apply santyl wet to dry dsd kerlix b/l feet  no sx intervention  f/u o/p 1 week at [address]  attending updated PODIATRY CONSULT   KUSH RINALDI is a pleasant well-nourished, well developed 69y Female in no acute distress, alert awake, and oriented to person, place and time.   Patient is a 69y old  Female who presents with a chief complaint of non productive cough (01 Aug 2019 11:04)    HPI:  69 year old lady with PMHx HTN, GERD, HFpEF, COPD with multiple past admissions, never been intubated presenting with SOB at rest and nonproductive cough for the past few days. She states that it feels like her usual COPD exacerbation. Denied fever, chills, n/v/d/c, cp,  pleuritic cp, palpitations, diaphoresis, wheezing, hemoptysis, HA, blurry vision, dizziness, lightheadedness, numbness, tingling, neck pain, neck stiffness, abdominal pain, melena, BRBPR, hematuria, urinary incontinace, trauma, calf pain/swelling/erythema, sick contacts, recent travel or rash.    In ED, bp 146/85    HR  110   RR 26     92% on RA    T 99.2F. She refused BiPap and was doing well on 2LNC. She was given solumedrol, nebs and started on levaquin. WBC 17. CXR showed a RLL infiltrate. trop (-) (31 Jul 2019 01:01)    pt seen and assessed at bedside with attending Dr. Walsh.     COPD  Insomnia  Chronic pain  Depression  Anxiety  COPD (chronic obstructive pulmonary disease)      PMH: COPD  Insomnia  Chronic pain  Depression  Anxiety  COPD (chronic obstructive pulmonary disease)    PSH: Neck mass  No significant past surgical history    Medication levoFLOXacin IVPB 500 milliGRAM(s) IV Intermittent every 24 hours    Allergy: penicillin (Unknown)        Labs:                        11.6   14.43 )-----------( 229      ( 31 Jul 2019 06:51 )             36.3       07-31    133<L>  |  88<L>  |  8<L>  ----------------------------<  263<H>  3.4<L>   |  30  |  0.5<L>    Ca    8.1<L>      31 Jul 2019 06:51  Mg     2.0     07-31    TPro  5.9<L>  /  Alb  2.7<L>  /  TBili  0.9  /  DBili  x   /  AST  33  /  ALT  10  /  AlkPhos  86  07-30    CARDIAC MARKERS ( 01 Aug 2019 06:57 )  x     / <0.01 ng/mL / 26 U/L / x     / 2.5 ng/mL  CARDIAC MARKERS ( 30 Jul 2019 20:22 )  x     / <0.01 ng/mL / x     / x     / x        REVIEW OF SYSTEMS:    CONSTITUTIONAL: No weakness, fevers or chills  EYES/ENT: No visual changes;  No vertigo or throat pain   NECK: No pain or stiffness  RESPIRATORY: No cough, wheezing, hemoptysis; No shortness of breath  CARDIOVASCULAR: No chest pain or palpitations  GASTROINTESTINAL: No abdominal or epigastric pain. No nausea, vomiting, or hematemesis; No diarrhea or constipation. No melena or hematochezia.  GENITOURINARY: No dysuria, frequency or hematuria  NEUROLOGICAL: No numbness or weakness  SKIN: No itching, burning, rashes, or lesions   All other review of systems is negative unless indicated below.    < from: [facility] Duplex Lower Ext Vein Scan, Fabian (07.31.19 @ 11:59) >    EXAM:  DUPLEX SCAN EXT VEINS LOWER BI            PROCEDURE DATE:  07/31/2019            INTERPRETATION:  Clinical History / Reason for exam: The patient is a   69-year-old female with shortness of breath. A venous duplex examination   was performedto evaluate the patient for deep venous thrombosis of the   lower extremities.    The common femoral, great saphenous, femoral, and small saphenous veins   were visualized with no evidence of deep venous thrombosis    All veins were fully compressible.  There was presence of spontaneous   flow, augmentation with distal compression and phasicity.    The right popliteal vein is free of thrombus. An acute left popliteal   vein DVT is visualized.    The anterior tibial veins were  patent     The posterior tibial veins were patent on the right and thrombosed on the   left     The peroneal veins were not visualized    Bilateral gastrocnemius vein thrombosis    Impression:    Acute left popliteal and posterior tibial vein DVT.    Bilateral gastrocnemius vein thrombosis    ICD-10:R06.02                  HOLDEN ROWE M.D., ATTENDING VASCULAR  This document has been electronically signed. Aug  1 2019  7:53AM                < end of copied text >    O:   Derm: multiple ulcerations b/l feet with dry superficial gangrene and fibrotic wound base, stable.  multiple erythematous papules anterior LLE, no open lesion.   Vascular: Dorsalis Pedis and Posterior Tibial pulses 1/4.  Capillary re-fill time less then 3 seconds digits 1-5 bilateral.  B/L feet warm to touch  Neuro: Protective sensation intact to the level of the digits bilateral.  MSK: Muscle strength 5/5 all major muscle groups bilateral.    Assessment and Plan:   b/l feet ulcerations, stable    Chart reviewed and Patient evaluated  Discussed diagnosis and treatment with patient  local wound care: wash with soap and water, apply santyl wet to dry dsd kerlix b/l feet  no sx intervention  venous duplex reviewed as above  f/u with vascular   f/u o/p 1 week at [address] [facility] [address]  reconsult as needed  attending updated

## 2019-08-01 NOTE — CONSULT NOTE ADULT - ASSESSMENT

## 2019-08-01 NOTE — CONSULT NOTE ADULT - SUBJECTIVE AND OBJECTIVE BOX
Patient is a 69y old  Female who presents with a chief complaint of non productive cough (31 Jul 2019 01:01)      HPI:  69 year old lady with PMHx HTN, GERD, HFpEF, COPD with multiple past admissions, never been intubated presenting with SOB at rest and nonproductive cough for the past few days. She states that it feels like her usual COPD exacerbation. Denied fever, chills, n/v/d/c, cp,  pleuritic cp, palpitations, diaphoresis, wheezing, hemoptysis, HA, blurry vision, dizziness, lightheadedness, numbness, tingling, neck pain, neck stiffness, abdominal pain, melena, BRBPR, hematuria, urinary incontinace, trauma, calf pain/swelling/erythema, sick contacts, recent travel or rash.    In ED, bp 146/85    HR  110   RR 26     92% on RA    T 99.2F. She refused BiPap and was doing well on 2LNC. She was given solumedrol, nebs and started on levaquin. WBC 17. CXR showed a RLL infiltrate. trop (-) (31 Jul 2019 01:01)      PAST MEDICAL & SURGICAL HISTORY:  Chronic pain  Depression  Anxiety  COPD (chronic obstructive pulmonary disease)  Neck mass      SOCIAL HX:   Smoking                         ETOH                            Other    FAMILY HISTORY:  Family history of COPD (chronic obstructive pulmonary disease) (Mother)  .  No cardiovascular or pulmonary family history     Review of System:  See HPI    Allergies    penicillin (Unknown)    Intolerances                                  MEDICATIONS  (STANDING):  ALBUTerol    90 MICROgram(s) HFA Inhaler 2 Puff(s) Inhalation every 6 hours  ALBUTerol/ipratropium for Nebulization 3 milliLiter(s) Nebulizer every 6 hours  ALPRAZolam 0.5 milliGRAM(s) Oral daily  amLODIPine   Tablet 5 milliGRAM(s) Oral daily  apixaban 10 milliGRAM(s) Oral every 12 hours  buDESOnide 160 MICROgram(s)/formoterol 4.5 MICROgram(s) Inhaler 2 Puff(s) Inhalation two times a day  chlorhexidine 4% Liquid 1 Application(s) Topical <User Schedule>  cyanocobalamin 1000 MICROGram(s) Oral daily  levoFLOXacin IVPB 500 milliGRAM(s) IV Intermittent every 24 hours  methylPREDNISolone sodium succinate Injectable 60 milliGRAM(s) IV Push daily  nicotine -   7 mG/24Hr(s) Patch 1 patch Transdermal daily  tiotropium 18 MICROgram(s) Capsule 1 Capsule(s) Inhalation daily    MEDICATIONS  (PRN):  acetaminophen   Tablet .. 650 milliGRAM(s) Oral once PRN Mild Pain (1 - 3)  oxyCODONE    5 mG/acetaminophen 325 mG 2 Tablet(s) Oral every 6 hours PRN Moderate Pain (4 - 6)            PHYSICAL EXAM  Vital Signs Last 24 Hrs  T(C): 35.6 (01 Aug 2019 04:43), Max: 36.3 (31 Jul 2019 16:56)  T(F): 96 (01 Aug 2019 04:43), Max: 97.3 (31 Jul 2019 16:56)  HR: 95 (01 Aug 2019 04:43) (91 - 95)  BP: 123/63 (01 Aug 2019 04:43) (123/63 - 126/60)  BP(mean): --  RR: 20 (01 Aug 2019 04:43) (20 - 20)  SpO2: --    General: In NAD  HEENT: ANDRA             Lymphatic system: No cervical LN     Lungs: Santhosh diffuse wheezing  Cardiovascular: Regular, S1, S2  Gastrointestinal: Soft.  + BS   Musculoskeletal: No Clubbing.  Full range of motion.. Moves all extremities  Lower Extremities: No edema  Skin: Warm.  Intact  Neurological: No motor or sensory deficit       LABS:                          11.6   14.43 )-----------( 229      ( 31 Jul 2019 06:51 )             36.3                                               07-31    133<L>  |  88<L>  |  8<L>  ----------------------------<  263<H>  3.4<L>   |  30  |  0.5<L>    Ca    8.1<L>      31 Jul 2019 06:51  Mg     2.0     07-31    TPro  5.9<L>  /  Alb  2.7<L>  /  TBili  0.9  /  DBili  x   /  AST  33  /  ALT  10  /  AlkPhos  86  07-30                                                 CARDIAC MARKERS ( 01 Aug 2019 06:57 )  x     / <0.01 ng/mL / 26 U/L / x     / 2.5 ng/mL  CARDIAC MARKERS ( 30 Jul 2019 20:22 )  x     / <0.01 ng/mL / x     / x     / x                                                LIVER FUNCTIONS - ( 30 Jul 2019 20:22 )  Alb: 2.7 g/dL / Pro: 5.9 g/dL / ALK PHOS: 86 U/L / ALT: 10 U/L / AST: 33 U/L / GGT: x                                                < from: CT Angio Chest w/ IV Cont (07.31.19 @ 23:12) >  PULMONARY EMBOLUS: Multiple segmental pulmonary emboli involving the   proximal right upper lobar artery, proximal right lower lobar artery,   proximal left lower lobar artery and proximal left upper lobar artery. No   evidence of right heart strain.    LUNGS, PLEURA, AIRWAYS: Central airways are patent. No focal   consolidation, pleural effusion or pneumothorax. Bibasilar subsegmental   atelectasis.    THORACIC NODES: Subcarinal left and measures 2.4 x 1.5 cm. Left hilar   lymph node measures 1.8 x 1.4 cm.    MEDIASTINUM/GREAT VESSELS: Heart size is within normal limits. No   pericardial effusion. The great vessels are normal in caliber. Aortic and   coronary artery atherosclerosis.    BONES/SOFT TISSUES: Chronic rib fracture deformities of the left 5th   through 8th ribs.    VISUALIZED UPPER ABDOMEN: Unremarkable.      IMPRESSION:    Multiple segmental pulmonary emboli involving the right upper lobe, right lower lobe, left upper lobe and left lower lobe. No CT evidence of right heart strain.    < end of copied text >    < from: [facility] Duplex Lower Ext Vein Scan, Bilat (07.31.19 @ 11:59) >  Impression:    Acute left popliteal and posterior tibial vein DVT.    Bilateral gastrocnemius vein thrombosis    < end of copied text >      < from: Transthoracic Echocardiogram (08.01.19 @ 07:56) >  Summary:   1. LV Ejection Fraction by Aguila's Method with a biplane EF of 53 %.   2. Spectral Doppler shows impaired relaxation pattern of left   ventricular myocardial filling (Grade I diastolic dysfunction).   3. Normal right ventricular size and function.   4. Estimated pulmonary artery systolic pressure is 46.2 mmHg assuming a   right atrial pressure of 15 mmHg, which is consistent with mild pulmonary   hypertension.    < end of copied text > Patient is a 69y old  Female who presents with a chief complaint of non productive cough (31 Jul 2019 01:01)      HPI:  69 year old lady with PMHx HTN, GERD, HFpEF, COPD with multiple past admissions, never been intubated presenting with SOB at rest and nonproductive cough for the past few days. She states that it feels like her usual COPD exacerbation. Denied fever, chills, n/v/d/c, cp,  pleuritic cp, palpitations, diaphoresis, wheezing, hemoptysis, HA, blurry vision, dizziness, lightheadedness, numbness, tingling, neck pain, neck stiffness, abdominal pain, melena, BRBPR, hematuria, urinary incontinace, trauma, calf pain/swelling/erythema, sick contacts, recent travel or rash.    In ED, bp 146/85    HR  110   RR 26     92% on RA    T 99.2F. She refused BiPap and was doing well on 2LNC. She was given solumedrol, nebs and started on levaquin. WBC 17. CXR showed a RLL infiltrate. trop (-) (31 Jul 2019 01:01), worsening SOB, s/p chest ct b/l PE, LE Doppler DVT      PAST MEDICAL & SURGICAL HISTORY:  Chronic pain  Depression  Anxiety  COPD (chronic obstructive pulmonary disease)  Neck mass      SOCIAL HX:   Smoking  EX  FAMILY HISTORY:  Family history of COPD (chronic obstructive pulmonary disease) (Mother)  .  No cardiovascular or pulmonary family history     Review of System:  See HPI    Allergies    penicillin (Unknown)    Intolerances                                  MEDICATIONS  (STANDING):  ALBUTerol    90 MICROgram(s) HFA Inhaler 2 Puff(s) Inhalation every 6 hours  ALBUTerol/ipratropium for Nebulization 3 milliLiter(s) Nebulizer every 6 hours  ALPRAZolam 0.5 milliGRAM(s) Oral daily  amLODIPine   Tablet 5 milliGRAM(s) Oral daily  apixaban 10 milliGRAM(s) Oral every 12 hours  buDESOnide 160 MICROgram(s)/formoterol 4.5 MICROgram(s) Inhaler 2 Puff(s) Inhalation two times a day  chlorhexidine 4% Liquid 1 Application(s) Topical <User Schedule>  cyanocobalamin 1000 MICROGram(s) Oral daily  levoFLOXacin IVPB 500 milliGRAM(s) IV Intermittent every 24 hours  methylPREDNISolone sodium succinate Injectable 60 milliGRAM(s) IV Push daily  nicotine -   7 mG/24Hr(s) Patch 1 patch Transdermal daily  tiotropium 18 MICROgram(s) Capsule 1 Capsule(s) Inhalation daily    MEDICATIONS  (PRN):  acetaminophen   Tablet .. 650 milliGRAM(s) Oral once PRN Mild Pain (1 - 3)  oxyCODONE    5 mG/acetaminophen 325 mG 2 Tablet(s) Oral every 6 hours PRN Moderate Pain (4 - 6)            PHYSICAL EXAM  Vital Signs Last 24 Hrs  T(C): 35.6 (01 Aug 2019 04:43), Max: 36.3 (31 Jul 2019 16:56)  T(F): 96 (01 Aug 2019 04:43), Max: 97.3 (31 Jul 2019 16:56)  HR: 95 (01 Aug 2019 04:43) (91 - 95)  BP: 123/63 (01 Aug 2019 04:43) (123/63 - 126/60)  RR: 20 (01 Aug 2019 04:43) (20 - 20)  SpO2: 92 % 2L NC    General: In NAD  HEENT: ANDRA             Lymphatic system: No cervical LN     Lungs: Santhosh diffuse wheezing  Cardiovascular: Regular, S1, S2  Gastrointestinal: Soft.  + BS   Musculoskeletal: No Clubbing.  Full range of motion.. Moves all extremities  Lower Extremities: No edema  Skin: Warm.  Intact  Neurological: No motor or sensory deficit       LABS:                          11.6   14.43 )-----------( 229      ( 31 Jul 2019 06:51 )             36.3                                               07-31    133<L>  |  88<L>  |  8<L>  ----------------------------<  263<H>  3.4<L>   |  30  |  0.5<L>    Ca    8.1<L>      31 Jul 2019 06:51  Mg     2.0     07-31    TPro  5.9<L>  /  Alb  2.7<L>  /  TBili  0.9  /  DBili  x   /  AST  33  /  ALT  10  /  AlkPhos  86  07-30                                                 CARDIAC MARKERS ( 01 Aug 2019 06:57 )  x     / <0.01 ng/mL / 26 U/L / x     / 2.5 ng/mL  CARDIAC MARKERS ( 30 Jul 2019 20:22 )  x     / <0.01 ng/mL / x     / x     / x                                                LIVER FUNCTIONS - ( 30 Jul 2019 20:22 )  Alb: 2.7 g/dL / Pro: 5.9 g/dL / ALK PHOS: 86 U/L / ALT: 10 U/L / AST: 33 U/L / GGT: x                                                < from: CT Angio Chest w/ IV Cont (07.31.19 @ 23:12) >  PULMONARY EMBOLUS: Multiple segmental pulmonary emboli involving the   proximal right upper lobar artery, proximal right lower lobar artery,   proximal left lower lobar artery and proximal left upper lobar artery. No   evidence of right heart strain.    LUNGS, PLEURA, AIRWAYS: Central airways are patent. No focal   consolidation, pleural effusion or pneumothorax. Bibasilar subsegmental   atelectasis.    THORACIC NODES: Subcarinal left and measures 2.4 x 1.5 cm. Left hilar   lymph node measures 1.8 x 1.4 cm.    MEDIASTINUM/GREAT VESSELS: Heart size is within normal limits. No   pericardial effusion. The great vessels are normal in caliber. Aortic and   coronary artery atherosclerosis.    BONES/SOFT TISSUES: Chronic rib fracture deformities of the left 5th   through 8th ribs.    VISUALIZED UPPER ABDOMEN: Unremarkable.      IMPRESSION:    Multiple segmental pulmonary emboli involving the right upper lobe, right lower lobe, left upper lobe and left lower lobe. No CT evidence of right heart strain.    < end of copied text >    < from: [facility] Duplex Lower Ext Vein Scan, Bilat (07.31.19 @ 11:59) >  Impression:    Acute left popliteal and posterior tibial vein DVT.    Bilateral gastrocnemius vein thrombosis    < end of copied text >      < from: Transthoracic Echocardiogram (08.01.19 @ 07:56) >  Summary:   1. LV Ejection Fraction by Aguila's Method with a biplane EF of 53 %.   2. Spectral Doppler shows impaired relaxation pattern of left   ventricular myocardial filling (Grade I diastolic dysfunction).   3. Normal right ventricular size and function.   4. Estimated pulmonary artery systolic pressure is 46.2 mmHg assuming a   right atrial pressure of 15 mmHg, which is consistent with mild pulmonary   hypertension.    < end of copied text >

## 2019-08-01 NOTE — CONSULT NOTE ADULT - SUBJECTIVE AND OBJECTIVE BOX
HPI:  69 year old lady with PMHx HTN, GERD, HFpEF, COPD with multiple past admissions, never been intubated presenting with SOB at rest and nonproductive cough for the past few days. She states that it feels like her usual COPD exacerbation. Denied fever, chills, n/v/d/c, cp,  pleuritic cp, palpitations, diaphoresis, wheezing, hemoptysis, HA, blurry vision, dizziness, lightheadedness, numbness, tingling, neck pain, neck stiffness, abdominal pain, melena, BRBPR, hematuria, urinary incontinace, trauma, calf pain/swelling/erythema, sick contacts, recent travel or rash.    In ED, bp 146/85    HR  110   RR 26     92% on RA    T 99.2F. She refused BiPap and was doing well on 2LNC. She was given solumedrol, nebs and started on levaquin. WBC 17. CXR showed a RLL infiltrate. trop (-) (31 Jul 2019 01:01)      PAST MEDICAL & SURGICAL HISTORY:  Chronic pain  Depression  Anxiety  COPD (chronic obstructive pulmonary disease)  [facility]      Hospital Course:    TODAY'S SUBJECTIVE & REVIEW OF SYMPTOMS:     Constitutional WNL   Cardio WNL   Resp sob   GI WNL  Heme WNL  Endo WNL  Skin WNL  MSK Weakness  Neuro WNL  Cognitive WNL  Psych WNL      MEDICATIONS  (STANDING):  ALBUTerol    90 MICROgram(s) HFA Inhaler 2 Puff(s) Inhalation every 6 hours  ALBUTerol/ipratropium for Nebulization 3 milliLiter(s) Nebulizer every 6 hours  ALPRAZolam 0.5 milliGRAM(s) Oral daily  amLODIPine   Tablet 5 milliGRAM(s) Oral daily  apixaban 10 milliGRAM(s) Oral every 12 hours  buDESOnide 160 MICROgram(s)/formoterol 4.5 MICROgram(s) Inhaler 2 Puff(s) Inhalation two times a day  chlorhexidine 4% Liquid 1 Application(s) Topical <User Schedule>  collagenase Ointment 1 Application(s) Topical two times a day  cyanocobalamin 1000 MICROGram(s) Oral daily  methylPREDNISolone sodium succinate Injectable 60 milliGRAM(s) IV Push daily  nicotine -   7 mG/24Hr(s) Patch 1 patch Transdermal daily  tiotropium 18 MICROgram(s) Capsule 1 Capsule(s) Inhalation daily    MEDICATIONS  (PRN):  acetaminophen   Tablet .. 650 milliGRAM(s) Oral once PRN Mild Pain (1 - 3)  oxyCODONE    5 mG/acetaminophen 325 mG 2 Tablet(s) Oral every 6 hours PRN Moderate Pain (4 - 6)      FAMILY HISTORY:  Family history of COPD (chronic obstructive pulmonary disease) (Mother)      Allergies    penicillin (Unknown)    Intolerances        SOCIAL HISTORY:    [  ] Etoh  [  ] Smoking  [  ] Substance abuse     Home Environment:  [  ] Home Alone  [ x ] Lives with Family  [  ] Home Health Aid    Dwelling:  [x  ] Apartment  [  ] Private House  [  ] Adult Home  [  ] Skilled Nursing Facility      [  ] Short Term  [  ] Long Term  [  ] Stairs       Elevator [x  ]    FUNCTIONAL STATUS PTA: (Check all that apply)  Ambulation: [   ]Independent    [  ] Dependent     [  ] Non-Ambulatory  Assistive Device: [  ] SA Cane  [  ]  Q Cane  [  ] Walker  [ x ]  Wheelchair  ADL : [  ] Independent  [ x ]  Dependent       Vital Signs Last 24 Hrs  T(C): 35.9 (01 Aug 2019 14:16), Max: 36.3 (31 Jul 2019 16:56)  T(F): 96.7 (01 Aug 2019 14:16), Max: 97.3 (31 Jul 2019 16:56)  HR: 97 (01 Aug 2019 14:16) (91 - 97)  BP: 140/65 (01 Aug 2019 14:16) (123/63 - 140/65)  BP(mean): --  RR: 20 (01 Aug 2019 14:16) (20 - 20)  SpO2: --      PHYSICAL EXAM: Alert & Oriented X3  GENERAL: NAD  HEAD:  Atraumatic, Normocephalic  CHEST/LUNG: decreased bs lung bases  HEART: S1S2+  ABDOMEN: Soft, Nontender  EXTREMITIES:  no calf tenderness    NERVOUS SYSTEM:  Cranial Nerves 2-12 intact [  ] Abnormal  [  ]  ROM: WFL all extremities [  ]  Abnormal [ x ]able to move all ext  Motor Strength: WFL all extremities  [  ]  Abnormal [x  ]  Sensation: intact to light touch [  ] Abnormal [  ]  Reflexes: Symmetric [  ]  Abnormal [  ]    FUNCTIONAL STATUS:  Bed Mobility: Independent [  ]  Supervision [  ]  Needs Assistance [x  ]  N/A [  ]  Transfers: Independent [  ]  Supervision [  ]  Needs Assistance [ x ]  N/A [  ]   Ambulation: Independent [  ]  Supervision [  ]  Needs Assistance [  ]  N/A [  ]  ADL: Independent [  ] Requires Assistance [  ] N/A [  ]      LABS:                        11.6   14.43 )-----------( 229      ( 31 Jul 2019 06:51 )             36.3     07-31    133<L>  |  88<L>  |  8<L>  ----------------------------<  263<H>  3.4<L>   |  30  |  0.5<L>    Ca    8.1<L>      31 Jul 2019 06:51  Mg     2.0     07-31    TPro  5.9<L>  /  Alb  2.7<L>  /  TBili  0.9  /  DBili  x   /  AST  33  /  ALT  10  /  AlkPhos  86  07-30          RADIOLOGY & ADDITIONAL STUDIES:    Assesment:

## 2019-08-01 NOTE — PROGRESS NOTE ADULT - ATTENDING COMMENTS
Pt was seen and examined independently, pt is c/o SOB, choked while was drinking water. She has a h/o COPD and sedentary life style, was admitted for dyspnea, was found to have PE and DVT. She is on anticoagulation w/o evidence of right hears strain. Pt was consulted by pulmonary.   Today will consult vascular surgery to evaluate for retractable IVC filter ( pt has DVT and has a high risk for recurrent PE).  She is still wheezing, will c/w Solumedrol 60 mg Q 12h and start steroid taper in 24 hours.   I agree with residents findings, assessment and plan above, will consult vascular surgery and speech and swallow.     #Progress Note Handoff  Pending (specify):  vascular surgery consult to evaluate for retractable IVC filter, speech and swallow evaluation, start steroid taper in 24 hours   Family discussion: n/a. I spoke with a pt, she agreed with a plan of care   Disposition: Home___/SNF___/Other________/Unknown at this time__x ______

## 2019-08-02 LAB
ANION GAP SERPL CALC-SCNC: 10 MMOL/L — SIGNIFICANT CHANGE UP (ref 7–14)
BUN SERPL-MCNC: 7 MG/DL — LOW (ref 10–20)
CALCIUM SERPL-MCNC: 8.8 MG/DL — SIGNIFICANT CHANGE UP (ref 8.5–10.1)
CHLORIDE SERPL-SCNC: 93 MMOL/L — LOW (ref 98–110)
CO2 SERPL-SCNC: 32 MMOL/L — SIGNIFICANT CHANGE UP (ref 17–32)
CREAT SERPL-MCNC: 0.5 MG/DL — LOW (ref 0.7–1.5)
CULTURE RESULTS: NO GROWTH — SIGNIFICANT CHANGE UP
GLUCOSE SERPL-MCNC: 145 MG/DL — HIGH (ref 70–99)
HCT VFR BLD CALC: 33.9 % — LOW (ref 37–47)
HGB BLD-MCNC: 10.6 G/DL — LOW (ref 12–16)
MCHC RBC-ENTMCNC: 31.3 G/DL — LOW (ref 32–37)
MCHC RBC-ENTMCNC: 31.3 PG — HIGH (ref 27–31)
MCV RBC AUTO: 100 FL — HIGH (ref 81–99)
NRBC # BLD: 0 /100 WBCS — SIGNIFICANT CHANGE UP (ref 0–0)
PLATELET # BLD AUTO: 272 K/UL — SIGNIFICANT CHANGE UP (ref 130–400)
POTASSIUM SERPL-MCNC: 4.5 MMOL/L — SIGNIFICANT CHANGE UP (ref 3.5–5)
POTASSIUM SERPL-SCNC: 4.5 MMOL/L — SIGNIFICANT CHANGE UP (ref 3.5–5)
RBC # BLD: 3.39 M/UL — LOW (ref 4.2–5.4)
RBC # FLD: 14.1 % — SIGNIFICANT CHANGE UP (ref 11.5–14.5)
SODIUM SERPL-SCNC: 135 MMOL/L — SIGNIFICANT CHANGE UP (ref 135–146)
SPECIMEN SOURCE: SIGNIFICANT CHANGE UP
WBC # BLD: 13.67 K/UL — HIGH (ref 4.8–10.8)
WBC # FLD AUTO: 13.67 K/UL — HIGH (ref 4.8–10.8)

## 2019-08-02 PROCEDURE — 99233 SBSQ HOSP IP/OBS HIGH 50: CPT

## 2019-08-02 RX ORDER — ALPRAZOLAM 0.25 MG
0.5 TABLET ORAL ONCE
Refills: 0 | Status: DISCONTINUED | OUTPATIENT
Start: 2019-08-02 | End: 2019-08-02

## 2019-08-02 RX ADMIN — AMLODIPINE BESYLATE 5 MILLIGRAM(S): 2.5 TABLET ORAL at 05:13

## 2019-08-02 RX ADMIN — Medication 1 PATCH: at 07:53

## 2019-08-02 RX ADMIN — Medication 1 PATCH: at 11:21

## 2019-08-02 RX ADMIN — Medication 3 MILLILITER(S): at 14:05

## 2019-08-02 RX ADMIN — Medication 3 MILLILITER(S): at 20:54

## 2019-08-02 RX ADMIN — OXYCODONE AND ACETAMINOPHEN 2 TABLET(S): 5; 325 TABLET ORAL at 18:05

## 2019-08-02 RX ADMIN — ALBUTEROL 2 PUFF(S): 90 AEROSOL, METERED ORAL at 14:04

## 2019-08-02 RX ADMIN — Medication 1 APPLICATION(S): at 17:07

## 2019-08-02 RX ADMIN — APIXABAN 10 MILLIGRAM(S): 2.5 TABLET, FILM COATED ORAL at 17:08

## 2019-08-02 RX ADMIN — Medication 60 MILLIGRAM(S): at 17:08

## 2019-08-02 RX ADMIN — OXYCODONE AND ACETAMINOPHEN 2 TABLET(S): 5; 325 TABLET ORAL at 12:40

## 2019-08-02 RX ADMIN — PREGABALIN 1000 MICROGRAM(S): 225 CAPSULE ORAL at 11:21

## 2019-08-02 RX ADMIN — Medication 60 MILLIGRAM(S): at 05:14

## 2019-08-02 RX ADMIN — OXYCODONE AND ACETAMINOPHEN 2 TABLET(S): 5; 325 TABLET ORAL at 14:00

## 2019-08-02 RX ADMIN — OXYCODONE AND ACETAMINOPHEN 2 TABLET(S): 5; 325 TABLET ORAL at 05:53

## 2019-08-02 RX ADMIN — ALBUTEROL 2 PUFF(S): 90 AEROSOL, METERED ORAL at 08:34

## 2019-08-02 RX ADMIN — Medication 1 APPLICATION(S): at 05:13

## 2019-08-02 RX ADMIN — OXYCODONE AND ACETAMINOPHEN 2 TABLET(S): 5; 325 TABLET ORAL at 23:06

## 2019-08-02 RX ADMIN — Medication 3 MILLILITER(S): at 08:32

## 2019-08-02 RX ADMIN — APIXABAN 10 MILLIGRAM(S): 2.5 TABLET, FILM COATED ORAL at 05:13

## 2019-08-02 RX ADMIN — Medication 0.5 MILLIGRAM(S): at 23:14

## 2019-08-02 RX ADMIN — BUDESONIDE AND FORMOTEROL FUMARATE DIHYDRATE 2 PUFF(S): 160; 4.5 AEROSOL RESPIRATORY (INHALATION) at 08:03

## 2019-08-02 RX ADMIN — Medication 0.5 MILLIGRAM(S): at 11:21

## 2019-08-02 NOTE — CONSULT NOTE ADULT - REASON FOR ADMISSION
non productive cough

## 2019-08-02 NOTE — PROGRESS NOTE ADULT - SUBJECTIVE AND OBJECTIVE BOX
Patient is a 69y old  Female who presents with a chief complaint of non productive cough + dyspnea of 1 day duration (02 Aug 2019 08:42)  was found to have RLL infiltrate on CXR  CT angio showed bilateral PE, W/O evidence of right heart strain  started on eliquis 10 mg BID    OVERNIGHT EVENTS:  no acute events overnight    SUBJECTIVE / INTERVAL HPI: Patient seen and examined at bedside.   she is still c/o choking, dyspnea is better   VITAL SIGNS:  Vital Signs Last 24 Hrs  T(C): 36.4 (02 Aug 2019 05:19), Max: 36.7 (01 Aug 2019 20:14)  T(F): 97.5 (02 Aug 2019 05:19), Max: 98 (01 Aug 2019 20:14)  HR: 82 (02 Aug 2019 05:19) (82 - 97)  BP: 152/67 (02 Aug 2019 05:19) (120/74 - 152/67)  BP(mean): --  RR: 18 (02 Aug 2019 05:19) (18 - 20)  SpO2: --    PHYSICAL EXAM:    General: WDWN  HEENT: NC/AT; PERRL, clear conjunctiva  Neck: supple  Cardiovascular: +S1/S2; RRR  Respiratory: bilateral inspiratory and expiratory wheezes  Gastrointestinal: soft, NT/ND; +BSx4  Extremities: WWP; 2+ peripheral pulses; no edema   Neurological: AAOx3; no focal deficits    MEDICATIONS:  MEDICATIONS  (STANDING):  ALBUTerol    90 MICROgram(s) HFA Inhaler 2 Puff(s) Inhalation every 6 hours  ALBUTerol/ipratropium for Nebulization 3 milliLiter(s) Nebulizer every 6 hours  ALPRAZolam 0.5 milliGRAM(s) Oral daily  amLODIPine   Tablet 5 milliGRAM(s) Oral daily  apixaban 10 milliGRAM(s) Oral every 12 hours  buDESOnide 160 MICROgram(s)/formoterol 4.5 MICROgram(s) Inhaler 2 Puff(s) Inhalation two times a day  chlorhexidine 4% Liquid 1 Application(s) Topical <User Schedule>  collagenase Ointment 1 Application(s) Topical two times a day  cyanocobalamin 1000 MICROGram(s) Oral daily  methylPREDNISolone sodium succinate Injectable 60 milliGRAM(s) IV Push two times a day  nicotine -   7 mG/24Hr(s) Patch 1 patch Transdermal daily  tiotropium 18 MICROgram(s) Capsule 1 Capsule(s) Inhalation daily    MEDICATIONS  (PRN):  acetaminophen   Tablet .. 650 milliGRAM(s) Oral once PRN Mild Pain (1 - 3)  oxyCODONE    5 mG/acetaminophen 325 mG 2 Tablet(s) Oral every 6 hours PRN Moderate Pain (4 - 6)      ALLERGIES:  Allergies    penicillin (Unknown)    Intolerances        LABS:                        10.6   13.67 )-----------( 272      ( 02 Aug 2019 07:48 )             33.9     08-02    135  |  93<L>  |  7<L>  ----------------------------<  145<H>  4.5   |  32  |  0.5<L>    Ca    8.8      02 Aug 2019 07:48          CAPILLARY BLOOD GLUCOSE          RADIOLOGY & ADDITIONAL TESTS: Reviewed.    TTE    1. LV Ejection Fraction by Aguila's Method with a biplane EF of 53 %.   2. Spectral Doppler shows impaired relaxation pattern of left   ventricular myocardial filling (Grade I diastolic dysfunction).   3. Normal right ventricular size and function.   4. Estimated pulmonary artery systolic pressure is 46.2 mmHg assuming a   right atrial pressure of 15 mmHg, which is consistent with mild pulmonary   hypertension.

## 2019-08-02 NOTE — CONSULT NOTE ADULT - SUBJECTIVE AND OBJECTIVE BOX
KUSH RINALDI [id number]  69y Female  3d    HPI:  69 year old lady with PMHx HTN, GERD, HFpEF, COPD with multiple past admissions, never been intubated presenting with SOB at rest and nonproductive cough for the past few days. She states that it feels like her usual COPD exacerbation. Denied fever, chills, n/v/d/c, cp,  pleuritic cp, palpitations, diaphoresis, wheezing, hemoptysis, HA, blurry vision, dizziness, lightheadedness, numbness, tingling, neck pain, neck stiffness, abdominal pain, melena, BRBPR, hematuria, urinary incontinace, trauma, calf pain/swelling/erythema, sick contacts, recent travel or rash.    In ED, bp 146/85    HR  110   RR 26     92% on RA    T 99.2F. She refused BiPap and was doing well on 2LNC. She was given solumedrol, nebs and started on levaquin. WBC 17. CXR showed a RLL infiltrate. trop (-) (31 Jul 2019 01:01)      PAST MEDICAL & SURGICAL HISTORY:  Chronic pain  Depression  Anxiety  COPD (chronic obstructive pulmonary disease)  Neck mass        MEDICATIONS  (STANDING):  ALBUTerol    90 MICROgram(s) HFA Inhaler 2 Puff(s) Inhalation every 6 hours  ALBUTerol/ipratropium for Nebulization 3 milliLiter(s) Nebulizer every 6 hours  ALPRAZolam 0.5 milliGRAM(s) Oral daily  amLODIPine   Tablet 5 milliGRAM(s) Oral daily  apixaban 10 milliGRAM(s) Oral every 12 hours  buDESOnide 160 MICROgram(s)/formoterol 4.5 MICROgram(s) Inhaler 2 Puff(s) Inhalation two times a day  chlorhexidine 4% Liquid 1 Application(s) Topical <User Schedule>  collagenase Ointment 1 Application(s) Topical two times a day  cyanocobalamin 1000 MICROGram(s) Oral daily  methylPREDNISolone sodium succinate Injectable 60 milliGRAM(s) IV Push daily  nicotine -   7 mG/24Hr(s) Patch 1 patch Transdermal daily  tiotropium 18 MICROgram(s) Capsule 1 Capsule(s) Inhalation daily    MEDICATIONS  (PRN):  acetaminophen   Tablet .. 650 milliGRAM(s) Oral once PRN Mild Pain (1 - 3)  oxyCODONE    5 mG/acetaminophen 325 mG 2 Tablet(s) Oral every 6 hours PRN Moderate Pain (4 - 6)      Allergies    penicillin (Unknown)    Intolerances        REVIEW OF SYSTEMS    [x] A ten-point review of systems was otherwise negative except as noted.        Vital Signs Last 24 Hrs  T(C): 36.7 (01 Aug 2019 20:14), Max: 36.7 (01 Aug 2019 20:14)  T(F): 98 (01 Aug 2019 20:14), Max: 98 (01 Aug 2019 20:14)  HR: 90 (01 Aug 2019 20:14) (90 - 97)  BP: 120/74 (01 Aug 2019 20:14) (120/74 - 140/65)  RR: 18 (01 Aug 2019 20:14) (18 - 20)    PHYSICAL EXAM:  GENERAL: NAD, well-appearing  CHEST/LUNG: Clear to auscultation bilaterally  HEART: Regular rate and rhythm  ABDOMEN: Soft, Nontender, Nondistended;   EXTREMITIES:  No clubbing, cyanosis, or edema. b/l feet wounds  PULSES: palpable DP and PT b/l      LABS:  Labs:  CAPILLARY BLOOD GLUCOSE                              11.6   14.43 )-----------( 229      ( 31 Jul 2019 06:51 )             36.3         07-31    133<L>  |  88<L>  |  8<L>  ----------------------------<  263<H>  3.4<L>   |  30  |  0.5<L>          LFTs:     Blood Gas Venous - Lactate: 1.8 mmoL/L (07-30-19 @ 21:41)  Lactate, Blood: 1.9 mmol/L (07-30-19 @ 20:22)      Coags:    CARDIAC MARKERS ( 01 Aug 2019 06:57 )  x     / <0.01 ng/mL / 26 U/L / x     / 2.5 ng/mL      Serum Pro-Brain Natriuretic Peptide: 1475 pg/mL (07-30-19 @ 20:22)              RADIOLOGY & ADDITIONAL STUDIES:      < from: [facility] Duplex Lower Ext Vein Scan, Bilat (07.31.19 @ 11:59) >  Impression:    Acute left popliteal and posterior tibial vein DVT.    Bilateral gastrocnemius vein thrombosis    < end of copied text >      < from: CT Angio Chest w/ IV Cont (07.31.19 @ 23:12) >  IMPRESSION:    Multiple segmental pulmonary emboli involving the right upper lobe, right   lower lobe, left upper lobe and left lower lobe. No CT evidence of right   heart strain.    < end of copied text >

## 2019-08-02 NOTE — CONSULT NOTE ADULT - ASSESSMENT
IMPRESSION: Rehab of gait dysfunction    PRECAUTIONS: [  ] Cardiac  [  ] Respiratory  [  ] Seizures [  ] Contact Isolation  [  ] Droplet Isolation  [  ] Other    Weight Bearing Status:     RECOMMENDATION:    Out of Bed to Chair     DVT/Decubiti Prophylaxis    REHAB PLAN:     [x   ] Bedside P/T 3-5 times a week   [   ]   Bedside O/T  2-3 times a week             [   ] No Rehab Therapy Indicated                   [   ]  Speech Therapy   Conditioning/ROM                                    ADL  Bed Mobility                                               Conditioning/ROM  Transfers                                                     Bed Mobility  Sitting /Standing Balance                         Transfers                                        Gait Training                                               Sitting/Standing Balance  Stair Training [   ]Applicable                    Home equipment Eval                                                                        Splinting  [   ] Only      GOALS:   ADL   [   ]   Independent                    Transfers  [ x  ] Independent                          Ambulation  [ x  ] Independent     [ x   ] With device                            [   ]  CG                                                         [   ]  CG                                                                  [   ] CG                            [    ] Min A                                                   [   ] Min A                                                              [   ] Min  A          DISCHARGE PLAN:   [   ]  Good candidate for Intensive Rehabilitation/Hospital based                                             Will tolerate 3hrs Intensive Rehab Daily                                       [ x   ]  Short Term Rehab in Skilled Nursing Facility                                       [    ]  Home with Outpatient or  services                                         [    ]  Possible Candidate for Intensive Hospital based Rehab

## 2019-08-02 NOTE — PROGRESS NOTE ADULT - ASSESSMENT
69 year old lady with PMHx HTN, GERD, HFpEF, COPD with multiple past admissions, never been intubated presenting with SOB at rest and nonproductive cough for the past few days/ treated for COPD/ now DVT/ PE    Impression:    Provoked DVT and PE (segmental PE in RUL, RLL, BEENA, LLL), NO right heart strain  COPD/ Med ln    Plan:    Agree with eliquis  c/w symbicort, spiriva  prednisone 40 mg daily  poor prognosis

## 2019-08-02 NOTE — SWALLOW BEDSIDE ASSESSMENT ADULT - SLP GENERAL OBSERVATIONS
Pt received sitting upright in bed, alert and oriented x3 w/ no c/o pain. Pt reported anxiety during evaluation. +O2 via nasal cannula.

## 2019-08-02 NOTE — SWALLOW BEDSIDE ASSESSMENT ADULT - SWALLOW EVAL: DIAGNOSIS
+toleration for regular solids and thin liquids w/o overt s/s penetration/aspiration. Pt w/ very rapid PO intake and reported anxiety during eating.

## 2019-08-02 NOTE — SWALLOW BEDSIDE ASSESSMENT ADULT - SLP PERTINENT HISTORY OF CURRENT PROBLEM
PMHx HTN, GERD, HFpEF, COPD with multiple past admissions, never been intubated presenting with SOB at rest and nonproductive cough for the past few days. Possible COPD exacerbation. Pt reported hx of esophageal surgery, but was unsure what exactly it was. no reports of hx w/ esophageal dysphagia. pt reported anxiety during evaluation and asked for a xanax. RN made aware.

## 2019-08-02 NOTE — SWALLOW BEDSIDE ASSESSMENT ADULT - SWALLOW EVAL: RECOMMENDED FEEDING/EATING TECHNIQUES
position upright (90 degrees)/alternate food with liquid/small sips/bites/slow rate, no talking w/ food in mouth/maintain upright posture during/after eating for 30 mins/oral hygiene

## 2019-08-02 NOTE — PROGRESS NOTE ADULT - ASSESSMENT
79 year old lady with PMHx HTN, GERD, DMII, COPD with multiple past admissions, never been intubated presenting with SOB at rest and nonproductive cough for the past few days.    SOB due to b/l PE and COPD exacerbation  - found to have provoked bilateral PE and bilateral DVT due to immobility  - TTE did not show any right ventricular strain  - patient actively wheezing on exam  - continue eliquis 10 mg BID for 7 days then 5 mg BID   - continue Solumedrol 60 Q12H, duonebs and o2 as needed  - seen by Vascular, no IVC filter at this time and c/w AC    HTN  -monitor bp  -c/w amlodipine    GERD  -c/w protonix    DMII  -hold oral glycemic meds  -monitor FS, pt on steroids  -if FS>180 start basal bolus insulin whilst in-patient    vitamin b12 deficiency:  - on cyanocobalamin    #Progress Note Handoff  Pending (specify): improvement in respiratory status     Family discussion: Plan of care discussed with patient and family at bedside, aware and agreeable   Disposition:  SNF

## 2019-08-02 NOTE — PROGRESS NOTE ADULT - ASSESSMENT
79 year old lady with PMHx HTN, GERD, DMII, COPD with multiple past admissions, presenting with SOB at rest and nonproductive cough for the past few days.    SOB:  - found to have provoked bilateral PE and acute left popliteal DVT  - TTE did not show any right ventricular strain  - continue eliquis 10 mg BID for 7 days then 5 mg BID   - increase solumedrol to 60 mg iv BID + bronchodilators  - repeat CXR in view of no major improvement  - vascular: therapeutic AC. no indication for IVC filter    HTN  -monitor bp  -c/w amlodipine    GERD  -stable  -c/w protonix    DMII  -hold oral glycemic meds  -monitor FS  -if FS>180 start basal bolus insulin whilst in-patient    vitamin b12 deficiency:  - on cyanocobalamin    Dispo  -from home  -ambulates independently  -anticipated discharge when medically stable    DVT ppx  GI ppx  CHG 4% daily and PRN  OOBTC  dysphagia diet /  follow up with speech and swallow  FULL CODE

## 2019-08-02 NOTE — PROGRESS NOTE ADULT - SUBJECTIVE AND OBJECTIVE BOX
OVERNIGHT EVENTS: events noted, occ cough    Vital Signs Last 24 Hrs  T(C): 36.4 (02 Aug 2019 05:19), Max: 36.7 (01 Aug 2019 20:14)  T(F): 97.5 (02 Aug 2019 05:19), Max: 98 (01 Aug 2019 20:14)  HR: 82 (02 Aug 2019 05:19) (82 - 97)  BP: 152/67 (02 Aug 2019 05:19) (120/74 - 152/67)  RR: 18 (02 Aug 2019 05:19) (18 - 20)  SpO2: 95% 2 l nc    PHYSICAL EXAMINATION:    GENERAL: The patient is awake and alert in no apparent distress.     HEENT: Head is normocephalic and atraumatic. Extraocular muscles are intact. Mucous membranes are moist.    NECK: Supple.    LUNGS: dec bs diffusely    HEART: Regular rate and rhythm without murmur.    ABDOMEN: Soft, nontender, and nondistended.      EXTREMITIES: Without any cyanosis, clubbing, rash, lesions or edema.    NEUROLOGIC: Grossly intact.    SKIN: No ulceration or induration present.      LABS:                        10.6   13.67 )-----------( 272      ( 02 Aug 2019 07:48 )             33.9                 CARDIAC MARKERS ( 01 Aug 2019 06:57 )  x     / <0.01 ng/mL / 26 U/L / x     / 2.5 ng/mL        Serum Pro-Brain Natriuretic Peptide: 1475 pg/mL (07-30-19 @ 20:22)            08-01-19 @ 07:01  -  08-02-19 @ 07:00  --------------------------------------------------------  IN: 0 mL / OUT: 650 mL / NET: -650 mL        MICROBIOLOGY:      MEDICATIONS  (STANDING):  ALBUTerol    90 MICROgram(s) HFA Inhaler 2 Puff(s) Inhalation every 6 hours  ALBUTerol/ipratropium for Nebulization 3 milliLiter(s) Nebulizer every 6 hours  ALPRAZolam 0.5 milliGRAM(s) Oral daily  amLODIPine   Tablet 5 milliGRAM(s) Oral daily  apixaban 10 milliGRAM(s) Oral every 12 hours  buDESOnide 160 MICROgram(s)/formoterol 4.5 MICROgram(s) Inhaler 2 Puff(s) Inhalation two times a day  chlorhexidine 4% Liquid 1 Application(s) Topical <User Schedule>  collagenase Ointment 1 Application(s) Topical two times a day  cyanocobalamin 1000 MICROGram(s) Oral daily  methylPREDNISolone sodium succinate Injectable 60 milliGRAM(s) IV Push daily  nicotine -   7 mG/24Hr(s) Patch 1 patch Transdermal daily  tiotropium 18 MICROgram(s) Capsule 1 Capsule(s) Inhalation daily    MEDICATIONS  (PRN):  acetaminophen   Tablet .. 650 milliGRAM(s) Oral once PRN Mild Pain (1 - 3)  oxyCODONE    5 mG/acetaminophen 325 mG 2 Tablet(s) Oral every 6 hours PRN Moderate Pain (4 - 6)      RADIOLOGY & ADDITIONAL STUDIES:

## 2019-08-02 NOTE — CONSULT NOTE ADULT - SUBJECTIVE AND OBJECTIVE BOX
HPI:  69 year old lady with PMHx HTN, GERD, HFpEF, COPD with multiple past admissions, never been intubated presenting with SOB at rest and nonproductive cough for the past few days. She states that it feels like her usual COPD exacerbation. Denied fever, chills, n/v/d/c, cp,  pleuritic cp, palpitations, diaphoresis, wheezing, hemoptysis, HA, blurry vision, dizziness, lightheadedness, numbness, tingling, neck pain, neck stiffness, abdominal pain, melena, BRBPR, hematuria, urinary incontinace, trauma, calf pain/swelling/erythema, sick contacts, recent travel or rash.    In ED, bp 146/85    HR  110   RR 26     92% on RA    T 99.2F. She refused BiPap and was doing well on 2LNC. She was given solumedrol, nebs and started on levaquin. WBC 17. CXR showed a RLL infiltrate. trop (-) (31 Jul 2019 01:01)      PAST MEDICAL & SURGICAL HISTORY:  Chronic pain  Depression  Anxiety  COPD (chronic obstructive pulmonary disease)  [facility]      Hospital Course:    TODAY'S SUBJECTIVE & REVIEW OF SYMPTOMS:     Constitutional WNL   Cardio WNL   Resp sob   GI WNL  Heme WNL  Endo WNL  Skin WNL  MSK Weakness  Neuro WNL  Cognitive WNL  Psych WNL      MEDICATIONS  (STANDING):  ALBUTerol    90 MICROgram(s) HFA Inhaler 2 Puff(s) Inhalation every 6 hours  ALBUTerol/ipratropium for Nebulization 3 milliLiter(s) Nebulizer every 6 hours  ALPRAZolam 0.5 milliGRAM(s) Oral daily  amLODIPine   Tablet 5 milliGRAM(s) Oral daily  apixaban 10 milliGRAM(s) Oral every 12 hours  buDESOnide 160 MICROgram(s)/formoterol 4.5 MICROgram(s) Inhaler 2 Puff(s) Inhalation two times a day  chlorhexidine 4% Liquid 1 Application(s) Topical <User Schedule>  collagenase Ointment 1 Application(s) Topical two times a day  cyanocobalamin 1000 MICROGram(s) Oral daily  methylPREDNISolone sodium succinate Injectable 60 milliGRAM(s) IV Push two times a day  nicotine -   7 mG/24Hr(s) Patch 1 patch Transdermal daily  tiotropium 18 MICROgram(s) Capsule 1 Capsule(s) Inhalation daily    MEDICATIONS  (PRN):  acetaminophen   Tablet .. 650 milliGRAM(s) Oral once PRN Mild Pain (1 - 3)  oxyCODONE    5 mG/acetaminophen 325 mG 2 Tablet(s) Oral every 6 hours PRN Moderate Pain (4 - 6)      FAMILY HISTORY:  Family history of COPD (chronic obstructive pulmonary disease) (Mother)      Allergies    penicillin (Unknown)    Intolerances        SOCIAL HISTORY:    [  ] Etoh  [  ] Smoking  [  ] Substance abuse     Home Environment:  [  ] Home Alone  [x  ] Lives with Family  [  ] Home Health Aid    Dwelling:  [ x ] Apartment  [  ] Private House  [  ] Adult Home  [  ] Skilled Nursing Facility      [  ] Short Term  [  ] Long Term  [  ] Stairs       Elevator [x  ]    FUNCTIONAL STATUS PTA: (Check all that apply)  Ambulation: [ x  ]Independent    [  ] Dependent     [  ] Non-Ambulatory  Assistive Device: [ x ] SA Cane  [  ]  Q Cane  [  ] Walker  [  ]  Wheelchair  ADL : [ x ] Independent  [  ]  Dependent       Vital Signs Last 24 Hrs  T(C): 36.4 (02 Aug 2019 05:19), Max: 36.7 (01 Aug 2019 20:14)  T(F): 97.5 (02 Aug 2019 05:19), Max: 98 (01 Aug 2019 20:14)  HR: 82 (02 Aug 2019 05:19) (82 - 97)  BP: 152/67 (02 Aug 2019 05:19) (120/74 - 152/67)  BP(mean): --  RR: 18 (02 Aug 2019 05:19) (18 - 20)  SpO2: --      PHYSICAL EXAM: Alert & Oriented X3  GENERAL: NAD, well-groomed, well-developed  HEAD:  Atraumatic, Normocephalic  CHEST/LUNG: decreased bs lung bases  HEART: S1S2+  ABDOMEN: Soft, Nontender  EXTREMITIES:  no calf tenderness    NERVOUS SYSTEM:  Cranial Nerves 2-12 intact [  ] Abnormal  [  ]  ROM: WFL all extremities [  ]  Abnormal [ x ]limited right shoulder  Motor Strength: WFL all extremities  [  ]  Abnormal [ x ]limited right shoulder  Sensation: intact to light touch [  ] Abnormal [  ]  Reflexes: Symmetric [  ]  Abnormal [  ]    FUNCTIONAL STATUS:  Bed Mobility: Independent [  ]  Supervision [  ]  Needs Assistance [ x ]  N/A [  ]  Transfers: Independent [  ]  Supervision [  ]  Needs Assistance [ x ]  N/A [  ]   Ambulation: Independent [  ]  Supervision [  ]  Needs Assistance [  ]  N/A [  ]  ADL: Independent [  ] Requires Assistance [  ] N/A [  ]      LABS:                        10.6   13.67 )-----------( 272      ( 02 Aug 2019 07:48 )             33.9     08-02    135  |  93<L>  |  7<L>  ----------------------------<  145<H>  4.5   |  32  |  0.5<L>    Ca    8.8      02 Aug 2019 07:48            RADIOLOGY & ADDITIONAL STUDIES:    Assesment:

## 2019-08-02 NOTE — PROGRESS NOTE ADULT - SUBJECTIVE AND OBJECTIVE BOX
KUSH RINALDI  69y Female    CHIEF COMPLAINT:    Patient is a 69y old  Female who presents with a chief complaint of non productive cough (02 Aug 2019 12:55)      INTERVAL HPI/OVERNIGHT EVENTS:    Patient seen and examined. No acute events overnight. Still wheezing    ROS: All other systems are negative.    Vital Signs:    T(F): 97 (08-02-19 @ 13:42), Max: 98 (08-01-19 @ 20:14)  HR: 95 (08-02-19 @ 13:42) (82 - 97)  BP: 119/59 (08-02-19 @ 13:42) (119/59 - 152/67)  RR: 19 (08-02-19 @ 13:42) (18 - 20)    01 Aug 2019 07:01  -  02 Aug 2019 07:00  --------------------------------------------------------  IN: 0 mL / OUT: 650 mL / NET: -650 mL    PHYSICAL EXAM:    GENERAL:  NAD  SKIN: No rashes or lesions  HEENT: Atraumatic. Normocephalic.   NECK: Supple, No JVD. No lymphadenopathy.  PULMONARY: B?l wheezing throughout all lung fields. No rales  CVS: Normal S1, S2. Rate and Rhythm are regular. No murmurs.  ABDOMEN/GI: Soft, Nontender, Nondistended; BS present  MSK:  No edema B/L LE. No clubbing or cyanosis  NEUROLOGIC:  No motor or sensory deficit.  PSYCH: Alert & oriented x 3, normal affect    Consultant(s) Notes Reviewed:  [x ] YES  [ ] NO  Care Discussed with Consultants/Other Providers [ x] YES  [ ] NO    LABS:                        10.6   13.67 )-----------( 272      ( 02 Aug 2019 07:48 )             33.9     08-02    135  |  93<L>  |  7<L>  ----------------------------<  145<H>  4.5   |  32  |  0.5<L>    Ca    8.8      02 Aug 2019 07:48      Serum Pro-Brain Natriuretic Peptide: 1475 pg/mL (07-30-19 @ 20:22)    Trop <0.01, CKMB 2.5, CK 26, 08-01-19 @ 06:57  Trop <0.01, CKMB --, CK --, 07-30-19 @ 20:22    Culture - Urine (collected 30 Jul 2019 20:22)  Source: .Urine Clean Catch (Midstream)  Final Report (02 Aug 2019 11:59):    No growth    RADIOLOGY & ADDITIONAL TESTS:  Imaging or report Personally Reviewed:  [x] YES  [ ] NO  EKG reviewed: [x] YES  [ ] NO    Medications:  Standing  ALBUTerol    90 MICROgram(s) HFA Inhaler 2 Puff(s) Inhalation every 6 hours  ALBUTerol/ipratropium for Nebulization 3 milliLiter(s) Nebulizer every 6 hours  ALPRAZolam 0.5 milliGRAM(s) Oral daily  amLODIPine   Tablet 5 milliGRAM(s) Oral daily  apixaban 10 milliGRAM(s) Oral every 12 hours  buDESOnide 160 MICROgram(s)/formoterol 4.5 MICROgram(s) Inhaler 2 Puff(s) Inhalation two times a day  chlorhexidine 4% Liquid 1 Application(s) Topical <User Schedule>  collagenase Ointment 1 Application(s) Topical two times a day  cyanocobalamin 1000 MICROGram(s) Oral daily  methylPREDNISolone sodium succinate Injectable 60 milliGRAM(s) IV Push two times a day  nicotine -   7 mG/24Hr(s) Patch 1 patch Transdermal daily  tiotropium 18 MICROgram(s) Capsule 1 Capsule(s) Inhalation daily    PRN Meds  acetaminophen   Tablet .. 650 milliGRAM(s) Oral once PRN  oxyCODONE    5 mG/acetaminophen 325 mG 2 Tablet(s) Oral every 6 hours PRN      Samantha Gamboa MD  s. [phone]

## 2019-08-02 NOTE — CONSULT NOTE ADULT - ASSESSMENT
70 yo F with above PMH, not on anticoagulation is found to have acute left popliteal and PT vein DVT and multiple segmental PE involving RUL, RLL, BEENA, and LLL with no evidence of right heart strain.    PLAN:  -Therapeutic anticoagulation  -F/U as outpatient with Dr. Duval

## 2019-08-03 LAB
ANION GAP SERPL CALC-SCNC: 9 MMOL/L — SIGNIFICANT CHANGE UP (ref 7–14)
BUN SERPL-MCNC: 7 MG/DL — LOW (ref 10–20)
CALCIUM SERPL-MCNC: 9.1 MG/DL — SIGNIFICANT CHANGE UP (ref 8.5–10.1)
CHLORIDE SERPL-SCNC: 92 MMOL/L — LOW (ref 98–110)
CO2 SERPL-SCNC: 35 MMOL/L — HIGH (ref 17–32)
CREAT SERPL-MCNC: <0.5 MG/DL — LOW (ref 0.7–1.5)
GLUCOSE SERPL-MCNC: 144 MG/DL — HIGH (ref 70–99)
HCT VFR BLD CALC: 35.9 % — LOW (ref 37–47)
HGB BLD-MCNC: 11.1 G/DL — LOW (ref 12–16)
MCHC RBC-ENTMCNC: 30.9 G/DL — LOW (ref 32–37)
MCHC RBC-ENTMCNC: 31.2 PG — HIGH (ref 27–31)
MCV RBC AUTO: 100.8 FL — HIGH (ref 81–99)
NRBC # BLD: 0 /100 WBCS — SIGNIFICANT CHANGE UP (ref 0–0)
PLATELET # BLD AUTO: 299 K/UL — SIGNIFICANT CHANGE UP (ref 130–400)
POTASSIUM SERPL-MCNC: 4.5 MMOL/L — SIGNIFICANT CHANGE UP (ref 3.5–5)
POTASSIUM SERPL-SCNC: 4.5 MMOL/L — SIGNIFICANT CHANGE UP (ref 3.5–5)
RBC # BLD: 3.56 M/UL — LOW (ref 4.2–5.4)
RBC # FLD: 13.7 % — SIGNIFICANT CHANGE UP (ref 11.5–14.5)
SODIUM SERPL-SCNC: 136 MMOL/L — SIGNIFICANT CHANGE UP (ref 135–146)
WBC # BLD: 13.31 K/UL — HIGH (ref 4.8–10.8)
WBC # FLD AUTO: 13.31 K/UL — HIGH (ref 4.8–10.8)

## 2019-08-03 PROCEDURE — 99232 SBSQ HOSP IP/OBS MODERATE 35: CPT

## 2019-08-03 RX ORDER — ALPRAZOLAM 0.25 MG
0.5 TABLET ORAL ONCE
Refills: 0 | Status: DISCONTINUED | OUTPATIENT
Start: 2019-08-03 | End: 2019-08-03

## 2019-08-03 RX ORDER — FUROSEMIDE 40 MG
60 TABLET ORAL DAILY
Refills: 0 | Status: DISCONTINUED | OUTPATIENT
Start: 2019-08-03 | End: 2019-08-07

## 2019-08-03 RX ADMIN — Medication 1 APPLICATION(S): at 06:48

## 2019-08-03 RX ADMIN — OXYCODONE AND ACETAMINOPHEN 2 TABLET(S): 5; 325 TABLET ORAL at 15:45

## 2019-08-03 RX ADMIN — OXYCODONE AND ACETAMINOPHEN 2 TABLET(S): 5; 325 TABLET ORAL at 15:20

## 2019-08-03 RX ADMIN — BUDESONIDE AND FORMOTEROL FUMARATE DIHYDRATE 2 PUFF(S): 160; 4.5 AEROSOL RESPIRATORY (INHALATION) at 08:45

## 2019-08-03 RX ADMIN — APIXABAN 10 MILLIGRAM(S): 2.5 TABLET, FILM COATED ORAL at 06:47

## 2019-08-03 RX ADMIN — OXYCODONE AND ACETAMINOPHEN 2 TABLET(S): 5; 325 TABLET ORAL at 21:16

## 2019-08-03 RX ADMIN — Medication 0.5 MILLIGRAM(S): at 11:36

## 2019-08-03 RX ADMIN — Medication 3 MILLILITER(S): at 07:26

## 2019-08-03 RX ADMIN — CHLORHEXIDINE GLUCONATE 1 APPLICATION(S): 213 SOLUTION TOPICAL at 06:46

## 2019-08-03 RX ADMIN — AMLODIPINE BESYLATE 5 MILLIGRAM(S): 2.5 TABLET ORAL at 06:48

## 2019-08-03 RX ADMIN — Medication 1 APPLICATION(S): at 17:11

## 2019-08-03 RX ADMIN — Medication 3 MILLILITER(S): at 20:30

## 2019-08-03 RX ADMIN — OXYCODONE AND ACETAMINOPHEN 2 TABLET(S): 5; 325 TABLET ORAL at 07:23

## 2019-08-03 RX ADMIN — PREGABALIN 1000 MICROGRAM(S): 225 CAPSULE ORAL at 11:34

## 2019-08-03 RX ADMIN — APIXABAN 10 MILLIGRAM(S): 2.5 TABLET, FILM COATED ORAL at 17:15

## 2019-08-03 RX ADMIN — Medication 1 PATCH: at 11:36

## 2019-08-03 RX ADMIN — Medication 3 MILLILITER(S): at 15:20

## 2019-08-03 RX ADMIN — Medication 1 PATCH: at 13:05

## 2019-08-03 RX ADMIN — Medication 0.5 MILLIGRAM(S): at 22:30

## 2019-08-03 RX ADMIN — Medication 1 PATCH: at 07:23

## 2019-08-03 RX ADMIN — Medication 60 MILLIGRAM(S): at 06:48

## 2019-08-03 RX ADMIN — OXYCODONE AND ACETAMINOPHEN 2 TABLET(S): 5; 325 TABLET ORAL at 06:52

## 2019-08-03 RX ADMIN — Medication 60 MILLIGRAM(S): at 17:15

## 2019-08-03 RX ADMIN — Medication 1 PATCH: at 17:18

## 2019-08-03 NOTE — PROGRESS NOTE ADULT - SUBJECTIVE AND OBJECTIVE BOX
KUSH RINALDI  69y Female    CHIEF COMPLAINT:    Patient is a 69y old  Female who presents with a chief complaint of non productive cough (03 Aug 2019 10:38)      INTERVAL HPI/OVERNIGHT EVENTS:    Patient seen and examined. No acute events overnight. Respiratory status improving today    ROS: All other systems are negative.    Vital Signs:    T(F): 97.3 (08-03-19 @ 05:00), Max: 97.5 (08-02-19 @ 20:00)  HR: 81 (08-03-19 @ 05:00) (81 - 92)  BP: 153/73 (08-03-19 @ 05:00) (141/66 - 153/73)  RR: 18 (08-03-19 @ 05:00) (18 - 18)  SpO2: 96% (08-02-19 @ 23:59) (96% - 96%)    PHYSICAL EXAM:    GENERAL:  NAD  SKIN: No rashes or lesions  HEENT: Atraumatic. Normocephalic. PERRL. Moist membranes.  NECK: Supple, No JVD. No lymphadenopathy.  PULMONARY: b/l expiratory wheezing. No rales  CVS: Normal S1, S2. Rate and Rhythm are regular. No murmurs.  ABDOMEN/GI: Soft, Nontender, Nondistended; BS present  MSK: Trace LE edema, no clubbing or cyanosis  NEUROLOGIC:  No motor or sensory deficit.  PSYCH: Alert & oriented x 3, normal affect    Consultant(s) Notes Reviewed:  [x ] YES  [ ] NO  Care Discussed with Consultants/Other Providers [ x] YES  [ ] NO    LABS:                        11.1   13.31 )-----------( 299      ( 03 Aug 2019 04:30 )             35.9     08-03    136  |  92<L>  |  7<L>  ----------------------------<  144<H>  4.5   |  35<H>  |  <0.5<L>    Ca    9.1      03 Aug 2019 04:30  Serum Pro-Brain Natriuretic Peptide: 1475 pg/mL (07-30-19 @ 20:22)    Trop <0.01, CKMB 2.5, CK 26, 08-01-19 @ 06:57    RADIOLOGY & ADDITIONAL TESTS:      Medications:  Standing  ALBUTerol    90 MICROgram(s) HFA Inhaler 2 Puff(s) Inhalation every 6 hours  ALBUTerol/ipratropium for Nebulization 3 milliLiter(s) Nebulizer every 6 hours  ALPRAZolam 0.5 milliGRAM(s) Oral daily  amLODIPine   Tablet 5 milliGRAM(s) Oral daily  apixaban 10 milliGRAM(s) Oral every 12 hours  buDESOnide 160 MICROgram(s)/formoterol 4.5 MICROgram(s) Inhaler 2 Puff(s) Inhalation two times a day  chlorhexidine 4% Liquid 1 Application(s) Topical <User Schedule>  collagenase Ointment 1 Application(s) Topical two times a day  cyanocobalamin 1000 MICROGram(s) Oral daily  methylPREDNISolone sodium succinate Injectable 60 milliGRAM(s) IV Push two times a day  nicotine -   7 mG/24Hr(s) Patch 1 patch Transdermal daily  tiotropium 18 MICROgram(s) Capsule 1 Capsule(s) Inhalation daily    PRN Meds  acetaminophen   Tablet .. 650 milliGRAM(s) Oral once PRN  oxyCODONE    5 mG/acetaminophen 325 mG 2 Tablet(s) Oral every 6 hours PRN      Samantha Gamboa MD  s. [phone]

## 2019-08-03 NOTE — PROGRESS NOTE ADULT - ASSESSMENT
79 year old lady with PMHx HTN, GERD, DMII, COPD with multiple past admissions, never been intubated presenting with SOB at rest and nonproductive cough for the past few days.    SOB due to b/l PE and COPD exacerbation  - found to have provoked bilateral PE and bilateral DVT due to immobility  - TTE did not show any right ventricular strain  - patient with improving exam, still with expiratory wheezing  - continue eliquis 10 mg BID for 7 days (started on 7/31) then 5 mg BID   - continue Solumedrol 60 Q12H, duonebs and o2 as needed  - seen by Vascular, no IVC filter at this time and c/w AC    HTN  -monitor bp  -c/w amlodipine    GERD  -c/w protonix    DMII  -hold oral glycemic meds  -monitor FS, pt on steroids  -if FS>180 start basal bolus insulin whilst in-patient    vitamin b12 deficiency:  - on cyanocobalamin    #Progress Note Handoff  Pending (specify): improvement in respiratory status     Family discussion: Plan of care discussed with patient and family at bedside, aware and agreeable   Disposition:  SNF when improves clinically

## 2019-08-03 NOTE — PROGRESS NOTE ADULT - SUBJECTIVE AND OBJECTIVE BOX
Patient is a 69y old  Female who presents with a chief complaint of non productive cough (02 Aug 2019 13:57)      OVERNIGHT EVENTS:    SUBJECTIVE / INTERVAL HPI: Patient seen and examined at bedside.     VITAL SIGNS:  Vital Signs Last 24 Hrs  T(C): 36.3 (03 Aug 2019 05:00), Max: 36.4 (02 Aug 2019 20:00)  T(F): 97.3 (03 Aug 2019 05:00), Max: 97.5 (02 Aug 2019 20:00)  HR: 81 (03 Aug 2019 05:00) (81 - 95)  BP: 153/73 (03 Aug 2019 05:00) (119/59 - 153/73)  BP(mean): --  RR: 18 (03 Aug 2019 05:00) (18 - 19)  SpO2: 96% (02 Aug 2019 23:59) (96% - 96%)    PHYSICAL EXAM:    General: WDWN  HEENT: NC/AT; PERRL, clear conjunctiva  Neck: supple  Cardiovascular: +S1/S2; RRR  Respiratory: CTA b/l; no W/R/R  Gastrointestinal: soft, NT/ND; +BSx4  Extremities: WWP; 2+ peripheral pulses; no edema   Neurological: AAOx3; no focal deficits    MEDICATIONS:  MEDICATIONS  (STANDING):  ALBUTerol    90 MICROgram(s) HFA Inhaler 2 Puff(s) Inhalation every 6 hours  ALBUTerol/ipratropium for Nebulization 3 milliLiter(s) Nebulizer every 6 hours  ALPRAZolam 0.5 milliGRAM(s) Oral daily  amLODIPine   Tablet 5 milliGRAM(s) Oral daily  apixaban 10 milliGRAM(s) Oral every 12 hours  buDESOnide 160 MICROgram(s)/formoterol 4.5 MICROgram(s) Inhaler 2 Puff(s) Inhalation two times a day  chlorhexidine 4% Liquid 1 Application(s) Topical <User Schedule>  collagenase Ointment 1 Application(s) Topical two times a day  cyanocobalamin 1000 MICROGram(s) Oral daily  methylPREDNISolone sodium succinate Injectable 60 milliGRAM(s) IV Push two times a day  nicotine -   7 mG/24Hr(s) Patch 1 patch Transdermal daily  tiotropium 18 MICROgram(s) Capsule 1 Capsule(s) Inhalation daily    MEDICATIONS  (PRN):  acetaminophen   Tablet .. 650 milliGRAM(s) Oral once PRN Mild Pain (1 - 3)  oxyCODONE    5 mG/acetaminophen 325 mG 2 Tablet(s) Oral every 6 hours PRN Moderate Pain (4 - 6)      ALLERGIES:  Allergies    penicillin (Unknown)    Intolerances        LABS:                        11.1   13.31 )-----------( 299      ( 03 Aug 2019 04:30 )             35.9     08-03    136  |  92<L>  |  7<L>  ----------------------------<  144<H>  4.5   |  35<H>  |  <0.5<L>    Ca    9.1      03 Aug 2019 04:30 Patient is a 69y old  Female who presents with a chief complaint of non productive cough + SOB (02 Aug 2019 13:57)      OVERNIGHT EVENTS: no major events overnight    SUBJECTIVE / INTERVAL HPI: Patient seen and examined at bedside.   she is still windy, c/o SOB, and intermittent cough  no fever or chills    VITAL SIGNS:  Vital Signs Last 24 Hrs  T(C): 36.3 (03 Aug 2019 05:00), Max: 36.4 (02 Aug 2019 20:00)  T(F): 97.3 (03 Aug 2019 05:00), Max: 97.5 (02 Aug 2019 20:00)  HR: 81 (03 Aug 2019 05:00) (81 - 95)  BP: 153/73 (03 Aug 2019 05:00) (119/59 - 153/73)  BP(mean): --  RR: 18 (03 Aug 2019 05:00) (18 - 19)  SpO2: 96% (02 Aug 2019 23:59) (96% - 96%)    PHYSICAL EXAM:    General: WDWN  HEENT: NC/AT; PERRL, clear conjunctiva  Neck: supple  Cardiovascular: +S1/S2; RRR  Respiratory: bilateral expiratory wheezes and ronchi all over  Gastrointestinal: soft, NT/ND; +BSx4  Extremities: WWP; 2+ peripheral pulses; no edema   Neurological: AAOx3; no focal deficits    MEDICATIONS:  MEDICATIONS  (STANDING):  ALBUTerol    90 MICROgram(s) HFA Inhaler 2 Puff(s) Inhalation every 6 hours  ALBUTerol/ipratropium for Nebulization 3 milliLiter(s) Nebulizer every 6 hours  ALPRAZolam 0.5 milliGRAM(s) Oral daily  amLODIPine   Tablet 5 milliGRAM(s) Oral daily  apixaban 10 milliGRAM(s) Oral every 12 hours  buDESOnide 160 MICROgram(s)/formoterol 4.5 MICROgram(s) Inhaler 2 Puff(s) Inhalation two times a day  chlorhexidine 4% Liquid 1 Application(s) Topical <User Schedule>  collagenase Ointment 1 Application(s) Topical two times a day  cyanocobalamin 1000 MICROGram(s) Oral daily  methylPREDNISolone sodium succinate Injectable 60 milliGRAM(s) IV Push two times a day  nicotine -   7 mG/24Hr(s) Patch 1 patch Transdermal daily  tiotropium 18 MICROgram(s) Capsule 1 Capsule(s) Inhalation daily    MEDICATIONS  (PRN):  acetaminophen   Tablet .. 650 milliGRAM(s) Oral once PRN Mild Pain (1 - 3)  oxyCODONE    5 mG/acetaminophen 325 mG 2 Tablet(s) Oral every 6 hours PRN Moderate Pain (4 - 6)      ALLERGIES:  Allergies    penicillin (Unknown)    Intolerances        LABS:                        11.1   13.31 )-----------( 299      ( 03 Aug 2019 04:30 )             35.9     08-03    136  |  92<L>  |  7<L>  ----------------------------<  144<H>  4.5   |  35<H>  |  <0.5<L>    Ca    9.1      03 Aug 2019 04:30

## 2019-08-03 NOTE — PROGRESS NOTE ADULT - ASSESSMENT
79 year old lady with PMHx HTN, GERD, DMII, COPD with multiple past admissions, presenting with SOB at rest and nonproductive cough for the past few days.  was found to have bilateral PE + left acute popliteal DVT    SOB:  - found to have provoked bilateral PE and acute left popliteal DVT  - TTE did not show any right ventricular strain  - continue eliquis 10 mg BID for 7 days then 5 mg BID   - continue solumedrol to 60 mg iv BID + bronchodilators  - vascular: therapeutic AC. no indication for IVC filter    HTN  -monitor bp  -c/w amlodipine    GERD  -stable  -c/w protonix    DMII  -hold oral glycemic meds  -monitor FS  -if FS>180 start basal bolus insulin whilst in-patient    vitamin b12 deficiency:  - on cyanocobalamin    Dispo  -from home  -ambulates independently  -anticipated discharge when medically stable    DVT ppx: eliquis  GI ppx  CHG 4% daily and PRN  OOBTC  dysphagia diet /  follow up with speech and swallow  FULL CODE

## 2019-08-04 PROCEDURE — 99232 SBSQ HOSP IP/OBS MODERATE 35: CPT

## 2019-08-04 RX ORDER — ALPRAZOLAM 0.25 MG
0.5 TABLET ORAL ONCE
Refills: 0 | Status: DISCONTINUED | OUTPATIENT
Start: 2019-08-04 | End: 2019-08-04

## 2019-08-04 RX ADMIN — ALBUTEROL 2 PUFF(S): 90 AEROSOL, METERED ORAL at 23:24

## 2019-08-04 RX ADMIN — Medication 3 MILLILITER(S): at 07:44

## 2019-08-04 RX ADMIN — APIXABAN 10 MILLIGRAM(S): 2.5 TABLET, FILM COATED ORAL at 17:19

## 2019-08-04 RX ADMIN — Medication 3 MILLILITER(S): at 14:37

## 2019-08-04 RX ADMIN — Medication 0.5 MILLIGRAM(S): at 23:24

## 2019-08-04 RX ADMIN — Medication 60 MILLIGRAM(S): at 05:41

## 2019-08-04 RX ADMIN — CHLORHEXIDINE GLUCONATE 1 APPLICATION(S): 213 SOLUTION TOPICAL at 05:45

## 2019-08-04 RX ADMIN — PREGABALIN 1000 MICROGRAM(S): 225 CAPSULE ORAL at 11:24

## 2019-08-04 RX ADMIN — OXYCODONE AND ACETAMINOPHEN 2 TABLET(S): 5; 325 TABLET ORAL at 21:09

## 2019-08-04 RX ADMIN — Medication 1 APPLICATION(S): at 17:19

## 2019-08-04 RX ADMIN — APIXABAN 10 MILLIGRAM(S): 2.5 TABLET, FILM COATED ORAL at 05:41

## 2019-08-04 RX ADMIN — OXYCODONE AND ACETAMINOPHEN 2 TABLET(S): 5; 325 TABLET ORAL at 05:41

## 2019-08-04 RX ADMIN — Medication 3 MILLILITER(S): at 21:23

## 2019-08-04 RX ADMIN — BUDESONIDE AND FORMOTEROL FUMARATE DIHYDRATE 2 PUFF(S): 160; 4.5 AEROSOL RESPIRATORY (INHALATION) at 08:18

## 2019-08-04 RX ADMIN — Medication 1 PATCH: at 17:24

## 2019-08-04 RX ADMIN — OXYCODONE AND ACETAMINOPHEN 2 TABLET(S): 5; 325 TABLET ORAL at 14:05

## 2019-08-04 RX ADMIN — Medication 1 PATCH: at 11:15

## 2019-08-04 RX ADMIN — Medication 1 PATCH: at 11:16

## 2019-08-04 RX ADMIN — AMLODIPINE BESYLATE 5 MILLIGRAM(S): 2.5 TABLET ORAL at 05:40

## 2019-08-04 RX ADMIN — Medication 0.5 MILLIGRAM(S): at 11:24

## 2019-08-04 RX ADMIN — Medication 60 MILLIGRAM(S): at 17:20

## 2019-08-04 RX ADMIN — Medication 1 PATCH: at 08:19

## 2019-08-04 RX ADMIN — Medication 60 MILLIGRAM(S): at 05:40

## 2019-08-04 RX ADMIN — OXYCODONE AND ACETAMINOPHEN 2 TABLET(S): 5; 325 TABLET ORAL at 15:15

## 2019-08-04 NOTE — PROGRESS NOTE ADULT - SUBJECTIVE AND OBJECTIVE BOX
KUSH RINALDI  69y Female    CHIEF COMPLAINT:    Patient is a 69y old  Female who presents with a chief complaint of non productive cough (03 Aug 2019 13:53)      INTERVAL HPI/OVERNIGHT EVENTS:    Patient seen and examined. No acute events overnight. now with rhonchi but wheezing improved    ROS: All other systems are negative.    Vital Signs:    T(F): 97.8 (08-04-19 @ 13:06), Max: 97.9 (08-03-19 @ 14:18)  HR: 101 (08-04-19 @ 13:06) (93 - 101)  BP: 144/66 (08-04-19 @ 13:06) (132/62 - 169/83)  RR: 20 (08-04-19 @ 13:06) (18 - 20)  SpO2: 99% (08-03-19 @ 20:07) (99% - 99%)  I&O's Summary    03 Aug 2019 07:01  -  04 Aug 2019 07:00  --------------------------------------------------------  IN: 0 mL / OUT: 3 mL / NET: -3 mL    PHYSICAL EXAM:    GENERAL:  NAD  SKIN: No rashes or lesions  HEENT: Atraumatic. Normocephalic.  NECK: Supple, No JVD. No lymphadenopathy.  PULMONARY: Improving expiratory wheezing, + rhonchi No rales  CVS: Normal S1, S2. Rate and Rhythm are regular. No murmurs.  ABDOMEN/GI: Soft, Nontender, Nondistended; BS present  MSK:  trace LLE edema, no clubbing or cyanosis  NEUROLOGIC:  No motor or sensory deficit.  PSYCH: Alert & oriented x 3, normal affect    Consultant(s) Notes Reviewed:  [x ] YES  [ ] NO  Care Discussed with Consultants/Other Providers [ x] YES  [ ] NO    LABS:                        11.1   13.31 )-----------( 299      ( 03 Aug 2019 04:30 )             35.9     08-03    136  |  92<L>  |  7<L>  ----------------------------<  144<H>  4.5   |  35<H>  |  <0.5<L>    Ca    9.1      03 Aug 2019 04:30    Serum Pro-Brain Natriuretic Peptide: 1475 pg/mL (07-30-19 @ 20:22)    RADIOLOGY & ADDITIONAL TESTS:  Imaging or report Personally Reviewed:  [x] YES  [ ] NO  EKG reviewed: [x] YES  [ ] NO    Medications:  Standing  ALBUTerol    90 MICROgram(s) HFA Inhaler 2 Puff(s) Inhalation every 6 hours  ALBUTerol/ipratropium for Nebulization 3 milliLiter(s) Nebulizer every 6 hours  ALPRAZolam 0.5 milliGRAM(s) Oral daily  amLODIPine   Tablet 5 milliGRAM(s) Oral daily  apixaban 10 milliGRAM(s) Oral every 12 hours  buDESOnide 160 MICROgram(s)/formoterol 4.5 MICROgram(s) Inhaler 2 Puff(s) Inhalation two times a day  chlorhexidine 4% Liquid 1 Application(s) Topical <User Schedule>  collagenase Ointment 1 Application(s) Topical two times a day  cyanocobalamin 1000 MICROGram(s) Oral daily  furosemide    Tablet 60 milliGRAM(s) Oral daily  methylPREDNISolone sodium succinate Injectable 60 milliGRAM(s) IV Push two times a day  nicotine -   7 mG/24Hr(s) Patch 1 patch Transdermal daily  tiotropium 18 MICROgram(s) Capsule 1 Capsule(s) Inhalation daily    PRN Meds  acetaminophen   Tablet .. 650 milliGRAM(s) Oral once PRN  oxyCODONE    5 mG/acetaminophen 325 mG 2 Tablet(s) Oral every 6 hours PRN      Samantha Gamboa MD  s. [phone]

## 2019-08-04 NOTE — PROGRESS NOTE ADULT - ASSESSMENT
79 year old lady with PMHx HTN, GERD, DMII, COPD with multiple past admissions, never been intubated presenting with SOB at rest and nonproductive cough for the past few days.    SOB due to b/l PE and COPD exacerbation  - found to have provoked bilateral PE and bilateral DVT due to immobility  - TTE did not show any right ventricular strain  - patient with improving expiratory wheezing and able to speak in longer sentences  - continue eliquis 10 mg BID for 7 days (started on 7/31) then 5 mg BID   - continue Solumedrol 60 Q12H, duonebs and o2 as needed, check AM xray   - seen by Vascular, no IVC filter at this time and c/w AC    HTN  -monitor bp  -c/w amlodipine    GERD  -c/w protonix    DMII  -hold oral glycemic meds  -monitor FS, pt on steroids  -if FS>180 start basal bolus insulin whilst in-patient    vitamin b12 deficiency:  - on cyanocobalamin    #Progress Note Handoff  Pending (specify): improvement in respiratory status     Family discussion: Plan of care discussed with patient and family at bedside, aware and agreeable   Disposition:  SNF when improves clinically

## 2019-08-05 LAB
ALBUMIN SERPL ELPH-MCNC: 2.8 G/DL — LOW (ref 3.5–5.2)
ALP SERPL-CCNC: 69 U/L — SIGNIFICANT CHANGE UP (ref 30–115)
ALT FLD-CCNC: 13 U/L — SIGNIFICANT CHANGE UP (ref 0–41)
ANION GAP SERPL CALC-SCNC: 9 MMOL/L — SIGNIFICANT CHANGE UP (ref 7–14)
AST SERPL-CCNC: 10 U/L — SIGNIFICANT CHANGE UP (ref 0–41)
BILIRUB SERPL-MCNC: 0.2 MG/DL — SIGNIFICANT CHANGE UP (ref 0.2–1.2)
BUN SERPL-MCNC: 9 MG/DL — LOW (ref 10–20)
CALCIUM SERPL-MCNC: 8.7 MG/DL — SIGNIFICANT CHANGE UP (ref 8.5–10.1)
CHLORIDE SERPL-SCNC: 91 MMOL/L — LOW (ref 98–110)
CO2 SERPL-SCNC: 33 MMOL/L — HIGH (ref 17–32)
CREAT SERPL-MCNC: <0.5 MG/DL — LOW (ref 0.7–1.5)
GLUCOSE SERPL-MCNC: 172 MG/DL — HIGH (ref 70–99)
HCT VFR BLD CALC: 38.3 % — SIGNIFICANT CHANGE UP (ref 37–47)
HGB BLD-MCNC: 11.7 G/DL — LOW (ref 12–16)
MCHC RBC-ENTMCNC: 30.5 G/DL — LOW (ref 32–37)
MCHC RBC-ENTMCNC: 30.7 PG — SIGNIFICANT CHANGE UP (ref 27–31)
MCV RBC AUTO: 100.5 FL — HIGH (ref 81–99)
NRBC # BLD: 0 /100 WBCS — SIGNIFICANT CHANGE UP (ref 0–0)
OSMOLALITY SERPL: 280 MOSMOL/KG — SIGNIFICANT CHANGE UP (ref 280–301)
PLATELET # BLD AUTO: 335 K/UL — SIGNIFICANT CHANGE UP (ref 130–400)
POTASSIUM SERPL-MCNC: 4.9 MMOL/L — SIGNIFICANT CHANGE UP (ref 3.5–5)
POTASSIUM SERPL-SCNC: 4.9 MMOL/L — SIGNIFICANT CHANGE UP (ref 3.5–5)
PROT SERPL-MCNC: 5.4 G/DL — LOW (ref 6–8)
RBC # BLD: 3.81 M/UL — LOW (ref 4.2–5.4)
RBC # FLD: 13.5 % — SIGNIFICANT CHANGE UP (ref 11.5–14.5)
SODIUM SERPL-SCNC: 133 MMOL/L — LOW (ref 135–146)
WBC # BLD: 18.55 K/UL — HIGH (ref 4.8–10.8)
WBC # FLD AUTO: 18.55 K/UL — HIGH (ref 4.8–10.8)

## 2019-08-05 PROCEDURE — 99232 SBSQ HOSP IP/OBS MODERATE 35: CPT

## 2019-08-05 RX ADMIN — OXYCODONE AND ACETAMINOPHEN 2 TABLET(S): 5; 325 TABLET ORAL at 05:36

## 2019-08-05 RX ADMIN — OXYCODONE AND ACETAMINOPHEN 2 TABLET(S): 5; 325 TABLET ORAL at 18:53

## 2019-08-05 RX ADMIN — Medication 1 APPLICATION(S): at 05:23

## 2019-08-05 RX ADMIN — BUDESONIDE AND FORMOTEROL FUMARATE DIHYDRATE 2 PUFF(S): 160; 4.5 AEROSOL RESPIRATORY (INHALATION) at 08:00

## 2019-08-05 RX ADMIN — Medication 1 PATCH: at 12:27

## 2019-08-05 RX ADMIN — PREGABALIN 1000 MICROGRAM(S): 225 CAPSULE ORAL at 12:27

## 2019-08-05 RX ADMIN — OXYCODONE AND ACETAMINOPHEN 2 TABLET(S): 5; 325 TABLET ORAL at 12:26

## 2019-08-05 RX ADMIN — OXYCODONE AND ACETAMINOPHEN 2 TABLET(S): 5; 325 TABLET ORAL at 19:00

## 2019-08-05 RX ADMIN — Medication 1 PATCH: at 19:10

## 2019-08-05 RX ADMIN — APIXABAN 10 MILLIGRAM(S): 2.5 TABLET, FILM COATED ORAL at 17:39

## 2019-08-05 RX ADMIN — OXYCODONE AND ACETAMINOPHEN 2 TABLET(S): 5; 325 TABLET ORAL at 13:33

## 2019-08-05 RX ADMIN — Medication 60 MILLIGRAM(S): at 05:21

## 2019-08-05 RX ADMIN — Medication 1 PATCH: at 12:31

## 2019-08-05 RX ADMIN — Medication 0.5 MILLIGRAM(S): at 12:26

## 2019-08-05 RX ADMIN — APIXABAN 10 MILLIGRAM(S): 2.5 TABLET, FILM COATED ORAL at 05:21

## 2019-08-05 RX ADMIN — AMLODIPINE BESYLATE 5 MILLIGRAM(S): 2.5 TABLET ORAL at 05:21

## 2019-08-05 RX ADMIN — CHLORHEXIDINE GLUCONATE 1 APPLICATION(S): 213 SOLUTION TOPICAL at 05:23

## 2019-08-05 RX ADMIN — Medication 3 MILLILITER(S): at 20:08

## 2019-08-05 NOTE — PROGRESS NOTE ADULT - SUBJECTIVE AND OBJECTIVE BOX
KUSH RINALDI  69y Female    CHIEF COMPLAINT:    Patient is a 69y old  Female who presents with a chief complaint of non productive cough (05 Aug 2019 10:47)      INTERVAL HPI/OVERNIGHT EVENTS:    Patient seen and examined. No acute events overnight. Respiratory status improving    ROS: All other systems are negative.    Vital Signs:    T(F): 97.1 (08-05-19 @ 05:31), Max: 97.8 (08-04-19 @ 13:06)  HR: 85 (08-05-19 @ 05:31) (85 - 101)  BP: 143/68 (08-05-19 @ 05:31) (141/65 - 144/66)  RR: 17 (08-05-19 @ 05:31) (17 - 20)  SpO2: 92% (08-05-19 @ 09:00) (92% - 99%)    PHYSICAL EXAM:    GENERAL:  NAD  SKIN: No rashes or lesions  HEENT: Atraumatic. Normocephalic.   NECK: Supple, No JVD.  PULMONARY: Improving expiratory wheezing. No rales  CVS: Normal S1, S2. Rate and Rhythm are regular. No murmurs.  ABDOMEN/GI: Soft, Nontender, Nondistended; BS present  MSK:  Trace LE edema  NEUROLOGIC:  No motor or sensory deficit.  PSYCH: Alert & oriented x 3, normal affect    Consultant(s) Notes Reviewed:  [x ] YES  [ ] NO  Care Discussed with Consultants/Other Providers [ x] YES  [ ] NO    LABS:                        11.7   18.55 )-----------( 335      ( 05 Aug 2019 06:26 )             38.3     08-05    133<L>  |  91<L>  |  9<L>  ----------------------------<  172<H>  4.9   |  33<H>  |  <0.5<L>    Ca    8.7      05 Aug 2019 06:26    TPro  5.4<L>  /  Alb  2.8<L>  /  TBili  0.2  /  DBili  x   /  AST  10  /  ALT  13  /  AlkPhos  69  08-05    Serum Pro-Brain Natriuretic Peptide: 1475 pg/mL (07-30-19 @ 20:22)    RADIOLOGY & ADDITIONAL TESTS:    Imaging or report Personally Reviewed:  [x] YES  [ ] NO  EKG reviewed: [x] YES  [ ] NO    Medications:  Standing  ALBUTerol    90 MICROgram(s) HFA Inhaler 2 Puff(s) Inhalation every 6 hours  ALBUTerol/ipratropium for Nebulization 3 milliLiter(s) Nebulizer every 6 hours  ALPRAZolam 0.5 milliGRAM(s) Oral daily  amLODIPine   Tablet 5 milliGRAM(s) Oral daily  apixaban 10 milliGRAM(s) Oral every 12 hours  buDESOnide 160 MICROgram(s)/formoterol 4.5 MICROgram(s) Inhaler 2 Puff(s) Inhalation two times a day  chlorhexidine 4% Liquid 1 Application(s) Topical <User Schedule>  collagenase Ointment 1 Application(s) Topical two times a day  cyanocobalamin 1000 MICROGram(s) Oral daily  furosemide    Tablet 60 milliGRAM(s) Oral daily  methylPREDNISolone sodium succinate Injectable 60 milliGRAM(s) IV Push two times a day  nicotine -   7 mG/24Hr(s) Patch 1 patch Transdermal daily  tiotropium 18 MICROgram(s) Capsule 1 Capsule(s) Inhalation daily    PRN Meds  acetaminophen   Tablet .. 650 milliGRAM(s) Oral once PRN  oxyCODONE    5 mG/acetaminophen 325 mG 2 Tablet(s) Oral every 6 hours PRN      Samantha Gamboa MD  s. [phone]

## 2019-08-05 NOTE — PROGRESS NOTE ADULT - ASSESSMENT
69 year old lady with PMHx HTN, GERD, DMII, and COPD presenting with SOB at rest and nonproductive cough, admitted for Rs distress dueto COPD exacerbation and PE management.     #RS distress due to b/l PE and COPD exacerbation >> improving   -bilateral PE and bilateral DVT due to immobility, stable PE, TTE did not show any right ventricular strain  - patient with improving expiratory wheezing and able to speak in longer sentences  - continue eliquis 10 mg BID for 7 days (started on 7/31) then 5 mg BID  -- seen by Vascular, no IVC filter at this time and c/w AC  - COPD exacerbation:  -continue Solumedrol 60 Q12H, duonebs and o2 as needed, CXR PRN  -pulmonary to reevaluate     #ID: had short course of levaquin  -no fever, leukocytosis likely from steroid  #HTN  -monitor bp  -c/w amlodipine    #GERD  -c/w protonix    #DMII  -hold oral glycemic meds  -monitor FS, pt on steroids  -if FS>180 start basal bolus insulin whilst in-patient    vitamin b12 deficiency:  - on cyanocobalamin     Disposition:  SNF when improves clinically 69 year old lady with PMHx HTN, GERD, DMII, and COPD presenting with SOB at rest and nonproductive cough, admitted for Rs distress dueto COPD exacerbation and PE management.     #RS distress due to b/l PE and COPD exacerbation >> improving   1-PE/DVT   -bilateral PE and bilateral DVT due to immobility, stable PE, TTE did not show any right ventricular strain    - continue eliquis 10 mg BID for 7 days (started on 7/31) then 5 mg BID  -- seen by Vascular, no IVC filter at this time and c/w AC  - COPD exacerbation:  -patient with improving expiratory wheezing and able to speak in longer sentences, but still wheezing with mild RS distress  -continue Solumedrol 60 Q12H, duonebs and o2 as needed, CXR PRN  -pulmonary to reevaluate     #ID: had short course of Levaquin  -no fever, and leukocytosis is likely from steroid    #HTN  -monitor BP  -c/w amlodipine    #GERD  -c/w protonix    #DMII  -hold oral glycemic meds  -monitor FS, pt on steroids  -if FS>180 start basal bolus insulin whilst in-patient    vitamin b12 deficiency:  - on cyanocobalamin     Disposition:  SNF when improves clinically 69 year old lady with PMHx HTN, GERD, DMII, and COPD presenting with SOB at rest and nonproductive cough, admitted for Rs distress dueto COPD exacerbation and PE management.     #RS distress due to b/l PE and COPD exacerbation >> improving   1-PE/DVT   -bilateral PE and bilateral DVT due to immobility, stable PE, TTE did not show any right ventricular strain    -continue eliquis 10 mg BID for 7 days (started on 7/31) then 5 mg BID  -seen by Vascular, no IVC filter at this time and c/w AC    2- COPD exacerbation:  -patient with improving expiratory wheezing and able to speak in longer sentences, but still wheezing with mild RS distress  -continue Solumedrol 60 Q12H, duonebs and o2 as needed, CXR PRN  -pulmonary to reevaluate     #ID: had short course of Levaquin  -no fever, and leukocytosis is likely from steroid    #HTN  -monitor BP  -c/w amlodipine    #GERD  -c/w protonix    #DMII  -hold oral glycemic meds  -monitor FS, pt on steroids  -if FS>180 start basal bolus insulin whilst in-patient    vitamin b12 deficiency:  - on cyanocobalamin     Disposition:  SNF when improves clinically

## 2019-08-05 NOTE — PROGRESS NOTE ADULT - SUBJECTIVE AND OBJECTIVE BOX
KUSH RINALDI  69y  Female    69 year old lady with PMHx HTN, GERD, DMII, and COPD presenting with SOB at rest and nonproductive cough, admitted for Rs distress dueto COPD exacerbation and PE management.   Hospital day 6    INTERVAL HPI/OVERNIGHT EVENTS:  pt cont to be on mild RS distress, still have cough, wheezing and SOB, no desaturation and she is on on 2L/NC, no fever.     REVIEW OF SYSTEMS:  CONSTITUTIONAL: No fever, weight loss, or fatigue  EYES: No eye pain, visual disturbances, or discharge  ENMT:  No difficulty hearing, tinnitus, vertigo; No sinus or throat pain  NECK: No pain or stiffness  BREASTS: No pain, masses, or nipple discharge  RESPIRATORY: as HPI  CARDIOVASCULAR: No chest pain, palpitations, dizziness, or leg swelling  GASTROINTESTINAL: No abdominal or epigastric pain. No nausea, vomiting, or hematemesis; No diarrhea or constipation. No melena or hematochezia.  GENITOURINARY: No dysuria, frequency, hematuria, or incontinence  NEUROLOGICAL: No headaches, memory loss, loss of strength, numbness, or tremors  SKIN: No itching, burning, rashes, or lesions   LYMPH NODES: No enlarged glands  ENDOCRINE: No heat or cold intolerance; No hair loss  MUSCULOSKELETAL: No joint pain or swelling; No muscle, back, or extremity pain  PSYCHIATRIC: No depression, anxiety, mood swings, or difficulty sleeping  HEME/LYMPH: No easy bruising, or bleeding gums  ALLERY AND IMMUNOLOGIC: No hives or eczema  FAMILY HISTORY:  Family history of COPD (chronic obstructive pulmonary disease) (Mother)    T(C): 36.2 (08-05-19 @ 05:31), Max: 36.6 (08-04-19 @ 13:06)  HR: 85 (08-05-19 @ 05:31) (85 - 101)  BP: 143/68 (08-05-19 @ 05:31) (141/65 - 144/66)  RR: 17 (08-05-19 @ 05:31) (17 - 20)  SpO2: 99% (08-04-19 @ 20:24) (99% - 99%)  Wt(kg): --Vital Signs Last 24 Hrs  T(C): 36.2 (05 Aug 2019 05:31), Max: 36.6 (04 Aug 2019 13:06)  T(F): 97.1 (05 Aug 2019 05:31), Max: 97.8 (04 Aug 2019 13:06)  HR: 85 (05 Aug 2019 05:31) (85 - 101)  BP: 143/68 (05 Aug 2019 05:31) (141/65 - 144/66)  BP(mean): --  RR: 17 (05 Aug 2019 05:31) (17 - 20)  SpO2: 99% (04 Aug 2019 20:24) (99% - 99%)    PHYSICAL EXAM:  GENERAL: NAD, well-groomed, well-developed  HEAD:  Atraumatic, Normocephalic  EYES: EOMI, PERRLA, conjunctiva and sclera clear  ENMT: No tonsillar erythema, exudates, or enlargement; Moist mucous membranes, Good dentition, No lesions  NECK: Supple, No JVD, Normal thyroid  NERVOUS SYSTEM:  Alert & Oriented X3, Good concentration; Motor Strength 5/5 B/L upper and lower extremities; DTRs 2+ intact and symmetric  CHEST/LUNG: diffuse exp wheeze bilateral, good air entry bilateral, no crackles,  HEART: Regular rate and rhythm; No murmurs, rubs, or gallops  ABDOMEN: Soft, Nontender, Nondistended; Bowel sounds present  EXTREMITIES:  2+ Peripheral Pulses, No clubbing, cyanosis, or edema  LYMPH: No lymphadenopathy noted  SKIN: No rashes or lesions    Consultant(s) Notes Reviewed:  [x ] YES  [ ] NO  Care Discussed with Consultants/Other Providers [ x] YES  [ ] NO    LABS:  LABS:                        11.7   18.55 )-----------( 335      ( 05 Aug 2019 06:26 )             38.3     05 Aug 2019 06:26    133    |  91     |  9      ----------------------------<  172    4.9     |  33     |  <0.5     Ca    8.7        05 Aug 2019 06:26    TPro  5.4    /  Alb  2.8    /  TBili  0.2    /  DBili  x      /  AST  10     /  ALT  13     /  AlkPhos  69     05 Aug 2019 06:26          RADIOLOGY & ADDITIONAL TESTS:    Imaging Personally Reviewed:  [ ] YES  [ ] NO  acetaminophen   Tablet .. 650 milliGRAM(s) Oral once PRN  ALBUTerol    90 MICROgram(s) HFA Inhaler 2 Puff(s) Inhalation every 6 hours  ALBUTerol/ipratropium for Nebulization 3 milliLiter(s) Nebulizer every 6 hours  ALPRAZolam 0.5 milliGRAM(s) Oral daily  amLODIPine   Tablet 5 milliGRAM(s) Oral daily  apixaban 10 milliGRAM(s) Oral every 12 hours  buDESOnide 160 MICROgram(s)/formoterol 4.5 MICROgram(s) Inhaler 2 Puff(s) Inhalation two times a day  chlorhexidine 4% Liquid 1 Application(s) Topical <User Schedule>  collagenase Ointment 1 Application(s) Topical two times a day  cyanocobalamin 1000 MICROGram(s) Oral daily  furosemide    Tablet 60 milliGRAM(s) Oral daily  methylPREDNISolone sodium succinate Injectable 60 milliGRAM(s) IV Push two times a day  nicotine -   7 mG/24Hr(s) Patch 1 patch Transdermal daily  oxyCODONE    5 mG/acetaminophen 325 mG 2 Tablet(s) Oral every 6 hours PRN  tiotropium 18 MICROgram(s) Capsule 1 Capsule(s) Inhalation daily      HEALTH ISSUES - PROBLEM Dx: KUSH RINALDI  69y  Female    69 year old lady with PMHx HTN, GERD, DMII, and COPD presenting with SOB at rest and nonproductive cough, admitted for Rs distress dueto COPD exacerbation and PE management.   Hospital day 6    INTERVAL HPI/OVERNIGHT EVENTS:  pt cont to be on mild RS distress, still have cough, wheezing and SOB, no desaturation and she is on on 2L/NC, no fever.     REVIEW OF SYSTEMS:  CONSTITUTIONAL: No fever, weight loss, or fatigue  EYES: No eye pain, visual disturbances, or discharge  ENMT:  No difficulty hearing, tinnitus, vertigo; No sinus or throat pain  NECK: No pain or stiffness  BREASTS: No pain, masses, or nipple discharge  RESPIRATORY: as HPI  CARDIOVASCULAR: No chest pain, palpitations, dizziness, or leg swelling  GASTROINTESTINAL: No abdominal or epigastric pain. No nausea, vomiting, or hematemesis; No diarrhea or constipation. No melena or hematochezia.  GENITOURINARY: No dysuria, frequency, hematuria, or incontinence  NEUROLOGICAL: No headaches, memory loss, loss of strength, numbness, or tremors  SKIN: No itching, burning, rashes, or lesions   LYMPH NODES: No enlarged glands  ENDOCRINE: No heat or cold intolerance; No hair loss  MUSCULOSKELETAL: No joint pain or swelling; No muscle, back, or extremity pain  PSYCHIATRIC: No depression, anxiety, mood swings, or difficulty sleeping  HEME/LYMPH: No easy bruising, or bleeding gums  ALLERY AND IMMUNOLOGIC: No hives or eczema  FAMILY HISTORY:  Family history of COPD (chronic obstructive pulmonary disease) (Mother)    T(C): 36.2 (08-05-19 @ 05:31), Max: 36.6 (08-04-19 @ 13:06)  HR: 85 (08-05-19 @ 05:31) (85 - 101)  BP: 143/68 (08-05-19 @ 05:31) (141/65 - 144/66)  RR: 17 (08-05-19 @ 05:31) (17 - 20)  SpO2: 99% (08-04-19 @ 20:24) (99% - 99%)  Wt(kg): --Vital Signs Last 24 Hrs  T(C): 36.2 (05 Aug 2019 05:31), Max: 36.6 (04 Aug 2019 13:06)  T(F): 97.1 (05 Aug 2019 05:31), Max: 97.8 (04 Aug 2019 13:06)  HR: 85 (05 Aug 2019 05:31) (85 - 101)  BP: 143/68 (05 Aug 2019 05:31) (141/65 - 144/66)  BP(mean): --  RR: 17 (05 Aug 2019 05:31) (17 - 20)  SpO2: 99% (04 Aug 2019 20:24) (99% - 99%)    PHYSICAL EXAM:  GENERAL: NAD, well-groomed, well-developed  HEAD:  Atraumatic, Normocephalic  EYES: EOMI, PERRLA, conjunctiva and sclera clear  ENMT: No tonsillar erythema, exudates, or enlargement; Moist mucous membranes, Good dentition, No lesions  NECK: Supple, No JVD, Normal thyroid  NERVOUS SYSTEM:  Alert & Oriented X3, Good concentration; Motor Strength 5/5 B/L upper and lower extremities; DTRs 2+ intact and symmetric  CHEST/LUNG: diffuse exp wheeze bilateral, good air entry bilateral, no crackles,  HEART: Regular rate and rhythm; No murmurs, rubs, or gallops  ABDOMEN: Soft, Nontender, Nondistended; Bowel sounds present  EXTREMITIES:  2+ Peripheral Pulses, No clubbing, cyanosis, or edema  LYMPH: No lymphadenopathy noted  SKIN: No rashes or lesions    Consultant(s) Notes Reviewed:  [x ] YES  [ ] NO  Care Discussed with Consultants/Other Providers [ x] YES  [ ] NO    LABS:  LABS:                        11.7   18.55 )-----------( 335      ( 05 Aug 2019 06:26 )             38.3     05 Aug 2019 06:26    133    |  91     |  9      ----------------------------<  172    4.9     |  33     |  <0.5     Ca    8.7        05 Aug 2019 06:26    TPro  5.4    /  Alb  2.8    /  TBili  0.2    /  DBili  x      /  AST  10     /  ALT  13     /  AlkPhos  69     05 Aug 2019 06:26            RADIOLOGY & ADDITIONAL TESTS:  < from: CT Angio Chest w/ IV Cont (07.31.19 @ 23:12) >  Multiple segmental pulmonary emboli involving the right upper lobe, right   lower lobe, left upper lobe and left lower lobe. No CT evidence of right   heart strain.    < end of copied text >    Meds:  acetaminophen   Tablet .. 650 milliGRAM(s) Oral once PRN  ALBUTerol    90 MICROgram(s) HFA Inhaler 2 Puff(s) Inhalation every 6 hours  ALBUTerol/ipratropium for Nebulization 3 milliLiter(s) Nebulizer every 6 hours  ALPRAZolam 0.5 milliGRAM(s) Oral daily  amLODIPine   Tablet 5 milliGRAM(s) Oral daily  apixaban 10 milliGRAM(s) Oral every 12 hours  buDESOnide 160 MICROgram(s)/formoterol 4.5 MICROgram(s) Inhaler 2 Puff(s) Inhalation two times a day  chlorhexidine 4% Liquid 1 Application(s) Topical <User Schedule>  collagenase Ointment 1 Application(s) Topical two times a day  cyanocobalamin 1000 MICROGram(s) Oral daily  furosemide    Tablet 60 milliGRAM(s) Oral daily  methylPREDNISolone sodium succinate Injectable 60 milliGRAM(s) IV Push two times a day  nicotine -   7 mG/24Hr(s) Patch 1 patch Transdermal daily  oxyCODONE    5 mG/acetaminophen 325 mG 2 Tablet(s) Oral every 6 hours PRN  tiotropium 18 MICROgram(s) Capsule 1 Capsule(s) Inhalation daily      HEALTH ISSUES - PROBLEM Dx:

## 2019-08-05 NOTE — PROGRESS NOTE ADULT - ASSESSMENT
79 year old lady with PMHx HTN, GERD, DMII, COPD with multiple past admissions, never been intubated presenting with SOB at rest and nonproductive cough for the past few days.    SOB due to b/l PE and COPD exacerbation  - found to have provoked bilateral PE and bilateral DVT due to immobility  - TTE did not show any right ventricular strain  - patient with improving expiratory wheezing and saturating 92-93% on RA  - continue eliquis 10 mg BID for 7 days (started on 7/31) then 5 mg BID   - start prednisone taper tomorrow, 60 mg x3 days, 40 x3 days, 20 x3 days  - seen by Vascular, no IVC filter at this time and c/w AC    HTN  -monitor bp  -c/w amlodipine and Lasix    GERD  -c/w protonix    DMII  -hold oral glycemic meds  -monitor FS, pt on steroids  -if FS>180 start basal bolus insulin whilst in-patient    vitamin b12 deficiency:  - on cyanocobalamin    #Progress Note Handoff  Pending (specify): improvement in respiratory status     Family discussion: Plan of care discussed with patient and family at bedside, aware and agreeable   Disposition:  SNF when improves clinically, aticipate dc 24-48 hours

## 2019-08-05 NOTE — PROGRESS NOTE ADULT - SUBJECTIVE AND OBJECTIVE BOX
Patient is a 69y old  Female who presents with a chief complaint of PE/COPD exacerbation (05 Aug 2019 09:54)        SUBJECTIVE:  patient feels 60% better.    REVIEW OF SYSTEMS:  See HPI    PHYSICAL EXAM  Vital Signs Last 24 Hrs  T(C): 36.2 (05 Aug 2019 05:31), Max: 36.6 (04 Aug 2019 13:06)  T(F): 97.1 (05 Aug 2019 05:31), Max: 97.8 (04 Aug 2019 13:06)  HR: 85 (05 Aug 2019 05:31) (85 - 101)  BP: 143/68 (05 Aug 2019 05:31) (141/65 - 144/66)  BP(mean): --  RR: 17 (05 Aug 2019 05:31) (17 - 20)  SpO2: 99% (04 Aug 2019 20:24) (99% - 99%)    General: In NAD  HEENT: ANDRA               Lymphatic system: No Cervical LN    Respiratory: Santhosh BS  Cardiovascular: Regular  Gastrointestinal: Soft. + BS  Musculoskeletal: No clubbing.  moves all extremities.  Full range of motion    Skin: Warm.  Intact  Neurological: No motor or sensory deficit        LABS:                          11.7   18.55 )-----------( 335      ( 05 Aug 2019 06:26 )             38.3                                               08-05    133<L>  |  91<L>  |  9<L>  ----------------------------<  172<H>  4.9   |  33<H>  |  <0.5<L>    Ca    8.7      05 Aug 2019 06:26    TPro  5.4<L>  /  Alb  2.8<L>  /  TBili  0.2  /  DBili  x   /  AST  10  /  ALT  13  /  AlkPhos  69  08-05                                                                                           LIVER FUNCTIONS - ( 05 Aug 2019 06:26 )  Alb: 2.8 g/dL / Pro: 5.4 g/dL / ALK PHOS: 69 U/L / ALT: 13 U/L / AST: 10 U/L / GGT: x                                                                                                MEDICATIONS  (STANDING):  ALBUTerol    90 MICROgram(s) HFA Inhaler 2 Puff(s) Inhalation every 6 hours  ALBUTerol/ipratropium for Nebulization 3 milliLiter(s) Nebulizer every 6 hours  ALPRAZolam 0.5 milliGRAM(s) Oral daily  amLODIPine   Tablet 5 milliGRAM(s) Oral daily  apixaban 10 milliGRAM(s) Oral every 12 hours  buDESOnide 160 MICROgram(s)/formoterol 4.5 MICROgram(s) Inhaler 2 Puff(s) Inhalation two times a day  chlorhexidine 4% Liquid 1 Application(s) Topical <User Schedule>  collagenase Ointment 1 Application(s) Topical two times a day  cyanocobalamin 1000 MICROGram(s) Oral daily  furosemide    Tablet 60 milliGRAM(s) Oral daily  methylPREDNISolone sodium succinate Injectable 60 milliGRAM(s) IV Push two times a day  nicotine -   7 mG/24Hr(s) Patch 1 patch Transdermal daily  tiotropium 18 MICROgram(s) Capsule 1 Capsule(s) Inhalation daily    MEDICATIONS  (PRN):  acetaminophen   Tablet .. 650 milliGRAM(s) Oral once PRN Mild Pain (1 - 3)  oxyCODONE    5 mG/acetaminophen 325 mG 2 Tablet(s) Oral every 6 hours PRN Moderate Pain (4 - 6) Patient is a 69y old  Female who presents with a chief complaint of PE/COPD exacerbation (05 Aug 2019 09:54)        SUBJECTIVE:  patient feels 60% better.    REVIEW OF SYSTEMS:  See HPI    PHYSICAL EXAM  Vital Signs Last 24 Hrs  T(C): 36.2 (05 Aug 2019 05:31), Max: 36.6 (04 Aug 2019 13:06)  T(F): 97.1 (05 Aug 2019 05:31), Max: 97.8 (04 Aug 2019 13:06)  HR: 85 (05 Aug 2019 05:31) (85 - 101)  BP: 143/68 (05 Aug 2019 05:31) (141/65 - 144/66)  BP(mean): --  RR: 17 (05 Aug 2019 05:31) (17 - 20)  SpO2: 99% (04 Aug 2019 20:24) (99% - 99%)    General: In NAD  HEENT: ANDRA               Lymphatic system: No Cervical LN    Respiratory: Santhosh wheezing  Cardiovascular: Regular  Gastrointestinal: Soft. + BS  Musculoskeletal: No clubbing.  moves all extremities.  Full range of motion    Skin: Warm.  Intact  Neurological: No motor or sensory deficit        LABS:                          11.7   18.55 )-----------( 335      ( 05 Aug 2019 06:26 )             38.3                                               08-05    133<L>  |  91<L>  |  9<L>  ----------------------------<  172<H>  4.9   |  33<H>  |  <0.5<L>    Ca    8.7      05 Aug 2019 06:26    TPro  5.4<L>  /  Alb  2.8<L>  /  TBili  0.2  /  DBili  x   /  AST  10  /  ALT  13  /  AlkPhos  69  08-05                                                                                           LIVER FUNCTIONS - ( 05 Aug 2019 06:26 )  Alb: 2.8 g/dL / Pro: 5.4 g/dL / ALK PHOS: 69 U/L / ALT: 13 U/L / AST: 10 U/L / GGT: x                                                                                                MEDICATIONS  (STANDING):  ALBUTerol    90 MICROgram(s) HFA Inhaler 2 Puff(s) Inhalation every 6 hours  ALBUTerol/ipratropium for Nebulization 3 milliLiter(s) Nebulizer every 6 hours  ALPRAZolam 0.5 milliGRAM(s) Oral daily  amLODIPine   Tablet 5 milliGRAM(s) Oral daily  apixaban 10 milliGRAM(s) Oral every 12 hours  buDESOnide 160 MICROgram(s)/formoterol 4.5 MICROgram(s) Inhaler 2 Puff(s) Inhalation two times a day  chlorhexidine 4% Liquid 1 Application(s) Topical <User Schedule>  collagenase Ointment 1 Application(s) Topical two times a day  cyanocobalamin 1000 MICROGram(s) Oral daily  furosemide    Tablet 60 milliGRAM(s) Oral daily  methylPREDNISolone sodium succinate Injectable 60 milliGRAM(s) IV Push two times a day  nicotine -   7 mG/24Hr(s) Patch 1 patch Transdermal daily  tiotropium 18 MICROgram(s) Capsule 1 Capsule(s) Inhalation daily    MEDICATIONS  (PRN):  acetaminophen   Tablet .. 650 milliGRAM(s) Oral once PRN Mild Pain (1 - 3)  oxyCODONE    5 mG/acetaminophen 325 mG 2 Tablet(s) Oral every 6 hours PRN Moderate Pain (4 - 6)

## 2019-08-05 NOTE — PROGRESS NOTE ADULT - ASSESSMENT
Copd exacerbation 2ry to PE  Acute PE    plan:  continue Eliquis  oxygen, nebs, inhalers  continue solumedrol Impression:    Copd exacerbation 2ry to PE  Unprovoked VTE ?    plan:    Continue AC  Continue Home inhalers  Prednisone taper  OOB to chair  OP pulmonary follow up with Dr. Walker

## 2019-08-06 PROCEDURE — 99232 SBSQ HOSP IP/OBS MODERATE 35: CPT

## 2019-08-06 RX ORDER — PANTOPRAZOLE SODIUM 20 MG/1
40 TABLET, DELAYED RELEASE ORAL DAILY
Refills: 0 | Status: DISCONTINUED | OUTPATIENT
Start: 2019-08-06 | End: 2019-08-07

## 2019-08-06 RX ADMIN — OXYCODONE AND ACETAMINOPHEN 2 TABLET(S): 5; 325 TABLET ORAL at 02:50

## 2019-08-06 RX ADMIN — OXYCODONE AND ACETAMINOPHEN 2 TABLET(S): 5; 325 TABLET ORAL at 09:02

## 2019-08-06 RX ADMIN — OXYCODONE AND ACETAMINOPHEN 2 TABLET(S): 5; 325 TABLET ORAL at 21:41

## 2019-08-06 RX ADMIN — Medication 0.5 MILLIGRAM(S): at 11:26

## 2019-08-06 RX ADMIN — Medication 1 PATCH: at 06:13

## 2019-08-06 RX ADMIN — Medication 1 PATCH: at 17:15

## 2019-08-06 RX ADMIN — OXYCODONE AND ACETAMINOPHEN 2 TABLET(S): 5; 325 TABLET ORAL at 17:13

## 2019-08-06 RX ADMIN — CHLORHEXIDINE GLUCONATE 1 APPLICATION(S): 213 SOLUTION TOPICAL at 05:53

## 2019-08-06 RX ADMIN — Medication 3 MILLILITER(S): at 10:10

## 2019-08-06 RX ADMIN — OXYCODONE AND ACETAMINOPHEN 2 TABLET(S): 5; 325 TABLET ORAL at 03:52

## 2019-08-06 RX ADMIN — Medication 1 PATCH: at 11:27

## 2019-08-06 RX ADMIN — OXYCODONE AND ACETAMINOPHEN 2 TABLET(S): 5; 325 TABLET ORAL at 23:00

## 2019-08-06 RX ADMIN — AMLODIPINE BESYLATE 5 MILLIGRAM(S): 2.5 TABLET ORAL at 05:01

## 2019-08-06 RX ADMIN — APIXABAN 10 MILLIGRAM(S): 2.5 TABLET, FILM COATED ORAL at 05:01

## 2019-08-06 RX ADMIN — Medication 3 MILLILITER(S): at 19:52

## 2019-08-06 RX ADMIN — Medication 1 PATCH: at 11:07

## 2019-08-06 RX ADMIN — OXYCODONE AND ACETAMINOPHEN 2 TABLET(S): 5; 325 TABLET ORAL at 18:00

## 2019-08-06 RX ADMIN — PREGABALIN 1000 MICROGRAM(S): 225 CAPSULE ORAL at 11:24

## 2019-08-06 RX ADMIN — OXYCODONE AND ACETAMINOPHEN 2 TABLET(S): 5; 325 TABLET ORAL at 09:30

## 2019-08-06 RX ADMIN — APIXABAN 10 MILLIGRAM(S): 2.5 TABLET, FILM COATED ORAL at 17:11

## 2019-08-06 RX ADMIN — PANTOPRAZOLE SODIUM 40 MILLIGRAM(S): 20 TABLET, DELAYED RELEASE ORAL at 11:09

## 2019-08-06 RX ADMIN — Medication 60 MILLIGRAM(S): at 05:01

## 2019-08-06 RX ADMIN — Medication 1 APPLICATION(S): at 17:14

## 2019-08-06 RX ADMIN — BUDESONIDE AND FORMOTEROL FUMARATE DIHYDRATE 2 PUFF(S): 160; 4.5 AEROSOL RESPIRATORY (INHALATION) at 09:00

## 2019-08-06 RX ADMIN — Medication 3 MILLILITER(S): at 14:38

## 2019-08-06 NOTE — PROGRESS NOTE ADULT - ASSESSMENT
79 year old lady with PMHx HTN, GERD, DMII, COPD with multiple past admissions, never been intubated presenting with SOB at rest and nonproductive cough for the past few days.    SOB due to b/l PE and COPD exacerbation  - found to have provoked bilateral PE and bilateral DVT due to immobility  - TTE did not show any right ventricular strain  - patient with improving expiratory wheezing and saturating 92-93% on RA  - continue eliquis 10 mg BID for 7 days, last day today  - in AM, 8/7 -start 5 mg BID   - start prednisone taper today and monitor  - seen by Vascular, no IVC filter at this time and c/w AC    HTN  -monitor bp  -c/w amlodipine and Lasix    GERD  -c/w protonix    DMII  -hold oral glycemic meds  -monitor FS, pt on steroids  -if FS>180 start basal bolus insulin whilst in-patient    vitamin b12 deficiency:  - on cyanocobalamin    #Progress Note Handoff  Pending (specify): improvement in respiratory status     Family discussion: Plan of care discussed with patient and family at bedside, aware and agreeable   Disposition:  SNF when improves clinically, aticipated for tomorrow

## 2019-08-06 NOTE — DIETITIAN INITIAL EVALUATION ADULT. - ENERGY NEEDS
estimated calorie needs = ~5088-3760 kcal/day (25-30 kcal/kg IBW as inaccurate CBW as per pt and overweight BMI noted).   estimated protein needs = 58-68 g/day (1.1-1.3 g/kg IBW reason mentioned above and IBW significantly <CBW).   estimated fluid needs = per LIP

## 2019-08-06 NOTE — PROGRESS NOTE ADULT - ASSESSMENT
69 year old lady with PMHx HTN, GERD, DMII, and COPD presenting with SOB at rest and nonproductive cough, admitted for Rs distress due to COPD exacerbation and PE management.     #RS distress due to b/l PE and COPD exacerbation >> improving   1-PE/DVT   -bilateral PE and bilateral DVT due to immobility, stable PE, TTE did not show any right ventricular strain  -last day of Eliquis 10 mg BID 7 days completed today, then 5 mg BID starting from tomorrow   -seen by Vascular, no IVC filter at this time and c/w AC    2- COPD exacerbation:  -improving RS distress  -continue with taper prednisone,  Duoneb and O2 as needed, CXR PRN      #ID: had short course of Levaquin  -no fever, and leukocytosis is likely from steroid    #HTN  -monitor BP  -c/w amlodipine    #GERD  -c/w protonix    #DMII  -hold oral glycemic meds  -monitor FS, pt on steroids  -if FS>180 start basal bolus insulin whilst in-patient    vitamin b12 deficiency:  - on cyanocobalamin     anticipated discharge tomorrow to SNF

## 2019-08-06 NOTE — DIETITIAN INITIAL EVALUATION ADULT. - OTHER INFO
Pt p/w non productive cough. Primary dx: COPD. Hospital course complicated by provoked b/l PE and b/l DVT d/t immobility, vascular following. HTN, lasix in place. hx of GERD, DM and  vitamin B12 deficiency.

## 2019-08-06 NOTE — PROGRESS NOTE ADULT - SUBJECTIVE AND OBJECTIVE BOX
Patient is a 69y old  Female who presents with a chief complaint of non productive cough (06 Aug 2019 11:26)      OVERNIGHT EVENTS:  RS effort improving, no fever or desaturation.    SUBJECTIVE / INTERVAL HPI: Patient seen and examined at bedside.     VITAL SIGNS:  Vital Signs Last 24 Hrs  T(C): 36.7 (06 Aug 2019 15:32), Max: 36.7 (06 Aug 2019 05:29)  T(F): 98.1 (06 Aug 2019 15:32), Max: 98.1 (06 Aug 2019 15:32)  HR: 101 (06 Aug 2019 15:32) (94 - 101)  BP: 141/92 (06 Aug 2019 15:32) (130/62 - 158/72)  BP(mean): --  RR: 18 (06 Aug 2019 15:32) (18 - 18)  SpO2: 93% (05 Aug 2019 23:40) (93% - 93%)    PHYSICAL EXAM:    General: WDWN  HEENT: NC/AT; PERRL, clear conjunctiva  Neck: supple  Cardiovascular: +S1/S2; RRR  Respiratory: CTA b/l; diffuse exp wheezing  Gastrointestinal: soft, NT/ND; +BSx4  Extremities: WWP; 2+ peripheral pulses; no edema   Neurological: AAOx3; no focal deficits    MEDICATIONS:  MEDICATIONS  (STANDING):  ALBUTerol    90 MICROgram(s) HFA Inhaler 2 Puff(s) Inhalation every 6 hours  ALBUTerol/ipratropium for Nebulization 3 milliLiter(s) Nebulizer every 6 hours  ALPRAZolam 0.5 milliGRAM(s) Oral daily  amLODIPine   Tablet 5 milliGRAM(s) Oral daily  apixaban 10 milliGRAM(s) Oral every 12 hours  buDESOnide 160 MICROgram(s)/formoterol 4.5 MICROgram(s) Inhaler 2 Puff(s) Inhalation two times a day  chlorhexidine 4% Liquid 1 Application(s) Topical <User Schedule>  collagenase Ointment 1 Application(s) Topical two times a day  cyanocobalamin 1000 MICROGram(s) Oral daily  furosemide    Tablet 60 milliGRAM(s) Oral daily  nicotine -   7 mG/24Hr(s) Patch 1 patch Transdermal daily  pantoprazole  Injectable 40 milliGRAM(s) IV Push daily  predniSONE   Tablet 60 milliGRAM(s) Oral daily  tiotropium 18 MICROgram(s) Capsule 1 Capsule(s) Inhalation daily    MEDICATIONS  (PRN):  acetaminophen   Tablet .. 650 milliGRAM(s) Oral once PRN Mild Pain (1 - 3)  oxyCODONE    5 mG/acetaminophen 325 mG 2 Tablet(s) Oral every 6 hours PRN Moderate Pain (4 - 6)      ALLERGIES:  Allergies    penicillin (Unknown)    Intolerances        LABS:                        11.7   18.55 )-----------( 335      ( 05 Aug 2019 06:26 )             38.3     08-05    133<L>  |  91<L>  |  9<L>  ----------------------------<  172<H>  4.9   |  33<H>  |  <0.5<L>    Ca    8.7      05 Aug 2019 06:26    TPro  5.4<L>  /  Alb  2.8<L>  /  TBili  0.2  /  DBili  x   /  AST  10  /  ALT  13  /  AlkPhos  69  08-05        CAPILLARY BLOOD GLUCOSE          RADIOLOGY & ADDITIONAL TESTS: Reviewed.  < from: CT Angio Chest w/ IV Cont (07.31.19 @ 23:12) >  IMPRESSION:    Multiple segmental pulmonary emboli involving the right upper lobe, right   lower lobe, left upper lobe and left lower lobe. No CT evidence of right   heart strain.    < end of copied text >

## 2019-08-06 NOTE — DIETITIAN INITIAL EVALUATION ADULT. - PHYSICAL APPEARANCE
overweight/BMI 27.1(153#, 63in). wt in EMR is pt reported dry wt, unable to obtain CBW d/t pt bed position. asked staff to obtain current wt via bedscale when able. no edema noted at this time. b/l LE venous stasis ulcers.

## 2019-08-06 NOTE — DIETITIAN INITIAL EVALUATION ADULT. - PERTINENT LABORATORY DATA
(8/5/19) WBC 18.55, RBC 3.81, Hgb 11.7, Na 133, Cl 91, BUN 9, Cr <0.5, glucose 172  (5/30/19) A1c 6.1%

## 2019-08-06 NOTE — DIETITIAN INITIAL EVALUATION ADULT. - ENERGY INTAKE
Pt c/o CHO consistent diet stating she is "not a diabetic". Blood glucose levels elevated likely 2/2 steroid medication, however, HbA1c from 05/2019 is 6.1% indicating preDM so pt could benefit from diet modifier. D/w pt at this time. Good (>75%)

## 2019-08-06 NOTE — PROGRESS NOTE ADULT - SUBJECTIVE AND OBJECTIVE BOX
KUSH RINALDI  69y Female    CHIEF COMPLAINT:    Patient is a 69y old  Female who presents with a chief complaint of non productive cough (05 Aug 2019 13:01)      INTERVAL HPI/OVERNIGHT EVENTS:    Patient seen and examined. No acute events overnight. Respiratory status improving    ROS: All other systems are negative.    Vital Signs:    T(F): 98 (08-06-19 @ 05:29), Max: 98.2 (08-05-19 @ 13:49)  HR: 94 (08-06-19 @ 05:29) (94 - 99)  BP: 158/72 (08-06-19 @ 05:29) (111/66 - 158/72)  RR: 18 (08-06-19 @ 05:29) (18 - 18)  SpO2: 93% (08-05-19 @ 23:40) (93% - 93%)    05 Aug 2019 07:01  -  06 Aug 2019 07:00  --------------------------------------------------------  IN: 0 mL / OUT: 1 mL / NET: -1 mL    PHYSICAL EXAM:    GENERAL:  NAD, mildly tachypnenic  SKIN: No rashes or lesions  HEENT: Atraumatic. Normocephalic.  NECK: Supple, No JVD. No lymphadenopathy.  PULMONARY: Improving expiratory wheezing No rales  CVS: Normal S1, S2. Rate and Rhythm are regular.    ABDOMEN/GI: Soft, Nontender, Nondistended; BS present  MSK:  No edema B/L LE. No clubbing or cyanosis  NEUROLOGIC:  No motor or sensory deficit.  PSYCH: Alert & oriented x 3, normal affect    Consultant(s) Notes Reviewed:  [x ] YES  [ ] NO  Care Discussed with Consultants/Other Providers [ x] YES  [ ] NO    LABS:                        11.7   18.55 )-----------( 335      ( 05 Aug 2019 06:26 )             38.3     08-05    133<L>  |  91<L>  |  9<L>  ----------------------------<  172<H>  4.9   |  33<H>  |  <0.5<L>    Ca    8.7      05 Aug 2019 06:26    TPro  5.4<L>  /  Alb  2.8<L>  /  TBili  0.2  /  DBili  x   /  AST  10  /  ALT  13  /  AlkPhos  69  08-05    Serum Pro-Brain Natriuretic Peptide: 1475 pg/mL (07-30-19 @ 20:22)    RADIOLOGY & ADDITIONAL TESTS  Imaging or report Personally Reviewed:  [x] YES  [ ] NO  EKG reviewed: [x] YES  [ ] NO    Medications:  Standing  ALBUTerol    90 MICROgram(s) HFA Inhaler 2 Puff(s) Inhalation every 6 hours  ALBUTerol/ipratropium for Nebulization 3 milliLiter(s) Nebulizer every 6 hours  ALPRAZolam 0.5 milliGRAM(s) Oral daily  amLODIPine   Tablet 5 milliGRAM(s) Oral daily  apixaban 10 milliGRAM(s) Oral every 12 hours  buDESOnide 160 MICROgram(s)/formoterol 4.5 MICROgram(s) Inhaler 2 Puff(s) Inhalation two times a day  chlorhexidine 4% Liquid 1 Application(s) Topical <User Schedule>  collagenase Ointment 1 Application(s) Topical two times a day  cyanocobalamin 1000 MICROGram(s) Oral daily  furosemide    Tablet 60 milliGRAM(s) Oral daily  nicotine -   7 mG/24Hr(s) Patch 1 patch Transdermal daily  pantoprazole  Injectable 40 milliGRAM(s) IV Push daily  predniSONE   Tablet 60 milliGRAM(s) Oral daily  tiotropium 18 MICROgram(s) Capsule 1 Capsule(s) Inhalation daily    PRN Meds  acetaminophen   Tablet .. 650 milliGRAM(s) Oral once PRN  oxyCODONE    5 mG/acetaminophen 325 mG 2 Tablet(s) Oral every 6 hours PRN      Samantha Gamboa MD  s. [id number]

## 2019-08-07 ENCOUNTER — TRANSCRIPTION ENCOUNTER (OUTPATIENT)
Age: 69
End: 2019-08-07

## 2019-08-07 VITALS
TEMPERATURE: 97 F | DIASTOLIC BLOOD PRESSURE: 65 MMHG | RESPIRATION RATE: 18 BRPM | SYSTOLIC BLOOD PRESSURE: 130 MMHG | HEART RATE: 79 BPM

## 2019-08-07 DIAGNOSIS — J44.9 CHRONIC OBSTRUCTIVE PULMONARY DISEASE, UNSPECIFIED: ICD-10-CM

## 2019-08-07 PROCEDURE — 99232 SBSQ HOSP IP/OBS MODERATE 35: CPT

## 2019-08-07 RX ORDER — APIXABAN 2.5 MG/1
5 TABLET, FILM COATED ORAL EVERY 12 HOURS
Refills: 0 | Status: DISCONTINUED | OUTPATIENT
Start: 2019-08-07 | End: 2019-08-07

## 2019-08-07 RX ORDER — APIXABAN 2.5 MG/1
1 TABLET, FILM COATED ORAL
Qty: 120 | Refills: 0
Start: 2019-08-07 | End: 2019-10-05

## 2019-08-07 RX ORDER — PREGABALIN 225 MG/1
1 CAPSULE ORAL
Qty: 30 | Refills: 0
Start: 2019-08-07 | End: 2019-09-05

## 2019-08-07 RX ORDER — PANTOPRAZOLE SODIUM 20 MG/1
40 TABLET, DELAYED RELEASE ORAL
Refills: 0 | Status: DISCONTINUED | OUTPATIENT
Start: 2019-08-07 | End: 2019-08-07

## 2019-08-07 RX ADMIN — Medication 3 MILLILITER(S): at 08:34

## 2019-08-07 RX ADMIN — Medication 1 PATCH: at 11:23

## 2019-08-07 RX ADMIN — Medication 3 MILLILITER(S): at 14:32

## 2019-08-07 RX ADMIN — Medication 1 APPLICATION(S): at 05:09

## 2019-08-07 RX ADMIN — APIXABAN 5 MILLIGRAM(S): 2.5 TABLET, FILM COATED ORAL at 17:08

## 2019-08-07 RX ADMIN — AMLODIPINE BESYLATE 5 MILLIGRAM(S): 2.5 TABLET ORAL at 05:08

## 2019-08-07 RX ADMIN — BUDESONIDE AND FORMOTEROL FUMARATE DIHYDRATE 2 PUFF(S): 160; 4.5 AEROSOL RESPIRATORY (INHALATION) at 08:05

## 2019-08-07 RX ADMIN — APIXABAN 10 MILLIGRAM(S): 2.5 TABLET, FILM COATED ORAL at 05:09

## 2019-08-07 RX ADMIN — OXYCODONE AND ACETAMINOPHEN 2 TABLET(S): 5; 325 TABLET ORAL at 05:13

## 2019-08-07 RX ADMIN — Medication 1 APPLICATION(S): at 17:08

## 2019-08-07 RX ADMIN — OXYCODONE AND ACETAMINOPHEN 2 TABLET(S): 5; 325 TABLET ORAL at 17:40

## 2019-08-07 RX ADMIN — OXYCODONE AND ACETAMINOPHEN 2 TABLET(S): 5; 325 TABLET ORAL at 17:08

## 2019-08-07 RX ADMIN — OXYCODONE AND ACETAMINOPHEN 2 TABLET(S): 5; 325 TABLET ORAL at 11:23

## 2019-08-07 RX ADMIN — PREGABALIN 1000 MICROGRAM(S): 225 CAPSULE ORAL at 11:22

## 2019-08-07 RX ADMIN — OXYCODONE AND ACETAMINOPHEN 2 TABLET(S): 5; 325 TABLET ORAL at 06:45

## 2019-08-07 RX ADMIN — OXYCODONE AND ACETAMINOPHEN 2 TABLET(S): 5; 325 TABLET ORAL at 12:00

## 2019-08-07 RX ADMIN — Medication 1 PATCH: at 06:44

## 2019-08-07 RX ADMIN — Medication 60 MILLIGRAM(S): at 05:09

## 2019-08-07 RX ADMIN — Medication 0.5 MILLIGRAM(S): at 11:22

## 2019-08-07 NOTE — DISCHARGE NOTE NURSING/CASE MANAGEMENT/SOCIAL WORK - NSDCPEWEB_GEN_ALL_CORE
[facility] for Tobacco Control website --- [email or web address] website --- [email or web address]

## 2019-08-07 NOTE — PROGRESS NOTE ADULT - ASSESSMENT
69y old  Female who presents with a chief complaint of non productive cough with known h/o COPD, she was diagnosed with provoked DVT and PE without heart strain. Pt was consulted by pulmonary, treated for COPD exacerbation with IV steroids, started on po Prednisone, clinically improved. Pt developed leukocytosis, likely due steroids, will c/w taper on discharge.     A/P     # Acute on chronic respiratory failure due to  b/l PE/ DVT  and COPD exacerbation   - no evidence of right heart strain  - on Eliquis   - consulted by vascular surgery, no IVC filter recommended   - on po Prednisone with taper, nebs PRN, pt is comfortable on RA, f/u with pulmonary after discharge   - monitor pulse OX , pt is using oxygen PRN at home   - prognosis guarded   - encourage smoking cessation , on Nicotine patch     # Leukocytosis   - likely due to steroids  - taper steroids, repeat CBC in one week after discharge   - pt completed course of Abx while in the hospital       #HTN  -monitor BP  -c/w amlodipine    #GERD  -c/w Protonix    #DMII  - carb consistent diet   - monitor finger stick   - resume home medications on discharge     # vitamin b12 deficiency:  - on cyanocobalamin     #Progress Note Handoff  Pending (specify):  none  Family discussion: n/a  Disposition: pt is stable for discharge to SNF today

## 2019-08-07 NOTE — DISCHARGE NOTE PROVIDER - NSDCCPCAREPLAN_GEN_ALL_CORE_FT
PRINCIPAL DISCHARGE DIAGNOSIS  Diagnosis: Pulmonary embolism  Assessment and Plan of Treatment: you came to emergency with shortness of breath, evalaution showed clots at both lungs, you started treat,ent wiht blood thinner eliquis, please continue with this medicatiopn as prescribed and follow up with Pulmonology Dr. Duval and Dr. Walker      SECONDARY DISCHARGE DIAGNOSES  Diagnosis: DVT (deep venous thrombosis)  Assessment and Plan of Treatment: you was evalauted for clots at your lower extremity because you have clots in your lungs, and was found to have clots at both legs, you satrted on blood thinner qliquis, please continue the medication as prescribed and follow up with Dr. Lopez and Dr. Walker PRINCIPAL DISCHARGE DIAGNOSIS  Diagnosis: Pulmonary embolism  Assessment and Plan of Treatment: you came to emergency with shortness of breath, evalaution showed clots at both lungs, you started treat,ent wiht blood thinner eliquis, please continue with this medicatiopn as prescribed and follow up with Pulmonology Dr. Walker and vascular with Dr. Duval      SECONDARY DISCHARGE DIAGNOSES  Diagnosis: DVT (deep venous thrombosis)  Assessment and Plan of Treatment: you was evalauted for clots at your lower extremity because you have clots in your lungs, and was found to have clots at both legs, you satrted on blood thinner qliquis, please continue the medication as prescribed and follow up with Dr. Duval

## 2019-08-07 NOTE — CHART NOTE - NSCHARTNOTEFT_GEN_A_CORE
Pt to be discharged to SNF, pt notes she is on chronic pain management prior to admission and no pain management on med rec.   Chart reviewed as well as pharmacy data, pt receiving percocet 5-325 2 tabs q6 PRN while inpatient, pt last discharged on acetaminophen-hydrocodone  q6hrs on last admission and pharmacy data with same med in June.   Med rec updated to reflect previous home meds acetaminophen-hydrocodone  q6 PRN.

## 2019-08-07 NOTE — PROGRESS NOTE ADULT - REASON FOR ADMISSION
non productive cough
PE/COPD exacerbation
non productive cough
non productive cough + SOB
non productive cough

## 2019-08-07 NOTE — DISCHARGE NOTE PROVIDER - CARE PROVIDER_API CALL
Keegan Duval)  Surgery; Vascular Surgery  [address] [place] [address]  Phone: [phone]  Fax: [phone]  Follow Up Time: 2 weeks    Chris Contreras)  Critical Care Medicine; Internal Medicine; Pulmonary Disease; Sleep Medicine  [address] [place] [address]  Phone: [phone]  Fax: [phone]  Follow Up Time: 2 weeks

## 2019-08-07 NOTE — DISCHARGE NOTE PROVIDER - PROVIDER TOKENS
PROVIDER:[TOKEN:[[id number]],FOLLOWUP:[2 weeks]],PROVIDER:[TOKEN:[[id number]],FOLLOWUP:[2 weeks]]

## 2019-08-07 NOTE — PROGRESS NOTE ADULT - PROVIDER SPECIALTY LIST ADULT
Hospitalist
Internal Medicine
Pulmonology
Pulmonology
Internal Medicine

## 2019-08-07 NOTE — PROGRESS NOTE ADULT - NSHPATTENDINGPLANDISCUSS_GEN_ALL_CORE
team
house staff, medical residents during rounds
HOuse staff
house staff and medical residents during rounds

## 2019-08-07 NOTE — DISCHARGE NOTE PROVIDER - NSDCPNSUBOBJ_GEN_ALL_CORE
<<<RESIDENT DISCHARGE NOTE>>>         KUSH RINALDI    MRN-[id number]        69 year old lady with PMHx HTN, GERD, DMII, and COPD presenting with SOB at rest and nonproductive cough, admitted for Rs distress due to COPD exacerbation     and bilateral DVT/PE management.    no overnight events, patient RS distress resolved, and has been of O2 with no desaturation or SOB.         VITAL SIGNS:    T(F): 96.5 (08-07-19 @ 04:31), Max: 98.1 (08-06-19 @ 15:32)    HR: 86 (08-07-19 @ 04:31)    BP: 138/68 (08-07-19 @ 04:31)    SpO2: 93% (08-07-19 @ 07:56)            PHYSICAL EXAMINATION:    General: WDWN    HEENT: NC/AT; PERRL, clear conjunctiva    Neck: supple    Cardiovascular: +S1/S2; RRR    Respiratory: CTA b/l; diffuse exp wheezing    Gastrointestinal: soft, NT/ND; +BSx4    Extremities: WWP; 2+ peripheral pulses; no edema     Neurological: AAOx3; no focal deficits        1-PE/DVT     -bilateral PE and bilateral DVT due to immobility, stable PE, TTE did not show any right ventricular strain    -completed 7 days of  eliquis 10mg BID, started today on 5mg BID    -seen by Vascular, no IVC filter at this time and c/w AC        2- COPD exacerbation:    -RS distress resolved    -continue with taper prednisone,  Duoneb and O2 as needed,             #ID: had short course of Levaquin    -no fever, and leukocytosis is likely from steroid        #HTN    -monitor BP    -c/w amlodipine        #GERD    -c/w protonix        #DMII    -hold oral glycemic meds    -monitor FS, pt on steroids    -if FS>180 start basal bolus insulin whilst in-patient        vitamin b12 deficiency:    - on cyanocobalamin            patient is medically cleared for discharge today to SNF

## 2019-08-07 NOTE — DISCHARGE NOTE PROVIDER - HOSPITAL COURSE
pt 69 year old lady with PMHx HTN, GERD, HFpEF, COPD presented with SOB at rest, she had RS distress in ER, refused Bipap, and her saturation was > 90% with NC, she was treated initially for pneumonia/COPD exacerbation as she had RLL infiltration and leukocytosis, but he saturation dropped after few hours later to 75%, chest CTA showed bilateral PE ((segmental PE in RUL, RLL, BEENA, LLL),Echo normal with  NO right heart strain, and VA duplex showed Acute left popliteal and posterior tibial vein DVT and Bilateral gastrocnemius vein thrombosis, she was started on therapeutic eliquis dose and continued 7 days, vascular consulted and no IVC indicated at this time, pt continued  to improve, was weaned off O2 gradually, continued 5 days of levaquin and was on IV steroid, and switched to PO, she was seen and examined today, medically cleared for discharge, to continue on eliquis 5mg QD and f/u with pulmonary and vascular in 2 weeks, continue taper prednisone, discharged to SNF. 69 year old lady with PMHx HTN, GERD, HFpEF, COPD presented with SOB at rest, she had RS distress in ER, refused Bipap, and her saturation was > 90% with NC, she was treated initially for pneumonia/COPD exacerbation as she had RLL infiltration and leukocytosis, but he saturation dropped after few hours later to 75%, chest CTA showed bilateral PE ((segmental PE in RUL, RLL, BEENA, LLL),Echo normal with  NO right heart strain, and VA duplex showed Acute left popliteal and posterior tibial vein DVT and Bilateral gastrocnemius vein thrombosis, she was started on therapeutic eliquis dose and continued 7 days, vascular consulted and no IVC indicated at this time, pt continued  to improve, was weaned off O2 gradually, continued 5 days of levaquin and was on IV steroid, and switched to PO, she was seen and examined today, medically cleared for discharge to SNF, to continue on eliquis 5mg QD and f/u with pulmonary and vascular in 2 weeks, continue taper prednisone, discharged to SNF.

## 2019-08-07 NOTE — PROGRESS NOTE ADULT - SUBJECTIVE AND OBJECTIVE BOX
Patient is a 69y old  Female who presents with a chief complaint of non productive cough with known h/o COPD, she was diagnosed with provoked DVT and PE without heart strain. Pt was consulted by pulmonary, treated for COPD exacerbation with IV steroids, started on po Prednisone, clinically improved. Pt developed leukocytosis, likely due steroids, will c/w taper on discharge.   Today pt gets anxious at time, comfortable on RA at rest.    Vital Signs Last 24 Hrs  T(C): 35.8 (07 Aug 2019 04:31), Max: 36.7 (06 Aug 2019 15:32)  T(F): 96.5 (07 Aug 2019 04:31), Max: 98.1 (06 Aug 2019 15:32)  HR: 86 (07 Aug 2019 04:31) (86 - 103)  BP: 138/68 (07 Aug 2019 04:31) (122/63 - 141/92)  BP(mean): --  RR: 18 (07 Aug 2019 04:31) (18 - 18)  SpO2: 93% (07 Aug 2019 07:56) (93% - 96%)    PHYSICAL EXAM:  General: NAD, anxious at times   HEENT: NC/AT; PERRL, clear conjunctiva  Neck: supple, no JVD  Cardiovascular: +S1/S2; RRR  Respiratory: decreased BS b/l, fine expiratory wheezing, prolonged inspiratory phase    Gastrointestinal: soft, NT/ND; +BSx4  Extremities: WWP; 2+ peripheral pulses; no edema   Neurological: AAOx3; no focal deficits    LABS:  no new labls         RADIOLOGY & ADDITIONAL TESTS: Reviewed.  < from: CT Angio Chest w/ IV Cont (07.31.19 @ 23:12) >  IMPRESSION:    Multiple segmental pulmonary emboli involving the right upper lobe, right   lower lobe, left upper lobe and left lower lobe. No CT evidence of right   heart strain.  < from: VA Duplex Lower Ext Vein Scan, Bilat (07.31.19 @ 11:59) >  Impression:    Acute left popliteal and posterior tibial vein DVT.    Bilateral gastrocnemius vein thrombosis    MEDICATIONS  (STANDING):  ALBUTerol    90 MICROgram(s) HFA Inhaler 2 Puff(s) Inhalation every 6 hours  ALBUTerol/ipratropium for Nebulization 3 milliLiter(s) Nebulizer every 6 hours  amLODIPine   Tablet 5 milliGRAM(s) Oral daily  apixaban 5 milliGRAM(s) Oral every 12 hours  buDESOnide 160 MICROgram(s)/formoterol 4.5 MICROgram(s) Inhaler 2 Puff(s) Inhalation two times a day  chlorhexidine 4% Liquid 1 Application(s) Topical <User Schedule>  collagenase Ointment 1 Application(s) Topical two times a day  cyanocobalamin 1000 MICROGram(s) Oral daily  furosemide    Tablet 60 milliGRAM(s) Oral daily  nicotine -   7 mG/24Hr(s) Patch 1 patch Transdermal daily  pantoprazole    Tablet 40 milliGRAM(s) Oral before breakfast    MEDICATIONS  (PRN):  acetaminophen   Tablet .. 650 milliGRAM(s) Oral once PRN Mild Pain (1 - 3)  oxyCODONE    5 mG/acetaminophen 325 mG 2 Tablet(s) Oral every 6 hours PRN Moderate Pain (4 - 6)

## 2019-08-07 NOTE — DISCHARGE NOTE NURSING/CASE MANAGEMENT/SOCIAL WORK - NSDCDPATPORTLINK_GEN_ALL_CORE
You can access the [facility] Portal, offered by [facility], by registering with the following website: [email or web address]

## 2019-08-09 ENCOUNTER — OUTPATIENT (OUTPATIENT)
Dept: OUTPATIENT SERVICES | Facility: HOSPITAL | Age: 69
LOS: 1 days | Discharge: HOME | End: 2019-08-09

## 2019-08-09 DIAGNOSIS — E11.9 TYPE 2 DIABETES MELLITUS WITHOUT COMPLICATIONS: ICD-10-CM

## 2019-08-09 DIAGNOSIS — R22.1 LOCALIZED SWELLING, MASS AND LUMP, NECK: Chronic | ICD-10-CM

## 2019-08-09 DIAGNOSIS — R05 COUGH: ICD-10-CM

## 2019-08-13 DIAGNOSIS — I82.442 ACUTE EMBOLISM AND THROMBOSIS OF LEFT TIBIAL VEIN: ICD-10-CM

## 2019-08-13 DIAGNOSIS — J44.0 CHRONIC OBSTRUCTIVE PULMONARY DISEASE WITH (ACUTE) LOWER RESPIRATORY INFECTION: ICD-10-CM

## 2019-08-13 DIAGNOSIS — J18.9 PNEUMONIA, UNSPECIFIED ORGANISM: ICD-10-CM

## 2019-08-13 DIAGNOSIS — J96.20 ACUTE AND CHRONIC RESPIRATORY FAILURE, UNSPECIFIED WHETHER WITH HYPOXIA OR HYPERCAPNIA: ICD-10-CM

## 2019-08-13 DIAGNOSIS — I82.432 ACUTE EMBOLISM AND THROMBOSIS OF LEFT POPLITEAL VEIN: ICD-10-CM

## 2019-08-13 DIAGNOSIS — J44.1 CHRONIC OBSTRUCTIVE PULMONARY DISEASE WITH (ACUTE) EXACERBATION: ICD-10-CM

## 2019-08-13 DIAGNOSIS — F41.8 OTHER SPECIFIED ANXIETY DISORDERS: ICD-10-CM

## 2019-08-13 DIAGNOSIS — R26.89 OTHER ABNORMALITIES OF GAIT AND MOBILITY: ICD-10-CM

## 2019-08-13 DIAGNOSIS — E11.621 TYPE 2 DIABETES MELLITUS WITH FOOT ULCER: ICD-10-CM

## 2019-08-13 DIAGNOSIS — Z87.891 PERSONAL HISTORY OF NICOTINE DEPENDENCE: ICD-10-CM

## 2019-08-13 DIAGNOSIS — K21.9 GASTRO-ESOPHAGEAL REFLUX DISEASE WITHOUT ESOPHAGITIS: ICD-10-CM

## 2019-08-13 DIAGNOSIS — I50.31 ACUTE DIASTOLIC (CONGESTIVE) HEART FAILURE: ICD-10-CM

## 2019-08-13 DIAGNOSIS — G47.00 INSOMNIA, UNSPECIFIED: ICD-10-CM

## 2019-08-13 DIAGNOSIS — E53.8 DEFICIENCY OF OTHER SPECIFIED B GROUP VITAMINS: ICD-10-CM

## 2019-08-13 DIAGNOSIS — I26.99 OTHER PULMONARY EMBOLISM WITHOUT ACUTE COR PULMONALE: ICD-10-CM

## 2019-08-13 DIAGNOSIS — I82.493 ACUTE EMBOLISM AND THROMBOSIS OF OTHER SPECIFIED DEEP VEIN OF LOWER EXTREMITY, BILATERAL: ICD-10-CM

## 2019-08-13 DIAGNOSIS — L97.519 NON-PRESSURE CHRONIC ULCER OF OTHER PART OF RIGHT FOOT WITH UNSPECIFIED SEVERITY: ICD-10-CM

## 2019-08-13 DIAGNOSIS — Z79.84 LONG TERM (CURRENT) USE OF ORAL HYPOGLYCEMIC DRUGS: ICD-10-CM

## 2019-08-13 DIAGNOSIS — I11.0 HYPERTENSIVE HEART DISEASE WITH HEART FAILURE: ICD-10-CM

## 2019-08-13 DIAGNOSIS — J98.11 ATELECTASIS: ICD-10-CM

## 2019-08-13 DIAGNOSIS — L97.529 NON-PRESSURE CHRONIC ULCER OF OTHER PART OF LEFT FOOT WITH UNSPECIFIED SEVERITY: ICD-10-CM

## 2019-08-13 DIAGNOSIS — Z88.0 ALLERGY STATUS TO PENICILLIN: ICD-10-CM

## 2019-08-13 DIAGNOSIS — Z79.899 OTHER LONG TERM (CURRENT) DRUG THERAPY: ICD-10-CM

## 2019-08-17 ENCOUNTER — OUTPATIENT (OUTPATIENT)
Dept: OUTPATIENT SERVICES | Facility: HOSPITAL | Age: 69
LOS: 1 days | Discharge: HOME | End: 2019-08-17

## 2019-08-17 DIAGNOSIS — R22.1 LOCALIZED SWELLING, MASS AND LUMP, NECK: Chronic | ICD-10-CM

## 2019-08-17 DIAGNOSIS — I48.91 UNSPECIFIED ATRIAL FIBRILLATION: ICD-10-CM

## 2020-01-30 ENCOUNTER — OUTPATIENT (OUTPATIENT)
Dept: OUTPATIENT SERVICES | Facility: HOSPITAL | Age: 70
LOS: 1 days | Discharge: HOME | End: 2020-01-30
Payer: MEDICARE

## 2020-01-30 DIAGNOSIS — R22.1 LOCALIZED SWELLING, MASS AND LUMP, NECK: Chronic | ICD-10-CM

## 2020-01-30 DIAGNOSIS — M54.5 LOW BACK PAIN: ICD-10-CM

## 2020-01-30 PROCEDURE — 72148 MRI LUMBAR SPINE W/O DYE: CPT | Mod: 26

## 2020-01-30 NOTE — CHART NOTE - NSCHARTNOTESELECT_GEN_ALL_CORE
Event Note Detail Level: Generalized Price (Do Not Change): 0.00 Instructions: This plan will send the code FBSD to the PM system.  DO NOT or CHANGE the price.

## 2020-02-13 ENCOUNTER — INPATIENT (INPATIENT)
Facility: HOSPITAL | Age: 70
LOS: 5 days | Discharge: HOME HEALTH | End: 2020-02-19
Attending: INTERNAL MEDICINE | Admitting: INTERNAL MEDICINE
Payer: MEDICARE

## 2020-02-13 VITALS
TEMPERATURE: 98 F | SYSTOLIC BLOOD PRESSURE: 140 MMHG | OXYGEN SATURATION: 98 % | DIASTOLIC BLOOD PRESSURE: 78 MMHG | RESPIRATION RATE: 20 BRPM | HEART RATE: 84 BPM

## 2020-02-13 DIAGNOSIS — R22.1 LOCALIZED SWELLING, MASS AND LUMP, NECK: Chronic | ICD-10-CM

## 2020-02-13 LAB
BASE EXCESS BLDV CALC-SCNC: 2.7 MMOL/L — HIGH (ref -2–2)
BASOPHILS # BLD AUTO: 0.04 K/UL — SIGNIFICANT CHANGE UP (ref 0–0.2)
BASOPHILS NFR BLD AUTO: 0.4 % — SIGNIFICANT CHANGE UP (ref 0–1)
CA-I SERPL-SCNC: 1.03 MMOL/L — LOW (ref 1.12–1.3)
EOSINOPHIL # BLD AUTO: 0.2 K/UL — SIGNIFICANT CHANGE UP (ref 0–0.7)
EOSINOPHIL NFR BLD AUTO: 2.2 % — SIGNIFICANT CHANGE UP (ref 0–8)
FLU A RESULT: NEGATIVE — SIGNIFICANT CHANGE UP
FLU A RESULT: NEGATIVE — SIGNIFICANT CHANGE UP
FLUAV AG NPH QL: NEGATIVE — SIGNIFICANT CHANGE UP
FLUBV AG NPH QL: NEGATIVE — SIGNIFICANT CHANGE UP
GAS PNL BLDV: 128 MMOL/L — LOW (ref 136–145)
GAS PNL BLDV: SIGNIFICANT CHANGE UP
HCO3 BLDV-SCNC: 28 MMOL/L — SIGNIFICANT CHANGE UP (ref 22–29)
HCT VFR BLD CALC: 43.2 % — SIGNIFICANT CHANGE UP (ref 37–47)
HCT VFR BLDA CALC: 45.7 % — HIGH (ref 34–44)
HGB BLD CALC-MCNC: 14.9 G/DL — SIGNIFICANT CHANGE UP (ref 14–18)
HGB BLD-MCNC: 14.1 G/DL — SIGNIFICANT CHANGE UP (ref 12–16)
IMM GRANULOCYTES NFR BLD AUTO: 0.1 % — SIGNIFICANT CHANGE UP (ref 0.1–0.3)
LACTATE BLDV-MCNC: 1 MMOL/L — SIGNIFICANT CHANGE UP (ref 0.5–1.6)
LYMPHOCYTES # BLD AUTO: 3.44 K/UL — HIGH (ref 1.2–3.4)
LYMPHOCYTES # BLD AUTO: 38.7 % — SIGNIFICANT CHANGE UP (ref 20.5–51.1)
MCHC RBC-ENTMCNC: 29.9 PG — SIGNIFICANT CHANGE UP (ref 27–31)
MCHC RBC-ENTMCNC: 32.6 G/DL — SIGNIFICANT CHANGE UP (ref 32–37)
MCV RBC AUTO: 91.7 FL — SIGNIFICANT CHANGE UP (ref 81–99)
MONOCYTES # BLD AUTO: 1.03 K/UL — HIGH (ref 0.1–0.6)
MONOCYTES NFR BLD AUTO: 11.6 % — HIGH (ref 1.7–9.3)
NEUTROPHILS # BLD AUTO: 4.17 K/UL — SIGNIFICANT CHANGE UP (ref 1.4–6.5)
NEUTROPHILS NFR BLD AUTO: 47 % — SIGNIFICANT CHANGE UP (ref 42.2–75.2)
NRBC # BLD: 0 /100 WBCS — SIGNIFICANT CHANGE UP (ref 0–0)
PCO2 BLDV: 47 MMHG — SIGNIFICANT CHANGE UP (ref 41–51)
PH BLDV: 7.39 — SIGNIFICANT CHANGE UP (ref 7.26–7.43)
PLATELET # BLD AUTO: 242 K/UL — SIGNIFICANT CHANGE UP (ref 130–400)
PO2 BLDV: 45 MMHG — HIGH (ref 20–40)
POTASSIUM BLDV-SCNC: 6.5 MMOL/L — HIGH (ref 3.3–5.6)
RBC # BLD: 4.71 M/UL — SIGNIFICANT CHANGE UP (ref 4.2–5.4)
RBC # FLD: 15.3 % — HIGH (ref 11.5–14.5)
RSV RESULT: NEGATIVE — SIGNIFICANT CHANGE UP
RSV RNA RESP QL NAA+PROBE: NEGATIVE — SIGNIFICANT CHANGE UP
SAO2 % BLDV: 76 % — SIGNIFICANT CHANGE UP
WBC # BLD: 8.89 K/UL — SIGNIFICANT CHANGE UP (ref 4.8–10.8)
WBC # FLD AUTO: 8.89 K/UL — SIGNIFICANT CHANGE UP (ref 4.8–10.8)

## 2020-02-13 PROCEDURE — 93010 ELECTROCARDIOGRAM REPORT: CPT

## 2020-02-13 PROCEDURE — 99285 EMERGENCY DEPT VISIT HI MDM: CPT

## 2020-02-13 RX ORDER — IPRATROPIUM/ALBUTEROL SULFATE 18-103MCG
3 AEROSOL WITH ADAPTER (GRAM) INHALATION
Refills: 0 | Status: COMPLETED | OUTPATIENT
Start: 2020-02-13 | End: 2020-02-13

## 2020-02-13 RX ORDER — OXYCODONE AND ACETAMINOPHEN 5; 325 MG/1; MG/1
2 TABLET ORAL ONCE
Refills: 0 | Status: DISCONTINUED | OUTPATIENT
Start: 2020-02-13 | End: 2020-02-13

## 2020-02-13 RX ADMIN — OXYCODONE AND ACETAMINOPHEN 2 TABLET(S): 5; 325 TABLET ORAL at 22:44

## 2020-02-13 RX ADMIN — Medication 3 MILLILITER(S): at 23:24

## 2020-02-13 RX ADMIN — Medication 60 MILLIGRAM(S): at 22:45

## 2020-02-13 RX ADMIN — Medication 3 MILLILITER(S): at 23:30

## 2020-02-13 RX ADMIN — Medication 3 MILLILITER(S): at 23:40

## 2020-02-13 NOTE — ED PROVIDER NOTE - PROGRESS NOTE DETAILS
Offered bipap which pt refused, will see how she does with nebs/steroids on NC. Initial CMP/VBG hemolyzed, samples redrawn Pt now able to speak full sentences but remains diffusely wheezing. Will admit

## 2020-02-13 NOTE — ED PROVIDER NOTE - OBJECTIVE STATEMENT
68yo F with PMHx HTN, DM, GERD, COPD on 2L home O2, DVT/PE on eliquis, with h/o admissions but no intubations, presents for SOB and wheezing worsening over the past few days. Chronic cough. Denies fever. States feels like COPD and "usually when it gets this bad they admit me." Pt states had appointment with PMD next Tuesday (in 5 days) but called PMD office because she felt like she couldn't wait that long and they told her to go to ED for eval. Pt also notes she has chronic back pain, takes percocet for it. Had MRI lumbar spine showing moderate-severe and severe spinal stenosis at multiple levels. Denies fever, chills, headache, dizziness, CP, nausea, vomiting, diarrhea, abd pain, dysuria, leg swelling.

## 2020-02-13 NOTE — ED PROVIDER NOTE - PHYSICAL EXAMINATION
Vital signs reviewed  GENERAL: Patient nontoxic appearing, NAD  HEAD: NCAT  EYES: Anicteric  ENT: MMM  RESPIRATORY: Slightly increased respiratory effort. B/L expiratory wheezing diffusely. Speaks in medium length sentences. Mild-moderate respiratory distress  CARDIOVASCULAR: Regular rate and rhythm  ABDOMEN: Soft. Nondistended. Nontender. No guarding or rebound.   MUSCULOSKELETAL/EXTREMITIES: Brisk cap refill. Equal radial pulses. No leg edema. Lower lumbar muscle spasm, no midline TTP.   SKIN:  Warm and dry  NEURO: AAOx3. No gross FND.

## 2020-02-13 NOTE — ED PROVIDER NOTE - CLINICAL SUMMARY MEDICAL DECISION MAKING FREE TEXT BOX
70yo F with PMHx HTN, DM, GERD, COPD on 2L home O2, DVT/PE on eliquis, presents for SOB and wheezing worsening over the past few days. Pt with some improvement s/p meds but still diffusely wheezing. Will admit

## 2020-02-13 NOTE — ED PROVIDER NOTE - NS ED ROS FT
Constitutional: No fever  ENMT:  No neck pain  Cardiac:  No chest pain  Respiratory:  +cough, SOB  GI:  No nausea, vomiting, diarrhea, abdominal pain.  :  No dysuria  MS:  +chronic back pain.  Neuro:  No headache or lightheadedness  Skin:  No skin rash  Endocrine: h/o diabetes.  Except as documented in the HPI,  all other systems are negative.

## 2020-02-13 NOTE — ED ADULT NURSE REASSESSMENT NOTE - NS ED NURSE REASSESS COMMENT FT1
pt. came to ED c'o difficulty breathing with hx of COPD. pt. alert and oriented times four. VSS. pt. uses oxygen at home as needed. Labs drawn and sent. Meds given as ordered. IVL place. Comfort and safety and measures in place. will monitor.

## 2020-02-14 LAB
ALBUMIN SERPL ELPH-MCNC: 2.9 G/DL — LOW (ref 3.5–5.2)
ALBUMIN SERPL ELPH-MCNC: 3.1 G/DL — LOW (ref 3.5–5.2)
ALP SERPL-CCNC: 78 U/L — SIGNIFICANT CHANGE UP (ref 30–115)
ALP SERPL-CCNC: 82 U/L — SIGNIFICANT CHANGE UP (ref 30–115)
ALT FLD-CCNC: 11 U/L — SIGNIFICANT CHANGE UP (ref 0–41)
ALT FLD-CCNC: 9 U/L — SIGNIFICANT CHANGE UP (ref 0–41)
ANION GAP SERPL CALC-SCNC: 12 MMOL/L — SIGNIFICANT CHANGE UP (ref 7–14)
ANION GAP SERPL CALC-SCNC: 13 MMOL/L — SIGNIFICANT CHANGE UP (ref 7–14)
AST SERPL-CCNC: 21 U/L — SIGNIFICANT CHANGE UP (ref 0–41)
AST SERPL-CCNC: 33 U/L — SIGNIFICANT CHANGE UP (ref 0–41)
BASE EXCESS BLDV CALC-SCNC: 1.7 MMOL/L — SIGNIFICANT CHANGE UP (ref -2–2)
BASOPHILS # BLD AUTO: 0.01 K/UL — SIGNIFICANT CHANGE UP (ref 0–0.2)
BASOPHILS NFR BLD AUTO: 0.2 % — SIGNIFICANT CHANGE UP (ref 0–1)
BILIRUB SERPL-MCNC: 0.5 MG/DL — SIGNIFICANT CHANGE UP (ref 0.2–1.2)
BILIRUB SERPL-MCNC: 0.7 MG/DL — SIGNIFICANT CHANGE UP (ref 0.2–1.2)
BUN SERPL-MCNC: 4 MG/DL — LOW (ref 10–20)
BUN SERPL-MCNC: 5 MG/DL — LOW (ref 10–20)
CA-I SERPL-SCNC: 1.08 MMOL/L — LOW (ref 1.12–1.3)
CALCIUM SERPL-MCNC: 8.2 MG/DL — LOW (ref 8.5–10.1)
CALCIUM SERPL-MCNC: 8.4 MG/DL — LOW (ref 8.5–10.1)
CHLORIDE SERPL-SCNC: 98 MMOL/L — SIGNIFICANT CHANGE UP (ref 98–110)
CHLORIDE SERPL-SCNC: 98 MMOL/L — SIGNIFICANT CHANGE UP (ref 98–110)
CO2 SERPL-SCNC: 23 MMOL/L — SIGNIFICANT CHANGE UP (ref 17–32)
CO2 SERPL-SCNC: 24 MMOL/L — SIGNIFICANT CHANGE UP (ref 17–32)
CREAT SERPL-MCNC: 0.5 MG/DL — LOW (ref 0.7–1.5)
CREAT SERPL-MCNC: 0.5 MG/DL — LOW (ref 0.7–1.5)
EOSINOPHIL # BLD AUTO: 0 K/UL — SIGNIFICANT CHANGE UP (ref 0–0.7)
EOSINOPHIL NFR BLD AUTO: 0 % — SIGNIFICANT CHANGE UP (ref 0–8)
ESTIMATED AVERAGE GLUCOSE: 114 MG/DL — SIGNIFICANT CHANGE UP (ref 68–114)
GAS PNL BLDV: 132 MMOL/L — LOW (ref 136–145)
GAS PNL BLDV: SIGNIFICANT CHANGE UP
GLUCOSE BLDC GLUCOMTR-MCNC: 146 MG/DL — HIGH (ref 70–99)
GLUCOSE BLDC GLUCOMTR-MCNC: 212 MG/DL — HIGH (ref 70–99)
GLUCOSE BLDC GLUCOMTR-MCNC: 234 MG/DL — HIGH (ref 70–99)
GLUCOSE SERPL-MCNC: 117 MG/DL — HIGH (ref 70–99)
GLUCOSE SERPL-MCNC: 171 MG/DL — HIGH (ref 70–99)
HBA1C BLD-MCNC: 5.6 % — SIGNIFICANT CHANGE UP (ref 4–5.6)
HCO3 BLDV-SCNC: 27 MMOL/L — SIGNIFICANT CHANGE UP (ref 22–29)
HCT VFR BLD CALC: 43.4 % — SIGNIFICANT CHANGE UP (ref 37–47)
HCT VFR BLDA CALC: 48.2 % — HIGH (ref 34–44)
HGB BLD CALC-MCNC: 15.7 G/DL — SIGNIFICANT CHANGE UP (ref 14–18)
HGB BLD-MCNC: 14.1 G/DL — SIGNIFICANT CHANGE UP (ref 12–16)
IMM GRANULOCYTES NFR BLD AUTO: 0.5 % — HIGH (ref 0.1–0.3)
LACTATE BLDV-MCNC: 0.9 MMOL/L — SIGNIFICANT CHANGE UP (ref 0.5–1.6)
LYMPHOCYTES # BLD AUTO: 0.54 K/UL — LOW (ref 1.2–3.4)
LYMPHOCYTES # BLD AUTO: 12.8 % — LOW (ref 20.5–51.1)
MAGNESIUM SERPL-MCNC: 2 MG/DL — SIGNIFICANT CHANGE UP (ref 1.8–2.4)
MCHC RBC-ENTMCNC: 30.2 PG — SIGNIFICANT CHANGE UP (ref 27–31)
MCHC RBC-ENTMCNC: 32.5 G/DL — SIGNIFICANT CHANGE UP (ref 32–37)
MCV RBC AUTO: 92.9 FL — SIGNIFICANT CHANGE UP (ref 81–99)
MONOCYTES # BLD AUTO: 0.04 K/UL — LOW (ref 0.1–0.6)
MONOCYTES NFR BLD AUTO: 0.9 % — LOW (ref 1.7–9.3)
NEUTROPHILS # BLD AUTO: 3.61 K/UL — SIGNIFICANT CHANGE UP (ref 1.4–6.5)
NEUTROPHILS NFR BLD AUTO: 85.6 % — HIGH (ref 42.2–75.2)
NRBC # BLD: 0 /100 WBCS — SIGNIFICANT CHANGE UP (ref 0–0)
PCO2 BLDV: 44 MMHG — SIGNIFICANT CHANGE UP (ref 41–51)
PH BLDV: 7.4 — SIGNIFICANT CHANGE UP (ref 7.26–7.43)
PLATELET # BLD AUTO: 245 K/UL — SIGNIFICANT CHANGE UP (ref 130–400)
PO2 BLDV: 60 MMHG — HIGH (ref 20–40)
POTASSIUM BLDV-SCNC: 3.8 MMOL/L — SIGNIFICANT CHANGE UP (ref 3.3–5.6)
POTASSIUM SERPL-MCNC: 4.2 MMOL/L — SIGNIFICANT CHANGE UP (ref 3.5–5)
POTASSIUM SERPL-MCNC: 4.9 MMOL/L — SIGNIFICANT CHANGE UP (ref 3.5–5)
POTASSIUM SERPL-SCNC: 4.2 MMOL/L — SIGNIFICANT CHANGE UP (ref 3.5–5)
POTASSIUM SERPL-SCNC: 4.9 MMOL/L — SIGNIFICANT CHANGE UP (ref 3.5–5)
PROT SERPL-MCNC: 6.2 G/DL — SIGNIFICANT CHANGE UP (ref 6–8)
PROT SERPL-MCNC: 6.2 G/DL — SIGNIFICANT CHANGE UP (ref 6–8)
RBC # BLD: 4.67 M/UL — SIGNIFICANT CHANGE UP (ref 4.2–5.4)
RBC # FLD: 15.4 % — HIGH (ref 11.5–14.5)
SAO2 % BLDV: 89 % — SIGNIFICANT CHANGE UP
SODIUM SERPL-SCNC: 133 MMOL/L — LOW (ref 135–146)
SODIUM SERPL-SCNC: 135 MMOL/L — SIGNIFICANT CHANGE UP (ref 135–146)
WBC # BLD: 4.22 K/UL — LOW (ref 4.8–10.8)
WBC # FLD AUTO: 4.22 K/UL — LOW (ref 4.8–10.8)

## 2020-02-14 PROCEDURE — 99223 1ST HOSP IP/OBS HIGH 75: CPT | Mod: AI,GC

## 2020-02-14 PROCEDURE — 71045 X-RAY EXAM CHEST 1 VIEW: CPT | Mod: 26

## 2020-02-14 RX ORDER — APIXABAN 2.5 MG/1
5 TABLET, FILM COATED ORAL EVERY 12 HOURS
Refills: 0 | Status: DISCONTINUED | OUTPATIENT
Start: 2020-02-14 | End: 2020-02-19

## 2020-02-14 RX ORDER — FUROSEMIDE 40 MG
40 TABLET ORAL DAILY
Refills: 0 | Status: DISCONTINUED | OUTPATIENT
Start: 2020-02-14 | End: 2020-02-19

## 2020-02-14 RX ORDER — IPRATROPIUM/ALBUTEROL SULFATE 18-103MCG
0 AEROSOL WITH ADAPTER (GRAM) INHALATION
Qty: 4 | Refills: 0 | DISCHARGE

## 2020-02-14 RX ORDER — AMLODIPINE BESYLATE 2.5 MG/1
0 TABLET ORAL
Qty: 12 | Refills: 0 | DISCHARGE

## 2020-02-14 RX ORDER — BUDESONIDE AND FORMOTEROL FUMARATE DIHYDRATE 160; 4.5 UG/1; UG/1
2 AEROSOL RESPIRATORY (INHALATION)
Refills: 0 | Status: DISCONTINUED | OUTPATIENT
Start: 2020-02-14 | End: 2020-02-19

## 2020-02-14 RX ORDER — METFORMIN HYDROCHLORIDE 850 MG/1
1 TABLET ORAL
Qty: 0 | Refills: 0 | DISCHARGE

## 2020-02-14 RX ORDER — OXYCODONE AND ACETAMINOPHEN 5; 325 MG/1; MG/1
2 TABLET ORAL EVERY 6 HOURS
Refills: 0 | Status: DISCONTINUED | OUTPATIENT
Start: 2020-02-14 | End: 2020-02-17

## 2020-02-14 RX ORDER — IPRATROPIUM/ALBUTEROL SULFATE 18-103MCG
3 AEROSOL WITH ADAPTER (GRAM) INHALATION EVERY 4 HOURS
Refills: 0 | Status: DISCONTINUED | OUTPATIENT
Start: 2020-02-14 | End: 2020-02-19

## 2020-02-14 RX ORDER — ALBUTEROL 90 UG/1
0 AEROSOL, METERED ORAL
Qty: 375 | Refills: 0 | DISCHARGE

## 2020-02-14 RX ORDER — OXYCODONE AND ACETAMINOPHEN 5; 325 MG/1; MG/1
1 TABLET ORAL EVERY 6 HOURS
Refills: 0 | Status: DISCONTINUED | OUTPATIENT
Start: 2020-02-14 | End: 2020-02-14

## 2020-02-14 RX ORDER — ALPRAZOLAM 0.25 MG
0.5 TABLET ORAL EVERY 8 HOURS
Refills: 0 | Status: DISCONTINUED | OUTPATIENT
Start: 2020-02-14 | End: 2020-02-19

## 2020-02-14 RX ORDER — TRAZODONE HCL 50 MG
50 TABLET ORAL AT BEDTIME
Refills: 0 | Status: DISCONTINUED | OUTPATIENT
Start: 2020-02-14 | End: 2020-02-19

## 2020-02-14 RX ORDER — PANTOPRAZOLE SODIUM 20 MG/1
40 TABLET, DELAYED RELEASE ORAL
Refills: 0 | Status: DISCONTINUED | OUTPATIENT
Start: 2020-02-14 | End: 2020-02-19

## 2020-02-14 RX ADMIN — OXYCODONE AND ACETAMINOPHEN 2 TABLET(S): 5; 325 TABLET ORAL at 11:00

## 2020-02-14 RX ADMIN — OXYCODONE AND ACETAMINOPHEN 2 TABLET(S): 5; 325 TABLET ORAL at 19:20

## 2020-02-14 RX ADMIN — Medication 50 MILLIGRAM(S): at 21:26

## 2020-02-14 RX ADMIN — APIXABAN 5 MILLIGRAM(S): 2.5 TABLET, FILM COATED ORAL at 17:42

## 2020-02-14 RX ADMIN — Medication 3 MILLILITER(S): at 20:59

## 2020-02-14 RX ADMIN — Medication 40 MILLIGRAM(S): at 05:26

## 2020-02-14 RX ADMIN — APIXABAN 5 MILLIGRAM(S): 2.5 TABLET, FILM COATED ORAL at 05:26

## 2020-02-14 RX ADMIN — OXYCODONE AND ACETAMINOPHEN 1 TABLET(S): 5; 325 TABLET ORAL at 04:46

## 2020-02-14 RX ADMIN — Medication 3 MILLILITER(S): at 05:26

## 2020-02-14 RX ADMIN — Medication 0.5 MILLIGRAM(S): at 21:35

## 2020-02-14 RX ADMIN — Medication 60 MILLIGRAM(S): at 05:28

## 2020-02-14 RX ADMIN — Medication 3 MILLILITER(S): at 15:22

## 2020-02-14 RX ADMIN — Medication 60 MILLIGRAM(S): at 21:26

## 2020-02-14 RX ADMIN — Medication 60 MILLIGRAM(S): at 13:34

## 2020-02-14 RX ADMIN — PANTOPRAZOLE SODIUM 40 MILLIGRAM(S): 20 TABLET, DELAYED RELEASE ORAL at 05:25

## 2020-02-14 RX ADMIN — BUDESONIDE AND FORMOTEROL FUMARATE DIHYDRATE 2 PUFF(S): 160; 4.5 AEROSOL RESPIRATORY (INHALATION) at 20:18

## 2020-02-14 RX ADMIN — OXYCODONE AND ACETAMINOPHEN 2 TABLET(S): 5; 325 TABLET ORAL at 17:42

## 2020-02-14 NOTE — DISCHARGE NOTE PROVIDER - NSDCCPCAREPLAN_GEN_ALL_CORE_FT
PRINCIPAL DISCHARGE DIAGNOSIS  Diagnosis: COPD exacerbation  Assessment and Plan of Treatment: take medication and inhalers as prescribed.  avoid smoking.  follow up with Dr. rincon and your PMD as outpatient PRINCIPAL DISCHARGE DIAGNOSIS  Diagnosis: COPD exacerbation  Assessment and Plan of Treatment: take medication and inhalers as prescribed.  avoid smoking.  take prednisone 40mg for 3 days, and then 20mg for 3 days.  follow up with Dr. Stefanie rasmussen PMD as outpatient in 2 weeks      SECONDARY DISCHARGE DIAGNOSES  Diagnosis: Hypertension  Assessment and Plan of Treatment: amlopdpine added to control blood pressure

## 2020-02-14 NOTE — PHYSICAL THERAPY INITIAL EVALUATION ADULT - PERTINENT HX OF CURRENT PROBLEM, REHAB EVAL
69 year old female patient with past medical history of COPD on 2 L NC PRN, DVT / PE on Eliquis, chronic back pain due to spinal stenosis and chronic compression fractures, GERD presenting for dyspnea.

## 2020-02-14 NOTE — CONSULT NOTE ADULT - SUBJECTIVE AND OBJECTIVE BOX
Patient is a 69y old  Female who presents with a chief complaint of Dyspnea (14 Feb 2020 09:29)    HPI:  69 year old female patient with past medical history of COPD on 2 L NC PRN, DVT / PE on Eliquis, chronic back pain due to spinal stenosis and chronic compression fractures, GERD presenting for dyspnea.   The patient states that she has been experiencing dyspnea on minimal exertion even walking a few steps, and at rest, associated with wheezing and cough productive of clear sputum. She denies fever / chills and reports upper respiratory symptoms consisting of coryza, rhinorrhea and sore throat.  The patient has been lying in bed most of the time for the past week because of severe back pain, and was told by her PMD to report to the ED for suspected COPD exacerbation and for PT / rehab. Of note an MRI of the lumbosacral spine done recently showed likely acute / subacute compression fractures of T12 and T11, mild chronic compression fractures of L1 through L5 as well as spinal stenoses.   In the ED : Chest x ray unremarkable, ABG normal, the patient was given Duoneb and Solumedrol 60 mg IV with persistence of the wheezing afterwards and decision was made to admit (14 Feb 2020 03:05)      PAST MEDICAL & SURGICAL HISTORY:  Spinal stenosis  Chronic GERD  Hypertension  Chronic pain  Depression  Anxiety  COPD (chronic obstructive pulmonary disease)  Neck mass      Hospital Course: COPD exacerbation   - Chest x ray unremarkable per my read, ABG normal, RVP negative   - F/U official chest x ray results   - Duoneb q4h + Solumedrol 60 mg IV q8 + Symbicort 2 puffs BID  - Keep SpO2 between 90 and 92 %  - Pulmonary consult   - Unlikely pneumonia, will defer antibiotics for now     # Chronic back pain secondary to spinal stenosis and chronic, acute / subacute compression fractures  - Continue Percocet   - outpatient follow up with pain management / back surgery if pain uncontrolled   - PT rehab consult     # GERD : Continue Protonix     # HTN : Patient on Amlodipine in the past, restart if BP uncontrolled in hospital     # Diabetes ? Noted in chart but denied by patient, patient was omn Metformin in the past   - F/U A1C   - FS before meals and at bedtime as patient will be on Solumedrol  - Start insulin coverage if FS > 180  - Carb consistent diet     # History of DVT / PE : Continue Eliquis     # Miscellaneous   ·	DVT prophylaxis : Patient on Eliquis   ·	GI prophylaxis : Protonix   ·	Ambulate as tolerated   DASH TLC carb consistent diet     TODAY'S SUBJECTIVE & REVIEW OF SYMPTOMS:     Constitutional WNL   Cardio WNL   Resp WNL   GI WNL  Heme WNL  Endo WNL  Skin WNL  MSK WNL  Neuro WNL  Cognitive WNL  Psych WNL      MEDICATIONS  (STANDING):  albuterol/ipratropium for Nebulization 3 milliLiter(s) Nebulizer every 4 hours  apixaban 5 milliGRAM(s) Oral every 12 hours  budesonide 160 MICROgram(s)/formoterol 4.5 MICROgram(s) Inhaler 2 Puff(s) Inhalation two times a day  furosemide    Tablet 40 milliGRAM(s) Oral daily  methylPREDNISolone sodium succinate Injectable 60 milliGRAM(s) IV Push every 8 hours  pantoprazole    Tablet 40 milliGRAM(s) Oral before breakfast  traZODone 50 milliGRAM(s) Oral at bedtime    MEDICATIONS  (PRN):  ALPRAZolam 0.5 milliGRAM(s) Oral every 8 hours PRN anxiety  guaiFENesin   Syrup  (Sugar-Free) 200 milliGRAM(s) Oral every 4 hours PRN Cough  oxycodone    5 mG/acetaminophen 325 mG 2 Tablet(s) Oral every 6 hours PRN Severe Pain (7 - 10)      FAMILY HISTORY:  FH: lung cancer: Mother  Family history of COPD (chronic obstructive pulmonary disease) (Mother)      Allergies    penicillin (Unknown)    Intolerances        SOCIAL HISTORY:    [    ] Etoh  [    ] Smoking  [    ] Substance abuse     Home Environment:  [    ] Home Alone  [  x  ] Lives with Family BROTHER  [    ] Home Health Aid    Dwelling:  [  x  ] Apartment  [    ] Private House  [    ] Adult Home  [    ] Skilled Nursing Facility      [    ] Short Term  [    ] Long Term  [    ] Stairs                           [   x ] Elevator     FUNCTIONAL STATUS PTA: (Check all that apply)  Ambulation: [ x    ]Independent    [    ] Dependent     [    ] Non-Ambulatory  Assistive Device: [    ] SA Cane  [    ]  Q Cane  [  x  ] Walker  [  x  ]  Wheelchair  ADL : [ x   ] Independent  [    ]  Dependent   O2 HS and PRN    Vital Signs Last 24 Hrs  T(C): 36.3 (14 Feb 2020 07:59), Max: 36.6 (13 Feb 2020 21:25)  T(F): 97.4 (14 Feb 2020 07:59), Max: 97.8 (13 Feb 2020 21:25)  HR: 81 (14 Feb 2020 07:59) (81 - 96)  BP: 132/66 (14 Feb 2020 07:59) (121/59 - 140/78)  BP(mean): --  RR: 18 (14 Feb 2020 07:59) (18 - 20)  SpO2: 100% (14 Feb 2020 07:59) (95% - 100%)      PHYSICAL EXAM: Alert & Oriented X3 On O2  GENERAL: NAD, well-groomed, well-developed  HEAD:  Atraumatic, Normocephalic  EYES: EOMI, PERRLA, conjunctiva and sclera clear  NECK: Supple  CHEST/LUNG: Santhosh exp wheeze  HEART: Regular rate and rhythm  ABDOMEN: Soft, Nontender, Nondistended; Bowel sounds present  EXTREMITIES:  no calf tenderness,no edema BLES    NERVOUS SYSTEM:  Cranial Nerves 2-12 intact [  x  ] Abnormal  [    ]  ROM: WFL all extremities [  x  ]  Abnormal [     ]  Motor Strength: WFL all extremities  [    ]  Abnormal [ x   ]R shoulder 1-2/5 distal RUE4/5 Other ext 4/5  Sensation: intact to light touch [ x   ] Abnormal [    ]      FUNCTIONAL STATUS:  Bed Mobility: [   ]  Independent [  x  ]  Supervision [    ]  Needs Assistance [  ]  N/A  Transfers: [    ]  Independent [   x ]  Supervision [    ]  Needs Assistance [    ]  N/A    Ambulation:  [    ]  Independent [    ]  Supervision [   x ]  Needs Assistance [    ]  N/A   ADL:  [    ]   Independent [    ] Requires Assistance [   x ] N/A   AMBULATES TO BATHROOM IN ER WITH STAFF    LABS:                        14.1   4.22  )-----------( 245      ( 14 Feb 2020 07:00 )             43.4     02-14    135  |  98  |  5<L>  ----------------------------<  171<H>  4.2   |  24  |  0.5<L>    Ca    8.4<L>      14 Feb 2020 07:00  Mg     2.0     02-14    TPro  6.2  /  Alb  3.1<L>  /  TBili  0.5  /  DBili  x   /  AST  21  /  ALT  9   /  AlkPhos  82  02-14          RADIOLOGY & ADDITIONAL STUDIES:

## 2020-02-14 NOTE — H&P ADULT - ASSESSMENT
69 year old female patient with past medical history of COPD on 2 L NC PRN, DVT / PE on Eliquis, chronic back pain due to spinal stenosis and chronic compression fractures, GERD presenting for dyspnea.     # COPD exacerbation   - Chest x ray unremarkable per my read, ABG normal, RVP negative   - F/U official chest x ray results   - Duoneb q4h + Solumedrol 60 mg IV q8 + Symbicort 2 puffs BID  - Keep SpO2 between 90 and 92 %  - Pulmonary consult   - Unlikely pneumonia, will defer antibiotics for now     # Chronic back pain secondary to spinal stenosis and chronic, acute / subacute compression fractures  - Continue Percocet   - outpatient follow up with pain management / back surgery if pain uncontrolled   - PT rehab consult     # GERD : Continue Protonix     # HTN : Patient on Amlodipine in the past, restart if BP uncontrolled in hospital     # Diabetes ? Noted in chart but denied by patient, patient was omn Metformin in the past   - F/U A1C   - FS before meals and at bedtime as patient will be on Solumedrol  - Start insulin coverage if FS > 180  - Carb consistent diet     # History of DVT / PE : Continue Eliquis     # Miscellaneous   ·	DVT prophylaxis : Patient on Eliquis   ·	GI prophylaxis : Protonix   ·	Ambulate as tolerated   ·	DASH TLC carb consistent diet   ·	Full code

## 2020-02-14 NOTE — H&P ADULT - NSICDXFAMILYHX_GEN_ALL_CORE_FT
FAMILY HISTORY:  FH: lung cancer, Mother    Mother  Still living? Unknown  Family history of COPD (chronic obstructive pulmonary disease), Age at diagnosis: Age Unknown

## 2020-02-14 NOTE — H&P ADULT - NSHPSOCIALHISTORY_GEN_ALL_CORE
- Former smoker, smoked for 45 years, stopped May 2019  - Drinks alcohol on occasions  - Denies drug use   - Uses a walker for ambulation

## 2020-02-14 NOTE — H&P ADULT - ATTENDING COMMENTS
HPI:  69 year old female patient with past medical history of COPD on 2 L NC PRN, DVT / PE on Eliquis, chronic back pain due to spinal stenosis and chronic compression fractures, GERD presenting for dyspnea.   The patient states that she has been experiencing dyspnea on minimal exertion even walking a few steps, and at rest, associated with wheezing and cough productive of clear sputum. She denies fever / chills and reports upper respiratory symptoms consisting of coryza, rhinorrhea and sore throat.  The patient has been lying in bed most of the time for the past week because of severe back pain, and was told by her PMD to report to the ED for suspected COPD exacerbation and for PT / rehab. Of note an MRI of the lumbosacral spine done recently showed likely acute / subacute compression fractures of T12 and T11, mild chronic compression fractures of L1 through L5 as well as spinal stenoses.   In the ED : Chest x ray unremarkable, ABG normal, the patient was given Duoneb and Solumedrol 60 mg IV with persistence of the wheezing afterwards and decision was made to admit (14 Feb 2020 03:05)    REVIEW OF SYSTEMS: see cc/HPI  CONSTITUTIONAL: No weakness, fevers or chills  EYES/ENT: No visual changes;  No vertigo or throat pain   NECK: No pain or stiffness  RESPIRATORY: (+) cough, (+) wheezing, NO hemoptysis; (+) shortness of breath, see cc/HPI  CARDIOVASCULAR: No chest pain or palpitations  GASTROINTESTINAL: No abdominal or epigastric pain. No nausea, vomiting, or hematemesis; No diarrhea or constipation. No melena or hematochezia.  GENITOURINARY: No dysuria, frequency or hematuria  NEUROLOGICAL: No numbness or weakness  SKIN: No itching, rashes    Physical Exam:  General: WN/WD NAD  Neurology: A&Ox3, nonfocal, follows commands  Eyes: PERRLA/ EOMI  ENT/Neck: Neck supple, trachea midline, No JVD  Respiratory: CTA B/L, No wheezing, rales, rhonchi  CV: Normal rate regular rhythm, S1S2, no murmurs, rubs or gallops  Abdominal: Soft, NT, ND +BS,   Extremities: No edema, + peripheral pulses  Skin: No Rashes, Hematoma, Ecchymosis  Incisions: n/a  Tubes: n/a    A/p  COPD   -IV steroid / Nebs PRN/ Spiriva  -O2 via NC / pulse ox monitoring   -Pulm eval     Chronic back pain    DVT/PE   -c/w Eliquis    GERD  -PPI    HTN  - Lasix    ?? Diabetes type II  -F/s monitoring and Lispro PRN ( given patient is to be on IV steroids) HPI:  69 year old female patient with past medical history of COPD on 2 L NC PRN, DVT / PE on Eliquis, chronic back pain due to spinal stenosis and chronic compression fractures, GERD presenting for dyspnea.   The patient states that she has been experiencing dyspnea on minimal exertion even walking a few steps, and at rest, associated with wheezing and cough productive of clear sputum. She denies fever / chills and reports upper respiratory symptoms consisting of coryza, rhinorrhea and sore throat.  The patient has been lying in bed most of the time for the past week because of severe back pain, and was told by her PMD to report to the ED for suspected COPD exacerbation and for PT / rehab. Of note an MRI of the lumbosacral spine done recently showed likely acute / subacute compression fractures of T12 and T11, mild chronic compression fractures of L1 through L5 as well as spinal stenoses.   In the ED : Chest x ray unremarkable, ABG normal, the patient was given Duoneb and Solumedrol 60 mg IV with persistence of the wheezing afterwards and decision was made to admit (14 Feb 2020 03:05)    REVIEW OF SYSTEMS: see cc/HPI  CONSTITUTIONAL: No weakness, fevers or chills  EYES/ENT: No visual changes;  No vertigo or throat pain   NECK: No pain or stiffness  RESPIRATORY: (+) cough, (+) wheezing, NO hemoptysis; (+) shortness of breath, see cc/HPI  CARDIOVASCULAR: No chest pain or palpitations  GASTROINTESTINAL: No abdominal or epigastric pain. No nausea, vomiting, or hematemesis; No diarrhea or constipation. No melena or hematochezia.  GENITOURINARY: No dysuria, frequency or hematuria  NEUROLOGICAL: No numbness or weakness  SKIN: No itching, rashes    Physical Exam:  General: WN/WD NAD  Neurology: A&Ox3, nonfocal, follows commands  Eyes: PERRLA/ EOMI  ENT/Neck: Neck supple, trachea midline, No JVD  Respiratory: B/L wheezing, and decreased breath sounds, NO rales, NO rhonchi  CV: Normal rate regular rhythm, S1S2, no murmurs, rubs or gallops  Abdominal: Soft, NT, ND +BS, obese  Extremities: No edema, + peripheral pulses  Skin: No Rashes, Hematoma, Ecchymosis  Back- tenderness in the low back and pain w/ movement  Incisions: n/a  Tubes: n/a    A/p  COPD exacerbation  -IV steroid / Nebs PRN/ Spiriva  -O2 via NC / pulse ox monitoring   -Pulm eval     Chronic back pain/ spinal stenosis / compression fractures  -c/w Percocet PRN     DVT/PE   -c/w Eliquis    GERD  -PPI    HTN  - Lasix    ?? Diabetes type II  -F/s monitoring and Lispro PRN ( given patient is to be on IV steroids)  -check A1c

## 2020-02-14 NOTE — DISCHARGE NOTE PROVIDER - PROVIDER TOKENS
PROVIDER:[TOKEN:[[id number]],FOLLOWUP:[Routine]],PROVIDER:[TOKEN:[[id number]],FOLLOWUP:[Routine]] PROVIDER:[TOKEN:[[id number]],FOLLOWUP:[Routine]],PROVIDER:[TOKEN:[[id number]],FOLLOWUP:[2 weeks]]

## 2020-02-14 NOTE — H&P ADULT - NSICDXPASTMEDICALHX_GEN_ALL_CORE_FT
PAST MEDICAL HISTORY:  Anxiety     Chronic GERD     Chronic pain     COPD (chronic obstructive pulmonary disease)     Depression     Hypertension     Spinal stenosis

## 2020-02-14 NOTE — CONSULT NOTE ADULT - SUBJECTIVE AND OBJECTIVE BOX
Patient is a 69y old  Female who presents with a chief complaint of Dyspnea (14 Feb 2020 03:05)      HPI:  69 year old female patient with past medical history of HTN, DM2, COPD on 2 L NC PRN, DVT / PE on Eliquis, chronic back pain due to spinal stenosis and chronic compression fractures, GERD presenting for dyspnea.   The patient states that she has been experiencing dyspnea on minimal exertion even walking a few steps, and at rest, associated with wheezing and cough productive of clear sputum. She denies fever / chills and reports upper respiratory symptoms consisting of coryza, rhinorrhea and sore throat.  The patient has been lying in bed most of the time for the past week because of severe back pain, and was told by her PMD to report to the ED for suspected COPD exacerbation and for PT / rehab. Of note an MRI of the lumbosacral spine done recently showed likely acute / subacute compression fractures of T12 and T11, mild chronic compression fractures of L1 through L5 as well as spinal stenoses.   In the ED : Chest x ray unremarkable, ABG normal, the patient was given Duoneb and Solumedrol 60 mg IV with persistence of the wheezing afterwards and decision was made to admit (14 Feb 2020 03:05)    Patient follows with pulm Dr. Chris Contreras. Upon evaluation, patient notes some improvement of her SOB, she is able to hold a conversation comfortably, although she still has dyspnea after walking to the bathroom, and chronic cough. Currently saturating well on 1.5L NC O2. She has home oxygen 2L NC but only uses it occasionally at night prn. Patient has been hospitalized multiple times in the past for COPD exacerbations, but denies every being intubated. She has been taking her inhalers at home as prescribed. Former smoker, 45 years. Patient was experiencing some mild URI symptoms over the past few days including sore throat, which may have exacerbated her COPD. She has also been lying in bed for several days, due to worsening of her chronic back pain. Patient complains of LLE tenderness to palpation, although no pain at rest, and she says she has been taking her Eliquis as prescribed.     PAST MEDICAL & SURGICAL HISTORY:  Spinal stenosis  Chronic GERD  Hypertension  Chronic pain  Depression  Anxiety  COPD (chronic obstructive pulmonary disease)  Neck mass      SOCIAL HX:   Smoking                         ETOH                            Other    FAMILY HISTORY:  FH: lung cancer: Mother  Family history of COPD (chronic obstructive pulmonary disease) (Mother)  .  No cardiovascular or pulmonary family history     Review of System:  See HPI    Allergies    penicillin (Unknown)    Intolerances          PHYSICAL EXAM  Vital Signs Last 24 Hrs  T(C): 36.3 (14 Feb 2020 07:59), Max: 36.6 (13 Feb 2020 21:25)  T(F): 97.4 (14 Feb 2020 07:59), Max: 97.8 (13 Feb 2020 21:25)  HR: 81 (14 Feb 2020 07:59) (81 - 96)  BP: 132/66 (14 Feb 2020 07:59) (121/59 - 140/78)  BP(mean): --  RR: 18 (14 Feb 2020 07:59) (18 - 20)  SpO2: 100% (14 Feb 2020 07:59) (95% - 100%)    General: In NAD. Conversing comfortably on 1.5 L NC O2.  HEENT: ANDRA             Lymphatic system: No cervical LN     Lungs: Diffuse B/L wheezing in all lung fields.  Cardiovascular: Regular  Gastrointestinal: Soft.  + BS   Musculoskeletal: Swelling of L lower extremity compared to R, tenderness to palpation of L calf. 2+ DP pulses. No Clubbing.  Full range of motion. Moves all extremities. Tenderness to palpation of thoracic and lumbar spine.  Skin: Warm.  Intact  Neurological: No motor or sensory deficit       LABS:                          14.1   4.22  )-----------( 245      ( 14 Feb 2020 07:00 )             43.4                                               02-14    135  |  98  |  5<L>  ----------------------------<  171<H>  4.2   |  24  |  0.5<L>    Ca    8.4<L>      14 Feb 2020 07:00  Mg     2.0     02-14    TPro  6.2  /  Alb  3.1<L>  /  TBili  0.5  /  DBili  x   /  AST  21  /  ALT  9   /  AlkPhos  82  02-14                                                                                           LIVER FUNCTIONS - ( 14 Feb 2020 07:00 )  Alb: 3.1 g/dL / Pro: 6.2 g/dL / ALK PHOS: 82 U/L / ALT: 9 U/L / AST: 21 U/L / GGT: x                                                                                                MEDICATIONS  (STANDING):  albuterol/ipratropium for Nebulization 3 milliLiter(s) Nebulizer every 4 hours  apixaban 5 milliGRAM(s) Oral every 12 hours  budesonide 160 MICROgram(s)/formoterol 4.5 MICROgram(s) Inhaler 2 Puff(s) Inhalation two times a day  furosemide    Tablet 40 milliGRAM(s) Oral daily  methylPREDNISolone sodium succinate Injectable 60 milliGRAM(s) IV Push every 8 hours  pantoprazole    Tablet 40 milliGRAM(s) Oral before breakfast  traZODone 50 milliGRAM(s) Oral at bedtime    MEDICATIONS  (PRN):  ALPRAZolam 0.5 milliGRAM(s) Oral every 8 hours PRN anxiety  guaiFENesin   Syrup  (Sugar-Free) 200 milliGRAM(s) Oral every 4 hours PRN Cough  oxycodone    5 mG/acetaminophen 325 mG 2 Tablet(s) Oral every 6 hours PRN Severe Pain (7 - 10) Patient is a 69y old  Female who presents with a chief complaint of Dyspnea (14 Feb 2020 03:05)      HPI:  69 year old female patient with past medical history of HTN, DM2, COPD on 2 L NC PRN, DVT / PE on Eliquis, chronic back pain due to spinal stenosis and chronic compression fractures, GERD presenting for dyspnea.   The patient states that she has been experiencing dyspnea on minimal exertion even walking a few steps, and at rest, associated with wheezing and cough productive of clear sputum. She denies fever / chills and reports upper respiratory symptoms consisting of coryza, rhinorrhea and sore throat.  The patient has been lying in bed most of the time for the past week because of severe back pain, and was told by her PMD to report to the ED for suspected COPD exacerbation and for PT / rehab. Of note an MRI of the lumbosacral spine done recently showed likely acute / subacute compression fractures of T12 and T11, mild chronic compression fractures of L1 through L5 as well as spinal stenoses.   In the ED : Chest x ray unremarkable, ABG normal, the patient was given Duoneb and Solumedrol 60 mg IV with persistence of the wheezing afterwards and decision was made to admit (14 Feb 2020 03:05)    Patient follows with pulm Dr. Chris Contreras. Upon evaluation, patient notes some improvement of her SOB, she is able to hold a conversation comfortably, although she still has dyspnea after walking to the bathroom, and chronic cough. Currently saturating well on 1.5L NC O2. She has home oxygen 2L NC but only uses it occasionally at night prn. Patient has been hospitalized multiple times in the past for COPD exacerbations, but denies every being intubated. She has been taking her inhalers at home as prescribed. Former smoker, ~45 pack years, quit 2019. Patient was experiencing some mild URI symptoms over the past few days including sore throat, which may have exacerbated her COPD. She has also been lying in bed for several days, due to worsening of her chronic back pain. Patient complains of LLE tenderness to palpation, although no pain at rest, and she says she has been taking her Eliquis as prescribed.     PAST MEDICAL & SURGICAL HISTORY:  Spinal stenosis  Chronic GERD  Hypertension  Chronic pain  Depression  Anxiety  COPD (chronic obstructive pulmonary disease)  Neck mass      SOCIAL HX:   Smoking- Former smoker, ~45 pack years, quit 2019.                   ETOH                            Other    FAMILY HISTORY:  FH: lung cancer: Mother  Family history of COPD (chronic obstructive pulmonary disease) (Mother)  .  No cardiovascular or pulmonary family history     Review of System:  See HPI    Allergies    penicillin (Unknown)    Intolerances          PHYSICAL EXAM  Vital Signs Last 24 Hrs  T(C): 36.3 (14 Feb 2020 07:59), Max: 36.6 (13 Feb 2020 21:25)  T(F): 97.4 (14 Feb 2020 07:59), Max: 97.8 (13 Feb 2020 21:25)  HR: 81 (14 Feb 2020 07:59) (81 - 96)  BP: 132/66 (14 Feb 2020 07:59) (121/59 - 140/78)  BP(mean): --  RR: 18 (14 Feb 2020 07:59) (18 - 20)  SpO2: 100% (14 Feb 2020 07:59) (95% - 100%)    General: In NAD. Conversing comfortably on 1.5 L NC O2.  HEENT: ANDRA             Lymphatic system: No cervical LN     Lungs: Diffuse B/L wheezing in all lung fields.  Cardiovascular: Regular  Gastrointestinal: Soft.  + BS   Musculoskeletal: Swelling of L lower extremity compared to R, tenderness to palpation of L calf. 2+ DP pulses. No Clubbing.  Full range of motion. Moves all extremities. Tenderness to palpation of thoracic and lumbar spine.  Skin: Warm.  Intact  Neurological: No motor or sensory deficit       LABS:                          14.1   4.22  )-----------( 245      ( 14 Feb 2020 07:00 )             43.4                                               02-14    135  |  98  |  5<L>  ----------------------------<  171<H>  4.2   |  24  |  0.5<L>    Ca    8.4<L>      14 Feb 2020 07:00  Mg     2.0     02-14    TPro  6.2  /  Alb  3.1<L>  /  TBili  0.5  /  DBili  x   /  AST  21  /  ALT  9   /  AlkPhos  82  02-14                                                                                           LIVER FUNCTIONS - ( 14 Feb 2020 07:00 )  Alb: 3.1 g/dL / Pro: 6.2 g/dL / ALK PHOS: 82 U/L / ALT: 9 U/L / AST: 21 U/L / GGT: x                                                                                                MEDICATIONS  (STANDING):  albuterol/ipratropium for Nebulization 3 milliLiter(s) Nebulizer every 4 hours  apixaban 5 milliGRAM(s) Oral every 12 hours  budesonide 160 MICROgram(s)/formoterol 4.5 MICROgram(s) Inhaler 2 Puff(s) Inhalation two times a day  furosemide    Tablet 40 milliGRAM(s) Oral daily  methylPREDNISolone sodium succinate Injectable 60 milliGRAM(s) IV Push every 8 hours  pantoprazole    Tablet 40 milliGRAM(s) Oral before breakfast  traZODone 50 milliGRAM(s) Oral at bedtime    MEDICATIONS  (PRN):  ALPRAZolam 0.5 milliGRAM(s) Oral every 8 hours PRN anxiety  guaiFENesin   Syrup  (Sugar-Free) 200 milliGRAM(s) Oral every 4 hours PRN Cough  oxycodone    5 mG/acetaminophen 325 mG 2 Tablet(s) Oral every 6 hours PRN Severe Pain (7 - 10) Patient is a 69y old  Female who presents with a chief complaint of Dyspnea (14 Feb 2020 03:05)      HPI:  69 year old female patient with past medical history of HTN, DM2, COPD on 2 L NC PRN, DVT / PE on Eliquis, chronic back pain due to spinal stenosis and chronic compression fractures, GERD presenting for dyspnea.   The patient states that she has been experiencing dyspnea on minimal exertion even walking a few steps, and at rest, associated with wheezing and cough productive of clear sputum. She denies fever / chills and reports upper respiratory symptoms consisting of coryza, rhinorrhea and sore throat.  The patient has been lying in bed most of the time for the past week because of severe back pain, and was told by her PMD to report to the ED for suspected COPD exacerbation and for PT / rehab. Of note an MRI of the lumbosacral spine done recently showed likely acute / subacute compression fractures of T12 and T11, mild chronic compression fractures of L1 through L5 as well as spinal stenoses.   In the ED : Chest x ray unremarkable, ABG normal, the patient was given Duoneb and Solumedrol 60 mg IV with persistence of the wheezing afterwards and decision was made to admit (14 Feb 2020 03:05)    Patient follows with pulm Dr. Chris Contreras. Upon evaluation, patient notes some improvement of her SOB, she is able to hold a conversation comfortably, although she still has dyspnea after walking to the bathroom, and chronic cough. Currently saturating well on 1.5L NC O2. She has home oxygen 2L NC but only uses it occasionally at night prn. Patient has been hospitalized multiple times in the past for COPD exacerbations, but denies every being intubated. She has been taking her inhalers at home as prescribed. Former smoker, ~45 pack years, quit 2019. Patient was experiencing some mild URI symptoms over the past few days including sore throat, which may have exacerbated her COPD. She has also been lying in bed for several days, due to worsening of her chronic back pain. Patient complains of LLE tenderness to palpation, but says she has been taking her Eliquis as prescribed.     PAST MEDICAL & SURGICAL HISTORY:  Spinal stenosis  Chronic GERD  Hypertension  Chronic pain  Depression  Anxiety  COPD (chronic obstructive pulmonary disease)  Neck mass      SOCIAL HX:   Smoking- Former smoker, ~45 pack years, quit 2019.                   ETOH                            Other    FAMILY HISTORY:  FH: lung cancer: Mother  Family history of COPD (chronic obstructive pulmonary disease) (Mother)  .  No cardiovascular or pulmonary family history     Review of System:  See HPI    Allergies    penicillin (Unknown)    Intolerances          PHYSICAL EXAM  Vital Signs Last 24 Hrs  T(C): 36.3 (14 Feb 2020 07:59), Max: 36.6 (13 Feb 2020 21:25)  T(F): 97.4 (14 Feb 2020 07:59), Max: 97.8 (13 Feb 2020 21:25)  HR: 81 (14 Feb 2020 07:59) (81 - 96)  BP: 132/66 (14 Feb 2020 07:59) (121/59 - 140/78)  BP(mean): --  RR: 18 (14 Feb 2020 07:59) (18 - 20)  SpO2: 100% (14 Feb 2020 07:59) (95% - 100%)    General: In NAD. Conversing comfortably on 1.5 L NC O2.  HEENT: ANDRA             Lymphatic system: No cervical LN     Lungs: Diffuse B/L wheezing in all lung fields.  Cardiovascular: Regular  Gastrointestinal: Soft.  + BS   Musculoskeletal: Swelling of L lower extremity compared to R, tenderness to palpation of L calf. 2+ DP pulses. No Clubbing.  Full range of motion. Moves all extremities. Tenderness to palpation of thoracic and lumbar spine.  Skin: Warm.  Intact  Neurological: No motor or sensory deficit       LABS:                          14.1   4.22  )-----------( 245      ( 14 Feb 2020 07:00 )             43.4                                               02-14    135  |  98  |  5<L>  ----------------------------<  171<H>  4.2   |  24  |  0.5<L>    Ca    8.4<L>      14 Feb 2020 07:00  Mg     2.0     02-14    TPro  6.2  /  Alb  3.1<L>  /  TBili  0.5  /  DBili  x   /  AST  21  /  ALT  9   /  AlkPhos  82  02-14                                                                                           LIVER FUNCTIONS - ( 14 Feb 2020 07:00 )  Alb: 3.1 g/dL / Pro: 6.2 g/dL / ALK PHOS: 82 U/L / ALT: 9 U/L / AST: 21 U/L / GGT: x                                                                                                MEDICATIONS  (STANDING):  albuterol/ipratropium for Nebulization 3 milliLiter(s) Nebulizer every 4 hours  apixaban 5 milliGRAM(s) Oral every 12 hours  budesonide 160 MICROgram(s)/formoterol 4.5 MICROgram(s) Inhaler 2 Puff(s) Inhalation two times a day  furosemide    Tablet 40 milliGRAM(s) Oral daily  methylPREDNISolone sodium succinate Injectable 60 milliGRAM(s) IV Push every 8 hours  pantoprazole    Tablet 40 milliGRAM(s) Oral before breakfast  traZODone 50 milliGRAM(s) Oral at bedtime    MEDICATIONS  (PRN):  ALPRAZolam 0.5 milliGRAM(s) Oral every 8 hours PRN anxiety  guaiFENesin   Syrup  (Sugar-Free) 200 milliGRAM(s) Oral every 4 hours PRN Cough  oxycodone    5 mG/acetaminophen 325 mG 2 Tablet(s) Oral every 6 hours PRN Severe Pain (7 - 10)

## 2020-02-14 NOTE — DISCHARGE NOTE PROVIDER - HOSPITAL COURSE
69 year old female patient with past medical history of COPD on 2 L NC PRN, DVT / PE on Eliquis, chronic back pain due to spinal stenosis and chronic compression fractures, GERD presenting for dyspnea.     patient admitted with COPD exacerbation.    conservatively treated.    cleared to be discharged to follow up with outpatient PMD and pulmonary. 69 year old female patient with past medical history of COPD on 2 L NC PRN, DVT / PE on Eliquis, chronic back pain due to spinal stenosis and chronic compression fractures, GERD presenting for dyspnea.        # SOB, COPD exacerbation, vs chronic  PE      -pt is still wheezing     -echo normal from April 2019    -hypoxic on ABG    - Chest x ray showed atelectatic changes    -ABG normal, hyperventilating, not retention -->goes against copd exacerbation    - va duplex is negative for DVT    CT chest< from: CT Angio Chest w/ IV Cont (02.15.20 @ 19:19) >    No CTA evidence of acute pulmonary embolus.    Redemonstrated partially attenuated filling defects and multiple right upper lobe subsegmental branches, felt to reflect chronic pulmonary emboli.    -RVP negative     - Duoneb + Symbicort will be d/c on prednisone on prednisone taper, 40mg for 3 days and then 20 for 3 days    -need to follow outpatient with pulmonary clinic in 2 weeks                # Chronic back pain secondary to spinal stenosis and chronic, acute / subacute compression fractures    -pt on vicodine outpatient     - started on Percocet     - outpatient follow up with pain management             # HTN :  uncontrolled in hospital -- started on amlodipine

## 2020-02-14 NOTE — PHYSICAL THERAPY INITIAL EVALUATION ADULT - GENERAL OBSERVATIONS, REHAB EVAL
[id number] Patient encountered seated at edge of stretcher + IV lock, + NCO2 pox 98% , s/p amb 93% , NC o2 placed back on patient

## 2020-02-14 NOTE — DISCHARGE NOTE PROVIDER - CARE PROVIDER_API CALL
Chris Contreras)  Critical Care Medicine; Internal Medicine; Pulmonary Disease; Sleep Medicine  [address] [place] [address]  Phone: [phone]  Fax: [phone]  Follow Up Time: Routine    London Bermudez)  Internal Medicine  [address] [place] [address]  Phone: [phone]  Fax: [phone]  Follow Up Time: Routine London Bermudez)  Internal Medicine  [address] [place] [address]  Phone: [phone]  Fax: [phone]  Follow Up Time: Routine    Juan R Rodriguez)  Critical Care Medicine; Geriatric Medicine; Internal Medicine; Pulmonary Disease  [address] [place] [address]  Phone: [phone]  Fax: [phone]  Follow Up Time: 2 weeks

## 2020-02-14 NOTE — CONSULT NOTE ADULT - ASSESSMENT
IMPRESSION: Rehab of Debilitation COPD exac Chronic LBP    PRECAUTIONS: [ x   ] Cardiac  [   x ] Respiratory  [    ] Seizures [    ] Contact Isolation  [    ] Droplet Isolation  [    ] Other    Weight Bearing Status:     RECOMMENDATION:    Out of Bed to Chair     DVT/Decubiti Prophylaxis    REHAB PLAN:     [  x   ] Bedside P/T 3-5 times a week   [     ] Bedside O/T  2-3 times a week   [     ] No Rehab Therapy Indicated   [     ]  Speech Therapy   Conditioning/ROM                                 ADL  Bed Mobility                                            Conditioning/ROM  Transfers                                                  Bed Mobility  Sitting /Standing Balance                      Transfers                                        Gait Training                                            Sitting/Standing Balance  Stair Training [   ]Applicable                 Home equipment Eval                                                                     Splinting  [   ] Only      GOALS:   ADL   [ x   ]   Independent         Transfers  [ x   ] Independent            Ambulation  [ x    ] Independent     [   x  ] With device                            [    ]  CG                                               [    ]  CG                                                    [     ] CG                            [    ] Min A                                          [    ] Min A                                                [     ] Min  A          DISCHARGE PLAN:   [     ]  Good candidate for Intensive Rehabilitation/Hospital based                                             Will tolerate 3hrs Intensive Rehab Daily                                       [      ]  Short Term Rehab in Skilled Nursing Facility                                       [   x   ]  Home with Outpatient or  services                                         [      ]  Possible Candidate for Intensive Hospital based Rehab

## 2020-02-14 NOTE — DISCHARGE NOTE PROVIDER - NSDCMRMEDTOKEN_GEN_ALL_CORE_FT
ALPRAZOLAM .5 MG TABS:   COMBIVENT    AER : 1  inhaled every 6 hours, As Needed  Eliquis 5 mg oral tablet: 1 tab(s) orally 2 times a day  furosemide 40 mg oral tablet: 1 tab(s) orally once a day  guaiFENesin 100 mg/5 mL oral liquid: 10 milliliter(s) orally every 4 hours, As Needed  lactulose 10 g/15 mL oral syrup: 15 milliliter(s) orally once a day  pantoprazole 40 mg oral delayed release tablet: 1 tab(s) orally once a day  Percocet 5/325 oral tablet: 1 tab(s) orally every 6 hours, As Needed  Spiriva 18 mcg inhalation capsule: 1 cap(s) inhaled once a day  traZODone 50 mg oral tablet: 1 tab(s) orally once a day (at bedtime)  vitamin A &amp; D topical ointment: Apply topically to affected area 4 times a day, As Needed ALPRAZOLAM .5 MG TABS:   amLODIPine 5 mg oral tablet: 1 tab(s) orally once a day  cholecalciferol oral tablet: 1 cap(s) orally once a day   COMBIVENT    AER : 1  inhaled every 6 hours, As Needed  Eliquis 5 mg oral tablet: 1 tab(s) orally 2 times a day  furosemide 40 mg oral tablet: 1 tab(s) orally once a day  guaiFENesin 100 mg/5 mL oral liquid: 10 milliliter(s) orally every 4 hours, As Needed  lactulose 10 g/15 mL oral syrup: 15 milliliter(s) orally once a day  pantoprazole 40 mg oral delayed release tablet: 1 tab(s) orally once a day  Percocet 5/325 oral tablet: 1 tab(s) orally every 6 hours, As Needed  predniSONE 20 mg oral tablet: 2 tab(s) orally once a day x 3 days  1 tab(s) orally once a day x 3 days  Spiriva 18 mcg inhalation capsule: 1 cap(s) inhaled once a day  traZODone 50 mg oral tablet: 1 tab(s) orally once a day (at bedtime)  vitamin A &amp; D topical ointment: Apply topically to affected area 4 times a day, As Needed

## 2020-02-14 NOTE — CONSULT NOTE ADULT - ASSESSMENT
69 year old female patient with past medical history of HTN, DM2, COPD on 2 L NC PRN, DVT / PE on Eliquis, GERD, chronic back pain due to spinal stenosis and chronic compression fractures, presenting for dyspnea.     #Likely COPD exacerbation  #History of DVT/PE    - Patient follows with pulm Dr. Chris Contreras. Multiple admissions for COPD exacerbation but no intubations. She has home oxygen 2L NC at night prn.  - Patient states SOB improving, but still has exertional dyspnea after walking to the bathroom. Currently conversing comfortably and saturating well on 1.5L NC O2.   - ABG normal. Flu A/B/RSV negative. F/u RVP.  - CXR official read pending. No evidence of consolidation, low suspicion for pneumonia.  - Duoneb q4h + Solumedrol 60 mg IV q8 + Symbicort 2 puffs BID  - Lasix 40 qd.  - Patient has history of DVT and PE in August 2019 (segmental PE in RUL, RLL, BEENA, LLL). Currently she complains of tenderness and swelling of left LE compared to right. She has been mostly lying in bed for the past few days due to worsening of her chronic back pain. Patient states she has been taking Eliquis as prescribed - consider Venous duplex LE.      This is a preliminary note by medical student. Recommendations to follow pending conversation with attending. 69 year old female patient with past medical history of HTN, DM2, COPD on 2 L NC PRN, DVT / PE on Eliquis, GERD, chronic back pain due to spinal stenosis and chronic compression fractures, presenting for dyspnea.     #Likely COPD exacerbation  #History of DVT/PE    - Patient follows with pulm Dr. Chris Contreras. Multiple admissions for COPD exacerbation but no intubations. She has home oxygen 2L NC at night prn.  - Patient states SOB improving, but still has exertional dyspnea after walking to the bathroom. Currently conversing comfortably and saturating well on 1.5L NC O2.   - ABG normal. Flu A/B/RSV negative. F/u RVP.  - CXR official read pending. No evidence of consolidation, low suspicion for pneumonia.  - Duoneb q4h + Solumedrol 60 mg IV q8 + Symbicort 2 puffs BID  - Lasix 40 qd.  - TTE 8/1/2019: Summary: EF 53%. Grade I diastolic dysfunction. Normal right ventricular size and function. Estimated pulmonary artery systolic pressure is 46.2 mmHg assuming a right atrial pressure of 15 mmHg, which is consistent with mild pulmonary hypertension.    - Patient has history of DVT and PE in August 2019 (segmental PE in RUL, RLL, BEENA, LLL). Currently she complains of tenderness and swelling of left LE compared to right. She has been mostly lying in bed for the past few days due to worsening of her chronic back pain. Patient states she has been taking Eliquis as prescribed - consider Venous duplex LE.        This is a preliminary note by medical student. Recommendations to follow pending conversation with attending. 69 year old female patient with past medical history of HTN, DM2, COPD on 2 L NC PRN, DVT / PE on Eliquis, GERD, chronic back pain due to spinal stenosis and chronic compression fractures, presenting for dyspnea.     #Likely COPD exacerbation  - Patient follows with pulm Dr. Chris Contreras. Multiple admissions for COPD exacerbation but no intubations. Home oxygen 2L NC at night prn.  - SOB improving, but still has exertional dyspnea after walking to the bathroom. Currently conversing comfortably and saturating well on 1.5L NC O2.   - ABG normal. Flu A/B/RSV negative. F/u RVP.  - CXR: Bibasilar atelectatic changes.   - Duoneb q4h + Solumedrol 60 mg IV q8 + Symbicort 2 puffs BID  - Lasix 40 qd.  - TTE 8/1/2019: EF 53%. Grade I diastolic dysfunction. Normal RV size and function. Mild pulmonary HTN (46.2 mmHg).    #History of DVT/PE  - History of DVT and PE in August 2019 (segmental PE in RUL, RLL, BEENA, LLL). On Eliquis.  - Currently patient complains of tenderness and swelling of left LE compared to right. She has been lying in bed for the past few days due to worsening of chronic back pain. She states she has been taking Eliquis as prescribed. Consider Venous duplex u/s LE.     This is a preliminary note by medical student. Recommendations to follow pending conversation with attending. 69 year old female patient with past medical history of HTN, DM2, COPD on 2 L NC PRN, DVT / PE on Eliquis, GERD, chronic back pain due to spinal stenosis and chronic compression fractures, presenting for dyspnea.     #Likely COPD exacerbation  - Patient follows with pulm Dr. Chris Contreras. Multiple admissions for COPD exacerbation but no intubations. Home oxygen 2L NC at night prn.  - SOB improving, but still has exertional dyspnea after walking to the bathroom. Currently conversing comfortably and saturating well on 1.5L NC O2.   - ABG normal. Flu A/B/RSV negative. F/u RVP.  - CXR: Bibasilar atelectatic changes.   - Duoneb q4h + Solumedrol 60 mg IV q8 + Symbicort 2 puffs BID  - Lasix 40 qd.  - TTE 8/1/2019: EF 53%. Grade I diastolic dysfunction. Normal RV size and function. Mild pulmonary HTN (46.2 mmHg).    #History of DVT/PE  - History of DVT and PE in August 2019 (segmental PE in RUL, RLL, BEENA, LLL). On Eliquis.  - Currently patient complains of tenderness and swelling of left LE compared to right. She has been lying in bed for the past few days due to worsening of chronic back pain. She states she has been taking Eliquis as prescribed.     RECOMMENDATIONS  - IV Lasix  - COPD triple therapy: ICS, LABA, LAMA  - Order: ProBNP, 2d echo, Venous duplex LE.  - F/u RVP.    This is a preliminary note by medical student. Recommendations to follow pending conversation with attending.

## 2020-02-14 NOTE — DISCHARGE NOTE PROVIDER - CARE PROVIDERS DIRECT ADDRESSES
,DirectAddress_Unknown,DirectAddress_Unknown ,DirectAddress_Unknown,[email or web address]

## 2020-02-14 NOTE — H&P ADULT - HISTORY OF PRESENT ILLNESS
69 year old female patient with past medical history of COPD on 2 L NC PRN, DVT / PE on Eliquis, chronic back pain due to spinal stenosis and chronic compression fractures, GERD presenting for dyspnea.   The patient states that she has been experiencing dyspnea on minimal exertion even walking a few steps, and at rest, associated with wheezing and cough productive of clear sputum. She denies fever / chills and reports upper respiratory symptoms consisting of coryza, rhinorrhea and sore throat.  The patient has been lying in bed most of the time for the past week because of severe back pain, and was told by her PMD to report to the ED for suspected COPD exacerbation and for PT / rehab. Of note an MRI of the lumbosacral spine done recently showed likely acute / subacute compression fractures of T12 and T11, mild chronic compression fractures of L1 through L5 as well as spinal stenoses.   In the ED : Chest x ray unremarkable, ABG normal, the patient was given Duoneb and Solumedrol 60 mg IV with persistence of the wheezing afterwards and decision was made to admit

## 2020-02-15 LAB
ANION GAP SERPL CALC-SCNC: 15 MMOL/L — HIGH (ref 7–14)
BASOPHILS # BLD AUTO: 0.01 K/UL — SIGNIFICANT CHANGE UP (ref 0–0.2)
BASOPHILS NFR BLD AUTO: 0.1 % — SIGNIFICANT CHANGE UP (ref 0–1)
BUN SERPL-MCNC: 7 MG/DL — LOW (ref 10–20)
CALCIUM SERPL-MCNC: 8.8 MG/DL — SIGNIFICANT CHANGE UP (ref 8.5–10.1)
CHLORIDE SERPL-SCNC: 95 MMOL/L — LOW (ref 98–110)
CO2 SERPL-SCNC: 25 MMOL/L — SIGNIFICANT CHANGE UP (ref 17–32)
CREAT SERPL-MCNC: 0.7 MG/DL — SIGNIFICANT CHANGE UP (ref 0.7–1.5)
EOSINOPHIL # BLD AUTO: 0 K/UL — SIGNIFICANT CHANGE UP (ref 0–0.7)
EOSINOPHIL NFR BLD AUTO: 0 % — SIGNIFICANT CHANGE UP (ref 0–8)
GLUCOSE BLDC GLUCOMTR-MCNC: 113 MG/DL — HIGH (ref 70–99)
GLUCOSE BLDC GLUCOMTR-MCNC: 165 MG/DL — HIGH (ref 70–99)
GLUCOSE BLDC GLUCOMTR-MCNC: 173 MG/DL — HIGH (ref 70–99)
GLUCOSE BLDC GLUCOMTR-MCNC: 182 MG/DL — HIGH (ref 70–99)
GLUCOSE SERPL-MCNC: 204 MG/DL — HIGH (ref 70–99)
HCT VFR BLD CALC: 42.8 % — SIGNIFICANT CHANGE UP (ref 37–47)
HGB BLD-MCNC: 14.2 G/DL — SIGNIFICANT CHANGE UP (ref 12–16)
IMM GRANULOCYTES NFR BLD AUTO: 0.4 % — HIGH (ref 0.1–0.3)
LYMPHOCYTES # BLD AUTO: 0.93 K/UL — LOW (ref 1.2–3.4)
LYMPHOCYTES # BLD AUTO: 12.2 % — LOW (ref 20.5–51.1)
MAGNESIUM SERPL-MCNC: 1.9 MG/DL — SIGNIFICANT CHANGE UP (ref 1.8–2.4)
MCHC RBC-ENTMCNC: 31.1 PG — HIGH (ref 27–31)
MCHC RBC-ENTMCNC: 33.2 G/DL — SIGNIFICANT CHANGE UP (ref 32–37)
MCV RBC AUTO: 93.9 FL — SIGNIFICANT CHANGE UP (ref 81–99)
MONOCYTES # BLD AUTO: 0.29 K/UL — SIGNIFICANT CHANGE UP (ref 0.1–0.6)
MONOCYTES NFR BLD AUTO: 3.8 % — SIGNIFICANT CHANGE UP (ref 1.7–9.3)
NEUTROPHILS # BLD AUTO: 6.39 K/UL — SIGNIFICANT CHANGE UP (ref 1.4–6.5)
NEUTROPHILS NFR BLD AUTO: 83.5 % — HIGH (ref 42.2–75.2)
NRBC # BLD: 0 /100 WBCS — SIGNIFICANT CHANGE UP (ref 0–0)
PLATELET # BLD AUTO: 276 K/UL — SIGNIFICANT CHANGE UP (ref 130–400)
POTASSIUM SERPL-MCNC: 4.3 MMOL/L — SIGNIFICANT CHANGE UP (ref 3.5–5)
POTASSIUM SERPL-SCNC: 4.3 MMOL/L — SIGNIFICANT CHANGE UP (ref 3.5–5)
RBC # BLD: 4.56 M/UL — SIGNIFICANT CHANGE UP (ref 4.2–5.4)
RBC # FLD: 15.5 % — HIGH (ref 11.5–14.5)
SODIUM SERPL-SCNC: 135 MMOL/L — SIGNIFICANT CHANGE UP (ref 135–146)
WBC # BLD: 7.65 K/UL — SIGNIFICANT CHANGE UP (ref 4.8–10.8)
WBC # FLD AUTO: 7.65 K/UL — SIGNIFICANT CHANGE UP (ref 4.8–10.8)

## 2020-02-15 PROCEDURE — 93970 EXTREMITY STUDY: CPT | Mod: 26

## 2020-02-15 PROCEDURE — 99233 SBSQ HOSP IP/OBS HIGH 50: CPT

## 2020-02-15 PROCEDURE — 71275 CT ANGIOGRAPHY CHEST: CPT | Mod: 26

## 2020-02-15 RX ORDER — GUAIFENESIN/DEXTROMETHORPHAN 600MG-30MG
10 TABLET, EXTENDED RELEASE 12 HR ORAL EVERY 4 HOURS
Refills: 0 | Status: DISCONTINUED | OUTPATIENT
Start: 2020-02-15 | End: 2020-02-19

## 2020-02-15 RX ORDER — LIDOCAINE 4 G/100G
1 CREAM TOPICAL EVERY 24 HOURS
Refills: 0 | Status: DISCONTINUED | OUTPATIENT
Start: 2020-02-15 | End: 2020-02-19

## 2020-02-15 RX ORDER — NYSTATIN CREAM 100000 [USP'U]/G
1 CREAM TOPICAL EVERY 12 HOURS
Refills: 0 | Status: DISCONTINUED | OUTPATIENT
Start: 2020-02-15 | End: 2020-02-19

## 2020-02-15 RX ORDER — NICOTINE POLACRILEX 2 MG
1 GUM BUCCAL DAILY
Refills: 0 | Status: DISCONTINUED | OUTPATIENT
Start: 2020-02-15 | End: 2020-02-19

## 2020-02-15 RX ADMIN — LIDOCAINE 1 PATCH: 4 CREAM TOPICAL at 10:04

## 2020-02-15 RX ADMIN — Medication 3 MILLILITER(S): at 19:38

## 2020-02-15 RX ADMIN — OXYCODONE AND ACETAMINOPHEN 2 TABLET(S): 5; 325 TABLET ORAL at 09:56

## 2020-02-15 RX ADMIN — PANTOPRAZOLE SODIUM 40 MILLIGRAM(S): 20 TABLET, DELAYED RELEASE ORAL at 06:11

## 2020-02-15 RX ADMIN — OXYCODONE AND ACETAMINOPHEN 2 TABLET(S): 5; 325 TABLET ORAL at 20:13

## 2020-02-15 RX ADMIN — Medication 3 MILLILITER(S): at 12:23

## 2020-02-15 RX ADMIN — OXYCODONE AND ACETAMINOPHEN 2 TABLET(S): 5; 325 TABLET ORAL at 14:07

## 2020-02-15 RX ADMIN — OXYCODONE AND ACETAMINOPHEN 2 TABLET(S): 5; 325 TABLET ORAL at 08:17

## 2020-02-15 RX ADMIN — OXYCODONE AND ACETAMINOPHEN 2 TABLET(S): 5; 325 TABLET ORAL at 01:07

## 2020-02-15 RX ADMIN — Medication 0.5 MILLIGRAM(S): at 20:00

## 2020-02-15 RX ADMIN — LIDOCAINE 1 PATCH: 4 CREAM TOPICAL at 22:25

## 2020-02-15 RX ADMIN — Medication 60 MILLIGRAM(S): at 06:11

## 2020-02-15 RX ADMIN — OXYCODONE AND ACETAMINOPHEN 2 TABLET(S): 5; 325 TABLET ORAL at 20:45

## 2020-02-15 RX ADMIN — OXYCODONE AND ACETAMINOPHEN 2 TABLET(S): 5; 325 TABLET ORAL at 14:08

## 2020-02-15 RX ADMIN — Medication 3 MILLILITER(S): at 08:16

## 2020-02-15 RX ADMIN — APIXABAN 5 MILLIGRAM(S): 2.5 TABLET, FILM COATED ORAL at 19:52

## 2020-02-15 RX ADMIN — Medication 60 MILLIGRAM(S): at 17:35

## 2020-02-15 RX ADMIN — OXYCODONE AND ACETAMINOPHEN 2 TABLET(S): 5; 325 TABLET ORAL at 01:40

## 2020-02-15 RX ADMIN — Medication 50 MILLIGRAM(S): at 22:25

## 2020-02-15 RX ADMIN — Medication 3 MILLILITER(S): at 15:42

## 2020-02-15 RX ADMIN — Medication 0.5 MILLIGRAM(S): at 08:03

## 2020-02-15 RX ADMIN — NYSTATIN CREAM 1 APPLICATION(S): 100000 CREAM TOPICAL at 17:33

## 2020-02-15 RX ADMIN — APIXABAN 5 MILLIGRAM(S): 2.5 TABLET, FILM COATED ORAL at 06:11

## 2020-02-15 RX ADMIN — BUDESONIDE AND FORMOTEROL FUMARATE DIHYDRATE 2 PUFF(S): 160; 4.5 AEROSOL RESPIRATORY (INHALATION) at 19:52

## 2020-02-15 RX ADMIN — LIDOCAINE 1 PATCH: 4 CREAM TOPICAL at 19:53

## 2020-02-15 RX ADMIN — BUDESONIDE AND FORMOTEROL FUMARATE DIHYDRATE 2 PUFF(S): 160; 4.5 AEROSOL RESPIRATORY (INHALATION) at 08:00

## 2020-02-15 RX ADMIN — Medication 1 PATCH: at 11:02

## 2020-02-15 RX ADMIN — Medication 1 PATCH: at 19:52

## 2020-02-15 RX ADMIN — Medication 40 MILLIGRAM(S): at 06:11

## 2020-02-15 NOTE — PROGRESS NOTE ADULT - ASSESSMENT
69 year old female patient with past medical history of COPD on 2 L NC PRN, DVT / PE on Eliquis, chronic back pain due to spinal stenosis and chronic compression fractures, GERD presenting for dyspnea.     # SOB, COPD exacerbation vs PE, Unlikely pneumonia,    -echo normal from April 2019  -hypoxic on ABG  - Chest x ray unremarkable per my read,   -ABG normal, pt is not washing out Co2, does not fit the picture of COPD exacerbation  -RVP negative   - Duoneb q4h + Solumedrol 60 mg IV q8 + Symbicort 2 puffs BID  - Keep SpO2 between 90 and 92 %  - Pulmonary consult   - Hx of DVT on july 2019 on eliquis, will repeat va duplex  -f/u CT angio of the chest     # Chronic back pain secondary to spinal stenosis and chronic, acute / subacute compression fractures  - Continue Percocet   - outpatient follow up with pain management / back surgery if pain uncontrolled   - PT rehab consult   -lidocaine patch    # GERD : Continue Protonix     # HTN : Patient on Amlodipine in the past, restart if BP uncontrolled in hospital     # Diabetes ? Noted in chart but denied by patient, patient was omn Metformin in the past   - F/U A1C   - FS before meals and at bedtime as patient will be on Solumedrol  - Start insulin coverage if FS > 180  - Carb consistent diet     # History of DVT / PE : Continue Eliquis     # Miscellaneous   ·	DVT prophylaxis : Patient on Eliquis   ·	GI prophylaxis : Protonix   ·	Ambulate as tolerated   ·	DASH TLC carb consistent diet   full code    f/u lower ext duplex, CT angio, pulmonary recommendation 69 year old female patient with past medical history of COPD on 2 L NC PRN, DVT / PE on Eliquis, chronic back pain due to spinal stenosis and chronic compression fractures, GERD presenting for dyspnea.     # SOB, COPD exacerbation vs PE, Unlikely pneumonia,    -echo normal from April 2019  -hypoxic on ABG  - Chest x ray unremarkable per my read,   -ABG normal, pt is not washing out Co2, does not fit the picture of COPD exacerbation  -RVP negative   - Duoneb q4h + Solumedrol 60 mg IV q8 + Symbicort 2 puffs BID  - Keep SpO2 between 90 and 92 %  - Pulmonary consult   - Hx of DVT on july 2019 on eliquis, will repeat va duplex,  -f/u CT angio of the chest, if postive need to be switched as pt may be failing Eliquis     # Chronic back pain secondary to spinal stenosis and chronic, acute / subacute compression fractures  - Continue Percocet   - outpatient follow up with pain management / back surgery if pain uncontrolled   - PT rehab consult   -lidocaine patch    # GERD : Continue Protonix     # HTN : Patient on Amlodipine in the past, restart if BP uncontrolled in hospital     # Diabetes ? Noted in chart but denied by patient, patient was omn Metformin in the past   - F/U A1C   - FS before meals and at bedtime as patient will be on Solumedrol  - Start insulin coverage if FS > 180  - Carb consistent diet     # History of DVT / PE : Continue Eliquis     # Miscellaneous   ·	DVT prophylaxis : Patient on Eliquis   ·	GI prophylaxis : Protonix   ·	Ambulate as tolerated   ·	DASH TLC carb consistent diet   full code    f/u lower ext duplex, CT angio, pulmonary recommendation

## 2020-02-15 NOTE — PROGRESS NOTE ADULT - ATTENDING COMMENTS
Patient seen and examined independently. Agree with resident note/ history / physical exam and plan of care with no exceptions/additions/updates. Case discussed with house-staff, nursing and patient/pt decision maker.    FU CTA and LE doppler  cont meds  pulm followup   pt may need avap or cpap at home  Full code

## 2020-02-15 NOTE — PROGRESS NOTE ADULT - SUBJECTIVE AND OBJECTIVE BOX
GEE RINALDIUREEN  69y  Female      Patient is a 69y old  Female who presents with a chief complaint of Dyspnea (14 Feb 2020 15:41)      INTERVAL HPI/OVERNIGHT EVENTS:  no acute events overnight, still on xygen as needed, SOB when walking to bathroom and back    Vital Signs Last 24 Hrs  T(C): 37.1 (15 Feb 2020 06:41), Max: 37.1 (15 Feb 2020 06:41)  T(F): 98.7 (15 Feb 2020 06:41), Max: 98.7 (15 Feb 2020 06:41)  HR: 83 (15 Feb 2020 06:41) (83 - 99)  BP: 128/74 (15 Feb 2020 06:41) (128/74 - 144/72)  BP(mean): --  RR: 17 (15 Feb 2020 06:41) (17 - 20)  SpO2: 95% (15 Feb 2020 08:10) (95% - 95%)    PHYSICAL EXAM:  · Constitutional	detailed exam	  · Constitutional Details	well-developed; well-groomed; well-nourished; Anxious	  · Eyes	detailed exam	  · Eyes Details	EOMI; conjunctiva clear	  · Respiratory	detailed exam	  · Respiratory Details	airway patent;  rhonchi; wheezes	  · Wheezes	upper L; lower L; upper R; middle R; lower R	  · Cardiovascular	detailed exam	  · Cardiovascular Details	regular rate and rhythm	  · Cardiovascular Details	positive S1; positive S2	  · Gastrointestinal	detailed exam	  · GI Normal	normal; soft; nontender; no distention	  · Extremities	detailed exam	  · Extremities Details	normal; no clubbing; no cyanosis; no pedal edema, no lower ext swelling	    LABS:                        14.2   7.65  )-----------( 276      ( 15 Feb 2020 07:51 )             42.8     02-15    135  |  95<L>  |  7<L>  ----------------------------<  204<H>  4.3   |  25  |  0.7    Ca    8.8      15 Feb 2020 07:51  Mg     1.9     02-15    TPro  6.2  /  Alb  3.1<L>  /  TBili  0.5  /  DBili  x   /  AST  21  /  ALT  9   /  AlkPhos  82  02-14          CAPILLARY BLOOD GLUCOSE      POCT Blood Glucose.: 182 mg/dL (15 Feb 2020 07:43)  POCT Blood Glucose.: 234 mg/dL (14 Feb 2020 17:53)  POCT Blood Glucose.: 212 mg/dL (14 Feb 2020 11:42)      RADIOLOGY & ADDITIONAL TESTS:    Imaging Personally Reviewed:  [ ] YES  [ ] NO GEE RINALDIUREEN  69y  Female      Patient is a 69y old  Female who presents with a chief complaint of Dyspnea (14 Feb 2020 15:41)      INTERVAL HPI/OVERNIGHT EVENTS:  no acute events overnight, still on xygen as needed, SOB when walking to bathroom and back    Vital Signs Last 24 Hrs  T(C): 37.1 (15 Feb 2020 06:41), Max: 37.1 (15 Feb 2020 06:41)  T(F): 98.7 (15 Feb 2020 06:41), Max: 98.7 (15 Feb 2020 06:41)  HR: 83 (15 Feb 2020 06:41) (83 - 99)  BP: 128/74 (15 Feb 2020 06:41) (128/74 - 144/72)  BP(mean): --  RR: 17 (15 Feb 2020 06:41) (17 - 20)  SpO2: 95% (15 Feb 2020 08:10) (95% - 95%)    PHYSICAL EXAM:  · Constitutional	detailed exam	  · Constitutional Details	well-developed; well-groomed; well-nourished; Anxious	  · Eyes	detailed exam	  · Eyes Details	EOMI; conjunctiva clear	  · Respiratory	detailed exam	  · Respiratory Details	airway patent;  rhonchi; wheezes present, bilat	  · Wheezes	upper L; lower L; upper R; middle R; lower R	  · Cardiovascular	detailed exam	  · Cardiovascular Details	regular rate and rhythm	  · Cardiovascular Details	positive S1; positive S2	  · Gastrointestinal	detailed exam	  · GI Normal	normal; soft; nontender; no distention	  · Extremities	detailed exam	  · Extremities Details	normal; no clubbing; no cyanosis; no pedal edema, no lower ext swelling	    LABS:                        14.2   7.65  )-----------( 276      ( 15 Feb 2020 07:51 )             42.8     02-15    135  |  95<L>  |  7<L>  ----------------------------<  204<H>  4.3   |  25  |  0.7    Ca    8.8      15 Feb 2020 07:51  Mg     1.9     02-15    TPro  6.2  /  Alb  3.1<L>  /  TBili  0.5  /  DBili  x   /  AST  21  /  ALT  9   /  AlkPhos  82  02-14          CAPILLARY BLOOD GLUCOSE      POCT Blood Glucose.: 182 mg/dL (15 Feb 2020 07:43)  POCT Blood Glucose.: 234 mg/dL (14 Feb 2020 17:53)  POCT Blood Glucose.: 212 mg/dL (14 Feb 2020 11:42)      RADIOLOGY & ADDITIONAL TESTS:  '< from: VA Duplex Lower Ext Vein Scan, Bilat (07.31.19 @ 11:59) >    Acute left popliteal and posterior tibial vein DVT.    Bilateral gastrocnemius vein thrombosis    < end of copied text >

## 2020-02-16 LAB
ALBUMIN SERPL ELPH-MCNC: 3.1 G/DL — LOW (ref 3.5–5.2)
ALP SERPL-CCNC: 56 U/L — SIGNIFICANT CHANGE UP (ref 30–115)
ALT FLD-CCNC: 9 U/L — SIGNIFICANT CHANGE UP (ref 0–41)
ANION GAP SERPL CALC-SCNC: 10 MMOL/L — SIGNIFICANT CHANGE UP (ref 7–14)
AST SERPL-CCNC: 13 U/L — SIGNIFICANT CHANGE UP (ref 0–41)
BASOPHILS # BLD AUTO: 0.01 K/UL — SIGNIFICANT CHANGE UP (ref 0–0.2)
BASOPHILS NFR BLD AUTO: 0.1 % — SIGNIFICANT CHANGE UP (ref 0–1)
BILIRUB SERPL-MCNC: 0.2 MG/DL — SIGNIFICANT CHANGE UP (ref 0.2–1.2)
BUN SERPL-MCNC: 9 MG/DL — LOW (ref 10–20)
CALCIUM SERPL-MCNC: 8.6 MG/DL — SIGNIFICANT CHANGE UP (ref 8.5–10.1)
CHLORIDE SERPL-SCNC: 97 MMOL/L — LOW (ref 98–110)
CO2 SERPL-SCNC: 28 MMOL/L — SIGNIFICANT CHANGE UP (ref 17–32)
CREAT SERPL-MCNC: 0.5 MG/DL — LOW (ref 0.7–1.5)
EOSINOPHIL # BLD AUTO: 0 K/UL — SIGNIFICANT CHANGE UP (ref 0–0.7)
EOSINOPHIL NFR BLD AUTO: 0 % — SIGNIFICANT CHANGE UP (ref 0–8)
GLUCOSE BLDC GLUCOMTR-MCNC: 129 MG/DL — HIGH (ref 70–99)
GLUCOSE BLDC GLUCOMTR-MCNC: 137 MG/DL — HIGH (ref 70–99)
GLUCOSE SERPL-MCNC: 137 MG/DL — HIGH (ref 70–99)
HCT VFR BLD CALC: 39.7 % — SIGNIFICANT CHANGE UP (ref 37–47)
HGB BLD-MCNC: 12.8 G/DL — SIGNIFICANT CHANGE UP (ref 12–16)
IMM GRANULOCYTES NFR BLD AUTO: 0.9 % — HIGH (ref 0.1–0.3)
LYMPHOCYTES # BLD AUTO: 1.46 K/UL — SIGNIFICANT CHANGE UP (ref 1.2–3.4)
LYMPHOCYTES # BLD AUTO: 12.4 % — LOW (ref 20.5–51.1)
MAGNESIUM SERPL-MCNC: 1.9 MG/DL — SIGNIFICANT CHANGE UP (ref 1.8–2.4)
MCHC RBC-ENTMCNC: 30.1 PG — SIGNIFICANT CHANGE UP (ref 27–31)
MCHC RBC-ENTMCNC: 32.2 G/DL — SIGNIFICANT CHANGE UP (ref 32–37)
MCV RBC AUTO: 93.4 FL — SIGNIFICANT CHANGE UP (ref 81–99)
MONOCYTES # BLD AUTO: 1.01 K/UL — HIGH (ref 0.1–0.6)
MONOCYTES NFR BLD AUTO: 8.6 % — SIGNIFICANT CHANGE UP (ref 1.7–9.3)
NEUTROPHILS # BLD AUTO: 9.16 K/UL — HIGH (ref 1.4–6.5)
NEUTROPHILS NFR BLD AUTO: 78 % — HIGH (ref 42.2–75.2)
NRBC # BLD: 0 /100 WBCS — SIGNIFICANT CHANGE UP (ref 0–0)
PLATELET # BLD AUTO: 277 K/UL — SIGNIFICANT CHANGE UP (ref 130–400)
POTASSIUM SERPL-MCNC: 4.1 MMOL/L — SIGNIFICANT CHANGE UP (ref 3.5–5)
POTASSIUM SERPL-SCNC: 4.1 MMOL/L — SIGNIFICANT CHANGE UP (ref 3.5–5)
PROT SERPL-MCNC: 5.8 G/DL — LOW (ref 6–8)
RBC # BLD: 4.25 M/UL — SIGNIFICANT CHANGE UP (ref 4.2–5.4)
RBC # FLD: 15.6 % — HIGH (ref 11.5–14.5)
SODIUM SERPL-SCNC: 135 MMOL/L — SIGNIFICANT CHANGE UP (ref 135–146)
WBC # BLD: 11.74 K/UL — HIGH (ref 4.8–10.8)
WBC # FLD AUTO: 11.74 K/UL — HIGH (ref 4.8–10.8)

## 2020-02-16 PROCEDURE — 99233 SBSQ HOSP IP/OBS HIGH 50: CPT

## 2020-02-16 RX ORDER — SIMETHICONE 80 MG/1
80 TABLET, CHEWABLE ORAL ONCE
Refills: 0 | Status: COMPLETED | OUTPATIENT
Start: 2020-02-16 | End: 2020-02-16

## 2020-02-16 RX ADMIN — Medication 1 PATCH: at 11:46

## 2020-02-16 RX ADMIN — Medication 60 MILLIGRAM(S): at 17:03

## 2020-02-16 RX ADMIN — Medication 1 PATCH: at 20:01

## 2020-02-16 RX ADMIN — Medication 0.5 MILLIGRAM(S): at 17:03

## 2020-02-16 RX ADMIN — APIXABAN 5 MILLIGRAM(S): 2.5 TABLET, FILM COATED ORAL at 17:03

## 2020-02-16 RX ADMIN — BUDESONIDE AND FORMOTEROL FUMARATE DIHYDRATE 2 PUFF(S): 160; 4.5 AEROSOL RESPIRATORY (INHALATION) at 20:37

## 2020-02-16 RX ADMIN — Medication 3 MILLILITER(S): at 19:07

## 2020-02-16 RX ADMIN — NYSTATIN CREAM 1 APPLICATION(S): 100000 CREAM TOPICAL at 17:11

## 2020-02-16 RX ADMIN — Medication 60 MILLIGRAM(S): at 06:28

## 2020-02-16 RX ADMIN — Medication 3 MILLILITER(S): at 12:01

## 2020-02-16 RX ADMIN — PANTOPRAZOLE SODIUM 40 MILLIGRAM(S): 20 TABLET, DELAYED RELEASE ORAL at 06:28

## 2020-02-16 RX ADMIN — LIDOCAINE 1 PATCH: 4 CREAM TOPICAL at 23:30

## 2020-02-16 RX ADMIN — SIMETHICONE 80 MILLIGRAM(S): 80 TABLET, CHEWABLE ORAL at 11:49

## 2020-02-16 RX ADMIN — BUDESONIDE AND FORMOTEROL FUMARATE DIHYDRATE 2 PUFF(S): 160; 4.5 AEROSOL RESPIRATORY (INHALATION) at 08:21

## 2020-02-16 RX ADMIN — OXYCODONE AND ACETAMINOPHEN 2 TABLET(S): 5; 325 TABLET ORAL at 14:28

## 2020-02-16 RX ADMIN — Medication 3 MILLILITER(S): at 08:47

## 2020-02-16 RX ADMIN — OXYCODONE AND ACETAMINOPHEN 2 TABLET(S): 5; 325 TABLET ORAL at 08:26

## 2020-02-16 RX ADMIN — NYSTATIN CREAM 1 APPLICATION(S): 100000 CREAM TOPICAL at 06:28

## 2020-02-16 RX ADMIN — OXYCODONE AND ACETAMINOPHEN 2 TABLET(S): 5; 325 TABLET ORAL at 02:29

## 2020-02-16 RX ADMIN — OXYCODONE AND ACETAMINOPHEN 2 TABLET(S): 5; 325 TABLET ORAL at 21:18

## 2020-02-16 RX ADMIN — OXYCODONE AND ACETAMINOPHEN 2 TABLET(S): 5; 325 TABLET ORAL at 14:29

## 2020-02-16 RX ADMIN — Medication 1 PATCH: at 11:54

## 2020-02-16 RX ADMIN — Medication 50 MILLIGRAM(S): at 21:30

## 2020-02-16 RX ADMIN — OXYCODONE AND ACETAMINOPHEN 2 TABLET(S): 5; 325 TABLET ORAL at 08:31

## 2020-02-16 RX ADMIN — OXYCODONE AND ACETAMINOPHEN 2 TABLET(S): 5; 325 TABLET ORAL at 03:00

## 2020-02-16 RX ADMIN — Medication 40 MILLIGRAM(S): at 06:28

## 2020-02-16 RX ADMIN — Medication 3 MILLILITER(S): at 15:41

## 2020-02-16 RX ADMIN — LIDOCAINE 1 PATCH: 4 CREAM TOPICAL at 11:48

## 2020-02-16 RX ADMIN — APIXABAN 5 MILLIGRAM(S): 2.5 TABLET, FILM COATED ORAL at 06:28

## 2020-02-16 RX ADMIN — Medication 1 PATCH: at 07:51

## 2020-02-16 RX ADMIN — LIDOCAINE 1 PATCH: 4 CREAM TOPICAL at 20:02

## 2020-02-16 RX ADMIN — OXYCODONE AND ACETAMINOPHEN 2 TABLET(S): 5; 325 TABLET ORAL at 20:38

## 2020-02-16 NOTE — PROGRESS NOTE ADULT - ASSESSMENT
69 year old female patient with past medical history of COPD on 2 L NC PRN, DVT / PE on Eliquis, chronic back pain due to spinal stenosis and chronic compression fractures, GERD presenting for dyspnea.     # SOB, COPD exacerbation vs PE, Unlikely pneumonia,    -echo normal from April 2019  -hypoxic on ABG  - Chest x ray showed atelectatic changes  -ABG normal, hyperventillating  CT chest< from: CT Angio Chest w/ IV Cont (02.15.20 @ 19:19) >  No CTA evidence of acute pulmonary embolus.    Redemonstrated partially attenuated filling defects and multiple right upper lobe subsegmental branches, felt to reflect chronic pulmonary emboli.    -RVP negative   - Duoneb q4h + Solumedrol 60 mg IV q12 + Symbicort 2 puffs BID  - Keep SpO2 between 90 and 92 %  - Hx of DVT on july 2019 on eliquis,  va duplex is pending,  -patient says she is compliant with eliquis    # Chronic back pain secondary to spinal stenosis and chronic, acute / subacute compression fractures  - Continue Percocet   - outpatient follow up with pain management / back surgery if pain uncontrolled   - PT rehab consult   -lidocaine patch    # GERD : Continue Protonix     # HTN :  uncontrolled in hospital -- started on amlodipine    # No Diabetes hba1c is 5.6 blood sugar high as she is on steroids  -  Carb consistent diet     # History of DVT / PE : Continue Eliquis   awaiting clinical improvement.

## 2020-02-16 NOTE — PROGRESS NOTE ADULT - SUBJECTIVE AND OBJECTIVE BOX
SUBJECTIVE:    Patient is a 69y old Female who presents with a chief complaint of Dyspnea (15 Feb 2020 11:38)    Currently admitted to medicine with the primary diagnosis of COPD exacerbation     Today is hospital day 2d.     PAST MEDICAL & SURGICAL HISTORY  Spinal stenosis  Chronic GERD  Hypertension  Chronic pain  Depression  Anxiety  COPD (chronic obstructive pulmonary disease)  Neck mass    ALLERGIES:  penicillin (Unknown)    MEDICATIONS:  STANDING MEDICATIONS  albuterol/ipratropium for Nebulization 3 milliLiter(s) Nebulizer every 4 hours  apixaban 5 milliGRAM(s) Oral every 12 hours  budesonide 160 MICROgram(s)/formoterol 4.5 MICROgram(s) Inhaler 2 Puff(s) Inhalation two times a day  furosemide    Tablet 40 milliGRAM(s) Oral daily  lidocaine   Patch 1 Patch Transdermal every 24 hours  methylPREDNISolone sodium succinate Injectable 60 milliGRAM(s) IV Push every 12 hours  nicotine -   7 mG/24Hr(s) Patch 1 patch Transdermal daily  nystatin Powder 1 Application(s) Topical every 12 hours  pantoprazole    Tablet 40 milliGRAM(s) Oral before breakfast  simethicone 80 milliGRAM(s) Chew once  traZODone 50 milliGRAM(s) Oral at bedtime    PRN MEDICATIONS  ALPRAZolam 0.5 milliGRAM(s) Oral every 8 hours PRN  guaifenesin/dextromethorphan  Syrup 10 milliLiter(s) Oral every 4 hours PRN  oxycodone    5 mG/acetaminophen 325 mG 2 Tablet(s) Oral every 6 hours PRN    VITALS:   T(F): 97.2  HR: 80  BP: 162/82  RR: 18  SpO2: 95%    LABS:                        12.8   11.74 )-----------( 277      ( 16 Feb 2020 05:47 )             39.7     02-16    135  |  97<L>  |  9<L>  ----------------------------<  137<H>  4.1   |  28  |  0.5<L>    Ca    8.6      16 Feb 2020 05:47  Mg     1.9     02-16    TPro  5.8<L>  /  Alb  3.1<L>  /  TBili  0.2  /  DBili  x   /  AST  13  /  ALT  9   /  AlkPhos  56  02-16                  RADIOLOGY:    PHYSICAL EXAM:  GEN: No acute distress  LUNGS: Clear to auscultation bilaterally   HEART: S1/S2 present. RRR.   ABD/ GI: Soft, non-tender, non-distended. Bowel sounds present  EXT: NC/NC/NE/2+PP/BAPTISTE  NEURO: AAOX3

## 2020-02-17 ENCOUNTER — TRANSCRIPTION ENCOUNTER (OUTPATIENT)
Age: 70
End: 2020-02-17

## 2020-02-17 LAB
ALBUMIN SERPL ELPH-MCNC: 3.5 G/DL — SIGNIFICANT CHANGE UP (ref 3.5–5.2)
ALP SERPL-CCNC: 54 U/L — SIGNIFICANT CHANGE UP (ref 30–115)
ALT FLD-CCNC: 12 U/L — SIGNIFICANT CHANGE UP (ref 0–41)
ANION GAP SERPL CALC-SCNC: 10 MMOL/L — SIGNIFICANT CHANGE UP (ref 7–14)
AST SERPL-CCNC: 15 U/L — SIGNIFICANT CHANGE UP (ref 0–41)
BASOPHILS # BLD AUTO: 0.01 K/UL — SIGNIFICANT CHANGE UP (ref 0–0.2)
BASOPHILS NFR BLD AUTO: 0.1 % — SIGNIFICANT CHANGE UP (ref 0–1)
BILIRUB SERPL-MCNC: 0.3 MG/DL — SIGNIFICANT CHANGE UP (ref 0.2–1.2)
BUN SERPL-MCNC: 10 MG/DL — SIGNIFICANT CHANGE UP (ref 10–20)
CALCIUM SERPL-MCNC: 9 MG/DL — SIGNIFICANT CHANGE UP (ref 8.5–10.1)
CHLORIDE SERPL-SCNC: 96 MMOL/L — LOW (ref 98–110)
CO2 SERPL-SCNC: 28 MMOL/L — SIGNIFICANT CHANGE UP (ref 17–32)
CREAT SERPL-MCNC: 0.5 MG/DL — LOW (ref 0.7–1.5)
EOSINOPHIL # BLD AUTO: 0 K/UL — SIGNIFICANT CHANGE UP (ref 0–0.7)
EOSINOPHIL NFR BLD AUTO: 0 % — SIGNIFICANT CHANGE UP (ref 0–8)
GLUCOSE BLDC GLUCOMTR-MCNC: 153 MG/DL — HIGH (ref 70–99)
GLUCOSE BLDC GLUCOMTR-MCNC: 157 MG/DL — HIGH (ref 70–99)
GLUCOSE BLDC GLUCOMTR-MCNC: 158 MG/DL — HIGH (ref 70–99)
GLUCOSE BLDC GLUCOMTR-MCNC: 167 MG/DL — HIGH (ref 70–99)
GLUCOSE SERPL-MCNC: 171 MG/DL — HIGH (ref 70–99)
HCT VFR BLD CALC: 41.8 % — SIGNIFICANT CHANGE UP (ref 37–47)
HGB BLD-MCNC: 13.6 G/DL — SIGNIFICANT CHANGE UP (ref 12–16)
IMM GRANULOCYTES NFR BLD AUTO: 0.7 % — HIGH (ref 0.1–0.3)
LYMPHOCYTES # BLD AUTO: 1.35 K/UL — SIGNIFICANT CHANGE UP (ref 1.2–3.4)
LYMPHOCYTES # BLD AUTO: 14.2 % — LOW (ref 20.5–51.1)
MAGNESIUM SERPL-MCNC: 2.4 MG/DL — SIGNIFICANT CHANGE UP (ref 1.8–2.4)
MCHC RBC-ENTMCNC: 30.9 PG — SIGNIFICANT CHANGE UP (ref 27–31)
MCHC RBC-ENTMCNC: 32.5 G/DL — SIGNIFICANT CHANGE UP (ref 32–37)
MCV RBC AUTO: 95 FL — SIGNIFICANT CHANGE UP (ref 81–99)
MONOCYTES # BLD AUTO: 0.7 K/UL — HIGH (ref 0.1–0.6)
MONOCYTES NFR BLD AUTO: 7.4 % — SIGNIFICANT CHANGE UP (ref 1.7–9.3)
NEUTROPHILS # BLD AUTO: 7.39 K/UL — HIGH (ref 1.4–6.5)
NEUTROPHILS NFR BLD AUTO: 77.6 % — HIGH (ref 42.2–75.2)
NRBC # BLD: 0 /100 WBCS — SIGNIFICANT CHANGE UP (ref 0–0)
PLATELET # BLD AUTO: 302 K/UL — SIGNIFICANT CHANGE UP (ref 130–400)
POTASSIUM SERPL-MCNC: 4.7 MMOL/L — SIGNIFICANT CHANGE UP (ref 3.5–5)
POTASSIUM SERPL-SCNC: 4.7 MMOL/L — SIGNIFICANT CHANGE UP (ref 3.5–5)
PROT SERPL-MCNC: 6.1 G/DL — SIGNIFICANT CHANGE UP (ref 6–8)
RBC # BLD: 4.4 M/UL — SIGNIFICANT CHANGE UP (ref 4.2–5.4)
RBC # FLD: 15.5 % — HIGH (ref 11.5–14.5)
SODIUM SERPL-SCNC: 134 MMOL/L — LOW (ref 135–146)
WBC # BLD: 9.52 K/UL — SIGNIFICANT CHANGE UP (ref 4.8–10.8)
WBC # FLD AUTO: 9.52 K/UL — SIGNIFICANT CHANGE UP (ref 4.8–10.8)

## 2020-02-17 PROCEDURE — 99232 SBSQ HOSP IP/OBS MODERATE 35: CPT

## 2020-02-17 RX ORDER — AMLODIPINE BESYLATE 2.5 MG/1
5 TABLET ORAL DAILY
Refills: 0 | Status: DISCONTINUED | OUTPATIENT
Start: 2020-02-17 | End: 2020-02-19

## 2020-02-17 RX ORDER — OXYCODONE AND ACETAMINOPHEN 5; 325 MG/1; MG/1
1 TABLET ORAL EVERY 8 HOURS
Refills: 0 | Status: DISCONTINUED | OUTPATIENT
Start: 2020-02-17 | End: 2020-02-19

## 2020-02-17 RX ADMIN — OXYCODONE AND ACETAMINOPHEN 2 TABLET(S): 5; 325 TABLET ORAL at 04:16

## 2020-02-17 RX ADMIN — OXYCODONE AND ACETAMINOPHEN 1 TABLET(S): 5; 325 TABLET ORAL at 21:14

## 2020-02-17 RX ADMIN — Medication 50 MILLIGRAM(S): at 21:13

## 2020-02-17 RX ADMIN — OXYCODONE AND ACETAMINOPHEN 1 TABLET(S): 5; 325 TABLET ORAL at 16:47

## 2020-02-17 RX ADMIN — OXYCODONE AND ACETAMINOPHEN 2 TABLET(S): 5; 325 TABLET ORAL at 09:41

## 2020-02-17 RX ADMIN — PANTOPRAZOLE SODIUM 40 MILLIGRAM(S): 20 TABLET, DELAYED RELEASE ORAL at 05:34

## 2020-02-17 RX ADMIN — Medication 3 MILLILITER(S): at 15:33

## 2020-02-17 RX ADMIN — Medication 1 PATCH: at 22:56

## 2020-02-17 RX ADMIN — Medication 40 MILLIGRAM(S): at 05:34

## 2020-02-17 RX ADMIN — Medication 1 PATCH: at 12:11

## 2020-02-17 RX ADMIN — Medication 60 MILLIGRAM(S): at 05:35

## 2020-02-17 RX ADMIN — AMLODIPINE BESYLATE 5 MILLIGRAM(S): 2.5 TABLET ORAL at 21:14

## 2020-02-17 RX ADMIN — Medication 3 MILLILITER(S): at 03:54

## 2020-02-17 RX ADMIN — NYSTATIN CREAM 1 APPLICATION(S): 100000 CREAM TOPICAL at 05:40

## 2020-02-17 RX ADMIN — Medication 0.5 MILLIGRAM(S): at 08:07

## 2020-02-17 RX ADMIN — NYSTATIN CREAM 1 APPLICATION(S): 100000 CREAM TOPICAL at 17:16

## 2020-02-17 RX ADMIN — OXYCODONE AND ACETAMINOPHEN 2 TABLET(S): 5; 325 TABLET ORAL at 03:38

## 2020-02-17 RX ADMIN — APIXABAN 5 MILLIGRAM(S): 2.5 TABLET, FILM COATED ORAL at 17:17

## 2020-02-17 RX ADMIN — OXYCODONE AND ACETAMINOPHEN 1 TABLET(S): 5; 325 TABLET ORAL at 13:38

## 2020-02-17 RX ADMIN — Medication 3 MILLILITER(S): at 12:33

## 2020-02-17 RX ADMIN — OXYCODONE AND ACETAMINOPHEN 2 TABLET(S): 5; 325 TABLET ORAL at 11:22

## 2020-02-17 RX ADMIN — Medication 3 MILLILITER(S): at 19:04

## 2020-02-17 RX ADMIN — OXYCODONE AND ACETAMINOPHEN 1 TABLET(S): 5; 325 TABLET ORAL at 22:28

## 2020-02-17 RX ADMIN — Medication 1 PATCH: at 11:15

## 2020-02-17 RX ADMIN — Medication 1 PATCH: at 11:20

## 2020-02-17 RX ADMIN — Medication 0.5 MILLIGRAM(S): at 17:18

## 2020-02-17 RX ADMIN — APIXABAN 5 MILLIGRAM(S): 2.5 TABLET, FILM COATED ORAL at 05:34

## 2020-02-17 RX ADMIN — Medication 3 MILLILITER(S): at 07:56

## 2020-02-17 NOTE — PROGRESS NOTE ADULT - SUBJECTIVE AND OBJECTIVE BOX
SUBJECTIVE:    Patient is a 69y old Female who presents with a chief complaint of Dyspnea (16 Feb 2020 11:00)    Currently admitted to medicine with the primary diagnosis of COPD exacerbation     Today is hospital day 3d.     PAST MEDICAL & SURGICAL HISTORY  Spinal stenosis  Chronic GERD  Hypertension  Chronic pain  Depression  Anxiety  COPD (chronic obstructive pulmonary disease)  Neck mass    ALLERGIES:  penicillin (Unknown)    MEDICATIONS:  STANDING MEDICATIONS  albuterol/ipratropium for Nebulization 3 milliLiter(s) Nebulizer every 4 hours  apixaban 5 milliGRAM(s) Oral every 12 hours  budesonide 160 MICROgram(s)/formoterol 4.5 MICROgram(s) Inhaler 2 Puff(s) Inhalation two times a day  furosemide    Tablet 40 milliGRAM(s) Oral daily  lidocaine   Patch 1 Patch Transdermal every 24 hours  methylPREDNISolone sodium succinate Injectable 60 milliGRAM(s) IV Push every 12 hours  nicotine -   7 mG/24Hr(s) Patch 1 patch Transdermal daily  nystatin Powder 1 Application(s) Topical every 12 hours  pantoprazole    Tablet 40 milliGRAM(s) Oral before breakfast  traZODone 50 milliGRAM(s) Oral at bedtime    PRN MEDICATIONS  ALPRAZolam 0.5 milliGRAM(s) Oral every 8 hours PRN  guaifenesin/dextromethorphan  Syrup 10 milliLiter(s) Oral every 4 hours PRN  oxycodone    5 mG/acetaminophen 325 mG 2 Tablet(s) Oral every 6 hours PRN    VITALS:   T(F): 97.2  HR: 85  BP: 134/63  RR: 18  SpO2: 97%    LABS:                        13.6   9.52  )-----------( 302      ( 17 Feb 2020 05:57 )             41.8     02-17    134<L>  |  96<L>  |  10  ----------------------------<  171<H>  4.7   |  28  |  0.5<L>    Ca    9.0      17 Feb 2020 05:57  Mg     2.4     02-17    TPro  6.1  /  Alb  3.5  /  TBili  0.3  /  DBili  x   /  AST  15  /  ALT  12  /  AlkPhos  54  02-17                  RADIOLOGY:    PHYSICAL EXAM:  GEN: No acute distress  LUNGS: wheezing bilateral  HEART: S1/S2 present. RRR.   ABD/ GI: Soft, non-tender, non-distended. Bowel sounds present  EXT: NC/NC/NE/2+PP/BAPTISTE  NEURO: AAOX3

## 2020-02-17 NOTE — DISCHARGE NOTE NURSING/CASE MANAGEMENT/SOCIAL WORK - NSDCPEWEB_GEN_ALL_CORE
[facility] for Tobacco Control website --- [email or web address] --- [email or web address]

## 2020-02-17 NOTE — PROGRESS NOTE ADULT - ASSESSMENT
69 year old female patient with past medical history of COPD on 2 L NC PRN, DVT / PE on Eliquis, chronic back pain due to spinal stenosis and chronic compression fractures, GERD presenting for dyspnea.     # SOB, COPD exacerbation, vs PE    -echo normal from April 2019  -hypoxic on ABG  - Chest x ray showed atelectatic changes  -ABG normal, hyperventillating  CT chest< from: CT Angio Chest w/ IV Cont (02.15.20 @ 19:19) >  No CTA evidence of acute pulmonary embolus.    Redemonstrated partially attenuated filling defects and multiple right upper lobe subsegmental branches, felt to reflect chronic pulmonary emboli.    -RVP negative   - Duoneb q4h + Solumedrol changed to prednisone + Symbicort 2 puffs BID  - Keep SpO2 between 90 and 92 %  - Hx of DVT on july 2019 on eliquis,  va duplex is negative for DVT  -patient says she is compliant with eliquis    # Chronic back pain secondary to spinal stenosis and chronic, acute / subacute compression fractures  - Continue Percocet   - outpatient follow up with pain management / back surgery if pain uncontrolled   - PT  has seen patient and she went down to 93% on RA   -lidocaine patch    # GERD : Continue Protonix     # HTN :  uncontrolled in hospital -- started on amlodipine    # No Diabetes hba1c is 5.6 blood sugar high as she is on steroids  -  Carb consistent diet     # History of DVT / PE : Continue Eliquis     DC plan tomorrow as she refuses to go today-- will intend her today 69 year old female patient with past medical history of COPD on 2 L NC PRN, DVT / PE on Eliquis, chronic back pain due to spinal stenosis and chronic compression fractures, GERD presenting for dyspnea.     # SOB, COPD exacerbation, vs PE    -echo normal from April 2019  -hypoxic on ABG  - Chest x ray showed atelectatic changes  -ABG normal, hyperventillating  CT chest< from: CT Angio Chest w/ IV Cont (02.15.20 @ 19:19) >  No CTA evidence of acute pulmonary embolus.    Redemonstrated partially attenuated filling defects and multiple right upper lobe subsegmental branches, felt to reflect chronic pulmonary emboli.    -RVP negative   - Duoneb q4h + Solumedrol changed to prednisone + Symbicort 2 puffs BID  - Keep SpO2 between 90 and 92 %  - Hx of DVT on july 2019 on eliquis,  va duplex is negative for DVT  -patient says she is compliant with eliquis    # Chronic back pain secondary to spinal stenosis and chronic, acute / subacute compression fractures  - Continue Percocet   - outpatient follow up with pain management takes one tab of percocet every 8hours -- dose has been adjusted.  - PT  has seen patient and she went to 93% on RA   -lidocaine patch    # GERD : Continue Protonix     # HTN :  uncontrolled in hospital -- started on amlodipine    # No Diabetes hba1c is 5.6 blood sugar high as she is on steroids  -  Carb consistent diet     # History of DVT / PE : Continue Eliquis     DC plan tomorrow as she refuses to go today-- will intend her today

## 2020-02-17 NOTE — DISCHARGE NOTE NURSING/CASE MANAGEMENT/SOCIAL WORK - PATIENT PORTAL LINK FT
You can access the FollowMyHealth Patient Portal offered by [facility] by registering at the following website: [email or web address]. By joining [facility]’s FollowMyHealth portal, you will also be able to view your health information using other applications (apps) compatible with our system.

## 2020-02-17 NOTE — PROGRESS NOTE ADULT - ASSESSMENT
69 year old female patient with past medical history of COPD on 2 L NC PRN, DVT / PE on Eliquis, chronic back pain due to spinal stenosis and chronic compression fractures, GERD presenting for dyspnea.    # SOB, COPD exacerbation, vs chronic  PE    -echo normal from April 2019  -hypoxic on ABG  - Chest x ray showed atelectatic changes  -ABG normal, hyperventilating, not retention -->goes against copd excerbation  - va duplex is negative for DVT  CT chest< from: CT Angio Chest w/ IV Cont (02.15.20 @ 19:19) >  No CTA evidence of acute pulmonary embolus.  Redemonstrated partially attenuated filling defects and multiple right upper lobe subsegmental branches, felt to reflect chronic pulmonary emboli.  -RVP negative   - Duoneb q4h + Solumedrol changed to prednisone + Symbicort 2 puffs BID  - Hx of DVT on july 2019 on eliquis, (saying she is complaint with Eliquis)      # Chronic back pain secondary to spinal stenosis and chronic, acute / subacute compression fractures  -pt on vicodine outpatient   - started on Percocet   - outpatient follow up with pain management   -lidocaine patch    # GERD : Continue Protonix     # HTN :  uncontrolled in hospital -- started on amlodipine    # No Diabetes hba1c is 5.6 blood sugar high as she is on steroids  -  Carb consistent diet     # History of DVT / PE : Continue Eliquis     DC plan tomorrow as she refuses to go today-- will intend her today

## 2020-02-17 NOTE — DISCHARGE NOTE NURSING/CASE MANAGEMENT/SOCIAL WORK - NSDCPEEMAIL_GEN_ALL_CORE
[facility] email [email or web address]

## 2020-02-17 NOTE — PROGRESS NOTE ADULT - SUBJECTIVE AND OBJECTIVE BOX
KUSH RINALDI  69y  Female      Patient is a 69y old  Female who presents with a chief complaint of Dyspnea (17 Feb 2020 12:16)      INTERVAL HPI/OVERNIGHT EVENTS:  no acute events overnight, denied any new compalins, still SOB, no chest pain. patient is getting shortness of breath while going to bathroom    PHYSICAL EXAM:  · Constitutional	detailed exam	  · Constitutional Details	well-developed; well-groomed; well-nourished; Anxious	  · Eyes	detailed exam	  · Eyes Details	EOMI; conjunctiva clear	  · Respiratory	detailed exam	  · Respiratory Details	airway patent;  rhonchi; wheezes present, bilat	  · Wheezes	upper L; lower L; upper R; middle R; lower R	  · Cardiovascular	detailed exam	  · Cardiovascular Details	regular rate and rhythm	  · Cardiovascular Details	positive S1; positive S2	  · Gastrointestinal	detailed exam	  · GI Normal	normal; soft; nontender; no distention	  · Extremities	detailed exam	  · Extremities Details	normal; no clubbing; no cyanosis; no pedal edema, no lower ext swelling	      LABS:                        13.6   9.52  )-----------( 302      ( 17 Feb 2020 05:57 )             41.8     02-17    134<L>  |  96<L>  |  10  ----------------------------<  171<H>  4.7   |  28  |  0.5<L>    Ca    9.0      17 Feb 2020 05:57  Mg     2.4     02-17    TPro  6.1  /  Alb  3.5  /  TBili  0.3  /  DBili  x   /  AST  15  /  ALT  12  /  AlkPhos  54  02-17          CAPILLARY BLOOD GLUCOSE      POCT Blood Glucose.: 157 mg/dL (17 Feb 2020 11:36)  POCT Blood Glucose.: 153 mg/dL (17 Feb 2020 08:00)  POCT Blood Glucose.: 137 mg/dL (16 Feb 2020 17:10)      RADIOLOGY & ADDITIONAL TESTS:    Imaging Personally Reviewed:  [ ] YES  [ ] NO

## 2020-02-18 LAB
GLUCOSE BLDC GLUCOMTR-MCNC: 101 MG/DL — HIGH (ref 70–99)
GLUCOSE BLDC GLUCOMTR-MCNC: 127 MG/DL — HIGH (ref 70–99)
GLUCOSE BLDC GLUCOMTR-MCNC: 129 MG/DL — HIGH (ref 70–99)
GLUCOSE BLDC GLUCOMTR-MCNC: 198 MG/DL — HIGH (ref 70–99)

## 2020-02-18 PROCEDURE — 99232 SBSQ HOSP IP/OBS MODERATE 35: CPT

## 2020-02-18 RX ORDER — OXYCODONE AND ACETAMINOPHEN 5; 325 MG/1; MG/1
1 TABLET ORAL ONCE
Refills: 0 | Status: DISCONTINUED | OUTPATIENT
Start: 2020-02-18 | End: 2020-02-18

## 2020-02-18 RX ORDER — AMLODIPINE BESYLATE 2.5 MG/1
1 TABLET ORAL
Qty: 30 | Refills: 0
Start: 2020-02-18 | End: 2020-03-18

## 2020-02-18 RX ORDER — CHOLECALCIFEROL (VITAMIN D3) 125 MCG
1 CAPSULE ORAL
Qty: 30 | Refills: 0
Start: 2020-02-18 | End: 2020-03-18

## 2020-02-18 RX ORDER — CHOLECALCIFEROL (VITAMIN D3) 125 MCG
1000 CAPSULE ORAL DAILY
Refills: 0 | Status: DISCONTINUED | OUTPATIENT
Start: 2020-02-18 | End: 2020-02-19

## 2020-02-18 RX ADMIN — LIDOCAINE 1 PATCH: 4 CREAM TOPICAL at 11:01

## 2020-02-18 RX ADMIN — Medication 60 MILLIGRAM(S): at 05:29

## 2020-02-18 RX ADMIN — APIXABAN 5 MILLIGRAM(S): 2.5 TABLET, FILM COATED ORAL at 05:28

## 2020-02-18 RX ADMIN — Medication 1 PATCH: at 12:07

## 2020-02-18 RX ADMIN — NYSTATIN CREAM 1 APPLICATION(S): 100000 CREAM TOPICAL at 05:30

## 2020-02-18 RX ADMIN — LIDOCAINE 1 PATCH: 4 CREAM TOPICAL at 21:25

## 2020-02-18 RX ADMIN — Medication 3 MILLILITER(S): at 08:16

## 2020-02-18 RX ADMIN — Medication 50 MILLIGRAM(S): at 21:31

## 2020-02-18 RX ADMIN — OXYCODONE AND ACETAMINOPHEN 1 TABLET(S): 5; 325 TABLET ORAL at 05:27

## 2020-02-18 RX ADMIN — Medication 3 MILLILITER(S): at 14:00

## 2020-02-18 RX ADMIN — OXYCODONE AND ACETAMINOPHEN 1 TABLET(S): 5; 325 TABLET ORAL at 22:05

## 2020-02-18 RX ADMIN — BUDESONIDE AND FORMOTEROL FUMARATE DIHYDRATE 2 PUFF(S): 160; 4.5 AEROSOL RESPIRATORY (INHALATION) at 08:00

## 2020-02-18 RX ADMIN — PANTOPRAZOLE SODIUM 40 MILLIGRAM(S): 20 TABLET, DELAYED RELEASE ORAL at 05:30

## 2020-02-18 RX ADMIN — Medication 1 PATCH: at 12:12

## 2020-02-18 RX ADMIN — Medication 1 PATCH: at 19:56

## 2020-02-18 RX ADMIN — AMLODIPINE BESYLATE 5 MILLIGRAM(S): 2.5 TABLET ORAL at 05:29

## 2020-02-18 RX ADMIN — Medication 1 PATCH: at 07:18

## 2020-02-18 RX ADMIN — OXYCODONE AND ACETAMINOPHEN 1 TABLET(S): 5; 325 TABLET ORAL at 21:32

## 2020-02-18 RX ADMIN — OXYCODONE AND ACETAMINOPHEN 1 TABLET(S): 5; 325 TABLET ORAL at 16:56

## 2020-02-18 RX ADMIN — NYSTATIN CREAM 1 APPLICATION(S): 100000 CREAM TOPICAL at 18:06

## 2020-02-18 RX ADMIN — Medication 40 MILLIGRAM(S): at 05:28

## 2020-02-18 RX ADMIN — APIXABAN 5 MILLIGRAM(S): 2.5 TABLET, FILM COATED ORAL at 18:06

## 2020-02-18 RX ADMIN — OXYCODONE AND ACETAMINOPHEN 1 TABLET(S): 5; 325 TABLET ORAL at 12:11

## 2020-02-18 RX ADMIN — OXYCODONE AND ACETAMINOPHEN 1 TABLET(S): 5; 325 TABLET ORAL at 06:08

## 2020-02-18 RX ADMIN — Medication 1000 UNIT(S): at 12:12

## 2020-02-18 RX ADMIN — Medication 3 MILLILITER(S): at 19:36

## 2020-02-18 RX ADMIN — LIDOCAINE 1 PATCH: 4 CREAM TOPICAL at 19:56

## 2020-02-18 NOTE — PROGRESS NOTE ADULT - SUBJECTIVE AND OBJECTIVE BOX
GEE RINALDIUREEN  69y  Female      Patient is a 69y old  Female who presents with a chief complaint of Dyspnea (18 Feb 2020 12:59)      INTERVAL HPI/OVERNIGHT EVENTS:    no acute events overnight, denied any new compalins, still SOB, no chest pain. patient is getting shortness of breath while going to bathroom      Vital Signs Last 24 Hrs  T(C): 35.9 (18 Feb 2020 13:47), Max: 36.6 (18 Feb 2020 05:21)  T(F): 96.7 (18 Feb 2020 13:47), Max: 97.8 (18 Feb 2020 05:21)  HR: 91 (18 Feb 2020 13:47) (77 - 91)  BP: 117/56 (18 Feb 2020 13:47) (110/58 - 153/70)  BP(mean): --  RR: 18 (18 Feb 2020 13:47) (18 - 18)  SpO2: 96% (18 Feb 2020 05:21) (96% - 96%)    PHYSICAL EXAM:  · Constitutional	detailed exam	  · Constitutional Details	well-developed; well-groomed; well-nourished; Anxious	  · Eyes	detailed exam	  · Eyes Details	EOMI; conjunctiva clear	  · Respiratory	detailed exam	  · Respiratory Details	airway patent;  rhonchi; wheezes present, bilat	  · Wheezes	upper L; lower L; upper R; middle R; lower R	  · Cardiovascular	detailed exam	  · Cardiovascular Details	regular rate and rhythm	  · Cardiovascular Details	positive S1; positive S2	  · Gastrointestinal	detailed exam	  · GI Normal	normal; soft; nontender; no distention	  · Extremities	detailed exam	  · Extremities Details	normal; no clubbing; no cyanosis; no pedal edema, no lower ext swelling	    LABS:                        13.6   9.52  )-----------( 302      ( 17 Feb 2020 05:57 )             41.8     02-17    134<L>  |  96<L>  |  10  ----------------------------<  171<H>  4.7   |  28  |  0.5<L>    Ca    9.0      17 Feb 2020 05:57  Mg     2.4     02-17    TPro  6.1  /  Alb  3.5  /  TBili  0.3  /  DBili  x   /  AST  15  /  ALT  12  /  AlkPhos  54  02-17          CAPILLARY BLOOD GLUCOSE      POCT Blood Glucose.: 129 mg/dL (18 Feb 2020 17:00)  POCT Blood Glucose.: 198 mg/dL (18 Feb 2020 11:30)  POCT Blood Glucose.: 101 mg/dL (18 Feb 2020 07:53)  POCT Blood Glucose.: 158 mg/dL (17 Feb 2020 20:55)      RADIOLOGY & ADDITIONAL TESTS:    Imaging Personally Reviewed:  [ ] YES  [ ] NO

## 2020-02-18 NOTE — PROGRESS NOTE ADULT - ASSESSMENT
69 year old female patient with past medical history of COPD on 2 L NC PRN, DVT / PE on Eliquis, chronic back pain due to spinal stenosis and chronic compression fractures, GERD presenting for dyspnea.    # SOB, COPD exacerbation, vs chronic  PE    -echo normal from April 2019  -hypoxic on ABG  - Chest x ray showed atelectatic changes  -ABG normal, hyperventilating, not retention -->goes against copd excerbation  - va duplex is negative for DVT  CT chest< from: CT Angio Chest w/ IV Cont (02.15.20 @ 19:19) >  No CTA evidence of acute pulmonary embolus.  Redemonstrated partially attenuated filling defects and multiple right upper lobe subsegmental branches, felt to reflect chronic pulmonary emboli.  -RVP negative   - Duoneb q4h + Solumedrol changed to prednisone + Symbicort 2 puffs BID  - Hx of DVT on july 2019 on eliquis, (saying she is complaint with Eliquis)      # Chronic back pain secondary to spinal stenosis and chronic, acute / subacute compression fractures  -pt on vicodine outpatient   - started on Percocet   - outpatient follow up with pain management, pt pain medication requesting a lot of pain meds, will not give any pain med on d/c, records for pain meds left in the charts  -lidocaine patch    # GERD : Continue Protonix     # HTN :  uncontrolled in hospital -- started on amlodipine    # No Diabetes hba1c is 5.6 blood sugar high as she is on steroids  -  Carb consistent diet     # History of DVT / PE : Continue Eliquis     DC plan tomorrow as she refuses to go today-- will intend her today

## 2020-02-18 NOTE — PROGRESS NOTE ADULT - ASSESSMENT
69 year old female patient with past medical history of COPD on 2 L NC PRN, DVT / PE on Eliquis, chronic back pain due to spinal stenosis and chronic compression fractures, GERD presenting for dyspnea.     # SOB, COPD exacerbation, vs PE    -echo normal from April 2019  -hypoxic on ABG  - Chest x ray showed atelectatic changes  -ABG normal, hyperventillating  CT chest< from: CT Angio Chest w/ IV Cont (02.15.20 @ 19:19) >  No CTA evidence of acute pulmonary embolus.    Redemonstrated partially attenuated filling defects and multiple right upper lobe subsegmental branches, felt to reflect chronic pulmonary emboli.    -RVP negative   - Duoneb q4h + Solumedrol changed to prednisone + Symbicort 2 puffs BID  - Keep SpO2 between 90 and 92 %  - Hx of DVT on july 2019 on eliquis,  va duplex is negative for DVT  -patient says she is compliant with eliquis    # Chronic back pain secondary to spinal stenosis and chronic, acute / subacute compression fractures  - Continue Percocet   - outpatient follow up with pain management takes one tab of vicodin every 8hours -- dose has been adjusted.  - PT  has seen patient and she went to 93% on RA   -lidocaine patch    # GERD : Continue Protonix     # HTN :  uncontrolled in hospital -- started on amlodipine    # No Diabetes hba1c is 5.6 blood sugar high as she is on steroids  -  Carb consistent diet     # History of DVT / PE : Continue Eliquis     DC plan home today-- spent more than 30mins.

## 2020-02-18 NOTE — PROGRESS NOTE ADULT - SUBJECTIVE AND OBJECTIVE BOX
SUBJECTIVE:    Patient is a 69y old Female who presents with a chief complaint of Dyspnea (17 Feb 2020 16:02)    Currently admitted to medicine with the primary diagnosis of COPD exacerbation     Today is hospital day 4d.     PAST MEDICAL & SURGICAL HISTORY  Spinal stenosis  Chronic GERD  Hypertension  Chronic pain  Depression  Anxiety  COPD (chronic obstructive pulmonary disease)  Neck mass    ALLERGIES:  penicillin (Unknown)    MEDICATIONS:  STANDING MEDICATIONS  albuterol/ipratropium for Nebulization 3 milliLiter(s) Nebulizer every 4 hours  amLODIPine   Tablet 5 milliGRAM(s) Oral daily  apixaban 5 milliGRAM(s) Oral every 12 hours  budesonide 160 MICROgram(s)/formoterol 4.5 MICROgram(s) Inhaler 2 Puff(s) Inhalation two times a day  cholecalciferol 1000 Unit(s) Oral daily  furosemide    Tablet 40 milliGRAM(s) Oral daily  lidocaine   Patch 1 Patch Transdermal every 24 hours  nicotine -   7 mG/24Hr(s) Patch 1 patch Transdermal daily  nystatin Powder 1 Application(s) Topical every 12 hours  oxycodone    5 mG/acetaminophen 325 mG 1 Tablet(s) Oral every 8 hours  pantoprazole    Tablet 40 milliGRAM(s) Oral before breakfast  predniSONE   Tablet 60 milliGRAM(s) Oral daily  traZODone 50 milliGRAM(s) Oral at bedtime    PRN MEDICATIONS  ALPRAZolam 0.5 milliGRAM(s) Oral every 8 hours PRN  guaifenesin/dextromethorphan  Syrup 10 milliLiter(s) Oral every 4 hours PRN    VITALS:   T(F): 97.8  HR: 77  BP: 110/58  RR: 18  SpO2: 96%    LABS:                        13.6   9.52  )-----------( 302      ( 17 Feb 2020 05:57 )             41.8     02-17    134<L>  |  96<L>  |  10  ----------------------------<  171<H>  4.7   |  28  |  0.5<L>    Ca    9.0      17 Feb 2020 05:57  Mg     2.4     02-17    TPro  6.1  /  Alb  3.5  /  TBili  0.3  /  DBili  x   /  AST  15  /  ALT  12  /  AlkPhos  54  02-17                  RADIOLOGY:    PHYSICAL EXAM:  GEN: No acute distress  LUNGS:  bilateral chronic wheeze  HEART: S1/S2 present. RRR.   ABD/ GI: Soft, non-tender, non-distended. Bowel sounds present  EXT: NC/NC/NE/2+PP/BAPTISTE  NEURO: AAOX3

## 2020-02-19 VITALS
RESPIRATION RATE: 18 BRPM | TEMPERATURE: 97 F | DIASTOLIC BLOOD PRESSURE: 74 MMHG | HEART RATE: 91 BPM | SYSTOLIC BLOOD PRESSURE: 128 MMHG

## 2020-02-19 LAB
GLUCOSE BLDC GLUCOMTR-MCNC: 129 MG/DL — HIGH (ref 70–99)
GLUCOSE BLDC GLUCOMTR-MCNC: 195 MG/DL — HIGH (ref 70–99)

## 2020-02-19 PROCEDURE — 99239 HOSP IP/OBS DSCHRG MGMT >30: CPT

## 2020-02-19 RX ORDER — CHLORHEXIDINE GLUCONATE 213 G/1000ML
1 SOLUTION TOPICAL
Refills: 0 | Status: DISCONTINUED | OUTPATIENT
Start: 2020-02-19 | End: 2020-02-19

## 2020-02-19 RX ORDER — CHOLECALCIFEROL (VITAMIN D3) 125 MCG
1 CAPSULE ORAL
Qty: 30 | Refills: 0
Start: 2020-02-19 | End: 2020-03-19

## 2020-02-19 RX ADMIN — CHLORHEXIDINE GLUCONATE 1 APPLICATION(S): 213 SOLUTION TOPICAL at 05:09

## 2020-02-19 RX ADMIN — Medication 1 PATCH: at 07:30

## 2020-02-19 RX ADMIN — AMLODIPINE BESYLATE 5 MILLIGRAM(S): 2.5 TABLET ORAL at 05:10

## 2020-02-19 RX ADMIN — OXYCODONE AND ACETAMINOPHEN 1 TABLET(S): 5; 325 TABLET ORAL at 05:08

## 2020-02-19 RX ADMIN — OXYCODONE AND ACETAMINOPHEN 1 TABLET(S): 5; 325 TABLET ORAL at 05:40

## 2020-02-19 RX ADMIN — APIXABAN 5 MILLIGRAM(S): 2.5 TABLET, FILM COATED ORAL at 05:11

## 2020-02-19 RX ADMIN — Medication 3 MILLILITER(S): at 11:49

## 2020-02-19 RX ADMIN — Medication 1 PATCH: at 12:05

## 2020-02-19 RX ADMIN — LIDOCAINE 1 PATCH: 4 CREAM TOPICAL at 10:47

## 2020-02-19 RX ADMIN — Medication 60 MILLIGRAM(S): at 05:10

## 2020-02-19 RX ADMIN — Medication 40 MILLIGRAM(S): at 05:10

## 2020-02-19 RX ADMIN — Medication 1000 UNIT(S): at 12:13

## 2020-02-19 RX ADMIN — Medication 1 PATCH: at 12:18

## 2020-02-19 RX ADMIN — PANTOPRAZOLE SODIUM 40 MILLIGRAM(S): 20 TABLET, DELAYED RELEASE ORAL at 05:12

## 2020-02-19 RX ADMIN — NYSTATIN CREAM 1 APPLICATION(S): 100000 CREAM TOPICAL at 05:09

## 2020-02-19 RX ADMIN — Medication 3 MILLILITER(S): at 01:48

## 2020-02-19 RX ADMIN — OXYCODONE AND ACETAMINOPHEN 1 TABLET(S): 5; 325 TABLET ORAL at 13:06

## 2020-02-19 NOTE — PROGRESS NOTE ADULT - SUBJECTIVE AND OBJECTIVE BOX
KUSH RINALDI  [facility] T2-3A 020 B ([facility] T[facility])            Patient was evaluated and examined  by bedside, no dyspnea at rest, mild cough, tolerating diet well.                REVIEW OF SYSTEMS:  please see pertinent positives mentioned above, all other 12 ROS negative      T(C): , Max: 36.4 (02-19-20 @ 05:00)  HR: 91 (02-19-20 @ 12:29)  BP: 128/74 (02-19-20 @ 12:29)  RR: 18 (02-19-20 @ 12:29)  SpO2: 96% (02-18-20 @ 19:43)  CAPILLARY BLOOD GLUCOSE      POCT Blood Glucose.: 195 mg/dL (19 Feb 2020 11:44)  POCT Blood Glucose.: 129 mg/dL (19 Feb 2020 07:42)  POCT Blood Glucose.: 127 mg/dL (18 Feb 2020 20:17)  POCT Blood Glucose.: 129 mg/dL (18 Feb 2020 17:00)      PHYSICAL EXAM:  General: NAD, AAOX3, patient is laying comfortably in bed  HEENT: AT, NC, Supple, NO JVD, NO CB  Lungs: mild decreased breath sounds B/L, chronic mild b/l expiratory  wheezing, no rhonchi  CVS: normal S1, S2, RRR, NO M/G/R  Abdomen: soft, bowel sounds present, non-tender, non-distended  Extremities: no edema, no clubbing, no cyanosis, positive peripheral pulses b/l  Neuro: no acute focal neurological deficits  Skin: no rash, no ecchymosis      LAB  CBC  Date: 02-17-20 @ 05:57  Mean cell Fkncfcreta26.9  Mean cell Hemoglobin Conc32.5  Mean cell Volum 95.0  Platelet count-Automate 302  RBC Count 4.40  Red Cell Distrib Width15.5  WBC Count9.52  % Albumin, Urine--  Hematocrit 41.8  Hemoglobin 13.6  CBC  Date: 02-16-20 @ 05:47  Mean cell Fqszmtkcze30.1  Mean cell Hemoglobin Conc32.2  Mean cell Volum 93.4  Platelet count-Automate 277  RBC Count 4.25  Red Cell Distrib Width15.6  WBC Count11.74  % Albumin, Urine--  Hematocrit 39.7  Hemoglobin 12.8  CBC  Date: 02-15-20 @ 07:51  Mean cell Clcfitxmkd60.1  Mean cell Hemoglobin Conc33.2  Mean cell Volum 93.9  Platelet count-Automate 276  RBC Count 4.56  Red Cell Distrib Width15.5  WBC Count7.65  % Albumin, Urine--  Hematocrit 42.8  Hemoglobin 14.2  CBC  Date: 02-14-20 @ 07:00  Mean cell Fqppdrkcbq40.2  Mean cell Hemoglobin Conc32.5  Mean cell Volum 92.9  Platelet count-Automate 245  RBC Count 4.67  Red Cell Distrib Width15.4  WBC Count4.22  % Albumin, Urine--  Hematocrit 43.4  Hemoglobin 14.1  CBC  Date: 02-13-20 @ 21:57  Mean cell Eqzjemmvhx19.9  Mean cell Hemoglobin Conc32.6  Mean cell Volum 91.7  Platelet count-Automate 242  RBC Count 4.71  Red Cell Distrib Width15.3  WBC Count8.89  % Albumin, Urine--  Hematocrit 43.2  Hemoglobin 14.1    [facility]  02-17-20 @ 05:57  Blood Gas Arterial-Calcium,Ionized--  Blood Urea Nitrogen, Serum 10 mg/dL [10 - 20]  Carbon Dioxide, Serum28 mmol/L [17 - 32]  Chloride, Serum96 mmol/L<L> [98 - 110]  Creatinie, Serum0.5 mg/dL<L> [0.7 - 1.5]  Glucose, Ucwso030 mg/dL<H> [70 - 99]  Potassium, Serum4.7 mmol/L [3.5 - 5.0]  Sodium, Serum 134 mmol/L<L> [135 - 146]  [facility]  02-16-20 @ 05:47  Blood Gas Arterial-Calcium,Ionized--  Blood Urea Nitrogen, Serum 9 mg/dL<L> [10 - 20]  Carbon Dioxide, Serum28 mmol/L [17 - 32]  Chloride, Serum97 mmol/L<L> [98 - 110]  Creatinie, Serum0.5 mg/dL<L> [0.7 - 1.5]  Glucose, Ayxqx269 mg/dL<H> [70 - 99]  Potassium, Serum4.1 mmol/L [3.5 - 5.0]  Sodium, Serum 135 mmol/L [135 - 146]  [facility]  02-15-20 @ 07:51  Blood Gas Arterial-Calcium,Ionized--  Blood Urea Nitrogen, Serum 7 mg/dL<L> [10 - 20]  Carbon Dioxide, Serum25 mmol/L [17 - 32]  Chloride, Serum95 mmol/L<L> [98 - 110]  Creatinie, Serum0.7 mg/dL [0.7 - 1.5]  Glucose, Oupwt883 mg/dL<H> [70 - 99]  Potassium, Serum4.3 mmol/L [3.5 - 5.0]  Sodium, Serum 135 mmol/L [135 - 146]        Medications:  albuterol/ipratropium for Nebulization 3 milliLiter(s) Nebulizer every 4 hours  ALPRAZolam 0.5 milliGRAM(s) Oral every 8 hours PRN  amLODIPine   Tablet 5 milliGRAM(s) Oral daily  apixaban 5 milliGRAM(s) Oral every 12 hours  budesonide 160 MICROgram(s)/formoterol 4.5 MICROgram(s) Inhaler 2 Puff(s) Inhalation two times a day  chlorhexidine 4% Liquid 1 Application(s) Topical <User Schedule>  cholecalciferol 1000 Unit(s) Oral daily  furosemide    Tablet 40 milliGRAM(s) Oral daily  guaifenesin/dextromethorphan  Syrup 10 milliLiter(s) Oral every 4 hours PRN  lidocaine   Patch 1 Patch Transdermal every 24 hours  nicotine -   7 mG/24Hr(s) Patch 1 patch Transdermal daily  nystatin Powder 1 Application(s) Topical every 12 hours  oxycodone    5 mG/acetaminophen 325 mG 1 Tablet(s) Oral every 8 hours  pantoprazole    Tablet 40 milliGRAM(s) Oral before breakfast  predniSONE   Tablet 60 milliGRAM(s) Oral daily  traZODone 50 milliGRAM(s) Oral at bedtime        Assessment and Plan:  69 year old female patient with past medical history of COPD on 2 L NC PRN, DVT / PE on Eliquis, chronic back pain due to spinal stenosis and chronic compression fractures, GERD presenting for dyspnea.    # Acute on chronic COPD exacerbation, vs chronic  PE    -echo normal from April 2019  -hypoxic on ABG  - Chest x ray showed atelectatic changes  - va duplex is negative for DVT  CT chest< from: CT Angio Chest w/ IV Cont (02.15.20 @ 19:19) >  No CTA evidence of acute pulmonary embolus.  Redemonstrated partially attenuated filling defects and multiple right upper lobe subsegmental branches, felt to reflect chronic pulmonary emboli.  -RVP negative   - Duoneb q4h + Solumedrol changed to prednisone + Symbicort 2 puffs BID  - Hx of DVT on july 2019 on eliquis, (saying she is complaint with Eliquis)  -outpatient Pulmonary specialist f/up.      # Chronic back pain secondary to spinal stenosis and chronic, acute / subacute compression fractures  -pt on vicodine outpatient   - started on Percocet   - outpatient follow up with pain management, pt pain medication requesting a lot of pain meds, will not give any pain med on d/c, records for pain meds left in the charts  -lidocaine patch    # GERD : Continue Protonix     # HTN :  uncontrolled in hospital -- started on amlodipine    # No Diabetes hba1c is 5.6 blood sugar high as she is on steroids  -  Carb consistent diet     # History of DVT / PE : Continue Eliquis     d/c plan: patient is medically stable for d/c home today    Patient verbalized good understanding of discharge instructions and agreed with discharge plan.

## 2020-02-24 DIAGNOSIS — R06.9 UNSPECIFIED ABNORMALITIES OF BREATHING: ICD-10-CM

## 2020-02-24 DIAGNOSIS — R09.02 HYPOXEMIA: ICD-10-CM

## 2020-02-24 DIAGNOSIS — I27.82 CHRONIC PULMONARY EMBOLISM: ICD-10-CM

## 2020-02-24 DIAGNOSIS — Z83.6 FAMILY HISTORY OF OTHER DISEASES OF THE RESPIRATORY SYSTEM: ICD-10-CM

## 2020-02-24 DIAGNOSIS — E11.9 TYPE 2 DIABETES MELLITUS WITHOUT COMPLICATIONS: ICD-10-CM

## 2020-02-24 DIAGNOSIS — I10 ESSENTIAL (PRIMARY) HYPERTENSION: ICD-10-CM

## 2020-02-24 DIAGNOSIS — Z87.891 PERSONAL HISTORY OF NICOTINE DEPENDENCE: ICD-10-CM

## 2020-02-24 DIAGNOSIS — Z99.81 DEPENDENCE ON SUPPLEMENTAL OXYGEN: ICD-10-CM

## 2020-02-24 DIAGNOSIS — I27.20 PULMONARY HYPERTENSION, UNSPECIFIED: ICD-10-CM

## 2020-02-24 DIAGNOSIS — Z86.711 PERSONAL HISTORY OF PULMONARY EMBOLISM: ICD-10-CM

## 2020-02-24 DIAGNOSIS — M48.061 SPINAL STENOSIS, LUMBAR REGION WITHOUT NEUROGENIC CLAUDICATION: ICD-10-CM

## 2020-02-24 DIAGNOSIS — Z80.1 FAMILY HISTORY OF MALIGNANT NEOPLASM OF TRACHEA, BRONCHUS AND LUNG: ICD-10-CM

## 2020-02-24 DIAGNOSIS — M84.48XA PATHOLOGICAL FRACTURE, OTHER SITE, INITIAL ENCOUNTER FOR FRACTURE: ICD-10-CM

## 2020-02-24 DIAGNOSIS — F32.9 MAJOR DEPRESSIVE DISORDER, SINGLE EPISODE, UNSPECIFIED: ICD-10-CM

## 2020-02-24 DIAGNOSIS — J44.1 CHRONIC OBSTRUCTIVE PULMONARY DISEASE WITH (ACUTE) EXACERBATION: ICD-10-CM

## 2020-02-24 DIAGNOSIS — F41.9 ANXIETY DISORDER, UNSPECIFIED: ICD-10-CM

## 2020-02-24 DIAGNOSIS — K21.9 GASTRO-ESOPHAGEAL REFLUX DISEASE WITHOUT ESOPHAGITIS: ICD-10-CM

## 2020-02-24 DIAGNOSIS — Z88.0 ALLERGY STATUS TO PENICILLIN: ICD-10-CM

## 2020-02-24 DIAGNOSIS — Z79.84 LONG TERM (CURRENT) USE OF ORAL HYPOGLYCEMIC DRUGS: ICD-10-CM

## 2020-02-24 DIAGNOSIS — M54.9 DORSALGIA, UNSPECIFIED: ICD-10-CM

## 2020-02-24 DIAGNOSIS — G89.29 OTHER CHRONIC PAIN: ICD-10-CM

## 2020-02-24 DIAGNOSIS — Z79.01 LONG TERM (CURRENT) USE OF ANTICOAGULANTS: ICD-10-CM

## 2020-02-24 DIAGNOSIS — Z86.718 PERSONAL HISTORY OF OTHER VENOUS THROMBOSIS AND EMBOLISM: ICD-10-CM

## 2020-05-13 NOTE — ED ADULT TRIAGE NOTE - MEANS OF ARRIVAL
ambulatory Pt p/w headache, dizziness and "tingling" to neck x 3 weeks, per patient has had headache since diagnosis w/ covid, able to tolerate headache noted today that she feels "off" with dizziness and tingling today. nausea yesterday.

## 2020-07-13 NOTE — PATIENT PROFILE ADULT - INDICATE TYPE
Health Care Proxy (HCP) Detail Level: Simple Additional Notes: Dr. Char Parsons notes the importance of not drinking while taking the medication due to the effects on the liver

## 2020-10-20 NOTE — PATIENT PROFILE ADULT. - TEACHING/LEARNING LEARNING PREFERENCES
Medication can not be refilled per protocol.  Medication request routed to PCP.  
verbal instruction

## 2020-11-27 NOTE — ED ADULT NURSE NOTE - NS_SISCREENINGSR_GEN_ALL_ED
Returned the patient phone's call. In regards to their message below. Offered the patient to be schedule on Monday 11/30/20 at 8:40 with a 8:15 x-ray. Patient states that they will not be able to come on that day. I got the patient schedule to see Dr. Tierney on 12/3/20 at 1:40 with a 1:15 x-rays. Patient verbalized understanding. FP          ----- Message from Jayne Nina sent at 11/27/2020  8:54 AM CST -----  Pt is calling regarding left knee pain and she fell on her right knee where the procedure was performed . Please call back at [phone]        
Negative

## 2020-12-18 NOTE — CONSULT NOTE ADULT - CONSULT REQUESTED DATE/TIME
From: Dirk Gong  To: Maurice Kennedy  Sent: 12/18/2020 2:39 PM CST  Subject: Lab Test or Test Related Question    Dr. Kennedy: Thank you very much.     I was just at [facility]. Diabetic visit with Pauly West. They said my blood pressure was high. It showed 160/70 with a pulse of 78. My next  visit is with you on 1/12/21.     They said they would let you know to.     Thanks,  Dirk Gong   24-Apr-2018 11:32

## 2021-01-28 NOTE — ED ADULT TRIAGE NOTE - WEIGHT IN KG
Pt clean and dry at this time. Purewick catheter placed, brief placed. Warm blankets applied. Offered to move pt to an inpatient bed for better comfort, but pt stated she is comfortable at this time and doesn't need to be moved. Rapid covid swab collected and sent to lab via tube station.        Dave Nelson RN  01/28/21 0737 69.4

## 2021-03-17 NOTE — ED PROVIDER NOTE - PHYSICAL EXAMINATION
38w1d
CONSTITUTIONAL: Well-appearing; well-nourished; in no apparent distress.   ENT: normal nose; no rhinorrhea; normal pharynx with no tonsillar hypertrophy.   NECK: Supple; non-tender; no cervical lymphadenopathy.   CARDIOVASCULAR: Normal S1, S2; no murmurs, rubs, or gallops.   RESPIRATORY: + tachypnea, + wheezes/rhonchi b/l. Pt speaking full sentences  GI/: Normal bowel sounds; non-distended; non-tender; no palpable organomegaly.   MS: No evidence of trauma or deformity. Normal ROM in all four extremities; non-tender to palpation; distal pulses are normal.   SKIN: Normal for age and race; warm; dry; good turgor; no apparent lesions or exudate.   NEURO/PSYCH: A & O x 4; grossly unremarkable. mood and manner are appropriate. Grooming and personal hygiene are appropriate.

## 2021-05-02 ENCOUNTER — INPATIENT (INPATIENT)
Facility: HOSPITAL | Age: 71
LOS: 7 days | Discharge: HOME HEALTH | End: 2021-05-10
Attending: INTERNAL MEDICINE | Admitting: INTERNAL MEDICINE
Payer: MEDICARE

## 2021-05-02 VITALS
OXYGEN SATURATION: 95 % | HEIGHT: 64 IN | RESPIRATION RATE: 18 BRPM | HEART RATE: 112 BPM | SYSTOLIC BLOOD PRESSURE: 118 MMHG | DIASTOLIC BLOOD PRESSURE: 80 MMHG | TEMPERATURE: 98 F

## 2021-05-02 DIAGNOSIS — R22.1 LOCALIZED SWELLING, MASS AND LUMP, NECK: Chronic | ICD-10-CM

## 2021-05-02 LAB
ALBUMIN SERPL ELPH-MCNC: 3.8 G/DL — SIGNIFICANT CHANGE UP (ref 3.5–5.2)
ALP SERPL-CCNC: 65 U/L — SIGNIFICANT CHANGE UP (ref 30–115)
ALT FLD-CCNC: 16 U/L — SIGNIFICANT CHANGE UP (ref 0–41)
ANION GAP SERPL CALC-SCNC: 9 MMOL/L — SIGNIFICANT CHANGE UP (ref 7–14)
APPEARANCE UR: CLEAR — SIGNIFICANT CHANGE UP
AST SERPL-CCNC: 29 U/L — SIGNIFICANT CHANGE UP (ref 0–41)
BASE EXCESS BLDV CALC-SCNC: 9.6 MMOL/L — HIGH (ref -2–2)
BASOPHILS # BLD AUTO: 0 K/UL — SIGNIFICANT CHANGE UP (ref 0–0.2)
BASOPHILS NFR BLD AUTO: 0 % — SIGNIFICANT CHANGE UP (ref 0–1)
BILIRUB SERPL-MCNC: 0.2 MG/DL — SIGNIFICANT CHANGE UP (ref 0.2–1.2)
BILIRUB UR-MCNC: NEGATIVE — SIGNIFICANT CHANGE UP
BUN SERPL-MCNC: 12 MG/DL — SIGNIFICANT CHANGE UP (ref 10–20)
CA-I SERPL-SCNC: 1.2 MMOL/L — SIGNIFICANT CHANGE UP (ref 1.12–1.3)
CALCIUM SERPL-MCNC: 9.2 MG/DL — SIGNIFICANT CHANGE UP (ref 8.5–10.1)
CHLORIDE SERPL-SCNC: 90 MMOL/L — LOW (ref 98–110)
CO2 SERPL-SCNC: 28 MMOL/L — SIGNIFICANT CHANGE UP (ref 17–32)
COLOR SPEC: SIGNIFICANT CHANGE UP
CREAT SERPL-MCNC: <0.5 MG/DL — LOW (ref 0.7–1.5)
DIFF PNL FLD: NEGATIVE — SIGNIFICANT CHANGE UP
EOSINOPHIL # BLD AUTO: 0.28 K/UL — SIGNIFICANT CHANGE UP (ref 0–0.7)
EOSINOPHIL NFR BLD AUTO: 1.7 % — SIGNIFICANT CHANGE UP (ref 0–8)
GAS PNL BLDV: 126 MMOL/L — LOW (ref 136–145)
GAS PNL BLDV: SIGNIFICANT CHANGE UP
GIANT PLATELETS BLD QL SMEAR: PRESENT — SIGNIFICANT CHANGE UP
GLUCOSE SERPL-MCNC: 112 MG/DL — HIGH (ref 70–99)
GLUCOSE UR QL: NEGATIVE — SIGNIFICANT CHANGE UP
HCO3 BLDV-SCNC: 37 MMOL/L — HIGH (ref 22–29)
HCT VFR BLD CALC: 36 % — LOW (ref 37–47)
HCT VFR BLDA CALC: 35.7 % — SIGNIFICANT CHANGE UP (ref 34–44)
HGB BLD CALC-MCNC: 11.6 G/DL — LOW (ref 14–18)
HGB BLD-MCNC: 11.3 G/DL — LOW (ref 12–16)
KETONES UR-MCNC: NEGATIVE — SIGNIFICANT CHANGE UP
LACTATE BLDV-MCNC: 1.2 MMOL/L — SIGNIFICANT CHANGE UP (ref 0.5–1.6)
LEUKOCYTE ESTERASE UR-ACNC: NEGATIVE — SIGNIFICANT CHANGE UP
LYMPHOCYTES # BLD AUTO: 0.99 K/UL — LOW (ref 1.2–3.4)
LYMPHOCYTES # BLD AUTO: 6.1 % — LOW (ref 20.5–51.1)
MANUAL SMEAR VERIFICATION: SIGNIFICANT CHANGE UP
MCHC RBC-ENTMCNC: 26.1 PG — LOW (ref 27–31)
MCHC RBC-ENTMCNC: 31.4 G/DL — LOW (ref 32–37)
MCV RBC AUTO: 83.1 FL — SIGNIFICANT CHANGE UP (ref 81–99)
MONOCYTES # BLD AUTO: 0.57 K/UL — SIGNIFICANT CHANGE UP (ref 0.1–0.6)
MONOCYTES NFR BLD AUTO: 3.5 % — SIGNIFICANT CHANGE UP (ref 1.7–9.3)
NEUTROPHILS # BLD AUTO: 14.3 K/UL — HIGH (ref 1.4–6.5)
NEUTROPHILS NFR BLD AUTO: 80.9 % — HIGH (ref 42.2–75.2)
NEUTS BAND # BLD: 6.9 % — HIGH (ref 0–6)
NITRITE UR-MCNC: NEGATIVE — SIGNIFICANT CHANGE UP
NT-PROBNP SERPL-SCNC: 835 PG/ML — HIGH (ref 0–300)
PCO2 BLDV: 66 MMHG — HIGH (ref 41–51)
PH BLDV: 7.36 — SIGNIFICANT CHANGE UP (ref 7.26–7.43)
PH UR: 7 — SIGNIFICANT CHANGE UP (ref 5–8)
PLAT MORPH BLD: NORMAL — SIGNIFICANT CHANGE UP
PLATELET # BLD AUTO: 299 K/UL — SIGNIFICANT CHANGE UP (ref 130–400)
PO2 BLDV: 24 MMHG — SIGNIFICANT CHANGE UP (ref 20–40)
POTASSIUM BLDV-SCNC: 5.8 MMOL/L — HIGH (ref 3.3–5.6)
POTASSIUM SERPL-MCNC: 5.8 MMOL/L — HIGH (ref 3.5–5)
POTASSIUM SERPL-SCNC: 5.8 MMOL/L — HIGH (ref 3.5–5)
PROT SERPL-MCNC: 6.1 G/DL — SIGNIFICANT CHANGE UP (ref 6–8)
PROT UR-MCNC: NEGATIVE — SIGNIFICANT CHANGE UP
RBC # BLD: 4.33 M/UL — SIGNIFICANT CHANGE UP (ref 4.2–5.4)
RBC # FLD: 16.8 % — HIGH (ref 11.5–14.5)
RBC BLD AUTO: NORMAL — SIGNIFICANT CHANGE UP
SAO2 % BLDV: 36 % — SIGNIFICANT CHANGE UP
SARS-COV-2 RNA SPEC QL NAA+PROBE: SIGNIFICANT CHANGE UP
SODIUM SERPL-SCNC: 127 MMOL/L — LOW (ref 135–146)
SP GR SPEC: 1.03 — SIGNIFICANT CHANGE UP (ref 1.01–1.03)
TROPONIN T SERPL-MCNC: <0.01 NG/ML — SIGNIFICANT CHANGE UP
UROBILINOGEN FLD QL: SIGNIFICANT CHANGE UP
VARIANT LYMPHS # BLD: 0.9 % — SIGNIFICANT CHANGE UP (ref 0–5)
WBC # BLD: 16.29 K/UL — HIGH (ref 4.8–10.8)
WBC # FLD AUTO: 16.29 K/UL — HIGH (ref 4.8–10.8)

## 2021-05-02 PROCEDURE — 71275 CT ANGIOGRAPHY CHEST: CPT | Mod: 26,MA

## 2021-05-02 PROCEDURE — 71045 X-RAY EXAM CHEST 1 VIEW: CPT | Mod: 26

## 2021-05-02 PROCEDURE — 99285 EMERGENCY DEPT VISIT HI MDM: CPT

## 2021-05-02 PROCEDURE — 74177 CT ABD & PELVIS W/CONTRAST: CPT | Mod: 26,MA

## 2021-05-02 PROCEDURE — 93010 ELECTROCARDIOGRAM REPORT: CPT

## 2021-05-02 RX ORDER — SODIUM CHLORIDE 9 MG/ML
1000 INJECTION INTRAMUSCULAR; INTRAVENOUS; SUBCUTANEOUS ONCE
Refills: 0 | Status: COMPLETED | OUTPATIENT
Start: 2021-05-02 | End: 2021-05-02

## 2021-05-02 RX ORDER — IPRATROPIUM/ALBUTEROL SULFATE 18-103MCG
3 AEROSOL WITH ADAPTER (GRAM) INHALATION
Refills: 0 | Status: COMPLETED | OUTPATIENT
Start: 2021-05-02 | End: 2021-05-02

## 2021-05-02 RX ORDER — MAGNESIUM SULFATE 500 MG/ML
2 VIAL (ML) INJECTION ONCE
Refills: 0 | Status: COMPLETED | OUTPATIENT
Start: 2021-05-02 | End: 2021-05-02

## 2021-05-02 RX ORDER — ACETAMINOPHEN 500 MG
650 TABLET ORAL ONCE
Refills: 0 | Status: COMPLETED | OUTPATIENT
Start: 2021-05-02 | End: 2021-05-02

## 2021-05-02 RX ORDER — ONDANSETRON 8 MG/1
4 TABLET, FILM COATED ORAL ONCE
Refills: 0 | Status: COMPLETED | OUTPATIENT
Start: 2021-05-02 | End: 2021-05-02

## 2021-05-02 RX ADMIN — Medication 125 MILLIGRAM(S): at 18:55

## 2021-05-02 RX ADMIN — Medication 650 MILLIGRAM(S): at 18:56

## 2021-05-02 RX ADMIN — Medication 3 MILLILITER(S): at 18:55

## 2021-05-02 RX ADMIN — Medication 3 MILLILITER(S): at 19:33

## 2021-05-02 RX ADMIN — Medication 50 GRAM(S): at 18:55

## 2021-05-02 RX ADMIN — Medication 3 MILLILITER(S): at 19:30

## 2021-05-02 RX ADMIN — SODIUM CHLORIDE 1000 MILLILITER(S): 9 INJECTION INTRAMUSCULAR; INTRAVENOUS; SUBCUTANEOUS at 20:30

## 2021-05-02 NOTE — ED PROVIDER NOTE - PHYSICAL EXAMINATION
Gen: NAD, AOx3  Head: NCAT  HEENT: PERRL, oral mucosa moist, normal conjunctiva, oropharynx clear without exudate or erythema  Lung: no respiratory distress, diffuse wheezing/rales  CV: normal s1/s2, rrr, Normal perfusion, pulses 2+ throughout  Abd: soft, diffuse tenderness w/ no rebounding/guarding, ND, no CVA tenderness  Genitourinary: no pelvic tenderness  MSK: No edema, no visible deformities, full range of motion in all 4 extremities  Neuro: No focal neurologic deficits  Skin: No rash   Psych: normal affect

## 2021-05-02 NOTE — ED PROVIDER NOTE - ATTENDING CONTRIBUTION TO CARE
69yoF with h/o COPD on home O2, DVT/PE on Eliquis, chronic back pain, presents with what she states is her typical COPD exacerbation, states "this happens all the time" and they give her medications and she goes home. Associated low back pain which she states she has had for 1 yr due to being dropped on the floor. Pt unable to hear my further questioning due to apparent hearing loss. On exam, afebrile, hemodynamically stable, saturating well, NAD, well appearing, no WOB, speaking full sentences, head NCAT, EOMI grossly, anicteric, MMM, no JVD, RRR, nml S1/S2, no m/r/g, lungs diffuse wheezes, moderate air mvmt, abd soft, NT, ND, nml BS, no rebound or guarding, AAO, CN's 3-12 grossly intact, BAPTISTE spontaneously, no leg cyanosis or edema, skin warm, well perfused, no rashes or hives. Character low suspicion for PE and no e/o DVT. 69yoF with h/o COPD on home O2, DVT/PE on Eliquis, chronic back pain, presents with what she states is her typical COPD exacerbation, states "this happens all the time" and they give her medications and she goes home. Associated low back pain which she states she has had for 1 yr due to being dropped on the floor. Pt unable to hear my further questioning due to apparent hearing loss. On exam, afebrile, hemodynamically stable, saturating well, NAD, well appearing, no WOB, speaking full sentences, head NCAT, EOMI grossly, anicteric, MMM, no JVD, RRR, nml S1/S2, no m/r/g, lungs diffuse wheezes, moderate air mvmt, abd soft, NT, ND, nml BS, no rebound or guarding, AAO, CN's 3-12 grossly intact, BAPTISTE spontaneously, no leg cyanosis or edema, skin warm, well perfused, no rashes or hives. Character low suspicion for PE and no e/o DVT. No e/o fluid overload on exam/CXR. No e/o PNA or PTX. Character low suspicion for ACS and ECG/trop unremarkable. Hx/exam c/w asthma exacerbation. Given Duonebs, solumedrol, magnesium. NAD, well appearing, no WOB at this time. Signed off care to Dr. PAVAN Painting who will f/u CT's and reassess.

## 2021-05-02 NOTE — ED PROVIDER NOTE - CLINICAL SUMMARY MEDICAL DECISION MAKING FREE TEXT BOX
patien evaluated for sob, found to have copd exacerbation, ct imaging did [facility] acute pe. given patients persistent symptoms patient admitted for further treatment.

## 2021-05-02 NOTE — ED PROVIDER NOTE - OBJECTIVE STATEMENT
70 yo female with a pmh of HTN, COPD, and PE on eliquis presents c/o worsening SOB over the past 2 weeks. history is limited due to pt's hearing impairment. pt states to have experienced 2 episode of NBNB emesis today. denies any other symptoms including fevers, chill, headache, recent illness/travel, cough, abdominal pain, chest pain.

## 2021-05-02 NOTE — ED PROVIDER NOTE - NS ED ROS FT
Constitutional: (-) fever  Eyes/ENT: (-) visual changes   Cardiovascular: (-) chest pain, (-) syncope  Respiratory: (-) cough, (+) shortness of breath  Gastrointestinal: (+) vomiting, (-) diarrhea  Genitourinary: (-) dysuria, (-) hesitancy, (-) frequency   Musculoskeletal: (-) neck pain, (-) back pain, (-) joint pain  Integumentary: (-) rash, (-) edema  Neurological: (-) headache, (-) altered mental status  Allergic/Immunologic: (-) pruritus

## 2021-05-02 NOTE — ED PROVIDER NOTE - PMH
Anxiety    Chronic GERD    Chronic pain    COPD (chronic obstructive pulmonary disease)    Depression    Hypertension    Spinal stenosis

## 2021-05-03 PROBLEM — M48.00 SPINAL STENOSIS, SITE UNSPECIFIED: Chronic | Status: ACTIVE | Noted: 2020-02-14

## 2021-05-03 LAB
A1C WITH ESTIMATED AVERAGE GLUCOSE RESULT: 5.7 % — HIGH (ref 4–5.6)
ALBUMIN SERPL ELPH-MCNC: 3.6 G/DL — SIGNIFICANT CHANGE UP (ref 3.5–5.2)
ALP SERPL-CCNC: 60 U/L — SIGNIFICANT CHANGE UP (ref 30–115)
ALT FLD-CCNC: 14 U/L — SIGNIFICANT CHANGE UP (ref 0–41)
ANION GAP SERPL CALC-SCNC: 8 MMOL/L — SIGNIFICANT CHANGE UP (ref 7–14)
AST SERPL-CCNC: 23 U/L — SIGNIFICANT CHANGE UP (ref 0–41)
BASOPHILS # BLD AUTO: 0.02 K/UL — SIGNIFICANT CHANGE UP (ref 0–0.2)
BASOPHILS NFR BLD AUTO: 0.2 % — SIGNIFICANT CHANGE UP (ref 0–1)
BILIRUB SERPL-MCNC: <0.2 MG/DL — SIGNIFICANT CHANGE UP (ref 0.2–1.2)
BUN SERPL-MCNC: 9 MG/DL — LOW (ref 10–20)
CALCIUM SERPL-MCNC: 8.7 MG/DL — SIGNIFICANT CHANGE UP (ref 8.5–10.1)
CHLORIDE SERPL-SCNC: 93 MMOL/L — LOW (ref 98–110)
CO2 SERPL-SCNC: 30 MMOL/L — SIGNIFICANT CHANGE UP (ref 17–32)
CREAT ?TM UR-MCNC: 23 MG/DL — SIGNIFICANT CHANGE UP
CREAT SERPL-MCNC: <0.5 MG/DL — LOW (ref 0.7–1.5)
EOSINOPHIL # BLD AUTO: 0 K/UL — SIGNIFICANT CHANGE UP (ref 0–0.7)
EOSINOPHIL NFR BLD AUTO: 0 % — SIGNIFICANT CHANGE UP (ref 0–8)
ESTIMATED AVERAGE GLUCOSE: 117 MG/DL — HIGH (ref 68–114)
GLUCOSE SERPL-MCNC: 151 MG/DL — HIGH (ref 70–99)
HCT VFR BLD CALC: 32.4 % — LOW (ref 37–47)
HGB BLD-MCNC: 10.1 G/DL — LOW (ref 12–16)
IMM GRANULOCYTES NFR BLD AUTO: 0.3 % — SIGNIFICANT CHANGE UP (ref 0.1–0.3)
LYMPHOCYTES # BLD AUTO: 0.52 K/UL — LOW (ref 1.2–3.4)
LYMPHOCYTES # BLD AUTO: 4.2 % — LOW (ref 20.5–51.1)
MAGNESIUM SERPL-MCNC: 2.1 MG/DL — SIGNIFICANT CHANGE UP (ref 1.8–2.4)
MCHC RBC-ENTMCNC: 25.6 PG — LOW (ref 27–31)
MCHC RBC-ENTMCNC: 31.2 G/DL — LOW (ref 32–37)
MCV RBC AUTO: 82.2 FL — SIGNIFICANT CHANGE UP (ref 81–99)
MONOCYTES # BLD AUTO: 0.08 K/UL — LOW (ref 0.1–0.6)
MONOCYTES NFR BLD AUTO: 0.6 % — LOW (ref 1.7–9.3)
NEUTROPHILS # BLD AUTO: 11.77 K/UL — HIGH (ref 1.4–6.5)
NEUTROPHILS NFR BLD AUTO: 94.7 % — HIGH (ref 42.2–75.2)
NRBC # BLD: 0 /100 WBCS — SIGNIFICANT CHANGE UP (ref 0–0)
OSMOLALITY UR: 224 MOS/KG — SIGNIFICANT CHANGE UP (ref 50–1200)
PHOSPHATE SERPL-MCNC: 3.5 MG/DL — SIGNIFICANT CHANGE UP (ref 2.1–4.9)
PLATELET # BLD AUTO: 296 K/UL — SIGNIFICANT CHANGE UP (ref 130–400)
POTASSIUM SERPL-MCNC: 5.1 MMOL/L — HIGH (ref 3.5–5)
POTASSIUM SERPL-SCNC: 5.1 MMOL/L — HIGH (ref 3.5–5)
PROCALCITONIN SERPL-MCNC: 0.5 NG/ML — HIGH (ref 0.02–0.1)
PROT ?TM UR-MCNC: 5 MG/DLG/24H — SIGNIFICANT CHANGE UP
PROT SERPL-MCNC: 5.7 G/DL — LOW (ref 6–8)
RBC # BLD: 3.94 M/UL — LOW (ref 4.2–5.4)
RBC # FLD: 16.9 % — HIGH (ref 11.5–14.5)
SODIUM SERPL-SCNC: 131 MMOL/L — LOW (ref 135–146)
SODIUM UR-SCNC: 20 MMOL/L — SIGNIFICANT CHANGE UP
WBC # BLD: 12.43 K/UL — HIGH (ref 4.8–10.8)
WBC # FLD AUTO: 12.43 K/UL — HIGH (ref 4.8–10.8)

## 2021-05-03 PROCEDURE — 99223 1ST HOSP IP/OBS HIGH 75: CPT

## 2021-05-03 RX ORDER — IPRATROPIUM/ALBUTEROL SULFATE 18-103MCG
3 AEROSOL WITH ADAPTER (GRAM) INHALATION EVERY 6 HOURS
Refills: 0 | Status: DISCONTINUED | OUTPATIENT
Start: 2021-05-03 | End: 2021-05-03

## 2021-05-03 RX ORDER — LIDOCAINE 4 G/100G
1 CREAM TOPICAL ONCE
Refills: 0 | Status: COMPLETED | OUTPATIENT
Start: 2021-05-03 | End: 2021-05-03

## 2021-05-03 RX ORDER — AZITHROMYCIN 500 MG/1
500 TABLET, FILM COATED ORAL DAILY
Refills: 0 | Status: DISCONTINUED | OUTPATIENT
Start: 2021-05-03 | End: 2021-05-03

## 2021-05-03 RX ORDER — ALBUTEROL 90 UG/1
2.5 AEROSOL, METERED ORAL EVERY 6 HOURS
Refills: 0 | Status: DISCONTINUED | OUTPATIENT
Start: 2021-05-03 | End: 2021-05-04

## 2021-05-03 RX ORDER — METOCLOPRAMIDE HCL 10 MG
10 TABLET ORAL EVERY 6 HOURS
Refills: 0 | Status: DISCONTINUED | OUTPATIENT
Start: 2021-05-03 | End: 2021-05-10

## 2021-05-03 RX ORDER — SENNA PLUS 8.6 MG/1
2 TABLET ORAL AT BEDTIME
Refills: 0 | Status: DISCONTINUED | OUTPATIENT
Start: 2021-05-03 | End: 2021-05-10

## 2021-05-03 RX ORDER — OXYCODONE AND ACETAMINOPHEN 5; 325 MG/1; MG/1
2 TABLET ORAL EVERY 8 HOURS
Refills: 0 | Status: COMPLETED | OUTPATIENT
Start: 2021-05-03 | End: 2021-05-10

## 2021-05-03 RX ORDER — APIXABAN 2.5 MG/1
5 TABLET, FILM COATED ORAL EVERY 12 HOURS
Refills: 0 | Status: DISCONTINUED | OUTPATIENT
Start: 2021-05-03 | End: 2021-05-10

## 2021-05-03 RX ORDER — ALBUTEROL 90 UG/1
2 AEROSOL, METERED ORAL EVERY 6 HOURS
Refills: 0 | Status: DISCONTINUED | OUTPATIENT
Start: 2021-05-03 | End: 2021-05-10

## 2021-05-03 RX ORDER — TIOTROPIUM BROMIDE 18 UG/1
1 CAPSULE ORAL; RESPIRATORY (INHALATION) DAILY
Refills: 0 | Status: DISCONTINUED | OUTPATIENT
Start: 2021-05-03 | End: 2021-05-10

## 2021-05-03 RX ORDER — PANTOPRAZOLE SODIUM 20 MG/1
40 TABLET, DELAYED RELEASE ORAL
Refills: 0 | Status: DISCONTINUED | OUTPATIENT
Start: 2021-05-03 | End: 2021-05-10

## 2021-05-03 RX ORDER — TRAZODONE HCL 50 MG
50 TABLET ORAL ONCE
Refills: 0 | Status: COMPLETED | OUTPATIENT
Start: 2021-05-03 | End: 2021-05-03

## 2021-05-03 RX ORDER — AZITHROMYCIN 500 MG/1
250 TABLET, FILM COATED ORAL DAILY
Refills: 0 | Status: COMPLETED | OUTPATIENT
Start: 2021-05-03 | End: 2021-05-04

## 2021-05-03 RX ORDER — ALPRAZOLAM 0.25 MG
0.5 TABLET ORAL
Refills: 0 | Status: DISCONTINUED | OUTPATIENT
Start: 2021-05-03 | End: 2021-05-10

## 2021-05-03 RX ORDER — TRAZODONE HCL 50 MG
50 TABLET ORAL AT BEDTIME
Refills: 0 | Status: DISCONTINUED | OUTPATIENT
Start: 2021-05-03 | End: 2021-05-10

## 2021-05-03 RX ORDER — POLYETHYLENE GLYCOL 3350 17 G/17G
17 POWDER, FOR SOLUTION ORAL DAILY
Refills: 0 | Status: DISCONTINUED | OUTPATIENT
Start: 2021-05-03 | End: 2021-05-05

## 2021-05-03 RX ORDER — SODIUM ZIRCONIUM CYCLOSILICATE 10 G/10G
10 POWDER, FOR SUSPENSION ORAL
Refills: 0 | Status: DISCONTINUED | OUTPATIENT
Start: 2021-05-03 | End: 2021-05-03

## 2021-05-03 RX ORDER — FUROSEMIDE 40 MG
1 TABLET ORAL
Qty: 0 | Refills: 2 | DISCHARGE

## 2021-05-03 RX ORDER — TIOTROPIUM BROMIDE 18 UG/1
1 CAPSULE ORAL; RESPIRATORY (INHALATION)
Qty: 0 | Refills: 0 | DISCHARGE

## 2021-05-03 RX ORDER — ALBUTEROL 90 UG/1
2.5 AEROSOL, METERED ORAL THREE TIMES A DAY
Refills: 0 | Status: DISCONTINUED | OUTPATIENT
Start: 2021-05-03 | End: 2021-05-03

## 2021-05-03 RX ORDER — ALPRAZOLAM 0.25 MG
0 TABLET ORAL
Qty: 60 | Refills: 0 | DISCHARGE

## 2021-05-03 RX ORDER — NICOTINE POLACRILEX 2 MG
1 GUM BUCCAL DAILY
Refills: 0 | Status: DISCONTINUED | OUTPATIENT
Start: 2021-05-03 | End: 2021-05-04

## 2021-05-03 RX ADMIN — APIXABAN 5 MILLIGRAM(S): 2.5 TABLET, FILM COATED ORAL at 17:10

## 2021-05-03 RX ADMIN — Medication 50 MILLIGRAM(S): at 01:01

## 2021-05-03 RX ADMIN — OXYCODONE AND ACETAMINOPHEN 2 TABLET(S): 5; 325 TABLET ORAL at 21:09

## 2021-05-03 RX ADMIN — OXYCODONE AND ACETAMINOPHEN 2 TABLET(S): 5; 325 TABLET ORAL at 22:09

## 2021-05-03 RX ADMIN — Medication 60 MILLIGRAM(S): at 17:10

## 2021-05-03 RX ADMIN — Medication 1 PATCH: at 17:10

## 2021-05-03 RX ADMIN — Medication 0.5 MILLIGRAM(S): at 17:10

## 2021-05-03 RX ADMIN — Medication 60 MILLIGRAM(S): at 06:05

## 2021-05-03 RX ADMIN — POLYETHYLENE GLYCOL 3350 17 GRAM(S): 17 POWDER, FOR SOLUTION ORAL at 12:24

## 2021-05-03 RX ADMIN — APIXABAN 5 MILLIGRAM(S): 2.5 TABLET, FILM COATED ORAL at 06:06

## 2021-05-03 RX ADMIN — Medication 1 PATCH: at 20:00

## 2021-05-03 RX ADMIN — Medication 3 MILLILITER(S): at 08:52

## 2021-05-03 RX ADMIN — OXYCODONE AND ACETAMINOPHEN 2 TABLET(S): 5; 325 TABLET ORAL at 06:07

## 2021-05-03 RX ADMIN — SENNA PLUS 2 TABLET(S): 8.6 TABLET ORAL at 21:09

## 2021-05-03 RX ADMIN — AZITHROMYCIN 250 MILLIGRAM(S): 500 TABLET, FILM COATED ORAL at 12:24

## 2021-05-03 RX ADMIN — PANTOPRAZOLE SODIUM 40 MILLIGRAM(S): 20 TABLET, DELAYED RELEASE ORAL at 06:06

## 2021-05-03 RX ADMIN — OXYCODONE AND ACETAMINOPHEN 2 TABLET(S): 5; 325 TABLET ORAL at 13:19

## 2021-05-03 RX ADMIN — Medication 0.5 MILLIGRAM(S): at 01:03

## 2021-05-03 RX ADMIN — Medication 50 MILLIGRAM(S): at 21:09

## 2021-05-03 RX ADMIN — TIOTROPIUM BROMIDE 1 CAPSULE(S): 18 CAPSULE ORAL; RESPIRATORY (INHALATION) at 08:53

## 2021-05-03 RX ADMIN — Medication 0.5 MILLIGRAM(S): at 06:07

## 2021-05-03 RX ADMIN — SODIUM ZIRCONIUM CYCLOSILICATE 10 GRAM(S): 10 POWDER, FOR SUSPENSION ORAL at 06:06

## 2021-05-03 RX ADMIN — LIDOCAINE 1 PATCH: 4 CREAM TOPICAL at 17:10

## 2021-05-03 NOTE — H&P ADULT - ASSESSMENT
71 year old Female with past medical history of COPD on 2L home O2, DVT/PE on Eliquis, chronic back pain presents with shortness of breath for last 2 weeks.    # SOB likely 2/2 Acute COPD exacerbation   # COPD on 2L home oxygen  - Chest x ray and CTA unremarkable  - Bilateral wheezing on exam  - s/p Duoneb, Solumedrol 125 and Magnesium in ED  - Will give Duoneb q6  - Will give Spiriva daily  - Will give 3 day course of Azithromycin 500  - Keep SpO2 between 88 and 92 %    # Chronic back pain secondary to spinal stenosis and and chronic compression fractures  - Continue Percocet  q8  - PT rehab consult     # GERD   - Continue Protonix     # H/O HTN  - Not on any meds. BP on the lower side    # History of Unprovoked DVT in 2019  - Continue Eliquis twice daily    DVT: Eliquis   GI: Protonix   Ambulate as tolerated   Diet: DASH  Full code    71 year old Female with past medical history of COPD on 2L home O2, DVT/PE on Eliquis, chronic back pain presents with shortness of breath for last 2 weeks.    # SOB likely 2/2 Acute COPD exacerbation   # COPD on 2L home oxygen  - Chest x ray and CTA unremarkable  - Bilateral wheezing on exam  - s/p Duoneb, Solumedrol 125 and Magnesium in ED  - Will give Duoneb q6  - Will give Spiriva daily  - Will give 3 day course of Azithromycin 500  - Keep SpO2 between 88 and 92 %    # Chronic back pain secondary to spinal stenosis and and chronic compression fractures  - Continue Percocet  q8  - PT rehab consult     # GERD   - Continue Protonix     # H/O HTN  - Not on any meds. BP on the lower side    # History of Unprovoked DVT in 2019  - Continue Eliquis twice daily    # Hyperkalemia  - Will give Lokelma    DVT: Eliquis   GI: Protonix   Ambulate as tolerated   Diet: DASH  Full code    71 year old Female with past medical history of COPD on 2L home O2, DVT/PE on Eliquis, chronic back pain presents with shortness of breath for last 2 weeks.    Patient has significant wheezing on exam likely secondary to copd exacebation. Will give Azithromycin, Solumedrol, Duoneb and Spiriva and monitor for improvement.    # SOB likely 2/2 Acute COPD exacerbation   # COPD on 2L home oxygen  - Chest x ray and CTA unremarkable  - Bilateral wheezing on exam  - s/p Duoneb, Solumedrol 125 and Magnesium in ED  - Will give Duoneb q6  - Will give Spiriva daily  - Will give 3 day course of Azithromycin 500  - Keep SpO2 between 88 and 92 %    # Chronic back pain secondary to spinal stenosis and chronic compression fractures  - Continue Percocet  q8 for now  - Outpatient follow up  - PT evaluation    # GERD   - Continue Protonix     # H/O HTN  - Not on any meds. BP on the lower side    # History of Unprovoked DVT in 2019  - Continue Eliquis twice daily    # Hyperkalemia  - Will give Lokelma 10 twice daily  - Follow repeat levels    # Nausea and Vomiting  - CT Abdomen negative for acute pathology  - Will give Reglan for now    # Anxiety  # Insomnia  - Continue Xanax 0.5 twice daily  - Continue Trazodone 50 at bedtime    DVT: Eliquis   GI: Protonix   Ambulate as tolerated   Diet: DASH  Full code    71 year old Female with past medical history of COPD on 2L home O2, DVT/PE on Eliquis, chronic back pain presents with shortness of breath for last 2 weeks.    Patient has significant wheezing on exam likely secondary to copd exacebation. Will give Azithromycin, Solumedrol, Duoneb and Spiriva and monitor for improvement.    # SOB likely 2/2 Acute COPD exacerbation   # COPD on 2L home oxygen  - Chest x ray and CTA unremarkable  - Bilateral wheezing on exam  - s/p Duoneb, Solumedrol 125 and Magnesium in ED  - Will give Duoneb q6  - Will give Spiriva daily  - Will give 3 day course of Azithromycin 500  - Keep SpO2 between 88 and 92 %    # Chronic back pain secondary to spinal stenosis and chronic compression fractures  - Continue Percocet  q8 for now  - Outpatient follow up  - PT evaluation    # GERD   - Continue Protonix     # H/O HTN  - Not on any meds. BP on the lower side    # History of Unprovoked DVT in 2019  - Continue Eliquis twice daily    # Hyperkalemia  - Will give Lokelma 10 twice daily  - Follow repeat levels    # Nausea and Vomiting  - CT Abdomen negative for acute pathology  - Will give Reglan for now    # Anxiety  # Insomnia  - Continue Xanax 0.5 twice daily  - Continue Trazodone 50 at bedtime    # Hyponatremia  - Likely 2/2 over hydration  - Will monitor for now and send urine lytes  - Counselled on drinking fluids appropriately    # Hearing Impairment  - Outpatient eval     DVT: Eliquis   GI: Protonix   Ambulate as tolerated   Diet: DASH  Full code    71 year old Female with past medical history of COPD on 2L home O2, DVT/PE on Eliquis, chronic back pain presents with shortness of breath for last 4 weeks.    Patient has significant wheezing on exam likely secondary to copd exacebation. Will give Azithromycin, Solumedrol, Duoneb and Spiriva and monitor for improvement.    # SOB likely 2/2 Acute COPD exacerbation   # COPD on 2L home oxygen  - Chest x ray and CTA unremarkable  - 4 weeks of progressive shortness of breath  - Bilateral wheezing on exam  - s/p Duoneb, Solumedrol 125 and Magnesium in ED  - Will give Duoneb q6  - Will give Spiriva daily  - Will give 3 day course of Azithromycin 500  - Keep SpO2 between 88 and 92 %    # Chronic back pain secondary to spinal stenosis and chronic compression fractures  - Continue Percocet  q8 for now  - Outpatient follow up  - PT evaluation    # GERD   - Continue Protonix     # H/O HTN  - Not on any meds. BP on the lower side    # History of Unprovoked DVT in 2019  - Continue Eliquis twice daily    # Hyperkalemia  - Will give Lokelma 10 twice daily  - Follow repeat levels    # Nausea and Vomiting  - CT Abdomen negative for acute pathology  - Will give Reglan for now    # Anxiety  # Insomnia  - Continue Xanax 0.5 twice daily  - Continue Trazodone 50 at bedtime    # Hyponatremia  - Likely 2/2 over hydration  - Will monitor for now and send urine lytes  - Counselled on drinking fluids appropriately    # Hearing Impairment  - Outpatient eval     DVT: Eliquis   GI: Protonix   Ambulate as tolerated   Diet: DASH  Full code

## 2021-05-03 NOTE — H&P ADULT - HISTORY OF PRESENT ILLNESS
71 year old Female with past medical history of COPD on 2L home O2, DVT/PE on Eliquis, chronic back pain presents with shortness of breath for last 2 weeks.    Patient reports that for the last 2 weeks she has been having progressive shortness of breath more at rest and says "this happens all the time" and is like her typical copd exacerbation. Patient reports partial relief with home medications and now requests that she be given some medications to relieve her and send her home. She endorses 2 episodes of non bloody emesis but denies any chest pain, abdominal pain, dysuria, numbness, tingling, diarrhea, constipation, fevers, rigors, chills or any other complaints.    In ED patient hemodynamically stable, afebrile, saturating 98% on 4L NC, CTA Chest negative for PE and CT abdomen negative for any acute pathology. Patient given Duoneb, Solumedrol 125 and Magnesium with partial improvement in symtpoms. 71 year old Female with past medical history of COPD on 2L home O2, DVT/PE on Eliquis, chronic back pain presents with shortness of breath for last 4 weeks.    Patient reports that for the last 4 weeks she has been having progressive shortness of breath initially on exertion but now at rest. She says that she was afraid to come to the hospital because of covid. She says "this happens all the time" and is like her typical copd exacerbation. Patient reports partial relief with home medications and now requests that she be given some medications to relieve her and send her home. She endorses 2 episodes of non bloody emesis but denies any chest pain, abdominal pain, dysuria, numbness, tingling, diarrhea, constipation, fevers, rigors, chills or any other complaints.    In ED patient hemodynamically stable, afebrile, saturating 98% on 4L NC, CTA Chest negative for PE and CT abdomen negative for any acute pathology. Patient given Duoneb, Solumedrol 125 and Magnesium with partial improvement in symtpoms.

## 2021-05-03 NOTE — H&P ADULT - NSHPPHYSICALEXAM_GEN_ALL_CORE
GENERAL: NAD, lying in bed comfortably  HEAD:  Atraumatic, Normocephalic  CHEST/LUNG: Bilateral wheezing  HEART: Regular rate and rhythm  ABDOMEN: Bowel sounds present; Soft, Nontender, Nondistended  EXTREMITIES: No Peripheral edema  NERVOUS SYSTEM:  Alert & Oriented X3, speech clear. No deficits   MSK: FROM all 4 extremities, full and equal strength  SKIN: No rashes or lesions

## 2021-05-03 NOTE — PHYSICAL THERAPY INITIAL EVALUATION ADULT - GENERAL OBSERVATIONS, REHAB EVAL
Patient was encountered in semifowler position in bed, in NAD, and agreeable to PT. Time in: 9:30, Time out: 10:00.

## 2021-05-03 NOTE — H&P ADULT - NSHPSOCIALHISTORY_GEN_ALL_CORE
Former smoker, smoked for 45 years, stopped 2 years ago, Drinks alcohol socially and denies illicit drug use  Uses a walker at baseline

## 2021-05-03 NOTE — H&P ADULT - NSHPLABSRESULTS_GEN_ALL_CORE
11.3   16.29 )-----------( 299      ( 02 May 2021 18:59 )             36.0       05-02    127<L>  |  90<L>  |  12  ----------------------------<  112<H>  5.8<H>   |  28  |  <0.5<L>    Ca    9.2      02 May 2021 18:59    TPro  6.1  /  Alb  3.8  /  TBili  0.2  /  DBili  x   /  AST  29  /  ALT  16  /  AlkPhos  65  05-02              Urinalysis Basic - ( 02 May 2021 19:13 )    Color: Light Yellow / Appearance: Clear / S.028 / pH: x  Gluc: x / Ketone: Negative  / Bili: Negative / Urobili: <2 mg/dL   Blood: x / Protein: Negative / Nitrite: Negative   Leuk Esterase: Negative / RBC: x / WBC x   Sq Epi: x / Non Sq Epi: x / Bacteria: x            CARDIAC MARKERS ( 02 May 2021 18:59 )  x     / <0.01 ng/mL / x     / x     / x            CAPILLARY BLOOD GLUCOSE

## 2021-05-03 NOTE — H&P ADULT - NSHPREVIEWOFSYSTEMS_GEN_ALL_CORE
CONSTITUTIONAL: No weakness, fevers or chills, no weight loss   EYES/ENT: No visual changes;  No vertigo or throat pain   NECK: No pain or stiffness  RESPIRATORY: As above  CARDIOVASCULAR: No chest pain or palpitations  GASTROINTESTINAL: As above  GENITOURINARY: No dysuria, frequency or hematuria  NEUROLOGICAL: No numbness or weakness  All other review of systems is negative unless indicated above.

## 2021-05-03 NOTE — CHART NOTE - NSCHARTNOTEFT_GEN_A_CORE
Pt seen by Dr Osei today. Case d/w RN and residents on rounds.    Hx: COPD on home O2 (2L); hx DVT/PE (Eliquis); chr back pain    cc: SOB x4 wks; vomit x2; pt feels like it is a COPD exac    CTC: no PE, no PNA    CT A/P: no acute    CXR; no infil    COPD Exac  solu 60 q12  albuterol, spiriva  abx: azithro po  NC O2 (on 3L w/ 96% sat)    Constipation - give laxatives    Na 127 - fluid res 1.5 L/d    K 5.8 - tx (Cr OK)    f/u PT eval; needs walker; has aid 6d/wk (8A-10A and 2P-7P)

## 2021-05-03 NOTE — PHYSICAL THERAPY INITIAL EVALUATION ADULT - PERTINENT HX OF CURRENT PROBLEM, REHAB EVAL
71 year old Female with past medical history of COPD on 2L home O2, DVT/PE on Eliquis, chronic back pain presents with shortness of breath for last 4 weeks.

## 2021-05-03 NOTE — H&P ADULT - ATTENDING COMMENTS
HPI:  71 year old Female with past medical history of COPD on 2L home O2, DVT/PE on Eliquis, chronic back pain presents with shortness of breath for last 4 weeks.    Patient reports that for the last 4 weeks she has been having progressive shortness of breath initially on exertion but now at rest. She says that she was afraid to come to the hospital because of covid. She says "this happens all the time" and is like her typical copd exacerbation. Patient reports partial relief with home medications and now requests that she be given some medications to relieve her and send her home. She endorses 2 episodes of non bloody emesis but denies any chest pain, abdominal pain, dysuria, numbness, tingling, diarrhea, constipation, fevers, rigors, chills or any other complaints.    In ED patient hemodynamically stable, afebrile, saturating 98% on 4L NC, CTA Chest negative for PE and CT abdomen negative for any acute pathology. Patient given Duoneb, Solumedrol 125 and Magnesium with partial improvement in symptoms. (03 May 2021 00:08)    REVIEW OF SYSTEMS: see cc/HPI   CONSTITUTIONAL: No weakness, fevers or chills  EYES/ENT: No visual changes;  No vertigo or throat pain   NECK: No pain or stiffness  RESPIRATORY: No cough, wheezing, hemoptysis; No shortness of breath  CARDIOVASCULAR: No chest pain or palpitations  GASTROINTESTINAL: No abdominal or epigastric pain. No nausea, vomiting, or hematemesis; No diarrhea or constipation. No melena or hematochezia.  GENITOURINARY: No dysuria, frequency or hematuria  NEUROLOGICAL: No numbness or weakness  SKIN: No itching, rashes    Physical Exam:  General: WN/WD NAD, [place]   Neurology: A&Ox3, nonfocal, follows commands  Eyes: PERRLA/ EOMI  ENT/Neck: Neck supple, trachea midline, No JVD  Respiratory: B/L  wheezing, (+) cough, NO rales/rhonchi  CV: Normal rate regular rhythm, S1S2, no murmurs, rubs or gallops  Abdominal: Soft, NT, ND +BS,   Extremities: No edema, + peripheral pulses  Skin: No Rashes, Hematoma, Ecchymosis  Incisions: n/a  Tubes: n/a    A/p  Dyspnea 2/2/ COPD exacerbation   -Admit to floor  -IV steroids  -c/w Dominique and MDI bronchodilator  -O2 via NC and pulse oc monitoring     H/o DVT and still on Eliquis ( unsure of ?? length of time for Rx)  -check venous duplex    Nausea/ Vomiting w/o acute pathology on CT scan  H/o GERD  -c/w PPI  -observation     Electrolyte abnormality - Hyperkalemia/Hyponatremia    -agree w/ lokelma  -agree w/ urine lytes/Cr, urine osm and serum osm     Anxiety / insomnia   -c/w Trazadone

## 2021-05-04 LAB
ANION GAP SERPL CALC-SCNC: 12 MMOL/L — SIGNIFICANT CHANGE UP (ref 7–14)
BASOPHILS # BLD AUTO: 0.01 K/UL — SIGNIFICANT CHANGE UP (ref 0–0.2)
BASOPHILS NFR BLD AUTO: 0.1 % — SIGNIFICANT CHANGE UP (ref 0–1)
BUN SERPL-MCNC: 13 MG/DL — SIGNIFICANT CHANGE UP (ref 10–20)
CALCIUM SERPL-MCNC: 9.1 MG/DL — SIGNIFICANT CHANGE UP (ref 8.5–10.1)
CHLORIDE SERPL-SCNC: 93 MMOL/L — LOW (ref 98–110)
CO2 SERPL-SCNC: 27 MMOL/L — SIGNIFICANT CHANGE UP (ref 17–32)
COVID-19 SPIKE DOMAIN AB INTERP: NEGATIVE — SIGNIFICANT CHANGE UP
COVID-19 SPIKE DOMAIN ANTIBODY RESULT: 0.49 U/ML — SIGNIFICANT CHANGE UP
CREAT SERPL-MCNC: <0.5 MG/DL — LOW (ref 0.7–1.5)
CULTURE RESULTS: SIGNIFICANT CHANGE UP
EOSINOPHIL # BLD AUTO: 0 K/UL — SIGNIFICANT CHANGE UP (ref 0–0.7)
EOSINOPHIL NFR BLD AUTO: 0 % — SIGNIFICANT CHANGE UP (ref 0–8)
GLUCOSE SERPL-MCNC: 142 MG/DL — HIGH (ref 70–99)
HCT VFR BLD CALC: 31.4 % — LOW (ref 37–47)
HGB BLD-MCNC: 9.9 G/DL — LOW (ref 12–16)
IMM GRANULOCYTES NFR BLD AUTO: 0.8 % — HIGH (ref 0.1–0.3)
LYMPHOCYTES # BLD AUTO: 0.64 K/UL — LOW (ref 1.2–3.4)
LYMPHOCYTES # BLD AUTO: 3.6 % — LOW (ref 20.5–51.1)
MAGNESIUM SERPL-MCNC: 1.9 MG/DL — SIGNIFICANT CHANGE UP (ref 1.8–2.4)
MCHC RBC-ENTMCNC: 26 PG — LOW (ref 27–31)
MCHC RBC-ENTMCNC: 31.5 G/DL — LOW (ref 32–37)
MCV RBC AUTO: 82.4 FL — SIGNIFICANT CHANGE UP (ref 81–99)
MONOCYTES # BLD AUTO: 0.56 K/UL — SIGNIFICANT CHANGE UP (ref 0.1–0.6)
MONOCYTES NFR BLD AUTO: 3.2 % — SIGNIFICANT CHANGE UP (ref 1.7–9.3)
NEUTROPHILS # BLD AUTO: 16.35 K/UL — HIGH (ref 1.4–6.5)
NEUTROPHILS NFR BLD AUTO: 92.3 % — HIGH (ref 42.2–75.2)
NRBC # BLD: 0 /100 WBCS — SIGNIFICANT CHANGE UP (ref 0–0)
PLATELET # BLD AUTO: 342 K/UL — SIGNIFICANT CHANGE UP (ref 130–400)
POTASSIUM SERPL-MCNC: 4.3 MMOL/L — SIGNIFICANT CHANGE UP (ref 3.5–5)
POTASSIUM SERPL-SCNC: 4.3 MMOL/L — SIGNIFICANT CHANGE UP (ref 3.5–5)
RBC # BLD: 3.81 M/UL — LOW (ref 4.2–5.4)
RBC # FLD: 17.7 % — HIGH (ref 11.5–14.5)
SARS-COV-2 IGG+IGM SERPL QL IA: 0.49 U/ML — SIGNIFICANT CHANGE UP
SARS-COV-2 IGG+IGM SERPL QL IA: NEGATIVE — SIGNIFICANT CHANGE UP
SODIUM SERPL-SCNC: 132 MMOL/L — LOW (ref 135–146)
SPECIMEN SOURCE: SIGNIFICANT CHANGE UP
WBC # BLD: 17.7 K/UL — HIGH (ref 4.8–10.8)
WBC # FLD AUTO: 17.7 K/UL — HIGH (ref 4.8–10.8)

## 2021-05-04 PROCEDURE — 93970 EXTREMITY STUDY: CPT | Mod: 26

## 2021-05-04 PROCEDURE — 99233 SBSQ HOSP IP/OBS HIGH 50: CPT

## 2021-05-04 RX ORDER — LACTULOSE 10 G/15ML
5 SOLUTION ORAL
Refills: 0 | Status: COMPLETED | OUTPATIENT
Start: 2021-05-04 | End: 2021-05-04

## 2021-05-04 RX ORDER — BUDESONIDE AND FORMOTEROL FUMARATE DIHYDRATE 160; 4.5 UG/1; UG/1
2 AEROSOL RESPIRATORY (INHALATION)
Refills: 0 | Status: DISCONTINUED | OUTPATIENT
Start: 2021-05-04 | End: 2021-05-10

## 2021-05-04 RX ORDER — LIDOCAINE 4 G/100G
1 CREAM TOPICAL ONCE
Refills: 0 | Status: COMPLETED | OUTPATIENT
Start: 2021-05-04 | End: 2021-05-04

## 2021-05-04 RX ORDER — ALBUTEROL 90 UG/1
2.5 AEROSOL, METERED ORAL EVERY 6 HOURS
Refills: 0 | Status: DISCONTINUED | OUTPATIENT
Start: 2021-05-04 | End: 2021-05-10

## 2021-05-04 RX ORDER — NICOTINE POLACRILEX 2 MG
1 GUM BUCCAL DAILY
Refills: 0 | Status: DISCONTINUED | OUTPATIENT
Start: 2021-05-04 | End: 2021-05-10

## 2021-05-04 RX ADMIN — Medication 1 PATCH: at 11:27

## 2021-05-04 RX ADMIN — AZITHROMYCIN 250 MILLIGRAM(S): 500 TABLET, FILM COATED ORAL at 11:27

## 2021-05-04 RX ADMIN — Medication 1 PATCH: at 10:23

## 2021-05-04 RX ADMIN — LACTULOSE 5 GRAM(S): 10 SOLUTION ORAL at 17:25

## 2021-05-04 RX ADMIN — LIDOCAINE 1 PATCH: 4 CREAM TOPICAL at 05:19

## 2021-05-04 RX ADMIN — Medication 0.5 MILLIGRAM(S): at 17:25

## 2021-05-04 RX ADMIN — Medication 50 MILLIGRAM(S): at 20:21

## 2021-05-04 RX ADMIN — LACTULOSE 5 GRAM(S): 10 SOLUTION ORAL at 10:23

## 2021-05-04 RX ADMIN — SENNA PLUS 2 TABLET(S): 8.6 TABLET ORAL at 20:21

## 2021-05-04 RX ADMIN — Medication 10 MILLIGRAM(S): at 20:23

## 2021-05-04 RX ADMIN — Medication 60 MILLIGRAM(S): at 12:49

## 2021-05-04 RX ADMIN — ALBUTEROL 2 PUFF(S): 90 AEROSOL, METERED ORAL at 09:42

## 2021-05-04 RX ADMIN — APIXABAN 5 MILLIGRAM(S): 2.5 TABLET, FILM COATED ORAL at 17:25

## 2021-05-04 RX ADMIN — BUDESONIDE AND FORMOTEROL FUMARATE DIHYDRATE 2 PUFF(S): 160; 4.5 AEROSOL RESPIRATORY (INHALATION) at 20:21

## 2021-05-04 RX ADMIN — ALBUTEROL 2.5 MILLIGRAM(S): 90 AEROSOL, METERED ORAL at 20:38

## 2021-05-04 RX ADMIN — Medication 60 MILLIGRAM(S): at 05:18

## 2021-05-04 RX ADMIN — Medication 1 PATCH: at 11:25

## 2021-05-04 RX ADMIN — LIDOCAINE 1 PATCH: 4 CREAM TOPICAL at 15:19

## 2021-05-04 RX ADMIN — Medication 0.5 MILLIGRAM(S): at 05:18

## 2021-05-04 RX ADMIN — ALBUTEROL 2.5 MILLIGRAM(S): 90 AEROSOL, METERED ORAL at 14:00

## 2021-05-04 RX ADMIN — LIDOCAINE 1 PATCH: 4 CREAM TOPICAL at 17:23

## 2021-05-04 RX ADMIN — OXYCODONE AND ACETAMINOPHEN 2 TABLET(S): 5; 325 TABLET ORAL at 05:18

## 2021-05-04 RX ADMIN — APIXABAN 5 MILLIGRAM(S): 2.5 TABLET, FILM COATED ORAL at 05:18

## 2021-05-04 RX ADMIN — Medication 1 PATCH: at 17:23

## 2021-05-04 RX ADMIN — OXYCODONE AND ACETAMINOPHEN 2 TABLET(S): 5; 325 TABLET ORAL at 12:49

## 2021-05-04 RX ADMIN — PANTOPRAZOLE SODIUM 40 MILLIGRAM(S): 20 TABLET, DELAYED RELEASE ORAL at 05:18

## 2021-05-04 RX ADMIN — Medication 60 MILLIGRAM(S): at 20:22

## 2021-05-04 RX ADMIN — OXYCODONE AND ACETAMINOPHEN 2 TABLET(S): 5; 325 TABLET ORAL at 20:21

## 2021-05-04 RX ADMIN — POLYETHYLENE GLYCOL 3350 17 GRAM(S): 17 POWDER, FOR SOLUTION ORAL at 11:27

## 2021-05-04 RX ADMIN — Medication 1 PATCH: at 18:09

## 2021-05-04 NOTE — PROGRESS NOTE ADULT - ASSESSMENT
72yo F c PMHx DVT/PE on NOAC, COPD c chronic respiratory failure c hypoxia on home o2 3L NC baseline, chronic diastolic HF compensated (gr1dd on echo), here with acute copd exacerbation, hypochloremic hyponatremia improved    active smoker >45 years, resumed smoking last year after brother passed away  ran out of all maintenance bronchodilators as well  c/w iv steroid/ bronchodilators maintenance + standing duonebs  pulm eval- previously followed with Dr. Rodriguez  completed short azithromycin course, if patient spikes fever resume and add cephalosporin (procal marginally elevated)  f/u us duplex LE's  c/w home noac  trend na/ appears euvolemic now on exam  smoking cessation counselling and support provided      #Progress Note Handoff    Pending (specify):  improvement in bronchospasm, pulm f/u, f/u fever curve    Family discussion: case discussed with the patient at bedside    Disposition: home with an outpatient pulm follow up and bronchodilators refilled, when bronchospasm improves 72yo F c PMHx DVT/PE on NOAC, COPD c chronic respiratory failure c hypoxia on home o2 2L NC baseline, chronic diastolic HF compensated (gr1dd on echo), here with acute copd exacerbation, hypochloremic hyponatremia improved    active smoker >45 years, resumed smoking last year after brother passed away  ran out of all maintenance bronchodilators as well  c/w iv steroid/ bronchodilators maintenance + standing duonebs  pulm eval- previously followed with Dr. Rodriguez  completed short azithromycin course, if patient spikes fever resume and add cephalosporin (procal marginally elevated)  f/u us duplex LE's  c/w home noac  trend na/ appears euvolemic now on exam  smoking cessation counselling and support provided      #Progress Note Handoff    Pending (specify):  improvement in bronchospasm, pulm f/u, f/u fever curve    Family discussion: case discussed with the patient at bedside    Disposition: home with an outpatient pulm follow up and bronchodilators refilled, when bronchospasm improves

## 2021-05-04 NOTE — PROGRESS NOTE ADULT - ASSESSMENT
69 year old female patient with past medical history of COPD on 2 L NC PRN, DVT / PE on Eliquis, chronic back pain due to spinal stenosis and chronic compression fractures, GERD presenting for dyspnea.    # SOB, COPD exacerbation  -TTE normal from April 2019  - Chest x ray showed atelectatic changes  -ABG normal, hyperventilating, not retention ->goes against copd exacerbation  - va duplex is negative for DVT, CT Chest  & CT Angio Chest w/ IV Cont; No CTA evidence of acute pulmonary embolus.  - Redemonstrated partially attenuated filling defects and multiple right upper lobe subsegmental branches, felt to reflect chronic pulmonary emboli.  - c/w Azithromycin 250 mg PO qD  - c/w Albuterol Neb q6h and PRN  - c/w Solumedrol 60 mg IV q8h  - c/w Symbicort & Spiriva     # Chronic back pain secondary to spinal stenosis and chronic, acute / subacute compression fractures  - pt on Vicodine outpatient   - c/w Percocet PRN  - Outpatient follow up with pain management, pt pain medication requesting a lot of pain meds, will not give any pain med on d/c, records for pain meds left in the charts  - Lidocaine patch    #Constipation   - likely 2/2 chronic opioid use  - c/w     # GERD : Continue Protonix     # HTN :  uncontrolled in hospital -- started on amlodipine    # No Diabetes hba1c is 5.6 blood sugar high as she is on steroids  -  Carb consistent diet     # History of DVT / PE : Continue Eliquis     DC plan tomorrow as she refuses to go today-- will intend her today   69 year old female patient with past medical history of COPD on 2 L NC PRN, DVT / PE on Eliquis, chronic back pain due to spinal stenosis and chronic compression fractures, GERD presenting for dyspnea.    # SOB, COPD exacerbation  -TTE normal from April 2019  - Chest x ray showed atelectatic changes  -ABG normal, hyperventilating, not retention ->goes against copd exacerbation  - va duplex is negative for DVT, CT Chest  & CT Angio Chest w/ IV Cont; No CTA evidence of acute pulmonary embolus.  - Redemonstrated partially attenuated filling defects and multiple right upper lobe subsegmental branches, felt to reflect chronic pulmonary emboli.  - c/w Azithromycin 250 mg PO qD  - c/w Albuterol Neb q6h and PRN  - c/w Solumedrol 60 mg IV q8h  - c/w Symbicort & Spiriva     # Chronic back pain secondary to spinal stenosis and chronic, acute / subacute compression fractures  - pt on Vicodine outpatient   - c/w Percocet PRN  - Outpatient follow up with pain management, pt pain medication requesting a lot of pain meds, will not give any pain med on d/c, records for pain meds left in the charts  - Lidocaine patch    #Constipation   - likely 2/2 chronic opioid use  - c/w     # GERD : Continue Protonix     # HTN :  uncontrolled in hospital -- started on amlodipine    # No Diabetes hba1c is 5.6 blood sugar high as she is on steroids  -  Carb consistent diet     # History of DVT / PE : Continue Eliquis

## 2021-05-04 NOTE — PROGRESS NOTE ADULT - SUBJECTIVE AND OBJECTIVE BOX
SUBJECTIVE:  HPI:  71 year old Female with past medical history of COPD on 2L home O2, DVT/PE on Eliquis, chronic back pain presents with shortness of breath for last 4 weeks.    Patient reports that for the last 4 weeks she has been having progressive shortness of breath initially on exertion but now at rest. She says that she was afraid to come to the hospital because of covid. She says "this happens all the time" and is like her typical copd exacerbation. Patient reports partial relief with home medications and now requests that she be given some medications to relieve her and send her home. She endorses 2 episodes of non bloody emesis but denies any chest pain, abdominal pain, dysuria, numbness, tingling, diarrhea, constipation, fevers, rigors, chills or any other complaints.    In ED patient hemodynamically stable, afebrile, saturating 98% on 4L NC, CTA Chest negative for PE and CT abdomen negative for any acute pathology. Patient given Duoneb, Solumedrol 125 and Magnesium with partial improvement in symtpoms. (03 May 2021 00:08)      PAST MEDICAL & SURGICAL HISTORY  PAST MEDICAL & SURGICAL HISTORY:  COPD (chronic obstructive pulmonary disease)    Anxiety    Depression    Chronic pain    Hypertension    Chronic GERD    Spinal stenosis    Neck mass      SOCIAL HISTORY:    ALLERGIES:  penicillin (Unknown)    MEDICATIONS:  STANDING MEDICATIONS  ALBUTerol    0.083% 2.5 milliGRAM(s) Nebulizer every 6 hours  ALPRAZolam 0.5 milliGRAM(s) Oral two times a day  apixaban 5 milliGRAM(s) Oral every 12 hours  lactulose Syrup 5 Gram(s) Oral two times a day  methylPREDNISolone sodium succinate Injectable 60 milliGRAM(s) IV Push every 8 hours  nicotine -   7 mG/24Hr(s) Patch 1 patch Transdermal daily  oxycodone    5 mG/acetaminophen 325 mG 2 Tablet(s) Oral every 8 hours  pantoprazole    Tablet 40 milliGRAM(s) Oral before breakfast  polyethylene glycol 3350 17 Gram(s) Oral daily  senna 2 Tablet(s) Oral at bedtime  tiotropium 18 MICROgram(s) Capsule 1 Capsule(s) Inhalation daily  traZODone 50 milliGRAM(s) Oral at bedtime    PRN MEDICATIONS  ALBUTerol    90 MICROgram(s) HFA Inhaler 2 Puff(s) Inhalation every 6 hours PRN  metoclopramide Injectable 10 milliGRAM(s) IV Push every 6 hours PRN    VITALS:   T(F): 98.7  HR: 99  BP: 165/78  RR: 19  SpO2: 96%    LABS:                        9.9    17.70 )-----------( 342      ( 04 May 2021 11:42 )             31.4     05-03    131<L>  |  93<L>  |  9<L>  ----------------------------<  151<H>  5.1<H>   |  30  |  <0.5<L>    Ca    8.7      03 May 2021 05:30  Phos  3.5     05-03  Mg     2.1     05-03    TPro  5.7<L>  /  Alb  3.6  /  TBili  <0.2  /  DBili  x   /  AST  23  /  ALT  14  /  AlkPhos  60  05-03      Urinalysis Basic - ( 02 May 2021 19:13 )    Color: Light Yellow / Appearance: Clear / S.028 / pH: x  Gluc: x / Ketone: Negative  / Bili: Negative / Urobili: <2 mg/dL   Blood: x / Protein: Negative / Nitrite: Negative   Leuk Esterase: Negative / RBC: x / WBC x   Sq Epi: x / Non Sq Epi: x / Bacteria: x            Culture - Urine (collected 02 May 2021 19:13)  Source: .Urine Clean Catch (Midstream)  Final Report (04 May 2021 10:02):    <10,000 CFU/mL Normal Urogenital Maya      CARDIAC MARKERS ( 02 May 2021 18:59 )  x     / <0.01 ng/mL / x     / x     / x          RADIOLOGY:    PHYSICAL EXAM:  GEN: No acute distress  HEENT: AT/NC PEERLA, EOMI  LUNGS: Diffuse wheezing b/l  HEART: S1/S2 present  ABD: Soft, non-tender, non-distended  EXT: No edema, no rashes, no cyanosis  NEURO: AAOX3

## 2021-05-04 NOTE — PROGRESS NOTE ADULT - SUBJECTIVE AND OBJECTIVE BOX
KUSH RINALDI  71y  Female      Patient is a 71y old  Female who presents with a chief complaint of Shortness of Breath (04 May 2021 13:54)      INTERVAL HPI/OVERNIGHT EVENTS: sob persists      REVIEW OF SYSTEMS:  as above  All other review of systems negative    T(C): 37.1 (21 @ 12:37), Max: 37.1 (21 @ 12:37)  HR: 107 (21 @ 14:00) (88 - 107)  BP: 165/78 (21 @ 12:37) (132/66 - 165/78)  RR: 19 (21 @ 12:37) (19 - 20)  SpO2: 98% (21 @ 14:00) (96% - 98%)  Wt(kg): --Vital Signs Last 24 Hrs  T(C): 37.1 (04 May 2021 12:37), Max: 37.1 (04 May 2021 12:37)  T(F): 98.7 (04 May 2021 12:37), Max: 98.7 (04 May 2021 12:37)  HR: 107 (04 May 2021 14:00) (88 - 107)  BP: 165/78 (04 May 2021 12:37) (132/66 - 165/78)  BP(mean): --  RR: 19 (04 May 2021 12:37) (19 - 20)  SpO2: 98% (04 May 2021 14:00) (96% - 98%)        PHYSICAL EXAM:  GENERAL: somewhat dishevelled   PSYCH: no agitation, baseline mentation, anxious  NERVOUS SYSTEM:  Alert & Oriented X3, no new focal deficits  PULMONARY: bilateral expiratory wheezing  CARDIOVASCULAR: Regular rate and rhythm; No murmurs, rubs, or gallops  GI: Soft, Nontender, Nondistended; Bowel sounds present  EXTREMITIES:  2+ Peripheral Pulses, No clubbing, cyanosis, or edema    Consultant(s) Notes Reviewed:  [x ] YES  [ ] NO    Discussed with Consultants/Other Providers [ x] YES     LABS                          9.9    17.70 )-----------( 342      ( 04 May 2021 11:42 )             31.4     05-    132<L>  |  93<L>  |  13  ----------------------------<  142<H>  4.3   |  27  |  <0.5<L>    Ca    9.1      04 May 2021 11:42  Phos  3.5     05-03  Mg     1.9     05-04    TPro  5.7<L>  /  Alb  3.6  /  TBili  <0.2  /  DBili  x   /  AST  23  /  ALT  14  /  AlkPhos  60  05-03      Urinalysis Basic - ( 02 May 2021 19:13 )    Color: Light Yellow / Appearance: Clear / S.028 / pH: x  Gluc: x / Ketone: Negative  / Bili: Negative / Urobili: <2 mg/dL   Blood: x / Protein: Negative / Nitrite: Negative   Leuk Esterase: Negative / RBC: x / WBC x   Sq Epi: x / Non Sq Epi: x / Bacteria: x        Lactate Trend    CARDIAC MARKERS ( 02 May 2021 18:59 )  x     / <0.01 ng/mL / x     / x     / x          CAPILLARY BLOOD GLUCOSE            RADIOLOGY & ADDITIONAL TESTS:    Imaging Personally Reviewed:  [ ] YES  [ ] NO    HEALTH ISSUES - PROBLEM Dx:

## 2021-05-05 PROCEDURE — 99233 SBSQ HOSP IP/OBS HIGH 50: CPT

## 2021-05-05 RX ORDER — GUAIFENESIN/DEXTROMETHORPHAN 600MG-30MG
10 TABLET, EXTENDED RELEASE 12 HR ORAL EVERY 8 HOURS
Refills: 0 | Status: DISCONTINUED | OUTPATIENT
Start: 2021-05-05 | End: 2021-05-05

## 2021-05-05 RX ORDER — POLYETHYLENE GLYCOL 3350 17 G/17G
17 POWDER, FOR SOLUTION ORAL
Refills: 0 | Status: DISCONTINUED | OUTPATIENT
Start: 2021-05-05 | End: 2021-05-10

## 2021-05-05 RX ORDER — CARBAMIDE PEROXIDE 81.86 MG/ML
5 SOLUTION/ DROPS AURICULAR (OTIC)
Refills: 0 | Status: DISCONTINUED | OUTPATIENT
Start: 2021-05-05 | End: 2021-05-10

## 2021-05-05 RX ORDER — AMLODIPINE BESYLATE 2.5 MG/1
5 TABLET ORAL DAILY
Refills: 0 | Status: DISCONTINUED | OUTPATIENT
Start: 2021-05-05 | End: 2021-05-06

## 2021-05-05 RX ORDER — LIDOCAINE 4 G/100G
1 CREAM TOPICAL ONCE
Refills: 0 | Status: COMPLETED | OUTPATIENT
Start: 2021-05-05 | End: 2021-05-05

## 2021-05-05 RX ADMIN — OXYCODONE AND ACETAMINOPHEN 2 TABLET(S): 5; 325 TABLET ORAL at 06:02

## 2021-05-05 RX ADMIN — Medication 60 MILLIGRAM(S): at 06:02

## 2021-05-05 RX ADMIN — Medication 600 MILLIGRAM(S): at 17:29

## 2021-05-05 RX ADMIN — POLYETHYLENE GLYCOL 3350 17 GRAM(S): 17 POWDER, FOR SOLUTION ORAL at 17:30

## 2021-05-05 RX ADMIN — Medication 0.5 MILLIGRAM(S): at 06:02

## 2021-05-05 RX ADMIN — ALBUTEROL 2.5 MILLIGRAM(S): 90 AEROSOL, METERED ORAL at 20:45

## 2021-05-05 RX ADMIN — Medication 1 PATCH: at 17:36

## 2021-05-05 RX ADMIN — BUDESONIDE AND FORMOTEROL FUMARATE DIHYDRATE 2 PUFF(S): 160; 4.5 AEROSOL RESPIRATORY (INHALATION) at 21:21

## 2021-05-05 RX ADMIN — Medication 600 MILLIGRAM(S): at 11:46

## 2021-05-05 RX ADMIN — LIDOCAINE 1 PATCH: 4 CREAM TOPICAL at 06:03

## 2021-05-05 RX ADMIN — Medication 0.5 MILLIGRAM(S): at 17:29

## 2021-05-05 RX ADMIN — OXYCODONE AND ACETAMINOPHEN 2 TABLET(S): 5; 325 TABLET ORAL at 14:48

## 2021-05-05 RX ADMIN — Medication 1 PATCH: at 21:09

## 2021-05-05 RX ADMIN — ALBUTEROL 2.5 MILLIGRAM(S): 90 AEROSOL, METERED ORAL at 09:00

## 2021-05-05 RX ADMIN — APIXABAN 5 MILLIGRAM(S): 2.5 TABLET, FILM COATED ORAL at 06:02

## 2021-05-05 RX ADMIN — APIXABAN 5 MILLIGRAM(S): 2.5 TABLET, FILM COATED ORAL at 17:29

## 2021-05-05 RX ADMIN — Medication 1 PATCH: at 17:30

## 2021-05-05 RX ADMIN — SENNA PLUS 2 TABLET(S): 8.6 TABLET ORAL at 21:22

## 2021-05-05 RX ADMIN — Medication 60 MILLIGRAM(S): at 21:21

## 2021-05-05 RX ADMIN — AMLODIPINE BESYLATE 5 MILLIGRAM(S): 2.5 TABLET ORAL at 06:02

## 2021-05-05 RX ADMIN — Medication 50 MILLIGRAM(S): at 22:33

## 2021-05-05 RX ADMIN — BUDESONIDE AND FORMOTEROL FUMARATE DIHYDRATE 2 PUFF(S): 160; 4.5 AEROSOL RESPIRATORY (INHALATION) at 10:08

## 2021-05-05 RX ADMIN — LIDOCAINE 1 PATCH: 4 CREAM TOPICAL at 21:29

## 2021-05-05 RX ADMIN — OXYCODONE AND ACETAMINOPHEN 2 TABLET(S): 5; 325 TABLET ORAL at 21:19

## 2021-05-05 RX ADMIN — Medication 60 MILLIGRAM(S): at 13:00

## 2021-05-05 RX ADMIN — ALBUTEROL 2.5 MILLIGRAM(S): 90 AEROSOL, METERED ORAL at 14:18

## 2021-05-05 RX ADMIN — CARBAMIDE PEROXIDE 5 DROP(S): 81.86 SOLUTION/ DROPS AURICULAR (OTIC) at 16:08

## 2021-05-05 RX ADMIN — OXYCODONE AND ACETAMINOPHEN 2 TABLET(S): 5; 325 TABLET ORAL at 13:00

## 2021-05-05 RX ADMIN — PANTOPRAZOLE SODIUM 40 MILLIGRAM(S): 20 TABLET, DELAYED RELEASE ORAL at 06:02

## 2021-05-05 NOTE — CONSULT NOTE ADULT - ATTENDING COMMENTS
Continue debrox ear drops, will attempt to remove cerumen in 24-48 hours. Remainder of plan as above

## 2021-05-05 NOTE — PROGRESS NOTE ADULT - ASSESSMENT
71 year old female patient with past medical history of COPD on 2L NC PRN at home, DVT/PE on eliquis, chronic back pain due to spinal stenosis, and chronic compression fractures, and GERD, presenting for dyspnea and admitted for COPD exacerbation was seen and examined.     #SOB, COPD exacerbation  - TTE normal from April 2019  - CXR 5/2 showed atelectasis  - CT angio 5/2 showed no evidence of PE  - ABG normal, hyperventilating, not retention -> goes against copd exacerbation  - thoracic kyphosis -> likely contributing to breathing difficulties (restrictive)  - On physical exam; pt has significant wheezing, congested lung sounds, wet but unproductive cough  - O2 sat 97% on RA  - afebrile  - elevated WBC; likely due to steroids  - COVID PCR negative  - Azithromycin 250 mg PO QD x 3 days; discontinue after last dose today (5/5)  - c/w Albuterol Neb q6h and PRN  - c/w solumedrol 60 mg IV q8h  - c/w symbicort 160 micrograms, 2puffs, BID  - c/w spiriva 18 micrograms capsule, QD  - start mucinex 600 mg q12h on 5/5    #HTN  - uncontrolled in hospital  - /80 on 5/5  - started on amlodipine 5mg QD on 5/5     #chronic back pain 2/2 spinal stenosis and chronic, acute/subacute compression fractures  - on vicodine outpatient  - pt not complaining of any pain unless specifically prompted about back pain.   - OP follow up with pain management  - c/w percocet 5/325 mg q8h  - c/w lidocaine patch PRN    #constipation  - likely 2/2 chronic opiod use  - improving  - received 5g lactulose x 2 times on 5/4  - c/w senna 2 tablets PO at bedtime  - c/w miralax 17 g PO QD    #hx of DVT/PE on eliquis  - VA duplex LE 5/4 showed no evidence of DVT in either lower extremity  - c/w apixaban 5 mg QD    #GERD  - c/w protonix   71 year old female patient with past medical history of COPD on 2L NC PRN at home, DVT/PE on eliquis, chronic back pain due to spinal stenosis, and chronic compression fractures, and GERD, presenting for dyspnea and admitted for COPD exacerbation was seen and examined. Patient is stable with good O2 sat but dyspnea remains about the same.     #SOB, COPD exacerbation  - TTE normal from April 2019. EF 53% -> no edema in LE -> do not suspect CHF (more likely to be COPD exacerbation)  - CXR 5/2 showed atelectasis  - CT angio 5/2 showed no evidence of PE  - ABG normal, hyperventilating, not retention -> goes against copd exacerbation  - thoracic kyphosis -> likely contributing to breathing difficulties (restrictive)  - On physical exam; pt has significant wheezing, congested lung sounds, wet but unproductive cough  - O2 sat 97% on RA  - afebrile  - elevated WBC; likely due to steroids  - COVID PCR negative  - Azithromycin 250 mg PO QD x 3 days; discontinue after last dose today (5/5)  - c/w Albuterol Neb q6h and PRN  - c/w solumedrol 60 mg IV q8h  - c/w symbicort 160 micrograms, 2puffs, BID  - c/w spiriva 18 micrograms capsule, QD  - start mucinex 600 mg q12h on 5/5    #HTN  - uncontrolled in hospital  - /80 on 5/5  - started on amlodipine 5mg QD on 5/5     #chronic back pain 2/2 spinal stenosis and chronic, acute/subacute compression fractures  - on vicodine outpatient  - pt not complaining of any pain unless specifically prompted about back pain.   - OP follow up with pain management  - c/w percocet 5/325 mg q8h  - c/w lidocaine patch PRN    #constipation  - likely 2/2 chronic opiod use  - received 5g lactulose x 2 times on 5/4  - c/w senna 2 tablets PO at bedtime  - increase miralax 17 g PO QD to BID 5/5    #hearing impairment  - onset about 5 days ago as per patient    - impaired visibility on otoscopic exam d/t wax  - improved hearing with hearing aid given during this admission  - consider ENT consult    #hx of DVT/PE on eliquis  - VA duplex LE 5/4 showed no evidence of DVT in either lower extremity  - c/w apixaban 5 mg QD    #GERD  - c/w protonix    #no diabetes hba1c is 5.6, blood sugar high as she is on steroids  - carb consistent diet

## 2021-05-05 NOTE — PROGRESS NOTE ADULT - SUBJECTIVE AND OBJECTIVE BOX
SUBJECTIVE:    Patient is a 71y old Female with PMH of COPD on 2L O2 NC PRN at home who presents with a chief complaint of Shortness of Breath currently admitted to medicine with the primary diagnosis of COPD exacerbation. Today is hospital day 3d. This morning she is lying in bed while receiving albuterol treatment. Patient initially reports SOB is worse than on 5/4 but then reports improved SOB after completing albuterol treatment 5/5 in the morning. She complains of a poor night's sleep due to feeling the need to defecate but only having 2 small BM's despite significant effort/time spent in restroom x7 times overnight. She has an increased appetite and has been tolerating PO diet. She continues to have difficulty hearing which began last weekend when her home aid poured water over her head while bathing her. Hearing impairment has since remain unchanged but patient believes it may be due to wax and would like ear exam. Pt admits to back pain but denies n/v, chest pain, abd pain, lower extremity pain.       PAST MEDICAL & SURGICAL HISTORY  COPD (chronic obstructive pulmonary disease)    Anxiety    Depression    Chronic pain    Hypertension    Chronic GERD    Spinal stenosis    Neck mass      SOCIAL HISTORY:  Positive for smoking; active smoker >45 years, resumed last year after brother passed away    ALLERGIES:  penicillin (Unknown)    MEDICATIONS:  STANDING MEDICATIONS  ALBUTerol    0.083% 2.5 milliGRAM(s) Nebulizer every 6 hours  ALPRAZolam 0.5 milliGRAM(s) Oral two times a day  amLODIPine   Tablet 5 milliGRAM(s) Oral daily  apixaban 5 milliGRAM(s) Oral every 12 hours  budesonide 160 MICROgram(s)/formoterol 4.5 MICROgram(s) Inhaler 2 Puff(s) Inhalation two times a day  guaiFENesin  milliGRAM(s) Oral every 12 hours  guaiFENesin ER 1200 milliGRAM(s) Oral every 12 hours  methylPREDNISolone sodium succinate Injectable 60 milliGRAM(s) IV Push every 8 hours  nicotine - 21 mG/24Hr(s) Patch 1 patch Transdermal daily  oxycodone    5 mG/acetaminophen 325 mG 2 Tablet(s) Oral every 8 hours  pantoprazole    Tablet 40 milliGRAM(s) Oral before breakfast  polyethylene glycol 3350 17 Gram(s) Oral two times a day  senna 2 Tablet(s) Oral at bedtime  tiotropium 18 MICROgram(s) Capsule 1 Capsule(s) Inhalation daily  traZODone 50 milliGRAM(s) Oral at bedtime    PRN MEDICATIONS  ALBUTerol    90 MICROgram(s) HFA Inhaler 2 Puff(s) Inhalation every 6 hours PRN  metoclopramide Injectable 10 milliGRAM(s) IV Push every 6 hours PRN    VITALS:   T(F): 97.8  HR: 88  BP: 174/80  RR: 18  SpO2: 97% on 2L NC and 97% on RA as per nurse     LABS:                        9.9    17.70 )-----------( 342      ( 04 May 2021 11:42 )             31.4     05-04    132<L>  |  93<L>  |  13  ----------------------------<  142<H>  4.3   |  27  |  <0.5<L>    Ca    9.1      04 May 2021 11:42  Mg     1.9     05-04                Culture - Urine (collected 02 May 2021 19:13)  Source: .Urine Clean Catch (Midstream)  Final Report (04 May 2021 10:02):    <10,000 CFU/mL Normal Urogenital Maya          RADIOLOGY:  < from: Xray Chest 1 View-PORTABLE IMMEDIATE (05.02.21 @ 19:48) >  Impression:    Minimal dependent atelectasis. Otherwise unremarkable study.      < end of copied text >          PHYSICAL EXAM:  GEN: Pt is sitting up in bed in no acute distress.   HEENT: head atraumatic. Otoscopic exam done; tympanic membrane not clearly visible due to ear wax.   LUNGS: wheezing and congestion on auscultation throughout. Labored breathing while doing nebulizer treatment with SOB while speaking -> pt seen again about 20 mins after completion of nebulizer and no longer had labored breathing, SOB appeared improved without need to pause for deep breaths in between sentences. pt actively coughing with deep inspiration; cough sounds productive but pt unable to bring up sputum.   HEART: RRR.   ABD: Bowel sounds present in all 4 quadrants. Abd soft, mildly distended, with tenderness to palpation with observed rebounding in the RUQ. no TTP or rebounding in other quadrants.  EXT: no edema in LE. no tenderness to palpation. skin intact other than old scab on right shin.   NEURO: AAOX3    Intravenous access: in place  NG tube: none  Ramirez Catheter: none; pt able to ambulate and use restroom independently with use of a walker         SUBJECTIVE:    Patient is a 71y old Female with PMH of COPD on 2L O2 NC PRN at home who presents with a chief complaint of Shortness of Breath currently admitted to medicine with the primary diagnosis of COPD exacerbation. Today is hospital day 3d. This morning she is lying in bed while receiving albuterol treatment. Patient initially reports SOB is worse than on 5/4 but then reports improved SOB after completing albuterol treatment 5/5 in the morning. She complains of a poor night's sleep due to feeling the need to defecate but only having 2 small BM's despite significant effort/time spent in restroom x7 times overnight. She has an increased appetite and has been tolerating PO diet. She continues to have difficulty hearing which began last weekend when her home aid poured water over her head while bathing her. Hearing impairment has since remain unchanged but patient believes it may be due to wax and would like ear exam. Pt admits to back pain but denies n/v, chest pain, abd pain, lower extremity pain.       PAST MEDICAL & SURGICAL HISTORY  COPD (chronic obstructive pulmonary disease)    Anxiety    Depression    Chronic pain    Hypertension    Chronic GERD    Spinal stenosis    Neck mass      SOCIAL HISTORY:  Positive for smoking; active smoker >45 years, resumed last year after brother passed away    ALLERGIES:  penicillin (Unknown)    MEDICATIONS:  STANDING MEDICATIONS  ALBUTerol    0.083% 2.5 milliGRAM(s) Nebulizer every 6 hours  ALPRAZolam 0.5 milliGRAM(s) Oral two times a day  amLODIPine   Tablet 5 milliGRAM(s) Oral daily  apixaban 5 milliGRAM(s) Oral every 12 hours  budesonide 160 MICROgram(s)/formoterol 4.5 MICROgram(s) Inhaler 2 Puff(s) Inhalation two times a day  guaiFENesin  milliGRAM(s) Oral every 12 hours  guaiFENesin ER 1200 milliGRAM(s) Oral every 12 hours  methylPREDNISolone sodium succinate Injectable 60 milliGRAM(s) IV Push every 8 hours  nicotine - 21 mG/24Hr(s) Patch 1 patch Transdermal daily  oxycodone    5 mG/acetaminophen 325 mG 2 Tablet(s) Oral every 8 hours  pantoprazole    Tablet 40 milliGRAM(s) Oral before breakfast  polyethylene glycol 3350 17 Gram(s) Oral two times a day  senna 2 Tablet(s) Oral at bedtime  tiotropium 18 MICROgram(s) Capsule 1 Capsule(s) Inhalation daily  traZODone 50 milliGRAM(s) Oral at bedtime    PRN MEDICATIONS  ALBUTerol    90 MICROgram(s) HFA Inhaler 2 Puff(s) Inhalation every 6 hours PRN  metoclopramide Injectable 10 milliGRAM(s) IV Push every 6 hours PRN    VITALS:   T(F): 97.8  HR: 88  BP: 174/80  RR: 18  SpO2: 97% on 2L NC and 97% on RA as per nurse     LABS:                          9.9    17.70 )-----------( 342      ( 04 May 2021 11:42 )             31.4     05-04    132<L>  |  93<L>  |  13  ----------------------------<  142<H>  4.3   |  27  |  <0.5<L>    Ca    9.1      04 May 2021 11:42  Mg     1.9     05-04                Culture - Urine (collected 02 May 2021 19:13)  Source: .Urine Clean Catch (Midstream)  Final Report (04 May 2021 10:02):    <10,000 CFU/mL Normal Urogenital Amya          RADIOLOGY:  < from: Xray Chest 1 View-PORTABLE IMMEDIATE (05.02.21 @ 19:48) >  Impression:    Minimal dependent atelectasis. Otherwise unremarkable study.      < end of copied text >          PHYSICAL EXAM:  GEN: Pt is sitting up in bed in no acute distress.   HEENT: head atraumatic. Otoscopic exam done; tympanic membrane not clearly visible due to ear wax.   LUNGS: wheezing and congestion on auscultation throughout. Labored breathing while doing nebulizer treatment with SOB while speaking -> pt seen again about 20 mins after completion of nebulizer and no longer had labored breathing, SOB appeared improved without need to pause for deep breaths in between sentences. pt actively coughing with deep inspiration; cough sounds productive but pt unable to bring up sputum.   HEART: RRR.   ABD: Bowel sounds present in all 4 quadrants. Abd soft, mildly distended, with tenderness to palpation with observed rebounding in the RUQ. no TTP or rebounding in other quadrants.  EXT: no edema in LE. no tenderness to palpation. skin intact other than old scab on right shin.   NEURO: AAOX3    Intravenous access: in place  NG tube: none  Ramirez Catheter: none; pt able to ambulate and use restroom independently with use of a walker

## 2021-05-05 NOTE — CONSULT NOTE ADULT - SUBJECTIVE AND OBJECTIVE BOX
HPI:  71 year old Female with past medical history of COPD on 2L home O2, DVT/PE on Eliquis, chronic back pain presents with shortness of breath for last 4 weeks.    Patient reports that for the last 4 weeks she has been having progressive shortness of breath initially on exertion but now at rest. She says that she was afraid to come to the hospital because of covid. She says "this happens all the time" and is like her typical copd exacerbation. Patient reports partial relief with home medications and now requests that she be given some medications to relieve her and send her home. She endorses 2 episodes of non bloody emesis but denies any chest pain, abdominal pain, dysuria, numbness, tingling, diarrhea, constipation, fevers, rigors, chills or any other complaints.    ENT: Called to evaluate for b/l hearing loss. Pt reports b/l hearing loss L>R, since 3 days ago. Pt states she has often required cerumen disimpaction in the past but has not had one recently. Denies any tinnitus, ear pain, dizziness, discharge.          REVIEW OF SYSTEMS   [x] A ten-point review of systems was otherwise negative except as noted.    Allergies  penicillin (Unknown)    MEDICATIONS:  Antiinfectives:     IV fluids:  Hematologic/Anticoagulation:  apixaban 5 milliGRAM(s) Oral every 12 hours    Pain medications/Neuro:  ALPRAZolam 0.5 milliGRAM(s) Oral two times a day  metoclopramide Injectable 10 milliGRAM(s) IV Push every 6 hours PRN  oxycodone    5 mG/acetaminophen 325 mG 2 Tablet(s) Oral every 8 hours  traZODone 50 milliGRAM(s) Oral at bedtime    Endocrine Medications:   methylPREDNISolone sodium succinate Injectable 60 milliGRAM(s) IV Push every 8 hours    All other standing medications:   ALBUTerol    0.083% 2.5 milliGRAM(s) Nebulizer every 6 hours  amLODIPine   Tablet 5 milliGRAM(s) Oral daily  budesonide 160 MICROgram(s)/formoterol 4.5 MICROgram(s) Inhaler 2 Puff(s) Inhalation two times a day  guaiFENesin  milliGRAM(s) Oral every 12 hours  guaiFENesin ER 1200 milliGRAM(s) Oral every 12 hours  nicotine - 21 mG/24Hr(s) Patch 1 patch Transdermal daily  pantoprazole    Tablet 40 milliGRAM(s) Oral before breakfast  polyethylene glycol 3350 17 Gram(s) Oral two times a day  senna 2 Tablet(s) Oral at bedtime  tiotropium 18 MICROgram(s) Capsule 1 Capsule(s) Inhalation daily    All other PRN medications:  ALBUTerol    90 MICROgram(s) HFA Inhaler 2 Puff(s) Inhalation every 6 hours PRN    Vital Signs Last 24 Hrs  T(C): 36.5 (05 May 2021 12:46), Max: 37.1 (04 May 2021 19:14)  T(F): 97.7 (05 May 2021 12:46), Max: 98.7 (04 May 2021 19:14)  HR: 94 (05 May 2021 12:46) (88 - 107)  BP: 161/78 (05 May 2021 12:46) (156/71 - 174/80)  BP(mean): --  RR: 18 (05 May 2021 12:46) (18 - 19)  SpO2: 97% (05 May 2021 09:00) (97% - 100%)    PHYSICAL EXAM:  ENT EXAM-   Constitutional: NAD, awake/alert,     Head:  normocephalic, atraumatic.   Ears:  b/l cerumen impaction, cannot visualize TM, EAC without erythema/edema  OC/OP:  Floor of mouth, buccal mucosa, lips, hard palate, soft palate, uvula, posterior pharyngeal wall normal.  Mucosa moist.  Neck:  Trachea midline.  Thyroid, parotid and submandibular glands normal.    LABS:  CBC-                        9.9    17.70 )-----------( 342      ( 04 May 2021 11:42 )             31.4     BMP/CMP-  04 May 2021 11:42    132    |  93     |  13     ----------------------------<  142    4.3     |  27     |  <0.5     Ca    9.1        04 May 2021 11:42  Mg     1.9       04 May 2021 11:42

## 2021-05-05 NOTE — PROGRESS NOTE ADULT - ASSESSMENT
72 yo woman w/ history of COPD on 2L NC PRN at home, DVT/PE on eliquis, chronic back pain due to spinal stenosis, and chronic compression fractures, and GERD, presenting for dyspnea and admitted for COPD exacerbation.    # acute on chronic hypoxic resp failure 2/2 COPD exacerbation 2/2 acute bronchitis; on home O2  TTE normal from April 2019. EF 53% -> no edema in LE -> do not suspect CHF (more likely to be COPD exacerbation)  CXR 5/2 showed atelectasis  CT angio 5/2 showed no evidence of PE  ABG normal, hyperventilating, not retention  mild thoracic kyphosis -> could be contributing to breathing difficulties (restrictive)  afebrile  elevated WBC; likely due to steroids  COVID PCR negative  suppl O2 as needed to keep sat >90%  abx: Azithromycin 250 mg PO QD x 3 days - completed 5/5  c/w Albuterol Neb q6h and PRN  c/w solumedrol 60 mg IV q8h  c/w symbicort 160 micrograms, 2puffs, BID  c/w spiriva 18 micrograms capsule, QD  start mucinex 600 mg q12h  check BNP    # HTN - uncontrolled   start amlodipine 5mg QD    # chronic back pain 2/2 spinal stenosis and chronic compression fractures  on vicodin outpatient  c/w percocet 5/325 mg q8h prn  c/w lidocaine patch PRN    # constipation - likely 2/2 chronic opoid use, but pt is having BMs per RN  c/w senna 2 tablets PO at bedtime  increase miralax 17 g PO QD to BID     # hearing impairment - onset about 5 days ago as per patient after rinsing hair w/ bucket of water  impaired visibility on otoscopic exam d/t wax  improved hearing with hearing aid given during this admission  ENT consult: debrox 5gtss AU q12    # hx of DVT/PE on eliquis  V duplex LE (5/4) showed no evidence of DVT in either lower extremity  c/w apixaban 5 mg QD    # GERD  c/w protonix po    # A1c 5.6 - no DM    # DVT ppx: on eliquis    # GI ppx: on ppi po    # Activity: ambulates on her own to BR    Dispo: IV steroids; nebs; ENT f/u after debrox; tx HTN;   will eventually d/c home - not ready for d/c yet

## 2021-05-05 NOTE — PROGRESS NOTE ADULT - SUBJECTIVE AND OBJECTIVE BOX
KUSH RINALDI  71y  Female  ***My note supersedes ALL resident notes that I sign.  My corrections for their notes are in my note.***    I can be reached directly on [email or web address]. My office number is [phone]. My personal cell number is [phone].    INTERVAL EVENTS: Here for f/u of COPD exac. Pt has been smoking again since Nov. Pt still w/ cough and wheeze. Was c/o of ear fullness and decr hearing after aide at home washed hair w/ bucket of water to rinse. Pt can eat and drink. She is on home O2. She can eat/drink. She can walk to BR.    T(F): 97.7 (05-05-21 @ 12:46), Max: 98.7 (05-04-21 @ 19:14)  HR: 103 (05-05-21 @ 14:17) (88 - 104)  BP: 161/78 (05-05-21 @ 12:46) (156/71 - 174/80)  RR: 18 (05-05-21 @ 12:46) (18 - 19)  SpO2: 96% (05-05-21 @ 14:17) (96% - 100%)    Gen: mild resp distress  HEENT: PERRL, EOMI, mouth clr, nose clr; b/l ear wax  Neck: no nodes, no JVD, thyroid nl  lungs: diffuse wheezing; + cough  hrt: s1 s2 reg, mildly tachy; no murmur  abd: soft, NT/ND, no HS megaly  ext: no edema, no c/c  neuro: aa ox3, cn intact (but quite hard of hearing), can move all 4 ext    LABS:                      9.9     (    82.4   17.70 )-----------( ---------      342      ( 04 May 2021 11:42 )             31.4    (    17.7     WBC Count: 17.70 K/uL (05-04-21 @ 11:42) - on steroids  WBC Count: 12.43 K/uL (05-03-21 @ 05:30)  WBC Count: 16.29 K/uL (05-02-21 @ 18:59)    Hemoglobin: 9.9 g/dL (05-04 @ 11:42)  Hemoglobin: 10.1 g/dL (05-03 @ 05:30)  Hemoglobin: 11.3 g/dL (05-02 @ 18:59)    Culture - Urine (collected 05-02-21 @ 19:13)  Source: .Urine Clean Catch (Midstream)  Final Report (05-04-21 @ 10:02):    <10,000 CFU/mL Normal Urogenital Maya    RADIOLOGY & ADDITIONAL TESTS:  < from: [facility] Duplex Lower Ext Vein Scan, Bilat (05.04.21 @ 15:11) >  IMPRESSION:  No evidence of deep venous thrombosis in either lower extremity.    < end of copied text >    < from: CT Angio Chest PE Protocol w/ IV Cont (05.02.21 @ 21:02) >  IMPRESSION:    No evidence of acute pulmonary embolus.    No evidence of acute intrathoracic or intra-abdominal pathology.    Moderate T11 compression fracture is more prominent since February 2020. Nevertheless, it is age-indeterminate.    < end of copied text >    < from: Xray Chest 1 View-PORTABLE IMMEDIATE (05.02.21 @ 19:48) >  Impression:    Minimal dependent atelectasis. Otherwise unremarkable study.    < end of copied text >    MEDICATIONS:    ALBUTerol    0.083% 2.5 milliGRAM(s) Nebulizer every 6 hours  ALBUTerol    90 MICROgram(s) HFA Inhaler 2 Puff(s) Inhalation every 6 hours PRN  ALPRAZolam 0.5 milliGRAM(s) Oral two times a day  amLODIPine   Tablet 5 milliGRAM(s) Oral daily  apixaban 5 milliGRAM(s) Oral every 12 hours  budesonide 160 MICROgram(s)/formoterol 4.5 MICROgram(s) Inhaler 2 Puff(s) Inhalation two times a day  carbamide peroxide Otic Solution 5 Drop(s) Both Ears two times a day  guaiFENesin  milliGRAM(s) Oral every 12 hours  lidocaine   Patch 1 Patch Transdermal once  methylPREDNISolone sodium succinate Injectable 60 milliGRAM(s) IV Push every 8 hours  metoclopramide Injectable 10 milliGRAM(s) IV Push every 6 hours PRN  nicotine - 21 mG/24Hr(s) Patch 1 patch Transdermal daily  oxycodone    5 mG/acetaminophen 325 mG 2 Tablet(s) Oral every 8 hours  pantoprazole    Tablet 40 milliGRAM(s) Oral before breakfast  polyethylene glycol 3350 17 Gram(s) Oral two times a day  senna 2 Tablet(s) Oral at bedtime  tiotropium 18 MICROgram(s) Capsule 1 Capsule(s) Inhalation daily  traZODone 50 milliGRAM(s) Oral at bedtime

## 2021-05-06 LAB
ANION GAP SERPL CALC-SCNC: 8 MMOL/L — SIGNIFICANT CHANGE UP (ref 7–14)
BASOPHILS # BLD AUTO: 0.04 K/UL — SIGNIFICANT CHANGE UP (ref 0–0.2)
BASOPHILS NFR BLD AUTO: 0.3 % — SIGNIFICANT CHANGE UP (ref 0–1)
BUN SERPL-MCNC: 15 MG/DL — SIGNIFICANT CHANGE UP (ref 10–20)
CALCIUM SERPL-MCNC: 9.2 MG/DL — SIGNIFICANT CHANGE UP (ref 8.5–10.1)
CHLORIDE SERPL-SCNC: 98 MMOL/L — SIGNIFICANT CHANGE UP (ref 98–110)
CO2 SERPL-SCNC: 28 MMOL/L — SIGNIFICANT CHANGE UP (ref 17–32)
CREAT SERPL-MCNC: <0.5 MG/DL — LOW (ref 0.7–1.5)
EOSINOPHIL # BLD AUTO: 0 K/UL — SIGNIFICANT CHANGE UP (ref 0–0.7)
EOSINOPHIL NFR BLD AUTO: 0 % — SIGNIFICANT CHANGE UP (ref 0–8)
GLUCOSE SERPL-MCNC: 135 MG/DL — HIGH (ref 70–99)
HCT VFR BLD CALC: 33.1 % — LOW (ref 37–47)
HGB BLD-MCNC: 10.3 G/DL — LOW (ref 12–16)
IMM GRANULOCYTES NFR BLD AUTO: 1.6 % — HIGH (ref 0.1–0.3)
LYMPHOCYTES # BLD AUTO: 1.39 K/UL — SIGNIFICANT CHANGE UP (ref 1.2–3.4)
LYMPHOCYTES # BLD AUTO: 9.5 % — LOW (ref 20.5–51.1)
MAGNESIUM SERPL-MCNC: 1.8 MG/DL — SIGNIFICANT CHANGE UP (ref 1.8–2.4)
MCHC RBC-ENTMCNC: 25.4 PG — LOW (ref 27–31)
MCHC RBC-ENTMCNC: 31.1 G/DL — LOW (ref 32–37)
MCV RBC AUTO: 81.7 FL — SIGNIFICANT CHANGE UP (ref 81–99)
MONOCYTES # BLD AUTO: 0.76 K/UL — HIGH (ref 0.1–0.6)
MONOCYTES NFR BLD AUTO: 5.2 % — SIGNIFICANT CHANGE UP (ref 1.7–9.3)
NEUTROPHILS # BLD AUTO: 12.28 K/UL — HIGH (ref 1.4–6.5)
NEUTROPHILS NFR BLD AUTO: 83.4 % — HIGH (ref 42.2–75.2)
NRBC # BLD: 0 /100 WBCS — SIGNIFICANT CHANGE UP (ref 0–0)
PLATELET # BLD AUTO: 375 K/UL — SIGNIFICANT CHANGE UP (ref 130–400)
POTASSIUM SERPL-MCNC: 4.1 MMOL/L — SIGNIFICANT CHANGE UP (ref 3.5–5)
POTASSIUM SERPL-SCNC: 4.1 MMOL/L — SIGNIFICANT CHANGE UP (ref 3.5–5)
RBC # BLD: 4.05 M/UL — LOW (ref 4.2–5.4)
RBC # FLD: 17.9 % — HIGH (ref 11.5–14.5)
SODIUM SERPL-SCNC: 134 MMOL/L — LOW (ref 135–146)
WBC # BLD: 14.7 K/UL — HIGH (ref 4.8–10.8)
WBC # FLD AUTO: 14.7 K/UL — HIGH (ref 4.8–10.8)

## 2021-05-06 PROCEDURE — 99233 SBSQ HOSP IP/OBS HIGH 50: CPT

## 2021-05-06 PROCEDURE — 99222 1ST HOSP IP/OBS MODERATE 55: CPT

## 2021-05-06 RX ORDER — NYSTATIN 500MM UNIT
500000 POWDER (EA) MISCELLANEOUS
Refills: 0 | Status: DISCONTINUED | OUTPATIENT
Start: 2021-05-06 | End: 2021-05-06

## 2021-05-06 RX ORDER — ALPRAZOLAM 0.25 MG
0.5 TABLET ORAL ONCE
Refills: 0 | Status: DISCONTINUED | OUTPATIENT
Start: 2021-05-06 | End: 2021-05-06

## 2021-05-06 RX ORDER — LIDOCAINE 4 G/100G
1 CREAM TOPICAL ONCE
Refills: 0 | Status: COMPLETED | OUTPATIENT
Start: 2021-05-06 | End: 2021-05-06

## 2021-05-06 RX ORDER — AMLODIPINE BESYLATE 2.5 MG/1
10 TABLET ORAL DAILY
Refills: 0 | Status: DISCONTINUED | OUTPATIENT
Start: 2021-05-06 | End: 2021-05-10

## 2021-05-06 RX ORDER — HALOPERIDOL DECANOATE 100 MG/ML
0.5 INJECTION INTRAMUSCULAR ONCE
Refills: 0 | Status: DISCONTINUED | OUTPATIENT
Start: 2021-05-06 | End: 2021-05-06

## 2021-05-06 RX ADMIN — Medication 50 MILLIGRAM(S): at 21:46

## 2021-05-06 RX ADMIN — ALBUTEROL 2.5 MILLIGRAM(S): 90 AEROSOL, METERED ORAL at 08:18

## 2021-05-06 RX ADMIN — Medication 600 MILLIGRAM(S): at 17:53

## 2021-05-06 RX ADMIN — LIDOCAINE 1 PATCH: 4 CREAM TOPICAL at 07:35

## 2021-05-06 RX ADMIN — SENNA PLUS 2 TABLET(S): 8.6 TABLET ORAL at 21:47

## 2021-05-06 RX ADMIN — AMLODIPINE BESYLATE 5 MILLIGRAM(S): 2.5 TABLET ORAL at 05:30

## 2021-05-06 RX ADMIN — PANTOPRAZOLE SODIUM 40 MILLIGRAM(S): 20 TABLET, DELAYED RELEASE ORAL at 07:37

## 2021-05-06 RX ADMIN — CARBAMIDE PEROXIDE 5 DROP(S): 81.86 SOLUTION/ DROPS AURICULAR (OTIC) at 18:11

## 2021-05-06 RX ADMIN — Medication 0.5 MILLIGRAM(S): at 17:57

## 2021-05-06 RX ADMIN — ALBUTEROL 2.5 MILLIGRAM(S): 90 AEROSOL, METERED ORAL at 13:37

## 2021-05-06 RX ADMIN — BUDESONIDE AND FORMOTEROL FUMARATE DIHYDRATE 2 PUFF(S): 160; 4.5 AEROSOL RESPIRATORY (INHALATION) at 21:02

## 2021-05-06 RX ADMIN — BUDESONIDE AND FORMOTEROL FUMARATE DIHYDRATE 2 PUFF(S): 160; 4.5 AEROSOL RESPIRATORY (INHALATION) at 08:26

## 2021-05-06 RX ADMIN — Medication 600 MILLIGRAM(S): at 05:31

## 2021-05-06 RX ADMIN — Medication 60 MILLIGRAM(S): at 17:53

## 2021-05-06 RX ADMIN — Medication 0.5 MILLIGRAM(S): at 05:30

## 2021-05-06 RX ADMIN — ALBUTEROL 2.5 MILLIGRAM(S): 90 AEROSOL, METERED ORAL at 19:42

## 2021-05-06 RX ADMIN — Medication 1 DROP(S): at 18:55

## 2021-05-06 RX ADMIN — OXYCODONE AND ACETAMINOPHEN 2 TABLET(S): 5; 325 TABLET ORAL at 21:48

## 2021-05-06 RX ADMIN — APIXABAN 5 MILLIGRAM(S): 2.5 TABLET, FILM COATED ORAL at 18:10

## 2021-05-06 RX ADMIN — Medication 60 MILLIGRAM(S): at 05:31

## 2021-05-06 RX ADMIN — TIOTROPIUM BROMIDE 1 CAPSULE(S): 18 CAPSULE ORAL; RESPIRATORY (INHALATION) at 08:23

## 2021-05-06 RX ADMIN — Medication 1 PATCH: at 07:36

## 2021-05-06 RX ADMIN — OXYCODONE AND ACETAMINOPHEN 2 TABLET(S): 5; 325 TABLET ORAL at 13:01

## 2021-05-06 RX ADMIN — CARBAMIDE PEROXIDE 5 DROP(S): 81.86 SOLUTION/ DROPS AURICULAR (OTIC) at 05:30

## 2021-05-06 RX ADMIN — OXYCODONE AND ACETAMINOPHEN 2 TABLET(S): 5; 325 TABLET ORAL at 05:30

## 2021-05-06 RX ADMIN — Medication 0.5 MILLIGRAM(S): at 00:50

## 2021-05-06 RX ADMIN — POLYETHYLENE GLYCOL 3350 17 GRAM(S): 17 POWDER, FOR SOLUTION ORAL at 05:31

## 2021-05-06 RX ADMIN — APIXABAN 5 MILLIGRAM(S): 2.5 TABLET, FILM COATED ORAL at 05:30

## 2021-05-06 RX ADMIN — Medication 1 PATCH: at 11:21

## 2021-05-06 RX ADMIN — Medication 0.5 MILLIGRAM(S): at 23:56

## 2021-05-06 RX ADMIN — AMLODIPINE BESYLATE 10 MILLIGRAM(S): 2.5 TABLET ORAL at 11:38

## 2021-05-06 RX ADMIN — LIDOCAINE 1 PATCH: 4 CREAM TOPICAL at 21:45

## 2021-05-06 NOTE — PROGRESS NOTE ADULT - SUBJECTIVE AND OBJECTIVE BOX
KUSH RINALDI  71y  Female  ***My note supersedes ALL resident notes that I sign.  My corrections for their notes are in my note.***    I can be reached directly on [id number]. My office number is [phone]. My personal cell number is [phone].    INTERVAL EVENTS: Here for f/u of COPD. Pt says that she is starting to feel a little better. Still w/ cough and wheeze. Can eat/drink and walk to BR.  Hearing is only slightly better, but just started debrox.    T(F): 97.6 (05-06-21 @ 14:45), Max: 97.8 (05-06-21 @ 05:12)  HR: 78 (05-06-21 @ 14:45) (78 - 94)  BP: 138/79 (05-06-21 @ 14:45) (132/71 - 174/82)  RR: 19 (05-06-21 @ 14:45) (18 - 19)  SpO2: 96% (05-06-21 @ 11:38) (96% - 97%)    Gen: min resp distress at rest; gets winded w/ any exertion; not requiring O2  HEENT: PERRL, EOMI, mouth clr, nose clr; b/l ear wax  Neck: no nodes, no JVD, thyroid nl  lungs: diffuse wheezing; + cough  hrt: s1 s2 reg, no murmur  abd: soft, NT/ND, no HS megaly  ext: no edema, no c/c  neuro: aa ox3, cn intact (but quite hard of hearing), can move all 4 ext    LABS:                      10.3    (    81.7   14.70 )-----------( ---------      375      ( 06 May 2021 07:20 )             33.1    (    17.9     WBC Count: 14.70 K/uL (05-06-21 @ 07:20) - on steroids  WBC Count: 17.70 K/uL (05-04-21 @ 11:42)  WBC Count: 12.43 K/uL (05-03-21 @ 05:30)  WBC Count: 16.29 K/uL (05-02-21 @ 18:59)    Hemoglobin: 10.3 g/dL (05-06 @ 07:20)  Hemoglobin: 9.9 g/dL (05-04 @ 11:42)  Hemoglobin: 10.1 g/dL (05-03 @ 05:30)  Hemoglobin: 11.3 g/dL (05-02 @ 18:59)    134   (   98   (   135      05-06-21 @ 07:20  ----------------------               4.1   (   28   (   15                             -----                        <0.5  Ca  9.2   Mg  1.8    P   --     RADIOLOGY & ADDITIONAL TESTS:      MEDICATIONS:  nystatin    Suspension 495401 Unit(s) Swish and Swallow two times a day PRN    ALBUTerol    0.083% 2.5 milliGRAM(s) Nebulizer every 6 hours  ALBUTerol    90 MICROgram(s) HFA Inhaler 2 Puff(s) Inhalation every 6 hours PRN  ALPRAZolam 0.5 milliGRAM(s) Oral two times a day  amLODIPine   Tablet 10 milliGRAM(s) Oral daily  apixaban 5 milliGRAM(s) Oral every 12 hours  budesonide 160 MICROgram(s)/formoterol 4.5 MICROgram(s) Inhaler 2 Puff(s) Inhalation two times a day  carbamide peroxide Otic Solution 5 Drop(s) Both Ears two times a day  guaiFENesin  milliGRAM(s) Oral every 12 hours  methylPREDNISolone sodium succinate Injectable 60 milliGRAM(s) IV Push every 12 hours  metoclopramide Injectable 10 milliGRAM(s) IV Push every 6 hours PRN  nicotine - 21 mG/24Hr(s) Patch 1 patch Transdermal daily  oxycodone    5 mG/acetaminophen 325 mG 2 Tablet(s) Oral every 8 hours  pantoprazole    Tablet 40 milliGRAM(s) Oral before breakfast  polyethylene glycol 3350 17 Gram(s) Oral two times a day  senna 2 Tablet(s) Oral at bedtime  tiotropium 18 MICROgram(s) Capsule 1 Capsule(s) Inhalation daily  traZODone 50 milliGRAM(s) Oral at bedtime

## 2021-05-06 NOTE — PROGRESS NOTE ADULT - SUBJECTIVE AND OBJECTIVE BOX
SUBJECTIVE:  HPI:  71 year old Female with past medical history of COPD on 2L home O2, DVT/PE on Eliquis, chronic back pain presents with shortness of breath for last 4 weeks.    Patient reports that for the last 4 weeks she has been having progressive shortness of breath initially on exertion but now at rest. She says that she was afraid to come to the hospital because of covid. She says "this happens all the time" and is like her typical copd exacerbation. Patient reports partial relief with home medications and now requests that she be given some medications to relieve her and send her home. She endorses 2 episodes of non bloody emesis but denies any chest pain, abdominal pain, dysuria, numbness, tingling, diarrhea, constipation, fevers, rigors, chills or any other complaints.    In ED patient hemodynamically stable, afebrile, saturating 98% on 4L NC, CTA Chest negative for PE and CT abdomen negative for any acute pathology. Patient given Duoneb, Solumedrol 125 and Magnesium with partial improvement in symtpoms. (03 May 2021 00:08)      PAST MEDICAL & SURGICAL HISTORY  PAST MEDICAL & SURGICAL HISTORY:  COPD (chronic obstructive pulmonary disease)    Anxiety    Depression    Chronic pain    Hypertension    Chronic GERD    Spinal stenosis    Neck mass      SOCIAL HISTORY:    ALLERGIES:  penicillin (Unknown)    MEDICATIONS:  STANDING MEDICATIONS  ALBUTerol    0.083% 2.5 milliGRAM(s) Nebulizer every 6 hours  ALPRAZolam 0.5 milliGRAM(s) Oral two times a day  amLODIPine   Tablet 10 milliGRAM(s) Oral daily  apixaban 5 milliGRAM(s) Oral every 12 hours  budesonide 160 MICROgram(s)/formoterol 4.5 MICROgram(s) Inhaler 2 Puff(s) Inhalation two times a day  carbamide peroxide Otic Solution 5 Drop(s) Both Ears two times a day  guaiFENesin  milliGRAM(s) Oral every 12 hours  methylPREDNISolone sodium succinate Injectable 60 milliGRAM(s) IV Push every 12 hours  nicotine - 21 mG/24Hr(s) Patch 1 patch Transdermal daily  oxycodone    5 mG/acetaminophen 325 mG 2 Tablet(s) Oral every 8 hours  pantoprazole    Tablet 40 milliGRAM(s) Oral before breakfast  polyethylene glycol 3350 17 Gram(s) Oral two times a day  senna 2 Tablet(s) Oral at bedtime  tiotropium 18 MICROgram(s) Capsule 1 Capsule(s) Inhalation daily  traZODone 50 milliGRAM(s) Oral at bedtime    PRN MEDICATIONS  ALBUTerol    90 MICROgram(s) HFA Inhaler 2 Puff(s) Inhalation every 6 hours PRN  metoclopramide Injectable 10 milliGRAM(s) IV Push every 6 hours PRN    VITALS:   T(F): 97.8  HR: 94  BP: 174/82  RR: 18  SpO2: 97%    LABS:                        10.3   14.70 )-----------( 375      ( 06 May 2021 07:20 )             33.1     05-06    134<L>  |  98  |  15  ----------------------------<  135<H>  4.1   |  28  |  <0.5<L>    Ca    9.2      06 May 2021 07:20  Mg     1.8     05-06                    RADIOLOGY:    PHYSICAL EXAM:  GEN: No acute distress  HEENT: AT/NC PEERLA, EOMI  LUNGS: diffuse wheezing  HEART: S1/S2 present. RRR  ABD: Soft, non-tender, non-distended  EXT: No edema, no rashes, no cyanosis  NEURO: AAOX3

## 2021-05-06 NOTE — PROGRESS NOTE ADULT - SUBJECTIVE AND OBJECTIVE BOX
SUBJECTIVE:    Patient is a 71y old Female with PMH of COPD on 2L O2 NC PRN at home who presents with a chief complaint of Shortness of Breath currently admitted to medicine with the primary diagnosis of COPD exacerbation.     Today is hospital day 4d. This morning she is sitting up comfortably in bed. She reports improved SOB and decreased need for O2 on NC (home O2 also used intermittently as needed). She reports having a dry cough since starting mucinex and has not been able to cough up sputum yet. Patient states she began receiving ear drops and feels that ear wax is starting to come out but no changes in hearing impairment. She would like to receive ear disimpaction inpatient. She complains of abd pain and a poor night's sleep due to frequent visits to the bathroom but only 1 BM overnight and 1BM this AM. Pt admits to feeling better overall. No acute overnight events.    ROS: pt admits to SOB, abd pain, bloating, constipation, back pain, and b/l hearing difficulty. pt denies dizziness, HA, ringing in the ears, n/v, diarrhea, chest pain, leg pain.    PAST MEDICAL & SURGICAL HISTORY  COPD (chronic obstructive pulmonary disease)    Anxiety    Depression    Chronic pain    Hypertension    Chronic GERD    Spinal stenosis    Neck mass      SOCIAL HISTORY:  Negative for smoking/alcohol/drug use.     ALLERGIES:  penicillin (Unknown)    MEDICATIONS:  STANDING MEDICATIONS  ALBUTerol    0.083% 2.5 milliGRAM(s) Nebulizer every 6 hours  ALPRAZolam 0.5 milliGRAM(s) Oral two times a day  amLODIPine   Tablet 10 milliGRAM(s) Oral daily  apixaban 5 milliGRAM(s) Oral every 12 hours  budesonide 160 MICROgram(s)/formoterol 4.5 MICROgram(s) Inhaler 2 Puff(s) Inhalation two times a day  carbamide peroxide Otic Solution 5 Drop(s) Both Ears two times a day  guaiFENesin  milliGRAM(s) Oral every 12 hours  methylPREDNISolone sodium succinate Injectable 60 milliGRAM(s) IV Push every 12 hours  nicotine - 21 mG/24Hr(s) Patch 1 patch Transdermal daily  oxycodone    5 mG/acetaminophen 325 mG 2 Tablet(s) Oral every 8 hours  pantoprazole    Tablet 40 milliGRAM(s) Oral before breakfast  polyethylene glycol 3350 17 Gram(s) Oral two times a day  senna 2 Tablet(s) Oral at bedtime  tiotropium 18 MICROgram(s) Capsule 1 Capsule(s) Inhalation daily  traZODone 50 milliGRAM(s) Oral at bedtime    PRN MEDICATIONS  ALBUTerol    90 MICROgram(s) HFA Inhaler 2 Puff(s) Inhalation every 6 hours PRN  metoclopramide Injectable 10 milliGRAM(s) IV Push every 6 hours PRN    VITALS:   T(F): 97.8  HR: 94  BP: 133/72  RR: 18  SpO2: 96%    LABS:                        10.3   14.70 )-----------( 375      ( 06 May 2021 07:20 )             33.1     05-06    134<L>  |  98  |  15  ----------------------------<  135<H>  4.1   |  28  |  <0.5<L>    Ca    9.2      06 May 2021 07:20  Mg     1.8     05-06                    RADIOLOGY:    PHYSICAL EXAM:  GEN: Pt sitting up in bed. No acute distress  LUNGS: diffuse wheezing and congested lung sounds throughout, shallow breaths, no crackles. SOB while speaking but improved from yesterday (5/5). actively coughs during deep inspiration; wet unproductive cough. O2 sat taken personally at bedside: O2sat  97% on RA.  HEART: S1/S2 present. RRR.   ABD: Soft, non-distended, bowel sounds present in all 4 quadrants. tender to palpation in epigastric region and RUQ.  EXT: no edema, no TTP  NEURO: AAOX3, moves all extremities, can change positions in bed without assistance    Intravenous access: in place  NG tube: none  Ramirez Catheter: not indicated         SUBJECTIVE:    Patient is a 71y old Female with PMH of COPD on 2L O2 NC PRN at home who presents with a chief complaint of Shortness of Breath currently admitted to medicine with the primary diagnosis of COPD exacerbation.     Today is hospital day 4d. This morning she is sitting up comfortably in bed. She reports improved SOB and decreased need for O2 on NC (home O2 also used intermittently as needed). She reports having a dry cough since starting mucinex and has not been able to cough up sputum yet. Patient states she began receiving ear drops and feels that ear wax is starting to come out but no changes in hearing impairment. She would like to receive ear disimpaction inpatient. She complains of abd pain and a poor night's sleep due to frequent visits to the bathroom but only 1 BM overnight and 1BM this AM. She also complains of dry eyes and 'feeling oral thrush coming on' and requests swish and swallow. Pt admits to feeling better overall. No acute overnight events.    ROS: pt admits to SOB, abd pain, bloating, constipation, back pain, and b/l hearing difficulty. pt denies dizziness, HA, changes in vision, ringing in the ears, n/v, diarrhea, chest pain, leg pain.    PAST MEDICAL & SURGICAL HISTORY  COPD (chronic obstructive pulmonary disease)    Anxiety    Depression    Chronic pain    Hypertension    Chronic GERD    Spinal stenosis    Neck mass      SOCIAL HISTORY:  Negative for smoking/alcohol/drug use.     ALLERGIES:  penicillin (Unknown)    MEDICATIONS:  STANDING MEDICATIONS  ALBUTerol    0.083% 2.5 milliGRAM(s) Nebulizer every 6 hours  ALPRAZolam 0.5 milliGRAM(s) Oral two times a day  amLODIPine   Tablet 10 milliGRAM(s) Oral daily  apixaban 5 milliGRAM(s) Oral every 12 hours  budesonide 160 MICROgram(s)/formoterol 4.5 MICROgram(s) Inhaler 2 Puff(s) Inhalation two times a day  carbamide peroxide Otic Solution 5 Drop(s) Both Ears two times a day  guaiFENesin  milliGRAM(s) Oral every 12 hours  methylPREDNISolone sodium succinate Injectable 60 milliGRAM(s) IV Push every 12 hours  nicotine - 21 mG/24Hr(s) Patch 1 patch Transdermal daily  oxycodone    5 mG/acetaminophen 325 mG 2 Tablet(s) Oral every 8 hours  pantoprazole    Tablet 40 milliGRAM(s) Oral before breakfast  polyethylene glycol 3350 17 Gram(s) Oral two times a day  senna 2 Tablet(s) Oral at bedtime  tiotropium 18 MICROgram(s) Capsule 1 Capsule(s) Inhalation daily  traZODone 50 milliGRAM(s) Oral at bedtime    PRN MEDICATIONS  ALBUTerol    90 MICROgram(s) HFA Inhaler 2 Puff(s) Inhalation every 6 hours PRN  metoclopramide Injectable 10 milliGRAM(s) IV Push every 6 hours PRN    VITALS:   T(F): 97.8  HR: 94  BP: 133/72  RR: 18  SpO2: 96%    LABS:                        10.3   14.70 )-----------( 375      ( 06 May 2021 07:20 )             33.1     05-06    134<L>  |  98  |  15  ----------------------------<  135<H>  4.1   |  28  |  <0.5<L>    Ca    9.2      06 May 2021 07:20  Mg     1.8     05-06                    RADIOLOGY:    PHYSICAL EXAM:  GEN: Pt sitting up in bed. No acute distress  HEENT: head aturamatic, mucus membranes moist, no oral thrush seen on exam, pupils symmetrical  LUNGS: diffuse wheezing and congested lung sounds throughout, shallow breaths, no crackles. SOB while speaking but improved from yesterday (5/5). actively coughs during deep inspiration; wet but unproductive cough. O2 sat taken personally at bedside: O2sat  97% on RA.  HEART: S1/S2 present. RRR.   ABD: Soft, non-distended, bowel sounds present in all 4 quadrants. tender to palpation in epigastric region and RUQ.  EXT: no edema, no TTP  NEURO: AAOX3, moves all extremities, can change positions in bed without assistance    Intravenous access: in place  NG tube: none  Ramirez Catheter: not indicated

## 2021-05-06 NOTE — PROGRESS NOTE ADULT - ASSESSMENT
71 year old female patient with past medical history of COPD on 2L NC PRN at home, DVT/PE on eliquis, chronic back pain due to spinal stenosis, and chronic compression fractures, and GERD, presenting for dyspnea and admitted for COPD exacerbation was seen and examined. Patient is stable with good O2 sat and dyspnea is improving.    # acute on chronic hypoxic resp failure 2/2 COPD exacerbation 2/2 acute bronchitis; on home O2 - improving, O2 sat 97% on RA  - TTE normal from April 2019. EF 53% -> no edema in LE -> do not suspect CHF (more likely to be COPD exacerbation)  - CXR 5/2 showed atelectasis  - CT angio 5/2 showed no evidence of PE  - ABG normal, hyperventilating, not retention  - mild thoracic kyphosis -> could be contributing to breathing difficulties (restrictive)  - afebrile  - elevated WBC (14.7 on 5/6); likely due to steroids  - COVID PCR (5/2) negative  - abx: Azithromycin 250 mg PO QD x 2 days - completed 5/4  - c/w suppl O2 as needed to keep sat >90%  - c/w Albuterol Neb q6h and PRN  - c/w symbicort 160 micrograms, 2puffs, BID  - c/w spiriva 18 micrograms capsule, QD  - c/w mucinex 600 mg q12h  - follow BNP  - change solumedrol 60 mg IV q8h to q12h    # HTN - uncontrolled   - BP on 5/6 /82  - started amlodipine 5mg QD on 5/5 -> increase to 10mg QD on 5/6    # chronic back pain 2/2 spinal stenosis and chronic compression fractures  - pain appears controlled  - on vicodine outpatient  - c/w percocet 5/325 mg q8h prn  - c/w lidocaine patch PRN    # constipation - likely 2/2 chronic opoid use, but pt is having BMs per RN  - c/w senna 2 tablets PO at bedtime  - c/w miralax 17 g PO QD to BID     # hearing impairment - onset about 6 days ago as per patient after rinsing hair w/ bucket of water  - impaired visibility on otoscopic exam d/t wax  - improved hearing with hearing aid given during this admission  - ENT consult: debrox 5gtss AU q12 (started 5/6)  - OP ENT f/u for disimpaction of ear after 3-4 days debrox use & audiogram after disimpaction    # hx of DVT/PE on eliquis  - V duplex LE (5/4) showed no evidence of DVT in either lower extremity  - c/w apixaban 5 mg QD    # GERD  - c/w protonix po    # A1c 5.6 - no DM    # DVT ppx: on eliquis    # GI ppx: on ppi po    # Activity: ambulates on her own to BR    Dispo: IV steroids; nebs; ENT f/u after debrox; tx HTN; anticipate d/c in 48 hrs   71 year old female patient with past medical history of COPD on 2L NC PRN at home, DVT/PE on eliquis, chronic back pain due to spinal stenosis, and chronic compression fractures, and GERD, presenting for dyspnea and admitted for COPD exacerbation was seen and examined. Patient is stable with good O2 sat and dyspnea is improving.    # acute on chronic hypoxic resp failure 2/2 COPD exacerbation 2/2 acute bronchitis; on home O2 - improving, O2 sat 97% on RA  - TTE normal from April 2019. EF 53% -> no edema in LE -> do not suspect CHF (more likely to be COPD exacerbation)  - CXR 5/2 showed atelectasis  - CT angio 5/2 showed no evidence of PE  - ABG normal, hyperventilating, not retention  - mild thoracic kyphosis -> could be contributing to breathing difficulties (restrictive)  - afebrile  - elevated WBC (14.7 on 5/6); likely due to steroids  - COVID PCR (5/2) negative  - abx: Azithromycin 250 mg PO QD x 2 days - completed 5/4  - c/w suppl O2 as needed to keep sat >90%  - c/w Albuterol Neb q6h and PRN  - c/w symbicort 160 micrograms, 2puffs, BID  - c/w spiriva 18 micrograms capsule, QD  - c/w mucinex 600 mg q12h  - follow BNP  - change solumedrol 60 mg IV from q8h to q12h    # HTN - uncontrolled   - BP on 5/6 /82 -> improved to 133/72 after administration of higher amlodipine dose  - started amlodipine 5mg QD on 5/5 -> increase to 10mg QD on 5/6    # chronic back pain 2/2 spinal stenosis and chronic compression fractures   - pain appears controlled  - on vicodin outpatient  - c/w percocet 5/325 mg q8h prn  - c/w lidocaine patch PRN    # constipation - likely 2/2 chronic opoid use, but pt is having BMs per RN  - c/w senna 2 tablets PO at bedtime  - c/w miralax 17 g PO QD to BID     # hearing impairment - onset about 6 days ago as per patient after rinsing hair w/ bucket of water  - impaired visibility on otoscopic exam d/t wax  - improved hearing with hearing aid given during this admission  - ENT consult: debrox 5gtss AU q12 (started 5/6)  - OP ENT f/u for disimpaction of ear after 3-4 days debrox use & audiogram after disimpaction    # hx of DVT/PE on eliquis  - V duplex LE (5/4) showed no evidence of DVT in either lower extremity  - c/w apixaban 5 mg QD    #dry eyes  - administer artifical tear eye drops    # GERD  - c/w protonix po    # A1c 5.6 - no DM    # DVT ppx: on eliquis    # GI ppx: on ppi po    # Activity: ambulates on her own to BR    Dispo: IV steroids q12h; nebs; ENT f/u after debrox; HTN controlled with amlodipine 10mg; anticipate d/c in 48 hrs

## 2021-05-06 NOTE — PROGRESS NOTE ADULT - ASSESSMENT
69 year old female patient with past medical history of COPD on 2 L NC PRN, DVT / PE on Eliquis, chronic back pain due to spinal stenosis and chronic compression fractures, GERD presenting for dyspnea.    # SOB, COPD exacerbation  - Still has diffuse wheezing on PE, however clinically improving, endorsing improvement in SOB  -TTE normal from April 2019  - Chest x ray showed atelectatic changes  - ABG normal, hyperventilating, not retention ->goes against copd exacerbation  - VA duplex is negative for DVT, CT Chest  & CT Angio Chest w/ IV Cont; No CTA evidence of acute pulmonary embolus.  - Redemonstrated partially attenuated filling defects and multiple right upper lobe subsegmental branches, felt to reflect chronic pulmonary emboli.  - Completed Azithromycin course  - c/w Albuterol Neb q6h and PRN  - c/w Solumedrol 60 mg IV q8h  - c/w Symbicort & Spiriva     # Chronic back pain secondary to spinal stenosis and chronic, acute/ subacute compression fractures  - Pt on Vicodine outpatient   - c/w Percocet PRN  - Outpatient follow up with pain management, pt pain medication requesting a lot of pain meds, will not give any pain med on d/c, records for pain meds left in the charts  - Lidocaine patch    # Constipation   - likely 2/2 chronic opioid use  - c/w bowel regimen    # GERD : Continue Protonix     # HTN :  uncontrolled in hospital -- started on amlodipine    # No Diabetes hba1c is 5.6 blood sugar high as she is on steroids  -  Carb consistent diet    # History of DVT / PE : Continue Eliquis     #Diet: DASH  #DVT pro: Eliquis  #GI pro: Protonix  #Dispo: Improvement in COPD exac, 48 hrs       69 year old female patient with past medical history of COPD on 2 L NC PRN, DVT / PE on Eliquis, chronic back pain due to spinal stenosis and chronic compression fractures, GERD presenting for dyspnea.    # SOB, COPD exacerbation  - Still has diffuse wheezing on PE, however clinically improving, endorsing improvement in SOB  -TTE normal from April 2019  - Chest x ray showed atelectatic changes  - ABG normal, hyperventilating, not retention ->goes against copd exacerbation  - VA duplex is negative for DVT, CT Chest  & CT Angio Chest w/ IV Cont; No CTA evidence of acute pulmonary embolus.  - Redemonstrated partially attenuated filling defects and multiple right upper lobe subsegmental branches, felt to reflect chronic pulmonary emboli.  - Completed Azithromycin course  - c/w Albuterol Neb q6h and PRN  - c/w Solumedrol 60 mg IV q12  - c/w Symbicort & Spiriva     # Chronic back pain secondary to spinal stenosis and chronic, acute/ subacute compression fractures  - Pt on Vicodine outpatient   - c/w Percocet PRN  - Outpatient follow up with pain management, pt pain medication requesting a lot of pain meds, will not give any pain med on d/c, records for pain meds left in the charts  - Lidocaine patch    # Constipation   - likely 2/2 chronic opioid use  - c/w bowel regimen    # GERD : Continue Protonix     # HTN :  uncontrolled in hospital -- started on amlodipine    # No Diabetes hba1c is 5.6 blood sugar high as she is on steroids  -  Carb consistent diet    # History of DVT / PE : Continue Eliquis     #Diet: DASH  #DVT pro: Eliquis  #GI pro: Protonix  #Dispo: Improvement in COPD exac, 48 hrs       71 year old female patient with past medical history of COPD on 2 L NC PRN, DVT / PE on Eliquis, chronic back pain due to spinal stenosis and chronic compression fractures, GERD presenting for dyspnea.    # SOB, COPD exacerbation  - Still has diffuse wheezing on PE, however clinically improving, endorsing improvement in SOB  -TTE normal from April 2019,   - Chest x ray showed atelectatic changes  - ABG normal, hyperventilating, not retention ->goes against copd exacerbation  - VA duplex is negative for DVT, CT Chest  & CT Angio Chest w/ IV Cont; No CTA evidence of acute pulmonary embolus.  - Redemonstrated partially attenuated filling defects and multiple right upper lobe subsegmental branches, felt to reflect chronic pulmonary emboli.  - Completed Azithromycin course  - c/w Albuterol Neb q6h and PRN  - c/w Solumedrol 60 mg IV q12  - c/w Symbicort & Spiriva   - C/W Mucinex    #HTN  - uncontrolled in hospital  - /80 on 5/5  - started on amlodipine 5mg QD on 5/5     #chronic back pain 2/2 spinal stenosis and chronic, acute/subacute compression fractures  - on vicodine outpatient  - pt not complaining of any pain unless specifically prompted about back pain.   - OP follow up with pain management  - c/w percocet 5/325 mg q8h  - c/w lidocaine patch PRN    #constipation  - likely 2/2 chronic opioid use  - received 5g lactulose x 2 times on 5/4  - c/w senna 2 tablets PO at bedtime  - increase miralax 17 g PO QD to BID 5/5    #hearing impairment  - onset about 6 days ago as per patient    - impaired visibility on otoscopic exam d/t wax  - ENT consult appreciated  - Started on Debrox, f/u op     #Hx of DVT/PE on Eliquis   - Duplex LE 5/4 no evidence of DVT  - c/w Apixaban 5mg qD    #Diet: DASH  #DVT pro: Eliquis  #GI pro: Protonix  #Dispo: Improvement in COPD exac, 48 hrs

## 2021-05-06 NOTE — PROGRESS NOTE ADULT - ASSESSMENT
70 yo woman w/ history of COPD on 2L NC PRN at home, DVT/PE on eliquis, chronic back pain due to spinal stenosis, and chronic compression fractures, and GERD, presenting for dyspnea and admitted for COPD exacerbation.    # acute on chronic hypoxic resp failure 2/2 COPD exacerbation 2/2 acute bronchitis; on home O2  TTE normal from April 2019. EF 53% -> no edema in LE -> do not suspect CHF (more likely to be COPD exacerbation)  CXR 5/2 showed atelectasis  CT angio 5/2 showed no evidence of PE  ABG normal, hyperventilating, not retention  mild thoracic kyphosis -> could be contributing to breathing difficulties (restrictive)  afebrile  elevated WBC; likely due to steroids  COVID PCR negative  suppl O2 as needed to keep sat >90%  abx: Azithromycin 250 mg PO QD x 3 days - completed 5/5  c/w Albuterol Neb q6h and PRN  decr solumedrol 60 mg IV q12  c/w symbicort 160 micrograms, 2puffs, BID  c/w spiriva 18 micrograms capsule, QD  c/w mucinex 600 mg q12h      # HTN - little better  c/w amlodipine 5mg QD    # chronic back pain 2/2 spinal stenosis and chronic compression fractures  on vicodin outpatient  c/w percocet 5/325 mg q8h prn  c/w lidocaine patch PRN    # constipation - likely 2/2 chronic opoid use, but pt is having BMs per RN  c/w senna 2 tablets PO at bedtime  c/w miralax 17 g PO QD to BID     # hearing impairment - onset about 5 days ago as per patient after rinsing hair w/ bucket of water  impaired visibility on otoscopic exam d/t wax  improved hearing with hearing aid given during this admission  ENT consult: debrox 5gtss AU q12    # hx of DVT/PE on eliquis  V duplex LE (5/4) showed no evidence of DVT in either lower extremity  c/w apixaban 5 mg QD    # GERD  c/w protonix po    # A1c 5.6 - no DM    # DVT ppx: on eliquis    # GI ppx: on ppi po    # Activity: ambulates on her own to [place]    Dispo: IV steroids; nebs; ENT f/u after debrox; tx HTN;   will eventually d/c home - not ready for d/c yet

## 2021-05-07 ENCOUNTER — TRANSCRIPTION ENCOUNTER (OUTPATIENT)
Age: 71
End: 2021-05-07

## 2021-05-07 LAB
ANION GAP SERPL CALC-SCNC: 8 MMOL/L — SIGNIFICANT CHANGE UP (ref 7–14)
BUN SERPL-MCNC: 16 MG/DL — SIGNIFICANT CHANGE UP (ref 10–20)
CALCIUM SERPL-MCNC: 9 MG/DL — SIGNIFICANT CHANGE UP (ref 8.5–10.1)
CHLORIDE SERPL-SCNC: 98 MMOL/L — SIGNIFICANT CHANGE UP (ref 98–110)
CO2 SERPL-SCNC: 30 MMOL/L — SIGNIFICANT CHANGE UP (ref 17–32)
CREAT SERPL-MCNC: <0.5 MG/DL — LOW (ref 0.7–1.5)
GLUCOSE SERPL-MCNC: 104 MG/DL — HIGH (ref 70–99)
MAGNESIUM SERPL-MCNC: 2 MG/DL — SIGNIFICANT CHANGE UP (ref 1.8–2.4)
POTASSIUM SERPL-MCNC: 4.8 MMOL/L — SIGNIFICANT CHANGE UP (ref 3.5–5)
POTASSIUM SERPL-SCNC: 4.8 MMOL/L — SIGNIFICANT CHANGE UP (ref 3.5–5)
SODIUM SERPL-SCNC: 136 MMOL/L — SIGNIFICANT CHANGE UP (ref 135–146)

## 2021-05-07 PROCEDURE — 99232 SBSQ HOSP IP/OBS MODERATE 35: CPT

## 2021-05-07 RX ORDER — AMLODIPINE BESYLATE 2.5 MG/1
1 TABLET ORAL
Qty: 30 | Refills: 0
Start: 2021-05-07 | End: 2021-06-05

## 2021-05-07 RX ORDER — LANOLIN ALCOHOL/MO/W.PET/CERES
5 CREAM (GRAM) TOPICAL AT BEDTIME
Refills: 0 | Status: DISCONTINUED | OUTPATIENT
Start: 2021-05-07 | End: 2021-05-10

## 2021-05-07 RX ORDER — ZOLPIDEM TARTRATE 10 MG/1
5 TABLET ORAL AT BEDTIME
Refills: 0 | Status: DISCONTINUED | OUTPATIENT
Start: 2021-05-07 | End: 2021-05-10

## 2021-05-07 RX ORDER — CARBAMIDE PEROXIDE 81.86 MG/ML
5 SOLUTION/ DROPS AURICULAR (OTIC)
Qty: 20 | Refills: 0
Start: 2021-05-07 | End: 2021-05-08

## 2021-05-07 RX ADMIN — Medication 1 PATCH: at 11:43

## 2021-05-07 RX ADMIN — BUDESONIDE AND FORMOTEROL FUMARATE DIHYDRATE 2 PUFF(S): 160; 4.5 AEROSOL RESPIRATORY (INHALATION) at 20:00

## 2021-05-07 RX ADMIN — OXYCODONE AND ACETAMINOPHEN 2 TABLET(S): 5; 325 TABLET ORAL at 21:19

## 2021-05-07 RX ADMIN — Medication 50 MILLIGRAM(S): at 21:18

## 2021-05-07 RX ADMIN — Medication 1 PATCH: at 11:42

## 2021-05-07 RX ADMIN — Medication 1 DROP(S): at 17:08

## 2021-05-07 RX ADMIN — OXYCODONE AND ACETAMINOPHEN 2 TABLET(S): 5; 325 TABLET ORAL at 13:05

## 2021-05-07 RX ADMIN — APIXABAN 5 MILLIGRAM(S): 2.5 TABLET, FILM COATED ORAL at 06:22

## 2021-05-07 RX ADMIN — LIDOCAINE 1 PATCH: 4 CREAM TOPICAL at 10:11

## 2021-05-07 RX ADMIN — PANTOPRAZOLE SODIUM 40 MILLIGRAM(S): 20 TABLET, DELAYED RELEASE ORAL at 06:22

## 2021-05-07 RX ADMIN — Medication 1 PATCH: at 07:36

## 2021-05-07 RX ADMIN — POLYETHYLENE GLYCOL 3350 17 GRAM(S): 17 POWDER, FOR SOLUTION ORAL at 17:11

## 2021-05-07 RX ADMIN — SENNA PLUS 2 TABLET(S): 8.6 TABLET ORAL at 21:18

## 2021-05-07 RX ADMIN — BUDESONIDE AND FORMOTEROL FUMARATE DIHYDRATE 2 PUFF(S): 160; 4.5 AEROSOL RESPIRATORY (INHALATION) at 08:32

## 2021-05-07 RX ADMIN — Medication 60 MILLIGRAM(S): at 06:24

## 2021-05-07 RX ADMIN — AMLODIPINE BESYLATE 10 MILLIGRAM(S): 2.5 TABLET ORAL at 06:22

## 2021-05-07 RX ADMIN — Medication 600 MILLIGRAM(S): at 17:11

## 2021-05-07 RX ADMIN — ZOLPIDEM TARTRATE 5 MILLIGRAM(S): 10 TABLET ORAL at 22:54

## 2021-05-07 RX ADMIN — Medication 5 MILLIGRAM(S): at 21:18

## 2021-05-07 RX ADMIN — Medication 600 MILLIGRAM(S): at 06:22

## 2021-05-07 RX ADMIN — OXYCODONE AND ACETAMINOPHEN 2 TABLET(S): 5; 325 TABLET ORAL at 21:20

## 2021-05-07 RX ADMIN — ALBUTEROL 2.5 MILLIGRAM(S): 90 AEROSOL, METERED ORAL at 08:19

## 2021-05-07 RX ADMIN — APIXABAN 5 MILLIGRAM(S): 2.5 TABLET, FILM COATED ORAL at 17:11

## 2021-05-07 RX ADMIN — Medication 1 DROP(S): at 06:25

## 2021-05-07 RX ADMIN — ALBUTEROL 2.5 MILLIGRAM(S): 90 AEROSOL, METERED ORAL at 20:03

## 2021-05-07 RX ADMIN — OXYCODONE AND ACETAMINOPHEN 2 TABLET(S): 5; 325 TABLET ORAL at 06:22

## 2021-05-07 RX ADMIN — CARBAMIDE PEROXIDE 5 DROP(S): 81.86 SOLUTION/ DROPS AURICULAR (OTIC) at 17:11

## 2021-05-07 RX ADMIN — Medication 1 DROP(S): at 11:42

## 2021-05-07 RX ADMIN — Medication 1 PATCH: at 19:50

## 2021-05-07 RX ADMIN — Medication 0.5 MILLIGRAM(S): at 17:11

## 2021-05-07 RX ADMIN — ALBUTEROL 2.5 MILLIGRAM(S): 90 AEROSOL, METERED ORAL at 13:54

## 2021-05-07 RX ADMIN — Medication 0.5 MILLIGRAM(S): at 06:22

## 2021-05-07 RX ADMIN — TIOTROPIUM BROMIDE 1 CAPSULE(S): 18 CAPSULE ORAL; RESPIRATORY (INHALATION) at 08:19

## 2021-05-07 RX ADMIN — LIDOCAINE 1 PATCH: 4 CREAM TOPICAL at 07:36

## 2021-05-07 NOTE — PROGRESS NOTE ADULT - ASSESSMENT
71 year old female patient with past medical history of COPD on 2L NC PRN at home, DVT/PE on eliquis, chronic back pain due to spinal stenosis, and chronic compression fractures, and GERD, presenting for dyspnea and admitted for COPD exacerbation was seen and examined. Patient is stable with good O2 sat and dyspnea is improving.    # acute on chronic hypoxic resp failure 2/2 COPD exacerbation 2/2 acute bronchitis; on home O2 - improving, O2 sat 97% on RA  - TTE normal from April 2019. EF 53% -> no edema in LE -> do not suspect CHF (more likely to be COPD exacerbation)  - CXR 5/2 showed atelectasis  - CT angio 5/2 showed no evidence of PE  - ABG normal, hyperventilating, not retention  - mild thoracic kyphosis -> could be contributing to breathing difficulties (restrictive)  - afebrile  - elevated WBC likely due to steroids  - COVID PCR (5/2) negative  -   - abx: Azithromycin 250 mg PO QD x 2 days - completed 5/4  - c/w suppl O2 as needed to keep sat >90%  - c/w Albuterol Neb q6h and PRN  - c/w symbicort 160 micrograms, 2puffs, BID  - c/w spiriva 18 micrograms capsule, QD  - c/w mucinex 600 mg q12h  - c/w tapering down steroids; change solumedrol 60 mg IV from q12h to only 1 dose on 5/7 -> plan to switch to prednisone 60 mg on 5/8    # HTN - improving  - BP on 5/7 /67  - c/w amlodipine 10 mg    #sleeping difficulty   - likely 2/2 steroids   - c/w steroid taper  - c/w trazodone 50 mg PO at bedtime  - start melatonin 5mg PO at bedtime 5/7    # chronic back pain 2/2 spinal stenosis and chronic compression fractures   - pain appears controlled  - on vicodin outpatient  - c/w percocet 5/325 mg q8h prn  - c/w lidocaine patch PRN    # constipation - likely 2/2 chronic opoid use, but pt is having BMs per RN  - pt reports improvement  - c/w senna 2 tablets PO at bedtime  - c/w miralax 17 g PO QD to BID     # hearing impairment - onset about 7 days ago as per patient after rinsing hair w/ bucket of water - improving  - impaired visibility on otoscopic exam d/t wax  - improved hearing with hearing aid given during this admission  - pt able to hear without hearing aid (but with some difficulty) on 5/7  - ENT following -> OP f/u for disimpaction of ear after 3-4 days debrox use & audiogram after disimpaction  - c/w debrox 5gtss AU q12 (started 5/6)    # hx of DVT/PE on eliquis  - V duplex LE (5/4) showed no evidence of DVT in either lower extremity  - c/w apixaban 5 mg QD    #dry eyes - improved  - c/w artifical tear eye drops PRN    # GERD  - c/w protonix po    # A1c 5.6 - no DM    # DVT ppx: on eliquis    # GI ppx: on ppi po    # Activity: ambulates on her own to BR    Dispo: IV steroids QD + switch to prednisone 5/8; nebs; ENT f/u after debrox; tx HTN; anticipate d/c in 24-48 hrs   71 year old female patient with past medical history of COPD on 2L NC PRN at home, DVT/PE on eliquis, chronic back pain due to spinal stenosis, and chronic compression fractures, and GERD, presenting for dyspnea and admitted for COPD exacerbation was seen and examined. Patient is stable with good O2 sat and dyspnea is improving.    # acute on chronic hypoxic resp failure 2/2 COPD exacerbation 2/2 acute bronchitis; on home O2 - improving  - TTE normal from April 2019. EF 53% -> no edema in LE -> do not suspect CHF (more likely to be COPD exacerbation)  - CXR 5/2 showed atelectasis  - CT angio 5/2 showed no evidence of PE  - ABG normal, hyperventilating, not retention  - mild thoracic kyphosis -> could be contributing to breathing difficulties (restrictive)  - afebrile  - elevated WBC likely due to steroids  - COVID PCR (5/2) negative  -   - O2 sat 99% on 2L NC (uses O2 NC PRN at home and in hospital)  - abx: Azithromycin 250 mg PO QD x 2 days - completed 5/4  - c/w suppl O2 as needed to keep sat >90%  - c/w Albuterol Neb q6h and PRN  - c/w symbicort 160 micrograms, 2puffs, BID  - c/w spiriva 18 micrograms capsule, QD  - c/w mucinex 600 mg q12h  - c/w tapering down steroids; change solumedrol 60 mg IV from q12h to only 1 dose on 5/7 -> plan to switch to prednisone 60 mg on 5/8    # HTN - improving  - BP on 5/7 /67  - c/w amlodipine 10 mg    #sleeping difficulty   - likely 2/2 steroids   - c/w steroid taper  - c/w trazodone 50 mg PO at bedtime  - start melatonin 5mg PO at bedtime 5/7    # chronic back pain 2/2 spinal stenosis and chronic compression fractures   - pain appears controlled  - on vicodin outpatient  - c/w percocet 5/325 mg q8h prn  - c/w lidocaine patch PRN    # constipation - likely 2/2 chronic opoid use, but pt is having BMs per RN  - pt reports improvement  - c/w senna 2 tablets PO at bedtime  - c/w miralax 17 g PO QD to BID     # hearing impairment - onset about 7 days ago as per patient after rinsing hair w/ bucket of water - improving  - impaired visibility on otoscopic exam d/t wax  - improved hearing with hearing aid given during this admission  - pt able to hear without hearing aid (but with some difficulty) on 5/7  - ENT following -> OP f/u for disimpaction of ear after 3-4 days debrox use & audiogram after disimpaction  - c/w debrox 5gtss AU q12 (started 5/6)    # hx of DVT/PE on eliquis  - V duplex LE (5/4) showed no evidence of DVT in either lower extremity  - c/w apixaban 5 mg QD    #dry eyes - improved  - c/w artifical tear eye drops PRN    # GERD  - c/w protonix po    # A1c 5.6 - no DM    # DVT ppx: on eliquis    # GI ppx: on ppi po    # Activity: ambulates on her own to BR    Dispo: IV steroids QD + switch to prednisone 5/8; nebs; ENT f/u after debrox; tx HTN; anticipate d/c in 24-48 hrs

## 2021-05-07 NOTE — DISCHARGE NOTE PROVIDER - NSDCFUADDINST_GEN_ALL_CORE_FT
Please follow up with your primary care doctor, ENT specialist and pulmonologist within 1-2 week(s) after discharge from the hospital. Please take your medications as prescribed.

## 2021-05-07 NOTE — PROGRESS NOTE ADULT - SUBJECTIVE AND OBJECTIVE BOX
KUSH RINALDI  71y  Female  ***My note supersedes ALL resident notes that I sign.  My corrections for their notes are in my note.***    I can be reached directly on [email or web address]. My office number is [phone]. My personal cell number is [phone].    INTERVAL EVENTS: Here for f/u of COPD. Still w/ cough. Insomnia from steroids. PARAR.    T(F): 96.7 (05-07-21 @ 12:58), Max: 97.4 (05-07-21 @ 05:23)  HR: 120 (05-07-21 @ 13:49) (75 - 120)  BP: 115/78 (05-07-21 @ 12:58) (115/78 - 154/79)  RR: 18 (05-07-21 @ 12:58) (18 - 19)  SpO2: 96% (05-07-21 @ 13:49) (96% - 99%)    Gen: min resp distress at rest; gets winded w/ any exertion; not requiring O2  HEENT: PERRL, EOMI, mouth clr, nose clr; b/l ear wax  Neck: no nodes, no JVD, thyroid nl  lungs: diffuse wheezing; + cough  hrt: s1 s2 reg, no murmur  abd: soft, NT/ND, no HS megaly  ext: no edema, no c/c  neuro: aa ox3, cn intact (but quite hard of hearing), can move all 4 ext    LABS:                      10.3    (    81.7   14.70 )-----------( ---------      375      ( 06 May 2021 07:20 )             33.1    (    17.9   on steroids    136   (   98   (   104      05-07-21 @ 07:57  ----------------------               4.8   (   30   (   16                             -----                        <0.5  Ca  9.0   Mg  2.0    P   --     RADIOLOGY & ADDITIONAL TESTS:      MEDICATIONS:    ALBUTerol    0.083% 2.5 milliGRAM(s) Nebulizer every 6 hours  ALBUTerol    90 MICROgram(s) HFA Inhaler 2 Puff(s) Inhalation every 6 hours PRN  ALPRAZolam 0.5 milliGRAM(s) Oral two times a day  amLODIPine   Tablet 10 milliGRAM(s) Oral daily  apixaban 5 milliGRAM(s) Oral every 12 hours  artificial tears (preservative free) Ophthalmic Solution 1 Drop(s) Both EYES every 6 hours  budesonide 160 MICROgram(s)/formoterol 4.5 MICROgram(s) Inhaler 2 Puff(s) Inhalation two times a day  carbamide peroxide Otic Solution 5 Drop(s) Both Ears two times a day  guaiFENesin  milliGRAM(s) Oral every 12 hours  melatonin 5 milliGRAM(s) Oral at bedtime  metoclopramide Injectable 10 milliGRAM(s) IV Push every 6 hours PRN  nicotine - 21 mG/24Hr(s) Patch 1 patch Transdermal daily  oxycodone    5 mG/acetaminophen 325 mG 2 Tablet(s) Oral every 8 hours  pantoprazole    Tablet 40 milliGRAM(s) Oral before breakfast  polyethylene glycol 3350 17 Gram(s) Oral two times a day  senna 2 Tablet(s) Oral at bedtime  tiotropium 18 MICROgram(s) Capsule 1 Capsule(s) Inhalation daily  traZODone 50 milliGRAM(s) Oral at bedtime  zolpidem 5 milliGRAM(s) Oral at bedtime PRN

## 2021-05-07 NOTE — DISCHARGE NOTE PROVIDER - HOSPITAL COURSE
71 year old Female with past medical history of COPD on 2L home O2, DVT/PE on Eliquis, chronic back pain presents with shortness of breath for last 4 weeks.    Patient reports that for the last 4 weeks she has been having progressive shortness of breath initially on exertion but now at rest. She says that she was afraid to come to the hospital because of covid. She says "this happens all the time" and is like her typical copd exacerbation. Patient reports partial relief with home medications and now requests that she be given some medications to relieve her and send her home. She endorses 2 episodes of non bloody emesis but denies any chest pain, abdominal pain, dysuria, numbness, tingling, diarrhea, constipation, fevers, rigors, chills or any other complaints.    In ED patient hemodynamically stable, afebrile, saturating 98% on 4L NC, CTA Chest negative for PE and CT abdomen negative for any acute pathology. Patient given Duoneb, Solumedrol 125 and Magnesium with partial improvement in symptoms. (03 May 2021 00:08)    Hospital Course  - Patient admitted for COPD exacerbation. Treated w/ NC 2-3L PRN, Solumedrol, 3 day course of Azithromycin, Albuterol Nebs and Albuterol inhaler, Symbicort, Spiriva. Solumedrol transitioned to Prednisone. In addition was found to have b/l worsening in hearing which ENT was consulted on and recc Debrox w/ improvement and f/u OP. Patient HD stable and safe for d/c..   71 year old Female with past medical history of COPD on 2L home O2, DVT/PE on Eliquis, chronic back pain presents with shortness of breath for last 4 weeks.    Patient reports that for the last 4 weeks she has been having progressive shortness of breath initially on exertion but now at rest. She says that she was afraid to come to the hospital because of covid. She says "this happens all the time" and is like her typical copd exacerbation. Patient reports partial relief with home medications and now requests that she be given some medications to relieve her and send her home. She endorses 2 episodes of non bloody emesis but denies any chest pain, abdominal pain, dysuria, numbness, tingling, diarrhea, constipation, fevers, rigors, chills or any other complaints.    In ED patient hemodynamically stable, afebrile, saturating 98% on 4L NC, CTA Chest negative for PE and CT abdomen negative for any acute pathology. Patient given Duoneb, Solumedrol 125 and Magnesium with partial improvement in symptoms. (03 May 2021 00:08)    Hospital Course  - Patient admitted for COPD exacerbation. Treated w/ NC 2-3L PRN, Solumedrol, 3 day course of Azithromycin, Albuterol Nebs and Albuterol inhaler, Symbicort, Spiriva. Solumedrol transitioned to Prednisone. In addition was found to have b/l worsening in hearing which ENT was consulted on and recc Debrox w/ improvement and f/u OP. Patient HD stable and safe for d/c..    #Acute hypercapnic respiratory failure   #Acute COPD exacerbation  - CXR no acute infiltrates  - Chest CT angio showed no PE. Venous duplex negative for DVT of LE.   - completed Zithromax   - Switched Solu-Medrol 40mg IV q 8hrs to Prednisone taper dose (60 mg x3 days, 50mg x 3 days, etc.)      #HTN   - c/w Norvasc     #Chronic back pain 2/2 spinal stenosis   #chronic, acute/subacute compression fractures  - CT chest showed more prominent T11 compression fracture.       History of DVT/PE :   - Duplex LE 5/4 no evidence of DVT  - Continue Eliquis.

## 2021-05-07 NOTE — DISCHARGE NOTE PROVIDER - NSDCCPCAREPLAN_GEN_ALL_CORE_FT
PRINCIPAL DISCHARGE DIAGNOSIS  Diagnosis: COPD exacerbation  Assessment and Plan of Treatment: COPD (Chronic Obstructive Pulmonary Disease)  WHAT YOU NEED TO KNOW:  COPD (chronic obstructive pulmonary disease) can get worse quickly. Your healthcare providers will help you create a care plan to use at home. The plan will give directions on how to prevent or manage shortness of breath. Your family members or anyone who cares for you will also get directions to help you.  Inspiration and Expiration     DISCHARGE INSTRUCTIONS:  Call your local emergency number (911 in the ) if:   •You feel lightheaded, short of breath, and have chest pain.  Seek care immediately if:   •You cough up blood.  •You are confused, dizzy, or feel faint.  •Your arm or leg feels warm, tender, and painful. It may look swollen and red.  Call your doctor if:   •You have increased shortness of breath.  •You need more medicine than usual to control your symptoms.  •You are coughing or wheezing more than usual.  •You are coughing up more mucus, or it has a new color or odor.  •You gain more than 3 pounds in a week.  •You have a fever, a runny or stuffy nose, and a sore throat, or other cold or flu symptoms.  •Your skin, lips, or nails start to turn blue.  •You have swelling in your legs or ankles.  •You are very tired or weak for more than a day.  •You notice changes in your mood, or changes in your ability to think or concentrate.  •You have questions or concerns about your condition or care.  Medicines:   •Short-acting bronchodilators may be called rescue inhalers or relievers. They relieve sudden, severe symptoms and start to work right away.  •Long-acting bronchodilators may be called controllers or maintenance medicine. This medicine helps open the airway over time, and is used to decrease and prevent breathing problems. Long-acting bronchodilators should not be used to treat sudden, severe symptoms, such as trouble breathing.        SECONDARY DISCHARGE DIAGNOSES  Diagnosis: COPD exacerbation  Assessment and Plan of Treatment: •Avoid secondhand smoke. This is smoke another person exhales. Even if you have never smoked or have quit, it is important to avoid secondhand smoke. This smoke can also cause lung damage or trigger an exacerbation.  •Go to pulmonary rehabilitation (rehab) if directed. Rehab is a program run by specialists who help you learn to manage COPD. Examples include a pulmonologist (lung specialist), dietitian, or exercise therapist. The specialists will help you make a plan to avoid triggers that cause an exacerbation.  •Take your medicines as directed. Refill your medicines before you are out so that you do not miss a dose. Ask your healthcare provider if you have any questions on how to take your medicines.  •Protect yourself from germs. Germs can get into your lungs and cause an infection. An infection in your lungs can create more mucus and make it harder to breathe. An infection can also create swelling in your airway and prevent air from getting in. You can decrease your risk for infection by doing the following:   ?Wash your hands often with soap and water. Carry germ-killing gel with you. You can use the gel to clean your hands when soap and water are not available.  ?Do not touch your eyes, nose, or mouth unless you have washed your hands first.  ?Always cover your mouth when you cough. Cough into a tissue or your shirtsleeve so you do not spread germs from your hands.  ?Try to avoid people who have a cold or the flu. If you are sick, stay away from others as much as possible.  ?Ask about vaccines. Influenza (the flu) and pneumonia can become life-threatening for a person who has COPD. Get a flu vaccine each year as soon as it becomes available. The pneumonia vaccine may be given every 5 years, or as directed. Ask about other vaccines you may need and when to get them.  •Drink liquids as directed. You may need to drink more liquid than usual. Liquid will help to keep your air passages moist and help you cough up mucus. Ask how much liquid to drink each day and which liquids

## 2021-05-07 NOTE — PROGRESS NOTE ADULT - ASSESSMENT
70 yo woman w/ history of COPD on 2L NC PRN at home, DVT/PE on eliquis, chronic back pain due to spinal stenosis, and chronic compression fractures, and GERD, presenting for dyspnea and admitted for COPD exacerbation.    # acute on chronic hypoxic resp failure 2/2 COPD exacerbation 2/2 acute bronchitis; on home O2  TTE normal from April 2019. EF 53% -> no edema in LE -> do not suspect CHF (more likely to be COPD exacerbation)  CXR 5/2 showed atelectasis  CT angio 5/2 showed no evidence of PE  mild thoracic kyphosis -> could be contributing to breathing difficulties (restrictive)  afebrile  elevated WBC; likely due to steroids  COVID PCR negative  suppl O2 as needed to keep sat >90%  abx: Azithromycin 250 mg PO QD x 3 days - completed 5/5  c/w Albuterol Neb q6h and PRN  decr solumedrol 60 mg IV q24 and try PRed 60mg po q24 morgan (prolonged taper)  c/w symbicort 160 micrograms, 2puffs, BID  c/w spiriva 18 micrograms capsule, QD  c/w mucinex 600 mg q12h      # HTN - better  c/w amlodipine 5mg QD    # insomnia  melatonin 5mg po qhs  ambien 5mg po qhs prn sleep    # chronic back pain 2/2 spinal stenosis and chronic compression fractures  on vicodin outpatient  c/w percocet 5/325 mg q8h prn  c/w lidocaine patch PRN    # constipation - likely 2/2 chronic opoid use, but pt is having BMs per RN  c/w senna 2 tablets PO at bedtime  c/w miralax 17 g PO QD to BID     # hearing impairment - onset about 5 days ago as per patient after rinsing hair w/ bucket of water  impaired visibility on otoscopic exam d/t wax  improved hearing with hearing aid given during this admission  ENT consult: debrox 5gtss AU q12    # hx of DVT/PE on eliquis  V duplex LE (5/4) showed no evidence of DVT in either lower extremity  c/w apixaban 5 mg QD    # GERD  c/w protonix po    # A1c 5.6 - no DM    # DVT ppx: on eliquis    # GI ppx: on ppi po    # Activity: ambulates on her own to BR    Dispo: IV steroids - pred morgan; nebs; ENT f/u after debrox; tx HTN; tx insomnia  will eventually d/c home - not ready for d/c yet

## 2021-05-07 NOTE — DISCHARGE NOTE NURSING/CASE MANAGEMENT/SOCIAL WORK - PATIENT PORTAL LINK FT
You can access the FollowMyHealth Patient Portal offered by [facility] by registering at the following website: [email or web address]. By joining [facility]’s FollowMyHealth portal, you will also be able to view your health information using other applications (apps) compatible with our system.

## 2021-05-07 NOTE — DISCHARGE NOTE PROVIDER - CARE PROVIDER_API CALL
Eliezer Acuna)  Otolaryngology  [address], [address] [place] [address]  Phone: [phone]  Fax: [phone]  Follow Up Time: 1-3 days    London Bermudez  Internal Medicine  [facility] [place] [address]  Phone: [phone]  Fax: [phone]  Follow Up Time: 2 weeks    Juan R Rodriguez  CRITICAL CARE MEDICINE  [address] [place] [address], [address]  [place] [address]  Phone: [phone]  Fax: [phone]  Follow Up Time: 2 weeks

## 2021-05-07 NOTE — DISCHARGE NOTE PROVIDER - PROVIDER TOKENS
PROVIDER:[TOKEN:[[id number]],FOLLOWUP:[1-3 days]],PROVIDER:[TOKEN:[[id number]],FOLLOWUP:[2 weeks]],PROVIDER:[TOKEN:[[id number]],FOLLOWUP:[2 weeks]]

## 2021-05-07 NOTE — DISCHARGE NOTE PROVIDER - NSDCMRMEDTOKEN_GEN_ALL_CORE_FT
ALPRAZOLAM .5 MG TABS: 1  orally 2 times a day  COMBIVENT    AER : 1  inhaled every 6 hours, As Needed  Eliquis 5 mg oral tablet: 1 tab(s) orally 2 times a day  lactulose 10 g/15 mL oral syrup: 15 milliliter(s) orally once a day  pantoprazole 40 mg oral delayed release tablet: 1 tab(s) orally once a day  Percocet 10/325 oral tablet: 1 tab(s) orally every 8 hours, As Needed  traZODone 50 mg oral tablet: 1 tab(s) orally once a day (at bedtime)   ALPRAZOLAM .5 MG TABS: 1  orally 2 times a day  amLODIPine 10 mg oral tablet: 1 tab(s) orally once a day  carbamide peroxide 6.5% otic solution: 5 drop(s) to each affected ear 2 times a day  COMBIVENT    AER : 1  inhaled every 6 hours, As Needed  Eliquis 5 mg oral tablet: 1 tab(s) orally 2 times a day  lactulose 10 g/15 mL oral syrup: 15 milliliter(s) orally once a day  pantoprazole 40 mg oral delayed release tablet: 1 tab(s) orally once a day  Percocet 10/325 oral tablet: 1 tab(s) orally every 8 hours, As Needed  predniSONE 10 mg oral tablet: 5 tabs daily  for 3 days. then 4 tabs daily for 3 days, then 3 tabs daily for 3 days, then 2 tabs daily for 3 days, then 1 tab daily for 3 days     traZODone 50 mg oral tablet: 1 tab(s) orally once a day (at bedtime)

## 2021-05-07 NOTE — DISCHARGE NOTE PROVIDER - CARE PROVIDERS DIRECT ADDRESSES
,[email or web address],[email or web address]

## 2021-05-07 NOTE — PROGRESS NOTE ADULT - SUBJECTIVE AND OBJECTIVE BOX
SUBJECTIVE:    Patient is a 71y old Female with PMH of COPD on 2L O2 NC PRN at home who presents with a chief complaint of Shortness of Breath currently admitted to medicine with the primary diagnosis of COPD exacerbation.     Today is hospital day 5d. This morning she is sitting up in bed and admits to feeling better overall. Patient reports SOB is better but she still feels her 'breathing is still bad.' She reports continued difficulty sleeping at night and wants to receive ambien instead of trazodone at bedtime. She reports improved constipation and decrease in abd pain, stating the abd pain is only present in RUQ when she strains during a BM. She reports she can now hear without aid if she pulls her ear. pt states mucinex is helping and she has been able to cough up a little sputum. She reports eye dryness is better.    ROS: pt admits to back pain, constipation, abd pain, hearing difficulty, sleeping difficulty. pt denies n/v, diarrhea, and LE pain.      PAST MEDICAL & SURGICAL HISTORY  COPD (chronic obstructive pulmonary disease)    Anxiety    Depression    Chronic pain    Hypertension    Chronic GERD    Spinal stenosis    Neck mass      SOCIAL HISTORY:  Negative for smoking/alcohol/drug use.     ALLERGIES:  penicillin (Unknown)    MEDICATIONS:  STANDING MEDICATIONS  ALBUTerol    0.083% 2.5 milliGRAM(s) Nebulizer every 6 hours  ALPRAZolam 0.5 milliGRAM(s) Oral two times a day  amLODIPine   Tablet 10 milliGRAM(s) Oral daily  apixaban 5 milliGRAM(s) Oral every 12 hours  artificial tears (preservative free) Ophthalmic Solution 1 Drop(s) Both EYES every 6 hours  budesonide 160 MICROgram(s)/formoterol 4.5 MICROgram(s) Inhaler 2 Puff(s) Inhalation two times a day  carbamide peroxide Otic Solution 5 Drop(s) Both Ears two times a day  guaiFENesin  milliGRAM(s) Oral every 12 hours  melatonin 5 milliGRAM(s) Oral at bedtime  nicotine - 21 mG/24Hr(s) Patch 1 patch Transdermal daily  oxycodone    5 mG/acetaminophen 325 mG 2 Tablet(s) Oral every 8 hours  pantoprazole    Tablet 40 milliGRAM(s) Oral before breakfast  polyethylene glycol 3350 17 Gram(s) Oral two times a day  senna 2 Tablet(s) Oral at bedtime  tiotropium 18 MICROgram(s) Capsule 1 Capsule(s) Inhalation daily  traZODone 50 milliGRAM(s) Oral at bedtime    PRN MEDICATIONS  ALBUTerol    90 MICROgram(s) HFA Inhaler 2 Puff(s) Inhalation every 6 hours PRN  metoclopramide Injectable 10 milliGRAM(s) IV Push every 6 hours PRN    VITALS:   T(F): 96.7  HR: 116  BP: 115/78  RR: 18  SpO2: 99%    LABS:                        10.3   14.70 )-----------( 375      ( 06 May 2021 07:20 )             33.1     05-07    136  |  98  |  16  ----------------------------<  104<H>  4.8   |  30  |  <0.5<L>    Ca    9.0      07 May 2021 07:57  Mg     2.0     05-07                    RADIOLOGY:    PHYSICAL EXAM:  GEN: No acute distress  HEENT: mucus membranes moist, no oral thrush observed  LUNGS: diffuse wheezing and congested lung sounds throughout, dyspnea with sustained talking.   HEART: S1/S2 present. RRR.   ABD: Soft, non-tender, non-distended. Bowel sounds present. mild tenderness to palpation in RUQ.   EXT: no edema in LE b/l  NEURO: AAOX3    Intravenous access: in place  NG tube: none  Ramirez Catheter: not indicated

## 2021-05-08 LAB
ANION GAP SERPL CALC-SCNC: 9 MMOL/L — SIGNIFICANT CHANGE UP (ref 7–14)
BUN SERPL-MCNC: 17 MG/DL — SIGNIFICANT CHANGE UP (ref 10–20)
CALCIUM SERPL-MCNC: 8.9 MG/DL — SIGNIFICANT CHANGE UP (ref 8.5–10.1)
CHLORIDE SERPL-SCNC: 96 MMOL/L — LOW (ref 98–110)
CO2 SERPL-SCNC: 29 MMOL/L — SIGNIFICANT CHANGE UP (ref 17–32)
CREAT SERPL-MCNC: <0.5 MG/DL — LOW (ref 0.7–1.5)
GLUCOSE SERPL-MCNC: 87 MG/DL — SIGNIFICANT CHANGE UP (ref 70–99)
MAGNESIUM SERPL-MCNC: 1.8 MG/DL — SIGNIFICANT CHANGE UP (ref 1.8–2.4)
POTASSIUM SERPL-MCNC: 4.8 MMOL/L — SIGNIFICANT CHANGE UP (ref 3.5–5)
POTASSIUM SERPL-SCNC: 4.8 MMOL/L — SIGNIFICANT CHANGE UP (ref 3.5–5)
SODIUM SERPL-SCNC: 134 MMOL/L — LOW (ref 135–146)

## 2021-05-08 PROCEDURE — 99233 SBSQ HOSP IP/OBS HIGH 50: CPT

## 2021-05-08 RX ORDER — LIDOCAINE 4 G/100G
1 CREAM TOPICAL ONCE
Refills: 0 | Status: COMPLETED | OUTPATIENT
Start: 2021-05-08 | End: 2021-05-08

## 2021-05-08 RX ORDER — ALPRAZOLAM 0.25 MG
0.25 TABLET ORAL ONCE
Refills: 0 | Status: DISCONTINUED | OUTPATIENT
Start: 2021-05-08 | End: 2021-05-08

## 2021-05-08 RX ADMIN — Medication 1 DROP(S): at 11:52

## 2021-05-08 RX ADMIN — ALBUTEROL 2 PUFF(S): 90 AEROSOL, METERED ORAL at 05:35

## 2021-05-08 RX ADMIN — ALBUTEROL 2.5 MILLIGRAM(S): 90 AEROSOL, METERED ORAL at 08:21

## 2021-05-08 RX ADMIN — Medication 0.5 MILLIGRAM(S): at 05:25

## 2021-05-08 RX ADMIN — CARBAMIDE PEROXIDE 5 DROP(S): 81.86 SOLUTION/ DROPS AURICULAR (OTIC) at 05:26

## 2021-05-08 RX ADMIN — Medication 50 MILLIGRAM(S): at 21:19

## 2021-05-08 RX ADMIN — Medication 1 PATCH: at 11:48

## 2021-05-08 RX ADMIN — CARBAMIDE PEROXIDE 5 DROP(S): 81.86 SOLUTION/ DROPS AURICULAR (OTIC) at 17:45

## 2021-05-08 RX ADMIN — Medication 600 MILLIGRAM(S): at 17:45

## 2021-05-08 RX ADMIN — Medication 0.25 MILLIGRAM(S): at 11:47

## 2021-05-08 RX ADMIN — OXYCODONE AND ACETAMINOPHEN 2 TABLET(S): 5; 325 TABLET ORAL at 05:26

## 2021-05-08 RX ADMIN — AMLODIPINE BESYLATE 10 MILLIGRAM(S): 2.5 TABLET ORAL at 05:25

## 2021-05-08 RX ADMIN — PANTOPRAZOLE SODIUM 40 MILLIGRAM(S): 20 TABLET, DELAYED RELEASE ORAL at 05:25

## 2021-05-08 RX ADMIN — ALBUTEROL 2.5 MILLIGRAM(S): 90 AEROSOL, METERED ORAL at 17:26

## 2021-05-08 RX ADMIN — APIXABAN 5 MILLIGRAM(S): 2.5 TABLET, FILM COATED ORAL at 17:45

## 2021-05-08 RX ADMIN — Medication 40 MILLIGRAM(S): at 21:17

## 2021-05-08 RX ADMIN — Medication 0.5 MILLIGRAM(S): at 17:48

## 2021-05-08 RX ADMIN — Medication 600 MILLIGRAM(S): at 05:25

## 2021-05-08 RX ADMIN — Medication 5 MILLIGRAM(S): at 21:19

## 2021-05-08 RX ADMIN — SENNA PLUS 2 TABLET(S): 8.6 TABLET ORAL at 21:19

## 2021-05-08 RX ADMIN — OXYCODONE AND ACETAMINOPHEN 2 TABLET(S): 5; 325 TABLET ORAL at 13:09

## 2021-05-08 RX ADMIN — Medication 40 MILLIGRAM(S): at 14:35

## 2021-05-08 RX ADMIN — ZOLPIDEM TARTRATE 5 MILLIGRAM(S): 10 TABLET ORAL at 21:18

## 2021-05-08 RX ADMIN — APIXABAN 5 MILLIGRAM(S): 2.5 TABLET, FILM COATED ORAL at 05:25

## 2021-05-08 RX ADMIN — Medication 60 MILLIGRAM(S): at 05:25

## 2021-05-08 RX ADMIN — LIDOCAINE 1 PATCH: 4 CREAM TOPICAL at 15:37

## 2021-05-08 RX ADMIN — Medication 1 DROP(S): at 05:26

## 2021-05-08 RX ADMIN — Medication 1 PATCH: at 07:57

## 2021-05-08 RX ADMIN — Medication 1 DROP(S): at 17:45

## 2021-05-08 RX ADMIN — LIDOCAINE 1 PATCH: 4 CREAM TOPICAL at 19:28

## 2021-05-08 RX ADMIN — OXYCODONE AND ACETAMINOPHEN 2 TABLET(S): 5; 325 TABLET ORAL at 21:18

## 2021-05-08 RX ADMIN — BUDESONIDE AND FORMOTEROL FUMARATE DIHYDRATE 2 PUFF(S): 160; 4.5 AEROSOL RESPIRATORY (INHALATION) at 08:07

## 2021-05-08 RX ADMIN — Medication 1 PATCH: at 19:28

## 2021-05-08 RX ADMIN — OXYCODONE AND ACETAMINOPHEN 2 TABLET(S): 5; 325 TABLET ORAL at 05:25

## 2021-05-08 RX ADMIN — POLYETHYLENE GLYCOL 3350 17 GRAM(S): 17 POWDER, FOR SOLUTION ORAL at 05:25

## 2021-05-08 RX ADMIN — Medication 1 PATCH: at 11:47

## 2021-05-08 NOTE — PROGRESS NOTE ADULT - ASSESSMENT
71 year old female patient with past medical history of COPD on 2 L NC PRN, DVT / PE on Eliquis, chronic back pain due to spinal stenosis and chronic compression fractures, GERD presenting for dyspnea.    A/P:   Acute on chronic   Acute COPD exacerbation  Still with SOB, accessory muscle use. Diffuse wheezing on exam.   CXR no acute infiltrates. Chest CT angio showed no PE. Venous duplex negative for DVT of LE.   CO2 on VBG 66.   completed Zithromax   Switch back to Solu-Medrol 40mg IV q 8hrs.     HTN Norvasc to 10 mg PO qD    Chronic back pain 2/2 spinal stenosis and chronic, acute/subacute compression fractures  CT chest showed more prominent T11 compression fracture.   Continue percocet 5/325 mg q8h and lidocaine patch PRN     DVT/PE on Eliquis   Duplex LE 5/4 no evidence of DVT  Continue Eliquis.     #Progress Note Handoff:  Pending (specify): improving SOB.   Family discussion: with patient.  Disposition: Home.

## 2021-05-08 NOTE — PROGRESS NOTE ADULT - ASSESSMENT
Pt is a 71 y.o female a/w SOB, followed for b/l cerumen impactions - cleaned B/L at the bedside using a curette. Left ear still with hard cerumen near the TM, unable to remove completely. Hearing significantly improved after cleaning.     ·	continue Debrox to the left ear  ·	can f/u as OP for ear cleaning, can make an appt to see any attending at our office - [phone],  [address]  ·	w/d with attng, recall prn

## 2021-05-08 NOTE — PROGRESS NOTE ADULT - SUBJECTIVE AND OBJECTIVE BOX
ENT DAILY PROGRESS NOTE    Pt is a 71y Female a/w SOB, called to assess for HL, found to have b/l cerumen impaction. PT seen and examined at bedside, has been doing Debrox drops herself.       REVIEW OF SYSTEMS   [x] A ten-point review of systems was otherwise negative except as noted.    Allergies    penicillin (Unknown)    Intolerances    MEDICATIONS:  ALBUTerol    0.083% 2.5 milliGRAM(s) Nebulizer every 6 hours  ALBUTerol    90 MICROgram(s) HFA Inhaler 2 Puff(s) Inhalation every 6 hours PRN  ALPRAZolam 0.5 milliGRAM(s) Oral two times a day  ALPRAZolam 0.25 milliGRAM(s) Oral once  amLODIPine   Tablet 10 milliGRAM(s) Oral daily  apixaban 5 milliGRAM(s) Oral every 12 hours  artificial tears (preservative free) Ophthalmic Solution 1 Drop(s) Both EYES every 6 hours  budesonide 160 MICROgram(s)/formoterol 4.5 MICROgram(s) Inhaler 2 Puff(s) Inhalation two times a day  carbamide peroxide Otic Solution 5 Drop(s) Both Ears two times a day  guaiFENesin  milliGRAM(s) Oral every 12 hours  melatonin 5 milliGRAM(s) Oral at bedtime  metoclopramide Injectable 10 milliGRAM(s) IV Push every 6 hours PRN  nicotine - 21 mG/24Hr(s) Patch 1 patch Transdermal daily  oxycodone    5 mG/acetaminophen 325 mG 2 Tablet(s) Oral every 8 hours  pantoprazole    Tablet 40 milliGRAM(s) Oral before breakfast  polyethylene glycol 3350 17 Gram(s) Oral two times a day  predniSONE   Tablet 60 milliGRAM(s) Oral daily  senna 2 Tablet(s) Oral at bedtime  tiotropium 18 MICROgram(s) Capsule 1 Capsule(s) Inhalation daily  traZODone 50 milliGRAM(s) Oral at bedtime  zolpidem 5 milliGRAM(s) Oral at bedtime PRN      Vital Signs Last 24 Hrs  T(C): 35.9 (08 May 2021 05:33), Max: 35.9 (07 May 2021 12:58)  T(F): 96.6 (08 May 2021 05:33), Max: 96.7 (07 May 2021 12:58)  HR: 90 (08 May 2021 05:33) (90 - 120)  BP: 174/80 (08 May 2021 05:33) (115/78 - 174/80)  RR: 20 (08 May 2021 05:33) (18 - 20)  SpO2: 96% (07 May 2021 13:49) (96% - 96%)      PHYSICAL EXAM:    GEN: NAD, awake and alert. No drooling or pooling of secretions. No stridor or stertor. Good vocal quality, no hoarseness.   SKIN: Good color, non diaphoretic  HEENT: NC/AT; Oral mucosa pink and moist. No erythema or edema noted to buccal mucosa, tongue, FOM, uvula or posterior oropharynx. Uvula midline  EARS: + b/l impacted cerumen. cerumen removed using a curette from the right ear, TM now visible, clear. most of the cerumen was removed using curette on the left side, unable to clear all cerumen. cerumen remaining is deep in the EAC, still hard. pt started to have discomfort with removal.   NECK:  Trachea midline. Neck supple, no TTP to B/L lateral neck, no cervical LAD.  RESP: No dyspnea, non-labored breathing. No use of accessory muscles.  CARDIO: +S1/S2  ABDO: Soft, NT.  EXT: BAPTISTE x 4    LABS:  CBC-    BMP/CMP-  08 May 2021 06:49    134    |  96     |  17     ----------------------------<  87     4.8     |  29     |  <0.5     Ca    8.9        08 May 2021 06:49  Mg     1.8       08 May 2021 06:49      Coagulation Studies-    Endocrine Panel-  Calcium, Total Serum: 8.9 mg/dL (05-08 @ 06:49)

## 2021-05-08 NOTE — PROGRESS NOTE ADULT - ASSESSMENT
71 year old female patient with past medical history of COPD on 2 L NC PRN, DVT / PE on Eliquis, chronic back pain due to spinal stenosis and chronic compression fractures, GERD presenting for dyspnea.    #COPD exacerbation  - Still has diffuse wheezing on PE, however clinically improving, endorsing improvement in SOB  -TTE normal from April 2019,   - Chest x ray showed atelectatic changes  - ABG normal, hyperventilating, not retention ->goes against copd exacerbation  - VA duplex is negative for DVT, CT Chest  & CT Angio Chest w/ IV Cont; No CTA evidence of acute pulmonary embolus.  - Redemonstrated partially attenuated filling defects and multiple right upper lobe subsegmental branches, felt to reflect chronic pulmonary emboli.  - Completed Azithromycin course  - c/w Albuterol Neb q6h and PRN  - Switched to PO Prednisone 60 mg PO qD  - c/w Symbicort & Spiriva   - C/W Mucinex    #HTN  - elevated in hospital  - Increased Norvasc to 10 mg PO qD    #Chronic back pain 2/2 spinal stenosis and chronic, acute/subacute compression fractures  - on vicodine outpatient  - pt not complaining of any pain unless specifically prompted about back pain.   - OP follow up with pain management  - c/w percocet 5/325 mg q8h  - c/w lidocaine patch PRN    #Constipation  - likely 2/2 chronic opioid use  - Endorsing daily small hard BM's  - c/w senna 2 tablets PO at bedtime  - c/w Miralax 17 g PO QD to BID 5/5    #Hearing impairment - improved  - Onset about 1 week ago  - Wax noted in ear  - ENT consult appreciated  - Started on Debrox, f/u op  - Flushed by ENT thia AM as per patient    #Hx of DVT/PE on Eliquis   - Duplex LE 5/4 no evidence of DVT  - c/w Apixaban 5mg qD    #Diet: DASH  #DVT pro: Eliquis  #GI pro: Protonix  #Dispo: Improvement in COPD exac.

## 2021-05-08 NOTE — PROGRESS NOTE ADULT - SUBJECTIVE AND OBJECTIVE BOX
KUSH RINALDI  71y  Female      Patient is a 71y old  Female who presents with a chief complaint of Shortness of Breath (08 May 2021 11:37)      INTERVAL HPI/OVERNIGHT EVENTS:  She is still with SOB, no chest pain, no fever.   Vital Signs Last 24 Hrs  T(C): 36.4 (08 May 2021 14:14), Max: 36.4 (08 May 2021 14:14)  T(F): 97.5 (08 May 2021 14:14), Max: 97.5 (08 May 2021 14:14)  HR: 104 (08 May 2021 14:14) (90 - 104)  BP: 145/64 (08 May 2021 14:14) (139/72 - 174/80)  BP(mean): --  RR: 18 (08 May 2021 14:14) (18 - 20)  SpO2: --            Consultant(s) Notes Reviewed:  [x ] YES  [ ] NO          MEDICATIONS  (STANDING):  ALBUTerol    0.083% 2.5 milliGRAM(s) Nebulizer every 6 hours  ALPRAZolam 0.5 milliGRAM(s) Oral two times a day  amLODIPine   Tablet 10 milliGRAM(s) Oral daily  apixaban 5 milliGRAM(s) Oral every 12 hours  artificial tears (preservative free) Ophthalmic Solution 1 Drop(s) Both EYES every 6 hours  budesonide 160 MICROgram(s)/formoterol 4.5 MICROgram(s) Inhaler 2 Puff(s) Inhalation two times a day  carbamide peroxide Otic Solution 5 Drop(s) Both Ears two times a day  guaiFENesin  milliGRAM(s) Oral every 12 hours  lidocaine   Patch 1 Patch Transdermal once  melatonin 5 milliGRAM(s) Oral at bedtime  methylPREDNISolone sodium succinate Injectable 40 milliGRAM(s) IV Push every 8 hours  nicotine - 21 mG/24Hr(s) Patch 1 patch Transdermal daily  oxycodone    5 mG/acetaminophen 325 mG 2 Tablet(s) Oral every 8 hours  pantoprazole    Tablet 40 milliGRAM(s) Oral before breakfast  polyethylene glycol 3350 17 Gram(s) Oral two times a day  senna 2 Tablet(s) Oral at bedtime  tiotropium 18 MICROgram(s) Capsule 1 Capsule(s) Inhalation daily  traZODone 50 milliGRAM(s) Oral at bedtime    MEDICATIONS  (PRN):  ALBUTerol    90 MICROgram(s) HFA Inhaler 2 Puff(s) Inhalation every 6 hours PRN Wheezing  metoclopramide Injectable 10 milliGRAM(s) IV Push every 6 hours PRN nausea  zolpidem 5 milliGRAM(s) Oral at bedtime PRN Insomnia      LABS      05-08    134<L>  |  96<L>  |  17  ----------------------------<  87  4.8   |  29  |  <0.5<L>    Ca    8.9      08 May 2021 06:49  Mg     1.8     05-08            Lactate Trend        CAPILLARY BLOOD GLUCOSE          Culture - Urine (collected 05-02-21 @ 19:13)  Source: .Urine Clean Catch (Midstream)  Final Report (05-04-21 @ 10:02):    <10,000 CFU/mL Normal Urogenital Maya        RADIOLOGY & ADDITIONAL TESTS:    Imaging Personally Reviewed:  [ ] YES  [ ] NO    HEALTH ISSUES - PROBLEM Dx:          PHYSICAL EXAM:  GENERAL: NAD, well-developed.  HEAD:  Atraumatic, Normocephalic.  EYES: EOMI, PERRLA, conjunctiva and sclera clear.  NECK: Supple, No JVD.  CHEST/LUNG: Severe diffuse bilateral wheezing.   HEART: Regular rate and rhythm; S1 S2.   ABDOMEN: Soft, Nontender, Nondistended; Bowel sounds present.  EXTREMITIES:  2+ Peripheral Pulses, No clubbing, cyanosis, or edema.  PSYCH: AAOx3.  NEUROLOGY: non-focal.  SKIN: No rashes or lesions.

## 2021-05-08 NOTE — PROGRESS NOTE ADULT - SUBJECTIVE AND OBJECTIVE BOX
SUBJECTIVE:  HPI:  71 year old Female with past medical history of COPD on 2L home O2, DVT/PE on Eliquis, chronic back pain presents with shortness of breath for last 4 weeks.    Patient reports that for the last 4 weeks she has been having progressive shortness of breath initially on exertion but now at rest. She says that she was afraid to come to the hospital because of covid. She says "this happens all the time" and is like her typical copd exacerbation. Patient reports partial relief with home medications and now requests that she be given some medications to relieve her and send her home. She endorses 2 episodes of non bloody emesis but denies any chest pain, abdominal pain, dysuria, numbness, tingling, diarrhea, constipation, fevers, rigors, chills or any other complaints.    In ED patient hemodynamically stable, afebrile, saturating 98% on 4L NC, CTA Chest negative for PE and CT abdomen negative for any acute pathology. Patient given Duoneb, Solumedrol 125 and Magnesium with partial improvement in symtpoms. (03 May 2021 00:08)      PAST MEDICAL & SURGICAL HISTORY  PAST MEDICAL & SURGICAL HISTORY:  COPD (chronic obstructive pulmonary disease)    Anxiety    Depression    Chronic pain    Hypertension    Chronic GERD    Spinal stenosis    Neck mass      SOCIAL HISTORY:    ALLERGIES:  penicillin (Unknown)    MEDICATIONS:  STANDING MEDICATIONS  ALBUTerol    0.083% 2.5 milliGRAM(s) Nebulizer every 6 hours  ALPRAZolam 0.5 milliGRAM(s) Oral two times a day  amLODIPine   Tablet 10 milliGRAM(s) Oral daily  apixaban 5 milliGRAM(s) Oral every 12 hours  artificial tears (preservative free) Ophthalmic Solution 1 Drop(s) Both EYES every 6 hours  budesonide 160 MICROgram(s)/formoterol 4.5 MICROgram(s) Inhaler 2 Puff(s) Inhalation two times a day  carbamide peroxide Otic Solution 5 Drop(s) Both Ears two times a day  guaiFENesin  milliGRAM(s) Oral every 12 hours  melatonin 5 milliGRAM(s) Oral at bedtime  nicotine - 21 mG/24Hr(s) Patch 1 patch Transdermal daily  oxycodone    5 mG/acetaminophen 325 mG 2 Tablet(s) Oral every 8 hours  pantoprazole    Tablet 40 milliGRAM(s) Oral before breakfast  polyethylene glycol 3350 17 Gram(s) Oral two times a day  predniSONE   Tablet 60 milliGRAM(s) Oral daily  senna 2 Tablet(s) Oral at bedtime  tiotropium 18 MICROgram(s) Capsule 1 Capsule(s) Inhalation daily  traZODone 50 milliGRAM(s) Oral at bedtime    PRN MEDICATIONS  ALBUTerol    90 MICROgram(s) HFA Inhaler 2 Puff(s) Inhalation every 6 hours PRN  metoclopramide Injectable 10 milliGRAM(s) IV Push every 6 hours PRN  zolpidem 5 milliGRAM(s) Oral at bedtime PRN    VITALS:   T(F): 96.6  HR: 90  BP: 174/80  RR: 20  SpO2: 96%    LABS:    05-08    134<L>  |  96<L>  |  17  ----------------------------<  87  4.8   |  29  |  <0.5<L>    Ca    8.9      08 May 2021 06:49  Mg     1.8     05-08                    RADIOLOGY:    PHYSICAL EXAM:  GEN: No acute distress  HEENT: AT/NC PEERLA, EOMI  LUNGS: Diffuse wheezing b/l  HEART: S1/S2 present  ABD: Soft, non-tender, non-distended  EXT: No edema, no rashes, no cyanosis  NEURO: AAOX3

## 2021-05-09 LAB
ANION GAP SERPL CALC-SCNC: 8 MMOL/L — SIGNIFICANT CHANGE UP (ref 7–14)
BASOPHILS # BLD AUTO: 0.06 K/UL — SIGNIFICANT CHANGE UP (ref 0–0.2)
BASOPHILS NFR BLD AUTO: 0.3 % — SIGNIFICANT CHANGE UP (ref 0–1)
BUN SERPL-MCNC: 18 MG/DL — SIGNIFICANT CHANGE UP (ref 10–20)
CALCIUM SERPL-MCNC: 8.6 MG/DL — SIGNIFICANT CHANGE UP (ref 8.5–10.1)
CHLORIDE SERPL-SCNC: 97 MMOL/L — LOW (ref 98–110)
CO2 SERPL-SCNC: 28 MMOL/L — SIGNIFICANT CHANGE UP (ref 17–32)
CREAT SERPL-MCNC: <0.5 MG/DL — LOW (ref 0.7–1.5)
EOSINOPHIL # BLD AUTO: 0 K/UL — SIGNIFICANT CHANGE UP (ref 0–0.7)
EOSINOPHIL NFR BLD AUTO: 0 % — SIGNIFICANT CHANGE UP (ref 0–8)
GLUCOSE SERPL-MCNC: 141 MG/DL — HIGH (ref 70–99)
HCT VFR BLD CALC: 33.1 % — LOW (ref 37–47)
HGB BLD-MCNC: 10.7 G/DL — LOW (ref 12–16)
IMM GRANULOCYTES NFR BLD AUTO: 3.6 % — HIGH (ref 0.1–0.3)
LYMPHOCYTES # BLD AUTO: 1.84 K/UL — SIGNIFICANT CHANGE UP (ref 1.2–3.4)
LYMPHOCYTES # BLD AUTO: 8 % — LOW (ref 20.5–51.1)
MAGNESIUM SERPL-MCNC: 1.8 MG/DL — SIGNIFICANT CHANGE UP (ref 1.8–2.4)
MCHC RBC-ENTMCNC: 26.4 PG — LOW (ref 27–31)
MCHC RBC-ENTMCNC: 32.3 G/DL — SIGNIFICANT CHANGE UP (ref 32–37)
MCV RBC AUTO: 81.7 FL — SIGNIFICANT CHANGE UP (ref 81–99)
MONOCYTES # BLD AUTO: 1.05 K/UL — HIGH (ref 0.1–0.6)
MONOCYTES NFR BLD AUTO: 4.6 % — SIGNIFICANT CHANGE UP (ref 1.7–9.3)
NEUTROPHILS # BLD AUTO: 19.14 K/UL — HIGH (ref 1.4–6.5)
NEUTROPHILS NFR BLD AUTO: 83.5 % — HIGH (ref 42.2–75.2)
NRBC # BLD: 0 /100 WBCS — SIGNIFICANT CHANGE UP (ref 0–0)
PLATELET # BLD AUTO: 390 K/UL — SIGNIFICANT CHANGE UP (ref 130–400)
POTASSIUM SERPL-MCNC: 4.4 MMOL/L — SIGNIFICANT CHANGE UP (ref 3.5–5)
POTASSIUM SERPL-SCNC: 4.4 MMOL/L — SIGNIFICANT CHANGE UP (ref 3.5–5)
RBC # BLD: 4.05 M/UL — LOW (ref 4.2–5.4)
RBC # FLD: 17.9 % — HIGH (ref 11.5–14.5)
SODIUM SERPL-SCNC: 133 MMOL/L — LOW (ref 135–146)
WBC # BLD: 22.91 K/UL — HIGH (ref 4.8–10.8)
WBC # FLD AUTO: 22.91 K/UL — HIGH (ref 4.8–10.8)

## 2021-05-09 PROCEDURE — 99233 SBSQ HOSP IP/OBS HIGH 50: CPT

## 2021-05-09 RX ORDER — LIDOCAINE 4 G/100G
1 CREAM TOPICAL DAILY
Refills: 0 | Status: DISCONTINUED | OUTPATIENT
Start: 2021-05-09 | End: 2021-05-10

## 2021-05-09 RX ADMIN — Medication 1 DROP(S): at 17:33

## 2021-05-09 RX ADMIN — APIXABAN 5 MILLIGRAM(S): 2.5 TABLET, FILM COATED ORAL at 05:38

## 2021-05-09 RX ADMIN — Medication 40 MILLIGRAM(S): at 13:10

## 2021-05-09 RX ADMIN — BUDESONIDE AND FORMOTEROL FUMARATE DIHYDRATE 2 PUFF(S): 160; 4.5 AEROSOL RESPIRATORY (INHALATION) at 08:08

## 2021-05-09 RX ADMIN — Medication 1 DROP(S): at 00:01

## 2021-05-09 RX ADMIN — ZOLPIDEM TARTRATE 5 MILLIGRAM(S): 10 TABLET ORAL at 21:16

## 2021-05-09 RX ADMIN — SENNA PLUS 2 TABLET(S): 8.6 TABLET ORAL at 21:16

## 2021-05-09 RX ADMIN — OXYCODONE AND ACETAMINOPHEN 2 TABLET(S): 5; 325 TABLET ORAL at 21:16

## 2021-05-09 RX ADMIN — APIXABAN 5 MILLIGRAM(S): 2.5 TABLET, FILM COATED ORAL at 17:21

## 2021-05-09 RX ADMIN — Medication 600 MILLIGRAM(S): at 17:21

## 2021-05-09 RX ADMIN — ALBUTEROL 2.5 MILLIGRAM(S): 90 AEROSOL, METERED ORAL at 17:08

## 2021-05-09 RX ADMIN — LIDOCAINE 1 PATCH: 4 CREAM TOPICAL at 19:42

## 2021-05-09 RX ADMIN — LIDOCAINE 1 PATCH: 4 CREAM TOPICAL at 03:34

## 2021-05-09 RX ADMIN — Medication 600 MILLIGRAM(S): at 05:38

## 2021-05-09 RX ADMIN — Medication 1 PATCH: at 12:22

## 2021-05-09 RX ADMIN — Medication 0.5 MILLIGRAM(S): at 17:23

## 2021-05-09 RX ADMIN — Medication 1 DROP(S): at 05:39

## 2021-05-09 RX ADMIN — Medication 40 MILLIGRAM(S): at 05:37

## 2021-05-09 RX ADMIN — Medication 1 PATCH: at 12:23

## 2021-05-09 RX ADMIN — OXYCODONE AND ACETAMINOPHEN 2 TABLET(S): 5; 325 TABLET ORAL at 05:37

## 2021-05-09 RX ADMIN — POLYETHYLENE GLYCOL 3350 17 GRAM(S): 17 POWDER, FOR SOLUTION ORAL at 05:39

## 2021-05-09 RX ADMIN — PANTOPRAZOLE SODIUM 40 MILLIGRAM(S): 20 TABLET, DELAYED RELEASE ORAL at 05:38

## 2021-05-09 RX ADMIN — LIDOCAINE 1 PATCH: 4 CREAM TOPICAL at 12:24

## 2021-05-09 RX ADMIN — Medication 1 PATCH: at 07:40

## 2021-05-09 RX ADMIN — ALBUTEROL 2.5 MILLIGRAM(S): 90 AEROSOL, METERED ORAL at 08:07

## 2021-05-09 RX ADMIN — ALBUTEROL 2.5 MILLIGRAM(S): 90 AEROSOL, METERED ORAL at 20:51

## 2021-05-09 RX ADMIN — Medication 1 DROP(S): at 12:24

## 2021-05-09 RX ADMIN — Medication 40 MILLIGRAM(S): at 21:16

## 2021-05-09 RX ADMIN — AMLODIPINE BESYLATE 10 MILLIGRAM(S): 2.5 TABLET ORAL at 05:38

## 2021-05-09 RX ADMIN — CARBAMIDE PEROXIDE 5 DROP(S): 81.86 SOLUTION/ DROPS AURICULAR (OTIC) at 17:24

## 2021-05-09 RX ADMIN — Medication 1 PATCH: at 19:42

## 2021-05-09 RX ADMIN — Medication 50 MILLIGRAM(S): at 21:16

## 2021-05-09 RX ADMIN — Medication 0.5 MILLIGRAM(S): at 05:37

## 2021-05-09 RX ADMIN — CARBAMIDE PEROXIDE 5 DROP(S): 81.86 SOLUTION/ DROPS AURICULAR (OTIC) at 05:39

## 2021-05-09 RX ADMIN — OXYCODONE AND ACETAMINOPHEN 2 TABLET(S): 5; 325 TABLET ORAL at 13:09

## 2021-05-09 RX ADMIN — Medication 5 MILLIGRAM(S): at 21:16

## 2021-05-09 NOTE — DIETITIAN INITIAL EVALUATION ADULT. - OTHER INFO
Dx: 70y/o female with pmhx noted above presented with dyspnea. Admitted with acute hypercapnic respiratory failure and acute COPD exacerbation. Hospital course is complicated by chronic back pain secondary to spinal stenosis and chronic, acute/subacute compression fractures. Skin is intact (Brenton Score-18).

## 2021-05-09 NOTE — DIETITIAN INITIAL EVALUATION ADULT. - ORAL INTAKE PTA/DIET HISTORY
The patient reports following a regular diet at home; consumed three meals at 100%; took centrum silver multivitamin daily.

## 2021-05-09 NOTE — DIETITIAN INITIAL EVALUATION ADULT. - RD TO REMAIN AVAILABLE
Intervention: 1.Meals and Snacks    Monitor/Evaluate: Diet order, energy intake, nutrition focused physical findings, Na, CL and anemia profile    Goals: 1.Continue to consume/tolerate 100% of meals in 7 days/yes

## 2021-05-09 NOTE — DIETITIAN INITIAL EVALUATION ADULT. - FACTORS AFF FOOD INTAKE
The patient reports consuming 100% of meals w/o chewing and swallowing difficulty. The patient reports having a bowel movement (5/9).

## 2021-05-09 NOTE — PROGRESS NOTE ADULT - ASSESSMENT
71 year old female patient with past medical history of COPD on 2 L NC PRN, DVT / PE on Eliquis, chronic back pain due to spinal stenosis and chronic compression fractures, GERD presenting for dyspnea.    A/P:   Acute hypercapnic respiratory failure:   Acute COPD exacerbation:   Still with SOB,. Diffuse wheezing on exam.   CXR no acute infiltrates. Chest CT angio showed no PE. Venous duplex negative for DVT of LE.   CO2 on VBG 66.   completed Zithromax   Continue Solu-Medrol 40mg IV q 8hrs. Possibly discharge tomorrow on Prednisone taper dose.   Worsening leukocytosis, likely from IV steroid.     HTN Norvasc increased to 10 mg PO qD    Chronic back pain 2/2 spinal stenosis and chronic, acute/subacute compression fractures  CT chest showed more prominent T11 compression fracture.   Continue percocet 5/325 mg q8h and lidocaine patch PRN    History of DVT/PE :   Duplex LE 5/4 no evidence of DVT  Continue Eliquis.     #Progress Note Handoff:  Pending (specify): improving SOB.   Family discussion: with patient.  Disposition: Home in 24 hrs.

## 2021-05-09 NOTE — DIETITIAN INITIAL EVALUATION ADULT. - OTHER CALCULATIONS
Estimated Calorie Needs: [id number] x AF 1.2-1.3-1332-1443kcal/day;  Estimated Protein Needs: 60-72grams/day (1-1.2grams/kg of admit weight) -Due to age;  Estimated Fluid Needs: 1332-1443mL/day (1mL/kcal)

## 2021-05-09 NOTE — PROGRESS NOTE ADULT - SUBJECTIVE AND OBJECTIVE BOX
SUDHIRLA, KUSH  71y  Female      Patient is a 71y old  Female who presents with a chief complaint of Shortness of Breath (08 May 2021 15:19)      INTERVAL HPI/OVERNIGHT EVENTS:  She feels slightly better, able to ambulate with mild SOB, still with wheezing, no fever.   Vital Signs Last 24 Hrs  T(C): 36.1 (09 May 2021 05:19), Max: 36.6 (08 May 2021 20:00)  T(F): 97 (09 May 2021 05:19), Max: 97.8 (08 May 2021 20:00)  HR: 87 (09 May 2021 05:19) (87 - 109)  BP: 178/75 (09 May 2021 05:19) (109/75 - 178/75)  BP(mean): --  RR: 18 (09 May 2021 05:19) (18 - 20)  SpO2: --      05-08-21 @ 07:01  -  05-09-21 @ 07:00  --------------------------------------------------------  IN: 120 mL / OUT: 0 mL / NET: 120 mL            Consultant(s) Notes Reviewed:  [x ] YES  [ ] NO          MEDICATIONS  (STANDING):  ALBUTerol    0.083% 2.5 milliGRAM(s) Nebulizer every 6 hours  ALPRAZolam 0.5 milliGRAM(s) Oral two times a day  amLODIPine   Tablet 10 milliGRAM(s) Oral daily  apixaban 5 milliGRAM(s) Oral every 12 hours  artificial tears (preservative free) Ophthalmic Solution 1 Drop(s) Both EYES every 6 hours  budesonide 160 MICROgram(s)/formoterol 4.5 MICROgram(s) Inhaler 2 Puff(s) Inhalation two times a day  carbamide peroxide Otic Solution 5 Drop(s) Both Ears two times a day  guaiFENesin  milliGRAM(s) Oral every 12 hours  lidocaine   Patch 1 Patch Transdermal daily  melatonin 5 milliGRAM(s) Oral at bedtime  methylPREDNISolone sodium succinate Injectable 40 milliGRAM(s) IV Push every 8 hours  nicotine - 21 mG/24Hr(s) Patch 1 patch Transdermal daily  oxycodone    5 mG/acetaminophen 325 mG 2 Tablet(s) Oral every 8 hours  pantoprazole    Tablet 40 milliGRAM(s) Oral before breakfast  polyethylene glycol 3350 17 Gram(s) Oral two times a day  senna 2 Tablet(s) Oral at bedtime  tiotropium 18 MICROgram(s) Capsule 1 Capsule(s) Inhalation daily  traZODone 50 milliGRAM(s) Oral at bedtime    MEDICATIONS  (PRN):  ALBUTerol    90 MICROgram(s) HFA Inhaler 2 Puff(s) Inhalation every 6 hours PRN Wheezing  metoclopramide Injectable 10 milliGRAM(s) IV Push every 6 hours PRN nausea  zolpidem 5 milliGRAM(s) Oral at bedtime PRN Insomnia      LABS                          10.7   22.91 )-----------( 390      ( 09 May 2021 07:16 )             33.1     05-09    133<L>  |  97<L>  |  18  ----------------------------<  141<H>  4.4   |  28  |  <0.5<L>    Ca    8.6      09 May 2021 07:16  Mg     1.8     05-09            Lactate Trend        CAPILLARY BLOOD GLUCOSE          Culture - Urine (collected 05-02-21 @ 19:13)  Source: .Urine Clean Catch (Midstream)  Final Report (05-04-21 @ 10:02):    <10,000 CFU/mL Normal Urogenital Maya        RADIOLOGY & ADDITIONAL TESTS:    Imaging Personally Reviewed:  [ ] YES  [ ] NO    HEALTH ISSUES - PROBLEM Dx:          PHYSICAL EXAM:  GENERAL: NAD, well-developed.  HEAD:  Atraumatic, Normocephalic.  EYES: EOMI, PERRLA, conjunctiva and sclera clear.  NECK: Supple, No JVD.  CHEST/LUNG: Severe diffuse bilateral wheezing.   HEART: Regular rate and rhythm; S1 S2.   ABDOMEN: Soft, Nontender, Nondistended; Bowel sounds present.  EXTREMITIES:  2+ Peripheral Pulses, No clubbing, cyanosis, or edema.  PSYCH: AAOx3.  NEUROLOGY: non-focal.  SKIN: No rashes or lesions.

## 2021-05-10 VITALS
SYSTOLIC BLOOD PRESSURE: 135 MMHG | RESPIRATION RATE: 20 BRPM | TEMPERATURE: 98 F | DIASTOLIC BLOOD PRESSURE: 63 MMHG | HEART RATE: 104 BPM

## 2021-05-10 PROCEDURE — 99239 HOSP IP/OBS DSCHRG MGMT >30: CPT

## 2021-05-10 RX ORDER — AMLODIPINE BESYLATE 2.5 MG/1
1 TABLET ORAL
Qty: 30 | Refills: 0
Start: 2021-05-10 | End: 2021-06-08

## 2021-05-10 RX ORDER — CARBAMIDE PEROXIDE 81.86 MG/ML
5 SOLUTION/ DROPS AURICULAR (OTIC)
Qty: 20 | Refills: 0
Start: 2021-05-10 | End: 2021-05-11

## 2021-05-10 RX ORDER — ALPRAZOLAM 0.25 MG
1 TABLET ORAL
Qty: 14 | Refills: 0
Start: 2021-05-10 | End: 2021-05-16

## 2021-05-10 RX ADMIN — CARBAMIDE PEROXIDE 5 DROP(S): 81.86 SOLUTION/ DROPS AURICULAR (OTIC) at 17:01

## 2021-05-10 RX ADMIN — Medication 1 PATCH: at 11:27

## 2021-05-10 RX ADMIN — Medication 1 PATCH: at 07:52

## 2021-05-10 RX ADMIN — ALBUTEROL 2.5 MILLIGRAM(S): 90 AEROSOL, METERED ORAL at 14:55

## 2021-05-10 RX ADMIN — Medication 40 MILLIGRAM(S): at 05:32

## 2021-05-10 RX ADMIN — LIDOCAINE 1 PATCH: 4 CREAM TOPICAL at 00:37

## 2021-05-10 RX ADMIN — TIOTROPIUM BROMIDE 1 CAPSULE(S): 18 CAPSULE ORAL; RESPIRATORY (INHALATION) at 07:56

## 2021-05-10 RX ADMIN — Medication 600 MILLIGRAM(S): at 17:01

## 2021-05-10 RX ADMIN — Medication 1 DROP(S): at 17:01

## 2021-05-10 RX ADMIN — Medication 600 MILLIGRAM(S): at 05:32

## 2021-05-10 RX ADMIN — APIXABAN 5 MILLIGRAM(S): 2.5 TABLET, FILM COATED ORAL at 05:32

## 2021-05-10 RX ADMIN — PANTOPRAZOLE SODIUM 40 MILLIGRAM(S): 20 TABLET, DELAYED RELEASE ORAL at 05:32

## 2021-05-10 RX ADMIN — Medication 1 DROP(S): at 05:33

## 2021-05-10 RX ADMIN — AMLODIPINE BESYLATE 10 MILLIGRAM(S): 2.5 TABLET ORAL at 05:32

## 2021-05-10 RX ADMIN — Medication 0.5 MILLIGRAM(S): at 05:32

## 2021-05-10 RX ADMIN — CARBAMIDE PEROXIDE 5 DROP(S): 81.86 SOLUTION/ DROPS AURICULAR (OTIC) at 05:33

## 2021-05-10 RX ADMIN — Medication 0.5 MILLIGRAM(S): at 17:01

## 2021-05-10 RX ADMIN — Medication 1 PATCH: at 12:00

## 2021-05-10 RX ADMIN — POLYETHYLENE GLYCOL 3350 17 GRAM(S): 17 POWDER, FOR SOLUTION ORAL at 05:35

## 2021-05-10 RX ADMIN — OXYCODONE AND ACETAMINOPHEN 2 TABLET(S): 5; 325 TABLET ORAL at 13:00

## 2021-05-10 RX ADMIN — OXYCODONE AND ACETAMINOPHEN 2 TABLET(S): 5; 325 TABLET ORAL at 05:32

## 2021-05-10 RX ADMIN — LIDOCAINE 1 PATCH: 4 CREAM TOPICAL at 11:27

## 2021-05-10 RX ADMIN — BUDESONIDE AND FORMOTEROL FUMARATE DIHYDRATE 2 PUFF(S): 160; 4.5 AEROSOL RESPIRATORY (INHALATION) at 10:22

## 2021-05-10 RX ADMIN — OXYCODONE AND ACETAMINOPHEN 2 TABLET(S): 5; 325 TABLET ORAL at 13:35

## 2021-05-10 RX ADMIN — APIXABAN 5 MILLIGRAM(S): 2.5 TABLET, FILM COATED ORAL at 17:01

## 2021-05-10 RX ADMIN — ALBUTEROL 2.5 MILLIGRAM(S): 90 AEROSOL, METERED ORAL at 07:56

## 2021-05-10 RX ADMIN — Medication 1 DROP(S): at 00:36

## 2021-05-10 RX ADMIN — Medication 1 DROP(S): at 11:19

## 2021-05-10 NOTE — PROGRESS NOTE ADULT - REASON FOR ADMISSION
Shortness of Breath

## 2021-05-10 NOTE — PROGRESS NOTE ADULT - SUBJECTIVE AND OBJECTIVE BOX
KUSH RINALDI 71y Female  MRN#: [id number]   Hospital Day: 8d    SUBJECTIVE  Patient is a 71y old Female who presents with a chief complaint of Shortness of Breath (09 May 2021 15:22)  Currently admitted to medicine with the primary diagnosis of COPD exacerbation      INTERVAL HPI AND OVERNIGHT EVENTS:  Patient was examined and seen at bedside. This morning she is resting comfortably in bed and reports no issues or overnight events.    REVIEW OF SYMPTOMS:  CONSTITUTIONAL: No weakness, fevers or chills; No headaches  EYES: No visual changes, eye pain, or discharge  ENT: No vertigo; No ear pain or change in hearing; No sore throat or difficulty swallowing  NECK: No pain or stiffness  RESPIRATORY: No cough, wheezing, or hemoptysis; No shortness of breath  CARDIOVASCULAR: No chest pain or palpitations  GASTROINTESTINAL: No abdominal or epigastric pain; No nausea, vomiting, or hematemesis; No diarrhea or constipation; No melena or hematochezia  GENITOURINARY: No dysuria, frequency or hematuria  MUSCULOSKELETAL: No joint pain, no muscle pain, no weakness  NEUROLOGICAL: No numbness or weakness  SKIN: No itching or rashes    OBJECTIVE  PAST MEDICAL & SURGICAL HISTORY  COPD (chronic obstructive pulmonary disease)    Anxiety    Depression    Chronic pain    Hypertension    Chronic GERD    Spinal stenosis    Neck mass      ALLERGIES:  penicillin (Unknown)    MEDICATIONS:  STANDING MEDICATIONS  ALBUTerol    0.083% 2.5 milliGRAM(s) Nebulizer every 6 hours  ALPRAZolam 0.5 milliGRAM(s) Oral two times a day  amLODIPine   Tablet 10 milliGRAM(s) Oral daily  apixaban 5 milliGRAM(s) Oral every 12 hours  artificial tears (preservative free) Ophthalmic Solution 1 Drop(s) Both EYES every 6 hours  budesonide 160 MICROgram(s)/formoterol 4.5 MICROgram(s) Inhaler 2 Puff(s) Inhalation two times a day  carbamide peroxide Otic Solution 5 Drop(s) Both Ears two times a day  guaiFENesin  milliGRAM(s) Oral every 12 hours  lidocaine   Patch 1 Patch Transdermal daily  melatonin 5 milliGRAM(s) Oral at bedtime  nicotine - 21 mG/24Hr(s) Patch 1 patch Transdermal daily  oxycodone    5 mG/acetaminophen 325 mG 2 Tablet(s) Oral every 8 hours  pantoprazole    Tablet 40 milliGRAM(s) Oral before breakfast  polyethylene glycol 3350 17 Gram(s) Oral two times a day  senna 2 Tablet(s) Oral at bedtime  tiotropium 18 MICROgram(s) Capsule 1 Capsule(s) Inhalation daily  traZODone 50 milliGRAM(s) Oral at bedtime    PRN MEDICATIONS  ALBUTerol    90 MICROgram(s) HFA Inhaler 2 Puff(s) Inhalation every 6 hours PRN  metoclopramide Injectable 10 milliGRAM(s) IV Push every 6 hours PRN  zolpidem 5 milliGRAM(s) Oral at bedtime PRN      VITAL SIGNS: Last 24 Hours  T(C): 36.2 (10 May 2021 05:07), Max: 36.7 (09 May 2021 14:27)  T(F): 97.1 (10 May 2021 05:07), Max: 98.1 (09 May 2021 19:34)  HR: 81 (10 May 2021 05:07) (81 - 101)  BP: 150/70 (10 May 2021 05:07) (143/65 - 150/70)  BP(mean): --  RR: 19 (10 May 2021 05:07) (18 - 19)  SpO2: 97% (10 May 2021 07:46) (97% - 97%)    LABS:                        10.7   22.91 )-----------( 390      ( 09 May 2021 07:16 )             33.1     05-09    133<L>  |  97<L>  |  18  ----------------------------<  141<H>  4.4   |  28  |  <0.5<L>    Ca    8.6      09 May 2021 07:16  Mg     1.8     05-09                    RADIOLOGY:      PHYSICAL EXAM:  CONSTITUTIONAL: No acute distress, well-developed, well-groomed, AAOx3  HEAD: Atraumatic, normocephalic  EYES: EOM intact, PERRLA, conjunctiva and sclera clear  ENT: Supple, no masses, no thyromegaly, no bruits, no JVD; moist mucous membranes  PULMONARY: Clear to auscultation bilaterally; no wheezes, rales, or rhonchi  CARDIOVASCULAR: Regular rate and rhythm; no murmurs, rubs, or gallops  GASTROINTESTINAL: Soft, non-tender, non-distended; bowel sounds present  MUSCULOSKELETAL: 2+ peripheral pulses; no clubbing, no cyanosis, no edema  NEUROLOGY: non-focal  SKIN: No rashes or lesions; warm and dry

## 2021-05-10 NOTE — PROGRESS NOTE ADULT - ASSESSMENT
71 year old female patient with past medical history of COPD on 2 L NC PRN, DVT / PE on Eliquis, chronic back pain due to spinal stenosis and chronic compression fractures, GERD presenting for dyspnea.      #Acute hypercapnic respiratory failure   #Acute COPD exacerbation  - Continues to be SOB. Diffuse wheezing on exam.   - CXR no acute infiltrates  - Chest CT angio showed no PE. Venous duplex negative for DVT of LE.   - CO2 on VBG 66.   - completed Zithromax   - Switched Solu-Medrol 40mg IV q 8hrs to Prednisone taper dose (60 mg x3 days, 50mg x 3 days, etc.)  - Worsening leukocytosis steroid.     #HTN   -Norvasc increased to 10 mg PO qD    #Chronic back pain 2/2 spinal stenosis   #chronic, acute/subacute compression fractures  - CT chest showed more prominent T11 compression fracture.   - Continue percocet 5/325 mg q8h and lidocaine patch PRN    History of DVT/PE :   - Duplex LE 5/4 no evidence of DVT  - Continue Eliquis.     Pending (specify): improving SOB.      lower

## 2021-05-10 NOTE — PROGRESS NOTE ADULT - SUBJECTIVE AND OBJECTIVE BOX
KUSH RINALDI  71y  Female  ***My note supersedes ALL resident notes that I sign.  My corrections for their notes are in my note.***    I can be reached directly on [email or web address]. My office number is [phone]. My personal cell number is [phone].    INTERVAL EVENTS: Here for f/u of COPD. Pt feels well enough to go home, though still wheezing a little. Cough is much better. Pt can hear OK. Pt is walking.    T(F): 97.1 (05-10-21 @ 05:07), Max: 98.1 (05-09-21 @ 19:34)  HR: 81 (05-10-21 @ 05:07) (81 - 101)  BP: 150/70 (05-10-21 @ 05:07) (143/65 - 150/70)  RR: 19 (05-10-21 @ 05:07) (18 - 19)  SpO2: 97% (05-10-21 @ 07:46) (97% - 97%)    Gen: no resp distress at rest; less winded w/ exertion; not requiring O2 that much  HEENT: PERRL, EOMI, mouth clr, nose clr; b/l ear wax  Neck: no nodes, no JVD, thyroid nl  lungs: b/l wheezing; much less coughing w/ inspiration  hrt: s1 s2 reg, no murmur  abd: soft, NT/ND, no HS megaly  ext: no edema, no c/c  neuro: aa ox3, cn intact; hearing better; can move all 4 ext    LABS:                      10.7    (    81.7   22.91 )-----------( ---------      390      ( 09 May 2021 07:16 )             33.1    (    17.9     on steroids    WBC Count: 22.91 K/uL (05-09-21 @ 07:16) - incr after return to solumedrol (clinically better)  WBC Count: 14.70 K/uL (05-06-21 @ 07:20)    RADIOLOGY & ADDITIONAL TESTS:      MEDICATIONS:    ALBUTerol    0.083% 2.5 milliGRAM(s) Nebulizer every 6 hours  ALBUTerol    90 MICROgram(s) HFA Inhaler 2 Puff(s) Inhalation every 6 hours PRN  ALPRAZolam 0.5 milliGRAM(s) Oral two times a day  amLODIPine   Tablet 10 milliGRAM(s) Oral daily  apixaban 5 milliGRAM(s) Oral every 12 hours  artificial tears (preservative free) Ophthalmic Solution 1 Drop(s) Both EYES every 6 hours  budesonide 160 MICROgram(s)/formoterol 4.5 MICROgram(s) Inhaler 2 Puff(s) Inhalation two times a day  carbamide peroxide Otic Solution 5 Drop(s) Both Ears two times a day  guaiFENesin  milliGRAM(s) Oral every 12 hours  lidocaine   Patch 1 Patch Transdermal daily  melatonin 5 milliGRAM(s) Oral at bedtime  metoclopramide Injectable 10 milliGRAM(s) IV Push every 6 hours PRN  nicotine - 21 mG/24Hr(s) Patch 1 patch Transdermal daily  oxycodone    5 mG/acetaminophen 325 mG 2 Tablet(s) Oral every 8 hours  pantoprazole    Tablet 40 milliGRAM(s) Oral before breakfast  polyethylene glycol 3350 17 Gram(s) Oral two times a day  senna 2 Tablet(s) Oral at bedtime  tiotropium 18 MICROgram(s) Capsule 1 Capsule(s) Inhalation daily  traZODone 50 milliGRAM(s) Oral at bedtime  zolpidem 5 milliGRAM(s) Oral at bedtime PRN

## 2021-05-10 NOTE — PROGRESS NOTE ADULT - NSICDXPILOT_GEN_ALL_CORE
[place]

## 2021-05-10 NOTE — PROGRESS NOTE ADULT - ASSESSMENT
70 yo woman w/ history of COPD on 2L NC PRN at home, DVT/PE on eliquis, chronic back pain due to spinal stenosis, and chronic compression fractures, and GERD, presenting for dyspnea and admitted for COPD exacerbation.    # acute on chronic hypoxic resp failure 2/2 COPD exacerbation 2/2 acute bronchitis; on home O2  TTE normal from April 2019. EF 53% -> no edema in LE -> do not suspect CHF (more likely to be COPD exacerbation)  CXR 5/2 showed atelectasis  CT angio 5/2 showed no evidence of PE  mild thoracic kyphosis -> could be contributing to breathing difficulties (restrictive)  afebrile  elevated WBC; likely due to steroids  COVID PCR negative  suppl O2 as needed to keep sat >90%  abx: Azithromycin x 3 days - completed 5/5  c/w Albuterol Neb q6h and PRN  d/c solumedrol and change to Pred 60mg po q24 and taper by 10mg q3days til off (prolonged taper)  c/w symbicort 160 micrograms, 2puffs, BID  c/w spiriva 18 micrograms capsule, QD  c/w mucinex 600 mg q12h      # HTN - better  c/w amlodipine 5mg QD    # insomnia/Anxiety  melatonin 5mg po qhs  ambien 5mg po qhs prn sleep (only need in hospital)  xanax 0.5mg po q12 - gave 1wk Rx to pharm    # chronic back pain 2/2 spinal stenosis and chronic compression fractures  on vicodin 10/325 po q8 - sent 1wk Rx to pharm  cw percocet 5/325 mg q8h prn (d/c upon discharge)  c/w lidocaine patch PRN    # constipation - likely 2/2 chronic opoid use, but pt is having BMs per RN  c/w senna 2 tablets PO at bedtime  c/w miralax 17 g PO QD to BID     # hearing impairment - onset about 5 days PTA as per patient after rinsing hair w/ bucket of water  impaired visibility on otoscopic exam d/t wax  improved hearing during this admission  debrox 5 gtts AU q12  outpt ENT f/u    # hx of DVT/PE on eliquis  V duplex LE (5/4) showed no evidence of DVT in either lower extremity  c/w apixaban 5 mg QD    # GERD  c/w protonix po    # A1c 5.6 - no DM    # DVT ppx: on eliquis    # GI ppx: on ppi po    # Activity: ambulates on her own to BR    Dispo: po steroids taper; nebs; c/w debrox; tx HTN; tx insomnia; d/c home today

## 2021-05-10 NOTE — PROGRESS NOTE ADULT - PROVIDER SPECIALTY LIST ADULT
Internal Medicine
ENT
Hospitalist
Internal Medicine
Hospitalist
Internal Medicine
Hospitalist
Internal Medicine

## 2021-05-18 DIAGNOSIS — J96.22 ACUTE AND CHRONIC RESPIRATORY FAILURE WITH HYPERCAPNIA: ICD-10-CM

## 2021-05-18 DIAGNOSIS — F41.9 ANXIETY DISORDER, UNSPECIFIED: ICD-10-CM

## 2021-05-18 DIAGNOSIS — J96.21 ACUTE AND CHRONIC RESPIRATORY FAILURE WITH HYPOXIA: ICD-10-CM

## 2021-05-18 DIAGNOSIS — H04.123 DRY EYE SYNDROME OF BILATERAL LACRIMAL GLANDS: ICD-10-CM

## 2021-05-18 DIAGNOSIS — I50.32 CHRONIC DIASTOLIC (CONGESTIVE) HEART FAILURE: ICD-10-CM

## 2021-05-18 DIAGNOSIS — J44.0 CHRONIC OBSTRUCTIVE PULMONARY DISEASE WITH (ACUTE) LOWER RESPIRATORY INFECTION: ICD-10-CM

## 2021-05-18 DIAGNOSIS — I11.0 HYPERTENSIVE HEART DISEASE WITH HEART FAILURE: ICD-10-CM

## 2021-05-18 DIAGNOSIS — T40.2X5A ADVERSE EFFECT OF OTHER OPIOIDS, INITIAL ENCOUNTER: ICD-10-CM

## 2021-05-18 DIAGNOSIS — K59.03 DRUG INDUCED CONSTIPATION: ICD-10-CM

## 2021-05-18 DIAGNOSIS — J44.1 CHRONIC OBSTRUCTIVE PULMONARY DISEASE WITH (ACUTE) EXACERBATION: ICD-10-CM

## 2021-05-18 DIAGNOSIS — E87.5 HYPERKALEMIA: ICD-10-CM

## 2021-05-18 DIAGNOSIS — Z79.899 OTHER LONG TERM (CURRENT) DRUG THERAPY: ICD-10-CM

## 2021-05-18 DIAGNOSIS — F32.9 MAJOR DEPRESSIVE DISORDER, SINGLE EPISODE, UNSPECIFIED: ICD-10-CM

## 2021-05-18 DIAGNOSIS — Z86.718 PERSONAL HISTORY OF OTHER VENOUS THROMBOSIS AND EMBOLISM: ICD-10-CM

## 2021-05-18 DIAGNOSIS — E87.8 OTHER DISORDERS OF ELECTROLYTE AND FLUID BALANCE, NOT ELSEWHERE CLASSIFIED: ICD-10-CM

## 2021-05-18 DIAGNOSIS — Z79.51 LONG TERM (CURRENT) USE OF INHALED STEROIDS: ICD-10-CM

## 2021-05-18 DIAGNOSIS — E87.1 HYPO-OSMOLALITY AND HYPONATREMIA: ICD-10-CM

## 2021-05-18 DIAGNOSIS — Z79.01 LONG TERM (CURRENT) USE OF ANTICOAGULANTS: ICD-10-CM

## 2021-05-18 DIAGNOSIS — K59.00 CONSTIPATION, UNSPECIFIED: ICD-10-CM

## 2021-05-18 DIAGNOSIS — G89.29 OTHER CHRONIC PAIN: ICD-10-CM

## 2021-05-18 DIAGNOSIS — K21.9 GASTRO-ESOPHAGEAL REFLUX DISEASE WITHOUT ESOPHAGITIS: ICD-10-CM

## 2021-05-18 DIAGNOSIS — J20.9 ACUTE BRONCHITIS, UNSPECIFIED: ICD-10-CM

## 2021-05-18 DIAGNOSIS — Z86.711 PERSONAL HISTORY OF PULMONARY EMBOLISM: ICD-10-CM

## 2021-05-18 DIAGNOSIS — Z88.0 ALLERGY STATUS TO PENICILLIN: ICD-10-CM

## 2021-05-18 DIAGNOSIS — M48.54XA COLLAPSED VERTEBRA, NOT ELSEWHERE CLASSIFIED, THORACIC REGION, INITIAL ENCOUNTER FOR FRACTURE: ICD-10-CM

## 2021-05-18 DIAGNOSIS — M84.68XA PATHOLOGICAL FRACTURE IN OTHER DISEASE, OTHER SITE, INITIAL ENCOUNTER FOR FRACTURE: ICD-10-CM

## 2021-05-18 DIAGNOSIS — M48.00 SPINAL STENOSIS, SITE UNSPECIFIED: ICD-10-CM

## 2021-05-18 DIAGNOSIS — F17.200 NICOTINE DEPENDENCE, UNSPECIFIED, UNCOMPLICATED: ICD-10-CM

## 2021-05-18 DIAGNOSIS — M40.204 UNSPECIFIED KYPHOSIS, THORACIC REGION: ICD-10-CM

## 2021-05-18 DIAGNOSIS — Z79.52 LONG TERM (CURRENT) USE OF SYSTEMIC STEROIDS: ICD-10-CM

## 2021-05-20 NOTE — ED ADULT TRIAGE NOTE - ESI TRIAGE ACUITY LEVEL, MLM
Get port flushes in [place] every 8 weeks or so    Consider acupuncture/laser therapy for neuropathy    Schedule labs and CT CAP in 2 months and see me a few days after that   3

## 2021-06-17 ENCOUNTER — APPOINTMENT (OUTPATIENT)
Dept: OTOLARYNGOLOGY | Facility: CLINIC | Age: 71
End: 2021-06-17
Payer: MEDICARE

## 2021-06-17 DIAGNOSIS — H93.8X3 OTHER SPECIFIED DISORDERS OF EAR, BILATERAL: ICD-10-CM

## 2021-06-17 DIAGNOSIS — H61.23 IMPACTED CERUMEN, BILATERAL: ICD-10-CM

## 2021-06-17 PROCEDURE — 69210 REMOVE IMPACTED EAR WAX UNI: CPT

## 2021-06-17 PROCEDURE — 99072 ADDL SUPL MATRL&STAF TM PHE: CPT

## 2021-06-17 PROCEDURE — 99213 OFFICE O/P EST LOW 20 MIN: CPT | Mod: 25

## 2021-06-17 NOTE — PHYSICAL EXAM
[Normal] : mucosa is normal [Midline] : trachea located in midline position [de-identified] : bilateral cerumen impaction, removed with suction

## 2021-06-17 NOTE — HISTORY OF PRESENT ILLNESS
[de-identified] : PAtient presents today c/o clogged ears .  Was in hospital for clogged ear cause hearing loss in left ear.  left ear is very clogged. denies tinnitus.or dizziness. no fever or discharge. no h/o recurrent ear infections, no h/o of noise exposure. Declines hearing test today.

## 2021-07-08 NOTE — CONSULT NOTE ADULT - CONSULT REQUESTED DATE/TIME
01-Aug-2019 11:05
01-Aug-2019 13:01
01-Aug-2019 16:28
02-Aug-2019 12:56
01-Aug-2019 18:00
Gen - WDWN, NAD, comfortable and non-toxic appearing  Skin - warm, dry, intact   Resp - Breathing comfortably on RA.  Neuro - AxOx3, clear speech, grossly unremarkable.  Psych - Calm and cooperative. Normal mood and affect.

## 2021-08-23 ENCOUNTER — INPATIENT (INPATIENT)
Facility: HOSPITAL | Age: 71
LOS: 8 days | Discharge: HOME HEALTH | End: 2021-09-01
Attending: STUDENT IN AN ORGANIZED HEALTH CARE EDUCATION/TRAINING PROGRAM | Admitting: STUDENT IN AN ORGANIZED HEALTH CARE EDUCATION/TRAINING PROGRAM
Payer: MEDICARE

## 2021-08-23 VITALS
TEMPERATURE: 99 F | HEART RATE: 88 BPM | OXYGEN SATURATION: 99 % | HEIGHT: 64 IN | SYSTOLIC BLOOD PRESSURE: 139 MMHG | RESPIRATION RATE: 18 BRPM | DIASTOLIC BLOOD PRESSURE: 89 MMHG

## 2021-08-23 DIAGNOSIS — R22.1 LOCALIZED SWELLING, MASS AND LUMP, NECK: Chronic | ICD-10-CM

## 2021-08-23 PROCEDURE — 99285 EMERGENCY DEPT VISIT HI MDM: CPT

## 2021-08-23 RX ORDER — ALPRAZOLAM 0.25 MG
0.5 TABLET ORAL ONCE
Refills: 0 | Status: DISCONTINUED | OUTPATIENT
Start: 2021-08-23 | End: 2021-08-23

## 2021-08-23 RX ORDER — IPRATROPIUM/ALBUTEROL SULFATE 18-103MCG
3 AEROSOL WITH ADAPTER (GRAM) INHALATION ONCE
Refills: 0 | Status: COMPLETED | OUTPATIENT
Start: 2021-08-23 | End: 2021-08-23

## 2021-08-23 NOTE — ED ADULT NURSE NOTE - NSIMPLEMENTINTERV_GEN_ALL_ED
Implemented All Fall Risk Interventions:  [place] to call system. Call bell, personal items and telephone within reach. Instruct patient to call for assistance. Room bathroom lighting operational. Non-slip footwear when patient is off stretcher. Physically safe environment: no spills, clutter or unnecessary equipment. Stretcher in lowest position, wheels locked, appropriate side rails in place. Provide visual cue, wrist band, yellow gown, etc. Monitor gait and stability. Monitor for mental status changes and reorient to person, place, and time. Review medications for side effects contributing to fall risk. Reinforce activity limits and safety measures with patient and family.

## 2021-08-23 NOTE — ED ADULT TRIAGE NOTE - HEIGHT IN CM
Do you know status of the prior authorization? Will you try calling Pt's parent again to cancel appointment for this Wednesday with Dr. Jesus Waite. [phone]

## 2021-08-23 NOTE — ED ADULT NURSE NOTE - NSICDXPASTMEDICALHX_GEN_ALL_CORE_FT
PAST MEDICAL HISTORY:  Anxiety     Chronic GERD     Chronic pain     COPD (chronic obstructive pulmonary disease)     Depression     Hypertension     Spinal stenosis      PAST MEDICAL HISTORY:  Anxiety     Chronic GERD     Chronic pain     COPD (chronic obstructive pulmonary disease)     Deep vein thrombosis (DVT)     Depression     Hypertension     Pulmonary embolism     Spinal stenosis

## 2021-08-24 LAB
ALBUMIN SERPL ELPH-MCNC: 4.2 G/DL — SIGNIFICANT CHANGE UP (ref 3.5–5.2)
ALP SERPL-CCNC: 84 U/L — SIGNIFICANT CHANGE UP (ref 30–115)
ALT FLD-CCNC: 9 U/L — SIGNIFICANT CHANGE UP (ref 0–41)
ANION GAP SERPL CALC-SCNC: 10 MMOL/L — SIGNIFICANT CHANGE UP (ref 7–14)
AST SERPL-CCNC: 26 U/L — SIGNIFICANT CHANGE UP (ref 0–41)
BASOPHILS # BLD AUTO: 0.04 K/UL — SIGNIFICANT CHANGE UP (ref 0–0.2)
BASOPHILS NFR BLD AUTO: 0.4 % — SIGNIFICANT CHANGE UP (ref 0–1)
BILIRUB SERPL-MCNC: <0.2 MG/DL — SIGNIFICANT CHANGE UP (ref 0.2–1.2)
BUN SERPL-MCNC: 11 MG/DL — SIGNIFICANT CHANGE UP (ref 10–20)
CALCIUM SERPL-MCNC: 10.1 MG/DL — SIGNIFICANT CHANGE UP (ref 8.5–10.1)
CHLORIDE SERPL-SCNC: 99 MMOL/L — SIGNIFICANT CHANGE UP (ref 98–110)
CO2 SERPL-SCNC: 29 MMOL/L — SIGNIFICANT CHANGE UP (ref 17–32)
CREAT SERPL-MCNC: 0.6 MG/DL — LOW (ref 0.7–1.5)
EOSINOPHIL # BLD AUTO: 0.25 K/UL — SIGNIFICANT CHANGE UP (ref 0–0.7)
EOSINOPHIL NFR BLD AUTO: 2.5 % — SIGNIFICANT CHANGE UP (ref 0–8)
GLUCOSE SERPL-MCNC: 101 MG/DL — HIGH (ref 70–99)
HCT VFR BLD CALC: 37.4 % — SIGNIFICANT CHANGE UP (ref 37–47)
HGB BLD-MCNC: 11.6 G/DL — LOW (ref 12–16)
IMM GRANULOCYTES NFR BLD AUTO: 0.1 % — SIGNIFICANT CHANGE UP (ref 0.1–0.3)
LYMPHOCYTES # BLD AUTO: 3.67 K/UL — HIGH (ref 1.2–3.4)
LYMPHOCYTES # BLD AUTO: 37 % — SIGNIFICANT CHANGE UP (ref 20.5–51.1)
MCHC RBC-ENTMCNC: 28.4 PG — SIGNIFICANT CHANGE UP (ref 27–31)
MCHC RBC-ENTMCNC: 31 G/DL — LOW (ref 32–37)
MCV RBC AUTO: 91.7 FL — SIGNIFICANT CHANGE UP (ref 81–99)
MONOCYTES # BLD AUTO: 0.68 K/UL — HIGH (ref 0.1–0.6)
MONOCYTES NFR BLD AUTO: 6.8 % — SIGNIFICANT CHANGE UP (ref 1.7–9.3)
NEUTROPHILS # BLD AUTO: 5.28 K/UL — SIGNIFICANT CHANGE UP (ref 1.4–6.5)
NEUTROPHILS NFR BLD AUTO: 53.2 % — SIGNIFICANT CHANGE UP (ref 42.2–75.2)
NRBC # BLD: 0 /100 WBCS — SIGNIFICANT CHANGE UP (ref 0–0)
NT-PROBNP SERPL-SCNC: 254 PG/ML — SIGNIFICANT CHANGE UP (ref 0–300)
NT-PROBNP SERPL-SCNC: 286 PG/ML — SIGNIFICANT CHANGE UP (ref 0–300)
PLATELET # BLD AUTO: 298 K/UL — SIGNIFICANT CHANGE UP (ref 130–400)
POTASSIUM SERPL-MCNC: 5.1 MMOL/L — HIGH (ref 3.5–5)
POTASSIUM SERPL-SCNC: 5.1 MMOL/L — HIGH (ref 3.5–5)
PROT SERPL-MCNC: 6.6 G/DL — SIGNIFICANT CHANGE UP (ref 6–8)
RBC # BLD: 4.08 M/UL — LOW (ref 4.2–5.4)
RBC # FLD: 12.6 % — SIGNIFICANT CHANGE UP (ref 11.5–14.5)
SARS-COV-2 RNA SPEC QL NAA+PROBE: SIGNIFICANT CHANGE UP
SODIUM SERPL-SCNC: 138 MMOL/L — SIGNIFICANT CHANGE UP (ref 135–146)
TROPONIN T SERPL-MCNC: <0.01 NG/ML — SIGNIFICANT CHANGE UP
WBC # BLD: 9.93 K/UL — SIGNIFICANT CHANGE UP (ref 4.8–10.8)
WBC # FLD AUTO: 9.93 K/UL — SIGNIFICANT CHANGE UP (ref 4.8–10.8)

## 2021-08-24 PROCEDURE — 71045 X-RAY EXAM CHEST 1 VIEW: CPT | Mod: 26

## 2021-08-24 PROCEDURE — 99221 1ST HOSP IP/OBS SF/LOW 40: CPT

## 2021-08-24 PROCEDURE — 93010 ELECTROCARDIOGRAM REPORT: CPT | Mod: 76

## 2021-08-24 RX ORDER — AMLODIPINE BESYLATE 2.5 MG/1
10 TABLET ORAL DAILY
Refills: 0 | Status: DISCONTINUED | OUTPATIENT
Start: 2021-08-24 | End: 2021-09-01

## 2021-08-24 RX ORDER — SENNA PLUS 8.6 MG/1
2 TABLET ORAL AT BEDTIME
Refills: 0 | Status: DISCONTINUED | OUTPATIENT
Start: 2021-08-24 | End: 2021-09-01

## 2021-08-24 RX ORDER — AZITHROMYCIN 500 MG/1
500 TABLET, FILM COATED ORAL EVERY 24 HOURS
Refills: 0 | Status: DISCONTINUED | OUTPATIENT
Start: 2021-08-24 | End: 2021-08-29

## 2021-08-24 RX ORDER — TIOTROPIUM BROMIDE 18 UG/1
1 CAPSULE ORAL; RESPIRATORY (INHALATION) DAILY
Refills: 0 | Status: DISCONTINUED | OUTPATIENT
Start: 2021-08-24 | End: 2021-09-01

## 2021-08-24 RX ORDER — OXYCODONE AND ACETAMINOPHEN 5; 325 MG/1; MG/1
2 TABLET ORAL EVERY 8 HOURS
Refills: 0 | Status: DISCONTINUED | OUTPATIENT
Start: 2021-08-24 | End: 2021-08-31

## 2021-08-24 RX ORDER — NICOTINE POLACRILEX 2 MG
1 GUM BUCCAL DAILY
Refills: 0 | Status: DISCONTINUED | OUTPATIENT
Start: 2021-08-24 | End: 2021-09-01

## 2021-08-24 RX ORDER — POLYETHYLENE GLYCOL 3350 17 G/17G
17 POWDER, FOR SOLUTION ORAL DAILY
Refills: 0 | Status: DISCONTINUED | OUTPATIENT
Start: 2021-08-24 | End: 2021-09-01

## 2021-08-24 RX ORDER — ALPRAZOLAM 0.25 MG
1 TABLET ORAL
Refills: 0 | Status: DISCONTINUED | OUTPATIENT
Start: 2021-08-24 | End: 2021-08-31

## 2021-08-24 RX ORDER — LIDOCAINE 4 G/100G
1 CREAM TOPICAL DAILY
Refills: 0 | Status: DISCONTINUED | OUTPATIENT
Start: 2021-08-24 | End: 2021-09-01

## 2021-08-24 RX ORDER — APIXABAN 2.5 MG/1
5 TABLET, FILM COATED ORAL
Refills: 0 | Status: DISCONTINUED | OUTPATIENT
Start: 2021-08-24 | End: 2021-09-01

## 2021-08-24 RX ORDER — LANOLIN ALCOHOL/MO/W.PET/CERES
5 CREAM (GRAM) TOPICAL AT BEDTIME
Refills: 0 | Status: DISCONTINUED | OUTPATIENT
Start: 2021-08-24 | End: 2021-09-01

## 2021-08-24 RX ORDER — PANTOPRAZOLE SODIUM 20 MG/1
40 TABLET, DELAYED RELEASE ORAL
Refills: 0 | Status: DISCONTINUED | OUTPATIENT
Start: 2021-08-24 | End: 2021-09-01

## 2021-08-24 RX ORDER — BUDESONIDE AND FORMOTEROL FUMARATE DIHYDRATE 160; 4.5 UG/1; UG/1
2 AEROSOL RESPIRATORY (INHALATION)
Refills: 0 | Status: DISCONTINUED | OUTPATIENT
Start: 2021-08-24 | End: 2021-09-01

## 2021-08-24 RX ORDER — IPRATROPIUM/ALBUTEROL SULFATE 18-103MCG
3 AEROSOL WITH ADAPTER (GRAM) INHALATION EVERY 6 HOURS
Refills: 0 | Status: DISCONTINUED | OUTPATIENT
Start: 2021-08-24 | End: 2021-08-27

## 2021-08-24 RX ORDER — TRAZODONE HCL 50 MG
50 TABLET ORAL AT BEDTIME
Refills: 0 | Status: DISCONTINUED | OUTPATIENT
Start: 2021-08-24 | End: 2021-09-01

## 2021-08-24 RX ADMIN — AMLODIPINE BESYLATE 10 MILLIGRAM(S): 2.5 TABLET ORAL at 06:49

## 2021-08-24 RX ADMIN — Medication 125 MILLIGRAM(S): at 00:50

## 2021-08-24 RX ADMIN — APIXABAN 5 MILLIGRAM(S): 2.5 TABLET, FILM COATED ORAL at 06:49

## 2021-08-24 RX ADMIN — Medication 1 PATCH: at 11:38

## 2021-08-24 RX ADMIN — BUDESONIDE AND FORMOTEROL FUMARATE DIHYDRATE 2 PUFF(S): 160; 4.5 AEROSOL RESPIRATORY (INHALATION) at 20:39

## 2021-08-24 RX ADMIN — OXYCODONE AND ACETAMINOPHEN 2 TABLET(S): 5; 325 TABLET ORAL at 03:47

## 2021-08-24 RX ADMIN — Medication 3 MILLILITER(S): at 08:16

## 2021-08-24 RX ADMIN — BUDESONIDE AND FORMOTEROL FUMARATE DIHYDRATE 2 PUFF(S): 160; 4.5 AEROSOL RESPIRATORY (INHALATION) at 16:35

## 2021-08-24 RX ADMIN — PANTOPRAZOLE SODIUM 40 MILLIGRAM(S): 20 TABLET, DELAYED RELEASE ORAL at 08:25

## 2021-08-24 RX ADMIN — Medication 3 MILLILITER(S): at 14:55

## 2021-08-24 RX ADMIN — Medication 60 MILLIGRAM(S): at 16:52

## 2021-08-24 RX ADMIN — Medication 600 MILLIGRAM(S): at 16:52

## 2021-08-24 RX ADMIN — Medication 3 MILLILITER(S): at 00:22

## 2021-08-24 RX ADMIN — Medication 600 MILLIGRAM(S): at 06:49

## 2021-08-24 RX ADMIN — Medication 3 MILLILITER(S): at 21:05

## 2021-08-24 RX ADMIN — Medication 50 MILLIGRAM(S): at 21:26

## 2021-08-24 RX ADMIN — POLYETHYLENE GLYCOL 3350 17 GRAM(S): 17 POWDER, FOR SOLUTION ORAL at 11:38

## 2021-08-24 RX ADMIN — Medication 1 MILLIGRAM(S): at 21:23

## 2021-08-24 RX ADMIN — LIDOCAINE 1 PATCH: 4 CREAM TOPICAL at 21:19

## 2021-08-24 RX ADMIN — Medication 0.5 MILLIGRAM(S): at 00:33

## 2021-08-24 RX ADMIN — SENNA PLUS 2 TABLET(S): 8.6 TABLET ORAL at 21:19

## 2021-08-24 RX ADMIN — APIXABAN 5 MILLIGRAM(S): 2.5 TABLET, FILM COATED ORAL at 16:52

## 2021-08-24 RX ADMIN — OXYCODONE AND ACETAMINOPHEN 2 TABLET(S): 5; 325 TABLET ORAL at 13:06

## 2021-08-24 RX ADMIN — OXYCODONE AND ACETAMINOPHEN 2 TABLET(S): 5; 325 TABLET ORAL at 13:43

## 2021-08-24 RX ADMIN — Medication 3 MILLILITER(S): at 00:33

## 2021-08-24 RX ADMIN — Medication 60 MILLIGRAM(S): at 06:48

## 2021-08-24 RX ADMIN — Medication 1 PATCH: at 19:48

## 2021-08-24 RX ADMIN — Medication 3 MILLILITER(S): at 00:51

## 2021-08-24 RX ADMIN — AZITHROMYCIN 500 MILLIGRAM(S): 500 TABLET, FILM COATED ORAL at 06:47

## 2021-08-24 NOTE — H&P ADULT - ASSESSMENT
71 year old female patient with past medical history of COPD on 2 L NC PRN, DVT / PE on Eliquis, chronic back pain due to spinal stenosis and chronic compression fractures, GERD presenting for dyspnea.    #COPD exacerbation  - Still has diffuse wheezing on PE, however clinically improving, endorsing improvement in SOB  -TTE normal from April 2019,   - Chest x ray showed atelectatic changes  - ABG normal, hyperventilating, not retention ->goes against copd exacerbation  - VA duplex is negative for DVT, CT Chest  & CT Angio Chest w/ IV Cont; No CTA evidence of acute pulmonary embolus.  - Redemonstrated partially attenuated filling defects and multiple right upper lobe subsegmental branches, felt to reflect chronic pulmonary emboli.  - Completed Azithromycin course  - c/w Albuterol Neb q6h and PRN  - Switched to PO Prednisone 60 mg PO qD  - c/w Symbicort & Spiriva   - C/W Mucinex    #HTN  - elevated in hospital  - Increased Norvasc to 10 mg PO qD    #Chronic back pain 2/2 spinal stenosis and chronic, acute/subacute compression fractures  - on vicodine outpatient  - pt not complaining of any pain unless specifically prompted about back pain.   - OP follow up with pain management  - c/w percocet 5/325 mg q8h  - c/w lidocaine patch PRN    #Constipation  - likely 2/2 chronic opioid use  - Endorsing daily small hard BM's  - c/w senna 2 tablets PO at bedtime  - c/w Miralax 17 g PO QD to BID 5/5    #Hearing impairment - improved  - Onset about 1 week ago  - Wax noted in ear  - ENT consult appreciated  - Started on Debrox, f/u op  - Flushed by ENT thia AM as per patient    #Hx of DVT/PE on Eliquis   - Duplex LE 5/4 no evidence of DVT  - c/w Apixaban 5mg qD    #Diet: DASH  #DVT pro: Eliquis  #GI pro: Protonix  #Dispo: Improvement in COPD exac. 71 year old female patient with past medical history of COPD on 2 L NC PRN, DVT / PE on Eliquis, chronic back pain due to spinal stenosis and chronic compression fractures, GERD presenting for dyspnea.    #COPD exacerbation  -  diffuse wheezing on PE  -TTE normal from April 2019,  FU BNP, repeat TTE  - Chest x ray grossly normal  - continue Azithromycin  - c/w Albuterol Neb q6h and PRN  - Methylprednisone 60 BID.   - c/w Symbicort & Spiriva   - C/W Mucinex    #HTN  - continue Norvasc to 10 mg PO qD    #Chronic back pain 2/2 spinal stenosis and chronic, acute/subacute compression fractures  - on vicodine outpatient  - OP follow up with pain management  - c/w percocet 5/325 mg q8h  - c/w lidocaine patch PRN    #Constipation  - likely 2/2 chronic opioid use  - Endorsing daily small hard BM's  - c/w senna 2 tablets PO at bedtime  - c/w Miralax 17 g PO QD       #Hx of DVT/PE on Eliquis   - Duplex LE 5/4 no evidence of DVT  - c/w Apixaban 5mg BID    #Diet: DASH  #DVT pro: Eliquis  #GI pro: Protonix  #Dispo: Improvement in COPD exac. 71 year old female patient with past medical history of COPD on 2 L NC PRN, DVT / PE on Eliquis, chronic back pain due to spinal stenosis and chronic compression fractures, GERD presenting for dyspnea.    #COPD exacerbation  -  diffuse wheezing on PE  -TTE normal from April 2019,  FU BNP, repeat TTE  - Chest x ray grossly normal  - continue Azithromycin  - c/w Albuterol Neb q6h and PRN  - Methylprednisone 60 BID.   - c/w Symbicort & Spiriva   - C/W Mucinex    #HTN  - continue Norvasc to 10 mg PO qD    #Chronic back pain 2/2 spinal stenosis and chronic, acute/subacute compression fractures  - on vicodine outpatient  - OP follow up with pain management  - c/w percocet 5/325 mg q8h  - c/w lidocaine patch PRN    #Constipation  - likely 2/2 chronic opioid use  - Endorsing daily small hard BM's  - c/w senna 2 tablets PO at bedtime  - c/w Miralax 17 g PO QD       #Hx of DVT/PE on Eliquis   - Duplex LE 5/4 no evidence of DVT  - c/w Apixaban 5mg BID    #Nicotine dependence: quit smoking 2 weeks ago, has a nicotine patch at home 7 mg /24 hours  #Diet: DASH  #DVT pro: Eliquis  #GI pro: Protonix  #Dispo: Improvement in COPD exac.

## 2021-08-24 NOTE — ED PROVIDER NOTE - OBJECTIVE STATEMENT
70 y/o F with PMH COPD on 2LPM as needed, DVT/PE on eliquis presents with moderate constant wheezing, sob typical of her COPD exacerbation x 5 days. no palliating/provoking factors.   +chronic unchanged sputum color cough.   no fever/n/v/d/ab pain/sob/leg pain or swelling/cp.   Denies hemoptysis, recent surgery/immobilization, hx cancers, hormone use, leg pain or swelling.

## 2021-08-24 NOTE — ED PROVIDER NOTE - CLINICAL SUMMARY MEDICAL DECISION MAKING FREE TEXT BOX
Critical Care 72 yo female with pmh of COPD (on 2 L oxygen prn at home), HTN, DVT/PE on Eliquis, back pain chronically, anxiety, chronic opiate use, GERD presents to the ER fo SOB/wheezing/chest tightness cw her COPD exacerbations x 5 days. Tried using her usual meds and her oxygen at home with no relief. Required 4 L nc to get to sats of 99% via EMS. Pt in ER has audible wheezes and labored breathing. Denies n/v/f/chills/abdomen pain/leg swelling. +cough occ with no phlegm (feels its stuck in her chest). Exam signif b/l wheezing, RR 24, heart regular s1s2, Abdomen soft nt/nd +BS, no mass, Ext no edema or calf tenderness. Labs, ekg, xray, nebs, steroids ordered. STill wheezing bilaterally after 3 treatments. Will need to be admitted overnight for COPD exacerbation. Covid-19 negative.

## 2021-08-24 NOTE — ED PROVIDER NOTE - PHYSICAL EXAMINATION
PHYSICAL EXAM:    GENERAL: Alert, appears stated age, well appearing, non-toxic  SKIN: Warm, pink and dry. MMM.   HEAD: NC  EYE: Normal lids/conjunctiva  ENT: Normal hearing, patent oropharynx without erythema or exudate  NECK: +supple. No meningismus, or JVD  Pulm: Bilateral BS, increased resp effort, no stridor, or retractions, speaking in complete sentences. +diffuse wheezing.   CV: RRR, no M/R/G, 2+and = radial pulses  Abd: soft, non-tender, non-distended  Mskel: no erythema, cyanosis, edema. no calf tenderness  Neuro: AAOx3, 5/5 strength throughout.

## 2021-08-24 NOTE — H&P ADULT - NSHPLABSRESULTS_GEN_ALL_CORE
11.6   9.93  )-----------( 298      ( 24 Aug 2021 00:20 )             37.4       08-24    138  |  99  |  11  ----------------------------<  101<H>  5.1<H>   |  29  |  0.6<L>    Ca    10.1      24 Aug 2021 00:20    TPro  6.6  /  Alb  4.2  /  TBili  <0.2  /  DBili  x   /  AST  26  /  ALT  9   /  AlkPhos  84  08-24      CARDIAC MARKERS ( 24 Aug 2021 00:20 )  x     / <0.01 ng/mL / x     / x     / x

## 2021-08-24 NOTE — H&P ADULT - HISTORY OF PRESENT ILLNESS
71 year old Female with past medical history of COPD on 2L home O2, DVT/PE on Eliquis, chronic back pain presents with shortness of breath.  in the ED,pt's VS T(C): 36.5 (08-23-21 @ 23:50), Max: 37.1 (08-23-21 @ 21:42)  HR: 87 (08-23-21 @ 23:50) (87 - 88)  BP: 173/81 (08-23-21 @ 23:50) (139/89 - 173/81)  RR: 19 (08-23-21 @ 23:50) (18 - 19)  SpO2: 100% (08-23-21 @ 23:50) (99% - 100%), labs done showed WBC 9 K, Covid negative.   CXR grossly normal. pt received Solumedrol 125mg IV , albuterol and was put on 4 L O2.   pt is admitted for COPD exacerbation. 71 year old Female with past medical history of COPD on 2L home O2, DVT/PE on Eliquis, chronic back pain presents with shortness of breath for the past 5 days. pt gets admitted frequently for COPD exacerbations, last one in May. no fever no chills, no nausea, no vomiting, no diarrhea, no urinary symptoms. SOB on minimal exertion. no CP.   in the ED,pt's VS T(C): 36.5 (08-23-21 @ 23:50), Max: 37.1 (08-23-21 @ 21:42)  HR: 87 (08-23-21 @ 23:50) (87 - 88)  BP: 173/81 (08-23-21 @ 23:50) (139/89 - 173/81)  RR: 19 (08-23-21 @ 23:50) (18 - 19)  SpO2: 100% (08-23-21 @ 23:50) (99% - 100%), labs done showed WBC 9 K, Covid negative.   CXR grossly normal. pt received Solumedrol 125mg IV , albuterol and was put on 4 L O2.   pt is admitted for COPD exacerbation.

## 2021-08-24 NOTE — H&P ADULT - NSHPREVIEWOFSYSTEMS_GEN_ALL_CORE
General: No fevers, chills, weight changes	  Skin/Breast: No skin rashes or wounds  Ophthalmologic: No blurry vision, double vision, recent changes in vision  ENMT: No difficulty hearing, ringing in ears, nasal discharge, throat pain, difficulty swallowing  Respiratory and Thorax: No coughing, wheezing, shortness of breath  Cardiovascular: No chest pain, palpitations  Gastrointestinal: No abdominal pain, constipation, diarrhea, nausea, vomiting  Genitourinary: No dysuria, polyuria, pyuria, hematuria  Musculoskeletal: No muscle aches or joint aches  Neurological: No numbness or tingling  Psychiatric: Regular mood  Hematology/Lymphatics: No easy bruising	  Endocrine: No hot or cold intolerance General: No fevers, chills, weight changes	  Skin/Breast: No skin rashes or wounds  Ophthalmologic: No blurry vision, double vision, recent changes in vision  ENMT: No difficulty hearing, ringing in ears, nasal discharge, throat pain, difficulty swallowing  Respiratory and Thorax: No coughing, +wheezing, + shortness of breath  Cardiovascular: No chest pain, palpitations  Gastrointestinal: No abdominal pain, constipation, diarrhea, nausea, vomiting  Genitourinary: No dysuria, polyuria, pyuria, hematuria  Musculoskeletal: No muscle aches or joint aches  Neurological: No numbness or tingling  Psychiatric: Regular mood  Hematology/Lymphatics: No easy bruising	  Endocrine: No hot or cold intolerance

## 2021-08-24 NOTE — ED PROVIDER NOTE - PROGRESS NOTE DETAILS
Nelli: cxr inter--+atelectasis to left base, increased interstitial  markings, no large consolidation/effusion, no ptx

## 2021-08-24 NOTE — ED PROVIDER NOTE - NS ED ROS FT
Review of Systems    Constitutional: (-) fever   Eyes/ENT: (-) vision changes  Cardiovascular: (-) chest pain, (-) syncope (-) palpitations  Respiratory: (+) cough, (+) shortness of breath  Gastrointestinal: (-) vomiting, (-) diarrhea (-)black/bloody stools (-) abdominal pain  Genitourinary:  (-) dysuria   Musculoskeletal: (-) neck pain, (-) back pain, (-) leg pain/swelling  Integumentary: (-) rash, (-) edema  Neurological: (-) headache, (-) confusion  Hematologic: (-) easy bruising

## 2021-08-24 NOTE — CHART NOTE - NSCHARTNOTEFT_GEN_A_CORE
71 year old female patient with past medical history of COPD on 2 L NC PRN, DVT / PE on Eliquis, chronic back pain due to spinal stenosis and chronic compression fractures, GERD presenting for dyspnea.    Gen- elderly F, mildly distressed, conversationally dyspneic  Eyes- anicteric sclera, non injected conjunctiva, EOMI  ENT- hearing grossly intact, oropharynx clear, MMM  Chest- symmetrical chest rise, audible wheeizng b/l, associated accessory muscle use at first but few minutes later breathing comfortably  Cardiac- RRR< normal s1s2, no RMG appreciated, no LE edema  Abdominal- non distended, soft, non ttp, nabs+  Ext- no clubbing or cyanosis, spontaneously moving all 4 ext  Skin- warm, dry, intact  Neuro- AOx3, normal mentation, CN 2-12 grossly itnact,   Psych- "better" mood, congruent to affect, cooperative    Vital Signs Last 24 Hrs  T(C): 36.4 (24 Aug 2021 13:01), Max: 37.1 (23 Aug 2021 21:42)  T(F): 97.6 (24 Aug 2021 13:01), Max: 98.7 (23 Aug 2021 21:42)  HR: 106 (24 Aug 2021 13:01) (82 - 106)  BP: 175/80 (24 Aug 2021 13:01) (139/89 - 175/80)  BP(mean): --  RR: 20 (24 Aug 2021 13:13) (18 - 20)  SpO2: 98% (24 Aug 2021 13:13) (97% - 100%)    #COPD exacerbation  -  diffuse wheezing on PE  -TTE normal from April 2019,  FU BNP, repeat TTE  - Chest x ray grossly normal  - continue Azithromycin  - c/w Albuterol Neb q6h and PRN  - Methylprednisone 60 BID.   - c/w Symbicort & Spiriva   - C/W Mucinex    #HTN  - continue Norvasc to 10 mg PO qD    #Chronic back pain 2/2 spinal stenosis and chronic, acute/subacute compression fractures  - on vicodine outpatient  - OP follow up with pain management  - c/w percocet 5/325 mg q8h  - c/w lidocaine patch PRN    #Constipation  - likely 2/2 chronic opioid use  - Endorsing daily small hard BM's  - c/w senna 2 tablets PO at bedtime  - c/w Miralax 17 g PO QD       #Hx of DVT/PE on Eliquis   - Duplex LE 5/4 no evidence of DVT  - c/w Apixaban 5mg BID    #Nicotine dependence: quit smoking 2 weeks ago, has a nicotine patch at home 7 mg /24 hours  #Diet: DASH  #DVT pro: Eliquis  #GI pro: Protonix  ##Progress Note Handoff  Pending (specify):  clinical improvement  Family discussion: patient aware of plan. in agreeement. all questions answered  Disposition: Home in next 24-36hrs

## 2021-08-24 NOTE — H&P ADULT - NSHPPHYSICALEXAM_GEN_ALL_CORE
T(C): 36.5 (08-23-21 @ 23:50), Max: 37.1 (08-23-21 @ 21:42)  HR: 87 (08-23-21 @ 23:50) (87 - 88)  BP: 173/81 (08-23-21 @ 23:50) (139/89 - 173/81)  RR: 19 (08-23-21 @ 23:50) (18 - 19)  SpO2: 100% (08-23-21 @ 23:50) (99% - 100%)    PHYSICAL EXAM:  GENERAL: NAD, well-developed  HEAD:  Atraumatic, Normocephalic  EYES: EOMI, PERRLA, conjunctiva and sclera clear  ENT:No nasal obstruction or discharge. No tonsillar exudate, swelling or erythema.  NECK: Supple, No JVD  CHEST/LUNG: Clear to auscultation bilaterally; No wheeze  HEART: Regular rate and rhythm; No murmurs, rubs, or gallops  ABDOMEN: Soft, Nontender, Nondistended; Bowel sounds present  EXTREMITIES:  2+ Peripheral Pulses, No clubbing, cyanosis, or edema  PSYCH: AAOx3  NEUROLOGY: non-focal  SKIN: No rashes or lesions T(C): 36.5 (08-23-21 @ 23:50), Max: 37.1 (08-23-21 @ 21:42)  HR: 87 (08-23-21 @ 23:50) (87 - 88)  BP: 173/81 (08-23-21 @ 23:50) (139/89 - 173/81)  RR: 19 (08-23-21 @ 23:50) (18 - 19)  SpO2: 100% (08-23-21 @ 23:50) (99% - 100%)    PHYSICAL EXAM:  GENERAL: NAD, well-developed  HEAD:  Atraumatic, Normocephalic  EYES: EOMI, PERRLA, conjunctiva and sclera clear  ENT:No nasal obstruction or discharge. No tonsillar exudate, swelling or erythema.  NECK: Supple, No JVD  CHEST/LUNG: Clear to auscultation bilaterally; + wheeze  HEART: Regular rate and rhythm; No murmurs, rubs, or gallops  ABDOMEN: Soft, Nontender, Nondistended; Bowel sounds present  EXTREMITIES:  2+ Peripheral Pulses, No clubbing, cyanosis, or edema  PSYCH: AAOx3  NEUROLOGY: non-focal  SKIN: No rashes or lesions

## 2021-08-25 LAB
ANION GAP SERPL CALC-SCNC: 11 MMOL/L — SIGNIFICANT CHANGE UP (ref 7–14)
BASOPHILS # BLD AUTO: 0.01 K/UL — SIGNIFICANT CHANGE UP (ref 0–0.2)
BASOPHILS NFR BLD AUTO: 0.1 % — SIGNIFICANT CHANGE UP (ref 0–1)
BUN SERPL-MCNC: 14 MG/DL — SIGNIFICANT CHANGE UP (ref 10–20)
CALCIUM SERPL-MCNC: 9.5 MG/DL — SIGNIFICANT CHANGE UP (ref 8.5–10.1)
CHLORIDE SERPL-SCNC: 104 MMOL/L — SIGNIFICANT CHANGE UP (ref 98–110)
CO2 SERPL-SCNC: 25 MMOL/L — SIGNIFICANT CHANGE UP (ref 17–32)
COVID-19 SPIKE DOMAIN AB INTERP: NEGATIVE — SIGNIFICANT CHANGE UP
COVID-19 SPIKE DOMAIN ANTIBODY RESULT: 0.4 U/ML — SIGNIFICANT CHANGE UP
CREAT SERPL-MCNC: 0.5 MG/DL — LOW (ref 0.7–1.5)
EOSINOPHIL # BLD AUTO: 0.01 K/UL — SIGNIFICANT CHANGE UP (ref 0–0.7)
EOSINOPHIL NFR BLD AUTO: 0.1 % — SIGNIFICANT CHANGE UP (ref 0–8)
GLUCOSE SERPL-MCNC: 100 MG/DL — HIGH (ref 70–99)
HCT VFR BLD CALC: 34.4 % — LOW (ref 37–47)
HGB BLD-MCNC: 10.7 G/DL — LOW (ref 12–16)
IMM GRANULOCYTES NFR BLD AUTO: 0.5 % — HIGH (ref 0.1–0.3)
LYMPHOCYTES # BLD AUTO: 2.43 K/UL — SIGNIFICANT CHANGE UP (ref 1.2–3.4)
LYMPHOCYTES # BLD AUTO: 20.2 % — LOW (ref 20.5–51.1)
MAGNESIUM SERPL-MCNC: 2 MG/DL — SIGNIFICANT CHANGE UP (ref 1.8–2.4)
MCHC RBC-ENTMCNC: 27.9 PG — SIGNIFICANT CHANGE UP (ref 27–31)
MCHC RBC-ENTMCNC: 31.1 G/DL — LOW (ref 32–37)
MCV RBC AUTO: 89.8 FL — SIGNIFICANT CHANGE UP (ref 81–99)
MONOCYTES # BLD AUTO: 0.85 K/UL — HIGH (ref 0.1–0.6)
MONOCYTES NFR BLD AUTO: 7.1 % — SIGNIFICANT CHANGE UP (ref 1.7–9.3)
NEUTROPHILS # BLD AUTO: 8.68 K/UL — HIGH (ref 1.4–6.5)
NEUTROPHILS NFR BLD AUTO: 72 % — SIGNIFICANT CHANGE UP (ref 42.2–75.2)
NRBC # BLD: 0 /100 WBCS — SIGNIFICANT CHANGE UP (ref 0–0)
PLATELET # BLD AUTO: 316 K/UL — SIGNIFICANT CHANGE UP (ref 130–400)
POTASSIUM SERPL-MCNC: 4.3 MMOL/L — SIGNIFICANT CHANGE UP (ref 3.5–5)
POTASSIUM SERPL-SCNC: 4.3 MMOL/L — SIGNIFICANT CHANGE UP (ref 3.5–5)
PROCALCITONIN SERPL-MCNC: <0.02 NG/ML — SIGNIFICANT CHANGE UP (ref 0.02–0.1)
RBC # BLD: 3.83 M/UL — LOW (ref 4.2–5.4)
RBC # FLD: 12.7 % — SIGNIFICANT CHANGE UP (ref 11.5–14.5)
SARS-COV-2 IGG+IGM SERPL QL IA: 0.4 U/ML — SIGNIFICANT CHANGE UP
SARS-COV-2 IGG+IGM SERPL QL IA: NEGATIVE — SIGNIFICANT CHANGE UP
SODIUM SERPL-SCNC: 140 MMOL/L — SIGNIFICANT CHANGE UP (ref 135–146)
WBC # BLD: 12.04 K/UL — HIGH (ref 4.8–10.8)
WBC # FLD AUTO: 12.04 K/UL — HIGH (ref 4.8–10.8)

## 2021-08-25 PROCEDURE — 99233 SBSQ HOSP IP/OBS HIGH 50: CPT

## 2021-08-25 RX ORDER — AMLODIPINE BESYLATE 2.5 MG/1
2.5 TABLET ORAL ONCE
Refills: 0 | Status: COMPLETED | OUTPATIENT
Start: 2021-08-25 | End: 2021-08-25

## 2021-08-25 RX ADMIN — LIDOCAINE 1 PATCH: 4 CREAM TOPICAL at 23:24

## 2021-08-25 RX ADMIN — SENNA PLUS 2 TABLET(S): 8.6 TABLET ORAL at 21:11

## 2021-08-25 RX ADMIN — OXYCODONE AND ACETAMINOPHEN 2 TABLET(S): 5; 325 TABLET ORAL at 17:00

## 2021-08-25 RX ADMIN — Medication 3 MILLILITER(S): at 20:09

## 2021-08-25 RX ADMIN — OXYCODONE AND ACETAMINOPHEN 2 TABLET(S): 5; 325 TABLET ORAL at 23:32

## 2021-08-25 RX ADMIN — OXYCODONE AND ACETAMINOPHEN 2 TABLET(S): 5; 325 TABLET ORAL at 08:21

## 2021-08-25 RX ADMIN — AZITHROMYCIN 500 MILLIGRAM(S): 500 TABLET, FILM COATED ORAL at 06:06

## 2021-08-25 RX ADMIN — Medication 600 MILLIGRAM(S): at 06:06

## 2021-08-25 RX ADMIN — PANTOPRAZOLE SODIUM 40 MILLIGRAM(S): 20 TABLET, DELAYED RELEASE ORAL at 06:05

## 2021-08-25 RX ADMIN — OXYCODONE AND ACETAMINOPHEN 2 TABLET(S): 5; 325 TABLET ORAL at 00:11

## 2021-08-25 RX ADMIN — Medication 60 MILLIGRAM(S): at 17:27

## 2021-08-25 RX ADMIN — Medication 60 MILLIGRAM(S): at 06:06

## 2021-08-25 RX ADMIN — Medication 1 PATCH: at 12:48

## 2021-08-25 RX ADMIN — Medication 1 MILLIGRAM(S): at 21:12

## 2021-08-25 RX ADMIN — Medication 1 MILLIGRAM(S): at 12:54

## 2021-08-25 RX ADMIN — OXYCODONE AND ACETAMINOPHEN 2 TABLET(S): 5; 325 TABLET ORAL at 09:00

## 2021-08-25 RX ADMIN — AMLODIPINE BESYLATE 2.5 MILLIGRAM(S): 2.5 TABLET ORAL at 23:24

## 2021-08-25 RX ADMIN — Medication 600 MILLIGRAM(S): at 17:26

## 2021-08-25 RX ADMIN — Medication 50 MILLIGRAM(S): at 21:12

## 2021-08-25 RX ADMIN — Medication 3 MILLILITER(S): at 08:26

## 2021-08-25 RX ADMIN — OXYCODONE AND ACETAMINOPHEN 2 TABLET(S): 5; 325 TABLET ORAL at 00:41

## 2021-08-25 RX ADMIN — Medication 1 PATCH: at 21:12

## 2021-08-25 RX ADMIN — APIXABAN 5 MILLIGRAM(S): 2.5 TABLET, FILM COATED ORAL at 17:27

## 2021-08-25 RX ADMIN — APIXABAN 5 MILLIGRAM(S): 2.5 TABLET, FILM COATED ORAL at 06:06

## 2021-08-25 RX ADMIN — Medication 3 MILLILITER(S): at 14:05

## 2021-08-25 RX ADMIN — OXYCODONE AND ACETAMINOPHEN 2 TABLET(S): 5; 325 TABLET ORAL at 16:21

## 2021-08-25 RX ADMIN — BUDESONIDE AND FORMOTEROL FUMARATE DIHYDRATE 2 PUFF(S): 160; 4.5 AEROSOL RESPIRATORY (INHALATION) at 08:14

## 2021-08-25 RX ADMIN — Medication 1 PATCH: at 06:06

## 2021-08-25 RX ADMIN — AMLODIPINE BESYLATE 10 MILLIGRAM(S): 2.5 TABLET ORAL at 06:06

## 2021-08-25 NOTE — PROGRESS NOTE ADULT - ASSESSMENT
71 year old female patient with past medical history of COPD on 2 L NC PRN, DVT / PE on Eliquis, chronic back pain due to spinal stenosis and chronic compression fractures, GERD presenting for dyspnea.    #COPD exacerbation  -  diffuse wheezing on PE  -TTE normal from April 2019,  FU BNP, repeat TTE  - Chest x ray grossly normal  - continue Azithromycin  - c/w Albuterol Neb q6h and PRN  - Methylprednisone 60 BID.   - c/w Symbicort & Spiriva   - C/W Mucinex    #HTN  - continue Norvasc to 10 mg PO qD    #Chronic back pain 2/2 spinal stenosis and chronic, acute/subacute compression fractures  - on vicodine outpatient  - OP follow up with pain management  - c/w percocet 5/325 mg q8h  - c/w lidocaine patch PRN    #Constipation  - likely 2/2 chronic opioid use  - Endorsing daily small hard BM's  - c/w senna 2 tablets PO at bedtime  - c/w Miralax 17 g PO QD       #Hx of DVT/PE on Eliquis   - Duplex LE 5/4 no evidence of DVT  - c/w Apixaban 5mg BID    #Nicotine dependence: quit smoking 2 weeks ago, has a nicotine patch at home 7 mg /24 hours  #Diet: DASH  #DVT pro: Eliquis  #GI pro: Protonix  #Dispo: Improvement in COPD exac.

## 2021-08-25 NOTE — PROGRESS NOTE ADULT - SUBJECTIVE AND OBJECTIVE BOX
----------Daily Progress Note----------    HISTORY OF PRESENT ILLNESS:  Patient is a 71y old Female who presents with a chief complaint of shortness of breath (24 Aug 2021 02:52)    Currently admitted to medicine with the primary diagnosis of COPD exacerbation       Today is hospital day 1d.     INTERVAL HOSPITAL COURSE / OVERNIGHT EVENTS:    Patient was examined and seen at bedside. This morning she is resting comfortably in bed and reports no new issues or overnight events; on 3 L of o2; reports feeling some improvement but still in distress on walking and coughing/wheezing during the night    Review of Systems: Otherwise unremarkable     <<<<<PAST MEDICAL & SURGICAL HISTORY>>>>>  COPD (chronic obstructive pulmonary disease)    Anxiety    Depression    Chronic pain    Hypertension    Chronic GERD    Spinal stenosis    Pulmonary embolism    Deep vein thrombosis (DVT)    Neck mass      ALLERGIES  penicillin (Unknown)      Home Medications:  ALPRAZOLAM .5 MG TABS: 1  orally 2 times a day (24 Aug 2021 03:20)  COMBIVENT    AER : 1  inhaled every 6 hours, As Needed (24 Aug 2021 03:20)  Combivent Respimat 20 mcg-100 mcg/inh inhalation aerosol: 1 puff(s) inhaled 4 times a day (24 Aug 2021 03:20)  lactulose 10 g/15 mL oral syrup: 15 milliliter(s) orally once a day (24 Aug 2021 03:20)  pantoprazole 40 mg oral delayed release tablet: 1 tab(s) orally once a day (24 Aug 2021 03:20)  Percocet 10/325 oral tablet: 1 tab(s) orally every 8 hours, As Needed (24 Aug 2021 03:20)  ProAir HFA 90 mcg/inh inhalation aerosol: 2 puff(s) inhaled every 6 hours (24 Aug 2021 03:20)  traZODone 50 mg oral tablet: 1 tab(s) orally once a day (at bedtime) (24 Aug 2021 03:20)        MEDICATIONS  STANDING MEDICATIONS  albuterol/ipratropium for Nebulization 3 milliLiter(s) Nebulizer every 6 hours  amLODIPine   Tablet 10 milliGRAM(s) Oral daily  apixaban 5 milliGRAM(s) Oral two times a day  azithromycin   Tablet 500 milliGRAM(s) Oral every 24 hours  budesonide 160 MICROgram(s)/formoterol 4.5 MICROgram(s) Inhaler 2 Puff(s) Inhalation two times a day  guaiFENesin  milliGRAM(s) Oral every 12 hours  methylPREDNISolone sodium succinate Injectable 60 milliGRAM(s) IV Push every 12 hours  nicotine   7 mG/24 Hr(s) Transdermal Patch -  Peds 1 Patch Transdermal daily  pantoprazole    Tablet 40 milliGRAM(s) Oral before breakfast  polyethylene glycol 3350 17 Gram(s) Oral daily  senna 2 Tablet(s) Oral at bedtime  tiotropium 18 MICROgram(s) Capsule 1 Capsule(s) Inhalation daily  traZODone 50 milliGRAM(s) Oral at bedtime    PRN MEDICATIONS  ALPRAZolam 1 milliGRAM(s) Oral two times a day PRN  lidocaine   4% Patch 1 Patch Transdermal daily PRN  melatonin 5 milliGRAM(s) Oral at bedtime PRN  oxycodone    5 mG/acetaminophen 325 mG 2 Tablet(s) Oral every 8 hours PRN    VITALS:  T(F): 97  HR: 82  BP: 162/74  RR: 18  SpO2: 98%    <<<<<LABS>>>>>                        10.7   12.04 )-----------( 316      ( 25 Aug 2021 06:59 )             34.4     08-24    138  |  99  |  11  ----------------------------<  101<H>  5.1<H>   |  29  |  0.6<L>    Ca    10.1      24 Aug 2021 00:20    TPro  6.6  /  Alb  4.2  /  TBili  <0.2  /  DBili  x   /  AST  26  /  ALT  9   /  AlkPhos  84  08-24            [id number]  CARDIAC MARKERS ( 24 Aug 2021 00:20 )  x     / <0.01 ng/mL / x     / x     / x            <<<<<RADIOLOGY>>>>>    <<<<<PHYSICAL EXAM>>>>>  GENERAL: Well developed, well nourished and in no acute distress. on 3L of o2; sounds junky  HEENT: Normocephalic, atraumatic, mucous membranes moist, EOMI, PERRLA, bilateral sclera anicteric, no conjunctival injection  Neck: Supple, non-tender, no lymphadenopathy.  PULMONARY: + wheeze, decreased breath sound   CARDIOVASCULAR: Regular rate and rhythm, S1-S2, no murmurs  GASTROINTESTINAL: Soft, non-tender, non-distended, no guarding.  RENAL: No CVA tenderness.  SKIN/EXTREMITIES: No clubbing or edema  NEUROLOGIC/MUSCULOSKELETAL: AOx4, grossly moving all extremities, no focal deficits.      ----------------------------------------------------------------------------------------------------------------------------------------------------------------------------------------------- ----------Daily Progress Note----------    HISTORY OF PRESENT ILLNESS:  Patient is a 71y old Female who presents with a chief complaint of shortness of breath (24 Aug 2021 02:52)    Currently admitted to medicine with the primary diagnosis of COPD exacerbation       Today is hospital day 1d.     INTERVAL HOSPITAL COURSE / OVERNIGHT EVENTS:    Patient was examined and seen at bedside. This morning she is resting comfortably in bed and reports no new issues or overnight events; on 3 L of o2; reports feeling some improvement but still in distress on walking and coughing/wheezing during the night    Review of Systems: Otherwise unremarkable     <<<<<PAST MEDICAL & SURGICAL HISTORY>>>>>  COPD (chronic obstructive pulmonary disease)    Anxiety    Depression    Chronic pain    Hypertension    Chronic GERD    Spinal stenosis    Pulmonary embolism    Deep vein thrombosis (DVT)    Neck mass      ALLERGIES  penicillin (Unknown)      Home Medications:  ALPRAZOLAM .5 MG TABS: 1  orally 2 times a day (24 Aug 2021 03:20)  COMBIVENT    AER : 1  inhaled every 6 hours, As Needed (24 Aug 2021 03:20)  Combivent Respimat 20 mcg-100 mcg/inh inhalation aerosol: 1 puff(s) inhaled 4 times a day (24 Aug 2021 03:20)  lactulose 10 g/15 mL oral syrup: 15 milliliter(s) orally once a day (24 Aug 2021 03:20)  pantoprazole 40 mg oral delayed release tablet: 1 tab(s) orally once a day (24 Aug 2021 03:20)  Percocet 10/325 oral tablet: 1 tab(s) orally every 8 hours, As Needed (24 Aug 2021 03:20)  ProAir HFA 90 mcg/inh inhalation aerosol: 2 puff(s) inhaled every 6 hours (24 Aug 2021 03:20)  traZODone 50 mg oral tablet: 1 tab(s) orally once a day (at bedtime) (24 Aug 2021 03:20)        MEDICATIONS  STANDING MEDICATIONS  albuterol/ipratropium for Nebulization 3 milliLiter(s) Nebulizer every 6 hours  amLODIPine   Tablet 10 milliGRAM(s) Oral daily  apixaban 5 milliGRAM(s) Oral two times a day  azithromycin   Tablet 500 milliGRAM(s) Oral every 24 hours  budesonide 160 MICROgram(s)/formoterol 4.5 MICROgram(s) Inhaler 2 Puff(s) Inhalation two times a day  guaiFENesin  milliGRAM(s) Oral every 12 hours  methylPREDNISolone sodium succinate Injectable 60 milliGRAM(s) IV Push every 12 hours  nicotine   7 mG/24 Hr(s) Transdermal Patch -  Peds 1 Patch Transdermal daily  pantoprazole    Tablet 40 milliGRAM(s) Oral before breakfast  polyethylene glycol 3350 17 Gram(s) Oral daily  senna 2 Tablet(s) Oral at bedtime  tiotropium 18 MICROgram(s) Capsule 1 Capsule(s) Inhalation daily  traZODone 50 milliGRAM(s) Oral at bedtime    PRN MEDICATIONS  ALPRAZolam 1 milliGRAM(s) Oral two times a day PRN  lidocaine   4% Patch 1 Patch Transdermal daily PRN  melatonin 5 milliGRAM(s) Oral at bedtime PRN  oxycodone    5 mG/acetaminophen 325 mG 2 Tablet(s) Oral every 8 hours PRN    VITALS:  T(F): 97  HR: 82  BP: 162/74  RR: 18  SpO2: 98%    <<<<<LABS>>>>>                        10.7   12.04 )-----------( 316      ( 25 Aug 2021 06:59 )             34.4     08-24    138  |  99  |  11  ----------------------------<  101<H>  5.1<H>   |  29  |  0.6<L>    Ca    10.1      24 Aug 2021 00:20    TPro  6.6  /  Alb  4.2  /  TBili  <0.2  /  DBili  x   /  AST  26  /  ALT  9   /  AlkPhos  84  08-24            [id number]  CARDIAC MARKERS ( 24 Aug 2021 00:20 )  x     / <0.01 ng/mL / x     / x     / x            <<<<<RADIOLOGY>>>>>    <<<<<PHYSICAL EXAM>>>>>  GENERAL: Well developed, well nourished and in no acute distress. on 3L of o2; sounds junky  HEENT: Normocephalic, atraumatic, mucous membranes moist, EOMI, PERRLA, bilateral sclera anicteric, no conjunctival injection  Neck: Supple, non-tender, no lymphadenopathy.  PULMONARY: + diffuse wheeze, decreased breath sound   CARDIOVASCULAR: Regular rate and rhythm, S1-S2, no murmurs  GASTROINTESTINAL: Soft, non-tender, non-distended, no guarding.  RENAL: No CVA tenderness.  SKIN/EXTREMITIES: No clubbing or edema  NEUROLOGIC/MUSCULOSKELETAL: AOx4, grossly moving all extremities, no focal deficits.      -----------------------------------------------------------------------------------------------------------------------------------------------------------------------------------------------

## 2021-08-26 ENCOUNTER — TRANSCRIPTION ENCOUNTER (OUTPATIENT)
Age: 71
End: 2021-08-26

## 2021-08-26 LAB
ANION GAP SERPL CALC-SCNC: 11 MMOL/L — SIGNIFICANT CHANGE UP (ref 7–14)
BASOPHILS # BLD AUTO: 0.02 K/UL — SIGNIFICANT CHANGE UP (ref 0–0.2)
BASOPHILS NFR BLD AUTO: 0.1 % — SIGNIFICANT CHANGE UP (ref 0–1)
BUN SERPL-MCNC: 21 MG/DL — HIGH (ref 10–20)
CALCIUM SERPL-MCNC: 9.5 MG/DL — SIGNIFICANT CHANGE UP (ref 8.5–10.1)
CHLORIDE SERPL-SCNC: 102 MMOL/L — SIGNIFICANT CHANGE UP (ref 98–110)
CO2 SERPL-SCNC: 26 MMOL/L — SIGNIFICANT CHANGE UP (ref 17–32)
CREAT SERPL-MCNC: 0.6 MG/DL — LOW (ref 0.7–1.5)
EOSINOPHIL # BLD AUTO: 0 K/UL — SIGNIFICANT CHANGE UP (ref 0–0.7)
EOSINOPHIL NFR BLD AUTO: 0 % — SIGNIFICANT CHANGE UP (ref 0–8)
GLUCOSE SERPL-MCNC: 105 MG/DL — HIGH (ref 70–99)
HCT VFR BLD CALC: 34.7 % — LOW (ref 37–47)
HGB BLD-MCNC: 10.8 G/DL — LOW (ref 12–16)
IMM GRANULOCYTES NFR BLD AUTO: 1 % — HIGH (ref 0.1–0.3)
LYMPHOCYTES # BLD AUTO: 16.2 % — LOW (ref 20.5–51.1)
LYMPHOCYTES # BLD AUTO: 2.21 K/UL — SIGNIFICANT CHANGE UP (ref 1.2–3.4)
MAGNESIUM SERPL-MCNC: 2 MG/DL — SIGNIFICANT CHANGE UP (ref 1.8–2.4)
MCHC RBC-ENTMCNC: 27.8 PG — SIGNIFICANT CHANGE UP (ref 27–31)
MCHC RBC-ENTMCNC: 31.1 G/DL — LOW (ref 32–37)
MCV RBC AUTO: 89.4 FL — SIGNIFICANT CHANGE UP (ref 81–99)
MONOCYTES # BLD AUTO: 0.79 K/UL — HIGH (ref 0.1–0.6)
MONOCYTES NFR BLD AUTO: 5.8 % — SIGNIFICANT CHANGE UP (ref 1.7–9.3)
NEUTROPHILS # BLD AUTO: 10.52 K/UL — HIGH (ref 1.4–6.5)
NEUTROPHILS NFR BLD AUTO: 76.9 % — HIGH (ref 42.2–75.2)
NRBC # BLD: 0 /100 WBCS — SIGNIFICANT CHANGE UP (ref 0–0)
PLATELET # BLD AUTO: 339 K/UL — SIGNIFICANT CHANGE UP (ref 130–400)
POTASSIUM SERPL-MCNC: 4.4 MMOL/L — SIGNIFICANT CHANGE UP (ref 3.5–5)
POTASSIUM SERPL-SCNC: 4.4 MMOL/L — SIGNIFICANT CHANGE UP (ref 3.5–5)
RBC # BLD: 3.88 M/UL — LOW (ref 4.2–5.4)
RBC # FLD: 12.6 % — SIGNIFICANT CHANGE UP (ref 11.5–14.5)
SODIUM SERPL-SCNC: 139 MMOL/L — SIGNIFICANT CHANGE UP (ref 135–146)
WBC # BLD: 13.67 K/UL — HIGH (ref 4.8–10.8)
WBC # FLD AUTO: 13.67 K/UL — HIGH (ref 4.8–10.8)

## 2021-08-26 PROCEDURE — 99233 SBSQ HOSP IP/OBS HIGH 50: CPT

## 2021-08-26 RX ORDER — IPRATROPIUM/ALBUTEROL SULFATE 18-103MCG
1 AEROSOL WITH ADAPTER (GRAM) INHALATION
Qty: 0 | Refills: 0 | DISCHARGE

## 2021-08-26 RX ORDER — NICOTINE POLACRILEX 2 MG
0 GUM BUCCAL
Qty: 0 | Refills: 0 | DISCHARGE
Start: 2021-08-26

## 2021-08-26 RX ADMIN — Medication 60 MILLIGRAM(S): at 06:00

## 2021-08-26 RX ADMIN — OXYCODONE AND ACETAMINOPHEN 2 TABLET(S): 5; 325 TABLET ORAL at 17:00

## 2021-08-26 RX ADMIN — LIDOCAINE 1 PATCH: 4 CREAM TOPICAL at 12:43

## 2021-08-26 RX ADMIN — BUDESONIDE AND FORMOTEROL FUMARATE DIHYDRATE 2 PUFF(S): 160; 4.5 AEROSOL RESPIRATORY (INHALATION) at 21:23

## 2021-08-26 RX ADMIN — Medication 3 MILLILITER(S): at 13:41

## 2021-08-26 RX ADMIN — OXYCODONE AND ACETAMINOPHEN 2 TABLET(S): 5; 325 TABLET ORAL at 07:51

## 2021-08-26 RX ADMIN — Medication 1 MILLIGRAM(S): at 21:24

## 2021-08-26 RX ADMIN — APIXABAN 5 MILLIGRAM(S): 2.5 TABLET, FILM COATED ORAL at 17:17

## 2021-08-26 RX ADMIN — Medication 3 MILLILITER(S): at 08:23

## 2021-08-26 RX ADMIN — OXYCODONE AND ACETAMINOPHEN 2 TABLET(S): 5; 325 TABLET ORAL at 08:00

## 2021-08-26 RX ADMIN — Medication 1 PATCH: at 12:43

## 2021-08-26 RX ADMIN — Medication 600 MILLIGRAM(S): at 06:00

## 2021-08-26 RX ADMIN — AZITHROMYCIN 500 MILLIGRAM(S): 500 TABLET, FILM COATED ORAL at 06:00

## 2021-08-26 RX ADMIN — PANTOPRAZOLE SODIUM 40 MILLIGRAM(S): 20 TABLET, DELAYED RELEASE ORAL at 06:11

## 2021-08-26 RX ADMIN — Medication 3 MILLILITER(S): at 20:02

## 2021-08-26 RX ADMIN — Medication 600 MILLIGRAM(S): at 17:17

## 2021-08-26 RX ADMIN — OXYCODONE AND ACETAMINOPHEN 2 TABLET(S): 5; 325 TABLET ORAL at 02:17

## 2021-08-26 RX ADMIN — LIDOCAINE 1 PATCH: 4 CREAM TOPICAL at 22:03

## 2021-08-26 RX ADMIN — BUDESONIDE AND FORMOTEROL FUMARATE DIHYDRATE 2 PUFF(S): 160; 4.5 AEROSOL RESPIRATORY (INHALATION) at 07:16

## 2021-08-26 RX ADMIN — Medication 1 MILLIGRAM(S): at 12:43

## 2021-08-26 RX ADMIN — Medication 50 MILLIGRAM(S): at 21:24

## 2021-08-26 RX ADMIN — Medication 1 PATCH: at 19:39

## 2021-08-26 RX ADMIN — Medication 1 PATCH: at 15:57

## 2021-08-26 RX ADMIN — AMLODIPINE BESYLATE 10 MILLIGRAM(S): 2.5 TABLET ORAL at 05:59

## 2021-08-26 RX ADMIN — Medication 40 MILLIGRAM(S): at 17:17

## 2021-08-26 RX ADMIN — SENNA PLUS 2 TABLET(S): 8.6 TABLET ORAL at 21:24

## 2021-08-26 RX ADMIN — APIXABAN 5 MILLIGRAM(S): 2.5 TABLET, FILM COATED ORAL at 05:59

## 2021-08-26 RX ADMIN — OXYCODONE AND ACETAMINOPHEN 2 TABLET(S): 5; 325 TABLET ORAL at 16:02

## 2021-08-26 NOTE — PROGRESS NOTE ADULT - ASSESSMENT
71 year old female patient with past medical history of COPD on 2 L NC prn , DVT / PE on Eliquis, chronic back pain due to spinal stenosis and chronic compression fractures, GERD presenting for dyspnea.    # Acute Exacerbation of COPD  - continues to have diffuse wheezing on exam   - Chest XR unremarkable   - continue Azithromycin  - c/w Albuterol Neb q6h and PRN  - Methylprednisone 60 BID  - c/w Symbicort & Spiriva   - C/W Mucinex    #HTN- norvasc   #Chronic back pain 2/2 spinal stenosis and chronic, acute/subacute compression fractures- c/w percocet , OP pain management   #Constipation- c/w senna and miralax   #Hx of DVT/PE on Eliquis   #Nicotine dependence: encourage cessation, c/w nicotine patch     Eliquis for dvt ppx     #Progress Note Handoff:  Pending (specify):  improvement in COPD exacerbation   Family discussion: d/w pt at bedside   Disposition: Home___/SNF___/Other________/Unknown at this time___x_____      Sandra Nicholson DO .       71 year old female patient with past medical history of COPD on 2 L NC prn , DVT / PE on Eliquis, chronic back pain due to spinal stenosis and chronic compression fractures, GERD presenting for dyspnea.    # Acute Exacerbation of COPD  - continues to have diffuse wheezing on exam   - Chest XR unremarkable   - continue Azithromycin  - c/w Albuterol Neb q6h and PRN  - Methylprednisone 40 BID  - c/w Symbicort & Spiriva   - C/W Mucinex  - RVP and VBG pending     #HTN- norvasc   #Chronic back pain 2/2 spinal stenosis and chronic, acute/subacute compression fractures- c/w percocet , OP pain management   #Constipation- c/w senna and miralax   #Hx of DVT/PE on Eliquis   #Nicotine dependence: encourage cessation, c/w nicotine patch     Eliquis for dvt ppx     #Progress Note Handoff:  Pending (specify):  improvement in COPD exacerbation   Family discussion: d/w pt at bedside   Disposition: Home___/SNF___/Other________/Unknown at this time___x_____      Sandra Nicholson DO .

## 2021-08-26 NOTE — PROGRESS NOTE ADULT - SUBJECTIVE AND OBJECTIVE BOX
KUSH RINALDI  71y, Female  Allergy: penicillin (Unknown)    Hospital Day: 2d    Patient seen and examined earlier today. Continues to have diffuse wheezing, still feeling dyspneic.     PMH/PSH:  PAST MEDICAL & SURGICAL HISTORY:  COPD (chronic obstructive pulmonary disease)    Anxiety    Depression    Chronic pain    Hypertension    Chronic GERD    Spinal stenosis    Pulmonary embolism    Deep vein thrombosis (DVT)    Neck mass        LAST 24-Hr EVENTS:    VITALS:  T(F): 96.8 (08-26-21 @ 14:38), Max: 97.6 (08-26-21 @ 05:53)  HR: 101 (08-26-21 @ 14:38)  BP: 132/60 (08-26-21 @ 14:38) (132/60 - 175/83)  RR: 22 (08-26-21 @ 14:38)  SpO2: --        TESTS & MEASUREMENTS:  Weight (Kg):       08-25-21 @ 07:01  -  08-26-21 @ 07:00  --------------------------------------------------------  IN: 360 mL / OUT: 0 mL / NET: 360 mL                            10.8   13.67 )-----------( 339      ( 26 Aug 2021 06:14 )             34.7       08-26    139  |  102  |  21<H>  ----------------------------<  105<H>  4.4   |  26  |  0.6<L>    Ca    9.5      26 Aug 2021 06:14  Mg     2.0     08-26              Culture - Blood (collected 08-24-21 @ 11:42)  Source: .Blood None  Preliminary Report (08-25-21 @ 22:01):    No growth to date.        Procalcitonin, Serum: <0.02 ng/mL (08-24-21 @ 11:42)        Serum Pro-Brain Natriuretic Peptide: 254 pg/mL (08-24-21 @ 11:42)  Serum Pro-Brain Natriuretic Peptide: 286 pg/mL (08-24-21 @ 00:20)    COVID-19 PCR: NotDetec (08-24-21 @ 00:20)      RADIOLOGY, ECG, & ADDITIONAL TESTS:      RECENT DIAGNOSTIC ORDERS:  Blood Gas Profile - Arterial: STAT (08-26-21 @ 15:10)      MEDICATIONS:  MEDICATIONS  (STANDING):  albuterol/ipratropium for Nebulization 3 milliLiter(s) Nebulizer every 6 hours  amLODIPine   Tablet 10 milliGRAM(s) Oral daily  apixaban 5 milliGRAM(s) Oral two times a day  azithromycin   Tablet 500 milliGRAM(s) Oral every 24 hours  budesonide 160 MICROgram(s)/formoterol 4.5 MICROgram(s) Inhaler 2 Puff(s) Inhalation two times a day  guaiFENesin  milliGRAM(s) Oral every 12 hours  methylPREDNISolone sodium succinate Injectable 40 milliGRAM(s) IV Push every 12 hours  nicotine   7 mG/24 Hr(s) Transdermal Patch -  Peds 1 Patch Transdermal daily  pantoprazole    Tablet 40 milliGRAM(s) Oral before breakfast  polyethylene glycol 3350 17 Gram(s) Oral daily  senna 2 Tablet(s) Oral at bedtime  tiotropium 18 MICROgram(s) Capsule 1 Capsule(s) Inhalation daily  traZODone 50 milliGRAM(s) Oral at bedtime    MEDICATIONS  (PRN):  ALPRAZolam 1 milliGRAM(s) Oral two times a day PRN ANXIETY  lidocaine   4% Patch 1 Patch Transdermal daily PRN back pain  melatonin 5 milliGRAM(s) Oral at bedtime PRN Insomnia  oxycodone    5 mG/acetaminophen 325 mG 2 Tablet(s) Oral every 8 hours PRN Moderate Pain (4 - 6)      HOME MEDICATIONS:  ALPRAZOLAM .5 MG TABS (08-24)  COMBIVENT    AER  (08-24)  lactulose 10 g/15 mL oral syrup (08-24)  nicotine (08-26)  pantoprazole 40 mg oral delayed release tablet (08-24)  Percocet 10/325 oral tablet (08-24)  ProAir HFA 90 mcg/inh inhalation aerosol (08-24)  traZODone 50 mg oral tablet (08-24)      PHYSICAL EXAM:  GENERAL: Well developed, well nourished, conversational dyspnea  HEENT: Normocephalic, atraumatic, mucous membranes moist, EOMI, PERRLA, bilateral sclera anicteric, no conjunctival injection  Neck: Supple, non-tender, no lymphadenopathy.  PULMONARY: + diffuse wheeze, decreased breath sound   CARDIOVASCULAR: Regular rate and rhythm, S1-S2, no murmurs  GASTROINTESTINAL: Soft, non-tender, non-distended, no guarding.  RENAL: No CVA tenderness.  SKIN/EXTREMITIES: No clubbing or edema  NEUROLOGIC/MUSCULOSKELETAL: AOx4, grossly moving all extremities, no focal deficits.

## 2021-08-26 NOTE — CONSULT NOTE ADULT - SUBJECTIVE AND OBJECTIVE BOX
Patient is a 71y old  Female who presents with a chief complaint of shortness of breath (26 Aug 2021 07:41)      HPI:  71 year old Female with past medical history of COPD on 2L home O2, DVT/PE on Eliquis, chronic back pain presents with shortness of breath for the past 5 days. pt gets admitted frequently for COPD exacerbations, last one in May. no fever no chills, no nausea, no vomiting, no diarrhea, no urinary symptoms. SOB on minimal exertion. no CP.   in the ED,pt's VS T(C): 36.5 (08-23-21 @ 23:50), Max: 37.1 (08-23-21 @ 21:42)  HR: 87 (08-23-21 @ 23:50) (87 - 88)  BP: 173/81 (08-23-21 @ 23:50) (139/89 - 173/81)  RR: 19 (08-23-21 @ 23:50) (18 - 19)  SpO2: 100% (08-23-21 @ 23:50) (99% - 100%), labs done showed WBC 9 K, Covid negative.   CXR grossly normal. pt received Solumedrol 125mg IV , albuterol and was put on 4 L O2.   pt is admitted for COPD exacerbation. (24 Aug 2021 02:52)      Occupational Hx:    Social Hx:    PAST MEDICAL & SURGICAL HISTORY:  COPD (chronic obstructive pulmonary disease)    Anxiety    Depression    Chronic pain    Hypertension    Chronic GERD    Spinal stenosis    Pulmonary embolism    Deep vein thrombosis (DVT)    Neck mass        FAMILY HISTORY:  Family history of COPD (chronic obstructive pulmonary disease) (Mother)    FH: lung cancer  Mother    .  No cardiovascular or pulmonary family history     REVIEW OF SYSTEMS:    All ROS are negative except per HPI     Allergies    penicillin (Unknown)    Intolerances          PHYSICAL EXAM  Vital Signs Last 24 Hrs  T(C): 36.4 (26 Aug 2021 05:53), Max: 36.4 (26 Aug 2021 05:53)  T(F): 97.6 (26 Aug 2021 05:53), Max: 97.6 (26 Aug 2021 05:53)  HR: 101 (26 Aug 2021 06:51) (88 - 101)  BP: 134/78 (26 Aug 2021 06:51) (134/78 - 175/83)  BP(mean): --  RR: 20 (26 Aug 2021 05:53) (18 - 20)  SpO2: 97% (25 Aug 2021 17:00) (97% - 97%)    CONSTITUTIONAL:  Well nourished.  NAD    ENT:   Airway patent,   No thrush    EYES:   Clear bilaterally,   pupils equal,   round and reactive to light.    CARDIAC:   Normal rate,   regular rhythm.    no edema      CAROTID:   normal systolic impulse  no bruits    RESPIRATORY:   No wheezing  Normal chest expansion  Not tachypneic,  No use of accessory muscles    GASTROINTESTINAL:  Abdomen soft,   non-tender,   no guarding,   + BS    MUSCULOSKELETAL:   range of motion is not limited,  no clubbing, cyanosis    NEUROLOGICAL:   Alert and oriented   no motor deficits.    SKIN:   Skin normal color for race,   No evidence of rash.    PSYCHIATRIC:   normal mood and affect.   no apparent risk to self or others.          LABS:                          10.8   13.67 )-----------( 339      ( 26 Aug 2021 06:14 )             34.7                                               08-26    139  |  102  |  21<H>  ----------------------------<  105<H>  4.4   |  26  |  0.6<L>    Ca    9.5      26 Aug 2021 06:14  Mg     2.0     08-26                                                                                                                                      Culture - Blood (collected 24 Aug 2021 11:42)  Source: .Blood None  Preliminary Report (25 Aug 2021 22:01):    No growth to date.                                                    MEDICATIONS  (STANDING):  albuterol/ipratropium for Nebulization 3 milliLiter(s) Nebulizer every 6 hours  amLODIPine   Tablet 10 milliGRAM(s) Oral daily  apixaban 5 milliGRAM(s) Oral two times a day  azithromycin   Tablet 500 milliGRAM(s) Oral every 24 hours  budesonide 160 MICROgram(s)/formoterol 4.5 MICROgram(s) Inhaler 2 Puff(s) Inhalation two times a day  guaiFENesin  milliGRAM(s) Oral every 12 hours  methylPREDNISolone sodium succinate Injectable 40 milliGRAM(s) IV Push every 12 hours  nicotine   7 mG/24 Hr(s) Transdermal Patch -  Peds 1 Patch Transdermal daily  pantoprazole    Tablet 40 milliGRAM(s) Oral before breakfast  polyethylene glycol 3350 17 Gram(s) Oral daily  senna 2 Tablet(s) Oral at bedtime  tiotropium 18 MICROgram(s) Capsule 1 Capsule(s) Inhalation daily  traZODone 50 milliGRAM(s) Oral at bedtime    MEDICATIONS  (PRN):  ALPRAZolam 1 milliGRAM(s) Oral two times a day PRN ANXIETY  lidocaine   4% Patch 1 Patch Transdermal daily PRN back pain  melatonin 5 milliGRAM(s) Oral at bedtime PRN Insomnia  oxycodone    5 mG/acetaminophen 325 mG 2 Tablet(s) Oral every 8 hours PRN Moderate Pain (4 - 6)      X-Rays reviewed:    CXR interpreted by me:

## 2021-08-26 NOTE — DISCHARGE NOTE PROVIDER - PROVIDER TOKENS
PROVIDER:[TOKEN:[[id number]],FOLLOWUP:[1 week],ESTABLISHEDPATIENT:[T]],PROVIDER:[TOKEN:[[id number]],FOLLOWUP:[1 week]]

## 2021-08-26 NOTE — DISCHARGE NOTE PROVIDER - CARE PROVIDER_API CALL
Chris Contreras)  Critical Care Medicine; Internal Medicine; Pulmonary Disease; Sleep Medicine  [address] [place] [address]  Phone: [phone]  Fax: [phone]  Established Patient  Follow Up Time: 1 week    London Bermudez  Internal Medicine  [facility] [place] [address]  Phone: [phone]  Fax: [phone]  Follow Up Time: 1 week

## 2021-08-26 NOTE — DISCHARGE NOTE PROVIDER - NSDCCPCAREPLAN_GEN_ALL_CORE_FT
PRINCIPAL DISCHARGE DIAGNOSIS  Diagnosis: COPD exacerbation  Assessment and Plan of Treatment: You were admitted because you were short of breath and had an exacerbation of chronic obstructive pulmonary disease (COPD). We treated you with IV steroids and antibiotics as well as inhalers/nebulizers. Please continue to take your prescribed prednisone streoid taper and antibiotic course and use your inhalers. Please refrain from smoking and irritant exposure. Please follow up with your primary care physician and pumonologist in 1 week. If you experience worsening symptoms please go to your nearest emergency department.

## 2021-08-26 NOTE — DISCHARGE NOTE PROVIDER - HOSPITAL COURSE
71 year old Female with past medical history of COPD on 2L home O2, DVT/PE on Eliquis, chronic back pain presents with shortness of breath for the past 5 days. pt gets admitted frequently for COPD exacerbations, last one in May. no fever no chills, no nausea, no vomiting, no diarrhea, no urinary symptoms. SOB on minimal exertion. no CP.   labs done showed WBC 9 K, Covid negative. CXR grossly normal. pt received Solumedrol 125mg IV , albuterol and was put on 4 L O2.   Patient admitted to medicine for COPD exacerbation, on oxygen as needed, received solumedrol 60 mg iv, azithromycin and duonebs/albuterol with significant improvement. Patient stable to be discharged to home on prednisone taper and azithromycin; to follow up with Dr. Contreras and Dr. Rodriguez. 71 year old female with past medical history of COPD on 2 L NC prn , DVT / PE on Eliquis, chronic back pain due to spinal stenosis and chronic compression fractures, GERD presenting for dyspnea.    # Acute Exacerbation of COPD  - continues to have diffuse wheezing on exam   - last Chest XR unremarkable   - s/p Azithromycin  - c/w Albuterol Neb q6h and PRN  - c/w Symbicort & Spiriva   - C/W Mucinex  - RVP negative  - pulm recommendations noted.  - Patient is clinically stable on room air. Ambulates on floor with walker. Patient denies any change in breathing. Diffuse wheezing has been persistent on examination since admission despite improvement in dyspnea. Will discharge today on prednisone po taper. Outpt pulmonary follow up. 71 year old female with past medical history of COPD on 2 L NC prn , DVT / PE on Eliquis, chronic back pain due to spinal stenosis and chronic compression fractures, GERD presenting for dyspnea.    # Acute Exacerbation of COPD  - continues to have diffuse wheezing on exam   - last Chest XR unremarkable   - s/p Azithromycin  - c/w Albuterol Neb q6h and PRN  - c/w Symbicort & Spiriva   - C/W Mucinex  - RVP negative  - pulm recommendations noted.  - Patient is clinically stable on room air. Ambulates on floor with walker. Patient denies any change in breathing. Diffuse wheezing has been persistent on examination since admission despite improvement in dyspnea. Will discharge today on prednisone po taper. Outpt pulmonary follow up.    Vital Signs Last 24 Hrs  T(C): 36.5 (01 Sep 2021 14:22), Max: 37.7 (31 Aug 2021 20:11)  T(F): 97.7 (01 Sep 2021 14:22), Max: 99.9 (31 Aug 2021 20:11)  HR: 116 (01 Sep 2021 14:22) (80 - 116)  BP: 161/70 (01 Sep 2021 14:22) (153/67 - 161/70)  BP(mean): --  RR: 20 (01 Sep 2021 14:22) (18 - 20)  SpO2: --

## 2021-08-26 NOTE — CONSULT NOTE ADULT - ASSESSMENT
Impression:  Acute exacerbation of COPD  HO DVT/PE on eliquis  Tobacco smoking    Plan:  O2 supplement goal spo2 88-92  Solumedrol 40 IV BID  send full RVP  VBG. if pco2 >45 start NIV 4hrs on/off/hs/PRN  Nebs q4hrs PRN  c/w azithromycin  smoking cessation councelling  OP pulmonary follow up

## 2021-08-26 NOTE — DISCHARGE NOTE PROVIDER - NSDCMRMEDTOKEN_GEN_ALL_CORE_FT
ALPRAZOLAM .5 MG TABS: 1  orally 2 times a day  amLODIPine 10 mg oral tablet: 1 tab(s) orally once a day  COMBIVENT    AER : 1  inhaled every 6 hours, As Needed  Eliquis 5 mg oral tablet: 1 tab(s) orally 2 times a day  lactulose 10 g/15 mL oral syrup: 15 milliliter(s) orally once a day  nicotine:   pantoprazole 40 mg oral delayed release tablet: 1 tab(s) orally once a day  Percocet 10/325 oral tablet: 1 tab(s) orally every 8 hours, As Needed  ProAir HFA 90 mcg/inh inhalation aerosol: 2 puff(s) inhaled every 6 hours  traZODone 50 mg oral tablet: 1 tab(s) orally once a day (at bedtime)   ALPRAZOLAM .5 MG TABS: 1  orally 2 times a day  amLODIPine 10 mg oral tablet: 1 tab(s) orally once a day  COMBIVENT    AER : 1  inhaled every 6 hours, As Needed  Eliquis 5 mg oral tablet: 1 tab(s) orally 2 times a day  lactulose 10 g/15 mL oral syrup: 15 milliliter(s) orally once a day  Nicoderm C-Q Clear 7 mg/24 hr transdermal film, extended release: 1 patch transdermally once a day   pantoprazole 40 mg oral delayed release tablet: 1 tab(s) orally once a day  Percocet 10/325 oral tablet: 1 tab(s) orally every 8 hours, As Needed  predniSONE 10 mg oral tablet: 6 tab(s) orally once a day x 3 days  5 tab(s) orally once a day x 3 days  4 tab(s) orally once a day x 3 days  3 tab(s) orally once a day x 3 days  2 tab(s) orally once a day x 3 days  1 tab(s) orally once a day x 3 days  ProAir HFA 90 mcg/inh inhalation aerosol: 2 puff(s) inhaled every 6 hours  traZODone 50 mg oral tablet: 1 tab(s) orally once a day (at bedtime)

## 2021-08-27 PROCEDURE — 99232 SBSQ HOSP IP/OBS MODERATE 35: CPT | Mod: GC

## 2021-08-27 PROCEDURE — 99232 SBSQ HOSP IP/OBS MODERATE 35: CPT

## 2021-08-27 RX ORDER — IPRATROPIUM/ALBUTEROL SULFATE 18-103MCG
3 AEROSOL WITH ADAPTER (GRAM) INHALATION EVERY 6 HOURS
Refills: 0 | Status: DISCONTINUED | OUTPATIENT
Start: 2021-08-27 | End: 2021-09-01

## 2021-08-27 RX ADMIN — Medication 60 MILLIGRAM(S): at 22:37

## 2021-08-27 RX ADMIN — SENNA PLUS 2 TABLET(S): 8.6 TABLET ORAL at 22:33

## 2021-08-27 RX ADMIN — APIXABAN 5 MILLIGRAM(S): 2.5 TABLET, FILM COATED ORAL at 05:38

## 2021-08-27 RX ADMIN — APIXABAN 5 MILLIGRAM(S): 2.5 TABLET, FILM COATED ORAL at 17:08

## 2021-08-27 RX ADMIN — Medication 3 MILLILITER(S): at 14:10

## 2021-08-27 RX ADMIN — Medication 3 MILLILITER(S): at 19:52

## 2021-08-27 RX ADMIN — Medication 600 MILLIGRAM(S): at 05:38

## 2021-08-27 RX ADMIN — OXYCODONE AND ACETAMINOPHEN 2 TABLET(S): 5; 325 TABLET ORAL at 11:03

## 2021-08-27 RX ADMIN — BUDESONIDE AND FORMOTEROL FUMARATE DIHYDRATE 2 PUFF(S): 160; 4.5 AEROSOL RESPIRATORY (INHALATION) at 08:00

## 2021-08-27 RX ADMIN — OXYCODONE AND ACETAMINOPHEN 2 TABLET(S): 5; 325 TABLET ORAL at 20:00

## 2021-08-27 RX ADMIN — OXYCODONE AND ACETAMINOPHEN 2 TABLET(S): 5; 325 TABLET ORAL at 02:26

## 2021-08-27 RX ADMIN — Medication 1 PATCH: at 12:47

## 2021-08-27 RX ADMIN — Medication 40 MILLIGRAM(S): at 05:38

## 2021-08-27 RX ADMIN — OXYCODONE AND ACETAMINOPHEN 2 TABLET(S): 5; 325 TABLET ORAL at 12:51

## 2021-08-27 RX ADMIN — Medication 3 MILLILITER(S): at 08:30

## 2021-08-27 RX ADMIN — AZITHROMYCIN 500 MILLIGRAM(S): 500 TABLET, FILM COATED ORAL at 05:39

## 2021-08-27 RX ADMIN — LIDOCAINE 1 PATCH: 4 CREAM TOPICAL at 12:49

## 2021-08-27 RX ADMIN — PANTOPRAZOLE SODIUM 40 MILLIGRAM(S): 20 TABLET, DELAYED RELEASE ORAL at 06:32

## 2021-08-27 RX ADMIN — AMLODIPINE BESYLATE 10 MILLIGRAM(S): 2.5 TABLET ORAL at 05:38

## 2021-08-27 RX ADMIN — OXYCODONE AND ACETAMINOPHEN 2 TABLET(S): 5; 325 TABLET ORAL at 02:56

## 2021-08-27 RX ADMIN — Medication 50 MILLIGRAM(S): at 22:39

## 2021-08-27 RX ADMIN — Medication 600 MILLIGRAM(S): at 17:08

## 2021-08-27 RX ADMIN — Medication 1 MILLIGRAM(S): at 12:46

## 2021-08-27 RX ADMIN — OXYCODONE AND ACETAMINOPHEN 2 TABLET(S): 5; 325 TABLET ORAL at 19:46

## 2021-08-27 RX ADMIN — BUDESONIDE AND FORMOTEROL FUMARATE DIHYDRATE 2 PUFF(S): 160; 4.5 AEROSOL RESPIRATORY (INHALATION) at 20:38

## 2021-08-27 RX ADMIN — Medication 1 PATCH: at 11:06

## 2021-08-27 NOTE — PROGRESS NOTE ADULT - ASSESSMENT
Impression:  Acute exacerbation of COPD  HO DVT/PE on eliquis  Tobacco smoking    Plan:  O2 supplement goal spo2 88-92  increase Solumedrol to 60 IV TID  f/u full RVP  VBG. if pco2 >45 start NIV 4hrs on/off/hs/PRN  Nebs q4hrs PRN  c/w azithromycin  smoking cessation councelling  OP pulmonary follow up

## 2021-08-27 NOTE — PROGRESS NOTE ADULT - SUBJECTIVE AND OBJECTIVE BOX
Patient is a 71y old  Female who presents with a chief complaint of shortness of breath (26 Aug 2021 17:08)        SUBJECTIVE: Patient still coughing and wheezing.      REVIEW OF SYSTEMS:    All Pertinent ROS are negative except per HPI       PHYSICAL EXAM  Vital Signs Last 24 Hrs  T(C): 36.8 (27 Aug 2021 12:38), Max: 36.8 (27 Aug 2021 12:38)  T(F): 98.3 (27 Aug 2021 12:38), Max: 98.3 (27 Aug 2021 12:38)  HR: 102 (27 Aug 2021 12:38) (81 - 103)  BP: 119/58 (27 Aug 2021 12:38) (119/58 - 151/83)  BP(mean): --  RR: 18 (27 Aug 2021 12:38) (18 - 22)  SpO2: --    CONSTITUTIONAL:  Well nourished.  NAD    ENT:   Airway patent,   No thrush    CARDIAC:   Normal rate,   regular rhythm.    no edema      RESPIRATORY:   ++ Wheezing  Normal chest expansion  Not tachypneic,  No use of accessory muscles    GASTROINTESTINAL:  Abdomen soft,   non-tender,   no guarding,   + BS    MUSCULOSKELETAL:   range of motion is not limited,  no clubbing, cyanosis    NEUROLOGICAL:   Alert and oriented   no motor or deficits.    SKIN:   Skin normal color for race,   warm, dry   No evidence of rash.        LABS:                          10.8   13.67 )-----------( 339      ( 26 Aug 2021 06:14 )             34.7                                               08-26    139  |  102  |  21<H>  ----------------------------<  105<H>  4.4   |  26  |  0.6<L>    Ca    9.5      26 Aug 2021 06:14  Mg     2.0     08-26                                                                                                                                                                                    MEDICATIONS  (STANDING):  albuterol/ipratropium for Nebulization 3 milliLiter(s) Nebulizer every 6 hours  amLODIPine   Tablet 10 milliGRAM(s) Oral daily  apixaban 5 milliGRAM(s) Oral two times a day  azithromycin   Tablet 500 milliGRAM(s) Oral every 24 hours  budesonide 160 MICROgram(s)/formoterol 4.5 MICROgram(s) Inhaler 2 Puff(s) Inhalation two times a day  guaiFENesin  milliGRAM(s) Oral every 12 hours  methylPREDNISolone sodium succinate Injectable 40 milliGRAM(s) IV Push every 12 hours  nicotine   7 mG/24 Hr(s) Transdermal Patch -  Peds 1 Patch Transdermal daily  pantoprazole    Tablet 40 milliGRAM(s) Oral before breakfast  polyethylene glycol 3350 17 Gram(s) Oral daily  senna 2 Tablet(s) Oral at bedtime  tiotropium 18 MICROgram(s) Capsule 1 Capsule(s) Inhalation daily  traZODone 50 milliGRAM(s) Oral at bedtime    MEDICATIONS  (PRN):  ALPRAZolam 1 milliGRAM(s) Oral two times a day PRN ANXIETY  lidocaine   4% Patch 1 Patch Transdermal daily PRN back pain  melatonin 5 milliGRAM(s) Oral at bedtime PRN Insomnia  oxycodone    5 mG/acetaminophen 325 mG 2 Tablet(s) Oral every 8 hours PRN Moderate Pain (4 - 6)      X-Rays reviewed    CXR interpreted by me:

## 2021-08-27 NOTE — PROGRESS NOTE ADULT - SUBJECTIVE AND OBJECTIVE BOX
KUSH RINALDI  71y, Female  Allergy: penicillin (Unknown)    Hospital Day: 3d    Patient seen and examined earlier today. Continues to be dyspneic with exertion, not at baseline.     PMH/PSH:  PAST MEDICAL & SURGICAL HISTORY:  COPD (chronic obstructive pulmonary disease)    Anxiety    Depression    Chronic pain    Hypertension    Chronic GERD    Spinal stenosis    Pulmonary embolism    Deep vein thrombosis (DVT)    Neck mass        LAST 24-Hr EVENTS:    VITALS:  T(F): 98.3 (08-27-21 @ 12:38), Max: 98.3 (08-27-21 @ 12:38)  HR: 102 (08-27-21 @ 12:38)  BP: 119/58 (08-27-21 @ 12:38) (119/58 - 151/83)  RR: 18 (08-27-21 @ 12:38)  SpO2: --        TESTS & MEASUREMENTS:  Weight (Kg):       08-25-21 @ 07:01  -  08-26-21 @ 07:00  --------------------------------------------------------  IN: 360 mL / OUT: 0 mL / NET: 360 mL                            10.8   13.67 )-----------( 339      ( 26 Aug 2021 06:14 )             34.7       08-26    139  |  102  |  21<H>  ----------------------------<  105<H>  4.4   |  26  |  0.6<L>    Ca    9.5      26 Aug 2021 06:14  Mg     2.0     08-26              Culture - Blood (collected 08-24-21 @ 11:42)  Source: .Blood None  Preliminary Report (08-25-21 @ 22:01):    No growth to date.        Procalcitonin, Serum: <0.02 ng/mL (08-24-21 @ 11:42)        Serum Pro-Brain Natriuretic Peptide: 254 pg/mL (08-24-21 @ 11:42)  Serum Pro-Brain Natriuretic Peptide: 286 pg/mL (08-24-21 @ 00:20)    COVID-19 PCR: NotDetec (08-24-21 @ 00:20)      RADIOLOGY, ECG, & ADDITIONAL TESTS:      RECENT DIAGNOSTIC ORDERS:  Blood Gas Profile - Venous: 20:00 (08-27-21 @ 17:08)  Respiratory Viral Panel with COVID-19 by JACOBO: Routine (08-27-21 @ 17:07)      MEDICATIONS:  MEDICATIONS  (STANDING):  albuterol/ipratropium for Nebulization 3 milliLiter(s) Nebulizer every 6 hours  amLODIPine   Tablet 10 milliGRAM(s) Oral daily  apixaban 5 milliGRAM(s) Oral two times a day  azithromycin   Tablet 500 milliGRAM(s) Oral every 24 hours  budesonide 160 MICROgram(s)/formoterol 4.5 MICROgram(s) Inhaler 2 Puff(s) Inhalation two times a day  guaiFENesin  milliGRAM(s) Oral every 12 hours  methylPREDNISolone sodium succinate Injectable 60 milliGRAM(s) IV Push every 8 hours  nicotine   7 mG/24 Hr(s) Transdermal Patch -  Peds 1 Patch Transdermal daily  pantoprazole    Tablet 40 milliGRAM(s) Oral before breakfast  polyethylene glycol 3350 17 Gram(s) Oral daily  senna 2 Tablet(s) Oral at bedtime  tiotropium 18 MICROgram(s) Capsule 1 Capsule(s) Inhalation daily  traZODone 50 milliGRAM(s) Oral at bedtime    MEDICATIONS  (PRN):  ALPRAZolam 1 milliGRAM(s) Oral two times a day PRN ANXIETY  lidocaine   4% Patch 1 Patch Transdermal daily PRN back pain  melatonin 5 milliGRAM(s) Oral at bedtime PRN Insomnia  oxycodone    5 mG/acetaminophen 325 mG 2 Tablet(s) Oral every 8 hours PRN Moderate Pain (4 - 6)      HOME MEDICATIONS:  ALPRAZOLAM .5 MG TABS (08-24)  COMBIVENT    AER  (08-24)  lactulose 10 g/15 mL oral syrup (08-24)  nicotine (08-26)  pantoprazole 40 mg oral delayed release tablet (08-24)  Percocet 10/325 oral tablet (08-24)  ProAir HFA 90 mcg/inh inhalation aerosol (08-24)  traZODone 50 mg oral tablet (08-24)      PHYSICAL EXAM:  GENERAL: Well developed, well nourished, conversational dyspnea  HEENT: Normocephalic, atraumatic, mucous membranes moist, EOMI, PERRLA, bilateral sclera anicteric, no conjunctival injection  Neck: Supple, non-tender, no lymphadenopathy.  PULMONARY: + diffuse wheeze, decreased breath sound   CARDIOVASCULAR: Regular rate and rhythm, S1-S2, no murmurs  GASTROINTESTINAL: Soft, non-tender, non-distended, no guarding.  RENAL: No CVA tenderness.  SKIN/EXTREMITIES: No clubbing or edema  NEUROLOGIC/MUSCULOSKELETAL: AOx4, grossly moving all extremities, no focal deficits.

## 2021-08-27 NOTE — PROGRESS NOTE ADULT - ASSESSMENT
71 year old female patient with past medical history of COPD on 2 L NC prn , DVT / PE on Eliquis, chronic back pain due to spinal stenosis and chronic compression fractures, GERD presenting for dyspnea.    # Acute Exacerbation of COPD  - continues to have diffuse wheezing on exam   - Chest XR unremarkable   - continue Azithromycin  - c/w Albuterol Neb q6h and PRN  - increase Methylprednisone 60 IV TID   - c/w Symbicort & Spiriva   - C/W Mucinex  - RVP and VBG pending     #HTN- norvasc   #Chronic back pain 2/2 spinal stenosis and chronic, acute/subacute compression fractures- c/w percocet , OP pain management   #Constipation- c/w senna and miralax   #Hx of DVT/PE on Eliquis   #Nicotine dependence: encourage cessation, c/w nicotine patch     Eliquis for dvt ppx     #Progress Note Handoff:  Pending (specify):  improvement in COPD exacerbation   Family discussion: d/w pt at bedside   Disposition: Home___/SNF___/Other________/Unknown at this time___x_____      Sandra Nicholson DO .

## 2021-08-28 PROCEDURE — 99232 SBSQ HOSP IP/OBS MODERATE 35: CPT

## 2021-08-28 RX ADMIN — BUDESONIDE AND FORMOTEROL FUMARATE DIHYDRATE 2 PUFF(S): 160; 4.5 AEROSOL RESPIRATORY (INHALATION) at 21:08

## 2021-08-28 RX ADMIN — Medication 1 PATCH: at 12:51

## 2021-08-28 RX ADMIN — Medication 50 MILLIGRAM(S): at 21:11

## 2021-08-28 RX ADMIN — OXYCODONE AND ACETAMINOPHEN 2 TABLET(S): 5; 325 TABLET ORAL at 06:29

## 2021-08-28 RX ADMIN — PANTOPRAZOLE SODIUM 40 MILLIGRAM(S): 20 TABLET, DELAYED RELEASE ORAL at 08:14

## 2021-08-28 RX ADMIN — Medication 60 MILLIGRAM(S): at 21:09

## 2021-08-28 RX ADMIN — SENNA PLUS 2 TABLET(S): 8.6 TABLET ORAL at 21:09

## 2021-08-28 RX ADMIN — OXYCODONE AND ACETAMINOPHEN 2 TABLET(S): 5; 325 TABLET ORAL at 18:10

## 2021-08-28 RX ADMIN — AZITHROMYCIN 500 MILLIGRAM(S): 500 TABLET, FILM COATED ORAL at 06:29

## 2021-08-28 RX ADMIN — Medication 1 MILLIGRAM(S): at 21:08

## 2021-08-28 RX ADMIN — Medication 1 PATCH: at 12:17

## 2021-08-28 RX ADMIN — Medication 1 PATCH: at 07:48

## 2021-08-28 RX ADMIN — LIDOCAINE 1 PATCH: 4 CREAM TOPICAL at 17:46

## 2021-08-28 RX ADMIN — Medication 3 MILLILITER(S): at 08:05

## 2021-08-28 RX ADMIN — Medication 600 MILLIGRAM(S): at 06:29

## 2021-08-28 RX ADMIN — Medication 3 MILLILITER(S): at 15:59

## 2021-08-28 RX ADMIN — Medication 60 MILLIGRAM(S): at 06:30

## 2021-08-28 RX ADMIN — Medication 1 MILLIGRAM(S): at 06:30

## 2021-08-28 RX ADMIN — Medication 600 MILLIGRAM(S): at 17:46

## 2021-08-28 RX ADMIN — APIXABAN 5 MILLIGRAM(S): 2.5 TABLET, FILM COATED ORAL at 17:46

## 2021-08-28 RX ADMIN — BUDESONIDE AND FORMOTEROL FUMARATE DIHYDRATE 2 PUFF(S): 160; 4.5 AEROSOL RESPIRATORY (INHALATION) at 08:23

## 2021-08-28 RX ADMIN — POLYETHYLENE GLYCOL 3350 17 GRAM(S): 17 POWDER, FOR SOLUTION ORAL at 12:51

## 2021-08-28 RX ADMIN — APIXABAN 5 MILLIGRAM(S): 2.5 TABLET, FILM COATED ORAL at 06:29

## 2021-08-28 RX ADMIN — Medication 60 MILLIGRAM(S): at 14:44

## 2021-08-28 RX ADMIN — TIOTROPIUM BROMIDE 1 CAPSULE(S): 18 CAPSULE ORAL; RESPIRATORY (INHALATION) at 08:05

## 2021-08-28 RX ADMIN — AMLODIPINE BESYLATE 10 MILLIGRAM(S): 2.5 TABLET ORAL at 06:29

## 2021-08-28 RX ADMIN — OXYCODONE AND ACETAMINOPHEN 2 TABLET(S): 5; 325 TABLET ORAL at 17:51

## 2021-08-28 NOTE — PROGRESS NOTE ADULT - SUBJECTIVE AND OBJECTIVE BOX
KUSH RINALDI  71y, Female  Allergy: penicillin (Unknown)    Hospital Day: 4d    Patient seen and examined earlier today. Pt continues to have significant wheezing, not feeling back to baseline.     PMH/PSH:  PAST MEDICAL & SURGICAL HISTORY:  COPD (chronic obstructive pulmonary disease)    Anxiety    Depression    Chronic pain    Hypertension    Chronic GERD    Spinal stenosis    Pulmonary embolism    Deep vein thrombosis (DVT)    Neck mass        LAST 24-Hr EVENTS:    VITALS:  T(F): 97.2 (08-28-21 @ 13:55), Max: 97.7 (08-27-21 @ 20:42)  HR: 109 (08-28-21 @ 13:55)  BP: 158/72 (08-28-21 @ 13:55) (125/77 - 158/72)  RR: 18 (08-28-21 @ 13:55)  SpO2: --        TESTS & MEASUREMENTS:  Weight (Kg):       08-27-21 @ 07:01  -  08-28-21 @ 07:00  --------------------------------------------------------  IN: 1200 mL / OUT: 0 mL / NET: 1200 mL                        Culture - Blood (collected 08-24-21 @ 11:42)  Source: .Blood None  Preliminary Report (08-25-21 @ 22:01):    No growth to date.        Procalcitonin, Serum: <0.02 ng/mL (08-24-21 @ 11:42)        Serum Pro-Brain Natriuretic Peptide: 254 pg/mL (08-24-21 @ 11:42)  Serum Pro-Brain Natriuretic Peptide: 286 pg/mL (08-24-21 @ 00:20)    COVID-19 PCR: NotDetec (08-24-21 @ 00:20)      RADIOLOGY, ECG, & ADDITIONAL TESTS:      RECENT DIAGNOSTIC ORDERS:      MEDICATIONS:  MEDICATIONS  (STANDING):  albuterol/ipratropium for Nebulization 3 milliLiter(s) Nebulizer every 6 hours  amLODIPine   Tablet 10 milliGRAM(s) Oral daily  apixaban 5 milliGRAM(s) Oral two times a day  azithromycin   Tablet 500 milliGRAM(s) Oral every 24 hours  budesonide 160 MICROgram(s)/formoterol 4.5 MICROgram(s) Inhaler 2 Puff(s) Inhalation two times a day  guaiFENesin  milliGRAM(s) Oral every 12 hours  methylPREDNISolone sodium succinate Injectable 60 milliGRAM(s) IV Push every 8 hours  nicotine   7 mG/24 Hr(s) Transdermal Patch -  Peds 1 Patch Transdermal daily  pantoprazole    Tablet 40 milliGRAM(s) Oral before breakfast  polyethylene glycol 3350 17 Gram(s) Oral daily  senna 2 Tablet(s) Oral at bedtime  tiotropium 18 MICROgram(s) Capsule 1 Capsule(s) Inhalation daily  traZODone 50 milliGRAM(s) Oral at bedtime    MEDICATIONS  (PRN):  ALPRAZolam 1 milliGRAM(s) Oral two times a day PRN ANXIETY  lidocaine   4% Patch 1 Patch Transdermal daily PRN back pain  melatonin 5 milliGRAM(s) Oral at bedtime PRN Insomnia  oxycodone    5 mG/acetaminophen 325 mG 2 Tablet(s) Oral every 8 hours PRN Moderate Pain (4 - 6)      HOME MEDICATIONS:  ALPRAZOLAM .5 MG TABS (08-24)  COMBIVENT    AER  (08-24)  lactulose 10 g/15 mL oral syrup (08-24)  nicotine (08-26)  pantoprazole 40 mg oral delayed release tablet (08-24)  Percocet 10/325 oral tablet (08-24)  ProAir HFA 90 mcg/inh inhalation aerosol (08-24)  traZODone 50 mg oral tablet (08-24)      PHYSICAL EXAM:  GENERAL: Well developed, well nourished, conversational dyspnea  HEENT: Normocephalic, atraumatic, mucous membranes moist, EOMI, PERRLA, bilateral sclera anicteric, no conjunctival injection  Neck: Supple, non-tender, no lymphadenopathy.  PULMONARY: + diffuse wheeze, decreased breath sound   CARDIOVASCULAR: Regular rate and rhythm, S1-S2, no murmurs  GASTROINTESTINAL: Soft, non-tender, non-distended, no guarding.  RENAL: No CVA tenderness.  SKIN/EXTREMITIES: No clubbing or edema  NEUROLOGIC/MUSCULOSKELETAL: AOx4, grossly moving all extremities, no focal deficits.

## 2021-08-28 NOTE — PROGRESS NOTE ADULT - ASSESSMENT
71 year old female patient with past medical history of COPD on 2 L NC prn , DVT / PE on Eliquis, chronic back pain due to spinal stenosis and chronic compression fractures, GERD presenting for dyspnea.    # Acute Exacerbation of COPD  - continues to have diffuse wheezing on exam   - Chest XR unremarkable   - continue Azithromycin  - c/w Albuterol Neb q6h and PRN  - increase Methylprednisone 60 IV TID   - c/w Symbicort & Spiriva   - C/W Mucinex  - RVP and VBG pending - pt refusing labs     #HTN- norvasc   #Chronic back pain 2/2 spinal stenosis and chronic, acute/subacute compression fractures- c/w percocet , OP pain management   #Constipation- c/w senna and miralax   #Hx of DVT/PE on Eliquis   #Nicotine dependence: encourage cessation, c/w nicotine patch     Eliquis for dvt ppx     #Progress Note Handoff:  Pending (specify):  improvement in COPD exacerbation   Family discussion: d/w pt at bedside   Disposition: Home___/SNF___/Other________/Unknown at this time___x_____      Sandra Nicholson DO .

## 2021-08-29 LAB
ANION GAP SERPL CALC-SCNC: 9 MMOL/L — SIGNIFICANT CHANGE UP (ref 7–14)
BUN SERPL-MCNC: 19 MG/DL — SIGNIFICANT CHANGE UP (ref 10–20)
CALCIUM SERPL-MCNC: 8.9 MG/DL — SIGNIFICANT CHANGE UP (ref 8.5–10.1)
CHLORIDE SERPL-SCNC: 99 MMOL/L — SIGNIFICANT CHANGE UP (ref 98–110)
CO2 SERPL-SCNC: 29 MMOL/L — SIGNIFICANT CHANGE UP (ref 17–32)
CREAT SERPL-MCNC: 0.5 MG/DL — LOW (ref 0.7–1.5)
CULTURE RESULTS: SIGNIFICANT CHANGE UP
GLUCOSE SERPL-MCNC: 130 MG/DL — HIGH (ref 70–99)
HCT VFR BLD CALC: 35.1 % — LOW (ref 37–47)
HGB BLD-MCNC: 10.8 G/DL — LOW (ref 12–16)
MCHC RBC-ENTMCNC: 27.6 PG — SIGNIFICANT CHANGE UP (ref 27–31)
MCHC RBC-ENTMCNC: 30.8 G/DL — LOW (ref 32–37)
MCV RBC AUTO: 89.5 FL — SIGNIFICANT CHANGE UP (ref 81–99)
NRBC # BLD: 0 /100 WBCS — SIGNIFICANT CHANGE UP (ref 0–0)
PLATELET # BLD AUTO: 380 K/UL — SIGNIFICANT CHANGE UP (ref 130–400)
POTASSIUM SERPL-MCNC: 4.7 MMOL/L — SIGNIFICANT CHANGE UP (ref 3.5–5)
POTASSIUM SERPL-SCNC: 4.7 MMOL/L — SIGNIFICANT CHANGE UP (ref 3.5–5)
RAPID RVP RESULT: SIGNIFICANT CHANGE UP
RBC # BLD: 3.92 M/UL — LOW (ref 4.2–5.4)
RBC # FLD: 12.6 % — SIGNIFICANT CHANGE UP (ref 11.5–14.5)
SARS-COV-2 RNA SPEC QL NAA+PROBE: SIGNIFICANT CHANGE UP
SODIUM SERPL-SCNC: 137 MMOL/L — SIGNIFICANT CHANGE UP (ref 135–146)
SPECIMEN SOURCE: SIGNIFICANT CHANGE UP
WBC # BLD: 19.88 K/UL — HIGH (ref 4.8–10.8)
WBC # FLD AUTO: 19.88 K/UL — HIGH (ref 4.8–10.8)

## 2021-08-29 PROCEDURE — 99232 SBSQ HOSP IP/OBS MODERATE 35: CPT

## 2021-08-29 RX ORDER — NYSTATIN 500MM UNIT
500000 POWDER (EA) MISCELLANEOUS THREE TIMES A DAY
Refills: 0 | Status: DISCONTINUED | OUTPATIENT
Start: 2021-08-29 | End: 2021-09-01

## 2021-08-29 RX ORDER — DIPHENHYDRAMINE HCL 50 MG
50 CAPSULE ORAL EVERY 4 HOURS
Refills: 0 | Status: DISCONTINUED | OUTPATIENT
Start: 2021-08-29 | End: 2021-08-29

## 2021-08-29 RX ORDER — DIPHENHYDRAMINE HCL 50 MG
25 CAPSULE ORAL EVERY 4 HOURS
Refills: 0 | Status: DISCONTINUED | OUTPATIENT
Start: 2021-08-29 | End: 2021-09-01

## 2021-08-29 RX ADMIN — Medication 500000 UNIT(S): at 21:29

## 2021-08-29 RX ADMIN — OXYCODONE AND ACETAMINOPHEN 2 TABLET(S): 5; 325 TABLET ORAL at 20:13

## 2021-08-29 RX ADMIN — APIXABAN 5 MILLIGRAM(S): 2.5 TABLET, FILM COATED ORAL at 17:46

## 2021-08-29 RX ADMIN — AZITHROMYCIN 500 MILLIGRAM(S): 500 TABLET, FILM COATED ORAL at 07:21

## 2021-08-29 RX ADMIN — Medication 1 PATCH: at 20:06

## 2021-08-29 RX ADMIN — Medication 50 MILLIGRAM(S): at 15:27

## 2021-08-29 RX ADMIN — Medication 3 MILLILITER(S): at 09:09

## 2021-08-29 RX ADMIN — OXYCODONE AND ACETAMINOPHEN 2 TABLET(S): 5; 325 TABLET ORAL at 11:44

## 2021-08-29 RX ADMIN — OXYCODONE AND ACETAMINOPHEN 2 TABLET(S): 5; 325 TABLET ORAL at 12:44

## 2021-08-29 RX ADMIN — Medication 600 MILLIGRAM(S): at 06:01

## 2021-08-29 RX ADMIN — SENNA PLUS 2 TABLET(S): 8.6 TABLET ORAL at 21:29

## 2021-08-29 RX ADMIN — OXYCODONE AND ACETAMINOPHEN 2 TABLET(S): 5; 325 TABLET ORAL at 03:43

## 2021-08-29 RX ADMIN — Medication 1 MILLIGRAM(S): at 14:57

## 2021-08-29 RX ADMIN — PANTOPRAZOLE SODIUM 40 MILLIGRAM(S): 20 TABLET, DELAYED RELEASE ORAL at 06:01

## 2021-08-29 RX ADMIN — Medication 3 MILLILITER(S): at 20:25

## 2021-08-29 RX ADMIN — APIXABAN 5 MILLIGRAM(S): 2.5 TABLET, FILM COATED ORAL at 07:21

## 2021-08-29 RX ADMIN — AMLODIPINE BESYLATE 10 MILLIGRAM(S): 2.5 TABLET ORAL at 06:01

## 2021-08-29 RX ADMIN — Medication 600 MILLIGRAM(S): at 17:47

## 2021-08-29 RX ADMIN — Medication 1 PATCH: at 11:46

## 2021-08-29 RX ADMIN — Medication 50 MILLIGRAM(S): at 21:30

## 2021-08-29 RX ADMIN — Medication 1 PATCH: at 07:00

## 2021-08-29 RX ADMIN — BUDESONIDE AND FORMOTEROL FUMARATE DIHYDRATE 2 PUFF(S): 160; 4.5 AEROSOL RESPIRATORY (INHALATION) at 20:14

## 2021-08-29 RX ADMIN — Medication 1 PATCH: at 11:51

## 2021-08-29 RX ADMIN — Medication 60 MILLIGRAM(S): at 06:02

## 2021-08-29 RX ADMIN — TIOTROPIUM BROMIDE 1 CAPSULE(S): 18 CAPSULE ORAL; RESPIRATORY (INHALATION) at 09:10

## 2021-08-29 RX ADMIN — OXYCODONE AND ACETAMINOPHEN 2 TABLET(S): 5; 325 TABLET ORAL at 20:45

## 2021-08-29 NOTE — PROGRESS NOTE ADULT - ASSESSMENT
71 year old female with past medical history of COPD on 2 L NC prn , DVT / PE on Eliquis, chronic back pain due to spinal stenosis and chronic compression fractures, GERD presenting for dyspnea.    # Acute Exacerbation of COPD  - continues to have diffuse wheezing on exam   - Chest XR unremarkable   - continue Azithromycin  - c/w Albuterol Neb q6h and PRN  - Methylprednisone 60 IV TID --> PO Prednisone 60 QD   - c/w Symbicort & Spiriva   - C/W Mucinex  - RVP and VBG pending - pt refusing labs     # Allergic reaction with Diffuse erythematous rash   - Benadryl PRN     # HTN- norvasc   # Chronic back pain 2/2 spinal stenosis and chronic, acute/subacute compression fractures- c/w percocet , OP pain management   # Constipation- c/w senna and miralax   # Hx of DVT/PE on Eliquis   # Nicotine dependence: encourage cessation, c/w nicotine patch     Eliquis for dvt ppx     #Progress Note Handoff:  Pending (specify):  improvement in COPD exacerbation   Family discussion: d/w pt at bedside   Disposition: Home___/SNF___/Other________/Unknown at this time___x_____      Sandra Nicholson DO .       71 year old female with past medical history of COPD on 2 L NC prn , DVT / PE on Eliquis, chronic back pain due to spinal stenosis and chronic compression fractures, GERD presenting for dyspnea.    # Acute Exacerbation of COPD  - continues to have diffuse wheezing on exam   - Chest XR unremarkable   - continue Azithromycin  - c/w Albuterol Neb q6h and PRN  - Methylprednisone 60 IV TID --> PO Prednisone 60 QD   - c/w Symbicort & Spiriva   - C/W Mucinex  - RVP and VBG pending - pt refusing labs     # Allergic reaction with Diffuse erythematous rash   - Benadryl PRN     # HTN- norvasc   # Chronic back pain 2/2 spinal stenosis and chronic, acute/subacute compression fractures- c/w percocet , OP pain management   # Constipation- c/w senna and miralax   # Hx of DVT/PE on Eliquis   # Nicotine dependence: encourage cessation, c/w nicotine patch     Eliquis for dvt ppx     #Progress Note Handoff:  Pending (specify):  improvement in COPD exacerbation   Family discussion: d/w pt at bedside   Disposition: Home___/SNF___/Other________/Unknown at this time___x_____

## 2021-08-29 NOTE — PROGRESS NOTE ADULT - SUBJECTIVE AND OBJECTIVE BOX
KUSH RINALDI  71y, Female  Allergy: penicillin (Unknown)    Hospital Day: 4d    CHIEF COMPLAINT  Patient is a 71y old  Female who presents with a chief complaint of shortness of breath (28 Aug 2021 18:39)    LAST 24-Hr EVENTS:  Patient seen and examined earlier today. Pt continues to have significant wheezing, not feeling back to baseline.     PMH/PSH:  PAST MEDICAL & SURGICAL HISTORY:  COPD (chronic obstructive pulmonary disease)  Anxiety  Depression  Chronic pain  Hypertension  Chronic GERD  Spinal stenosis  Pulmonary embolism  Deep vein thrombosis (DVT)  Neck mass      VITALS:  Vital Signs Last 24 Hrs  T(C): 36.1 (29 Aug 2021 05:00), Max: 36.4 (28 Aug 2021 21:32)  T(F): 96.9 (29 Aug 2021 05:00), Max: 97.5 (28 Aug 2021 21:32)  HR: 72 (29 Aug 2021 05:00) (72 - 113)  BP: 141/67 (29 Aug 2021 05:00) (141/67 - 167/84)  RR: 18 (29 Aug 2021 05:00) (18 - 20)  SpO2: 93% (29 Aug 2021 03:46) (93% - 93%)    PHYSICAL EXAM:  GENERAL: Well developed, well nourished, conversational dyspnea  HEENT: Normocephalic, atraumatic, mucous membranes moist  EYES: EOMI, PERRLA  Neck: Supple, non-tender, no lymphadenopathy.  PULMONARY: + diffuse wheeze, congested and Short of breath from ambulating   CARDIOVASCULAR: Regular rate and rhythm, S1-S2, no murmurs  GASTROINTESTINAL: Soft, non-tender, non-distended, no guarding.  RENAL: No CVA tenderness.  SKIN/EXTREMITIES: +Diffuse erythematous rash on upper chest   NEUROLOGIC/MUSCULOSKELETAL: AOx4, grossly moving all extremities, no focal deficits.    TESTS & MEASUREMENTS:  Weight (Kg):       08-27-21 @ 07:01  -  08-28-21 @ 07:00  --------------------------------------------------------  IN: 1200 mL / OUT: 0 mL / NET: 1200 mL    28 Aug 2021 07:01  -  29 Aug 2021 07:00  --------------------------------------------------------  IN: 960 mL / OUT: 0 mL / NET: 960 mL    29 Aug 2021 07:01  -  29 Aug 2021 14:06  --------------------------------------------------------  IN: 1000 mL / OUT: 0 mL / NET: 1000 mL        Culture - Blood (collected 08-24-21 @ 11:42)  Source: .Blood None  Preliminary Report (08-25-21 @ 22:01):    No growth to date.        Procalcitonin, Serum: <0.02 ng/mL (08-24-21 @ 11:42)        Serum Pro-Brain Natriuretic Peptide: 254 pg/mL (08-24-21 @ 11:42)  Serum Pro-Brain Natriuretic Peptide: 286 pg/mL (08-24-21 @ 00:20)    COVID-19 PCR: NotDetec (08-24-21 @ 00:20)      RADIOLOGY, ECG, & ADDITIONAL TESTS:      RECENT DIAGNOSTIC ORDERS:      MEDICATIONS:  MEDICATIONS  (STANDING):  albuterol/ipratropium for Nebulization 3 milliLiter(s) Nebulizer every 6 hours  amLODIPine   Tablet 10 milliGRAM(s) Oral daily  apixaban 5 milliGRAM(s) Oral two times a day  azithromycin   Tablet 500 milliGRAM(s) Oral every 24 hours  budesonide 160 MICROgram(s)/formoterol 4.5 MICROgram(s) Inhaler 2 Puff(s) Inhalation two times a day  guaiFENesin  milliGRAM(s) Oral every 12 hours  nicotine   7 mG/24 Hr(s) Transdermal Patch -  Peds 1 Patch Transdermal daily  pantoprazole    Tablet 40 milliGRAM(s) Oral before breakfast  polyethylene glycol 3350 17 Gram(s) Oral daily  predniSONE   Tablet 60 milliGRAM(s) Oral daily  senna 2 Tablet(s) Oral at bedtime  tiotropium 18 MICROgram(s) Capsule 1 Capsule(s) Inhalation daily  traZODone 50 milliGRAM(s) Oral at bedtime    MEDICATIONS  (PRN):  ALPRAZolam 1 milliGRAM(s) Oral two times a day PRN ANXIETY  lidocaine   4% Patch 1 Patch Transdermal daily PRN back pain  melatonin 5 milliGRAM(s) Oral at bedtime PRN Insomnia  oxycodone    5 mG/acetaminophen 325 mG 2 Tablet(s) Oral every 8 hours PRN Moderate Pain (4 - 6)        HOME MEDICATIONS:  ALPRAZOLAM .5 MG TABS (08-24)  COMBIVENT    AER  (08-24)  lactulose 10 g/15 mL oral syrup (08-24)  nicotine (08-26)  pantoprazole 40 mg oral delayed release tablet (08-24)  Percocet 10/325 oral tablet (08-24)  ProAir HFA 90 mcg/inh inhalation aerosol (08-24)  traZODone 50 mg oral tablet (08-24)

## 2021-08-30 LAB
ANION GAP SERPL CALC-SCNC: 4 MMOL/L — LOW (ref 7–14)
BUN SERPL-MCNC: 20 MG/DL — SIGNIFICANT CHANGE UP (ref 10–20)
CALCIUM SERPL-MCNC: 8.7 MG/DL — SIGNIFICANT CHANGE UP (ref 8.5–10.1)
CHLORIDE SERPL-SCNC: 103 MMOL/L — SIGNIFICANT CHANGE UP (ref 98–110)
CO2 SERPL-SCNC: 34 MMOL/L — HIGH (ref 17–32)
CREAT SERPL-MCNC: 0.5 MG/DL — LOW (ref 0.7–1.5)
GLUCOSE SERPL-MCNC: 80 MG/DL — SIGNIFICANT CHANGE UP (ref 70–99)
HCT VFR BLD CALC: 34.2 % — LOW (ref 37–47)
HGB BLD-MCNC: 10.6 G/DL — LOW (ref 12–16)
MCHC RBC-ENTMCNC: 27.6 PG — SIGNIFICANT CHANGE UP (ref 27–31)
MCHC RBC-ENTMCNC: 31 G/DL — LOW (ref 32–37)
MCV RBC AUTO: 89.1 FL — SIGNIFICANT CHANGE UP (ref 81–99)
NRBC # BLD: 0 /100 WBCS — SIGNIFICANT CHANGE UP (ref 0–0)
PLATELET # BLD AUTO: 388 K/UL — SIGNIFICANT CHANGE UP (ref 130–400)
POTASSIUM SERPL-MCNC: 4.8 MMOL/L — SIGNIFICANT CHANGE UP (ref 3.5–5)
POTASSIUM SERPL-SCNC: 4.8 MMOL/L — SIGNIFICANT CHANGE UP (ref 3.5–5)
RBC # BLD: 3.84 M/UL — LOW (ref 4.2–5.4)
RBC # FLD: 12.6 % — SIGNIFICANT CHANGE UP (ref 11.5–14.5)
SODIUM SERPL-SCNC: 141 MMOL/L — SIGNIFICANT CHANGE UP (ref 135–146)
WBC # BLD: 22.3 K/UL — HIGH (ref 4.8–10.8)
WBC # FLD AUTO: 22.3 K/UL — HIGH (ref 4.8–10.8)

## 2021-08-30 PROCEDURE — 99232 SBSQ HOSP IP/OBS MODERATE 35: CPT

## 2021-08-30 RX ADMIN — Medication 5 MILLIGRAM(S): at 21:13

## 2021-08-30 RX ADMIN — Medication 500000 UNIT(S): at 14:49

## 2021-08-30 RX ADMIN — APIXABAN 5 MILLIGRAM(S): 2.5 TABLET, FILM COATED ORAL at 17:42

## 2021-08-30 RX ADMIN — Medication 60 MILLIGRAM(S): at 20:36

## 2021-08-30 RX ADMIN — Medication 3 MILLILITER(S): at 10:14

## 2021-08-30 RX ADMIN — POLYETHYLENE GLYCOL 3350 17 GRAM(S): 17 POWDER, FOR SOLUTION ORAL at 12:52

## 2021-08-30 RX ADMIN — Medication 50 MILLIGRAM(S): at 23:00

## 2021-08-30 RX ADMIN — OXYCODONE AND ACETAMINOPHEN 2 TABLET(S): 5; 325 TABLET ORAL at 06:20

## 2021-08-30 RX ADMIN — Medication 500000 UNIT(S): at 20:36

## 2021-08-30 RX ADMIN — OXYCODONE AND ACETAMINOPHEN 2 TABLET(S): 5; 325 TABLET ORAL at 15:19

## 2021-08-30 RX ADMIN — Medication 60 MILLIGRAM(S): at 06:13

## 2021-08-30 RX ADMIN — AMLODIPINE BESYLATE 10 MILLIGRAM(S): 2.5 TABLET ORAL at 06:13

## 2021-08-30 RX ADMIN — OXYCODONE AND ACETAMINOPHEN 2 TABLET(S): 5; 325 TABLET ORAL at 06:50

## 2021-08-30 RX ADMIN — Medication 600 MILLIGRAM(S): at 17:43

## 2021-08-30 RX ADMIN — Medication 1 MILLIGRAM(S): at 21:17

## 2021-08-30 RX ADMIN — BUDESONIDE AND FORMOTEROL FUMARATE DIHYDRATE 2 PUFF(S): 160; 4.5 AEROSOL RESPIRATORY (INHALATION) at 08:00

## 2021-08-30 RX ADMIN — OXYCODONE AND ACETAMINOPHEN 2 TABLET(S): 5; 325 TABLET ORAL at 14:49

## 2021-08-30 RX ADMIN — Medication 3 MILLILITER(S): at 21:14

## 2021-08-30 RX ADMIN — OXYCODONE AND ACETAMINOPHEN 2 TABLET(S): 5; 325 TABLET ORAL at 22:14

## 2021-08-30 RX ADMIN — Medication 1 MILLIGRAM(S): at 12:54

## 2021-08-30 RX ADMIN — Medication 1 PATCH: at 12:51

## 2021-08-30 RX ADMIN — Medication 500000 UNIT(S): at 06:13

## 2021-08-30 RX ADMIN — APIXABAN 5 MILLIGRAM(S): 2.5 TABLET, FILM COATED ORAL at 06:13

## 2021-08-30 RX ADMIN — Medication 3 MILLILITER(S): at 13:20

## 2021-08-30 RX ADMIN — SENNA PLUS 2 TABLET(S): 8.6 TABLET ORAL at 20:36

## 2021-08-30 RX ADMIN — Medication 600 MILLIGRAM(S): at 06:13

## 2021-08-30 RX ADMIN — PANTOPRAZOLE SODIUM 40 MILLIGRAM(S): 20 TABLET, DELAYED RELEASE ORAL at 06:13

## 2021-08-30 RX ADMIN — OXYCODONE AND ACETAMINOPHEN 2 TABLET(S): 5; 325 TABLET ORAL at 21:14

## 2021-08-30 RX ADMIN — Medication 1 PATCH: at 12:53

## 2021-08-30 NOTE — PROGRESS NOTE ADULT - SUBJECTIVE AND OBJECTIVE BOX
Patient is a 71y old  Female who presents with a chief complaint of shortness of breath (29 Aug 2021 14:04)        SUBJECTIVE: pt still sob and Wheeze      REVIEW OF SYSTEMS:       ALLERGIC/IMMUNOLOGIC:   No active allergic or immunologic issues      PHYSICAL EXAM  Vital Signs Last 24 Hrs  T(C): 35.8 (30 Aug 2021 06:15), Max: 36.2 (29 Aug 2021 15:09)  T(F): 96.4 (30 Aug 2021 06:15), Max: 97.2 (29 Aug 2021 15:09)  HR: 78 (30 Aug 2021 06:15) (78 - 115)  BP: 153/72 (30 Aug 2021 06:15) (144/99 - 170/77)  BP(mean): --  RR: 18 (30 Aug 2021 06:15) (18 - 19)  SpO2: --    CONSTITUTIONAL:  Cough , wheeze     ENT:   Airway patent,   No thrush    EYES:   Clear bilaterally,   pupils equal, round and reactive to light.    CARDIAC:   tachy    CAROTID:   normal systolic impulse  no bruits    RESPIRATORY:   decreased air entry    GASTROINTESTINAL:  Abdomen soft,   non-tender,   no guarding,   + BS         MUSCULOSKELETAL:   range of motion is not limited,  no clubbing, cyanosis    NEUROLOGICAL:   Alert and oriented   no motor  deficits.           08-29-21 @ 07:01  -  08-30-21 @ 07:00  --------------------------------------------------------  IN:    Oral Fluid: 1000 mL  Total IN: 1000 mL    OUT:  Total OUT: 0 mL    Total NET: 1000 mL          LABS:                          10.6   22.30 )-----------( 388      ( 30 Aug 2021 06:10 )             34.2                                               08-30    141  |  103  |  20  ----------------------------<  80  4.8   |  34<H>  |  0.5<L>    Ca    8.7      30 Aug 2021 06:10                                                                                                                                                                                    MEDICATIONS  (STANDING):  albuterol/ipratropium for Nebulization 3 milliLiter(s) Nebulizer every 6 hours  amLODIPine   Tablet 10 milliGRAM(s) Oral daily  apixaban 5 milliGRAM(s) Oral two times a day  budesonide 160 MICROgram(s)/formoterol 4.5 MICROgram(s) Inhaler 2 Puff(s) Inhalation two times a day  guaiFENesin  milliGRAM(s) Oral every 12 hours  nicotine   7 mG/24 Hr(s) Transdermal Patch -  Peds 1 Patch Transdermal daily  nystatin    Suspension 434216 Unit(s) Oral three times a day  pantoprazole    Tablet 40 milliGRAM(s) Oral before breakfast  polyethylene glycol 3350 17 Gram(s) Oral daily  predniSONE   Tablet 60 milliGRAM(s) Oral daily  senna 2 Tablet(s) Oral at bedtime  tiotropium 18 MICROgram(s) Capsule 1 Capsule(s) Inhalation daily  traZODone 50 milliGRAM(s) Oral at bedtime    MEDICATIONS  (PRN):  ALPRAZolam 1 milliGRAM(s) Oral two times a day PRN ANXIETY  diphenhydrAMINE 25 milliGRAM(s) Oral every 4 hours PRN Rash and/or Itching  lidocaine   4% Patch 1 Patch Transdermal daily PRN back pain  melatonin 5 milliGRAM(s) Oral at bedtime PRN Insomnia  naproxen 500 milliGRAM(s) Oral two times a day PRN Moderate Pain (4 - 6)  oxycodone    5 mG/acetaminophen 325 mG 2 Tablet(s) Oral every 8 hours PRN Moderate Pain (4 - 6)      X-Rays reviewed    CXR interpreted by me: no infiltrates

## 2021-08-30 NOTE — PROGRESS NOTE ADULT - ASSESSMENT
Impression:  Acute exacerbation of COPD  HO DVT/PE on eliquis  Tobacco smoking    Plan:  O2 supplement goal spo2 88-92  increase Solumedrol to 60 IV TID for now    full RVP -ve   VBG. if pco2 >45 start NIV 4hrs on/off/hs/PRN  Nebs q4hrs PRN  c/w azithromycin  smoking cessation councelling  OP pulmonary follow up

## 2021-08-30 NOTE — PROGRESS NOTE ADULT - ASSESSMENT
71 year old female with past medical history of COPD on 2 L NC prn , DVT / PE on Eliquis, chronic back pain due to spinal stenosis and chronic compression fractures, GERD presenting for dyspnea.    # Acute Exacerbation of COPD  - continues to have diffuse wheezing on exam   - last Chest XR unremarkable   - s/p Azithromycin  - c/w Albuterol Neb q6h and PRN  - Methylprednisone 60 IV TID --> PO Prednisone 60 QD   - c/w Symbicort & Spiriva   - C/W Mucinex  - RVP negative  - pending pulm recommendations.     # Allergic reaction with Diffuse erythematous rash   - Benadryl PRN     # HTN- norvasc   # Chronic back pain 2/2 spinal stenosis and chronic, acute/subacute compression fractures- c/w percocet , OP pain management   # Constipation- c/w senna and miralax   # Hx of DVT/PE on Eliquis   # Nicotine dependence: encourage cessation, c/w nicotine patch     Eliquis for dvt ppx     #Progress Note Handoff:  Pending (specify):  improvement in COPD exacerbation   Disposition: Home___/SNF___/Other________/Unknown at this time___x_____

## 2021-08-30 NOTE — PROGRESS NOTE ADULT - SUBJECTIVE AND OBJECTIVE BOX
SUBJECTIVE:    Patient is a 71y old Female who presents with a chief complaint of shortness of breath (30 Aug 2021 11:52)    Currently admitted to medicine with the primary diagnosis of COPD exacerbation       Today is hospital day 6d. Overnight events noted.     Admit Diagnosis:  COPD EXACERBATION;SHORTNESS OF BREATH        PAST MEDICAL & SURGICAL HISTORY  COPD (chronic obstructive pulmonary disease)    Anxiety    Depression    Chronic pain    Hypertension    Chronic GERD    Spinal stenosis    Pulmonary embolism    Deep vein thrombosis (DVT)    Neck mass    COPD (chronic obstructive pulmonary disease)    Anxiety    Depression    Chronic pain    Insomnia    Hypertension    Chronic GERD    Spinal stenosis    Pulmonary embolism    Deep vein thrombosis (DVT)    No significant past surgical history    Neck mass        SOCIAL HISTORY:  Negative for smoking/alcohol/drug use.     ALLERGIES:  penicillin (Unknown)    MEDICATIONS:  STANDING MEDICATIONS  albuterol/ipratropium for Nebulization 3 milliLiter(s) Nebulizer every 6 hours  amLODIPine   Tablet 10 milliGRAM(s) Oral daily  apixaban 5 milliGRAM(s) Oral two times a day  budesonide 160 MICROgram(s)/formoterol 4.5 MICROgram(s) Inhaler 2 Puff(s) Inhalation two times a day  guaiFENesin  milliGRAM(s) Oral every 12 hours  nicotine   7 mG/24 Hr(s) Transdermal Patch -  Peds 1 Patch Transdermal daily  nystatin    Suspension 074432 Unit(s) Oral three times a day  pantoprazole    Tablet 40 milliGRAM(s) Oral before breakfast  polyethylene glycol 3350 17 Gram(s) Oral daily  predniSONE   Tablet 60 milliGRAM(s) Oral daily  senna 2 Tablet(s) Oral at bedtime  tiotropium 18 MICROgram(s) Capsule 1 Capsule(s) Inhalation daily  traZODone 50 milliGRAM(s) Oral at bedtime    PRN MEDICATIONS  ALPRAZolam 1 milliGRAM(s) Oral two times a day PRN  diphenhydrAMINE 25 milliGRAM(s) Oral every 4 hours PRN  lidocaine   4% Patch 1 Patch Transdermal daily PRN  melatonin 5 milliGRAM(s) Oral at bedtime PRN  naproxen 500 milliGRAM(s) Oral two times a day PRN  oxycodone    5 mG/acetaminophen 325 mG 2 Tablet(s) Oral every 8 hours PRN    VITALS:   T(F): 97.8, Max: 97.8 (08-30-21 @ 14:57)  HR: 114 (78 - 115)  BP: 144/82 (144/82 - 170/77)  RR: 24 (18 - 24)  SpO2: 97% (97% - 97%)    I&Os:  08-29-21 @ 07:01  -  08-30-21 @ 07:00  --------------------------------------------------------  IN: 1000 mL / OUT: 0 mL / NET: 1000 mL        PHYSICAL EXAM:  GEN: No acute distress  LUNGS: Diffuse bilateral wheezes  HEART: S1/S2  ABD: Soft, NT/ND. BS +  EXT: no cyanosis/edema  NEURO: AAOX3    LABS:                        10.6   22.30 )-----------( 388      ( 30 Aug 2021 06:10 )             34.2     Hgb: 10.6 <--, 10.8 <--    08-30    141  |  103  |  20  ----------------------------<  80  4.8   |  34<H>  |  0.5<L>    Ca    8.7      30 Aug 2021 06:10      Creatinine trend: 0.5<--, 0.5<--, 0.6<--, 0.5<--, 0.6<--            Culture - Blood (collected 08-24-21 @ 11:42)  Source: .Blood None  Final Report (08-29-21 @ 22:00):    No Growth Final          COVID-19 PCR: NotDetec (08-24-21 @ 00:20)          Procalcitonin, Serum: <0.02 ng/mL (08-24-21 @ 11:42)        RADIOLOGY:

## 2021-08-31 PROBLEM — I26.99 OTHER PULMONARY EMBOLISM WITHOUT ACUTE COR PULMONALE: Chronic | Status: ACTIVE | Noted: 2021-08-24

## 2021-08-31 PROBLEM — I82.409 ACUTE EMBOLISM AND THROMBOSIS OF UNSPECIFIED DEEP VEINS OF UNSPECIFIED LOWER EXTREMITY: Chronic | Status: ACTIVE | Noted: 2021-08-24

## 2021-08-31 LAB
ANION GAP SERPL CALC-SCNC: 10 MMOL/L — SIGNIFICANT CHANGE UP (ref 7–14)
BUN SERPL-MCNC: 23 MG/DL — HIGH (ref 10–20)
CALCIUM SERPL-MCNC: 8.5 MG/DL — SIGNIFICANT CHANGE UP (ref 8.5–10.1)
CHLORIDE SERPL-SCNC: 98 MMOL/L — SIGNIFICANT CHANGE UP (ref 98–110)
CO2 SERPL-SCNC: 28 MMOL/L — SIGNIFICANT CHANGE UP (ref 17–32)
CREAT SERPL-MCNC: 0.6 MG/DL — LOW (ref 0.7–1.5)
GLUCOSE SERPL-MCNC: 178 MG/DL — HIGH (ref 70–99)
HCT VFR BLD CALC: 35.2 % — LOW (ref 37–47)
HGB BLD-MCNC: 10.9 G/DL — LOW (ref 12–16)
MCHC RBC-ENTMCNC: 27.9 PG — SIGNIFICANT CHANGE UP (ref 27–31)
MCHC RBC-ENTMCNC: 31 G/DL — LOW (ref 32–37)
MCV RBC AUTO: 90 FL — SIGNIFICANT CHANGE UP (ref 81–99)
NRBC # BLD: 0 /100 WBCS — SIGNIFICANT CHANGE UP (ref 0–0)
PLATELET # BLD AUTO: 405 K/UL — HIGH (ref 130–400)
POTASSIUM SERPL-MCNC: 4.6 MMOL/L — SIGNIFICANT CHANGE UP (ref 3.5–5)
POTASSIUM SERPL-SCNC: 4.6 MMOL/L — SIGNIFICANT CHANGE UP (ref 3.5–5)
RBC # BLD: 3.91 M/UL — LOW (ref 4.2–5.4)
RBC # FLD: 13 % — SIGNIFICANT CHANGE UP (ref 11.5–14.5)
SODIUM SERPL-SCNC: 136 MMOL/L — SIGNIFICANT CHANGE UP (ref 135–146)
WBC # BLD: 23.54 K/UL — HIGH (ref 4.8–10.8)
WBC # FLD AUTO: 23.54 K/UL — HIGH (ref 4.8–10.8)

## 2021-08-31 PROCEDURE — 99232 SBSQ HOSP IP/OBS MODERATE 35: CPT

## 2021-08-31 RX ADMIN — Medication 1 PATCH: at 06:13

## 2021-08-31 RX ADMIN — Medication 50 MILLIGRAM(S): at 21:56

## 2021-08-31 RX ADMIN — Medication 60 MILLIGRAM(S): at 14:09

## 2021-08-31 RX ADMIN — Medication 1 MILLIGRAM(S): at 12:43

## 2021-08-31 RX ADMIN — Medication 3 MILLILITER(S): at 08:39

## 2021-08-31 RX ADMIN — APIXABAN 5 MILLIGRAM(S): 2.5 TABLET, FILM COATED ORAL at 17:13

## 2021-08-31 RX ADMIN — Medication 1 PATCH: at 13:26

## 2021-08-31 RX ADMIN — OXYCODONE AND ACETAMINOPHEN 2 TABLET(S): 5; 325 TABLET ORAL at 22:11

## 2021-08-31 RX ADMIN — OXYCODONE AND ACETAMINOPHEN 2 TABLET(S): 5; 325 TABLET ORAL at 14:50

## 2021-08-31 RX ADMIN — BUDESONIDE AND FORMOTEROL FUMARATE DIHYDRATE 2 PUFF(S): 160; 4.5 AEROSOL RESPIRATORY (INHALATION) at 21:57

## 2021-08-31 RX ADMIN — APIXABAN 5 MILLIGRAM(S): 2.5 TABLET, FILM COATED ORAL at 06:02

## 2021-08-31 RX ADMIN — PANTOPRAZOLE SODIUM 40 MILLIGRAM(S): 20 TABLET, DELAYED RELEASE ORAL at 06:02

## 2021-08-31 RX ADMIN — Medication 600 MILLIGRAM(S): at 06:02

## 2021-08-31 RX ADMIN — Medication 500000 UNIT(S): at 06:02

## 2021-08-31 RX ADMIN — OXYCODONE AND ACETAMINOPHEN 2 TABLET(S): 5; 325 TABLET ORAL at 06:08

## 2021-08-31 RX ADMIN — Medication 3 MILLILITER(S): at 13:18

## 2021-08-31 RX ADMIN — BUDESONIDE AND FORMOTEROL FUMARATE DIHYDRATE 2 PUFF(S): 160; 4.5 AEROSOL RESPIRATORY (INHALATION) at 08:04

## 2021-08-31 RX ADMIN — Medication 60 MILLIGRAM(S): at 06:02

## 2021-08-31 RX ADMIN — AMLODIPINE BESYLATE 10 MILLIGRAM(S): 2.5 TABLET ORAL at 06:02

## 2021-08-31 RX ADMIN — Medication 1 PATCH: at 12:43

## 2021-08-31 RX ADMIN — Medication 1 PATCH: at 19:30

## 2021-08-31 RX ADMIN — Medication 500000 UNIT(S): at 21:57

## 2021-08-31 RX ADMIN — POLYETHYLENE GLYCOL 3350 17 GRAM(S): 17 POWDER, FOR SOLUTION ORAL at 12:44

## 2021-08-31 RX ADMIN — Medication 600 MILLIGRAM(S): at 17:13

## 2021-08-31 RX ADMIN — Medication 60 MILLIGRAM(S): at 21:53

## 2021-08-31 RX ADMIN — SENNA PLUS 2 TABLET(S): 8.6 TABLET ORAL at 21:56

## 2021-08-31 RX ADMIN — OXYCODONE AND ACETAMINOPHEN 2 TABLET(S): 5; 325 TABLET ORAL at 14:09

## 2021-08-31 RX ADMIN — Medication 500000 UNIT(S): at 14:09

## 2021-08-31 RX ADMIN — OXYCODONE AND ACETAMINOPHEN 2 TABLET(S): 5; 325 TABLET ORAL at 07:05

## 2021-08-31 RX ADMIN — LIDOCAINE 1 PATCH: 4 CREAM TOPICAL at 18:50

## 2021-08-31 NOTE — PROGRESS NOTE ADULT - SUBJECTIVE AND OBJECTIVE BOX
SUBJECTIVE:    Patient is a 71y old Female who presents with a chief complaint of shortness of breath (30 Aug 2021 16:20)    Currently admitted to medicine with the primary diagnosis of COPD exacerbation    Today is hospital day 7d. Overnight events noted.     Admit Diagnosis:  COPD EXACERBATION;SHORTNESS OF BREATH        PAST MEDICAL & SURGICAL HISTORY  COPD (chronic obstructive pulmonary disease)    Anxiety    Depression    Chronic pain    Hypertension    Chronic GERD    Spinal stenosis    Pulmonary embolism    Deep vein thrombosis (DVT)    Neck mass    COPD (chronic obstructive pulmonary disease)    Anxiety    Depression    Chronic pain    Insomnia    Hypertension    Chronic GERD    Spinal stenosis    Pulmonary embolism    Deep vein thrombosis (DVT)    No significant past surgical history    Neck mass        SOCIAL HISTORY:  Negative for smoking/alcohol/drug use.     ALLERGIES:  penicillin (Unknown)    MEDICATIONS:  STANDING MEDICATIONS  albuterol/ipratropium for Nebulization 3 milliLiter(s) Nebulizer every 6 hours  amLODIPine   Tablet 10 milliGRAM(s) Oral daily  apixaban 5 milliGRAM(s) Oral two times a day  budesonide 160 MICROgram(s)/formoterol 4.5 MICROgram(s) Inhaler 2 Puff(s) Inhalation two times a day  guaiFENesin  milliGRAM(s) Oral every 12 hours  methylPREDNISolone sodium succinate Injectable 60 milliGRAM(s) IV Push every 8 hours  nicotine   7 mG/24 Hr(s) Transdermal Patch -  Peds 1 Patch Transdermal daily  nystatin    Suspension 700006 Unit(s) Oral three times a day  pantoprazole    Tablet 40 milliGRAM(s) Oral before breakfast  polyethylene glycol 3350 17 Gram(s) Oral daily  senna 2 Tablet(s) Oral at bedtime  tiotropium 18 MICROgram(s) Capsule 1 Capsule(s) Inhalation daily  traZODone 50 milliGRAM(s) Oral at bedtime    PRN MEDICATIONS  ALPRAZolam 1 milliGRAM(s) Oral two times a day PRN  diphenhydrAMINE 25 milliGRAM(s) Oral every 4 hours PRN  lidocaine   4% Patch 1 Patch Transdermal daily PRN  melatonin 5 milliGRAM(s) Oral at bedtime PRN  naproxen 500 milliGRAM(s) Oral two times a day PRN  oxycodone    5 mG/acetaminophen 325 mG 2 Tablet(s) Oral every 8 hours PRN    VITALS:   T(F): 97.5, Max: 97.8 (08-30-21 @ 14:57)  HR: 104 (81 - 114)  BP: 165/73 (138/60 - 165/73)  RR: 20 (20 - 24)  SpO2: 97% (97% - 98%)    I&Os:  08-30-21 @ 07:01  -  08-31-21 @ 07:00  --------------------------------------------------------  IN: 420 mL / OUT: 0 mL / NET: 420 mL        PHYSICAL EXAM:  GEN: No acute distress  LUNGS: Diffuse bilateral wheezes  HEART: S1/S2  ABD: Soft, NT/ND. BS +  EXT: no cyanosis/edema  NEURO: AAOX3    LABS:                        10.9   23.54 )-----------( 405      ( 31 Aug 2021 06:25 )             35.2     Hgb: 10.9 <--, 10.6 <--, 10.8 <--    08-31    136  |  98  |  23<H>  ----------------------------<  178<H>  4.6   |  28  |  0.6<L>    Ca    8.5      31 Aug 2021 06:25      Creatinine trend: 0.6<--, 0.5<--, 0.5<--, 0.6<--, 0.5<--, 0.6<--      Culture - Blood (collected 08-24-21 @ 11:42)  Source: .Blood None  Final Report (08-29-21 @ 22:00):    No Growth Final      COVID-19 PCR: NotDetec (08-24-21 @ 00:20)      Procalcitonin, Serum: <0.02 ng/mL (08-24-21 @ 11:42)      RADIOLOGY:

## 2021-08-31 NOTE — PROGRESS NOTE ADULT - ATTENDING COMMENTS
71 year old female patient with past medical history of COPD on 2 L NC prn , DVT / PE on Eliquis, chronic back pain due to spinal stenosis and chronic compression fractures, GERD presenting for dyspnea.    # Acute Exacerbation of COPD  - diffuse wheezing on exam   - Chest XR unremarkable   - continue Azithromycin  - c/w Albuterol Neb q6h and PRN  - Methylprednisone 60 BID  - c/w Symbicort & Spiriva   - C/W Mucinex    #HTN- norvasc   #Chronic back pain 2/2 spinal stenosis and chronic, acute/subacute compression fractures- c/w percocet , OP pain management   #Constipation- c/w senna and miralax   #Hx of DVT/PE on Eliquis   #Nicotine dependence: encourage cessation, c/w nicotine patch     Eliquis for dvt ppx     #Progress Note Handoff:  Pending (specify):  improvement in COPD exacerbation   Family discussion: d/w pt at bedside   Disposition: Home___/SNF___/Other________/Unknown at this time___x_____      Sandra Nicholson DO
71 year old female with past medical history of COPD on 2 L NC prn , DVT / PE on Eliquis, chronic back pain due to spinal stenosis and chronic compression fractures, GERD presenting for dyspnea.    # Acute Exacerbation of COPD  - pt with diffuse wheezing, some improvement in conversational dyspnea since admission, and ambulates comfortably around the unit   - s/p azithromycin   - has been on IV Solumedrol for about 1 week, without significant change in clinical exam findings   - likely has severe chronic COPD, and will need a prolonged steroid taper  - according to patient, pulmonary team recommended that she needed another day of IV steroids   - I anticipate that she will be well enough to return home tomorrow  with steroid taper   - home health aid set up for tomorrow   - c/w Symbicort & Spiriva   - C/W Mucinex    # HTN- norvasc   # Chronic back pain 2/2 spinal stenosis and chronic, acute/subacute compression fractures- c/w percocet , OP pain management   # Constipation- c/w senna and miralax   # Hx of DVT/PE on Eliquis   # Nicotine dependence: encourage cessation, c/w nicotine patch     Eliquis for dvt ppx     #Progress Note Handoff:  Likely discharge tomorrow, patient aware   Disposition: Home_x_/SNF___/Other________/Unknown at this time_______      Sandra Nicholson, 
71 year old female with past medical history of COPD on 2 L NC prn , DVT / PE on Eliquis, chronic back pain due to spinal stenosis and chronic compression fractures, GERD presenting for dyspnea.    Presenting with acute exacerbation of COPD. Patient continues to have diffuse wheezing on exam, but has some improvement in conversational dyspnea today. Likely patient has severe COPD and has chronic wheezing on examination. Will switch to PO Prednisone and monitor for improvement. Not requiring oxygen currently, ambulating around the unit comfortably. Finished 5d course of Azithromycin. Continue nebulizer, and home inhalers. RVP pending.     Remainder as above.     #Progress Note Handoff:  Pending (specify):  improvement in COPD exacerbation , possible dc tomorrow   Family discussion: d/w pt at bedside   Disposition: Home___/SNF___/Other________/Unknown at this time___x_____      Sandra Nicholson DO .
71 year old female with past medical history of COPD on 2 L NC prn , DVT / PE on Eliquis, chronic back pain due to spinal stenosis and chronic compression fractures, GERD presenting for dyspnea.    Presenting with acute exacerbation of COPD. Patient continues to have diffuse wheezing on exam, but has some improvement in conversational dyspnea. Likely patient has severe COPD and has chronic wheezing on examination. Will switch to PO Prednisone and monitor for improvement. Not requiring oxygen currently, ambulating around the unit comfortably. Finished 5d course of Azithromycin. Continue nebulizer, and home inhalers. RVP negative. Appreciate Pulmonary Attending follow up.     Remainder as above.     #Progress Note Handoff:  Pending (specify):  improvement in COPD exacerbation , possible dc tomorrow   Family discussion: d/w pt at bedside   Disposition: Home___/SNF___/Other________/Unknown at this time___x_____      Sandra Nicholson DO .
Impression:  Acute exacerbation of COPD  HO DVT/PE on eliquis  Tobacco smoking

## 2021-08-31 NOTE — PROGRESS NOTE ADULT - ASSESSMENT
71 year old female with past medical history of COPD on 2 L NC prn , DVT / PE on Eliquis, chronic back pain due to spinal stenosis and chronic compression fractures, GERD presenting for dyspnea.    # Acute Exacerbation of COPD  - continues to have diffuse wheezing on exam   - last Chest XR unremarkable   - s/p Azithromycin  - c/w Albuterol Neb q6h and PRN  - Methylprednisone 60 IV TID --> PO Prednisone 60 QD   - c/w Symbicort & Spiriva   - C/W Mucinex  - RVP negative  - pulm recommendations noted.  - Patient is clinically stable on room air. Ambulates on floor with walker. Patient denies any change in breathing. Diffuse wheezing has been persistent on examination since admission despite improvement in dyspnea. Will discharge today on prednisone po taper.    # Allergic reaction with Diffuse erythematous rash   - Benadryl PRN     # HTN- norvasc   # Chronic back pain 2/2 spinal stenosis and chronic, acute/subacute compression fractures- c/w percocet , OP pain management   # Constipation- c/w senna and miralax   # Hx of DVT/PE on Eliquis   # Nicotine dependence: encourage cessation, c/w nicotine patch     Eliquis for dvt ppx     #Progress Note Handoff:  Disposition: Home_x_/SNF___/Other________/Unknown at this time_______

## 2021-09-01 ENCOUNTER — TRANSCRIPTION ENCOUNTER (OUTPATIENT)
Age: 71
End: 2021-09-01

## 2021-09-01 VITALS
RESPIRATION RATE: 20 BRPM | HEART RATE: 116 BPM | SYSTOLIC BLOOD PRESSURE: 161 MMHG | DIASTOLIC BLOOD PRESSURE: 70 MMHG | TEMPERATURE: 98 F

## 2021-09-01 PROCEDURE — 99239 HOSP IP/OBS DSCHRG MGMT >30: CPT

## 2021-09-01 RX ORDER — OXYCODONE AND ACETAMINOPHEN 5; 325 MG/1; MG/1
1 TABLET ORAL ONCE
Refills: 0 | Status: DISCONTINUED | OUTPATIENT
Start: 2021-09-01 | End: 2021-09-01

## 2021-09-01 RX ORDER — NICOTINE POLACRILEX 2 MG
1 GUM BUCCAL
Qty: 7 | Refills: 0
Start: 2021-09-01 | End: 2021-09-07

## 2021-09-01 RX ORDER — AMLODIPINE BESYLATE 2.5 MG/1
1 TABLET ORAL
Qty: 30 | Refills: 0
Start: 2021-09-01 | End: 2021-09-30

## 2021-09-01 RX ADMIN — Medication 60 MILLIGRAM(S): at 11:47

## 2021-09-01 RX ADMIN — OXYCODONE AND ACETAMINOPHEN 1 TABLET(S): 5; 325 TABLET ORAL at 08:37

## 2021-09-01 RX ADMIN — Medication 3 MILLILITER(S): at 13:00

## 2021-09-01 RX ADMIN — BUDESONIDE AND FORMOTEROL FUMARATE DIHYDRATE 2 PUFF(S): 160; 4.5 AEROSOL RESPIRATORY (INHALATION) at 08:54

## 2021-09-01 RX ADMIN — LIDOCAINE 1 PATCH: 4 CREAM TOPICAL at 06:32

## 2021-09-01 RX ADMIN — Medication 3 MILLILITER(S): at 08:21

## 2021-09-01 RX ADMIN — OXYCODONE AND ACETAMINOPHEN 1 TABLET(S): 5; 325 TABLET ORAL at 06:47

## 2021-09-01 RX ADMIN — Medication 60 MILLIGRAM(S): at 06:30

## 2021-09-01 RX ADMIN — Medication 1 PATCH: at 08:53

## 2021-09-01 RX ADMIN — OXYCODONE AND ACETAMINOPHEN 1 TABLET(S): 5; 325 TABLET ORAL at 11:50

## 2021-09-01 RX ADMIN — OXYCODONE AND ACETAMINOPHEN 1 TABLET(S): 5; 325 TABLET ORAL at 14:37

## 2021-09-01 RX ADMIN — OXYCODONE AND ACETAMINOPHEN 1 TABLET(S): 5; 325 TABLET ORAL at 07:48

## 2021-09-01 RX ADMIN — AMLODIPINE BESYLATE 10 MILLIGRAM(S): 2.5 TABLET ORAL at 06:31

## 2021-09-01 RX ADMIN — APIXABAN 5 MILLIGRAM(S): 2.5 TABLET, FILM COATED ORAL at 06:31

## 2021-09-01 RX ADMIN — Medication 1 PATCH: at 11:46

## 2021-09-01 RX ADMIN — Medication 500000 UNIT(S): at 06:31

## 2021-09-01 RX ADMIN — PANTOPRAZOLE SODIUM 40 MILLIGRAM(S): 20 TABLET, DELAYED RELEASE ORAL at 06:31

## 2021-09-01 RX ADMIN — OXYCODONE AND ACETAMINOPHEN 1 TABLET(S): 5; 325 TABLET ORAL at 08:51

## 2021-09-01 RX ADMIN — Medication 600 MILLIGRAM(S): at 06:31

## 2021-09-01 RX ADMIN — Medication 1 PATCH: at 11:57

## 2021-09-01 RX ADMIN — POLYETHYLENE GLYCOL 3350 17 GRAM(S): 17 POWDER, FOR SOLUTION ORAL at 11:47

## 2021-09-01 NOTE — PROGRESS NOTE ADULT - SUBJECTIVE AND OBJECTIVE BOX
SUBJECTIVE:    Patient is a 71y old Female who presents with a chief complaint of shortness of breath (31 Aug 2021 11:40)    Currently admitted to medicine with the primary diagnosis of COPD exacerbation       Today is hospital day 8d. Overnight events noted.     Admit Diagnosis:  COPD EXACERBATION;SHORTNESS OF BREATH        PAST MEDICAL & SURGICAL HISTORY  COPD (chronic obstructive pulmonary disease)    Anxiety    Depression    Chronic pain    Hypertension    Chronic GERD    Spinal stenosis    Pulmonary embolism    Deep vein thrombosis (DVT)    Neck mass    COPD (chronic obstructive pulmonary disease)    Anxiety    Depression    Chronic pain    Insomnia    Hypertension    Chronic GERD    Spinal stenosis    Pulmonary embolism    Deep vein thrombosis (DVT)    No significant past surgical history    Neck mass        SOCIAL HISTORY:  Negative for smoking/alcohol/drug use.     ALLERGIES:  penicillin (Unknown)    MEDICATIONS:  STANDING MEDICATIONS  albuterol/ipratropium for Nebulization 3 milliLiter(s) Nebulizer every 6 hours  amLODIPine   Tablet 10 milliGRAM(s) Oral daily  apixaban 5 milliGRAM(s) Oral two times a day  budesonide 160 MICROgram(s)/formoterol 4.5 MICROgram(s) Inhaler 2 Puff(s) Inhalation two times a day  guaiFENesin  milliGRAM(s) Oral every 12 hours  methylPREDNISolone sodium succinate Injectable 60 milliGRAM(s) IV Push every 8 hours  nicotine   7 mG/24 Hr(s) Transdermal Patch -  Peds 1 Patch Transdermal daily  nystatin    Suspension 736928 Unit(s) Oral three times a day  oxycodone    5 mG/acetaminophen 325 mG 1 Tablet(s) Oral once  pantoprazole    Tablet 40 milliGRAM(s) Oral before breakfast  polyethylene glycol 3350 17 Gram(s) Oral daily  senna 2 Tablet(s) Oral at bedtime  tiotropium 18 MICROgram(s) Capsule 1 Capsule(s) Inhalation daily  traZODone 50 milliGRAM(s) Oral at bedtime    PRN MEDICATIONS  diphenhydrAMINE 25 milliGRAM(s) Oral every 4 hours PRN  lidocaine   4% Patch 1 Patch Transdermal daily PRN  melatonin 5 milliGRAM(s) Oral at bedtime PRN  naproxen 500 milliGRAM(s) Oral two times a day PRN    VITALS:   T(F): 97.6, Max: 99.9 (08-31-21 @ 20:11)  HR: 80 (80 - 112)  BP: 154/69 (141/70 - 154/69)  RR: 18 (18 - 20)  SpO2: --    I&Os:  08-31-21 @ 07:01  -  09-01-21 @ 07:00  --------------------------------------------------------  IN: 480 mL / OUT: 0 mL / NET: 480 mL    09-01-21 @ 07:01  -  09-01-21 @ 11:37  --------------------------------------------------------  IN: 210 mL / OUT: 0 mL / NET: 210 mL        PHYSICAL EXAM:  GEN: No acute distress  LUNGS: Diffuse bilateral wheezes  HEART: S1/S2  ABD: Soft, NT/ND. BS +  EXT: no cyanosis/edema  NEURO: AAOX3    LABS:                        10.9   23.54 )-----------( 405      ( 31 Aug 2021 06:25 )             35.2     Hgb: 10.9 <--, 10.6 <--    08-31    136  |  98  |  23<H>  ----------------------------<  178<H>  4.6   |  28  |  0.6<L>    Ca    8.5      31 Aug 2021 06:25      Creatinine trend: 0.6<--, 0.5<--, 0.5<--, 0.6<--, 0.5<--, 0.6<--      COVID-19 PCR: NotDetec (08-24-21 @ 00:20)      Procalcitonin, Serum: <0.02 ng/mL (08-24-21 @ 11:42)      RADIOLOGY:

## 2021-09-01 NOTE — PROGRESS NOTE ADULT - PROVIDER SPECIALTY LIST ADULT
Pulmonology
Internal Medicine
Pulmonology

## 2021-09-01 NOTE — DISCHARGE NOTE NURSING/CASE MANAGEMENT/SOCIAL WORK - PATIENT PORTAL LINK FT
You can access the FollowMyHealth Patient Portal offered by [facility] by registering at the following website: [email or web address]. By joining [facility]’s FollowMyHealth portal, you will also be able to view your health information using other applications (apps) compatible with our system.

## 2021-09-01 NOTE — DISCHARGE NOTE NURSING/CASE MANAGEMENT/SOCIAL WORK - NSFLUVACAGEDISCH_IMM_ALL_CORE
CBC  No further intervention is indicated at this time.
Adult

## 2021-09-07 ENCOUNTER — APPOINTMENT (OUTPATIENT)
Dept: OPHTHALMOLOGY | Facility: CLINIC | Age: 71
End: 2021-09-07

## 2021-09-07 ENCOUNTER — OUTPATIENT (OUTPATIENT)
Dept: OUTPATIENT SERVICES | Facility: HOSPITAL | Age: 71
LOS: 1 days | Discharge: HOME | End: 2021-09-07
Payer: MEDICARE

## 2021-09-07 DIAGNOSIS — F32.9 MAJOR DEPRESSIVE DISORDER, SINGLE EPISODE, UNSPECIFIED: ICD-10-CM

## 2021-09-07 DIAGNOSIS — Z86.711 PERSONAL HISTORY OF PULMONARY EMBOLISM: ICD-10-CM

## 2021-09-07 DIAGNOSIS — Z88.0 ALLERGY STATUS TO PENICILLIN: ICD-10-CM

## 2021-09-07 DIAGNOSIS — R22.1 LOCALIZED SWELLING, MASS AND LUMP, NECK: Chronic | ICD-10-CM

## 2021-09-07 DIAGNOSIS — F41.9 ANXIETY DISORDER, UNSPECIFIED: ICD-10-CM

## 2021-09-07 DIAGNOSIS — I10 ESSENTIAL (PRIMARY) HYPERTENSION: ICD-10-CM

## 2021-09-07 DIAGNOSIS — F17.200 NICOTINE DEPENDENCE, UNSPECIFIED, UNCOMPLICATED: ICD-10-CM

## 2021-09-07 DIAGNOSIS — J44.1 CHRONIC OBSTRUCTIVE PULMONARY DISEASE WITH (ACUTE) EXACERBATION: ICD-10-CM

## 2021-09-07 DIAGNOSIS — Z99.81 DEPENDENCE ON SUPPLEMENTAL OXYGEN: ICD-10-CM

## 2021-09-07 DIAGNOSIS — K21.9 GASTRO-ESOPHAGEAL REFLUX DISEASE WITHOUT ESOPHAGITIS: ICD-10-CM

## 2021-09-07 DIAGNOSIS — Z79.01 LONG TERM (CURRENT) USE OF ANTICOAGULANTS: ICD-10-CM

## 2021-09-07 DIAGNOSIS — Z86.718 PERSONAL HISTORY OF OTHER VENOUS THROMBOSIS AND EMBOLISM: ICD-10-CM

## 2021-09-07 DIAGNOSIS — K59.00 CONSTIPATION, UNSPECIFIED: ICD-10-CM

## 2021-09-07 DIAGNOSIS — M48.50XA COLLAPSED VERTEBRA, NOT ELSEWHERE CLASSIFIED, SITE UNSPECIFIED, INITIAL ENCOUNTER FOR FRACTURE: ICD-10-CM

## 2021-09-07 DIAGNOSIS — M48.00 SPINAL STENOSIS, SITE UNSPECIFIED: ICD-10-CM

## 2021-09-07 PROCEDURE — 92004 COMPRE OPH EXAM NEW PT 1/>: CPT

## 2021-09-07 PROCEDURE — 92202 OPSCPY EXTND ON/MAC DRAW: CPT

## 2021-09-09 DIAGNOSIS — H35.89 OTHER SPECIFIED RETINAL DISORDERS: ICD-10-CM

## 2021-09-09 DIAGNOSIS — H43.393 OTHER VITREOUS OPACITIES, BILATERAL: ICD-10-CM

## 2021-09-09 DIAGNOSIS — H25.13 AGE-RELATED NUCLEAR CATARACT, BILATERAL: ICD-10-CM

## 2021-09-20 ENCOUNTER — APPOINTMENT (OUTPATIENT)
Dept: OPHTHALMOLOGY | Facility: CLINIC | Age: 71
End: 2021-09-20

## 2021-09-20 ENCOUNTER — OUTPATIENT (OUTPATIENT)
Dept: OUTPATIENT SERVICES | Facility: HOSPITAL | Age: 71
LOS: 1 days | Discharge: HOME | End: 2021-09-20
Payer: MEDICARE

## 2021-09-20 DIAGNOSIS — R22.1 LOCALIZED SWELLING, MASS AND LUMP, NECK: Chronic | ICD-10-CM

## 2021-09-20 PROCEDURE — 92136 OPHTHALMIC BIOMETRY: CPT | Mod: 26

## 2021-09-24 ENCOUNTER — LABORATORY RESULT (OUTPATIENT)
Age: 71
End: 2021-09-24

## 2021-09-24 ENCOUNTER — OUTPATIENT (OUTPATIENT)
Dept: OUTPATIENT SERVICES | Facility: HOSPITAL | Age: 71
LOS: 1 days | Discharge: HOME | End: 2021-09-24

## 2021-09-24 DIAGNOSIS — Z11.59 ENCOUNTER FOR SCREENING FOR OTHER VIRAL DISEASES: ICD-10-CM

## 2021-09-24 DIAGNOSIS — R22.1 LOCALIZED SWELLING, MASS AND LUMP, NECK: Chronic | ICD-10-CM

## 2021-09-27 ENCOUNTER — OUTPATIENT (OUTPATIENT)
Dept: OUTPATIENT SERVICES | Facility: HOSPITAL | Age: 71
LOS: 1 days | Discharge: HOME | End: 2021-09-27

## 2021-09-27 VITALS
OXYGEN SATURATION: 96 % | HEIGHT: 64 IN | RESPIRATION RATE: 16 BRPM | TEMPERATURE: 97 F | HEART RATE: 103 BPM | SYSTOLIC BLOOD PRESSURE: 132 MMHG | DIASTOLIC BLOOD PRESSURE: 84 MMHG | WEIGHT: 158.95 LBS

## 2021-09-27 VITALS
DIASTOLIC BLOOD PRESSURE: 84 MMHG | SYSTOLIC BLOOD PRESSURE: 132 MMHG | HEART RATE: 109 BPM | TEMPERATURE: 97 F | HEIGHT: 64 IN | OXYGEN SATURATION: 96 % | RESPIRATION RATE: 16 BRPM | WEIGHT: 158.95 LBS

## 2021-09-27 DIAGNOSIS — R22.1 LOCALIZED SWELLING, MASS AND LUMP, NECK: Chronic | ICD-10-CM

## 2021-09-27 RX ORDER — TRAZODONE HCL 50 MG
1 TABLET ORAL
Qty: 0 | Refills: 0 | DISCHARGE

## 2021-09-27 RX ORDER — ALBUTEROL 90 UG/1
2 AEROSOL, METERED ORAL
Qty: 0 | Refills: 0 | DISCHARGE

## 2021-09-27 RX ORDER — ALPRAZOLAM 0.25 MG
1 TABLET ORAL
Qty: 0 | Refills: 0 | DISCHARGE

## 2021-09-27 RX ORDER — IPRATROPIUM/ALBUTEROL SULFATE 18-103MCG
1 AEROSOL WITH ADAPTER (GRAM) INHALATION
Qty: 0 | Refills: 0 | DISCHARGE

## 2021-09-27 RX ORDER — ALBUTEROL 90 UG/1
0 AEROSOL, METERED ORAL
Qty: 0 | Refills: 0 | DISCHARGE

## 2021-09-27 RX ORDER — LACTULOSE 10 G/15ML
15 SOLUTION ORAL
Qty: 0 | Refills: 0 | DISCHARGE

## 2021-09-27 RX ORDER — PANTOPRAZOLE SODIUM 20 MG/1
1 TABLET, DELAYED RELEASE ORAL
Qty: 0 | Refills: 0 | DISCHARGE

## 2021-09-27 NOTE — ASU PATIENT PROFILE, ADULT - NSICDXPASTMEDICALHX_GEN_ALL_CORE_FT
PAST MEDICAL HISTORY:  Anxiety     Chronic GERD denies    Chronic pain     COPD (chronic obstructive pulmonary disease)     Deep vein thrombosis (DVT)     Depression     Hypertension denies    Pulmonary embolism     Spinal stenosis

## 2021-09-27 NOTE — PRE-ANESTHESIA EVALUATION ADULT - NSANTHAPLANRD_GEN_ALL_CORE
Return to the clinic in 3 month/s.  Will contact with results as needed.     monitored anesthesia care (MAC)

## 2021-09-28 ENCOUNTER — OUTPATIENT (OUTPATIENT)
Dept: OUTPATIENT SERVICES | Facility: HOSPITAL | Age: 71
LOS: 1 days | Discharge: HOME | End: 2021-09-28
Payer: SELF-PAY

## 2021-09-28 ENCOUNTER — APPOINTMENT (OUTPATIENT)
Dept: OPHTHALMOLOGY | Facility: CLINIC | Age: 71
End: 2021-09-28

## 2021-09-28 DIAGNOSIS — H35.89 OTHER SPECIFIED RETINAL DISORDERS: ICD-10-CM

## 2021-09-28 DIAGNOSIS — H25.13 AGE-RELATED NUCLEAR CATARACT, BILATERAL: ICD-10-CM

## 2021-09-28 DIAGNOSIS — R22.1 LOCALIZED SWELLING, MASS AND LUMP, NECK: Chronic | ICD-10-CM

## 2021-09-28 DIAGNOSIS — H43.393 OTHER VITREOUS OPACITIES, BILATERAL: ICD-10-CM

## 2021-09-28 PROBLEM — I10 ESSENTIAL (PRIMARY) HYPERTENSION: Chronic | Status: ACTIVE | Noted: 2020-02-14

## 2021-09-28 PROBLEM — K21.9 GASTRO-ESOPHAGEAL REFLUX DISEASE WITHOUT ESOPHAGITIS: Chronic | Status: ACTIVE | Noted: 2020-02-14

## 2021-09-28 PROCEDURE — 99024 POSTOP FOLLOW-UP VISIT: CPT

## 2021-09-29 ENCOUNTER — APPOINTMENT (OUTPATIENT)
Dept: OPHTHALMOLOGY | Facility: CLINIC | Age: 71
End: 2021-09-29

## 2021-10-03 DIAGNOSIS — Z86.718 PERSONAL HISTORY OF OTHER VENOUS THROMBOSIS AND EMBOLISM: ICD-10-CM

## 2021-10-03 DIAGNOSIS — M48.00 SPINAL STENOSIS, SITE UNSPECIFIED: ICD-10-CM

## 2021-10-03 DIAGNOSIS — G89.29 OTHER CHRONIC PAIN: ICD-10-CM

## 2021-10-03 DIAGNOSIS — Z79.01 LONG TERM (CURRENT) USE OF ANTICOAGULANTS: ICD-10-CM

## 2021-10-03 DIAGNOSIS — I10 ESSENTIAL (PRIMARY) HYPERTENSION: ICD-10-CM

## 2021-10-03 DIAGNOSIS — H25.12 AGE-RELATED NUCLEAR CATARACT, LEFT EYE: ICD-10-CM

## 2021-10-03 DIAGNOSIS — J44.9 CHRONIC OBSTRUCTIVE PULMONARY DISEASE, UNSPECIFIED: ICD-10-CM

## 2021-10-03 DIAGNOSIS — K21.9 GASTRO-ESOPHAGEAL REFLUX DISEASE WITHOUT ESOPHAGITIS: ICD-10-CM

## 2021-10-03 DIAGNOSIS — Z86.711 PERSONAL HISTORY OF PULMONARY EMBOLISM: ICD-10-CM

## 2021-10-12 ENCOUNTER — OUTPATIENT (OUTPATIENT)
Dept: OUTPATIENT SERVICES | Facility: HOSPITAL | Age: 71
LOS: 1 days | Discharge: HOME | End: 2021-10-12
Payer: SELF-PAY

## 2021-10-12 ENCOUNTER — APPOINTMENT (OUTPATIENT)
Dept: OPHTHALMOLOGY | Facility: CLINIC | Age: 71
End: 2021-10-12

## 2021-10-12 DIAGNOSIS — R22.1 LOCALIZED SWELLING, MASS AND LUMP, NECK: Chronic | ICD-10-CM

## 2021-10-12 PROCEDURE — 99024 POSTOP FOLLOW-UP VISIT: CPT

## 2021-10-14 DIAGNOSIS — H35.89 OTHER SPECIFIED RETINAL DISORDERS: ICD-10-CM

## 2021-10-14 DIAGNOSIS — H25.13 AGE-RELATED NUCLEAR CATARACT, BILATERAL: ICD-10-CM

## 2021-10-14 DIAGNOSIS — H43.393 OTHER VITREOUS OPACITIES, BILATERAL: ICD-10-CM

## 2021-12-06 NOTE — PATIENT PROFILE ADULT. - VISION (WITH CORRECTIVE LENSES IF THE PATIENT USUALLY WEARS THEM):
Case Management Discharge Note      Final Note: home with family assist         Selected Continued Care - Discharged on 12/3/2021 Admission date: 11/29/2021 - Discharge disposition: Home or Self Care    Destination    No services have been selected for the patient.              Durable Medical Equipment    No services have been selected for the patient.              Dialysis/Infusion    No services have been selected for the patient.              Home Medical Care    No services have been selected for the patient.              Therapy    No services have been selected for the patient.              Community Resources    No services have been selected for the patient.              Community & DME    No services have been selected for the patient.                  Transportation Services  Private: Car    Final Discharge Disposition Code: 01 - home or self-care  
Normal vision: sees adequately in most situations; can see medication labels, newsprint

## 2021-12-14 ENCOUNTER — APPOINTMENT (OUTPATIENT)
Dept: OPHTHALMOLOGY | Facility: CLINIC | Age: 71
End: 2021-12-14

## 2022-04-19 NOTE — ED PROVIDER NOTE - MDM ORDERS SUBMITTED SELECTION
Medications/Labs/Imaging Studies/EKG
Spontaneous vaginal delivery of liveborn infant from ROT position. Head, shoulders, and body delivered easily. Infant was suctioned. No mec. Tight nuchal cord x1 was present. Cord was clamped and cut and infant was passed to mother and then peds for evaluation. Apgars 8_9. Placenta delivered intact with a 3 vessel cord. Fundal massage was given and uterine fundus was found to be firm. Vaginal exam revealed an intact cervix, vaginal walls and sulci. Patient had a 1st degree laceration in the perineum that was repaired with 2.0 chromic suture. Right periurethral abrasion was present with good hemostasis noted. No sutures were needed. Excellent hemostasis was noted. Patient was stable. Count was correct x 2. .

## 2022-10-12 NOTE — ED PROVIDER NOTE - DATE/TIME 1
23-Jun-2018 03:41 Humira Counseling:  I discussed with the patient the risks of adalimumab including but not limited to myelosuppression, immunosuppression, autoimmune hepatitis, demyelinating diseases, lymphoma, and serious infections.  The patient understands that monitoring is required including a PPD at baseline and must alert us or the primary physician if symptoms of infection or other concerning signs are noted.

## 2023-01-01 NOTE — PATIENT PROFILE ADULT - NUMBER OF YRS
I have not received  metabolic screen. Please request records.    Goldie Denise, Pediatric Nurse Practitioner   [facility]     44

## 2023-03-15 NOTE — CHART NOTE - NSCHARTNOTESELECT_GEN_ALL_CORE
Off Service Note Add Postop Global No-Charge Code (28594)?: yes Detail Level: Simple Count Minor/Major Decisions Toward Mdm (Not Cosmetic)?: No

## 2023-11-28 NOTE — DISCHARGE NOTE NURSING/CASE MANAGEMENT/SOCIAL WORK - NSTRANSFERBELONGINGSRESP_GEN_A_NUR
Pharm/Fax:[phone] for prior authorization on:    Medication: fluticasone propionate (Flovent HFA) 44 MCG/ACT inhaler  Dosage: As directed  Quantity requested: 10.6 g   Pharmacy for prescription has been selected.    Medication: dexmethylphenidate (FOCALIN) 10 MG tablet  Dosage: 10mg   Quantity requested: 60 tablet   Pharmacy for prescription has been selected.    Initiation of prior authorization needed.  Prior authorization request has been initiated and sent for completion.    
yes
yes

## 2024-01-01 NOTE — H&P ADULT - RS GEN PE MLT RESP DETAILS PC
Problem: RESPIRATORY -   Goal: Respiratory Rate 30-60 with no apnea, bradycardia, cyanosis or desaturations  Description: INTERVENTIONS:  - Assess respiratory rate, work of breathing, breath sounds and ability to manage secretions  - Monitor SpO2 and administer supplemental oxygen as ordered  - Document episodes of apnea, bradycardia, cyanosis and desaturations.  Include all associated factors and interventions  Outcome: Progressing  Goal: Optimal ventilation and oxygenation for gestation and disease state  Description: INTERVENTIONS:  - Assess respiratory rate, work of breathing, breath sounds and ability to manage secretions  -  Monitor SpO2 and administer supplemental oxygen as ordered  -  Position infant to facilitate oxygenation and minimize respiratory effort  -  Assess the need for suctioning and aspirate as needed  -  Monitor blood gases  - Monitor for adverse effects and complications of mechanical ventilation  Outcome: Progressing     Problem: METABOLIC/FLUID AND ELECTROLYTES -   Goal: Serum bilirubin WDL for age, gestation and disease state.  Description: INTERVENTIONS:  - Assess for risk factors for hyperbilirubinemia  - Observe for jaundice  - Monitor serum bilirubin levels  - Initiate phototherapy as ordered  - Administer medications as ordered  Outcome: Progressing  Goal: Bedside glucose within target range.  No signs or symptoms of hypoglycemia  Description: INTERVENTIONS:INTERVENTIONS:  - Monitor for signs and symptoms of hypoglycemia  - Bedside glucose as ordered  - Administer IV glucose as ordered  - Change IV dextrose concentration, increase IV rate and/or feed infant as ordered  Outcome: Progressing  Goal: Bedside glucose within target range.  No signs or symptoms of hyperglycemia  Description: INTERVENTIONS:  - Monitor for signs and symptoms of hyperglycemia  - Bedside glucose as ordered  - Initiate insulin as ordered  Outcome: Progressing  Goal: No signs or symptoms of fluid  overload or dehydration.  Electrolytes WDL.  Description: INTERVENTIONS:  - Assess for signs and symptoms of fluid overload or dehydration  - Monitor intake and output, weight, and labs  - Administer IV fluids and medications as ordered  Outcome: Progressing     Problem: THERMOREGULATION - PEDIATRICS  Goal: Maintains normal body temperature  Description: Interventions:  - Monitor temperature (axillary for Newborns) as ordered  - Monitor for signs of hypothermia or hyperthermia  - Provide thermal support measures  - Wean to open crib when appropriate  Outcome: Progressing     Problem: SAFETY -   Goal: Patient will remain free from falls  Description: INTERVENTIONS:  - Instruct family/caregiver on patient safety  - Keep incubator doors and portholes closed when unattended  - Keep radiant warmer side rails and crib rails up when unattended  - Based on caregiver fall risk screen, instruct family/caregiver to ask for assistance with transferring infant if caregiver noted to have fall risk factors  Outcome: Progressing     Problem: Adequate NUTRIENT INTAKE -   Goal: Nutrient/Hydration intake appropriate for improving, restoring or maintaining nutritional needs  Description: INTERVENTIONS:  - Assess growth and nutritional status of patients and recommend course of action  - Monitor nutrient intake, labs, and treatment plans  - Recommend appropriate diets and vitamin/mineral supplements  - Monitor and recommend adjustments to tube feedings and TPN/PPN based on assessed needs  - Provide specific nutrition education as appropriate  Outcome: Progressing      airway patent/rhonchi/wheezes/respirations non-labored

## 2024-01-15 NOTE — ED PROVIDER NOTE - NSICDXPASTSURGICALHX_GEN_ALL_CORE_FT
Referral Request    Who is requesting: Mother Ernestine Nix made some appts thought she should have some referrals on file for Insurance purposes.  Mom said she had discussed this with Bhavna Botello NP  In the past.     Orders being requested:   1) Allergy Referral -[place] Sinus Issues   2) Dermatologist - [place] Tinea Corporis  3) ENT [place] - Sinus       Okay to leave a detailed message?: Yes at Home number on file [phone] (home)    [facility]     PAST SURGICAL HISTORY:  Neck mass

## 2024-03-14 NOTE — ED PROVIDER NOTE - IV ALTEPLASE ADMIN OUTSIDE HIDDEN
Subjective:     Patient ID: Ace Han Bernadette test negative and lateral Bernadette  test negative.      Left knee: No effusion.      Instability Tests: Medial Bernadette test negative and lateral Bernadette Sharan Vee MD  Authorized by: Sharan Vee MD    Consent Done?:  Yes (Verbal)  Indications:  Arthritis and pain  Site marked: the procedure site was marked    Timeout: prior to procedure the correct patient, procedure, and site was verified    Prep: patient was prepped and draped in usual sterile fashion      Local anesthesia used?: Yes    Local anesthetic:  Lidocaine 1% without epinephrine  Anesthetic total (ml):  2      Details:  Needle Size:  22 G  Ultrasonic Guidance for needle placement?: No    Approach:  Anterolateral  Location:  Knee  Site:  L knee  Medications:  80 mg methylPREDNISolone acetate 80 mg/mL  Patient tolerance:   Patient tolerated the procedure well with no immediate complications           show

## 2024-04-01 NOTE — CONSULT NOTE ADULT - ASSESSMENT
72 y/o female with b/l cerumen impaction  - Start Debrox to b/l ears. 5 drops to each ear 2x daily  - If being discharged can f/u for b/l disimpaction in office  - If she remains here we can try and disimpact after 3-4 days of debrox use  - Audiogram after disimpaction  - Will d/w attending
Spontaneous, unlabored and symmetrical

## 2024-04-07 NOTE — PATIENT PROFILE ADULT - NSPROGENBLOODRESTRICT_GEN_A_NUR
88-year-old female with a past medical history of pulmonary sarcoidosis, COPD not on home O2, CAD, hypothyroidism, HTN presenting with shortness of breath. Patient resides at [facility]. Staff noticed the patient was having shortness of breath and hypoxemic in the 70s, EMS noted her to be hypoxemic in the 80s which corrected with oxygen. The patient has multiple admissions for recent febrile/sepsis workup in the setting of UTI. Recently discharged on _____, and was followed by ID for ___, pulmonary for _____, cardiology for ruptured chordae tend and EF ___, and x consult for _____. Currently, the patient is not complaining of chest pain, abdominal pain, nausea, vomiting, fevers.     In the ED, the patient received 1L followed by 500 cc in the setting of heart failure and ruptured chordae tendae with low threshold for HF exacerbation. Her BP temporarily improved with IVF, however, required pressor support with levophed 0.3. MICU consulted for hypotension requiring pressor support in the setting of sepsis and HF from possible UTI. On MICU evaluation, the patient was alert, awake, and had no pain. She received additional 500 cc of fluid and levophed reduced to 0.18. Pt accepted to MICU for IV pressor support    88-year-old female with a past medical history of pulmonary sarcoidosis, COPD not on home O2, CAD, hypothyroidism, HTN presenting with shortness of breath. Patient resides at [facility]. Staff noticed the patient was having shortness of breath and hypoxemic in the 70s, EMS noted her to be hypoxemic in the 80s which corrected with oxygen. The patient has multiple admissions for recent febrile/sepsis workup in the setting of UTI. Recently discharged on 4/3/24, and was followed by ID for sepsis 2/2 colitis/uti, pulmonary for pulmonary sarcoid, cardiology for ruptured chordae tend and EF 50%, and urology for hematuria and h/o L hydroureter. In the previous admission, the pt's sepsis resolved with IV antibiotics, cardiology diuresed patient, and urology recommended further imaging (cystoscopy/ct urogram) however family refused per chart review. The patient returns to ED from [facility] with SOB and fever and turbid urine concerning for similar UTI from previous admission. Currently, no collado in place. The patient is not complaining of chest pain, abdominal pain, nausea, vomiting, fevers.     In the ED, the patient placed on NC sat > 93%, febrile/leukocytosis, and sepsis cultures sent. She was found to be hypotensive in setting of sepsis and received 1L followed by 500 cc in the setting of heart failure and ruptured chordae tendae with low threshold for HF exacerbation. Her BP temporarily improved with IVF, however, required pressor support with levophed 0.3. MICU consulted for hypotension requiring pressor support in the setting of sepsis and HF from possible UTI. On MICU evaluation, the patient was alert, awake, and had no pain. She received additional 500 cc of fluid and levophed reduced to 0.18. Pt accepted to MICU for IV pressor support    none

## 2024-05-05 NOTE — PATIENT PROFILE ADULT. - PRO SERVICES AT [facility]
Patient ID: Galne Villasenor Jr. is a 81 y.o. male.  Referring Physician: Saturnino Kruse MD  [id number]  [place] [address]  Primary Care Provider: Bladimir Ackerman MD  Visit Type: Follow Up      Subjective    HPI How are my blood counts?    Review of Systems   Constitutional: Negative.    HENT:  Negative.     Eyes: Negative.    Respiratory: Negative.     Cardiovascular: Negative.    Gastrointestinal: Negative.    Endocrine: Negative.    Genitourinary: Negative.     Musculoskeletal: Negative.    Skin: Negative.    Neurological: Negative.    Hematological: Negative.    Psychiatric/Behavioral: Negative.          Objective   BSA: 2.08 meters squared  /55 (BP Location: Right arm)   Pulse 61   Temp 36 °C (96.8 °F) (Temporal)   Resp 18   Wt 87.4 kg (192 lb 10.9 oz)   SpO2 99%   BMI 27.65 kg/m²      has a past medical history of Atherosclerotic heart disease of native coronary artery without angina pectoris (04/04/2017), Diabetes mellitus (Multi), Personal history of malignant neoplasm of prostate (04/04/2017), Personal history of other diseases of the circulatory system (04/04/2017), Personal history of other diseases of the respiratory system (04/04/2017), Personal history of other endocrine, nutritional and metabolic disease (04/04/2017), Personal history of other endocrine, nutritional and metabolic disease (04/04/2017), and Personal history of other specified conditions (04/04/2017).   has a past surgical history that includes Rotator cuff repair (04/04/2017); Total knee arthroplasty (04/04/2017); Carpal tunnel release (04/04/2017); Coronary angioplasty (04/04/2017); Other surgical history (04/04/2017); Spinal fusion (04/04/2017); Cardiac electrophysiology procedure (N/A, 1/22/2024); and Cardiac electrophysiology procedure (N/A, 3/1/2024).  Family History   Problem Relation Name Age of Onset    Lung cancer Mother      Cancer Father      Alcohol abuse Father      Other (cardiac disorder)  Father      Brain cancer Sister      Cancer Brother       Oncology History   Hairy cell leukemia, in relapse (Multi)   12/13/2023 Initial Diagnosis    Hairy cell leukemia, in relapse (CMS/HCC)     1/29/2024 - 2/2/2024 Chemotherapy    Cladribine, 5 Day Cycles         Galen Villasenor   reports that he quit smoking about 44 years ago. His smoking use included cigarettes and pipe. He started smoking about 67 years ago. He has a 23 pack-year smoking history. He has been exposed to tobacco smoke. He has never used smokeless tobacco.  He  reports current alcohol use of about 7.0 standard drinks of alcohol per week.  He  reports that he does not currently use drugs.    Physical Exam  Vitals reviewed.   Constitutional:       Appearance: Normal appearance.   HENT:      Head: Normocephalic.      Mouth/Throat:      Mouth: Mucous membranes are moist.   Eyes:      Extraocular Movements: Extraocular movements intact.      Pupils: Pupils are equal, round, and reactive to light.   Cardiovascular:      Rate and Rhythm: Normal rate and regular rhythm.      Heart sounds: Normal heart sounds.   Pulmonary:      Breath sounds: Normal breath sounds.   Abdominal:      General: Bowel sounds are normal.      Palpations: Abdomen is soft.   Musculoskeletal:         General: Normal range of motion.      Cervical back: Normal range of motion and neck supple.   Skin:     General: Skin is warm.   Neurological:      General: No focal deficit present.      Mental Status: He is alert and oriented to person, place, and time.   Psychiatric:         Mood and Affect: Mood normal.         Behavior: Behavior normal.         WBC   Date/Time Value Ref Range Status   04/29/2024 11:22 AM 6.0 4.4 - 11.3 x10*3/uL Final   04/16/2024 10:30 AM 4.5 4.4 - 11.3 x10*3/uL Final   04/01/2024 02:19 PM 2.0 (L) 4.4 - 11.3 x10*3/uL Final     nRBC   Date Value Ref Range Status   04/29/2024 0.0 0.0 - 0.0 /100 WBCs Final   04/16/2024 0.0 0.0 - 0.0 /100 WBCs Final   04/01/2024  "0.0 0.0 - 0.0 /100 WBCs Final     RBC   Date Value Ref Range Status   04/29/2024 3.88 (L) 4.50 - 5.90 x10*6/uL Final   04/16/2024 3.34 (L) 4.50 - 5.90 x10*6/uL Final   04/01/2024 2.71 (L) 4.50 - 5.90 x10*6/uL Final     Hemoglobin   Date Value Ref Range Status   04/29/2024 12.3 (L) 13.5 - 17.5 g/dL Final   04/16/2024 10.7 (L) 13.5 - 17.5 g/dL Final   04/01/2024 9.0 (L) 13.5 - 17.5 g/dL Final     Hematocrit   Date Value Ref Range Status   04/29/2024 39.0 (L) 41.0 - 52.0 % Final   04/16/2024 34.6 (L) 41.0 - 52.0 % Final   04/01/2024 29.2 (L) 41.0 - 52.0 % Final     MCV   Date/Time Value Ref Range Status   04/29/2024 11:22  (H) 80 - 100 fL Final   04/16/2024 10:30  (H) 80 - 100 fL Final   04/01/2024 02:19  (H) 80 - 100 fL Final     MCH   Date/Time Value Ref Range Status   04/29/2024 11:22 AM 31.7 26.0 - 34.0 pg Final   04/16/2024 10:30 AM 32.0 26.0 - 34.0 pg Final   04/01/2024 02:19 PM 33.2 26.0 - 34.0 pg Final     MCHC   Date/Time Value Ref Range Status   04/29/2024 11:22 AM 31.5 (L) 32.0 - 36.0 g/dL Final   04/16/2024 10:30 AM 30.9 (L) 32.0 - 36.0 g/dL Final   04/01/2024 02:19 PM 30.8 (L) 32.0 - 36.0 g/dL Final     RDW   Date/Time Value Ref Range Status   04/29/2024 11:22 AM 16.4 (H) 11.5 - 14.5 % Final   04/16/2024 10:30 AM 17.0 (H) 11.5 - 14.5 % Final   04/01/2024 02:19 PM 18.7 (H) 11.5 - 14.5 % Final     Platelets   Date/Time Value Ref Range Status   04/29/2024 11:22  150 - 450 x10*3/uL Final   04/16/2024 10:30  150 - 450 x10*3/uL Final   04/01/2024 02:19  150 - 450 x10*3/uL Final     No results found for: \"MPV\"  Neutrophils %   Date/Time Value Ref Range Status   04/29/2024 11:22 AM 84.0 40.0 - 80.0 % Final   04/16/2024 10:30 AM 82.8 40.0 - 80.0 % Final   04/01/2024 02:19 PM 77.6 40.0 - 80.0 % Final     Immature Granulocytes %, Automated   Date/Time Value Ref Range Status   04/29/2024 11:22 AM 1.7 (H) 0.0 - 0.9 % Final     Comment:     Immature Granulocyte Count (IG) includes " promyelocytes, myelocytes and metamyelocytes but does not include bands. Percent differential counts (%) should be interpreted in the context of the absolute cell counts (cells/UL).   04/16/2024 10:30 AM 2.2 (H) 0.0 - 0.9 % Final     Comment:     Immature Granulocyte Count (IG) includes promyelocytes, myelocytes and metamyelocytes but does not include bands. Percent differential counts (%) should be interpreted in the context of the absolute cell counts (cells/UL).   04/01/2024 02:19 PM 1.0 (H) 0.0 - 0.9 % Final     Comment:     Immature Granulocyte Count (IG) includes promyelocytes, myelocytes and metamyelocytes but does not include bands. Percent differential counts (%) should be interpreted in the context of the absolute cell counts (cells/UL).     Lymphocytes %, Manual   Date/Time Value Ref Range Status   03/11/2024 01:18 PM 8.0 13.0 - 44.0 % Final     Lymphocytes %   Date/Time Value Ref Range Status   04/29/2024 11:22 AM 8.0 13.0 - 44.0 % Final   04/16/2024 10:30 AM 8.3 13.0 - 44.0 % Final   04/01/2024 02:19 PM 13.9 13.0 - 44.0 % Final     Monocytes %, Manual   Date/Time Value Ref Range Status   03/11/2024 01:18 PM 0.0 2.0 - 10.0 % Final     Monocytes %   Date/Time Value Ref Range Status   04/29/2024 11:22 AM 5.7 2.0 - 10.0 % Final   04/16/2024 10:30 AM 4.0 2.0 - 10.0 % Final   04/01/2024 02:19 PM 2.0 2.0 - 10.0 % Final     Eosinophils %, Manual   Date/Time Value Ref Range Status   03/11/2024 01:18 PM 2.0 0.0 - 6.0 % Final     Eosinophils %   Date/Time Value Ref Range Status   04/29/2024 11:22 AM 0.3 0.0 - 6.0 % Final   04/16/2024 10:30 AM 2.5 0.0 - 6.0 % Final   04/01/2024 02:19 PM 5.0 0.0 - 6.0 % Final     Basophils %, Manual   Date/Time Value Ref Range Status   03/11/2024 01:18 PM 1.0 0.0 - 2.0 % Final     Basophils %   Date/Time Value Ref Range Status   04/29/2024 11:22 AM 0.3 0.0 - 2.0 % Final   04/16/2024 10:30 AM 0.2 0.0 - 2.0 % Final   04/01/2024 02:19 PM 0.5 0.0 - 2.0 % Final     Neutrophils Absolute    Date/Time Value Ref Range Status   04/29/2024 11:22 AM 5.03 1.60 - 5.50 x10*3/uL Final     Comment:     Percent differential counts (%) should be interpreted in the context of the absolute cell counts (cells/uL).   04/16/2024 10:30 AM 3.70 1.60 - 5.50 x10*3/uL Final     Comment:     Percent differential counts (%) should be interpreted in the context of the absolute cell counts (cells/uL).   04/01/2024 02:19 PM 1.57 (L) 1.60 - 5.50 x10*3/uL Final     Comment:     Percent differential counts (%) should be interpreted in the context of the absolute cell counts (cells/uL).     Immature Granulocytes Absolute, Automated   Date/Time Value Ref Range Status   04/29/2024 11:22 AM 0.10 0.00 - 0.50 x10*3/uL Final   04/16/2024 10:30 AM 0.10 0.00 - 0.50 x10*3/uL Final   04/01/2024 02:19 PM 0.02 0.00 - 0.50 x10*3/uL Final     Lymphocytes Absolute   Date/Time Value Ref Range Status   04/29/2024 11:22 AM 0.48 (L) 0.80 - 3.00 x10*3/uL Final   04/16/2024 10:30 AM 0.37 (L) 0.80 - 3.00 x10*3/uL Final   04/01/2024 02:19 PM 0.28 (L) 0.80 - 3.00 x10*3/uL Final     Monocytes Absolute   Date/Time Value Ref Range Status   04/29/2024 11:22 AM 0.34 0.05 - 0.80 x10*3/uL Final   04/16/2024 10:30 AM 0.18 0.05 - 0.80 x10*3/uL Final   04/01/2024 02:19 PM 0.04 (L) 0.05 - 0.80 x10*3/uL Final     Eosinophils Absolute   Date/Time Value Ref Range Status   04/29/2024 11:22 AM 0.02 0.00 - 0.40 x10*3/uL Final   04/16/2024 10:30 AM 0.11 0.00 - 0.40 x10*3/uL Final   04/01/2024 02:19 PM 0.10 0.00 - 0.40 x10*3/uL Final     Eosinophils Absolute, Manual   Date/Time Value Ref Range Status   03/11/2024 01:18 PM 0.04 0.00 - 0.40 x10*3/uL Final     Basophils Absolute   Date/Time Value Ref Range Status   04/29/2024 11:22 AM 0.02 0.00 - 0.10 x10*3/uL Final   04/16/2024 10:30 AM 0.01 0.00 - 0.10 x10*3/uL Final   04/01/2024 02:19 PM 0.01 0.00 - 0.10 x10*3/uL Final     Basophils Absolute, Manual   Date/Time Value Ref Range Status   03/11/2024 01:18 PM 0.02 0.00 - 0.10  "x10*3/uL Final       No components found for: \"PT\"  aPTT   Date/Time Value Ref Range Status   2024 08:58 AM 44 (H) 27 - 38 seconds Final     Medication Documentation Review Audit       Reviewed by Louann Prather MA (Medical Assistant) on 24 at 1048      Medication Order Taking? Sig Documenting Provider Last Dose Status   apixaban (Eliquis) 5 mg tablet [id number] Yes Take 1 tablet (5 mg) by mouth 2 times a day. Calli Escobar MD Taking Active   dofetilide (Tikosyn) 125 mcg capsule [id number]  Take 1 capsule (125 mcg) by mouth every 12 hours. Coty Walter APRN-CNP   24 2359   DULoxetine (Cymbalta) 60 mg DR capsule [id number] Yes Take 2 capsules (120 mg) by mouth once daily. Calli Escobar MD Taking Active   empagliflozin (Jardiance) 25 mg [id number] Yes Take 0.5 tablets (12.5 mg) by mouth once daily. Calli Escobar MD Taking Active   fenofibrate (Triglide) 160 mg tablet [id number] Yes Take 1 tablet (160 mg) by mouth once daily.  WITH FOOD Historical MD Luz Taking Active   gabapentin (Neurontin) 600 mg tablet [id number] Yes Take 1 tablet (600 mg) by mouth in the evening. Take 2 tablets (1,200 mg) by mouth at bedtime. Calli Escobar MD Taking Active   isosorbide mononitrate ER (Imdur) 30 mg 24 hr tablet [id number] Yes Take 1 tablet (30 mg) by mouth once daily. Calli Escobar MD Taking Active   methIMAzole (Tapazole) 5 mg tablet [id number] Yes Take 1 tablet (5 mg) by mouth once daily. Brisa Skinner MD Taking Active   omeprazole (PriLOSEC) 20 mg DR capsule [id number]  Take 1 capsule (20 mg) by mouth 2 times a day before meals. For 45 days then resume once daily dosing Eufemia Rodrigez, APRN-CNP   24 2359   predniSONE (Deltasone) 10 mg tablet [id number] Yes TAKE 6 TABLETS BY MOUTH ONCE DAILY FOR 4 DAYS THEN 4 ONCE DAILY FOR 4 DAYS THEN 2 ONCE DAILY FOR 4 DAYS THEN 1/2 (ONE-HALF) ONCE DAILY FOR 4 DAYS Historical Provider MD Taking Active   rosuvastatin (Crestor) 20 " mg tablet [id number] Yes Take 1 tablet (20 mg) by mouth once daily. Historical Provider, MD Taking Active   vit A/vit C/vit E/zinc/copper (PRESERVISION AREDS ORAL) [id number] Yes Take 1 tablet by mouth twice a day. Historical Provider, MD Taking Active                   Assessment/Plan    1) hairy cell leukemia  -old records from outside hematology group were finally obtained on 12/7/2023: on 7/17/2018 CBC showed wbc 8.8, hgb 14.7, plt 109,000; bmbx was done to rule out relapse- bmbx was sent to [facility]:  no morphologic or immunophenotypic evidence of residual hairy cell leukemia; expanded population of CD8+ T cells; mildly hypercellular marrow with maturing trilineage hematopoiesis (30-40%), BRAF negative  -diagnosed in 2002, treated with cladribine  -remained mildly thrombocytopenic ever since  -last flow cytometry of peripheral blood done on 7/21/2023 was negative  -became more neutropenic than usual over the summer--[facility], Larry was dealing with ongoing staph infection at the time  -had bone marrow bx done on 11/28/2023--showed extensive marrow involvement by hairy cell leukemia; no evidence of T cell LGL; positive for BRAF  -discussed treatment options--cladribine vs cladribine + rituximab  -he opted for cladribine alone  -in the future, if he relapses again, he could go on either ibrutinib (not as desirable given the med's track record for exacerbating atrial fibrillation) or vemurafenib + rituximab  -he completed course of cladribine from January 29 through Feb 2, 2024  -he received neulasta x 1 dose  -he says one week ago he woke up and felt like a new man  -labs done on 4/29/2024 included CBC + COMP  --results reviewed--wbc 6.0, hgb 12.3 plt 174,000, ANC 5030, creatinine 1.17, calcium 9.2, AST 16, ALT 20  -will see him again in 3 months     2) CAD  -on ASA  -on imdur     3) atrial fibrillation  -on dofetilide  -on eliquis  -his ablation was delayed until January 22, 2024 at [facility]  -since  then he has been feeling very weak and lightheaded at times, legs will just give out under him, so that he at times is afraid to get up to walk around  -was in the hospital for loading on tikosyn       4) hyperlipidemia  -on fenofibrate  -on rosuvastatin     5) hypertension  -on lisinopril     6) diabetes  -on metformin     7) hyperthyroidism  -on methimazole                    Problem List Items Addressed This Visit             ICD-10-CM    Hairy cell leukemia, in relapse (Multi) C91.42    Relevant Orders    Clinic Appointment Request Follow Up; SATURNINO KRUSE; [facility]    CBC and Auto Differential    Comprehensive metabolic panel            Saturnino Kruse MD                          home health care

## 2024-11-23 NOTE — DISCHARGE NOTE ADULT - NS AS DC PROVIDER CONTACT Y/N MULTI
Placed 10/2024  Continue for tube feedings, routine care, and aspiration precaution  Per previous speech eval: Sips of liquids and ice chips  Confirmed tube feedings with nursing home: Jevity 1.2 at 85 cc/h with water flush 150 every 4 hours   Yes

## 2024-12-16 NOTE — PATIENT PROFILE ADULT - FUNCTIONAL SCREEN CURRENT LEVEL: COMMUNICATION, MLM
Date: 2024  Time: 4:17 AM    Admission H&P  Char Figueroa,  1994, MRN [id number]    PCP: No Ref-Primary, Physician, None  Primary OB: Epifanio   Code status:  Prior              Chief Complaint: Contractions     OBSTETRICAL / DATING HISTORY:  Estimated Date of Delivery: Estimated Date of Delivery: 2025  Gestational Age: 36w0d    OB History    Para Term  AB Living   2 1 1 0 0 1   SAB IAB Ectopic Multiple Live Births   0 0 0 0 1      # Outcome Date GA Lbr Seferino/2nd Weight Sex Type Anes PTL Lv   2 Current            1 Term 10/17/22 37w4d  3.28 kg (7 lb 3.7 oz) F  Spinal N PATTI      Name: CANDIDO,FEMALE-CHAR      Apgar1: 9  Apgar5: 9       HPI:    Char Figueroa is a 30 year old year old at 36w0d weeks. She presented to L&D for contractions.  She was also on L&D Saturday night for contractions, was watched overnight and discharged home Forrest morning.    She presented again tonight for painful contractions.  Cervix now 3 cm, changed from her last visit, feeling uncomfortable with contractions now consistent with early labor.    She has a history of  x 1.  She has had a low-lying placenta, had planned to re-assess on US this week but was scheduled for repeat , today she continues to express her desire to deliver via repeat .  She has also decided to proceed with tubal sterilization    OB Hx: 1 prior  for breech in     Medical History  Past Medical History:   Diagnosis Date    Anxiety        Surgical History  Past Surgical History:   Procedure Laterality Date     SECTION N/A 10/17/2022    Procedure: PRIMARY  SECTION;  Surgeon: Mateo Cortez MD;  Location: [facility] OR       Social History  Social History     Socioeconomic History    Marital status:      Spouse name: Not on file    Number of children: Not on file    Years of education: Not on file    Highest education level: Not on file   Occupational History    Not  on file   Tobacco Use    Smoking status: Never    Smokeless tobacco: Never   Substance and Sexual Activity    Alcohol use: Never    Drug use: Never    Sexual activity: Yes     Partners: Male     Birth control/protection: None   Other Topics Concern    Not on file   Social History Narrative    Not on file     Social Drivers of Health     Financial Resource Strain: Low Risk  (12/16/2024)    Financial Resource Strain     Within the past 12 months, have you or your family members you live with been unable to get utilities (heat, electricity) when it was really needed?: No   Food Insecurity: Low Risk  (12/16/2024)    Food Insecurity     Within the past 12 months, did you worry that your food would run out before you got money to buy more?: No     Within the past 12 months, did the food you bought just not last and you didn t have money to get more?: No   Transportation Needs: Low Risk  (12/16/2024)    Transportation Needs     Within the past 12 months, has lack of transportation kept you from medical appointments, getting your medicines, non-medical meetings or appointments, work, or from getting things that you need?: No   Physical Activity: Not on file   Stress: Not on file   Social Connections: Not on file   Interpersonal Safety: Not on file   Housing Stability: Low Risk  (12/16/2024)    Housing Stability     Do you have housing? : Yes     Are you worried about losing your housing?: No       No Known Allergies    Medications Prior to Admission   Medication Sig Dispense Refill Last Dose/Taking    Prenatal Vit-Fe Fumarate-FA (PRENATAL MULTIVITAMIN W/IRON) 27-0.8 MG tablet Take 1 tablet by mouth daily       Vitamin D3 (CHOLECALCIFEROL) 25 mcg (1000 units) tablet Take by mouth daily          Review of Systems:  Otherwise negative except per HPI    Physical Exam:  Temp:  [97.5  F (36.4  C)] 97.5  F (36.4  C)  Pulse:  [77] 77  Resp:  [15] 15  BP: (125)/(80) 125/80    /80 (BP Location: Right arm, Patient Position:  "Semi-Aldana's, Cuff Size: Adult Regular)   Pulse 77   Temp 97.5  F (36.4  C) (Tympanic)   Resp 15   Ht 1.613 m (5' 3.5\")   Wt 83.5 kg (184 lb)   BMI 32.08 kg/m      General: NAD  CV: RRR  Lungs: clear  Abdomen: soft, gravid    Pertinent Labs  Admission on 12/16/2024   Component Date Value Ref Range Status    WBC Count 12/16/2024 8.9  4.0 - 11.0 10e3/uL Final    RBC Count 12/16/2024 4.09  3.80 - 5.20 10e6/uL Final    Hemoglobin 12/16/2024 12.5  11.7 - 15.7 g/dL Final    Hematocrit 12/16/2024 36.4  35.0 - 47.0 % Final    MCV 12/16/2024 89  78 - 100 fL Final    MCH 12/16/2024 30.6  26.5 - 33.0 pg Final    MCHC 12/16/2024 34.3  31.5 - 36.5 g/dL Final    RDW 12/16/2024 12.8  10.0 - 15.0 % Final    Platelet Count 12/16/2024 204  150 - 450 10e3/uL Final    ABO/RH(D) 12/16/2024 O POS   Preliminary    SPECIMEN EXPIRATION DATE 12/16/2024 [id number]   Preliminary   Lab Requisition on 10/23/2024   Component Date Value Ref Range Status    Treponema Antibody Total 10/23/2024 Nonreactive  Nonreactive Final    WBC Count 10/23/2024 10.2  4.0 - 11.0 10e3/uL Final    RBC Count 10/23/2024 3.98  3.80 - 5.20 10e6/uL Final    Hemoglobin 10/23/2024 12.3  11.7 - 15.7 g/dL Final    Hematocrit 10/23/2024 37.3  35.0 - 47.0 % Final    MCV 10/23/2024 94  78 - 100 fL Final    MCH 10/23/2024 30.9  26.5 - 33.0 pg Final    MCHC 10/23/2024 33.0  31.5 - 36.5 g/dL Final    RDW 10/23/2024 13.1  10.0 - 15.0 % Final    Platelet Count 10/23/2024 227  150 - 450 10e3/uL Final   Lab Requisition on 07/03/2024   Component Date Value Ref Range Status    Rubella Opal IgG Instrument Value 07/03/2024 1.06  <0.90 Index Final    Rubella Antibody IgG 07/03/2024 Positive   Final    Suggests previous exposure or immunization and probable immunity.    Hepatitis C Antibody 07/03/2024 Nonreactive  Nonreactive Final    A nonreactive screening test result does not exclude the possibility of exposure to or infection with HCV. Nonreactive screening test results in " individuals with prior exposure to HCV may be due to antibody levels below the limit of detection of this assay or lack of reactivity to the HCV antigens used in this assay. Patients with recent HCV infections (<3 months from time of exposure) may have false-negative HCV antibody results due to the time needed for seroconversion (average of 8 to 9 weeks).    Hepatitis B Surface Antigen 07/03/2024 Nonreactive  Nonreactive Final    HIV Antigen Antibody Combo 07/03/2024 Nonreactive  Nonreactive Final    Negative HIV-1 p24 antigen and HIV-1/2 antibody screening test results usually indicate the absence of HIV-1 and HIV-2 infection. However, such negative results do not rule-out acute HIV infection.  If acute HIV-1 or HIV-2 infection is suspected, detection of HIV-1 or HIV-2 RNA  is recommended.     Rapid Plasma Reagin 07/03/2024 Nonreactive  Nonreactive Final    Hemoglobin A1C 07/03/2024 5.3  <5.7 % Final    Normal <5.7%   Prediabetes 5.7-6.4%    Diabetes 6.5% or higher     Note: Adopted from ADA consensus guidelines.    WBC Count 07/03/2024 9.3  4.0 - 11.0 10e3/uL Final    RBC Count 07/03/2024 4.07  3.80 - 5.20 10e6/uL Final    Hemoglobin 07/03/2024 12.8  11.7 - 15.7 g/dL Final    Hematocrit 07/03/2024 37.2  35.0 - 47.0 % Final    MCV 07/03/2024 91  78 - 100 fL Final    MCH 07/03/2024 31.4  26.5 - 33.0 pg Final    MCHC 07/03/2024 34.4  31.5 - 36.5 g/dL Final    RDW 07/03/2024 12.3  10.0 - 15.0 % Final    Platelet Count 07/03/2024 267  150 - 450 10e3/uL Final    % Neutrophils 07/03/2024 63  % Final    % Lymphocytes 07/03/2024 30  % Final    % Monocytes 07/03/2024 6  % Final    % Eosinophils 07/03/2024 1  % Final    % Basophils 07/03/2024 0  % Final    % Immature Granulocytes 07/03/2024 0  % Final    NRBCs per 100 WBC 07/03/2024 0  <1 /100 Final    Absolute Neutrophils 07/03/2024 5.7  1.6 - 8.3 10e3/uL Final    Absolute Lymphocytes 07/03/2024 2.8  0.8 - 5.3 10e3/uL Final    Absolute Monocytes 07/03/2024 0.6  0.0 - 1.3  10e3/uL Final    Absolute Eosinophils 2024 0.1  0.0 - 0.7 10e3/uL Final    Absolute Basophils 2024 0.0  0.0 - 0.2 10e3/uL Final    Absolute Immature Granulocytes 2024 0.0  <=0.4 10e3/uL Final    Absolute NRBCs 2024 0.0  10e3/uL Final    ABO/RH(D) 2024 O POS   Final    Antibody Screen 2024 Negative  Negative Final    SPECIMEN EXPIRATION DATE 2024 [id number]   Final   Lab Requisition on 2024   Component Date Value Ref Range Status    Culture 2024 <10,000 CFU/mL Mixture of Urogenital Rolanda   Final     GBS Negative    Pertinent Radiology:   Low-lying posterior placenta measuring 7 mm from internal os at 32 weeks, left renal pyelectasis measuring 7 mm also at 32 weeks      Impression/Plan:  1. IUP at 36w0d weeks,history of , early labor  -Patient continues to desire to deliver via repeat .  Had planned for repeat US this week to assess placental location, offered US on admission to assess placenta for consideration of TOLAC but she prefers to proceed with .  -Patient uncomfortable with contractions and cervix has changed from fingertip on prior admission to now 3 cm most consistent with early labor, plan to proceed to OR for repeat   -Reviewed risks of  including but not limited to pain, bleeding, infection, damage to nearby organs including but not limited to bowel, bladder, ureters, possibly requiring additional surgery for repair.  Reviewed blood transfusion and patient agreeable if indicated  2. Multiparity desiring sterilization:  -Patient had been considering sterilization at time of , today has decided to proceed with sterilization at time of .  Reviewed recommendation to remove entire fallopian tube, discussed that removal of the fimbriated end of the tube may reduce risk of future ovarian cancer.  Reviewed that sterilization is completely permanent and can NOT be reversed.  Patient voices  understanding, desires to proceed with sterilization today  3. Renal pyelectasis:  -Present at 32 weeks on US, baby will need post-delivery renal US    Provider: Janeth Godfrey MD    Date: 2024  Time: 4:17 PAOLA Figueroa,  1994, MRN [id number]   0 = understands/communicates without difficulty

## 2025-02-10 NOTE — ED PROVIDER NOTE - PRO INTERPRETER NEED 2
Pt is calling needing to reschedule colon procedure for tomorrow due to transportation issues. Pt also inquiring information regarding entyvio infusions. She recently got her infusions approved and infusion center is able to have her seen tomorrow for infusion since her last dose was 10 weeks ago, she wants to make sure if maintenance dose is still ok to take. Pls advise    English

## 2025-05-04 NOTE — ED ADULT NURSE NOTE - NSALCOHOLLASTUSE_GEN_A_CORE_DT
Admitted under [facility] for Prepatellar bursitis of left knee   S/p IRRIGATION AND DEBRIDEMENT, LOWER EXTREMITY (Left), Excisional debridement left pre patella abscess, Aspiration of left knee per ortho on 5/4/25  Wound cultures positive for Gram-positive cocci, blood cultures negative to date  Infectious Disease recommended 12 days of Zyvox.  Patient will follow up with ID in 1 week  Case discussed with Orthopedic surgery on day of discharge they have recommended removing the packing prior to discharge but cleared for discharge once that is completed.  PT/OT had evaluated the patient and recommended no need for outpatient therapy  Follow up with orthopedic trauma clinic in 2 weeks        
31-Mar-2019

## 2025-06-11 NOTE — PHYSICAL THERAPY INITIAL EVALUATION ADULT - PERTINENT HX OF CURRENT PROBLEM, REHAB EVAL
Family Medicine     Patient name: Criss Cohen  MRN: [id number]  : 1972  PCP NAME: Maria De Jesus Schroeder MD    Subjective       Patient ID: Criss Cohen is a 52 y.o. Black or  female presents to the clinic today for an annual physical exam. Chronic medical issues, if present, have been documented.   Active Problem List with Overview Notes    Diagnosis Date Noted    Gastroesophageal reflux disease without esophagitis 2025    Decreased ROM of right shoulder 2025    S/p TLH/BSO 3/14/25 2025    Pelvic floor dysfunction 10/05/2020    Weakness 10/05/2020    Other lack of coordination 10/05/2020    Generalized anxiety disorder 2020    Anxiety 2012    AR (allergic rhinitis) 2012    Urge incontinence 2012       Current History  No chief complaint on file.      History of Present Illness    Patient presents today to establish care with a new primary care physician    She has GERD since age 41, which flares with spicy foods, wine consumption, and ibuprofen use. She underwent hysterectomy in 2023 for fibroids and ovarian cysts. She has history of frozen shoulder syndrome with improvement following physical therapy. She has vitamin D deficiency.    She takes Protonix for GERD, Paxil for hot flashes, Estradiol vaginal cream following hysterectomy, and Xanax as needed for stress approximately 3-4 times per year.    Her mother has thyroid issues managed with Synthroid. Her father has low blood pressure. There is a family history of blood clots after surgery, with a family member having  from blood clots following a major surgical procedure.    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 She exercises 3 times per week, incorporating both cardio and resistance training. She reports getting 8 hours of sleep per night. She denies smoking and recreational drug use.  She is planning to take a trip to  [place].      ROS:  General: -fever, -chills, -fatigue, -weight gain, -weight loss  Eyes: -vision changes, -redness, -discharge  ENT: -ear pain, -nasal congestion, -sore throat  Cardiovascular: -chest pain, -palpitations, -lower extremity edema  Respiratory: -cough, -shortness of breath  Gastrointestinal: -abdominal pain, -nausea, -vomiting, -diarrhea, -constipation, -blood in stool, +indigestion  Genitourinary: -dysuria, -hematuria, -frequency  Musculoskeletal: -joint pain, -muscle pain, +pain with movement  Skin: -rash, -lesion, +itching  Neurological: -headache, -dizziness, -numbness, -tingling  Psychiatric: +anxiety, -depression, -sleep difficulty  Endocrine: +hot flashes          Medications   Medication List with Changes/Refills   Current Medications    ALPRAZOLAM (XANAX) 0.25 MG TABLET    Take 0.25 mg by mouth daily as needed.    DOCUSATE SODIUM (COLACE) 50 MG CAPSULE    Take 50 mg by mouth 2 (two) times daily.    ESTRADIOL (ESTRACE) 0.01 % (0.1 MG/GRAM) VAGINAL CREAM    Place 1 g vaginally once daily.    MELOXICAM (MOBIC) 15 MG TABLET    Take 1 tablet (15 mg total) by mouth daily as needed for Pain.    NAPROXEN (NAPROSYN) 500 MG TABLET    Take 1 tablet (500 mg total) by mouth 2 (two) times daily.    NAPROXEN SODIUM (ANAPROX) 550 MG TABLET    Take 1 tablet (550 mg total) by mouth 2 (two) times daily with meals.    PAROXETINE (PAXIL) 10 MG TABLET    Take 1 tablet (10 mg total) by mouth once daily.    POLYETHYLENE GLYCOL (GLYCOLAX) 17 GRAM/DOSE POWDER    Take 17 g by mouth once daily.   Changed and/or Refilled Medications    Modified Medication Previous Medication    PANTOPRAZOLE (PROTONIX) 40 MG TABLET pantoprazole (PROTONIX) 40 MG tablet       Take 1 tablet (40 mg total) by mouth once daily.    Take 40 mg by mouth.   Discontinued Medications    SEMAGLUTIDE SUBQ    Inject 1.5 mg into the skin every 7 days.       Allergies  Review of patient's allergies indicates:   Allergen Reactions    Cefaclor Rash     Other  reaction(s): Rash  Other reaction(s): Hives     Past Medical history  Past Medical History:   Diagnosis Date    Acid reflux     Allergy     Anxiety           Surgical History  Past Surgical History:   Procedure Laterality Date     SECTION      twice    COLONOSCOPY N/A 11/3/2023    Procedure: COLONOSCOPY;  Surgeon: Callum Villarreal MD;  Location: [facility] ENDO;  Service: Endoscopy;  Laterality: N/A;  Ref by ZENA Greer, On Victoza -instructed to day of procedure, carlene Neves- PC  10/11/23-change in MD scope schedule due to IP block - Pt r/s to 11/3/23- per Rosemary ok to use IP block for DR. Villarreal on 11/3/23/  prep ins on portal - PEG- [facility]- hold per protocl x 7 day    LAPAROSCOPIC SALPINGO-OOPHORECTOMY Bilateral 3/14/2025    Procedure: SALPINGO-OOPHORECTOMY, LAPAROSCOPIC;  Surgeon: Jessica Chen MD;  Location: [facility] OR;  Service: OB/GYN;  Laterality: Bilateral;    LAPAROSCOPIC TOTAL HYSTERECTOMY N/A 3/14/2025    Procedure: HYSTERECTOMY, TOTAL, LAPAROSCOPIC;  Surgeon: Jessica Chen MD;  Location: [facility] OR;  Service: OB/GYN;  Laterality: N/A;    LIPOSUCTION      2019     Family History  Family History   Problem Relation Name Age of Onset    Hypertension Mother      Vision loss Mother      Thyroid disease Mother          nodule    Colon polyps Mother  30    Diabetes Maternal Aunt      Hypertension Maternal Aunt      Diabetes Maternal Uncle      Hypertension Maternal Uncle      Hypertension Maternal Grandmother      Stroke Maternal Grandmother      Colon polyps Maternal Grandmother      Diabetes Maternal Grandmother      Diabetes Maternal Grandfather      Arthritis Paternal Grandmother      Hypertension Paternal Grandmother       Social History  Social History[1]     Health Maintainence:   Health Maintenance Due   Topic Date Due    TETANUS VACCINE  2023    Shingles Vaccine (1 of 2) Never done    Pneumococcal Vaccines (Age 50+) (1 of 1 - PCV) Never done    COVID-19 Vaccine (5 -  2024-25 season) 09/01/2024          Review of system     ROS  Negative except as mentioned above  Physical exam   Vital Signs  Vitals:    06/11/25 1350   BP: 110/70   Pulse: 67   Temp: 98.4 °F (36.9 °C)   TempSrc: Oral   SpO2: 98%   Weight: 77 kg (169 lb 12.1 oz)       Physical Exam  Constitutional:       Appearance: Normal appearance.   Cardiovascular:      Rate and Rhythm: Normal rate and regular rhythm.      Pulses: Normal pulses.      Heart sounds: Normal heart sounds.   Pulmonary:      Effort: Pulmonary effort is normal. No respiratory distress.      Breath sounds: Normal breath sounds. No wheezing.   Abdominal:      General: Bowel sounds are normal. There is no distension.      Palpations: Abdomen is soft.      Tenderness: There is no abdominal tenderness.   Musculoskeletal:      Right lower leg: No edema.      Left lower leg: No edema.   Skin:     General: Skin is warm.   Neurological:      Mental Status: She is alert and oriented to person, place, and time.           Laboratory data and other diagnostic findings     Previous labs reviewed.    Lab Results   Component Value Date    WBC 7.52 03/11/2025    HGB 13.3 03/11/2025    HCT 36.4 (L) 03/11/2025     03/11/2025    CHOL 179 01/17/2020    TRIG 103 01/17/2020    HDL 60 01/17/2020    ALT 20 04/19/2023    AST 14 04/19/2023     04/19/2023    K 4.4 04/19/2023     04/19/2023    CREATININE 0.9 04/19/2023    BUN 12 04/19/2023    CO2 24 04/19/2023    TSH 0.940 04/19/2023    INR 0.9 10/16/2009    GLUF 74 03/17/2008    HGBA1C 5.0 04/19/2023       Assessment and plan       Annual physical exam  Counseled on age appropriate medical preventative services, including age appropriate cancer screenings, over all nutritional health, need for a consistent exercise regimen and an over all push towards maintaining a vigorous and active lifestyle. Counseled on age appropriate vaccines and discussed upcoming health care needs based on age/gender. Spent time with  patient counseling on need for a good patient/doctor relationship moving forward. Discussed use of common OTC medications and supplements. Discussed common dietary aids and use of caffeine and the need for good sleep hygiene and stress management.  Recommend daily sun protection/avoidance and use of at least SPF 30     -     Lipid Panel; Future; Expected date: 06/11/2025  -     TSH; Future; Expected date: 06/11/2025  -     Hemoglobin A1C; Future; Expected date: 06/11/2025  -     Comprehensive Metabolic Panel; Future; Expected date: 06/11/2025  -     CBC Auto Differential; Future; Expected date: 06/11/2025  -     T4, Free; Future; Expected date: 06/11/2025    Hypovitaminosis D  -     Vitamin D; Future; Expected date: 06/11/2025    Gastroesophageal reflux disease without esophagitis    Intermittent.  Well controlled  -     pantoprazole (PROTONIX) 40 MG tablet; Take 1 tablet (40 mg total) by mouth once daily.  Dispense: 30 tablet; Refill: 5    Decreased ROM of right shoulder   Was diagnosed with adhesive capsulitis.  Has responded very well to physical therapy.   Continue with sports Medicine and PT.         Problem List Items Addressed This Visit       Decreased ROM of right shoulder    Gastroesophageal reflux disease without esophagitis    Relevant Medications    pantoprazole (PROTONIX) 40 MG tablet     Other Visit Diagnoses         Annual physical exam    -  Primary    Relevant Orders    Lipid Panel    TSH    Hemoglobin A1C    Comprehensive Metabolic Panel    CBC Auto Differential    T4, Free      Hypovitaminosis D        Relevant Orders    Vitamin D                    Future Appointments  Future Appointments   Date Time Provider Department Center   6/11/2025  2:30 PM [facility], [facility] [place] [facility]   6/11/2025  3:15 PM Jessica Chen MD [facility]   9/15/2025  1:30 PM Nazario Marshall [facility] [place]         No follow-ups on file. and PRN.      Maria De Jesus Schroeder MD    This office  pt admitted for SOB note was created by combination of typed  and MModal dictation.  Transcription errors may be present.  If there are any questions, please contact me.     This note was generated with the assistance of ambient listening technology. Verbal consent was obtained by the patient and accompanying visitor(s) for the recording of patient appointment to facilitate this note. I attest to having reviewed and edited the generated note for accuracy, though some syntax or spelling errors may persist. Please contact the author of this note for any clarification.                   [1]   Social History  Tobacco Use    Smoking status: Never     Passive exposure: Never    Smokeless tobacco: Never   Substance Use Topics    Alcohol use: Yes     Comment: social    Drug use: No